# Patient Record
Sex: FEMALE | Race: WHITE | Employment: FULL TIME | URBAN - METROPOLITAN AREA
[De-identification: names, ages, dates, MRNs, and addresses within clinical notes are randomized per-mention and may not be internally consistent; named-entity substitution may affect disease eponyms.]

---

## 2017-02-02 PROBLEM — F10.10 ALCOHOL ABUSE: Status: ACTIVE | Noted: 2017-02-02

## 2017-02-02 PROBLEM — R45.851 SUICIDAL IDEATIONS: Status: ACTIVE | Noted: 2017-02-02

## 2018-08-03 ENCOUNTER — HOSPITAL ENCOUNTER (EMERGENCY)
Age: 43
Discharge: HOME | End: 2018-08-03
Payer: COMMERCIAL

## 2018-08-03 VITALS
SYSTOLIC BLOOD PRESSURE: 128 MMHG | HEART RATE: 103 BPM | BODY MASS INDEX: 36.95 KG/M2 | RESPIRATION RATE: 16 BRPM | WEIGHT: 243 LBS | DIASTOLIC BLOOD PRESSURE: 87 MMHG | OXYGEN SATURATION: 96 % | TEMPERATURE: 97.9 F

## 2018-08-03 DIAGNOSIS — J01.90 ACUTE NON-RECURRENT SINUSITIS, UNSPECIFIED LOCATION: Primary | ICD-10-CM

## 2018-08-03 PROCEDURE — 99212 OFFICE O/P EST SF 10 MIN: CPT

## 2018-08-03 RX ORDER — BROMPHENIRAMINE MALEATE, PSEUDOEPHEDRINE HYDROCHLORIDE, AND DEXTROMETHORPHAN HYDROBROMIDE 2; 30; 10 MG/5ML; MG/5ML; MG/5ML
10 SYRUP ORAL 4 TIMES DAILY PRN
Qty: 140 ML | Refills: 0 | Status: SHIPPED | OUTPATIENT
Start: 2018-08-03 | End: 2018-08-10

## 2018-08-03 RX ORDER — AMOXICILLIN AND CLAVULANATE POTASSIUM 875; 125 MG/1; MG/1
1 TABLET, FILM COATED ORAL 2 TIMES DAILY
Qty: 20 TABLET | Refills: 0 | Status: SHIPPED | OUTPATIENT
Start: 2018-08-03 | End: 2018-08-13

## 2018-08-03 NOTE — ED PROVIDER NOTES
the eyes. The sclera are normal.  ENT: The oropharynx demonstrates a small amount of erythema bilaterally. There is no tonsillar enlargement nor is there any exudate present. No uvular deviation or edema. No tonsillary asymmetry.  Floor of the mouth soft, no trismus, handling secretions. TMs bilaterally demonstrate no evidence of infection. Tender over the maxillary sinuses  Neck: Normal range of motion is achieved in the neck. There is no JVD present. No meningeal signs are present   Anterior cervical adenopathy is negative. Respiratory/chest: The chest is nontender. Breath sounds are normal. There is no respiratory distress.   Cardiovascular: Heart shows a regular rate and rhythm without murmurs clicks or gallops. Abdominal exam: The abdomen is non tender without evidence of peritoneal signs. Specific attention to the left upper quadrant with palpation of the spleen demonstrates no organomegaly or tenderness  Skin: warm and dry, without rash  Neurologic: GCS 15   Psych: Normal Affect  -------------------------------------------------- RESULTS -------------------------------------------------    LABS:  No results found for this visit on 08/03/18. RADIOLOGY:  Interpreted by Radiologist.  No orders to display         ------------------------------ ED COURSE/MEDICAL DECISION MAKING----------------------  Medications - No data to display    ED COURSE:         Medical Decision Making:     Patient is tender over the sinuses has ear pressure and sore throat. I will treat for sinusitis. I did put her on Augmentin and Bromfed cough syrup and advised her to follow-up with her doctor she does not improve        Counseling: The emergency provider has spoken with the patient and discussed todays results, in addition to providing specific details for the plan of care and counseling regarding the diagnosis and prognosis. Questions are answered at this time and they are agreeable with the plan. --------------------------------- IMPRESSION AND DISPOSITION ---------------------------------    IMPRESSION  1.  Acute non-recurrent sinusitis, unspecified location        DISPOSITION  Disposition: Discharge to home  Patient condition is good           Kary Rose, SRIDHAR - GIO  08/03/18 [id number]

## 2019-07-09 ENCOUNTER — APPOINTMENT (OUTPATIENT)
Dept: ULTRASOUND IMAGING | Age: 44
DRG: 683 | End: 2019-07-09
Payer: COMMERCIAL

## 2019-07-09 ENCOUNTER — HOSPITAL ENCOUNTER (INPATIENT)
Age: 44
LOS: 3 days | Discharge: HOME | DRG: 683 | End: 2019-07-12
Attending: EMERGENCY MEDICINE | Admitting: INTERNAL MEDICINE
Payer: COMMERCIAL

## 2019-07-09 ENCOUNTER — HOSPITAL ENCOUNTER (EMERGENCY)
Age: 44
Discharge: ANOTHER HOSPITAL | DRG: 683 | End: 2019-07-09
Payer: COMMERCIAL

## 2019-07-09 VITALS
SYSTOLIC BLOOD PRESSURE: 123 MMHG | RESPIRATION RATE: 18 BRPM | HEART RATE: 85 BPM | TEMPERATURE: 98.5 F | WEIGHT: 214 LBS | DIASTOLIC BLOOD PRESSURE: 69 MMHG | HEIGHT: 68 IN | OXYGEN SATURATION: 100 % | BODY MASS INDEX: 32.43 KG/M2

## 2019-07-09 DIAGNOSIS — D69.6 THROMBOCYTOPENIA (HCC): ICD-10-CM

## 2019-07-09 DIAGNOSIS — E87.6 HYPOKALEMIA: ICD-10-CM

## 2019-07-09 DIAGNOSIS — D64.9 ANEMIA, UNSPECIFIED TYPE: ICD-10-CM

## 2019-07-09 DIAGNOSIS — R23.3 PETECHIAL RASH: Primary | ICD-10-CM

## 2019-07-09 DIAGNOSIS — N17.9 AKI (ACUTE KIDNEY INJURY) (HCC): ICD-10-CM

## 2019-07-09 DIAGNOSIS — R21 RASH: Primary | ICD-10-CM

## 2019-07-09 DIAGNOSIS — E83.42 HYPOMAGNESEMIA: ICD-10-CM

## 2019-07-09 LAB
ALBUMIN SERPL-MCNC: 3.8 G/DL (ref 3.5–5.2)
ALP BLD-CCNC: 160 U/L (ref 35–104)
ALT SERPL-CCNC: 21 U/L (ref 0–32)
ANION GAP SERPL CALCULATED.3IONS-SCNC: 17 MMOL/L (ref 7–16)
AST SERPL-CCNC: 57 U/L (ref 0–31)
BASOPHILS ABSOLUTE: 0 E9/L (ref 0–0.2)
BASOPHILS RELATIVE PERCENT: 0.6 % (ref 0–2)
BILIRUB SERPL-MCNC: 1.8 MG/DL (ref 0–1.2)
BILIRUBIN DIRECT: 0.8 MG/DL (ref 0–0.3)
BILIRUBIN URINE: NEGATIVE
BILIRUBIN, INDIRECT: 1 MG/DL (ref 0–1)
BLOOD, URINE: ABNORMAL
BUN BLDV-MCNC: 23 MG/DL (ref 6–20)
CALCIUM SERPL-MCNC: 8.9 MG/DL (ref 8.6–10.2)
CHLORIDE BLD-SCNC: 94 MMOL/L (ref 98–107)
CLARITY: CLEAR
CO2: 24 MMOL/L (ref 22–29)
COLOR: YELLOW
CREAT SERPL-MCNC: 1.7 MG/DL (ref 0.5–1)
EOSINOPHILS ABSOLUTE: 0.09 E9/L (ref 0.05–0.5)
EOSINOPHILS RELATIVE PERCENT: 1.7 % (ref 0–6)
GFR AFRICAN AMERICAN: 39
GFR NON-AFRICAN AMERICAN: 32 ML/MIN/1.73
GLUCOSE BLD-MCNC: 105 MG/DL (ref 74–99)
GLUCOSE URINE: NEGATIVE MG/DL
HCG, URINE, POC: NEGATIVE
HCT VFR BLD CALC: 24.9 % (ref 34–48)
HEMOGLOBIN: 8.2 G/DL (ref 11.5–15.5)
HYPOCHROMIA: ABNORMAL
KETONES, URINE: NEGATIVE MG/DL
LEUKOCYTE ESTERASE, URINE: NEGATIVE
LYMPHOCYTES ABSOLUTE: 1.2 E9/L (ref 1.5–4)
LYMPHOCYTES RELATIVE PERCENT: 24.3 % (ref 20–42)
Lab: NORMAL
MAGNESIUM: 1.2 MG/DL (ref 1.6–2.6)
MCH RBC QN AUTO: 30.5 PG (ref 26–35)
MCHC RBC AUTO-ENTMCNC: 32.9 % (ref 32–34.5)
MCV RBC AUTO: 92.6 FL (ref 80–99.9)
MONOCYTES ABSOLUTE: 0.15 E9/L (ref 0.1–0.95)
MONOCYTES RELATIVE PERCENT: 2.6 % (ref 2–12)
NEGATIVE QC PASS/FAIL: NORMAL
NEUTROPHILS ABSOLUTE: 3.55 E9/L (ref 1.8–7.3)
NEUTROPHILS RELATIVE PERCENT: 71.3 % (ref 43–80)
NITRITE, URINE: NEGATIVE
PDW BLD-RTO: 12.9 FL (ref 11.5–15)
PH UA: 6 (ref 5–9)
PLATELET # BLD: 68 E9/L (ref 130–450)
PLATELET CONFIRMATION: NORMAL
PMV BLD AUTO: 11.9 FL (ref 7–12)
POIKILOCYTES: ABNORMAL
POLYCHROMASIA: ABNORMAL
POSITIVE QC PASS/FAIL: NORMAL
POTASSIUM REFLEX MAGNESIUM: 3.2 MMOL/L (ref 3.5–5)
PROTEIN UA: NEGATIVE MG/DL
RBC # BLD: 2.69 E12/L (ref 3.5–5.5)
SEDIMENTATION RATE, ERYTHROCYTE: 95 MM/HR (ref 0–20)
SODIUM BLD-SCNC: 135 MMOL/L (ref 132–146)
SPECIFIC GRAVITY UA: <=1.005 (ref 1–1.03)
TEAR DROP CELLS: ABNORMAL
TOTAL PROTEIN: 8.4 G/DL (ref 6.4–8.3)
UROBILINOGEN, URINE: 0.2 E.U./DL
WBC # BLD: 5 E9/L (ref 4.5–11.5)

## 2019-07-09 PROCEDURE — 76770 US EXAM ABDO BACK WALL COMP: CPT

## 2019-07-09 PROCEDURE — 36415 COLL VENOUS BLD VENIPUNCTURE: CPT

## 2019-07-09 PROCEDURE — 80048 BASIC METABOLIC PNL TOTAL CA: CPT

## 2019-07-09 PROCEDURE — 83735 ASSAY OF MAGNESIUM: CPT

## 2019-07-09 PROCEDURE — 85651 RBC SED RATE NONAUTOMATED: CPT

## 2019-07-09 PROCEDURE — 1200000000 HC SEMI PRIVATE

## 2019-07-09 PROCEDURE — 80307 DRUG TEST PRSMV CHEM ANLYZR: CPT

## 2019-07-09 PROCEDURE — 84133 ASSAY OF URINE POTASSIUM: CPT

## 2019-07-09 PROCEDURE — 82570 ASSAY OF URINE CREATININE: CPT

## 2019-07-09 PROCEDURE — 85025 COMPLETE CBC W/AUTO DIFF WBC: CPT

## 2019-07-09 PROCEDURE — 2580000003 HC RX 258: Performed by: INTERNAL MEDICINE

## 2019-07-09 PROCEDURE — 6370000000 HC RX 637 (ALT 250 FOR IP): Performed by: EMERGENCY MEDICINE

## 2019-07-09 PROCEDURE — 99284 EMERGENCY DEPT VISIT MOD MDM: CPT

## 2019-07-09 PROCEDURE — 6370000000 HC RX 637 (ALT 250 FOR IP): Performed by: INTERNAL MEDICINE

## 2019-07-09 PROCEDURE — 96365 THER/PROPH/DIAG IV INF INIT: CPT

## 2019-07-09 PROCEDURE — 81001 URINALYSIS AUTO W/SCOPE: CPT

## 2019-07-09 PROCEDURE — 6360000002 HC RX W HCPCS: Performed by: INTERNAL MEDICINE

## 2019-07-09 PROCEDURE — 6360000002 HC RX W HCPCS: Performed by: EMERGENCY MEDICINE

## 2019-07-09 PROCEDURE — 99212 OFFICE O/P EST SF 10 MIN: CPT

## 2019-07-09 PROCEDURE — 87205 SMEAR GRAM STAIN: CPT

## 2019-07-09 PROCEDURE — 84300 ASSAY OF URINE SODIUM: CPT

## 2019-07-09 PROCEDURE — 80076 HEPATIC FUNCTION PANEL: CPT

## 2019-07-09 PROCEDURE — 82436 ASSAY OF URINE CHLORIDE: CPT

## 2019-07-09 RX ORDER — LORAZEPAM 2 MG/ML
1 INJECTION INTRAMUSCULAR ONCE
Status: COMPLETED | OUTPATIENT
Start: 2019-07-09 | End: 2019-07-09

## 2019-07-09 RX ORDER — SODIUM CHLORIDE, SODIUM LACTATE, POTASSIUM CHLORIDE, CALCIUM CHLORIDE 600; 310; 30; 20 MG/100ML; MG/100ML; MG/100ML; MG/100ML
INJECTION, SOLUTION INTRAVENOUS CONTINUOUS
Status: DISCONTINUED | OUTPATIENT
Start: 2019-07-09 | End: 2019-07-12 | Stop reason: HOSPADM

## 2019-07-09 RX ORDER — ONDANSETRON 2 MG/ML
4 INJECTION INTRAMUSCULAR; INTRAVENOUS EVERY 6 HOURS PRN
Status: DISCONTINUED | OUTPATIENT
Start: 2019-07-09 | End: 2019-07-12 | Stop reason: HOSPADM

## 2019-07-09 RX ORDER — ACETAMINOPHEN 325 MG/1
650 TABLET ORAL EVERY 4 HOURS PRN
Status: DISCONTINUED | OUTPATIENT
Start: 2019-07-09 | End: 2019-07-12 | Stop reason: HOSPADM

## 2019-07-09 RX ORDER — POTASSIUM CHLORIDE 20 MEQ/1
40 TABLET, EXTENDED RELEASE ORAL ONCE
Status: COMPLETED | OUTPATIENT
Start: 2019-07-09 | End: 2019-07-09

## 2019-07-09 RX ORDER — HYDROCODONE BITARTRATE AND ACETAMINOPHEN 5; 325 MG/1; MG/1
1 TABLET ORAL EVERY 6 HOURS PRN
Status: DISCONTINUED | OUTPATIENT
Start: 2019-07-09 | End: 2019-07-12 | Stop reason: HOSPADM

## 2019-07-09 RX ORDER — POTASSIUM CHLORIDE AND SODIUM CHLORIDE 900; 300 MG/100ML; MG/100ML
INJECTION, SOLUTION INTRAVENOUS CONTINUOUS
Status: DISCONTINUED | OUTPATIENT
Start: 2019-07-09 | End: 2019-07-09

## 2019-07-09 RX ORDER — HYDROCODONE BITARTRATE AND ACETAMINOPHEN 5; 325 MG/1; MG/1
1 TABLET ORAL ONCE
Status: COMPLETED | OUTPATIENT
Start: 2019-07-09 | End: 2019-07-09

## 2019-07-09 RX ORDER — SODIUM CHLORIDE 0.9 % (FLUSH) 0.9 %
10 SYRINGE (ML) INJECTION PRN
Status: DISCONTINUED | OUTPATIENT
Start: 2019-07-09 | End: 2019-07-12 | Stop reason: HOSPADM

## 2019-07-09 RX ORDER — DIPHENHYDRAMINE HCL 25 MG
25 TABLET ORAL EVERY 6 HOURS PRN
Status: DISCONTINUED | OUTPATIENT
Start: 2019-07-09 | End: 2019-07-12 | Stop reason: HOSPADM

## 2019-07-09 RX ORDER — SODIUM CHLORIDE 0.9 % (FLUSH) 0.9 %
10 SYRINGE (ML) INJECTION EVERY 12 HOURS SCHEDULED
Status: DISCONTINUED | OUTPATIENT
Start: 2019-07-09 | End: 2019-07-12 | Stop reason: HOSPADM

## 2019-07-09 RX ORDER — MAGNESIUM SULFATE 1 G/100ML
1 INJECTION INTRAVENOUS ONCE
Status: COMPLETED | OUTPATIENT
Start: 2019-07-09 | End: 2019-07-09

## 2019-07-09 RX ADMIN — MAGNESIUM SULFATE HEPTAHYDRATE 1 G: 1 INJECTION, SOLUTION INTRAVENOUS at 18:52

## 2019-07-09 RX ADMIN — HYDROCODONE BITARTRATE AND ACETAMINOPHEN 1 TABLET: 5; 325 TABLET ORAL at 23:53

## 2019-07-09 RX ADMIN — LORAZEPAM 1 MG: 2 INJECTION INTRAMUSCULAR; INTRAVENOUS at 23:53

## 2019-07-09 RX ADMIN — HYDROCODONE BITARTRATE AND ACETAMINOPHEN 1 TABLET: 5; 325 TABLET ORAL at 16:26

## 2019-07-09 RX ADMIN — SODIUM CHLORIDE, POTASSIUM CHLORIDE, SODIUM LACTATE AND CALCIUM CHLORIDE: 600; 310; 30; 20 INJECTION, SOLUTION INTRAVENOUS at 22:45

## 2019-07-09 RX ADMIN — POTASSIUM CHLORIDE 40 MEQ: 20 TABLET, EXTENDED RELEASE ORAL at 18:52

## 2019-07-09 RX ADMIN — DIPHENHYDRAMINE HCL 25 MG: 25 TABLET ORAL at 22:45

## 2019-07-09 RX ADMIN — ACETAMINOPHEN 650 MG: 325 TABLET, FILM COATED ORAL at 22:44

## 2019-07-09 ASSESSMENT — PAIN DESCRIPTION - PROGRESSION: CLINICAL_PROGRESSION: GRADUALLY WORSENING

## 2019-07-09 ASSESSMENT — PAIN DESCRIPTION - PAIN TYPE: TYPE: ACUTE PAIN

## 2019-07-09 ASSESSMENT — PAIN DESCRIPTION - ONSET: ONSET: GRADUAL

## 2019-07-09 ASSESSMENT — PAIN SCALES - GENERAL
PAINLEVEL_OUTOF10: 8
PAINLEVEL_OUTOF10: 7
PAINLEVEL_OUTOF10: 6
PAINLEVEL_OUTOF10: 5
PAINLEVEL_OUTOF10: 7
PAINLEVEL_OUTOF10: 7
PAINLEVEL_OUTOF10: 6

## 2019-07-09 ASSESSMENT — PAIN DESCRIPTION - ORIENTATION: ORIENTATION: RIGHT;LEFT

## 2019-07-09 ASSESSMENT — PAIN DESCRIPTION - DESCRIPTORS: DESCRIPTORS: ACHING;ITCHING

## 2019-07-09 ASSESSMENT — PAIN DESCRIPTION - LOCATION: LOCATION: LEG

## 2019-07-09 ASSESSMENT — PAIN DESCRIPTION - FREQUENCY: FREQUENCY: CONTINUOUS

## 2019-07-09 NOTE — ED PROVIDER NOTES
been reviewed. Allergies: Patient has no known allergies.     -------------------------------------------------- RESULTS -------------------------------------------------  All laboratory and radiology results have been personally reviewed by myself   LABS:  Results for orders placed or performed during the hospital encounter of 07/09/19   CBC Auto Differential   Result Value Ref Range    WBC 5.0 4.5 - 11.5 E9/L    RBC 2.69 (L) 3.50 - 5.50 E12/L    Hemoglobin 8.2 (L) 11.5 - 15.5 g/dL    Hematocrit 24.9 (L) 34.0 - 48.0 %    MCV 92.6 80.0 - 99.9 fL    MCH 30.5 26.0 - 35.0 pg    MCHC 32.9 32.0 - 34.5 %    RDW 12.9 11.5 - 15.0 fL    Platelets 68 (L) 663 - 450 E9/L    MPV 11.9 7.0 - 12.0 fL    Neutrophils % 71.3 43.0 - 80.0 %    Lymphocytes % 24.3 20.0 - 42.0 %    Monocytes % 2.6 2.0 - 12.0 %    Eosinophils % 1.7 0.0 - 6.0 %    Basophils % 0.6 0.0 - 2.0 %    Neutrophils # 3.55 1.80 - 7.30 E9/L    Lymphocytes # 1.20 (L) 1.50 - 4.00 E9/L    Monocytes # 0.15 0.10 - 0.95 E9/L    Eosinophils # 0.09 0.05 - 0.50 E9/L    Basophils # 0.00 0.00 - 0.20 E9/L    Polychromasia 1+     Hypochromia 1+     Poikilocytes 1+     Tear Drop Cells 1+    Basic Metabolic Panel w/ Reflex to MG   Result Value Ref Range    Sodium 135 132 - 146 mmol/L    Potassium reflex Magnesium 3.2 (L) 3.5 - 5.0 mmol/L    Chloride 94 (L) 98 - 107 mmol/L    CO2 24 22 - 29 mmol/L    Anion Gap 17 (H) 7 - 16 mmol/L    Glucose 105 (H) 74 - 99 mg/dL    BUN 23 (H) 6 - 20 mg/dL    CREATININE 1.7 (H) 0.5 - 1.0 mg/dL    GFR Non-African American 32 >=60 mL/min/1.73    GFR African American 39     Calcium 8.9 8.6 - 10.2 mg/dL   Sedimentation Rate   Result Value Ref Range    Sed Rate 95 (H) 0 - 20 mm/Hr   Platelet Confirmation   Result Value Ref Range    Platelet Confirmation CONFIRMED    Magnesium   Result Value Ref Range    Magnesium 1.2 (L) 1.6 - 2.6 mg/dL   Hepatic Function Panel   Result Value Ref Range    Total Protein 8.4 (H) 6.4 - 8.3 g/dL    Alb 3.8 3.5 - 5.2 g/dL

## 2019-07-10 ENCOUNTER — APPOINTMENT (OUTPATIENT)
Dept: CT IMAGING | Age: 44
DRG: 683 | End: 2019-07-10
Payer: COMMERCIAL

## 2019-07-10 LAB
ALBUMIN SERPL-MCNC: 3.5 G/DL (ref 3.5–5.2)
ALP BLD-CCNC: 139 U/L (ref 35–104)
ALT SERPL-CCNC: 18 U/L (ref 0–32)
AMPHETAMINE SCREEN, URINE: NOT DETECTED
ANION GAP SERPL CALCULATED.3IONS-SCNC: 12 MMOL/L (ref 7–16)
ANTISTREPTOLYSIN-O: 175 IU/ML (ref 0–200)
AST SERPL-CCNC: 50 U/L (ref 0–31)
BACTERIA: NORMAL /HPF
BARBITURATE SCREEN URINE: NOT DETECTED
BASOPHILIC STIPPLING: ABNORMAL
BASOPHILS ABSOLUTE: 0.03 E9/L (ref 0–0.2)
BASOPHILS RELATIVE PERCENT: 1 % (ref 0–2)
BENZODIAZEPINE SCREEN, URINE: NOT DETECTED
BILIRUB SERPL-MCNC: 1.5 MG/DL (ref 0–1.2)
BUN BLDV-MCNC: 16 MG/DL (ref 6–20)
CALCIUM SERPL-MCNC: 8.6 MG/DL (ref 8.6–10.2)
CANNABINOID SCREEN URINE: NOT DETECTED
CHLORIDE BLD-SCNC: 94 MMOL/L (ref 98–107)
CHLORIDE URINE RANDOM: <20 MMOL/L
CHOLESTEROL, TOTAL: 149 MG/DL (ref 0–199)
CO2: 24 MMOL/L (ref 22–29)
COCAINE METABOLITE SCREEN URINE: NOT DETECTED
CREAT SERPL-MCNC: 1.4 MG/DL (ref 0.5–1)
CREATININE URINE: 68 MG/DL (ref 29–226)
EOSINOPHIL, URINE: 0 % (ref 0–1)
EOSINOPHILS ABSOLUTE: 0.03 E9/L (ref 0.05–0.5)
EOSINOPHILS RELATIVE PERCENT: 1 % (ref 0–6)
EPITHELIAL CELLS, UA: NORMAL /HPF
GFR AFRICAN AMERICAN: 49
GFR NON-AFRICAN AMERICAN: 41 ML/MIN/1.73
GLUCOSE BLD-MCNC: 101 MG/DL (ref 74–99)
HCT VFR BLD CALC: 21.1 % (ref 34–48)
HDLC SERPL-MCNC: 46 MG/DL
HEMOGLOBIN: 7.3 G/DL (ref 11.5–15.5)
HYPOCHROMIA: ABNORMAL
LACTATE DEHYDROGENASE: 162 U/L (ref 135–214)
LDL CHOLESTEROL CALCULATED: 89 MG/DL (ref 0–99)
LYMPHOCYTES ABSOLUTE: 0.78 E9/L (ref 1.5–4)
LYMPHOCYTES RELATIVE PERCENT: 27 % (ref 20–42)
MAGNESIUM: 1.6 MG/DL (ref 1.6–2.6)
MCH RBC QN AUTO: 32 PG (ref 26–35)
MCHC RBC AUTO-ENTMCNC: 34.6 % (ref 32–34.5)
MCV RBC AUTO: 92.5 FL (ref 80–99.9)
METHADONE SCREEN, URINE: NOT DETECTED
MONOCYTES ABSOLUTE: 0.09 E9/L (ref 0.1–0.95)
MONOCYTES RELATIVE PERCENT: 3 % (ref 2–12)
NEUTROPHILS ABSOLUTE: 1.97 E9/L (ref 1.8–7.3)
NEUTROPHILS RELATIVE PERCENT: 68 % (ref 43–80)
OPIATE SCREEN URINE: NOT DETECTED
PATHOLOGIST REVIEW: NORMAL
PDW BLD-RTO: 12.9 FL (ref 11.5–15)
PHENCYCLIDINE SCREEN URINE: NOT DETECTED
PHOSPHORUS: 3.3 MG/DL (ref 2.5–4.5)
PLATELET # BLD: 60 E9/L (ref 130–450)
PLATELET CONFIRMATION: NORMAL
PMV BLD AUTO: 11.3 FL (ref 7–12)
POLYCHROMASIA: ABNORMAL
POTASSIUM SERPL-SCNC: 3.4 MMOL/L (ref 3.5–5)
POTASSIUM, UR: 14.5 MMOL/L
PROPOXYPHENE SCREEN: NOT DETECTED
RBC # BLD: 2.28 E12/L (ref 3.5–5.5)
RBC UA: NORMAL /HPF (ref 0–2)
SODIUM BLD-SCNC: 130 MMOL/L (ref 132–146)
SODIUM URINE: <20 MMOL/L
T4 FREE: 1.11 NG/DL (ref 0.93–1.7)
TOTAL PROTEIN: 7.4 G/DL (ref 6.4–8.3)
TRIGL SERPL-MCNC: 71 MG/DL (ref 0–149)
TSH SERPL DL<=0.05 MIU/L-ACNC: 20.28 UIU/ML (ref 0.27–4.2)
VLDLC SERPL CALC-MCNC: 14 MG/DL
WBC # BLD: 2.9 E9/L (ref 4.5–11.5)
WBC UA: NORMAL /HPF (ref 0–5)

## 2019-07-10 PROCEDURE — 36415 COLL VENOUS BLD VENIPUNCTURE: CPT

## 2019-07-10 PROCEDURE — 84443 ASSAY THYROID STIM HORMONE: CPT

## 2019-07-10 PROCEDURE — 80074 ACUTE HEPATITIS PANEL: CPT

## 2019-07-10 PROCEDURE — 84439 ASSAY OF FREE THYROXINE: CPT

## 2019-07-10 PROCEDURE — 85025 COMPLETE CBC W/AUTO DIFF WBC: CPT

## 2019-07-10 PROCEDURE — 83615 LACTATE (LD) (LDH) ENZYME: CPT

## 2019-07-10 PROCEDURE — 86703 HIV-1/HIV-2 1 RESULT ANTBDY: CPT

## 2019-07-10 PROCEDURE — 87040 BLOOD CULTURE FOR BACTERIA: CPT

## 2019-07-10 PROCEDURE — 74176 CT ABD & PELVIS W/O CONTRAST: CPT

## 2019-07-10 PROCEDURE — 82595 ASSAY OF CRYOGLOBULIN: CPT

## 2019-07-10 PROCEDURE — 86147 CARDIOLIPIN ANTIBODY EA IG: CPT

## 2019-07-10 PROCEDURE — 51798 US URINE CAPACITY MEASURE: CPT

## 2019-07-10 PROCEDURE — 6360000004 HC RX CONTRAST MEDICATION: Performed by: RADIOLOGY

## 2019-07-10 PROCEDURE — 83735 ASSAY OF MAGNESIUM: CPT

## 2019-07-10 PROCEDURE — 84100 ASSAY OF PHOSPHORUS: CPT

## 2019-07-10 PROCEDURE — 6370000000 HC RX 637 (ALT 250 FOR IP): Performed by: INTERNAL MEDICINE

## 2019-07-10 PROCEDURE — 80053 COMPREHEN METABOLIC PANEL: CPT

## 2019-07-10 PROCEDURE — 6360000002 HC RX W HCPCS: Performed by: INTERNAL MEDICINE

## 2019-07-10 PROCEDURE — 86060 ANTISTREPTOLYSIN O TITER: CPT

## 2019-07-10 PROCEDURE — 86162 COMPLEMENT TOTAL (CH50): CPT

## 2019-07-10 PROCEDURE — 80061 LIPID PANEL: CPT

## 2019-07-10 PROCEDURE — 86255 FLUORESCENT ANTIBODY SCREEN: CPT

## 2019-07-10 PROCEDURE — 2580000003 HC RX 258: Performed by: INTERNAL MEDICINE

## 2019-07-10 PROCEDURE — 1200000000 HC SEMI PRIVATE

## 2019-07-10 RX ORDER — POTASSIUM CHLORIDE 20 MEQ/1
40 TABLET, EXTENDED RELEASE ORAL ONCE
Status: COMPLETED | OUTPATIENT
Start: 2019-07-10 | End: 2019-07-10

## 2019-07-10 RX ORDER — LORAZEPAM 2 MG/ML
1 INJECTION INTRAMUSCULAR EVERY 6 HOURS PRN
Status: DISCONTINUED | OUTPATIENT
Start: 2019-07-10 | End: 2019-07-12 | Stop reason: HOSPADM

## 2019-07-10 RX ORDER — LEVOTHYROXINE SODIUM 0.05 MG/1
50 TABLET ORAL DAILY
Status: DISCONTINUED | OUTPATIENT
Start: 2019-07-10 | End: 2019-07-12 | Stop reason: HOSPADM

## 2019-07-10 RX ADMIN — LORAZEPAM 1 MG: 2 INJECTION INTRAMUSCULAR; INTRAVENOUS at 22:20

## 2019-07-10 RX ADMIN — LORAZEPAM 1 MG: 2 INJECTION INTRAMUSCULAR; INTRAVENOUS at 10:00

## 2019-07-10 RX ADMIN — SODIUM CHLORIDE, POTASSIUM CHLORIDE, SODIUM LACTATE AND CALCIUM CHLORIDE: 600; 310; 30; 20 INJECTION, SOLUTION INTRAVENOUS at 20:20

## 2019-07-10 RX ADMIN — IOHEXOL 50 ML: 240 INJECTION, SOLUTION INTRATHECAL; INTRAVASCULAR; INTRAVENOUS; ORAL at 12:45

## 2019-07-10 RX ADMIN — HYDROCODONE BITARTRATE AND ACETAMINOPHEN 1 TABLET: 5; 325 TABLET ORAL at 06:52

## 2019-07-10 RX ADMIN — POTASSIUM CHLORIDE 40 MEQ: 20 TABLET, EXTENDED RELEASE ORAL at 10:01

## 2019-07-10 RX ADMIN — HYDROCODONE BITARTRATE AND ACETAMINOPHEN 1 TABLET: 5; 325 TABLET ORAL at 17:24

## 2019-07-10 ASSESSMENT — PAIN DESCRIPTION - ORIENTATION
ORIENTATION: RIGHT;LEFT
ORIENTATION: RIGHT;LEFT

## 2019-07-10 ASSESSMENT — PAIN SCALES - GENERAL
PAINLEVEL_OUTOF10: 5
PAINLEVEL_OUTOF10: 6
PAINLEVEL_OUTOF10: 5
PAINLEVEL_OUTOF10: 2
PAINLEVEL_OUTOF10: 5
PAINLEVEL_OUTOF10: 6

## 2019-07-10 ASSESSMENT — PAIN DESCRIPTION - ONSET: ONSET: GRADUAL

## 2019-07-10 ASSESSMENT — PAIN DESCRIPTION - PROGRESSION: CLINICAL_PROGRESSION: NOT CHANGED

## 2019-07-10 ASSESSMENT — PAIN - FUNCTIONAL ASSESSMENT
PAIN_FUNCTIONAL_ASSESSMENT: ACTIVITIES ARE NOT PREVENTED
PAIN_FUNCTIONAL_ASSESSMENT: ACTIVITIES ARE NOT PREVENTED

## 2019-07-10 ASSESSMENT — PAIN DESCRIPTION - LOCATION
LOCATION: LEG
LOCATION: LEG

## 2019-07-10 ASSESSMENT — PAIN DESCRIPTION - FREQUENCY: FREQUENCY: CONTINUOUS

## 2019-07-10 ASSESSMENT — PAIN DESCRIPTION - DESCRIPTORS
DESCRIPTORS: ACHING;DISCOMFORT;DULL
DESCRIPTORS: ACHING

## 2019-07-10 ASSESSMENT — PAIN DESCRIPTION - PAIN TYPE
TYPE: ACUTE PAIN
TYPE: ACUTE PAIN

## 2019-07-10 NOTE — CONSULTS
[address] [place] [address] [facility]  Consult Note    [address] [address]  [place], [address]  Phone [phone]   [phone]      Admit Date: 2019  2:17 PM  Pt Name: Jordon Laureano  MRN: [id number]  : 1975  Reason for Consult:    Chief Complaint   Patient presents with    Rash     sent over from urgent care for further eval of purpuric rash to lower ext     Requesting Physician:  Tera Pugh DO  PCP: Mary Jean MD  History Obtained From:  patient  ID consulted for rash on hospital day  59       Chief Complaint   Patient presents with    Rash     sent over from urgent care for further eval of purpuric rash to lower ext     HISTORYOF PRESENT ILLNESS      Jordon Laureano is a 38 y.o. female who presents with significant past medical history of  has a past medical history of Acute renal failure (ARF) ([facility] [address] Anxiety, Crohn's colitis ([facility] [address] Depression, History of blood transfusion, Pyoderma gangrenosum, and Thyroid disease. who presents with   Chief Complaint   Patient presents with    Rash     sent over from urgent care for further eval of purpuric rash to lower ext     ED TRIAGEVITALS  BP: (!) 116/55, Temp: 97.9 °F (36.6 °C), Pulse: 85, Resp: 20, SpO2: 98 %  HPI   This is a 69-year-old female that presents to urgent care with a rash. The patient is a . She has been to various locations over the past several weeks such as [facility] and [facility]. 2 days ago in [place] she developed a rash to her lower legs. She then went to [address] [place] [address] Before that she was home. She did not go out anywhere. She states the rash is uncomfortable and slightly itchy. She does state a history of an autoimmune disease she states she had Pyoderma Gargrenosum with h/o crohn  She states as a result of this problem in the past she has had to have surgery to the right forearm area. She denies any fever or chills. No other body aches.   No sore throat or sores in the mouth.  No malaise. She does state when she was a child having the normal immunizations. She has a son. She has a cat. SHe walks in her neighborhood. No going through wooded areas. 1 month ago had abscess removed on her buttock. She has an apt with Dr Sanjuana Waters    (2-9 systems for level 4, 10 or more for level 5)     REVIEW OFSYSTEMS:    CONSTITUTIONAL:   No fever, chills, weight loss  ALLERGIES:    No urticaria, hay fever,    EYES:     No blurry vision, loss of vision,eye pain  ENT:      No hearing loss, sore throat  CARDIOVASCULAR:  No chest pain or palpitations  RESPIRATORY:   No cough, sob  ENDOCRINE:    No increase thirst, urination   HEME-LYMPH:   No easy bruising or bleeding  GI:     No nausea, vomiting or diarrhea  :     No urinary complaints  NEURO:    No seizures, stroke, HA  MUSCULOSKELETAL:  No muscle aches or pain, no jointpain  SKIN:      rash no itch  PSYCH:    No depression or anxiety    CURRENT MEDICATIONS     Current Facility-Administered Medications:     sodium chloride flush 0.9 % injection 10 mL, 10 mL, Intravenous, 2 times per day, Joshua Fuel, DO    sodium chloride flush 0.9 % injection 10 mL, 10 mL, Intravenous, PRN, Joshua Fuel, DO    acetaminophen (TYLENOL) tablet 650 mg, 650 mg, Oral, Q4H PRN, Joshua Fuel, DO, 650 mg at 07/09/19 2244    ondansetron (ZOFRAN) injection 4 mg, 4 mg, Intravenous, Q6H PRN, Joshua Fuel, DO    diphenhydrAMINE (BENADRYL) tablet 25 mg, 25 mg, Oral, Q6H PRN, Joshua Fuel, DO, 25 mg at 07/09/19 2245    lactated ringers infusion, , Intravenous, Continuous, Sheila Frank MD, Last Rate: 100 mL/hr at 07/09/19 2245    HYDROcodone-acetaminophen (NORCO) 5-325 MG per tablet 1 tablet, 1 tablet, Oral, Q6H PRN, Joshua Fuel, DO, 1 tablet at 07/10/19 7749  ALLERGIES     Patient has no known allergies. There is no immunization history on file for this patient.   PAST MEDICAL HISTORY     Past Medical History:   Diagnosis Date    Acute renal FEW /HPF    Bacteria, UA NONE /HPF   Platelet Confirmation   Result Value Ref Range    Platelet Confirmation CONFIRMED    POC Pregnancy Urine   Result Value Ref Range    HCG, Urine, POC Negative Negative    Lot Number [id number]     Positive QC Pass/Fail Pass     Negative QC Pass/Fail Pass      MICROBIOLOGY:    Urine Culture, Routine   Date Value Ref Range Status   10/03/2016 <10,000 CFU/mL  Mixed gram positive organisms    Final     No results found for: [address]     No results for input(s): LP1UAG in the last 72 hours. No results for input(s): STREPNEUMAGU in the last 72 hours. Patient is a 38 y.o. female who presented with   Chief Complaint   Patient presents with    Rash     sent over from urgent care for further eval of purpuric rash to lower ext        FINAL IMPRESSION    Leukopenia/thrombocytopenia  vasculitis  1. Petechial rash    2. Anemia, unspecified type    3. Thrombocytopenia ([address]    4. SEVEN (acute kidney injury) ([address]    5. Hypokalemia    6. Hypomagnesemia        H/o Crohn/pyoderma gangrenosum  H/o abscesses    -check labs  hiv hep panel  parvo  rpr  mycoplasma  t cell  ebv  aso  wnl   [id number]    See orders  Dr Bere Cooper to see  May need heme to see    Imaging and labs were reviewed per medical records and any ID pertinent labs were addressed with the patient. The patient/FAMILY  was educated about the diagnosis, prognosis, indications, risks and benefits of treatment. The patient/FAMILY is in agreement with the proposed medical treatment plan. An opportunity to ask questions was given to the patient/FAMILY and questions were answered. Thank you for the consult. We will follow with you.        Electronically signed by Bonnie Griffith MD on 7/10/2019 at 7:15 AM

## 2019-07-10 NOTE — H&P
Department of Internal Medicine  History and Physical    PCP: Dr. Rach Mejia Physician: Dr. Adamaris Espino  Consultants: Nephrology, ID, Dermatology      CHIEF COMPLAINT:  Petechial rash    HISTORY OF PRESENT ILLNESS:    Sissy Felton is a very polite and pleasant 41-year-old female who presented to [address] [place] [address] emergency department for the evaluation of worsening petechial rash involving bilateral lower extremities and to a lesser extent her upper extremities. She states this occurred on Sunday and has actually improved to some extent. Her past medical history includes untreated Crohn's disease, anxiety, and a history of pyoderma gangrenosum. She denies any inciting factor to the development of this rash including GI illness. Upon presentation to the emergency department, multiple lab abnormalities were identified including thrombocytopenia, anemia, and acute renal failure. The patient is asymptomatic aside from the rash. The patient does have a significant exposure to the public as she works as a . She has been out of the country recently with a trip to [facility]. Otherwise, she denies any known sick contacts. She has never had a reaction like this before. PAST MEDICAL Hx:  Past Medical History:   Diagnosis Date    Acute renal failure (ARF) ([facility] [address] 7/9/2019    Anxiety     Crohn's colitis ([facility] [address]     Depression     History of blood transfusion     Pyoderma gangrenosum     Thyroid disease        PAST SURGICAL Hx:   Past Surgical History:   Procedure Laterality Date    ABDOMEN SURGERY      pt has a J-pouch     ARM DEBRIDEMENT      R arm-2010-2011??    COLONOSCOPY      DILATATION, ESOPHAGUS      ENDOSCOPY, COLON, DIAGNOSTIC         FAMILY Hx:  Family History   Problem Relation Age of Onset    Cancer Mother     Heart Disease Father     High Blood Pressure Sister        HOME MEDICATIONS:  Prior to Admission medications    Medication Sig Start Date End Date Taking?  Authorizing Provider

## 2019-07-11 ENCOUNTER — APPOINTMENT (OUTPATIENT)
Dept: ULTRASOUND IMAGING | Age: 44
DRG: 683 | End: 2019-07-11
Payer: COMMERCIAL

## 2019-07-11 LAB
ANION GAP SERPL CALCULATED.3IONS-SCNC: 12 MMOL/L (ref 7–16)
BASOPHILS ABSOLUTE: 0 E9/L (ref 0–0.2)
BASOPHILS RELATIVE PERCENT: 0.7 % (ref 0–2)
BUN BLDV-MCNC: 8 MG/DL (ref 6–20)
C3 COMPLEMENT: 117 MG/DL (ref 90–180)
C4 COMPLEMENT: 16 MG/DL (ref 10–40)
CALCIUM SERPL-MCNC: 9 MG/DL (ref 8.6–10.2)
CHLORIDE BLD-SCNC: 97 MMOL/L (ref 98–107)
CO2: 25 MMOL/L (ref 22–29)
CREAT SERPL-MCNC: 1.1 MG/DL (ref 0.5–1)
EOSINOPHILS ABSOLUTE: 0 E9/L (ref 0.05–0.5)
EOSINOPHILS RELATIVE PERCENT: 1.9 % (ref 0–6)
GFR AFRICAN AMERICAN: >60
GFR NON-AFRICAN AMERICAN: 54 ML/MIN/1.73
GLUCOSE BLD-MCNC: 97 MG/DL (ref 74–99)
HAPTOGLOBIN: 104 MG/DL (ref 30–200)
HAV IGM SER IA-ACNC: NORMAL
HCT VFR BLD CALC: 22 % (ref 34–48)
HEMOGLOBIN: 7.1 G/DL (ref 11.5–15.5)
HEPATITIS B CORE IGM ANTIBODY: NORMAL
HEPATITIS B SURFACE ANTIGEN INTERPRETATION: NORMAL
HEPATITIS C ANTIBODY INTERPRETATION: NORMAL
HIV-1 AND HIV-2 ANTIBODIES: NORMAL
LYMPHOCYTES ABSOLUTE: 1.08 E9/L (ref 1.5–4)
LYMPHOCYTES RELATIVE PERCENT: 40 % (ref 20–42)
MCH RBC QN AUTO: 30.3 PG (ref 26–35)
MCHC RBC AUTO-ENTMCNC: 32.3 % (ref 32–34.5)
MCV RBC AUTO: 94 FL (ref 80–99.9)
MONOCYTES ABSOLUTE: 0.03 E9/L (ref 0.1–0.95)
MONOCYTES RELATIVE PERCENT: 0.9 % (ref 2–12)
MYCOPLASMA PNEUMONIAE IGM: NORMAL
NEUTROPHILS ABSOLUTE: 1.59 E9/L (ref 1.8–7.3)
NEUTROPHILS RELATIVE PERCENT: 59.1 % (ref 43–80)
PDW BLD-RTO: 13.2 FL (ref 11.5–15)
PLATELET # BLD: 62 E9/L (ref 130–450)
PLATELET CONFIRMATION: NORMAL
PMV BLD AUTO: 11 FL (ref 7–12)
POIKILOCYTES: ABNORMAL
POLYCHROMASIA: ABNORMAL
POTASSIUM SERPL-SCNC: 4 MMOL/L (ref 3.5–5)
RBC # BLD: 2.34 E12/L (ref 3.5–5.5)
RHEUMATOID FACTOR: <10 IU/ML (ref 0–13)
SODIUM BLD-SCNC: 134 MMOL/L (ref 132–146)
TEAR DROP CELLS: ABNORMAL
WBC # BLD: 2.7 E9/L (ref 4.5–11.5)

## 2019-07-11 PROCEDURE — 86696 HERPES SIMPLEX TYPE 2 TEST: CPT

## 2019-07-11 PROCEDURE — 83010 ASSAY OF HAPTOGLOBIN QUANT: CPT

## 2019-07-11 PROCEDURE — 86747 PARVOVIRUS ANTIBODY: CPT

## 2019-07-11 PROCEDURE — 86694 HERPES SIMPLEX NES ANTBDY: CPT

## 2019-07-11 PROCEDURE — 86160 COMPLEMENT ANTIGEN: CPT

## 2019-07-11 PROCEDURE — 82105 ALPHA-FETOPROTEIN SERUM: CPT

## 2019-07-11 PROCEDURE — 86431 RHEUMATOID FACTOR QUANT: CPT

## 2019-07-11 PROCEDURE — 86592 SYPHILIS TEST NON-TREP QUAL: CPT

## 2019-07-11 PROCEDURE — 85613 RUSSELL VIPER VENOM DILUTED: CPT

## 2019-07-11 PROCEDURE — 80048 BASIC METABOLIC PNL TOTAL CA: CPT

## 2019-07-11 PROCEDURE — 86611 BARTONELLA ANTIBODY: CPT

## 2019-07-11 PROCEDURE — 86663 EPSTEIN-BARR ANTIBODY: CPT

## 2019-07-11 PROCEDURE — 1200000000 HC SEMI PRIVATE

## 2019-07-11 PROCEDURE — 86038 ANTINUCLEAR ANTIBODIES: CPT

## 2019-07-11 PROCEDURE — 36415 COLL VENOUS BLD VENIPUNCTURE: CPT

## 2019-07-11 PROCEDURE — 85025 COMPLETE CBC W/AUTO DIFF WBC: CPT

## 2019-07-11 PROCEDURE — 86162 COMPLEMENT TOTAL (CH50): CPT

## 2019-07-11 PROCEDURE — 86664 EPSTEIN-BARR NUCLEAR ANTIGEN: CPT

## 2019-07-11 PROCEDURE — 86665 EPSTEIN-BARR CAPSID VCA: CPT

## 2019-07-11 PROCEDURE — 86255 FLUORESCENT ANTIBODY SCREEN: CPT

## 2019-07-11 PROCEDURE — 86738 MYCOPLASMA ANTIBODY: CPT

## 2019-07-11 PROCEDURE — 6370000000 HC RX 637 (ALT 250 FOR IP): Performed by: INTERNAL MEDICINE

## 2019-07-11 PROCEDURE — 2580000003 HC RX 258: Performed by: INTERNAL MEDICINE

## 2019-07-11 PROCEDURE — 76705 ECHO EXAM OF ABDOMEN: CPT

## 2019-07-11 PROCEDURE — 86695 HERPES SIMPLEX TYPE 1 TEST: CPT

## 2019-07-11 PROCEDURE — 6360000002 HC RX W HCPCS: Performed by: INTERNAL MEDICINE

## 2019-07-11 PROCEDURE — 84165 PROTEIN E-PHORESIS SERUM: CPT

## 2019-07-11 RX ADMIN — HYDROCODONE BITARTRATE AND ACETAMINOPHEN 1 TABLET: 5; 325 TABLET ORAL at 00:59

## 2019-07-11 RX ADMIN — HYDROCODONE BITARTRATE AND ACETAMINOPHEN 1 TABLET: 5; 325 TABLET ORAL at 22:45

## 2019-07-11 RX ADMIN — LORAZEPAM 1 MG: 2 INJECTION INTRAMUSCULAR; INTRAVENOUS at 05:00

## 2019-07-11 RX ADMIN — LEVOTHYROXINE SODIUM 50 MCG: 50 TABLET ORAL at 06:27

## 2019-07-11 RX ADMIN — LORAZEPAM 1 MG: 2 INJECTION INTRAMUSCULAR; INTRAVENOUS at 22:45

## 2019-07-11 RX ADMIN — HYDROCODONE BITARTRATE AND ACETAMINOPHEN 1 TABLET: 5; 325 TABLET ORAL at 08:23

## 2019-07-11 RX ADMIN — SODIUM CHLORIDE, POTASSIUM CHLORIDE, SODIUM LACTATE AND CALCIUM CHLORIDE: 600; 310; 30; 20 INJECTION, SOLUTION INTRAVENOUS at 05:04

## 2019-07-11 RX ADMIN — LORAZEPAM 1 MG: 2 INJECTION INTRAMUSCULAR; INTRAVENOUS at 13:45

## 2019-07-11 ASSESSMENT — PAIN SCALES - GENERAL
PAINLEVEL_OUTOF10: 6
PAINLEVEL_OUTOF10: 6
PAINLEVEL_OUTOF10: 0
PAINLEVEL_OUTOF10: 3
PAINLEVEL_OUTOF10: 5
PAINLEVEL_OUTOF10: 0
PAINLEVEL_OUTOF10: 7

## 2019-07-11 ASSESSMENT — PAIN - FUNCTIONAL ASSESSMENT: PAIN_FUNCTIONAL_ASSESSMENT: ACTIVITIES ARE NOT PREVENTED

## 2019-07-11 ASSESSMENT — PAIN DESCRIPTION - DESCRIPTORS: DESCRIPTORS: ACHING;DULL

## 2019-07-11 ASSESSMENT — PAIN DESCRIPTION - LOCATION: LOCATION: LEG

## 2019-07-11 ASSESSMENT — PAIN DESCRIPTION - FREQUENCY: FREQUENCY: CONTINUOUS

## 2019-07-11 ASSESSMENT — PAIN DESCRIPTION - ORIENTATION: ORIENTATION: RIGHT;LEFT

## 2019-07-11 ASSESSMENT — PAIN DESCRIPTION - PAIN TYPE: TYPE: ACUTE PAIN

## 2019-07-11 ASSESSMENT — PAIN DESCRIPTION - ONSET: ONSET: GRADUAL

## 2019-07-11 ASSESSMENT — PAIN DESCRIPTION - PROGRESSION: CLINICAL_PROGRESSION: GRADUALLY IMPROVING

## 2019-07-11 NOTE — PROGRESS NOTES
[facility]  PROGRESS NOTE  Admit Date:  2019   NAME:  Adrien Kanner  MRN:  [id number]  :  1975  Age:   38 y.o. PCP:   Wilber Khan DO, Shannon Walker MD  Hospital Day: Hospital Day: 3    Subjective:     Reason for Admission: Petechial rash [R23.3]  Acute renal failure (ARF) ([facility] [address] [N17.9]  Reason for consultatiion: rash   Pt was seen and examined in a face to face encounter for rash with CC of none  Chief Complaint   Patient presents with    Rash     sent over from urgent care for further eval of purpuric rash to lower ext       HPI: no issues overnight  No itch    ROS:  CONSTITUTIONAL:   No fever, chills, weight loss, loss of appetite  ALLERGIES:    No urticaria, hay fever,    EYES:     No blurry vision, loss of vision,eye pain  ENT:      No hearing loss, sore throat  CARDIOVASCULAR:  No chest pain, palpitations  RESPIRATORY:    No cough, sob  HEME-LYMPH:   No easy bruising, bleeding  GI:     No nausea, vomiting or diarrhea  :     No urinary complaints  NEURO:    No  lightheadedness, dizziness, confusion,   MUSCULOSKELETAL:  No muscle aches, pain   SKIN:       rash    PSYCH:    No depression or anxiety      Scheduled Meds:   levothyroxine  50 mcg Oral Daily    sodium chloride flush  10 mL Intravenous 2 times per day     Continuous Infusions:   lactated ringers 100 mL/hr at 19 0504     PRN Meds:LORazepam, sodium chloride flush, acetaminophen, ondansetron, diphenhydrAMINE, HYDROcodone 5 mg - acetaminophen    ALLERGIES: Patient has no known allergies. Objective:   Vitals reviewed. Vitals:    07/10/19 0900 07/10/19 1915 19 0006 19 0600   BP: 110/60 (!) 118/58     Pulse: 87 93     Resp: 18 18     Temp: 99 °F (37.2 °C) 98.3 °F (36.8 °C)     TempSrc: Oral Oral     SpO2: 95% 96%     Weight:   229 lb (103.9 kg) 229 lb 11.2 oz (104.2 kg)   Height:         Physical Exam  Constitutional:  The patient is awake, alert, and oriented.    Skin:    Warm

## 2019-07-11 NOTE — CONSULTS
Dermatology Consult Note    Reason for Consult:  Petechial Rash    CHIEF COMPLAINT: Lower extremity rash    History Obtained From: Patient    HISTORY OF PRESENT ILLNESS:      The patient is a 38 y.o. female  with a past medical history most notable for Crohn's disease and anxiety who presents with a four day history of a rash on the bilateral lower extremities. She reports that 7/7/2019 she drove to [place], took a nap in a crew room and awoke with a dramatic rash on her legs. She was otherwise feeling well with no unusual fatigue or other symptoms of illness. She reports no new medications. Past Medical History:        Diagnosis Date    Acute renal failure (ARF) ([facility] [address] 7/9/2019    Anxiety     Crohn's colitis ([facility] [address]     Depression     History of blood transfusion     Pyoderma gangrenosum     Thyroid disease      Past Surgical History:        Procedure Laterality Date    ABDOMEN SURGERY      pt has a J-pouch     ARM DEBRIDEMENT      R arm-2010-2011??    COLONOSCOPY      DILATATION, ESOPHAGUS      ENDOSCOPY, COLON, DIAGNOSTIC       Current Medications:    Current Facility-Administered Medications: levothyroxine (SYNTHROID) tablet 50 mcg, 50 mcg, Oral, Daily  LORazepam (ATIVAN) injection 1 mg, 1 mg, Intravenous, Q6H PRN  sodium chloride flush 0.9 % injection 10 mL, 10 mL, Intravenous, 2 times per day  sodium chloride flush 0.9 % injection 10 mL, 10 mL, Intravenous, PRN  acetaminophen (TYLENOL) tablet 650 mg, 650 mg, Oral, Q4H PRN  ondansetron (ZOFRAN) injection 4 mg, 4 mg, Intravenous, Q6H PRN  diphenhydrAMINE (BENADRYL) tablet 25 mg, 25 mg, Oral, Q6H PRN  lactated ringers infusion, , Intravenous, Continuous  HYDROcodone-acetaminophen (NORCO) 5-325 MG per tablet 1 tablet, 1 tablet, Oral, Q6H PRN    Allergies:  Patient has no known allergies.     Social History:   Social History     Socioeconomic History    Marital status: Single     Spouse name: Not on file    Number of children: Not almost entirely limited to the bilateral lower extremities, with a notable dependent distribution. The lesions were non-blanching, non-tender, and all had a non-inflamed dusky appearance. DATA:  CBC:   Lab Results   Component Value Date    WBC 2.7 07/11/2019    RBC 2.34 07/11/2019    HGB 7.1 07/11/2019    HCT 22.0 07/11/2019    MCV 94.0 07/11/2019    MCH 30.3 07/11/2019    MCHC 32.3 07/11/2019    RDW 13.2 07/11/2019    PLT 62 07/11/2019    MPV 11.0 07/11/2019     CMP:    Lab Results   Component Value Date     07/10/2019    K 3.4 07/10/2019    K 3.2 07/09/2019    CL 94 07/10/2019    CO2 24 07/10/2019    BUN 16 07/10/2019    CREATININE 1.4 07/10/2019    GFRAA 49 07/10/2019    LABGLOM 41 07/10/2019    GLUCOSE 101 07/10/2019    GLUCOSE 125 08/11/2011    PROT 7.4 07/10/2019    LABALBU 3.5 07/10/2019    LABALBU 3.6 08/11/2011    CALCIUM 8.6 07/10/2019    BILITOT 1.5 07/10/2019    ALKPHOS 139 07/10/2019    AST 50 07/10/2019    ALT 18 07/10/2019     Hepatic Function Panel:    Lab Results   Component Value Date    ALKPHOS 139 07/10/2019    ALT 18 07/10/2019    AST 50 07/10/2019    PROT 7.4 07/10/2019    BILITOT 1.5 07/10/2019    BILIDIR 0.8 07/09/2019    IBILI 1.0 07/09/2019    LABALBU 3.5 07/10/2019    LABALBU 3.6 08/11/2011       IMPRESSION/RECOMMENDATIONS:    1. 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 These type of vasculitides typically resolve spontaneously within a month and require no treatment. --The usual antecedents to CSVV are infections and medications, but no such clear etiologic candidate is evident here. The patient does have a known history of Crohn's disease with associated pyoderma gangrenosum (PG). Of note while CSVV is not as common as PG in Crohn's patients it also has a well established relationship.  -- The only potential systemic issue I see now is a resolving acute renal insufficiency, which I suspect is unrelated. Nonetheless the creatinine bump does warrant some additional lab work to help rule out a complicated cutaneous small vessel vasculitis. -- Given resolving nature of the purpura and that she has not had new lesions in 4 days I do not think a biopsy is warranted. Thank you for the opportunity to help in the care of your patient. Please do not hesitate to call with any additional question or concerns.            Electronically signed by Geovanny Childers MD PhD on 7/11/2019 at 6:09 AM

## 2019-07-11 NOTE — PLAN OF CARE
Problem: Pain:  Goal: Pain level will decrease  Description  Pain level will decrease  7/11/2019 1910 by Kina Mcwilliams RN  Outcome: Met This Shift     Problem: Pain:  Goal: Control of acute pain  Description  Control of acute pain  7/11/2019 1910 by Kina Mcwilliams RN  Outcome: Met This Shift     Problem: Anxiety:  Goal: Level of anxiety will decrease  Description  Level of anxiety will decrease  7/11/2019 1910 by Kina Mcwilliams RN  Outcome: Met This Shift     Problem: Falls - Risk of:  Goal: Will remain free from falls  Description  Will remain free from falls  7/11/2019 1910 by Kina Mcwilliams RN  Outcome: Met This Shift     Problem: Falls - Risk of:  Goal: Absence of physical injury  Description  Absence of physical injury  7/11/2019 1910 by Kina Mcwilliams RN  Outcome: Met This Shift

## 2019-07-11 NOTE — PLAN OF CARE
Problem: Pain:  Goal: Pain level will decrease  Description  Pain level will decrease  7/11/2019 0245 by Kvng Rivera RN  Outcome: Met This Shift     Problem: Pain:  Goal: Control of acute pain  Description  Control of acute pain  7/11/2019 0245 by Kvng Rivera RN  Outcome: Met This Shift     Problem: Pain:  Goal: Control of chronic pain  Description  Control of chronic pain  7/11/2019 0245 by Kvng Rivera RN  Outcome: Met This Shift     Problem: Anxiety:  Goal: Level of anxiety will decrease  Description  Level of anxiety will decrease  7/11/2019 0245 by Kvng Rivera RN  Outcome: Met This Shift     Problem: Falls - Risk of:  Goal: Will remain free from falls  Description  Will remain free from falls  7/11/2019 0245 by Kvng Rivera RN  Outcome: Met This Shift     Problem: Falls - Risk of:  Goal: Absence of physical injury  Description  Absence of physical injury  7/11/2019 0245 by Kvng Rivera RN  Outcome: Met This Shift

## 2019-07-12 VITALS
HEIGHT: 67 IN | HEART RATE: 78 BPM | WEIGHT: 229.7 LBS | DIASTOLIC BLOOD PRESSURE: 56 MMHG | OXYGEN SATURATION: 92 % | RESPIRATION RATE: 16 BRPM | TEMPERATURE: 98.3 F | BODY MASS INDEX: 36.05 KG/M2 | SYSTOLIC BLOOD PRESSURE: 118 MMHG

## 2019-07-12 LAB
ABO/RH: NORMAL
AFP-TUMOR MARKER: 10 NG/ML (ref 0–9)
ALBUMIN SERPL-MCNC: 3.1 G/DL (ref 3.5–4.7)
ALPHA-1-GLOBULIN: 0.3 G/DL (ref 0.2–0.4)
ALPHA-2-GLOBULIN: 0.7 G/FL (ref 0.5–1)
ANCA IFA: NORMAL
ANION GAP SERPL CALCULATED.3IONS-SCNC: 12 MMOL/L (ref 7–16)
ANTI-NUCLEAR ANTIBODY (ANA): NEGATIVE
ANTIBODY SCREEN: NORMAL
ANTICARDIOLIPIN IGA ANTIBODY: >150 APL (ref 0–11)
ANTICARDIOLIPIN IGG ANTIBODY: 11 GPL (ref 0–14)
BASOPHILS ABSOLUTE: 0 E9/L (ref 0–0.2)
BASOPHILS RELATIVE PERCENT: 0.9 % (ref 0–2)
BETA GLOBULIN: 1.5 G/DL (ref 0.8–1.3)
BLOOD BANK DISPENSE STATUS: NORMAL
BLOOD BANK PRODUCT CODE: NORMAL
BPU ID: NORMAL
BUN BLDV-MCNC: 5 MG/DL (ref 6–20)
CALCIUM SERPL-MCNC: 8.8 MG/DL (ref 8.6–10.2)
CARDIOLIPIN AB IGM: 1 MPL (ref 0–12)
CHLORIDE BLD-SCNC: 104 MMOL/L (ref 98–107)
CO2: 23 MMOL/L (ref 22–29)
COMPLEMENT TOTAL (CH50): 73 CAE UNITS (ref 60–144)
CREAT SERPL-MCNC: 1 MG/DL (ref 0.5–1)
DESCRIPTION BLOOD BANK: NORMAL
ELECTROPHORESIS: ABNORMAL
EOSINOPHILS ABSOLUTE: 0.12 E9/L (ref 0.05–0.5)
EOSINOPHILS RELATIVE PERCENT: 5.2 % (ref 0–6)
EPSTEIN BARR VIRUS NUCLEAR AB IGG: 256 U/ML (ref 0–21.9)
EPSTEIN-BARR EARLY ANTIGEN ANTIBODY: <5 U/ML (ref 0–10.9)
EPSTEIN-BARR VCA IGG: >750 U/ML (ref 0–21.9)
EPSTEIN-BARR VCA IGM: <10 U/ML (ref 0–43.9)
GAMMA GLOBULIN: 1.7 G/DL (ref 0.7–1.6)
GFR AFRICAN AMERICAN: >60
GFR NON-AFRICAN AMERICAN: 60 ML/MIN/1.73
GLUCOSE BLD-MCNC: 99 MG/DL (ref 74–99)
HCT VFR BLD CALC: 22.5 % (ref 34–48)
HEMOGLOBIN: 7 G/DL (ref 11.5–15.5)
HYPOCHROMIA: ABNORMAL
LUPUS ANTICOAG DVVT: NORMAL
LYMPHOCYTES ABSOLUTE: 0.9 E9/L (ref 1.5–4)
LYMPHOCYTES RELATIVE PERCENT: 39.1 % (ref 20–42)
MCH RBC QN AUTO: 30.4 PG (ref 26–35)
MCHC RBC AUTO-ENTMCNC: 31.1 % (ref 32–34.5)
MCV RBC AUTO: 97.8 FL (ref 80–99.9)
MONOCYTES ABSOLUTE: 0.16 E9/L (ref 0.1–0.95)
MONOCYTES RELATIVE PERCENT: 7 % (ref 2–12)
NEUTROPHILS ABSOLUTE: 1.13 E9/L (ref 1.8–7.3)
NEUTROPHILS RELATIVE PERCENT: 48.7 % (ref 43–80)
OVALOCYTES: ABNORMAL
PDW BLD-RTO: 13.4 FL (ref 11.5–15)
PLATELET # BLD: 71 E9/L (ref 130–450)
PLATELET CONFIRMATION: NORMAL
PMV BLD AUTO: 11.1 FL (ref 7–12)
POIKILOCYTES: ABNORMAL
POLYCHROMASIA: ABNORMAL
POTASSIUM SERPL-SCNC: 3.9 MMOL/L (ref 3.5–5)
RBC # BLD: 2.3 E12/L (ref 3.5–5.5)
RPR: NORMAL
SODIUM BLD-SCNC: 139 MMOL/L (ref 132–146)
TARGET CELLS: ABNORMAL
TOTAL PROTEIN: 7.3 G/DL (ref 6.4–8.3)
WBC # BLD: 2.3 E9/L (ref 4.5–11.5)

## 2019-07-12 PROCEDURE — 80048 BASIC METABOLIC PNL TOTAL CA: CPT

## 2019-07-12 PROCEDURE — 85025 COMPLETE CBC W/AUTO DIFF WBC: CPT

## 2019-07-12 PROCEDURE — 36415 COLL VENOUS BLD VENIPUNCTURE: CPT

## 2019-07-12 PROCEDURE — 86900 BLOOD TYPING SEROLOGIC ABO: CPT

## 2019-07-12 PROCEDURE — 86850 RBC ANTIBODY SCREEN: CPT

## 2019-07-12 PROCEDURE — 86923 COMPATIBILITY TEST ELECTRIC: CPT

## 2019-07-12 PROCEDURE — P9016 RBC LEUKOCYTES REDUCED: HCPCS

## 2019-07-12 PROCEDURE — 2580000003 HC RX 258: Performed by: INTERNAL MEDICINE

## 2019-07-12 PROCEDURE — 6370000000 HC RX 637 (ALT 250 FOR IP): Performed by: INTERNAL MEDICINE

## 2019-07-12 PROCEDURE — 6360000002 HC RX W HCPCS: Performed by: INTERNAL MEDICINE

## 2019-07-12 PROCEDURE — 86901 BLOOD TYPING SEROLOGIC RH(D): CPT

## 2019-07-12 PROCEDURE — 36430 TRANSFUSION BLD/BLD COMPNT: CPT

## 2019-07-12 RX ORDER — LEVOTHYROXINE SODIUM 0.05 MG/1
50 TABLET ORAL DAILY
Qty: 30 TABLET | Refills: 3 | Status: SHIPPED | OUTPATIENT
Start: 2019-07-13 | End: 2019-10-28 | Stop reason: ALTCHOICE

## 2019-07-12 RX ORDER — 0.9 % SODIUM CHLORIDE 0.9 %
250 INTRAVENOUS SOLUTION INTRAVENOUS ONCE
Status: DISCONTINUED | OUTPATIENT
Start: 2019-07-12 | End: 2019-07-12 | Stop reason: HOSPADM

## 2019-07-12 RX ADMIN — LEVOTHYROXINE SODIUM 50 MCG: 50 TABLET ORAL at 06:30

## 2019-07-12 RX ADMIN — SODIUM CHLORIDE 250 ML: 0.9 INJECTION, SOLUTION INTRAVENOUS at 11:00

## 2019-07-12 RX ADMIN — LORAZEPAM 1 MG: 2 INJECTION INTRAMUSCULAR; INTRAVENOUS at 06:35

## 2019-07-12 RX ADMIN — Medication 10 ML: at 11:00

## 2019-07-12 NOTE — PROGRESS NOTES
HGB 7.1 07/11/2019    HCT 22.0 07/11/2019    PLT 62 07/11/2019    MCV 94.0 07/11/2019    MCH 30.3 07/11/2019    MCHC 32.3 07/11/2019    RDW 13.2 07/11/2019    SEGSPCT 59 08/11/2011    METASPCT 3 07/05/2011    LYMPHOPCT 40.0 07/11/2019    MONOPCT 0.9 07/11/2019    MYELOPCT 2 02/22/2011    BASOPCT 0.7 07/11/2019    MONOSABS 0.03 07/11/2019    LYMPHSABS 1.08 07/11/2019    EOSABS 0.00 07/11/2019    BASOSABS 0.00 07/11/2019     BMP:    Lab Results   Component Value Date     07/11/2019    K 4.0 07/11/2019    K 3.2 07/09/2019    CL 97 07/11/2019    CO2 25 07/11/2019    BUN 8 07/11/2019    LABALBU 3.5 07/10/2019    LABALBU 3.6 08/11/2011    CREATININE 1.1 07/11/2019    CALCIUM 9.0 07/11/2019    GFRAA >60 07/11/2019    LABGLOM 54 07/11/2019    GLUCOSE 97 07/11/2019    GLUCOSE 125 08/11/2011     Magnesium:    Lab Results   Component Value Date    MG 1.6 07/10/2019     Phosphorus:    Lab Results   Component Value Date    PHOS 3.3 07/10/2019          levothyroxine  50 mcg Oral Daily    sodium chloride flush  10 mL Intravenous 2 times per day      lactated ringers 50 mL/hr at 07/11/19 1054     LORazepam, sodium chloride flush, acetaminophen, ondansetron, diphenhydrAMINE, HYDROcodone 5 mg - acetaminophen    IMPRESSION/RECOMMENDATIONS:      SEVEN secondary to decreased effective renal perfusion as indicated Blaze+<20 and a FeNa <1%-cr trending down-Hep AB&C non reactive, RF <10, C3 and C4 not depressed, HIV nonreactive, Haptoglobin not depressed and LDH not elevated  to suggest hemolysis await ANCA and Anti-GBM but doubt an active renal vasculitic process at this point    Hypothyroidism restarted on meds-TSH 20.280    Crohn's disease-defer to Dr. Alvarez Hernandez    Hx of ETOH Abuse with Thrombocytopenia related  to her splenomegaly in the setting of presumed chronic liver disease-PLT minimal change    Pancytopenia with Anemia as well as the Leukopenia and Thrombocytopenia-WBC and HgB trending down-consider Heme/Onc to see      Opal Escalona Parker Myers MD  7/11/2019 8:17 PM

## 2019-07-12 NOTE — PROGRESS NOTES
[facility]  PROGRESS NOTE  Admit Date:  2019   NAME:  Alice Sneed  MRN:  [id number]  :  1975  Age:   38 y.o. PCP:   Fernanda Russell DO, Bakari Menjivar MD  Hospital Day: Hospital Day: 4    Subjective:     Reason for Admission: Petechial rash [R23.3]  Acute renal failure (ARF) ([facility] [address] [N17.9]  Reason for consultatiion: rash   Pt was seen and examined in a face to face encounter for rash with CC of none  Chief Complaint   Patient presents with    Rash     sent over from urgent care for further eval of purpuric rash to lower ext       HPI: no issues overnight  No itch    ROS:  CONSTITUTIONAL:   No fever, chills, weight loss, loss of appetite  ALLERGIES:    No urticaria, hay fever,    EYES:     No blurry vision, loss of vision,eye pain  ENT:      No hearing loss, sore throat  CARDIOVASCULAR:  No chest pain, palpitations  RESPIRATORY:    No cough, sob  HEME-LYMPH:   No easy bruising, bleeding  GI:     No nausea, vomiting or diarrhea  :     No urinary complaints  NEURO:    No  lightheadedness, dizziness, confusion,   MUSCULOSKELETAL:  No muscle aches, pain   SKIN:       rash    PSYCH:    No depression or anxiety      Scheduled Meds:   sodium chloride  250 mL Intravenous Once    levothyroxine  50 mcg Oral Daily    sodium chloride flush  10 mL Intravenous 2 times per day     Continuous Infusions:   lactated ringers 50 mL/hr at 19 1054     PRN Meds:LORazepam, sodium chloride flush, acetaminophen, ondansetron, diphenhydrAMINE, HYDROcodone 5 mg - acetaminophen    ALLERGIES: Patient has no known allergies. Objective:   Vitals reviewed.   Vitals:    19 0823 19 0830 19 1900 19 0815   BP:  112/66 115/65 122/67   Pulse:  86 83 80   Resp:  18 16 16   Temp:  98.6 °F (37 °C) 98.4 °F (36.9 °C) 98.9 °F (37.2 °C)   TempSrc:  Oral Oral Oral   SpO2: 95%  92%    Weight:       Height:         Physical Exam  Constitutional:  The patient is awake, alert,

## 2019-07-13 LAB
COMPLEMENT TOTAL (CH50): 124 CAE UNITS (ref 60–144)
PARVOVIRUS B19 IGG ANTIBODY: 3.85 IV
PARVOVIRUS B19 IGM ANTIBODY: 0.07 IV

## 2019-07-14 LAB
BARTONELLA HENSELAE AB, IGG: NORMAL
BARTONELLA HENSELAE AB, IGM: NORMAL
HERPES TYPE 1/2 IGM COMBINED: 0.65 IV
HERPES TYPE I/II IGG COMBINED: 16.7 IV
HSV 1 GLYCOPROTEIN G AB IGG: 7.48 IV
HSV 2 GLYCOPROTEIN G AB IGG: 0.19 IV

## 2019-07-15 LAB
BLOOD CULTURE, ROUTINE: NORMAL
CRYOGLOBULIN: NEGATIVE
CULTURE, BLOOD 2: NORMAL

## 2019-07-16 LAB
ANCA IFA: NORMAL
Lab: NORMAL
REPORT: NORMAL
THIS TEST SENT TO: NORMAL

## 2019-09-21 ENCOUNTER — APPOINTMENT (EMERGENCY)
Dept: RADIOLOGY | Facility: HOSPITAL | Age: 44
DRG: 089 | End: 2019-09-21
Attending: EMERGENCY MEDICINE

## 2019-09-21 ENCOUNTER — HOSPITAL ENCOUNTER (INPATIENT)
Facility: HOSPITAL | Age: 44
LOS: 3 days | Discharge: OTHER INSTITUTION | DRG: 089 | End: 2019-09-24
Attending: EMERGENCY MEDICINE | Admitting: FAMILY MEDICINE

## 2019-09-21 DIAGNOSIS — R29.6 FALLING: ICD-10-CM

## 2019-09-21 DIAGNOSIS — E87.1 HYPONATREMIA: ICD-10-CM

## 2019-09-21 DIAGNOSIS — E87.6 HYPOKALEMIA: ICD-10-CM

## 2019-09-21 DIAGNOSIS — R42 DIZZINESS: Primary | ICD-10-CM

## 2019-09-21 PROBLEM — S00.83XA FACIAL BRUISING: Status: ACTIVE | Noted: 2019-09-21

## 2019-09-21 PROBLEM — F41.9 ANXIETY: Status: ACTIVE | Noted: 2019-09-21

## 2019-09-21 PROBLEM — D64.9 NORMOCYTIC ANEMIA: Status: ACTIVE | Noted: 2019-09-21

## 2019-09-21 PROBLEM — S00.03XA SCALP HEMATOMA: Status: ACTIVE | Noted: 2019-09-21

## 2019-09-21 PROBLEM — F10.20 ALCOHOL DEPENDENCE (CMS/HCC): Status: ACTIVE | Noted: 2019-09-21

## 2019-09-21 PROBLEM — R51.9 HEADACHE: Status: ACTIVE | Noted: 2019-09-21

## 2019-09-21 PROBLEM — R31.9 HEMATURIA: Status: ACTIVE | Noted: 2019-09-21

## 2019-09-21 PROBLEM — L88 PYODERMA GANGRENOSA: Status: ACTIVE | Noted: 2019-09-21

## 2019-09-21 PROBLEM — K51.90 ULCERATIVE COLITIS (CMS/HCC): Status: ACTIVE | Noted: 2019-09-21

## 2019-09-21 LAB
ALBUMIN SERPL-MCNC: 3.4 G/DL (ref 3.4–5)
ALP SERPL-CCNC: 114 IU/L (ref 35–126)
ALT SERPL-CCNC: 21 IU/L (ref 11–54)
ANION GAP SERPL CALC-SCNC: 11 MEQ/L (ref 3–15)
APAP SERPL-MCNC: <10 UG/ML (ref 10–30)
AST SERPL-CCNC: 46 IU/L (ref 15–41)
BACTERIA URNS QL MICRO: ABNORMAL /HPF
BASOPHILS # BLD: 0.04 K/UL (ref 0.01–0.1)
BASOPHILS NFR BLD: 0.5 %
BILIRUB SERPL-MCNC: 1.7 MG/DL (ref 0.3–1.2)
BILIRUB UR QL STRIP.AUTO: NEGATIVE MG/DL
BUN SERPL-MCNC: 18 MG/DL (ref 8–20)
CALCIUM SERPL-MCNC: 9.4 MG/DL (ref 8.9–10.3)
CHLORIDE SERPL-SCNC: 85 MEQ/L (ref 98–109)
CLARITY UR REFRACT.AUTO: CLEAR
CO2 SERPL-SCNC: 32 MEQ/L (ref 22–32)
COLOR UR AUTO: YELLOW
CREAT SERPL-MCNC: 1.9 MG/DL
DIFFERENTIAL METHOD BLD: NORMAL
EOSINOPHIL # BLD: 0.13 K/UL (ref 0.04–0.36)
EOSINOPHIL NFR BLD: 1.6 %
ERYTHROCYTE [DISTWIDTH] IN BLOOD BY AUTOMATED COUNT: 16.6 % (ref 11.7–14.4)
ETHANOL SERPL-MCNC: <5 MG/DL
GFR SERPL CREATININE-BSD FRML MDRD: 28.6 ML/MIN/1.73M*2
GLUCOSE SERPL-MCNC: 140 MG/DL (ref 70–99)
GLUCOSE UR STRIP.AUTO-MCNC: NEGATIVE MG/DL
HCG UR QL: NEGATIVE
HCT VFR BLDCO AUTO: 29.1 %
HGB BLD-MCNC: 9.4 G/DL
HGB UR QL STRIP.AUTO: ABNORMAL
HYALINE CASTS #/AREA URNS LPF: ABNORMAL /LPF
IMM GRANULOCYTES # BLD AUTO: 0.04 K/UL (ref 0–0.08)
IMM GRANULOCYTES NFR BLD AUTO: 0.5 %
KETONES UR STRIP.AUTO-MCNC: NEGATIVE MG/DL
LEUKOCYTE ESTERASE UR QL STRIP.AUTO: NEGATIVE
LYMPHOCYTES # BLD: 1.35 K/UL (ref 1.2–3.5)
LYMPHOCYTES NFR BLD: 16.6 %
MCH RBC QN AUTO: 29.6 PG (ref 28–33.2)
MCHC RBC AUTO-ENTMCNC: 32.3 G/DL (ref 32.2–35.5)
MCV RBC AUTO: 91.5 FL (ref 83–98)
MONOCYTES # BLD: 0.51 K/UL (ref 0.28–0.8)
MONOCYTES NFR BLD: 6.3 %
NEUTROPHILS # BLD: 6.04 K/UL (ref 1.7–7)
NEUTS SEG NFR BLD: 74.5 %
NITRITE UR QL STRIP.AUTO: NEGATIVE
NRBC BLD-RTO: 0 %
OVALOCYTES BLD QL SMEAR: NORMAL
PDW BLD AUTO: 11.4 FL (ref 9.4–12.3)
PH UR STRIP.AUTO: 6 [PH]
PLAT MORPH BLD: NORMAL
PLATELET # BLD AUTO: 96 K/UL
PLATELET # BLD EST: NORMAL 10*3/UL
POTASSIUM SERPL-SCNC: 2.7 MEQ/L (ref 3.6–5.1)
POTASSIUM SERPL-SCNC: 2.9 MEQ/L (ref 3.6–5.1)
PROT SERPL-MCNC: 8 G/DL (ref 6–8.2)
PROT UR QL STRIP.AUTO: NEGATIVE
RBC # BLD AUTO: 3.18 M/UL (ref 3.93–5.22)
RBC #/AREA URNS HPF: ABNORMAL /HPF
SODIUM SERPL-SCNC: 128 MEQ/L (ref 136–144)
SP GR UR REFRACT.AUTO: 1.01
SQUAMOUS URNS QL MICRO: 1 /HPF
TROPONIN I SERPL-MCNC: <0.02 NG/ML
UROBILINOGEN UR STRIP-ACNC: 0.2 EU/DL
WBC # BLD AUTO: 8.11 K/UL
WBC #/AREA URNS HPF: ABNORMAL /HPF

## 2019-09-21 PROCEDURE — 63600000 HC DRUGS/DETAIL CODE: Performed by: NURSE PRACTITIONER

## 2019-09-21 PROCEDURE — G0480 DRUG TEST DEF 1-7 CLASSES: HCPCS | Performed by: FAMILY MEDICINE

## 2019-09-21 PROCEDURE — 84132 ASSAY OF SERUM POTASSIUM: CPT | Performed by: FAMILY MEDICINE

## 2019-09-21 PROCEDURE — 99222 1ST HOSP IP/OBS MODERATE 55: CPT | Performed by: FAMILY MEDICINE

## 2019-09-21 PROCEDURE — 63700000 HC SELF-ADMINISTRABLE DRUG: Performed by: FAMILY MEDICINE

## 2019-09-21 PROCEDURE — 36415 COLL VENOUS BLD VENIPUNCTURE: CPT | Performed by: NURSE PRACTITIONER

## 2019-09-21 PROCEDURE — 72125 CT NECK SPINE W/O DYE: CPT

## 2019-09-21 PROCEDURE — 85025 COMPLETE CBC W/AUTO DIFF WBC: CPT | Performed by: NURSE PRACTITIONER

## 2019-09-21 PROCEDURE — 70486 CT MAXILLOFACIAL W/O DYE: CPT

## 2019-09-21 PROCEDURE — 99285 EMERGENCY DEPT VISIT HI MDM: CPT | Mod: 25

## 2019-09-21 PROCEDURE — 81001 URINALYSIS AUTO W/SCOPE: CPT | Performed by: NURSE PRACTITIONER

## 2019-09-21 PROCEDURE — 73030 X-RAY EXAM OF SHOULDER: CPT | Mod: RT

## 2019-09-21 PROCEDURE — 25800000 HC PHARMACY IV SOLUTIONS: Performed by: NURSE PRACTITIONER

## 2019-09-21 PROCEDURE — 80053 COMPREHEN METABOLIC PANEL: CPT | Performed by: NURSE PRACTITIONER

## 2019-09-21 PROCEDURE — 63700000 HC SELF-ADMINISTRABLE DRUG: Performed by: NURSE PRACTITIONER

## 2019-09-21 PROCEDURE — 63700000 HC SELF-ADMINISTRABLE DRUG

## 2019-09-21 PROCEDURE — 96374 THER/PROPH/DIAG INJ IV PUSH: CPT

## 2019-09-21 PROCEDURE — 70450 CT HEAD/BRAIN W/O DYE: CPT

## 2019-09-21 PROCEDURE — 84703 CHORIONIC GONADOTROPIN ASSAY: CPT | Performed by: NURSE PRACTITIONER

## 2019-09-21 PROCEDURE — 84484 ASSAY OF TROPONIN QUANT: CPT | Performed by: NURSE PRACTITIONER

## 2019-09-21 PROCEDURE — 20600000 HC ROOM AND CARE INTERMEDIATE/TELEMETRY

## 2019-09-21 PROCEDURE — 93005 ELECTROCARDIOGRAM TRACING: CPT | Performed by: NURSE PRACTITIONER

## 2019-09-21 PROCEDURE — 71046 X-RAY EXAM CHEST 2 VIEWS: CPT

## 2019-09-21 PROCEDURE — 63600000 HC DRUGS/DETAIL CODE: Performed by: FAMILY MEDICINE

## 2019-09-21 RX ORDER — FLUOXETINE HYDROCHLORIDE 20 MG/1
20 CAPSULE ORAL DAILY
Status: DISCONTINUED | OUTPATIENT
Start: 2019-09-21 | End: 2019-09-24 | Stop reason: HOSPADM

## 2019-09-21 RX ORDER — LORAZEPAM 2 MG/ML
1 INJECTION INTRAMUSCULAR
Status: DISCONTINUED | OUTPATIENT
Start: 2019-09-21 | End: 2019-09-24 | Stop reason: HOSPADM

## 2019-09-21 RX ORDER — IBUPROFEN 600 MG/1
TABLET ORAL
Status: COMPLETED
Start: 2019-09-21 | End: 2019-09-21

## 2019-09-21 RX ORDER — IBUPROFEN 600 MG/1
600 TABLET ORAL ONCE
Status: COMPLETED | OUTPATIENT
Start: 2019-09-21 | End: 2019-09-21

## 2019-09-21 RX ORDER — FLUOXETINE HYDROCHLORIDE 20 MG/1
20 CAPSULE ORAL DAILY
COMMUNITY

## 2019-09-21 RX ORDER — FOLIC ACID 1 MG/1
1 TABLET ORAL DAILY
Status: DISCONTINUED | OUTPATIENT
Start: 2019-09-21 | End: 2019-09-24 | Stop reason: HOSPADM

## 2019-09-21 RX ORDER — POTASSIUM CHLORIDE 1.5 G/1.58G
40 POWDER, FOR SOLUTION ORAL ONCE
Status: DISCONTINUED | OUTPATIENT
Start: 2019-09-21 | End: 2019-09-21

## 2019-09-21 RX ORDER — POTASSIUM CHLORIDE 750 MG/1
40 TABLET, FILM COATED, EXTENDED RELEASE ORAL ONCE
Status: COMPLETED | OUTPATIENT
Start: 2019-09-21 | End: 2019-09-21

## 2019-09-21 RX ORDER — THIAMINE HCL 100 MG
100 TABLET ORAL DAILY
Status: DISCONTINUED | OUTPATIENT
Start: 2019-09-21 | End: 2019-09-23

## 2019-09-21 RX ORDER — DIPHENHYDRAMINE HCL 50 MG/ML
50 VIAL (ML) INJECTION ONCE
Status: COMPLETED | OUTPATIENT
Start: 2019-09-21 | End: 2019-09-21

## 2019-09-21 RX ADMIN — IBUPROFEN 600 MG: 600 TABLET ORAL at 16:53

## 2019-09-21 RX ADMIN — POTASSIUM CHLORIDE 40 MEQ: 10 TABLET, EXTENDED RELEASE ORAL at 14:42

## 2019-09-21 RX ADMIN — FLUOXETINE 20 MG: 20 CAPSULE ORAL at 21:06

## 2019-09-21 RX ADMIN — LORAZEPAM 1 MG: 2 INJECTION INTRAMUSCULAR; INTRAVENOUS at 21:07

## 2019-09-21 RX ADMIN — LORAZEPAM 1 MG: 2 INJECTION INTRAMUSCULAR; INTRAVENOUS at 18:22

## 2019-09-21 RX ADMIN — POTASSIUM CHLORIDE 40 MEQ: 10 TABLET, EXTENDED RELEASE ORAL at 21:07

## 2019-09-21 RX ADMIN — IBUPROFEN 600 MG: 600 TABLET, FILM COATED ORAL at 16:53

## 2019-09-21 RX ADMIN — THIAMINE HCL TAB 100 MG 100 MG: 100 TAB at 18:25

## 2019-09-21 RX ADMIN — DIPHENHYDRAMINE HYDROCHLORIDE 50 MG: 50 INJECTION INTRAMUSCULAR; INTRAVENOUS at 15:48

## 2019-09-21 RX ADMIN — FOLIC ACID 1 MG: 1 TABLET ORAL at 18:25

## 2019-09-21 RX ADMIN — SODIUM CHLORIDE 1000 ML: 9 INJECTION, SOLUTION INTRAVENOUS at 13:56

## 2019-09-21 RX ADMIN — MULTIPLE VITAMINS W/ MINERALS TAB 1 TABLET: TAB at 18:25

## 2019-09-21 ASSESSMENT — COGNITIVE AND FUNCTIONAL STATUS - GENERAL
HELP NEEDED FOR BATHING: 4 - NONE
MOVING TO AND FROM BED TO CHAIR: 4 - NONE
EATING MEALS: 4 - NONE
DRESSING REGULAR UPPER BODY CLOTHING: 4 - NONE
CLIMB 3 TO 5 STEPS WITH RAILING: 4 - NONE
STANDING UP FROM CHAIR USING ARMS: 4 - NONE
TOILETING: 4 - NONE
WALKING IN HOSPITAL ROOM: 4 - NONE
HELP NEEDED FOR PERSONAL GROOMING: 4 - NONE
DRESSING REGULAR LOWER BODY CLOTHING: 4 - NONE

## 2019-09-21 ASSESSMENT — ENCOUNTER SYMPTOMS
ABDOMINAL PAIN: 0
CONFUSION: 0
DIFFICULTY URINATING: 0
PHOTOPHOBIA: 0
LIGHT-HEADEDNESS: 0
FEVER: 0
SHORTNESS OF BREATH: 0
NAUSEA: 1
HEADACHES: 1
BACK PAIN: 1
MYALGIAS: 0
DIZZINESS: 0
VOMITING: 1

## 2019-09-21 NOTE — ASSESSMENT & PLAN NOTE
- Multifactorial. Possible post concussive syndrome after fall over the weekend. Hyponatremic with a sodium of  128.   - Hx of heavy alcohol abuse for 20+ years  - Possible Wernike's encephalopathy? Patient is ataxic reporting multiple falls when she is drunk and when she is sober. She appears confused at times. Nystagmus not clearly present.   - Neurology consulted, recommendations appreciated.     Plan;  Cont w/management of alcohol abuse as stated below  Cont w/management of hyponatremia as stated below   Per neurology recommendations will initiate IV thiamine for treatment of potential wernickes although suspicion is low - will initiate iv thiamine 500mg TID x 2 days followed by transition to oral thiamine  Neurology signed off  Pending clinical stability and completion of IV thiamine x 2 days then t/c d/c back to [facility] tomorrow

## 2019-09-21 NOTE — ED PROVIDER NOTES
HPI     Chief Complaint   Patient presents with   • Head Injury   • Dizziness     Pt is a 44 year old female with hx of alcoholism who presents to the ED from [facility] for evaluation of a head injury after multiple falls. Pt states she fell 4 times while away on vacation in [place]. Pt admits to hitting her head with associated LOC. She was seen at a hospital in [place] following the incident, however did not have imaging done at that time. Pt reports nausea for the past several days. States she had one episode of vomiting today. Pt states she checked herself into [facility] 4 days ago when she returned home. Notes she has been drinking for the past 25 years and has been to rehab 8 times in the past. Pt's last drink was 6 days ago. Admits to drinking approximately 2 bottles of wine and 10 miniature vodka bottles a day. Pt denies any other drug use. Denies CP or SOB.       History provided by:  Patient   used: No         Patient History     Past Medical History:   Diagnosis Date   • Alcoholism (CMS/HCC) (HCC)    • Crohn disease (CMS/HCC) (HCC)        Past Surgical History:   Procedure Laterality Date   • SMALL INTESTINE SURGERY         History reviewed. No pertinent family history.    Social History   Substance Use Topics   • Smoking status: Former Smoker   • Smokeless tobacco: Never Used   • Alcohol use Yes      Comment: at [facility], last drink 7 days ago       Systems Reviewed from Nursing Triage:          Review of Systems     Review of Systems   Constitutional: Negative for fever.   Eyes: Negative for photophobia.   Respiratory: Negative for shortness of breath.    Cardiovascular: Negative for chest pain.   Gastrointestinal: Positive for nausea and vomiting. Negative for abdominal pain.   Genitourinary: Negative for difficulty urinating.   Musculoskeletal: Positive for back pain. Negative for myalgias.   Skin: Negative for rash.   Neurological: Positive for headaches. Negative for dizziness and  "light-headedness.   Psychiatric/Behavioral: Negative for confusion.        Physical Exam     ED Triage Vitals [09/21/19 1319]   Temp Heart Rate Resp BP SpO2   36.4 °C (97.6 °F) 86 16 (!) 122/56 98 %      Temp Source Heart Rate Source Patient Position BP Location FiO2 (%) (Set)   Oral -- Sitting Right upper arm --                     Patient Vitals for the past 24 hrs:   BP Temp Temp src Pulse Resp SpO2 Height Weight   09/21/19 1319 (!) 122/56 36.4 °C (97.6 °F) Oral 86 16 98 % 1.702 m (5' 7\") 95.3 kg (210 lb)           Physical Exam   Constitutional: She is oriented to person, place, and time. She appears well-developed and well-nourished. No distress.   HENT:   Head: Normocephalic.   Eyes: Conjunctivae are normal.   Neck: Normal range of motion. Neck supple.   Cardiovascular: Normal rate and regular rhythm.    Pulmonary/Chest: Effort normal and breath sounds normal.   Abdominal: Soft. There is no tenderness.   Musculoskeletal: She exhibits no edema or tenderness.   Neurological: She is alert and oriented to person, place, and time.   Skin: Skin is warm and dry. Bruising noted. No rash noted.   Bruising to posterior right shoulder and left buttock area  Bruising to right side of face   Psychiatric: She has a normal mood and affect.   Nursing note and vitals reviewed.           Procedures    ED Course & MDM     Labs Reviewed - No data to display    No orders to display               MDM         ED Course as of Sep 21 1842   Sat Sep 21, 2019   1351 Impression: Multiple falls with head injury   Currenlty in rehab [facility]  Plan: Check labs, CT head, facial bones and C-spine, CXR, and right shoulder xray  [LB]   1431 Potassium 2.7 with replace  [LB]   1509 Due to patient's hyponatremia hypokalemia complaint of dizziness lightheadedness falling.  Anemia/no insufficiency                      [LB]   1529 [facility] IS ACCEPTING TO TELE  [LB]      ED Course User Index  [LB] Sylvia Novoa CRNP         Clinical Impressions as of Sep 21 " 1842   Dizziness   Falling   Hyponatremia   Hypokalemia      By signing my name below, I, Salina Worthy, attest that this documentation has been prepared under the direction and in the presence of GIFTY Novoa NP  9/21/2019 1:37 PM       Salina Worthy  09/21/19 1352       Sylvia Novoa, MINISTERIO  09/21/19 1415       Sylvia Novoa, MINISTERIO  09/21/19 1845

## 2019-09-21 NOTE — ASSESSMENT & PLAN NOTE
- Hypokalemia with a potassium of 2.8 on admission. Replaced.     Plan;  Cont to monitor w/routine bmps and replete as required for K>4

## 2019-09-21 NOTE — ASSESSMENT & PLAN NOTE
- Tension headache? Possible post concussive.   - CT head (9/21/19): No acute intracranial hemorrhage.  No acute calvarial or maxillofacial bone fracture. No acute fracture or malalignment of the cervical spine. Small right frontal scalp hematoma.    Plan;  Denies any further sxs  Cont to to monitor

## 2019-09-21 NOTE — H&P
Hospital Medicine Service -  History & Physical        CHIEF COMPLAINT   Lightheadedness      HISTORY OF PRESENT ILLNESS      Rosaline Fatima is a 44 year old woman with a PMHx significant for alcohol abuse, Crohns disease, hx of pyoderma gangrenosum, anxiety, and depression who presents to the [place] ED on 9/21/19 from [facility] for dizziness. She works as a  and was in [place] when she became intoxicated and fell 4x hitting her head. She went to a local hospital but was told she could not receive any imaging for 2 days. She decided to fly back to [place] ([place] for [employer]) and went to [facility] on Tuesday (9/17/19).  Symptoms include lightheadedness, sometimes vertigo, hx of multiple falls intoxicated and sober, and headache. She has been a heavy drinker (2 bottles + of wine or liquor a day for 20+ years). She has been to rehab 8 times. The last time she was there was in July. Denies hx of hospitalization for alcohol withdrawal, seizures, hx of intubation.     In the ED Ms. Fatima was found to be hypokalemic to 2.8 and hyponatremic with a sodium of 128. CT of the head was unremarkable. She was admitted for further management.     PAST MEDICAL AND SURGICAL HISTORY      Past Medical History:   Diagnosis Date   • Alcoholism (CMS/HCC) ([facility])    • Crohn disease (CMS/HCC) ([facility])        Past Surgical History:   Procedure Laterality Date   • SMALL INTESTINE SURGERY         PCP: Pt States, No Pcp    MEDICATIONS      Prior to Admission medications    Medication Sig Start Date End Date Taking? Authorizing Provider   FLUoxetine (PROzac) 20 mg capsule Take 20 mg by mouth daily.   Yes ProviderLeyla MD   OXAZEPAM ORAL Take by mouth.    Provider, MD Leyla       ALLERGIES      Patient has no known allergies.    FAMILY HISTORY      History reviewed. No pertinent family history.    SOCIAL HISTORY      Social History     Social History   • Marital status:      Spouse name: N/A   • Number of children:  N/A   • Years of education: N/A     Social History Main Topics   • Smoking status: Former Smoker   • Smokeless tobacco: Never Used   • Alcohol use Yes      Comment: at [facility], last drink 7 days ago   • Drug use: No   • Sexual activity: Not Asked     Other Topics Concern   • None     Social History Narrative   • None       REVIEW OF SYSTEMS      All other systems reviewed and negative except as noted in HPI    PHYSICAL EXAMINATION      Temp:  [36.4 °C (97.6 °F)-36.9 °C (98.4 °F)] 36.9 °C (98.4 °F)  Heart Rate:  [69-86] 72  Resp:  [16-18] 18  BP: (110-122)/(54-56) 111/54  Body mass index is 32.89 kg/m².    Physical Exam   Constitutional: She is oriented to person, place, and time. She appears well-developed and well-nourished. No distress.   HENT:   Head: Normocephalic. Head is with contusion.       Eyes: Pupils are equal, round, and reactive to light. Conjunctivae and EOM are normal.   Neck: Normal range of motion. Neck supple.   Cardiovascular: Normal rate, regular rhythm and intact distal pulses.  Exam reveals gallop and friction rub.    Murmur heard.  Pulmonary/Chest: Effort normal and breath sounds normal. No respiratory distress. She has no wheezes. She has no rales. She exhibits no tenderness.   Abdominal: Soft. Bowel sounds are normal. She exhibits no distension and no mass. There is no tenderness. There is no rebound and no guarding. No hernia.   Musculoskeletal: Normal range of motion. She exhibits no edema, tenderness or deformity.   Neurological: She is alert and oriented to person, place, and time. She displays normal reflexes. No cranial nerve deficit. Coordination abnormal.   Skin: She is not diaphoretic.   Psychiatric: She has a normal mood and affect.       LABS / IMAGING / EKG        Labs  CBC Results       09/21/19                          1355           WBC 8.11           RBC 3.18 (L)           HGB 9.4 (L)           HCT 29.1 (L)           MCV 91.5           MCH 29.6           MCHC 32.3           PLT  96 (L)           Comment for PLT at 1355 on 09/21/19:  ALL RESULTS HAVE BEEN RECHECKED. SPECIMEN QUALITY CHECKED. RESULTS OBTAINED AFTER VORTEXING TO ELIMINATE PLT CLUMPS        CMP Results       09/21/19                          1355            (L)           K 2.7 (LL)           Cl 85 (L)           CO2 32           Glucose 140 (H)           BUN 18           Creatinine 1.9 (H)           Calcium 9.4           Anion Gap 11           AST 46 (H)           ALT 21           Albumin 3.4           EGFR 28.6 (L)           Comment for K at 1355 on 09/21/19:    Results obtained on plasma. Plasma Potassium values may be up to 0.4 mEQ/L less than serum values. The differences may be greater for patients with high platelet or white cell counts.            Imaging    X-RAY SHOULDER RIGHT 2+ VIEWS [[id number]] Collected: 09/21/19 1459   Order Status: Completed Updated: 09/21/19 1502   Narrative:     CLINICAL HISTORY: Fall; right shoulder pain   Impression:     IMPRESSION: No fracture seen.    COMMENT: Three views of the right shoulder were performed.    We have no prior studies for comparison.    No fracture, dislocation, or significant degenerative changes are seen.    No abnormal calcifications are seen about the shoulder.    The visualized right hemithorax appears clear.    If symptoms persist, MRI should be considered.   X-RAY CHEST 2 VIEWS [[id number]] Collected: 09/21/19 1452   Order Status: Completed Updated: 09/21/19 1454   Narrative:     CLINICAL HISTORY: Status post fall..    COMMENT: Two views of the chest are submitted for review without comparison.    The heart is normal in size. The mediastinal silhouette is unremarkable. The  lung fields are clear bilaterally without evidence for infiltrates, effusion, or  pneumothorax. The osseous structures are within normal limits.   Impression:     IMPRESSION:  No active disease is seen in the chest.   CT HEAD WITHOUT IV CONTRAST [[id number]] Collected: 09/21/19 1421   Order  Status: Completed Updated: 09/21/19 1439   Narrative:     CLINICAL HISTORY: trauma.    TECHNIQUE: Routine unenhanced CT of the head. Routine unenhanced maxillofacial  bone CT. Routine unenhanced CT of the cervical spine.    CT DOSE:  One or more dose reduction techniques (e.g. automated exposure  control, adjustment of the mA and/or kV according to patient size, use of  iterative reconstruction technique) utilized for this examination.    COMPARISON: None available.    COMMENT:    No acute intracranial hemorrhage, extracerebral fluid collection, midline shift  or mass effect.  Right frontal scalp hematoma.  No fracture of the calvarium or  skull base.    Flow Normal appearance of the brain parenchyma. Ventricles are normal. Cerebral  volume is age appropriate.  Visualized orbits are unremarkable.    No acute fracture or osseous defect of the maxillofacial bones.   Paranasal  sinuses are essentially clear.    No acute fracture of the cervical spine. No traumatic malalignment of the  cervical spine, noting mild straightening of the normal lordosis which is  favored to be positional. This examination does not assess for ligamentous  injury or stability. No evidence of significant intraspinal abnormality.  Incomplete fusion of the posterior arch of C1, normal variant.  Vertebral body  and intervertebral disc heights are normal.  Visualized lung apices are  unremarkable.  Common origin of the brachycephalic and left common carotid  arteries, normal variant.   Impression:     IMPRESSION:    1. No acute intracranial hemorrhage.  2. No acute calvarial or maxillofacial bone fracture.  3. No acute fracture or malalignment of the cervical spine.  4.  Small right frontal scalp hematoma.   CT FACIAL BONES WITHOUT IV CONTRAST [[id number]] Collected: 09/21/19 1421   Order Status: Completed Updated: 09/21/19 1439   Narrative:     CLINICAL HISTORY: trauma.    TECHNIQUE: Routine unenhanced CT of the head. Routine unenhanced  maxillofacial  bone CT. Routine unenhanced CT of the cervical spine.    CT DOSE:  One or more dose reduction techniques (e.g. automated exposure  control, adjustment of the mA and/or kV according to patient size, use of  iterative reconstruction technique) utilized for this examination.    COMPARISON: None available.    COMMENT:    No acute intracranial hemorrhage, extracerebral fluid collection, midline shift  or mass effect.  Right frontal scalp hematoma.  No fracture of the calvarium or  skull base.    Flow Normal appearance of the brain parenchyma. Ventricles are normal. Cerebral  volume is age appropriate.  Visualized orbits are unremarkable.    No acute fracture or osseous defect of the maxillofacial bones.   Paranasal  sinuses are essentially clear.    No acute fracture of the cervical spine. No traumatic malalignment of the  cervical spine, noting mild straightening of the normal lordosis which is  favored to be positional. This examination does not assess for ligamentous  injury or stability. No evidence of significant intraspinal abnormality.  Incomplete fusion of the posterior arch of C1, normal variant.  Vertebral body  and intervertebral disc heights are normal.  Visualized lung apices are  unremarkable.  Common origin of the brachycephalic and left common carotid  arteries, normal variant.   Impression:     IMPRESSION:    1. No acute intracranial hemorrhage.  2. No acute calvarial or maxillofacial bone fracture.  3. No acute fracture or malalignment of the cervical spine.  4.  Small right frontal scalp hematoma.   CT CERVICAL SPINE WITHOUT IV CONTRAST [[id number]] Collected: 09/21/19 1421   Order Status: Completed Updated: 09/21/19 1439   Narrative:     CLINICAL HISTORY: trauma.    TECHNIQUE: Routine unenhanced CT of the head. Routine unenhanced maxillofacial  bone CT. Routine unenhanced CT of the cervical spine.    CT DOSE:  One or more dose reduction techniques (e.g. automated exposure  control,  adjustment of the mA and/or kV according to patient size, use of  iterative reconstruction technique) utilized for this examination.    COMPARISON: None available.    COMMENT:    No acute intracranial hemorrhage, extracerebral fluid collection, midline shift  or mass effect.  Right frontal scalp hematoma.  No fracture of the calvarium or  skull base.    Flow Normal appearance of the brain parenchyma. Ventricles are normal. Cerebral  volume is age appropriate.  Visualized orbits are unremarkable.    No acute fracture or osseous defect of the maxillofacial bones.   Paranasal  sinuses are essentially clear.    No acute fracture of the cervical spine. No traumatic malalignment of the  cervical spine, noting mild straightening of the normal lordosis which is  favored to be positional. This examination does not assess for ligamentous  injury or stability. No evidence of significant intraspinal abnormality.  Incomplete fusion of the posterior arch of C1, normal variant.  Vertebral body  and intervertebral disc heights are normal.  Visualized lung apices are  unremarkable.  Common origin of the brachycephalic and left common carotid  arteries, normal variant.   Impression:     IMPRESSION:    1. No acute intracranial hemorrhage.  2. No acute calvarial or maxillofacial bone fracture.  3. No acute fracture or malalignment of the cervical spine.  4.  Small right frontal scalp hematoma.           ASSESSMENT AND PLAN           Alcohol dependence (CMS/HCC) ([facility])   Assessment & Plan    - Last drink was Monday 9/16/19  - At [facility] since Tuesday 9/17/19   - Presented to the ED from [facility] because of lightheadedness.   - On home medication acamprosate 333 mg qd. Will hold for now.   - Consult social work   - Alcohol education   - [facility]     Hyponatremia   Assessment & Plan    - Sodium of 128 on admission  - Secondary to beer potomania vs. SIADH (fall vs. SSRI use) vs. Dehydration  - Will check urine electrolytes  - Patient is eating and  drinking. Hold off on IVF     Dizziness   Assessment & Plan    - Multifactorial. Possible post concussive syndrome after fall over the weekend. Hyponatremic with a sodium of  128.   - Hx of heavy alcohol abuse for 20+ years  - Possible Wernike's encephalopathy? Patient is ataxic reporting multiple falls when she is drunk and when she is sober. She appears confused at times. Nystagmus not clearly present.   - Neurology consulted, recommendations appreciated.      * Hypokalemia   Assessment & Plan    - Hypokalemia with a potassium of 2.8  - KCl 40 mEq given in ED  - Repeat K   - Give another 40 mEq KCl        Headache   Assessment & Plan    - Tension headache? Possible post concussive   - CT head (9/21/19): No acute intracranial hemorrhage.  No acute calvarial or maxillofacial bone fracture. No acute fracture or malalignment of the cervical spine. Small right frontal scalp hematoma.     Anxiety   Assessment & Plan    - On fluoxetine 40 mg qd.  - Continue for now but can cause SIADH  - Based on urine electrolytes reassess fluoxetine.      Hematuria   Assessment & Plan    - Noted on UA  - Will continue to monitor     Facial bruising   Assessment & Plan    - Present on admission     Ulcerative colitis (CMS/HCC) ([facility])   Assessment & Plan    - Present on admission     Normocytic anemia   Assessment & Plan    - Hb of 9.4   - No signs of bleeding  - Will continue to monitor           VTE Prophylaxis Plan: SCD  Code Status: Full Code  Estimated Discharge Date: 9/23/2019       Katty Aguilar MD  9/21/2019

## 2019-09-21 NOTE — NURSING NOTE
Patient arrived to unit from ED. Patient placed on telemetry. Vital signs obtained and documented.

## 2019-09-21 NOTE — ASSESSMENT & PLAN NOTE
- Sodium of 128 on admission  - Secondary to beer potomania vs. SIADH (fall vs. SSRI use) vs. Dehydration  - Sodium of 136 today over the course of 48 hours    Plan:   will hold on additional IVFs  Cont to monitor w/routine bmps

## 2019-09-21 NOTE — ED ATTESTATION NOTE
I have personally seen and examined the patient.  I reviewed and agree with physician assistant / nurse practitioner’s assessment and plan of care, with the following exceptions: None  My examination, assessment, and plan of care of Rosaline Fatima is as follows:    The patient is a 44-year-old female with past medical history of alcohol abuse, Crohn's disease, who presents to the emergency department for evaluation of headache.  Patient reports she has fallen multiple times while she was visiting [place] 5 days ago.  The patient reports she has been drinking heavily over the past several days.  The patient went to get treatment in an alcohol treatment facility and was sent to the emergency department for evaluation.  The patient has had intermittent episodes of nausea, vomiting, headache, and lightheadedness.  Patient denies focal neurologic weakness, numbness, vision change, speech change, chest pain, dyspnea, abdominal pain, lower extremity pain, edema, or rash.  The patient has chronic diarrhea due to Crohn's disease.    Physical exam: Vital signs reviewed.  General: Patient appears comfortable sitting up in bed.  HEENT: NCAT, EOMI, MMM.  Neck: Supple, nontender, no meningismus.  Chest: Lungs clear to auscultation bilaterally, no rales.  Heart: Regular rate and rhythm no murmur.  Abdomen: Soft, nontender nondistended, no guarding, no rebound, obese, normal active bowel sounds.  Extremities: No cyanosis, clubbing, or edema.  No calf tenderness.  Skin: Warm and dry, no rash.  Neuro: GCS 15, 5/5 strength bilateral upper and lower extremities, sensation normal.  Cranial nerves II to XII intact.  Psychiatric: Patient appears skeptical and guarded.    Plan: Check labs.  CT scan brain.  '    Labs Reviewed   COMPREHENSIVE METABOLIC PANEL - Abnormal        Result Value    Sodium 128 (*)     Potassium 2.7 (*)     Chloride 85 (*)     CO2 32      BUN 18      Creatinine 1.9 (*)     Glucose 140 (*)     Calcium 9.4      AST (SGOT)  46 (*)     ALT (SGPT) 21      Alkaline Phosphatase 114      Total Protein 8.0      Albumin 3.4      Bilirubin, Total 1.7 (*)     eGFR 28.6 (*)     Anion Gap 11     CBC - Abnormal     WBC 8.11      RBC 3.18 (*)     Hemoglobin 9.4 (*)     Hematocrit 29.1 (*)     MCV 91.5      MCH 29.6      MCHC 32.3      RDW 16.6 (*)     Platelets 96 (*)     MPV 11.4     UA REFLEX CULTURE (MACROSCOPIC) - Abnormal     Color, Urine Yellow      Clarity, Urine Clear      Specific Gravity, Urine 1.012      pH, Urine 6.0      Leukocyte Esterase Negative      Nitrite, Urine Negative      Protein, Urine Negative      Glucose, Urine Negative      Ketones, Urine Negative      Urobilinogen, Urine 0.2      Bilirubin, Urine Negative      Blood, Urine Trace (*)    UA MICROSCOPIC - Abnormal     RBC, Urine 5 TO 9 (*)     WBC, Urine 0 TO 3      Squamous Epithelial +1 (*)     Hyaline Cast 0 TO 2 (*)     Bacteria, Urine None Seen     ACETAMINOPHEN LEVEL - Abnormal     Acetaminophen <10.0 (*)    POTASSIUM - Abnormal     Potassium 2.9 (*)    BHCG, SERUM, QUAL - Normal    Preg Test, Serum Negative     TROPONIN I - Normal    Troponin I <0.02     ETHANOL - Normal    Ethanol <5     CBC AND DIFFERENTIAL    Narrative:     The following orders were created for panel order CBC and differential.  Procedure                               Abnormality         Status                     ---------                               -----------         ------                     CBC[[id number]]                           Abnormal            Final result               Diff Count[[id number]]                                        Final result                 Please view results for these tests on the individual orders.   URINALYSIS REFLEX CULTURE (ED AND OUTPATIENT ONLY)    Narrative:     The following orders were created for panel order Urinalysis w/ reflex culture.  Procedure                               Abnormality         Status                     ---------                                -----------         ------                     UA Reflex to Culture (Mac...[[id number]]  Abnormal            Final result               UA Microscopic[[id number]]                Abnormal            Final result                 Please view results for these tests on the individual orders.   DIFF COUNT    Differential Type Auto      nRBC 0.0      Immature Granulocytes 0.5      Neutrophils 74.5      Lymphocytes 16.6      Monocytes 6.3      Eosinophils 1.6      Basophils 0.5      Immature Granulocytes, Absolute 0.04      Neutrophils, Absolute 6.04      Lymphocytes, Absolute 1.35      Monocytes, Absolute 0.51      Eosinophils, Absolute 0.13      Basophils, Absolute 0.04      Ovalocytes Occasional      Platelet Estimate Decreased (51,000-149,000)      PLT Morphology Normal     RAINBOW DRAW PANEL    Narrative:     The following orders were created for panel order [place] Draw Panel.  Procedure                               Abnormality         Status                     ---------                               -----------         ------                     RAINBOW RED[[id number]]                                                                  RAINBOW LT BLUE[[id number]]                                   Final result               RAINBOW LT GREEN[[id number]]                                  Final result                 Please view results for these tests on the individual orders.   RAINBOW LT BLUE   RAINBOW LT GREEN   RAINBOW RED         The patient's labs and CT scan results are reviewed.  The patient has an elevated creatinine.  Patient has no old records to determine her baseline creatinine.  The patient became tearful and agitated after being informed we recommend hospitalization at this time.  Patient reluctantly agrees to hospitalization.    Impression: Acute headache.  Acute hyponatremia.  acute hypokalemia. History of alcohol abuse.          I was physically present for the key/critical portions of the following  procedures: None       Andrey Lieberman MD  09/21/19 0917

## 2019-09-21 NOTE — ASSESSMENT & PLAN NOTE
- Last drink was Monday 9/16/19  - At [facility] since Tuesday 9/17/19   - Presented to the ED from [facility] because of lightheadedness.   - patient w/mild tremulousness but otherwise denies any other si/sxs of withdrawal    Plan:  - Held home medication acamprosate 333 mg qd while hospitalized. -   - Discussed alcohol cessation   - cont to monitor w/CIWA protocol  -Cont w/ativan IV PRN  -Cont w/MV, folic acid and IV thiamine as stated above

## 2019-09-22 PROBLEM — N17.9 AKI (ACUTE KIDNEY INJURY) (CMS/HCC): Status: ACTIVE | Noted: 2019-09-22

## 2019-09-22 PROBLEM — R26.81 UNSTEADY GAIT: Status: ACTIVE | Noted: 2019-09-22

## 2019-09-22 PROBLEM — S06.9XAA MILD TRAUMATIC BRAIN INJURY (CMS/HCC): Status: ACTIVE | Noted: 2019-09-22

## 2019-09-22 LAB
ALBUMIN SERPL-MCNC: 3.2 G/DL (ref 3.4–5)
ALP SERPL-CCNC: 93 IU/L (ref 35–126)
ALT SERPL-CCNC: 18 IU/L (ref 11–54)
ANION GAP SERPL CALC-SCNC: 10 MEQ/L (ref 3–15)
AST SERPL-CCNC: 35 IU/L (ref 15–41)
ATRIAL RATE: 80
BILIRUB SERPL-MCNC: 1.4 MG/DL (ref 0.3–1.2)
BUN SERPL-MCNC: 18 MG/DL (ref 8–20)
CALCIUM SERPL-MCNC: 9.1 MG/DL (ref 8.9–10.3)
CHLORIDE SERPL-SCNC: 94 MEQ/L (ref 98–109)
CO2 SERPL-SCNC: 29 MEQ/L (ref 22–32)
CREAT SERPL-MCNC: 1.6 MG/DL
ERYTHROCYTE [DISTWIDTH] IN BLOOD BY AUTOMATED COUNT: 17.3 % (ref 11.7–14.4)
GFR SERPL CREATININE-BSD FRML MDRD: 34.9 ML/MIN/1.73M*2
GLUCOSE SERPL-MCNC: 115 MG/DL (ref 70–99)
HCT VFR BLDCO AUTO: 27.1 %
HGB BLD-MCNC: 9 G/DL
MAGNESIUM SERPL-MCNC: 1.7 MG/DL (ref 1.8–2.5)
MCH RBC QN AUTO: 29.5 PG (ref 28–33.2)
MCHC RBC AUTO-ENTMCNC: 33.2 G/DL (ref 32.2–35.5)
MCV RBC AUTO: 88.9 FL (ref 83–98)
P AXIS: 49
PDW BLD AUTO: 11.4 FL (ref 9.4–12.3)
PLATELET # BLD AUTO: 89 K/UL
POTASSIUM SERPL-SCNC: 3.2 MEQ/L (ref 3.6–5.1)
PR INTERVAL: 126
PROT SERPL-MCNC: 6.9 G/DL (ref 6–8.2)
QRS DURATION: 94
QT INTERVAL: 404
QTC CALCULATION(BAZETT): 465
R AXIS: 11
RBC # BLD AUTO: 3.05 M/UL (ref 3.93–5.22)
SODIUM SERPL-SCNC: 133 MEQ/L (ref 136–144)
T WAVE AXIS: -22
VENTRICULAR RATE: 80
WBC # BLD AUTO: 6.52 K/UL

## 2019-09-22 PROCEDURE — 20600000 HC ROOM AND CARE INTERMEDIATE/TELEMETRY

## 2019-09-22 PROCEDURE — 63600000 HC DRUGS/DETAIL CODE: Performed by: FAMILY MEDICINE

## 2019-09-22 PROCEDURE — 99232 SBSQ HOSP IP/OBS MODERATE 35: CPT | Performed by: FAMILY MEDICINE

## 2019-09-22 PROCEDURE — 85027 COMPLETE CBC AUTOMATED: CPT | Performed by: FAMILY MEDICINE

## 2019-09-22 PROCEDURE — 80053 COMPREHEN METABOLIC PANEL: CPT | Performed by: FAMILY MEDICINE

## 2019-09-22 PROCEDURE — 36415 COLL VENOUS BLD VENIPUNCTURE: CPT | Performed by: FAMILY MEDICINE

## 2019-09-22 PROCEDURE — 63700000 HC SELF-ADMINISTRABLE DRUG: Performed by: FAMILY MEDICINE

## 2019-09-22 PROCEDURE — 25800000 HC PHARMACY IV SOLUTIONS: Performed by: FAMILY MEDICINE

## 2019-09-22 PROCEDURE — 83735 ASSAY OF MAGNESIUM: CPT | Performed by: FAMILY MEDICINE

## 2019-09-22 RX ORDER — SODIUM CHLORIDE 9 MG/ML
INJECTION, SOLUTION INTRAVENOUS CONTINUOUS
Status: ACTIVE | OUTPATIENT
Start: 2019-09-22 | End: 2019-09-23

## 2019-09-22 RX ORDER — POTASSIUM CHLORIDE 750 MG/1
40 TABLET, FILM COATED, EXTENDED RELEASE ORAL ONCE
Status: COMPLETED | OUTPATIENT
Start: 2019-09-22 | End: 2019-09-22

## 2019-09-22 RX ADMIN — SODIUM CHLORIDE: 9 INJECTION, SOLUTION INTRAVENOUS at 15:50

## 2019-09-22 RX ADMIN — LORAZEPAM 1 MG: 2 INJECTION INTRAMUSCULAR; INTRAVENOUS at 10:31

## 2019-09-22 RX ADMIN — MULTIPLE VITAMINS W/ MINERALS TAB 1 TABLET: TAB at 08:17

## 2019-09-22 RX ADMIN — THIAMINE HCL TAB 100 MG 100 MG: 100 TAB at 08:17

## 2019-09-22 RX ADMIN — LORAZEPAM 1 MG: 2 INJECTION INTRAMUSCULAR; INTRAVENOUS at 06:01

## 2019-09-22 RX ADMIN — SODIUM CHLORIDE: 9 INJECTION, SOLUTION INTRAVENOUS at 08:17

## 2019-09-22 RX ADMIN — LORAZEPAM 1 MG: 2 INJECTION INTRAMUSCULAR; INTRAVENOUS at 00:48

## 2019-09-22 RX ADMIN — LORAZEPAM 1 MG: 2 INJECTION INTRAMUSCULAR; INTRAVENOUS at 20:23

## 2019-09-22 RX ADMIN — SODIUM CHLORIDE: 9 INJECTION, SOLUTION INTRAVENOUS at 23:56

## 2019-09-22 RX ADMIN — POTASSIUM CHLORIDE 40 MEQ: 10 TABLET, EXTENDED RELEASE ORAL at 08:17

## 2019-09-22 RX ADMIN — FLUOXETINE 20 MG: 20 CAPSULE ORAL at 08:17

## 2019-09-22 RX ADMIN — LORAZEPAM 1 MG: 2 INJECTION INTRAMUSCULAR; INTRAVENOUS at 15:50

## 2019-09-22 RX ADMIN — FOLIC ACID 1 MG: 1 TABLET ORAL at 08:17

## 2019-09-22 NOTE — PLAN OF CARE
Problem: Patient Care Overview  Goal: Plan of Care Review  Outcome: Ongoing (interventions implemented as appropriate)   09/22/19 1118   Coping/Psychosocial   Plan Of Care Reviewed With patient   Plan of Care Review   Progress progress toward functional goals as expected   Outcome Summary Will continue to replace electrolytes and correct creatinine with hydration; ativan given as ordered per CIWA, patient calm and cooperative today

## 2019-09-22 NOTE — PROGRESS NOTES
Hospital Medicine Service -  Daily Progress Note       SUBJECTIVE   Interval History:     No acute overnight events. Ms. Fatima was seen and examined at bedside. She feels fine this morning and is anxious to go home. Denies fevers, chills, dyspnea, chest pain, palpitations, abdominal pain, nausea, vomiting, diarrhea.      OBJECTIVE      Vital signs in last 24 hours:  Temp:  [36.4 °C (97.6 °F)-36.9 °C (98.4 °F)] 36.6 °C (97.8 °F)  Heart Rate:  [65-86] 66  Resp:  [16-18] 18  BP: (110-122)/(54-60) 121/60    Intake/Output Summary (Last 24 hours) at 09/22/19 0808  Last data filed at 09/21/19 2350   Gross per 24 hour   Intake              240 ml   Output                0 ml   Net              240 ml       PHYSICAL EXAMINATION      Physical Exam     Constitutional: She is oriented to person, place, and time. She appears well-developed and well-nourished. No distress.   HENT:   Head: Normocephalic. Head is with contusion.   Eyes: Pupils are equal, round, and reactive to light. Conjunctivae and EOM are normal.   Neck: Normal range of motion. Neck supple.   Cardiovascular: Normal rate, regular rhythm and intact distal pulses.  Exam reveals gallop and friction rub.    Murmur heard.  Pulmonary/Chest: Effort normal and breath sounds normal. No respiratory distress. She has no wheezes. She has no rales. She exhibits no tenderness.   Abdominal: Soft. Bowel sounds are normal. She exhibits no distension and no mass. There is no tenderness. There is no rebound and no guarding. No hernia.   Musculoskeletal: Normal range of motion. She exhibits no edema, tenderness or deformity.   Neurological: She is alert and oriented to person, place, and time. She displays normal reflexes. No cranial nerve deficit. Coordination abnormal.   Skin: She is not diaphoretic.   Psychiatric: She has a normal mood and affect.       LINES, CATHETERS, DRAINS, AIRWAYS, AND WOUNDS   Lines, Drains, Airways, Wounds:  Peripheral IV 09/21/19 Left Forearm (Active)    Number of days: 1        LABS / IMAGING / TELE      Labs  CBC Results       09/22/19 09/21/19                       0520 1355          WBC 6.52 8.11          RBC 3.05 (L) 3.18 (L)          HGB 9.0 (L) 9.4 (L)          HCT 27.1 (L) 29.1 (L)          MCV 88.9 91.5          MCH 29.5 29.6          MCHC 33.2 32.3          PLT 89 (L) 96 (L)          Comment for PLT at 0520 on 09/22/19:  ALL RESULTS HAVE BEEN RECHECKED. CONSISTENT WITH PREVIOUS RESULTS. SPECIMEN QUALITY CHECKED    Comment for PLT at 1355 on 09/21/19:  ALL RESULTS HAVE BEEN RECHECKED. SPECIMEN QUALITY CHECKED. RESULTS OBTAINED AFTER VORTEXING TO ELIMINATE PLT CLUMPS        CMP Results       09/22/19 09/21/19 09/21/19                    0520 2014 1355          (L) - 128 (L)         K 3.2 (L) 2.9 (L) 2.7 (LL)         Cl 94 (L) - 85 (L)         CO2 29 - 32         Glucose 115 (H) - 140 (H)         BUN 18 - 18         Creatinine 1.6 (H) - 1.9 (H)         Calcium 9.1 - 9.4         Anion Gap 10 - 11         AST 35 - 46 (H)         ALT 18 - 21         Albumin 3.2 (L) - 3.4         EGFR 34.9 (L) - 28.6 (L)         Comment for K at 1355 on 09/21/19:    Results obtained on plasma. Plasma Potassium values may be up to 0.4 mEQ/L less than serum values. The differences may be greater for patients with high platelet or white cell counts.          ASSESSMENT AND PLAN      Alcohol dependence (CMS/HCC) (HCC)   Assessment & Plan    - Last drink was Monday 9/16/19  - At [facility] since Tuesday 9/17/19   - Presented to the ED from [facility] because of lightheadedness.   - Held home medication acamprosate 333 mg qd while hospitalized. Resumed on discharge  - Discussed alcohol cessation      Hyponatremia   Assessment & Plan    - Sodium of 128 on admission  - Secondary to beer potomania vs. SIADH (fall vs. SSRI use) vs. Dehydration  - Sodium of 133 today     Dizziness   Assessment & Plan    - Multifactorial. Possible post concussive syndrome after fall over the weekend. Hyponatremic  with a sodium of  128.   - Hx of heavy alcohol abuse for 20+ years  - Possible Wernike's encephalopathy? Patient is ataxic reporting multiple falls when she is drunk and when she is sober. She appears confused at times. Nystagmus not clearly present.   - Neurology consulted, recommendations appreciated.      * Hypokalemia   Assessment & Plan    - Hypokalemia with a potassium of 2.8 on admission. Replaced. Discharged with normal potassium.        Headache   Assessment & Plan    - Tension headache? Possible post concussive. - CT head (9/21/19): No acute intracranial hemorrhage.  No acute calvarial or maxillofacial bone fracture. No acute fracture or malalignment of the cervical spine. Small right frontal scalp hematoma.     Anxiety   Assessment & Plan    - Continued home medication fluoxetine 40 mg qd.       Hematuria   Assessment & Plan    - Noted on UA  - Pt to follow up with urology as an outpatient.      Facial bruising   Assessment & Plan    - Present on admission     Scalp hematoma   Assessment & Plan    - Present on admission     Normocytic anemia   Assessment & Plan    - Hb stable in the 9s.   - No evidence of bleeding.   - Outpatient follow up of anemia with PCP     CARLOTTA (acute kidney injury) (CMS/HCC) (HCC)   Assessment & Plan    - Cr of 1.9 on admission.   - With IV fluids decreased to 1.3   - Resumed fluids  - Unclear baseline          VTE Prophylaxis Plan: SCDs  Code Status: Full Code  Estimated Discharge Date: 9/23/2019  Disposition Planning: [facility] after resolution of CARLOTTA and evaluation by neurology      Katty Aguilar MD  9/22/2019

## 2019-09-22 NOTE — CONSULTS
NEUROLOGICAL CONSULTATION      PATIENT NAME:  Rosaline Fatima      YOB: 1975   AGE:  44 y.o.      GENDER: female  MRN:  [id number]          PATIENT #: [id number]    Reason for Consultation: Headache and slowed cognition in the setting of head injury and alcohol use    Requesting service: Hospitalist    HISTORY   Deni is a 44-year-old woman with a history of alcoholism and Crohn's disease who was admitted yesterday.  She had been in [place].  She was drinking heavily.  She fell several times.  She went to medical facility.  No head injury and head imaging was conducted.  When she [place] here [place] the [place], she checked herself into rehab.  She was experiencing a persistent headache and came to [facility].  She had several metabolic abnormalities upon presentation.  CAT scan was unremarkable.    PAST MEDICAL HISTORY     Past Medical History:   Diagnosis Date   • Alcoholism (CMS/HCC) (HCC)    • Crohn disease (CMS/HCC) (HCC)        PAST SURGICAL HISTORY     Past Surgical History:   Procedure Laterality Date   • SMALL INTESTINE SURGERY          FAMILY HISTORY   History reviewed. No pertinent family history.    SOCIAL HISTORY     Social History   Substance Use Topics   • Smoking status: Former Smoker   • Smokeless tobacco: Never Used   • Alcohol use Yes      Comment: at [facility], last drink 7 days ago       ALLERGIES   No Known Allergies    MEDICATIONS   Home Medication:  •  FLUoxetine, Take 20 mg by mouth daily.  •  OXAZEPAM ORAL, Take by mouth.    MEDICATIONS:  Infusions:      sodium chloride 0.9 %  Last Rate: 125 mL/hr at 19 0817          Scheduled:     FLUoxetine 20 mg oral Daily   folic acid 1 mg oral Daily   multivitamin 1 tablet oral Daily   thiamine 100 mg oral Daily       REVIEW OF SYSTEMS   13 point review of systems completed. Negative except for above mentioned history.    PHYSICAL EXAMINATION   Temperature: Temp (24hrs), Av.7 °C (98 °F), Min:36.4 °C (97.6 °F), Max:36.9 °C (98.4  °F)     BP Max:  Systolic (24hrs), Av , Min:110 , Max:122      BP Simpson:  Diastolic (24hrs), Av, Min:54, Max:60    Recent:      Patient Vitals for the past 4 hrs:   BP Temp Temp src Pulse Resp SpO2   19 0800 (!) 111/59 36.5 °C (97.7 °F) Oral 74 18 98 %   19 0658 - - - 66 - -         Neurologic Exam:   Mental status: Oriented to year and month.  Has trouble pronouncing the name of the hospital where she is.  Able to calculate but does so slowly.  Language is intact to naming, comprehension, repetition.  Cranial nerves: Pupils are 3 mm bilaterally and equally reactive to light.  Extraocular movements are intact without nystagmus.  Visual fields by confrontation are full.  Facial strength is symmetric.  Facial sensation is intact to light touch. Hearing is intact. Tongue is midline on protrusion with symmetric palate elevation.  Strength: 5/5 throughout.  No pronator drift.  Tone is normal.  Sensation: Intact to light touch.  Reflexes: 2+ throughout with downgoing toes  Coordination: Finger to nose and heel to shin are normal without ataxia  Gait: Hesitant and slightly wide-based    Diagnostic Data:     Labs reviewed-  Lab Results   Component Value Date    WBC 6.52 2019    HGB 9.0 (L) 2019    HCT 27.1 (L) 2019    PLT 89 (L) 2019    ALT 18 2019    AST 35 2019     (L) 2019    K 3.2 (L) 2019    CL 94 (L) 2019    CREATININE 1.6 (H) 2019    BUN 18 2019    CO2 29 2019     Imaging reviewed  X-ray Chest 2 Views    Result Date: 2019  IMPRESSION: No active disease is seen in the chest.    X-ray Shoulder Right 2+ Views    Result Date: 2019  IMPRESSION: No fracture seen. COMMENT: Three views of the right shoulder were performed. We have no prior studies for comparison. No fracture, dislocation, or significant degenerative changes are seen. No abnormal calcifications are seen about the shoulder. The visualized right  hemithorax appears clear. If symptoms persist, MRI should be considered.    Ct Head Without Iv Contrast    Result Date: 9/21/2019  IMPRESSION: 1. No acute intracranial hemorrhage. 2. No acute calvarial or maxillofacial bone fracture. 3. No acute fracture or malalignment of the cervical spine. 4.  Small right frontal scalp hematoma.     Ct Facial Bones Without Iv Contrast    Result Date: 9/21/2019  IMPRESSION: 1. No acute intracranial hemorrhage. 2. No acute calvarial or maxillofacial bone fracture. 3. No acute fracture or malalignment of the cervical spine. 4.  Small right frontal scalp hematoma.     Ct Cervical Spine Without Iv Contrast    Result Date: 9/21/2019  IMPRESSION: 1. No acute intracranial hemorrhage. 2. No acute calvarial or maxillofacial bone fracture. 3. No acute fracture or malalignment of the cervical spine. 4.  Small right frontal scalp hematoma.         IMPRESSION/PLAN     Mild traumatic brain injury (CMS/HCC) (HCC)   Assessment & Plan    Some unsteadiness, cognitive slowness, and headache likely secondary to concussion.  At this point, we will sign off.  Please let us know if we can be of additional service.     Unsteady gait   Assessment & Plan    Likely a combination of chronic alcoholism, concussion, and metabolic abnormality.  I think it is unlikely that this patient has Warnicke's encephalopathy, but I think benefits still outweigh risks of providing IV thiamine 300 mg.             AUTHOR:  Hay Padilla MD    PRIMARY CARE PHYSICIAN   Pt States, No Pcp  [phone]

## 2019-09-22 NOTE — ASSESSMENT & PLAN NOTE
Likely a combination of chronic alcoholism, concussion, and metabolic abnormality.  I think it is unlikely that this patient has Warnicke's encephalopathy, but I think benefits still outweigh risks of providing IV thiamine 300 mg.

## 2019-09-23 LAB
ANION GAP SERPL CALC-SCNC: 7 MEQ/L (ref 3–15)
BUN SERPL-MCNC: 12 MG/DL (ref 8–20)
CALCIUM SERPL-MCNC: 8.8 MG/DL (ref 8.9–10.3)
CHLORIDE SERPL-SCNC: 102 MEQ/L (ref 98–109)
CO2 SERPL-SCNC: 27 MEQ/L (ref 22–32)
CREAT SERPL-MCNC: 1.1 MG/DL
ERYTHROCYTE [DISTWIDTH] IN BLOOD BY AUTOMATED COUNT: 17.8 % (ref 11.7–14.4)
GFR SERPL CREATININE-BSD FRML MDRD: 53.7 ML/MIN/1.73M*2
GLUCOSE SERPL-MCNC: 106 MG/DL (ref 70–99)
HCT VFR BLDCO AUTO: 29.7 %
HGB BLD-MCNC: 9.5 G/DL
MAGNESIUM SERPL-MCNC: 1.6 MG/DL (ref 1.8–2.5)
MCH RBC QN AUTO: 29.8 PG (ref 28–33.2)
MCHC RBC AUTO-ENTMCNC: 32 G/DL (ref 32.2–35.5)
MCV RBC AUTO: 93.1 FL (ref 83–98)
PDW BLD AUTO: 10.9 FL (ref 9.4–12.3)
PLATELET # BLD AUTO: 95 K/UL
POTASSIUM SERPL-SCNC: 3.4 MEQ/L (ref 3.6–5.1)
RBC # BLD AUTO: 3.19 M/UL (ref 3.93–5.22)
SODIUM SERPL-SCNC: 136 MEQ/L (ref 136–144)
WBC # BLD AUTO: 6.55 K/UL

## 2019-09-23 PROCEDURE — 83735 ASSAY OF MAGNESIUM: CPT | Performed by: INTERNAL MEDICINE

## 2019-09-23 PROCEDURE — 63700000 HC SELF-ADMINISTRABLE DRUG: Performed by: FAMILY MEDICINE

## 2019-09-23 PROCEDURE — 63700000 HC SELF-ADMINISTRABLE DRUG: Performed by: INTERNAL MEDICINE

## 2019-09-23 PROCEDURE — 3E02340 INTRODUCTION OF INFLUENZA VACCINE INTO MUSCLE, PERCUTANEOUS APPROACH: ICD-10-PCS | Performed by: INTERNAL MEDICINE

## 2019-09-23 PROCEDURE — 63600000 HC DRUGS/DETAIL CODE: Performed by: FAMILY MEDICINE

## 2019-09-23 PROCEDURE — 90686 IIV4 VACC NO PRSV 0.5 ML IM: CPT | Performed by: INTERNAL MEDICINE

## 2019-09-23 PROCEDURE — 80048 BASIC METABOLIC PNL TOTAL CA: CPT | Performed by: FAMILY MEDICINE

## 2019-09-23 PROCEDURE — 63600000 HC DRUGS/DETAIL CODE: Performed by: INTERNAL MEDICINE

## 2019-09-23 PROCEDURE — 25800000 HC PHARMACY IV SOLUTIONS: Performed by: FAMILY MEDICINE

## 2019-09-23 PROCEDURE — 20600000 HC ROOM AND CARE INTERMEDIATE/TELEMETRY

## 2019-09-23 PROCEDURE — 85027 COMPLETE CBC AUTOMATED: CPT | Performed by: FAMILY MEDICINE

## 2019-09-23 PROCEDURE — 99232 SBSQ HOSP IP/OBS MODERATE 35: CPT | Mod: 25 | Performed by: INTERNAL MEDICINE

## 2019-09-23 PROCEDURE — 25800000 HC PHARMACY IV SOLUTIONS: Performed by: INTERNAL MEDICINE

## 2019-09-23 PROCEDURE — G0008 ADMIN INFLUENZA VIRUS VAC: HCPCS | Performed by: INTERNAL MEDICINE

## 2019-09-23 PROCEDURE — 36415 COLL VENOUS BLD VENIPUNCTURE: CPT | Performed by: FAMILY MEDICINE

## 2019-09-23 RX ORDER — THIAMINE HYDROCHLORIDE 100 MG/ML
500 INJECTION, SOLUTION INTRAMUSCULAR; INTRAVENOUS 3 TIMES DAILY
Status: DISCONTINUED | OUTPATIENT
Start: 2019-09-23 | End: 2019-09-23

## 2019-09-23 RX ORDER — POTASSIUM CHLORIDE 750 MG/1
40 TABLET, FILM COATED, EXTENDED RELEASE ORAL AS NEEDED
Status: DISCONTINUED | OUTPATIENT
Start: 2019-09-23 | End: 2019-09-24 | Stop reason: HOSPADM

## 2019-09-23 RX ORDER — POTASSIUM CHLORIDE 14.9 MG/ML
20 INJECTION INTRAVENOUS AS NEEDED
Status: DISCONTINUED | OUTPATIENT
Start: 2019-09-23 | End: 2019-09-24 | Stop reason: HOSPADM

## 2019-09-23 RX ORDER — POTASSIUM CHLORIDE 750 MG/1
20 TABLET, FILM COATED, EXTENDED RELEASE ORAL AS NEEDED
Status: DISCONTINUED | OUTPATIENT
Start: 2019-09-23 | End: 2019-09-24 | Stop reason: HOSPADM

## 2019-09-23 RX ORDER — POTASSIUM CHLORIDE 1.5 G/1.58G
20 POWDER, FOR SOLUTION ORAL AS NEEDED
Status: DISCONTINUED | OUTPATIENT
Start: 2019-09-23 | End: 2019-09-24 | Stop reason: HOSPADM

## 2019-09-23 RX ORDER — POTASSIUM CHLORIDE 1.5 G/1.58G
40 POWDER, FOR SOLUTION ORAL AS NEEDED
Status: DISCONTINUED | OUTPATIENT
Start: 2019-09-23 | End: 2019-09-24 | Stop reason: HOSPADM

## 2019-09-23 RX ADMIN — LORAZEPAM 1 MG: 2 INJECTION INTRAMUSCULAR; INTRAVENOUS at 22:06

## 2019-09-23 RX ADMIN — MAGNESIUM SULFATE 2 G: 2 INJECTION INTRAVENOUS at 23:31

## 2019-09-23 RX ADMIN — THIAMINE HYDROCHLORIDE 500 MG: 100 INJECTION, SOLUTION INTRAMUSCULAR; INTRAVENOUS at 22:06

## 2019-09-23 RX ADMIN — LORAZEPAM 1 MG: 2 INJECTION INTRAMUSCULAR; INTRAVENOUS at 00:29

## 2019-09-23 RX ADMIN — LORAZEPAM 1 MG: 2 INJECTION INTRAMUSCULAR; INTRAVENOUS at 09:02

## 2019-09-23 RX ADMIN — POTASSIUM CHLORIDE 40 MEQ: 10 TABLET, EXTENDED RELEASE ORAL at 23:28

## 2019-09-23 RX ADMIN — MULTIPLE VITAMINS W/ MINERALS TAB 1 TABLET: TAB at 07:52

## 2019-09-23 RX ADMIN — THIAMINE HCL TAB 100 MG 100 MG: 100 TAB at 07:52

## 2019-09-23 RX ADMIN — SODIUM CHLORIDE: 9 INJECTION, SOLUTION INTRAVENOUS at 07:54

## 2019-09-23 RX ADMIN — LORAZEPAM 1 MG: 2 INJECTION INTRAMUSCULAR; INTRAVENOUS at 17:05

## 2019-09-23 RX ADMIN — FLUOXETINE 20 MG: 20 CAPSULE ORAL at 07:52

## 2019-09-23 RX ADMIN — INFLUENZA VIRUS VACCINE 60 MCG: 15; 15; 15; 15 SUSPENSION INTRAMUSCULAR at 18:57

## 2019-09-23 RX ADMIN — THIAMINE HYDROCHLORIDE 500 MG: 100 INJECTION, SOLUTION INTRAMUSCULAR; INTRAVENOUS at 17:01

## 2019-09-23 RX ADMIN — LORAZEPAM 1 MG: 2 INJECTION INTRAMUSCULAR; INTRAVENOUS at 05:06

## 2019-09-23 RX ADMIN — FOLIC ACID 1 MG: 1 TABLET ORAL at 07:52

## 2019-09-23 RX ADMIN — THIAMINE HYDROCHLORIDE 500 MG: 100 INJECTION, SOLUTION INTRAMUSCULAR; INTRAVENOUS at 11:18

## 2019-09-23 NOTE — PLAN OF CARE
Problem: Patient Care Overview  Goal: Discharge Needs Assessment  Met with pt in room.  Explained role of care coordination.  Pt admitted to [facility] from [facility].  [place] [facility] [place] [facility]  Pt states she is independent and works as a .  Pt states her pharmacy is [facility].  Pt states she has good social support and her father lives close to her in [place].  Pt denies barriers to care in the community and states her PCP is Dr Ty Justin in [facility].  Pt anticipates [facility] when medically stable[facility]   09/21/19 2100 09/23/19 0804 09/23/19 1517   [place] Needs Assessment   Concerns To Be Addressed --  --  care coordination/care conferences;coping/stress concerns;substance/tobacco abuse/use concerns   Readmission Within The Last 30 Days --  no previous admission in last 30 days --    Provider Choice List(s) Given --  --  no   Anticipated Discharge Disposition --  substance abuse treatment facility --    Community Agency Name(s) --  --  [facility]   Equipment Needed After Discharge --  --  none   Current Discharge Risk --  --  substance use/abuse   Activity/Self Care ROS   Equipment Currently Used at Home --  --  none   Current Health   Anticipated Changes Related to Illness inability to work --  --

## 2019-09-23 NOTE — PLAN OF CARE
Problem: Patient Care Overview  Goal: Plan of Care Review  Outcome: Ongoing (interventions implemented as appropriate)   09/23/19 0635   Coping/Psychosocial   Plan Of Care Reviewed With patient   Plan of Care Review   Progress improving   Outcome Summary Replacing electrolytes. CIWA 9, IV ativan given. VSS.        Problem: Fall Risk (Adult)  Goal: Identify Related Risk Factors and Signs and Symptoms   09/23/19 0635   Fall Risk   Signs and Symptoms (Fall Risk) presence of risk factors     Goal: Absence of Falls   09/23/19 0635   Fall Risk (Adult)   Absence of Falls making progress toward outcome

## 2019-09-23 NOTE — PATIENT CARE CONFERENCE
Care Progression Rounds Note  Date: 9/23/2019  Time: 10:27 AM     Patient Name: Rosaline Fatima     Medical Record Number: [id number]   YOB: 1975  Sex: Female      Room/Bed: 3019    Admitting Diagnosis: Hypokalemia [E87.6]  Dizziness [R42]  Hyponatremia [E87.1]  Falling [R29.6]   Admit Date/Time: 9/21/2019  1:25 PM    Primary Diagnosis: Hypokalemia  Principal Problem: Hypokalemia    GMLOS: pending  Anticipated Discharge Date: 9/23/2019    AM-PAC  Mobility Score: 24    Discharge Planning:  Concerns To Be Addressed: no discharge needs identified, denies needs/concerns at this time  Anticipated Discharge Disposition: substance abuse treatment facility    Barriers to Discharge:  Barriers to Discharge: Medical issues not resolved    Participants:  , nursing, occupational therapy, social work/services

## 2019-09-23 NOTE — PLAN OF CARE
Problem: Patient Care Overview  Goal: Plan of Care Review  Outcome: Ongoing (interventions implemented as appropriate)   09/23/19 0804   Coping/Psychosocial   Plan Of Care Reviewed With patient   Plan of Care Review   Progress improving     Goal: Individualization & Mutuality  Outcome: Ongoing (interventions implemented as appropriate)   09/23/19 0804   Individualization   Patient Specific Preferences LIKES TO BE CALLED [facility]    Patient Specific Goals no falls   Patient Specific Interventions motnior VSS and heart montior     Goal: Discharge Needs Assessment  Outcome: Ongoing (interventions implemented as appropriate)   09/23/19 0804   DC Needs Assessment   Concerns To Be Addressed no discharge needs identified;denies needs/concerns at this time   Readmission Within The Last 30 Days no previous admission in last 30 days   Provider Choice List(s) Given no   Anticipated Discharge Disposition substance abuse treatment facility     Goal: Interprofessional Rounds/Family Conf  Outcome: Ongoing (interventions implemented as appropriate)   09/23/19 0804   Interdisciplinary Rounds/Family Conf   Participants nursing;patient;physician       Problem: Fall Risk (Adult)  Goal: Identify Related Risk Factors and Signs and Symptoms  Outcome: Ongoing (interventions implemented as appropriate)   09/23/19 0804   Fall Risk   Related Risk Factors (Fall Risk) history of falls;impaired vision;sensory deficits;environment unfamiliar;depression/anxiety   Signs and Symptoms (Fall Risk) presence of risk factors     Goal: Absence of Falls  Outcome: Ongoing (interventions implemented as appropriate)   09/23/19 0804   Fall Risk (Adult)   Absence of Falls making progress toward outcome

## 2019-09-24 VITALS
BODY MASS INDEX: 32.94 KG/M2 | RESPIRATION RATE: 20 BRPM | HEART RATE: 78 BPM | WEIGHT: 209.88 LBS | TEMPERATURE: 98.3 F | DIASTOLIC BLOOD PRESSURE: 59 MMHG | HEIGHT: 67 IN | SYSTOLIC BLOOD PRESSURE: 115 MMHG | OXYGEN SATURATION: 98 %

## 2019-09-24 LAB
ALBUMIN SERPL-MCNC: 2.7 G/DL (ref 3.4–5)
ALP SERPL-CCNC: 77 IU/L (ref 35–126)
ALT SERPL-CCNC: 17 IU/L (ref 11–54)
ANION GAP SERPL CALC-SCNC: 6 MEQ/L (ref 3–15)
AST SERPL-CCNC: 30 IU/L (ref 15–41)
BILIRUB SERPL-MCNC: 1.1 MG/DL (ref 0.3–1.2)
BUN SERPL-MCNC: 9 MG/DL (ref 8–20)
CALCIUM SERPL-MCNC: 8.6 MG/DL (ref 8.9–10.3)
CHLORIDE SERPL-SCNC: 106 MEQ/L (ref 98–109)
CO2 SERPL-SCNC: 24 MEQ/L (ref 22–32)
CREAT SERPL-MCNC: 0.9 MG/DL
ERYTHROCYTE [DISTWIDTH] IN BLOOD BY AUTOMATED COUNT: 17.9 % (ref 11.7–14.4)
GFR SERPL CREATININE-BSD FRML MDRD: >60 ML/MIN/1.73M*2
GLUCOSE SERPL-MCNC: 116 MG/DL (ref 70–99)
HCT VFR BLDCO AUTO: 26.7 %
HGB BLD-MCNC: 8.3 G/DL
MAGNESIUM SERPL-MCNC: 1.7 MG/DL (ref 1.8–2.5)
MCH RBC QN AUTO: 30.2 PG (ref 28–33.2)
MCHC RBC AUTO-ENTMCNC: 31.1 G/DL (ref 32.2–35.5)
MCV RBC AUTO: 97.1 FL (ref 83–98)
PDW BLD AUTO: 10.9 FL (ref 9.4–12.3)
PLATELET # BLD AUTO: 94 K/UL
POTASSIUM SERPL-SCNC: 3.9 MEQ/L (ref 3.6–5.1)
PROT SERPL-MCNC: 6.2 G/DL (ref 6–8.2)
RBC # BLD AUTO: 2.75 M/UL (ref 3.93–5.22)
SODIUM SERPL-SCNC: 136 MEQ/L (ref 136–144)
WBC # BLD AUTO: 4.39 K/UL

## 2019-09-24 PROCEDURE — 85027 COMPLETE CBC AUTOMATED: CPT | Performed by: INTERNAL MEDICINE

## 2019-09-24 PROCEDURE — 63600000 HC DRUGS/DETAIL CODE: Performed by: FAMILY MEDICINE

## 2019-09-24 PROCEDURE — 63600000 HC DRUGS/DETAIL CODE: Performed by: INTERNAL MEDICINE

## 2019-09-24 PROCEDURE — 36415 COLL VENOUS BLD VENIPUNCTURE: CPT | Performed by: INTERNAL MEDICINE

## 2019-09-24 PROCEDURE — 63700000 HC SELF-ADMINISTRABLE DRUG: Performed by: FAMILY MEDICINE

## 2019-09-24 PROCEDURE — 63700000 HC SELF-ADMINISTRABLE DRUG: Performed by: INTERNAL MEDICINE

## 2019-09-24 PROCEDURE — 99239 HOSP IP/OBS DSCHRG MGMT >30: CPT | Performed by: INTERNAL MEDICINE

## 2019-09-24 PROCEDURE — 25800000 HC PHARMACY IV SOLUTIONS: Performed by: INTERNAL MEDICINE

## 2019-09-24 PROCEDURE — 80053 COMPREHEN METABOLIC PANEL: CPT | Performed by: INTERNAL MEDICINE

## 2019-09-24 PROCEDURE — 83735 ASSAY OF MAGNESIUM: CPT | Performed by: INTERNAL MEDICINE

## 2019-09-24 RX ORDER — FOLIC ACID 1 MG/1
1 TABLET ORAL DAILY
Qty: 30 TABLET | Refills: 0 | Status: SHIPPED | OUTPATIENT
Start: 2019-09-25 | End: 2019-10-25

## 2019-09-24 RX ORDER — MULTIVITAMIN
1 TABLET ORAL DAILY
Qty: 30 TABLET | Refills: 0 | Status: SHIPPED | OUTPATIENT
Start: 2019-09-24 | End: 2019-10-24

## 2019-09-24 RX ORDER — LANOLIN ALCOHOL/MO/W.PET/CERES
CREAM (GRAM) TOPICAL
Qty: 60 TABLET | Refills: 0 | Status: SHIPPED | OUTPATIENT
Start: 2019-09-24

## 2019-09-24 RX ADMIN — LORAZEPAM 1 MG: 2 INJECTION INTRAMUSCULAR; INTRAVENOUS at 04:25

## 2019-09-24 RX ADMIN — THIAMINE HYDROCHLORIDE 500 MG: 100 INJECTION, SOLUTION INTRAMUSCULAR; INTRAVENOUS at 18:01

## 2019-09-24 RX ADMIN — LORAZEPAM 1 MG: 2 INJECTION INTRAMUSCULAR; INTRAVENOUS at 14:34

## 2019-09-24 RX ADMIN — THIAMINE HYDROCHLORIDE 500 MG: 100 INJECTION, SOLUTION INTRAMUSCULAR; INTRAVENOUS at 09:29

## 2019-09-24 RX ADMIN — THIAMINE HYDROCHLORIDE 500 MG: 100 INJECTION, SOLUTION INTRAMUSCULAR; INTRAVENOUS at 13:18

## 2019-09-24 RX ADMIN — POTASSIUM CHLORIDE 20 MEQ: 10 TABLET, EXTENDED RELEASE ORAL at 09:28

## 2019-09-24 RX ADMIN — MULTIPLE VITAMINS W/ MINERALS TAB 1 TABLET: TAB at 09:29

## 2019-09-24 RX ADMIN — LORAZEPAM 1 MG: 2 INJECTION INTRAMUSCULAR; INTRAVENOUS at 09:29

## 2019-09-24 RX ADMIN — FLUOXETINE 20 MG: 20 CAPSULE ORAL at 09:29

## 2019-09-24 RX ADMIN — FOLIC ACID 1 MG: 1 TABLET ORAL at 09:29

## 2019-09-24 NOTE — DISCHARGE SUMMARY
Hospital Medicine Service -  Inpatient Discharge Summary        BRIEF OVERVIEW   Admission Date: 9/21/2019  Discharge Date: 9/24/2019    Admitting Provider: Katty Aguilar MD  Attending Provider: Delmi Pickard DO Attending phys phone: [phone]    PCP: Henri Kamara, No Pcp [phone]    Consults During Admission:  IP CONSULT TO SOCIAL WORK  IP CONSULT TO NEUROLOGY     DISCHARGE DIAGNOSES      Primary Discharge Diagnosis  Dizziness    Secondary Discharge Diagnoses  Active Hospital Problems    Diagnosis Date Noted   • Dizziness 09/21/2019     Priority: High   • CARLOTTA (acute kidney injury) (CMS/[facility]) ([facility]) 09/22/2019   • Unsteady gait 09/22/2019   • Mild traumatic brain injury (CMS/HCC) ([facility]) 09/22/2019   • Hyponatremia 09/21/2019   • Hypokalemia 09/21/2019   • Alcohol dependence (CMS/HCC) ([facility]) 09/21/2019   • Normocytic anemia 09/21/2019   • Hematuria 09/21/2019   • Anxiety 09/21/2019   • Ulcerative colitis (CMS/HCC) ([facility]) 09/21/2019   • Pyoderma gangrenosa 09/21/2019   • Headache 09/21/2019   • Scalp hematoma 09/21/2019   • Facial bruising 09/21/2019      Resolved Hospital Problems    Diagnosis Date Noted Date Resolved   No resolved problems to display.       Problem List on Day of Discharge  * Dizziness   Assessment & Plan    - Multifactorial. Possible post concussive syndrome after fall over the weekend. Hyponatremic with a sodium of  128.   - Hx of heavy alcohol abuse for 20+ years  - Possible Wernike's encephalopathy? Patient is ataxic reporting multiple falls when she is drunk and when she is sober. She appears confused at times. Nystagmus not clearly present.   - Neurology consulted, recommendations appreciated.     Plan;  Cont w/management of alcohol abuse as stated below  Cont w/management of hyponatremia as stated below   Per neurology recommendations will initiate IV thiamine for treatment of potential wernickes although suspicion is low - will initiate iv thiamine 500mg TID x 2 days followed by  transition to oral thiamine  Neurology signed off  Pending clinical stability and completion of IV thiamine x 2 days then t/c d/c back to [facility] tomorrow       CARLOTTA (acute kidney injury) (CMS/[facility]) ([facility])   Assessment & Plan    - Cr of 1.9 on admission.   -resolved with IVFs    Plan;  Cont to monitor w/routine bmps     Facial bruising   Assessment & Plan    - Present on admission     Scalp hematoma   Assessment & Plan    - Present on admission     Headache   Assessment & Plan    - Tension headache? Possible post concussive.   - CT head (9/21/19): No acute intracranial hemorrhage.  No acute calvarial or maxillofacial bone fracture. No acute fracture or malalignment of the cervical spine. Small right frontal scalp hematoma.    Plan;  Denies any further sxs  Cont to to monitor     Anxiety   Assessment & Plan    - Continued home medication fluoxetine 40 mg qd.       Hematuria   Assessment & Plan    - Noted on UA  - Pt to follow up with urology as an outpatient.      Normocytic anemia   Assessment & Plan    - Hb stable in the 9s.   - No evidence of bleeding.   - Outpatient follow up of anemia with PCP     Alcohol dependence (CMS/[facility]) ([facility])   Assessment & Plan    - Last drink was Monday 9/16/19  - At [facility] since Tuesday 9/17/19   - Presented to the ED from [facility] because of lightheadedness.   - patient w/mild tremulousness but otherwise denies any other si/sxs of withdrawal    Plan:  - Held home medication acamprosate 333 mg qd while hospitalized. -   - Discussed alcohol cessation   - cont to monitor w/CIWA protocol  -Cont w/ativan IV PRN  -Cont w/MV, folic acid and IV thiamine as stated above     Hypokalemia   Assessment & Plan    - Hypokalemia with a potassium of 2.8 on admission. Replaced.     Plan;  Cont to monitor w/routine bmps and replete as required for K>4     Hyponatremia   Assessment & Plan    - Sodium of 128 on admission  - Secondary to beer potomania vs. SIADH (fall vs. SSRI use) vs. Dehydration  - Sodium of 136 today  "over the course of 48 hours    Plan:   will hold on additional IVFs  Cont to monitor w/routine bmps       SUMMARY OF HOSPITALIZATION      Presenting Problem/History of Present Illness  44 y.o. year-old female with past medical history of Crohn's Disease c/b pyoderma gangrenosum, alcohol abuse, depression, and anxiety who presented to [facility] ER from [facility] on 9/21/2019 with \"dizziness.\" According to patient she works as a  and while in [place] she became intoxicated and fell several times hitting her head. She went to a hospital in [place] for further treatment but was told she would have to wait two days for imaging. As a result, she flew back to [place] where she checked into [facility] for further management of alcohol abuse. While at [facility] patient continued to have persistent lightheadedness and occasional vertigo. As a result given persistent symptoms patient decided to come to the ER for further evaluation and management. Upon arrival to the ER, exam was notable for bruising encompassing right orbit. Labs were notable for Hgb 9.6, platelets of 96. Na of 128, potassium of 2.8. Cr 1.9. AST 46, T Bilirubin 1.7. Troponin was within rhonda limits. Tylenol level was undetectable. Ethanol level was undetectable. Urinalysis was microscopic hematuria but no adrianna infection. Head, facial and c-spine CT was notable for small right frontal scalp hematoma. No acute intracranial hemorrhage, calvarial or maxillofacial bone fracture, or  fracture or malalignment of the cervical spine was seen.    Hospital Course  Patient was ultimately admitted to the telemetry unit to the medicine service for further evaluation and management. Upon admission, patient was placed on CIWA protocol and initiated ativan PRN for management of alcohol abuse/withdrawal. Patient was also initiated on IVFs/banana bag for management of hyponatremia, hypokalemia and acute " "renal failure. Labs, to include BMP, Mg were performed on a frequent basis for close monitoring of electrolytes and kidney function. Following initiation of treatment patient's sodium gradually uptrended and electrolyte abnormalities, acute renal injury resolved. Despite resolution of metabolic abnormalities patient's \"dizziness\" persisted. Neurology was consulted and per evaluation symptoms were likely multifactorial secondary concussion, chronic alcoholism and and metabolic abnormalities. Continued supportive care and electrolyte repletion was recommended. While suspicion for Wernicke's encephalopathy was low, initiation of IV dose thiamine was also recommended given benefits outweighed the risk of medication administration. As a result, patient was also initiated on two days of IV thiamine 500mg TID followed by transition to oral thiamin 100mg TID. Following adjustments in therapy patient's symptoms improved although she still complained of lightheadedness intermittently. Despite ongoing symptoms however, she was able to ambulate and perform all ADLs and IADLs without difficulty.     The remainder of patient's hospitalization was uneventful and no further intervention was required.     Patient was deemed stable for discharged back to [facility] on 9/24/19 . Patient is to be continued on multivitamin, folic acid and thiamine 100mg TID x 2 weeks followed by 100mg daily for continued management of alcohol abuse. Patient is to call and schedule an appointment with primary care physician within 7-10 days of discharge from rehab for hospital follow-up. Repeat labs (CBC, CMP, Mg, and Urinalysis) are recommended to be performed on Monday, 9/30/19 for continued monitoring of blood counts, electrolytes, kidney, liver function and for continued monitoring of microscopic hematuria. If hematuria persists than to consider referral to Urology for further evaluation.     Exam on Day of Discharge  Physical " Exam  GEN: well-developed and well-nourished; not in acute distress  HEENT: normocephalic; (+) ecchymosis noted over left orbit. Eyes: PERRLA, EOMI  NECK: no JVD; no bruits  CARDIO: regular rate and rhythm; no murmurs, rubs or gallops  RESP: clear to auscultation bilaterally; no rales, rhonchi, or wheezes  ABD: soft, non-distended, non-tender, normal bowel sounds  EXT: no cyanosis, clubbing, or edema  SKIN: clean, dry, warm, and intact  MUSCULOSKELETAL: no injury or deformity  NEURO: alert and oriented x 3; tremulousness improved compared to yesterday   BEHAVIOR/EMOTIONAL: appropriate; cooperative  DISCHARGE MEDICATIONS      Medication List      START taking these medications    folic acid 1 mg tablet  Commonly known as:  FOLVITE  Take 1 tablet (1 mg total) by mouth daily.  Dose:  1 mg     multivitamin tablet  Commonly known as:  THERAGRAN  Take 1 tablet by mouth daily.  Dose:  1 tablet     thiamine 100 mg tablet  Take 1 tab by mouth three times a day for two weeks followed by 1 tab by mouth daily        CONTINUE taking these medications    FLUoxetine 20 mg capsule  Commonly known as:  PROzac  Take 20 mg by mouth daily.  Dose:  20 mg     OXAZEPAM ORAL  Take by mouth.           Rosaline Fatima   Home Medication Instructions ESTELLE:[id number]    Printed on:09/24/19 9745   Medication Information                      FLUoxetine (PROzac) 20 mg capsule  Take 20 mg by mouth daily.             OXAZEPAM ORAL  Take by mouth.                    PROCEDURES / LABS / IMAGING      Operative Procedures  None.     Other Procedures  None.     Pertinent Labs  As above.    Pertinent Imaging  X-ray Chest 2 Views    Result Date: 9/21/2019  IMPRESSION: No active disease is seen in the chest.    X-ray Shoulder Right 2+ Views    Result Date: 9/21/2019  IMPRESSION: No fracture seen. COMMENT: Three views of the right shoulder were performed. We have no prior studies for comparison. No fracture, dislocation, or significant degenerative changes are  seen. No abnormal calcifications are seen about the shoulder. The visualized right hemithorax appears clear. If symptoms persist, MRI should be considered.    Ct Head Without Iv Contrast    Result Date: 9/21/2019  IMPRESSION: 1. No acute intracranial hemorrhage. 2. No acute calvarial or maxillofacial bone fracture. 3. No acute fracture or malalignment of the cervical spine. 4.  Small right frontal scalp hematoma.     Ct Facial Bones Without Iv Contrast    Result Date: 9/21/2019  IMPRESSION: 1. No acute intracranial hemorrhage. 2. No acute calvarial or maxillofacial bone fracture. 3. No acute fracture or malalignment of the cervical spine. 4.  Small right frontal scalp hematoma.     Ct Cervical Spine Without Iv Contrast    Result Date: 9/21/2019  IMPRESSION: 1. No acute intracranial hemorrhage. 2. No acute calvarial or maxillofacial bone fracture. 3. No acute fracture or malalignment of the cervical spine. 4.  Small right frontal scalp hematoma.       OUTPATIENT  FOLLOW-UP / REFERRALS / PENDING TESTS      Outpatient Follow-Up Appointments  Please call and schedule appointment with primary care physician within 7-10 days for hospital follow-up and continued evaluation of monitoring of hematuria. If you need assistance finding a primary care physician in the area please call call [phone] ([phone])     Test Results Pending at Discharge  Unresulted Labs     Start     Ordered    09/21/19 1356  [place] Draw Panel  STAT     Question Answer Comment   Red Top 1 Label    Light Green Top 1 Label    Gold Top No Labels    Light Blue 1 Label    Lavender Top No Labels    Pink Top No Labels    Yellow - Urine Tall No Labels    Urine Culture Tube No Labels    Blood Culture No Labels        09/21/19 1355          Important Issues to Address in Follow-Up  Repeat labs (CBC, CMP, Mg) are recommended to be performed on Monday, 9/30/19 for continued monitoring of blood counts, electrolytes, kidney and liver function.     Repeat  urinalysis is recommended to be performed on Monday 9/30/19 for continued monitoring of microscopic hematuria. If hematuria persists than to consider referral to Urology for further evaluation.       DISCHARGE DISPOSITION      Disposition: Transfer to another type of institution      Code Status At Discharge: Full Code    Physician Order for Life-Sustaining Treatment Document Status      No documents found

## 2019-09-24 NOTE — PLAN OF CARE
Problem: Patient Care Overview  Goal: Plan of Care Review  Outcome: Ongoing (interventions implemented as appropriate)   09/24/19 0433   Coping/Psychosocial   Plan Of Care Reviewed With patient   Plan of Care Review   Progress improving   Outcome Summary vss, k-3.4 and mag 1.6, electrolytes replaced per prn protocol ciwa 10 prn ativan given     Goal: Individualization & Mutuality  Outcome: Ongoing (interventions implemented as appropriate)    Goal: Discharge Needs Assessment  Outcome: Ongoing (interventions implemented as appropriate)      Problem: Fall Risk (Adult)  Goal: Absence of Falls  Outcome: Ongoing (interventions implemented as appropriate)

## 2019-09-24 NOTE — PROGRESS NOTES
Hospital Medicine Service -  Daily Progress Note       SUBJECTIVE   Interval History: No acute events overnight. Patient seen and examined. Pt cont to c/o mild lightheadedness, tremulouness. Denies any avh. Denies any headaches, cp, sob, abd pain, n/v/d.     OBJECTIVE      Vital signs in last 24 hours:  Temp:  [36.5 °C (97.7 °F)-37.2 °C (98.9 °F)] 37.2 °C (98.9 °F)  Heart Rate:  [71-84] 80  Resp:  [18] 18  BP: (107-136)/(53-59) 116/59    Intake/Output Summary (Last 24 hours) at 09/23/19 2305  Last data filed at 09/23/19 2206   Gross per 24 hour   Intake              700 ml   Output                0 ml   Net              700 ml       PHYSICAL EXAMINATION      GEN: well-developed and well-nourished; not in acute distress  HEENT: normocephalic; atraumatic  NECK: no JVD; no bruits  CARDIO: regular rate and rhythm; no murmurs, rubs or gallops  RESP: clear to auscultation bilaterally; no rales, rhonchi, or wheezes  ABD: soft, non-distended, non-tender, normal bowel sounds  EXT: no cyanosis, clubbing, or edema  SKIN: clean, dry, warm, and intact  MUSCULOSKELETAL: no injury or deformity  NEURO: alert and oriented x 3; mild diffuse tremulousness noted   BEHAVIOR/EMOTIONAL: appropriate; cooperative     LABS / IMAGING / TELE      Labs  Lab Results   Component Value Date    GLUCOSE 106 (H) 09/23/2019    CALCIUM 8.8 (L) 09/23/2019     09/23/2019    K 3.4 (L) 09/23/2019    CO2 27 09/23/2019     09/23/2019    BUN 12 09/23/2019    CREATININE 1.1 09/23/2019     Lab Results   Component Value Date    WBC 6.55 09/23/2019    HGB 9.5 (L) 09/23/2019    HCT 29.7 (L) 09/23/2019    MCV 93.1 09/23/2019    PLT 95 (L) 09/23/2019     Lab Results   Component Value Date    ALBUMIN 3.2 (L) 09/22/2019    BILITOT 1.4 (H) 09/22/2019    ALKPHOS 93 09/22/2019    AST 35 09/22/2019    ALT 18 09/22/2019    PROTEIN 6.9 09/22/2019       Imaging  X-ray Chest 2 Views    Result Date: 9/21/2019  IMPRESSION: No active disease is seen in the  chest.    X-ray Shoulder Right 2+ Views    Result Date: 9/21/2019  IMPRESSION: No fracture seen. COMMENT: Three views of the right shoulder were performed. We have no prior studies for comparison. No fracture, dislocation, or significant degenerative changes are seen. No abnormal calcifications are seen about the shoulder. The visualized right hemithorax appears clear. If symptoms persist, MRI should be considered.    Ct Head Without Iv Contrast    Result Date: 9/21/2019  IMPRESSION: 1. No acute intracranial hemorrhage. 2. No acute calvarial or maxillofacial bone fracture. 3. No acute fracture or malalignment of the cervical spine. 4.  Small right frontal scalp hematoma.     Ct Facial Bones Without Iv Contrast    Result Date: 9/21/2019  IMPRESSION: 1. No acute intracranial hemorrhage. 2. No acute calvarial or maxillofacial bone fracture. 3. No acute fracture or malalignment of the cervical spine. 4.  Small right frontal scalp hematoma.     Ct Cervical Spine Without Iv Contrast    Result Date: 9/21/2019  IMPRESSION: 1. No acute intracranial hemorrhage. 2. No acute calvarial or maxillofacial bone fracture. 3. No acute fracture or malalignment of the cervical spine. 4.  Small right frontal scalp hematoma.         ASSESSMENT AND PLAN      * Dizziness   Assessment & Plan    - Multifactorial. Possible post concussive syndrome after fall over the weekend. Hyponatremic with a sodium of  128.   - Hx of heavy alcohol abuse for 20+ years  - Possible Wernike's encephalopathy? Patient is ataxic reporting multiple falls when she is drunk and when she is sober. She appears confused at times. Nystagmus not clearly present.   - Neurology consulted, recommendations appreciated.     Plan;  Cont w/management of alcohol abuse as stated below  Cont w/management of hyponatremia as stated below   Per neurology recommendations will initiate IV thiamine for treatment of potential wernickes although suspicion is low - will initiate iv  thiamine 500mg TID x 2 days followed by transition to oral thiamine  Neurology signed off  Pending clinical stability and completion of IV thiamine x 2 days then t/c d/c back to [facility] tomorrow       CARLOTTA (acute kidney injury) (CMS/[facility]) ([facility])   Assessment & Plan    - Cr of 1.9 on admission.   -resolved with IVFs    Plan;  Cont to monitor w/routine bmps     Facial bruising   Assessment & Plan    - Present on admission     Scalp hematoma   Assessment & Plan    - Present on admission     Headache   Assessment & Plan    - Tension headache? Possible post concussive.   - CT head (9/21/19): No acute intracranial hemorrhage.  No acute calvarial or maxillofacial bone fracture. No acute fracture or malalignment of the cervical spine. Small right frontal scalp hematoma.    Plan;  Denies any further sxs  Cont to to monitor     Anxiety   Assessment & Plan    - Continued home medication fluoxetine 40 mg qd.       Hematuria   Assessment & Plan    - Noted on UA  - Pt to follow up with urology as an outpatient.      Normocytic anemia   Assessment & Plan    - Hb stable in the 9s.   - No evidence of bleeding.   - Outpatient follow up of anemia with PCP     Alcohol dependence (CMS/[facility]) ([facility])   Assessment & Plan    - Last drink was Monday 9/16/19  - At [facility] since Tuesday 9/17/19   - Presented to the ED from [facility] because of lightheadedness.   - patient w/mild tremulousness but otherwise denies any other si/sxs of withdrawal    Plan:  - Held home medication acamprosate 333 mg qd while hospitalized. -   - Discussed alcohol cessation   - cont to monitor w/CIWA protocol  -Cont w/ativan IV PRN  -Cont w/MV, folic acid and IV thiamine as stated above     Hypokalemia   Assessment & Plan    - Hypokalemia with a potassium of 2.8 on admission. Replaced.     Plan;  Cont to monitor w/routine bmps and replete as required for K>4     Hyponatremia   Assessment & Plan    - Sodium of 128 on admission  - Secondary to beer potomania vs. SIADH (fall vs. SSRI use)  vs. Dehydration  - Sodium of 136 today over the course of 48 hours    Plan:   will hold on additional IVFs  Cont to monitor w/routine bmps            VTE Assessment: Padua      Estimated discharge date: 9/23/2019  Code Status: Full Code     Delmi Pickard, DO  9/23/2019

## 2019-09-24 NOTE — PLAN OF CARE
Problem: Patient Care Overview  Goal: Plan of Care Review  Outcome: Ongoing (interventions implemented as appropriate)   09/24/19 7975   Coping/Psychosocial   Plan Of Care Reviewed With patient   Plan of Care Review   Progress improving   Outcome Summary Magnesium replaced; VSS; Ativan IV administered per CIWA protocol; no falls; denies dizziness;       Problem: Fall Risk (Adult)  Goal: Identify Related Risk Factors and Signs and Symptoms  Outcome: Outcome(s) Achieved Date Met: 09/24/19    Goal: Absence of Falls  Outcome: Ongoing (interventions implemented as appropriate)      Problem: Alcohol Withdrawal Acute, Risk/Actual (Adult)  Goal: Signs and Symptoms of Listed Potential Problems Will be Absent, Minimized or Managed (Alcohol Withdrawal Acute, Risk/Actual)  Outcome: Outcome(s) Achieved Date Met: 09/24/19

## 2019-09-24 NOTE — DISCHARGE INSTRUCTIONS
ADDITIONAL INSTRUCTIONS:  Repeat labs (CBC, CMP, Mg) are recommended to be performed on Monday, 9/30/19 for continued monitoring of blood counts, electrolytes, kidney and liver function.     Repeat urinalysis is recommended to be performed on Monday 9/30/19 for continued monitoring of microscopic hematuria. If hematuria persists than to consider referral to Urology for further evaluation.    ACTIVITY:  Activity as tolerated.    MEDICATIONS:  Take all medications as prescribed.    Call your doctor or return to the Emergency Room of you develop any of the following:  Return of your original symptoms  Persistent nausea and vomiting   Chest Pain  Shortness of Breath  Fever above 100.4 degrees or chills  Difficulty or inability to urinate  Change in mental status from baseline

## 2019-09-24 NOTE — PATIENT CARE CONFERENCE
Care Progression Rounds Note  Date: 9/24/2019  Time: 10:21 AM     Patient Name: Rosaline Fatima     Medical Record Number: [id number]   YOB: 1975  Sex: Female      Room/Bed: 3019    Admitting Diagnosis: Hypokalemia [E87.6]  Dizziness [R42]  Hyponatremia [E87.1]  Falling [R29.6]   Admit Date/Time: 9/21/2019  1:25 PM    Primary Diagnosis: Dizziness  Principal Problem: Dizziness    GMLOS: 2.3  Anticipated Discharge Date: 9/23/2019    AM-PAC  Mobility Score: 24    Discharge Planning:  Concerns To Be Addressed: care coordination/care conferences, substance/tobacco abuse/use concerns  Anticipated Discharge Disposition: substance abuse treatment facility    Barriers to Discharge:  Barriers to Discharge: Other (see comments)  Comment: replete godfrey, possible d/c after Iv thiamine    Participants:  nursing, pharmacy, physical therapy, , social work/services

## 2019-09-24 NOTE — PROGRESS NOTES
Patient discussed with MD this afternoon who anticipates discharging patient today to [facility].  Call placed to Mallory Henderson  notifying her of plan for d/c today around 7pm (patient needing last dose of IV Thiamine). Clinical faxed to central intake per Mallory's request.  Patient agreeable to transfer back to [facility] this evening and is aware [facility] will provide transport.  [facility] contacted Mallory to confirm plan, she stated pt's clinical has been escalated to the medical director for review. She was unable to inform [facility] what [facility]'s concern is regarding pt's medical status.  She noted she will contact the appropriate parties at [facility] and will update ER [facility] due to the timing.  [facility] updated ER [facility] that [facility] may contact the ER with an update and plan is for pt to return to [facility] today if accepted.  [facility] updated pt's RN and provided RN with ER [facility]'s contact information.

## 2019-09-24 NOTE — PROGRESS NOTES
9/24/2019 3:05 PM SW attempted to meet with pt for alcohol counseling. Pt is snoring and unable to be awoken after multiple attempts. SW left alcohol counseling information on table at pt's bedside. Pt for return to [facility] at discharge. LUPE Seay

## 2019-09-25 NOTE — NURSING NOTE
Patient in stable condition. Discharged by [facility] staff in wheelchair to transport to [facility]. All questions answered and all belongings with patient on discharge.

## 2019-10-07 ENCOUNTER — APPOINTMENT (EMERGENCY)
Dept: RADIOLOGY | Facility: HOSPITAL | Age: 44
End: 2019-10-07

## 2019-10-07 ENCOUNTER — HOSPITAL ENCOUNTER (EMERGENCY)
Facility: HOSPITAL | Age: 44
Discharge: HOME | End: 2019-10-08
Attending: EMERGENCY MEDICINE

## 2019-10-07 DIAGNOSIS — R10.12 LUQ ABDOMINAL PAIN: Primary | ICD-10-CM

## 2019-10-07 LAB
ALBUMIN SERPL-MCNC: 3.7 G/DL (ref 3.4–5)
ALP SERPL-CCNC: 105 IU/L (ref 35–126)
ALT SERPL-CCNC: 21 IU/L (ref 11–54)
ANION GAP SERPL CALC-SCNC: 8 MEQ/L (ref 3–15)
AST SERPL-CCNC: 36 IU/L (ref 15–41)
BACTERIA URNS QL MICRO: ABNORMAL /HPF
BASOPHILS # BLD: 0.06 K/UL (ref 0.01–0.1)
BASOPHILS NFR BLD: 1 %
BILIRUB SERPL-MCNC: 1.1 MG/DL (ref 0.3–1.2)
BILIRUB UR QL STRIP.AUTO: NEGATIVE MG/DL
BUN SERPL-MCNC: 9 MG/DL (ref 8–20)
CALCIUM SERPL-MCNC: 9.1 MG/DL (ref 8.9–10.3)
CHLORIDE SERPL-SCNC: 102 MEQ/L (ref 98–109)
CLARITY UR REFRACT.AUTO: ABNORMAL
CO2 SERPL-SCNC: 25 MEQ/L (ref 22–32)
COLOR UR AUTO: YELLOW
CREAT SERPL-MCNC: 1 MG/DL
DIFFERENTIAL METHOD BLD: NORMAL
EOSINOPHIL # BLD: 0.14 K/UL (ref 0.04–0.36)
EOSINOPHIL NFR BLD: 2.3 %
ERYTHROCYTE [DISTWIDTH] IN BLOOD BY AUTOMATED COUNT: 16.8 % (ref 11.7–14.4)
GFR SERPL CREATININE-BSD FRML MDRD: 60 ML/MIN/1.73M*2
GLUCOSE SERPL-MCNC: 86 MG/DL (ref 70–99)
GLUCOSE UR STRIP.AUTO-MCNC: NEGATIVE MG/DL
HCG UR QL: NEGATIVE
HCT VFR BLDCO AUTO: 30.9 %
HGB BLD-MCNC: 9.8 G/DL
HGB UR QL STRIP.AUTO: ABNORMAL
HYALINE CASTS #/AREA URNS LPF: ABNORMAL /LPF
IMM GRANULOCYTES # BLD AUTO: 0.03 K/UL (ref 0–0.08)
IMM GRANULOCYTES NFR BLD AUTO: 0.5 %
KETONES UR STRIP.AUTO-MCNC: NEGATIVE MG/DL
LEUKOCYTE ESTERASE UR QL STRIP.AUTO: NEGATIVE
LIPASE SERPL-CCNC: 62 U/L (ref 20–51)
LYMPHOCYTES # BLD: 1.37 K/UL (ref 1.2–3.5)
LYMPHOCYTES NFR BLD: 22.9 %
MCH RBC QN AUTO: 29.6 PG (ref 28–33.2)
MCHC RBC AUTO-ENTMCNC: 31.7 G/DL (ref 32.2–35.5)
MCV RBC AUTO: 93.4 FL (ref 83–98)
MONOCYTES # BLD: 0.45 K/UL (ref 0.28–0.8)
MONOCYTES NFR BLD: 7.5 %
NEUTROPHILS # BLD: 3.93 K/UL (ref 1.7–7)
NEUTS SEG NFR BLD: 65.8 %
NITRITE UR QL STRIP.AUTO: NEGATIVE
NRBC BLD-RTO: 0 %
PDW BLD AUTO: 10.8 FL (ref 9.4–12.3)
PH UR STRIP.AUTO: 6 [PH]
PLATELET # BLD AUTO: 179 K/UL
POTASSIUM SERPL-SCNC: 3.4 MEQ/L (ref 3.6–5.1)
PROT SERPL-MCNC: 8.6 G/DL (ref 6–8.2)
PROT UR QL STRIP.AUTO: NEGATIVE
RBC # BLD AUTO: 3.31 M/UL (ref 3.93–5.22)
RBC #/AREA URNS HPF: ABNORMAL /HPF
SODIUM SERPL-SCNC: 135 MEQ/L (ref 136–144)
SP GR UR REFRACT.AUTO: 1.02
SQUAMOUS URNS QL MICRO: 2 /HPF
TROPONIN I SERPL-MCNC: <0.02 NG/ML
UROBILINOGEN UR STRIP-ACNC: 0.2 EU/DL
WBC # BLD AUTO: 5.98 K/UL
WBC #/AREA URNS HPF: ABNORMAL /HPF

## 2019-10-07 PROCEDURE — 86665 EPSTEIN-BARR CAPSID VCA: CPT | Performed by: EMERGENCY MEDICINE

## 2019-10-07 PROCEDURE — 25800000 HC PHARMACY IV SOLUTIONS: Performed by: EMERGENCY MEDICINE

## 2019-10-07 PROCEDURE — 74177 CT ABD & PELVIS W/CONTRAST: CPT

## 2019-10-07 PROCEDURE — 99284 EMERGENCY DEPT VISIT MOD MDM: CPT | Mod: 25

## 2019-10-07 PROCEDURE — 36415 COLL VENOUS BLD VENIPUNCTURE: CPT | Performed by: EMERGENCY MEDICINE

## 2019-10-07 PROCEDURE — 80053 COMPREHEN METABOLIC PANEL: CPT | Performed by: EMERGENCY MEDICINE

## 2019-10-07 PROCEDURE — 63700000 HC SELF-ADMINISTRABLE DRUG: Performed by: EMERGENCY MEDICINE

## 2019-10-07 PROCEDURE — 96374 THER/PROPH/DIAG INJ IV PUSH: CPT | Mod: 59

## 2019-10-07 PROCEDURE — 84484 ASSAY OF TROPONIN QUANT: CPT | Performed by: EMERGENCY MEDICINE

## 2019-10-07 PROCEDURE — 93005 ELECTROCARDIOGRAM TRACING: CPT | Performed by: EMERGENCY MEDICINE

## 2019-10-07 PROCEDURE — 63600105 HC IODINE BASED CONTRAST: Performed by: EMERGENCY MEDICINE

## 2019-10-07 PROCEDURE — 86308 HETEROPHILE ANTIBODY SCREEN: CPT | Performed by: EMERGENCY MEDICINE

## 2019-10-07 PROCEDURE — 83690 ASSAY OF LIPASE: CPT | Performed by: EMERGENCY MEDICINE

## 2019-10-07 PROCEDURE — 81001 URINALYSIS AUTO W/SCOPE: CPT | Performed by: EMERGENCY MEDICINE

## 2019-10-07 PROCEDURE — 71045 X-RAY EXAM CHEST 1 VIEW: CPT

## 2019-10-07 PROCEDURE — 84703 CHORIONIC GONADOTROPIN ASSAY: CPT

## 2019-10-07 PROCEDURE — 63600000 HC DRUGS/DETAIL CODE: Performed by: EMERGENCY MEDICINE

## 2019-10-07 PROCEDURE — 85025 COMPLETE CBC W/AUTO DIFF WBC: CPT | Performed by: EMERGENCY MEDICINE

## 2019-10-07 RX ORDER — FERROUS SULFATE 325(65) MG
TABLET ORAL
COMMUNITY
Start: 2019-10-03

## 2019-10-07 RX ORDER — FLUOXETINE HYDROCHLORIDE 40 MG/1
CAPSULE ORAL
COMMUNITY
Start: 2019-09-17

## 2019-10-07 RX ORDER — TRAZODONE HYDROCHLORIDE 100 MG/1
TABLET ORAL
COMMUNITY
Start: 2019-08-14

## 2019-10-07 RX ORDER — MULTIVIT-MIN/IRON/FOLIC ACID/K 18-400-25
1 TABLET ORAL
Refills: 0 | COMMUNITY
Start: 2019-09-24

## 2019-10-07 RX ORDER — AMOXICILLIN AND CLAVULANATE POTASSIUM 875; 125 MG/1; MG/1
TABLET, FILM COATED ORAL
COMMUNITY
Start: 2019-10-03

## 2019-10-07 RX ORDER — POTASSIUM CHLORIDE 750 MG/1
40 TABLET, FILM COATED, EXTENDED RELEASE ORAL 2 TIMES DAILY
Status: DISCONTINUED | OUTPATIENT
Start: 2019-10-07 | End: 2019-10-08 | Stop reason: HOSPADM

## 2019-10-07 RX ORDER — KETOROLAC TROMETHAMINE 15 MG/ML
15 INJECTION, SOLUTION INTRAMUSCULAR; INTRAVENOUS ONCE
Status: COMPLETED | OUTPATIENT
Start: 2019-10-07 | End: 2019-10-07

## 2019-10-07 RX ADMIN — SODIUM CHLORIDE 1000 ML: 9 INJECTION, SOLUTION INTRAVENOUS at 20:00

## 2019-10-07 RX ADMIN — KETOROLAC TROMETHAMINE 15 MG: 15 INJECTION, SOLUTION INTRAMUSCULAR; INTRAVENOUS at 22:43

## 2019-10-07 RX ADMIN — POTASSIUM CHLORIDE 40 MEQ: 10 TABLET, EXTENDED RELEASE ORAL at 21:26

## 2019-10-07 RX ADMIN — IOHEXOL 115 ML: 300 INJECTION, SOLUTION INTRAVENOUS at 21:59

## 2019-10-07 ASSESSMENT — ENCOUNTER SYMPTOMS
ARTHRALGIAS: 0
DYSURIA: 0
CHILLS: 0
SORE THROAT: 0
SEIZURES: 0
BACK PAIN: 0
HEMATURIA: 0
COUGH: 0
FEVER: 0
NAUSEA: 0
ABDOMINAL PAIN: 1
PALPITATIONS: 0
COLOR CHANGE: 0
SHORTNESS OF BREATH: 0
DIARRHEA: 0
VOMITING: 0
EYE PAIN: 0

## 2019-10-07 NOTE — ED PROVIDER NOTES
HPI     Chief Complaint   Patient presents with   • Abdominal Pain       44-year-old female with significant PMH of Crohn's disease (with prior surgical intervention) and alcoholism who currently resides at [facility] presents to the ED today complaining of left upper abdominal pain.  Patient notes that her pain started mild 4 days ago, next continue to worsen.  Patient reports her pain as sharp, constant 8/10 is worse with movement and breathing.  Patient reports her last BM was a few hours ago mildly runny per her baseline, not black or bloody.  Patient admits to 99.5 days ago, she denies fever/chills since.  Patient reports taking Motrin in her facility today at 1700 without relief of her pain.  Patient denies chest pain, shortness of breath, nausea/vomiting, dysuria, urinary symptoms.  Patient denies personal history of diverticulitis or kidney stones.  Patient was recently admitted to this hospital (9/21/19-9/24/19) for hyponatremia and alcohol withdrawal prior to going to [facility].             Patient History     Past Medical History:   Diagnosis Date   • Alcoholism (CMS/[facility])    • Crohn disease (CMS/[facility])        Past Surgical History:   Procedure Laterality Date   • SMALL INTESTINE SURGERY         History reviewed. No pertinent family history.    Social History   Substance Use Topics   • Smoking status: Former Smoker   • Smokeless tobacco: Never Used   • Alcohol use Yes      Comment: at [facility], last drink 7 days ago       Systems Reviewed from Nursing Triage:          Review of Systems     Review of Systems   Constitutional: Negative for chills and fever.   HENT: Negative for ear pain and sore throat.    Eyes: Negative for pain and visual disturbance.   Respiratory: Negative for cough and shortness of breath.    Cardiovascular: Negative for chest pain and palpitations.   Gastrointestinal: Positive for abdominal pain (LUQ/LLQ). Negative for diarrhea, nausea and vomiting.   Genitourinary: Negative for dysuria and hematuria.  "  Musculoskeletal: Negative for arthralgias and back pain.   Skin: Negative for color change and rash.   Neurological: Negative for seizures and syncope.   All other systems reviewed and are negative.       Physical Exam     ED Triage Vitals   Temp Heart Rate Resp BP SpO2   10/07/19 1840 10/07/19 1840 10/07/19 1840 10/07/19 1840 10/07/19 1840   36.7 °C (98 °F) 69 18 139/69 100 %      Temp Source Heart Rate Source Patient Position BP Location FiO2 (%) (Set)   10/07/19 1840 10/07/19 2017 10/07/19 1840 10/07/19 1840 --   Temporal Monitor Sitting Left upper arm        Pulse Ox %: 97 % (10/07/19 2137)  Pulse Ox Interpretation: Normal (10/07/19 2137)  Heart Rate: 61 (10/07/19 2137)  Rhythm Strip Interpretation: Normal Sinus Rhythm (10/07/19 2137)    Patient Vitals for the past 24 hrs:   BP Temp Temp src Pulse Resp SpO2 Height Weight   10/07/19 2243 133/69 - - 64 12 94 % - -   10/07/19 2128 119/67 - - 68 14 97 % - -   10/07/19 2017 (!) 121/57 - - 62 16 98 % - -   10/07/19 1840 139/69 36.7 °C (98 °F) Temporal 69 18 100 % 1.702 m (5' 7\") 98 kg (216 lb)           Physical Exam   Constitutional: She appears well-developed and well-nourished. No distress.   Patient well-appearing, in NAD   HENT:   Head: Normocephalic and atraumatic.   Eyes: Conjunctivae are normal.   Neck: Neck supple.   Cardiovascular: Normal rate and regular rhythm.    No murmur heard.  Pulmonary/Chest: Effort normal and breath sounds normal. No respiratory distress.   CTA bilaterally  No wheezes, rales, rhonchi   Abdominal: Soft. There is tenderness. There is rebound and guarding.   Soft, nondistended  Left upper quadrant left lower quadrant TTP.  Guarding present. Rebound present.   Musculoskeletal: She exhibits no edema.   Neurological: She is alert.   Skin: Skin is warm and dry.   Psychiatric: She has a normal mood and affect.   Nursing note and vitals reviewed.           Procedures    ED Course & MDM     Labs Reviewed   COMPREHENSIVE METABOLIC PANEL - " Abnormal        Result Value    Sodium 135 (*)     Potassium 3.4 (*)     Chloride 102      CO2 25      BUN 9      Creatinine 1.0      Glucose 86      Calcium 9.1      AST (SGOT) 36      ALT (SGPT) 21      Alkaline Phosphatase 105      Total Protein 8.6 (*)     Albumin 3.7      Bilirubin, Total 1.1      eGFR 60.0      Anion Gap 8     LIPASE - Abnormal     Lipase 62 (*)    CBC - Abnormal     WBC 5.98      RBC 3.31 (*)     Hemoglobin 9.8 (*)     Hematocrit 30.9 (*)     MCV 93.4      MCH 29.6      MCHC 31.7 (*)     RDW 16.8 (*)     Platelets 179      MPV 10.8     UA REFLEX CULTURE (MACROSCOPIC) - Abnormal     Color, Urine Yellow      Clarity, Urine Cloudy (*)     Specific Gravity, Urine 1.021      pH, Urine 6.0      Leukocyte Esterase Negative      Nitrite, Urine Negative      Protein, Urine Negative      Glucose, Urine Negative      Ketones, Urine Negative      Urobilinogen, Urine 0.2      Bilirubin, Urine Negative      Blood, Urine Trace (*)    UA MICROSCOPIC - Abnormal     RBC, Urine 10 TO 19 (*)     WBC, Urine 0 TO 3      Squamous Epithelial +2 (*)     Hyaline Cast 3 TO 9 (*)     Bacteria, Urine None Seen     TROPONIN I - Normal    Troponin I <0.02     BHCG, SERUM, QUAL - Normal    Preg Test, Serum Negative     CBC AND DIFFERENTIAL    Narrative:     The following orders were created for panel order CBC and differential.  Procedure                               Abnormality         Status                     ---------                               -----------         ------                     CBC[[id number]]                          Abnormal            Final result               Diff Count[[id number]]                                       Final result                 Please view results for these tests on the individual orders.   DIFF COUNT    Differential Type Auto      nRBC 0.0      Immature Granulocytes 0.5      Neutrophils 65.8      Lymphocytes 22.9      Monocytes 7.5      Eosinophils 2.3      Basophils 1.0       Immature Granulocytes, Absolute 0.03      Neutrophils, Absolute 3.93      Lymphocytes, Absolute 1.37      Monocytes, Absolute 0.45      Eosinophils, Absolute 0.14      Basophils, Absolute 0.06     URINALYSIS REFLEX CULTURE (ED AND OUTPATIENT ONLY)    Narrative:     The following orders were created for panel order Urinalysis w/ reflex culture.  Procedure                               Abnormality         Status                     ---------                               -----------         ------                     UA Reflex to Culture (Ma...[[id number]]  Abnormal            Final result               UA Microscopic[[id number]]               Abnormal            Final result                 Please view results for these tests on the individual orders.   RAINBOW DRAW PANEL    Narrative:     The following orders were created for panel order [facility] Draw Panel.  Procedure                               Abnormality         Status                     ---------                               -----------         ------                     RAINBOW RED[[id number]]                                      In process                 RAINBOW LT BLUE[[id number]]                                  In process                 RAINBOW GOLD[[id number]]                                     In process                   Please view results for these tests on the individual orders.   HETEROPHILE AB REFLEX EBV EVALUATION   RAINBOW RED   RAINBOW LT BLUE   RAINBOW GOLD       CT ABDOMEN PELVIS WITH IV CONTRAST   ED Interpretation   IMPRESSION:     1. Splenomegaly.       2. Hepatomegaly with trace perihepatic ascites and a small amount of fluid in the gallbladder fossa. Correlation with evidence of hepatic disease advised.       3. Evidence of prior bowel surgery. There is a small ventral midline umbilical hernia containing fat and bowel but no evidence of bowel obstruction.       4. Moderate cardiomegaly.       5. Tortuous fluid filled tubular structure in  the right adnexa, suspicious for incidental hydrosalpinx. Differential includes a normal loop of small bowel.      Interpreted by: New Nails MD, Oct 07, 2019 10:27 PM      ECG 12 lead   ED Interpretation   Rhythm: Normal Sinus   Rate: 61   P waves: normal interval   QRS: normal QRS   Axis: normal   ST Segments: no obvious ST elevation or ischemia      Rhythm Strip: Normal Sinus Rhythm   Normal EKG   When compared to eKG from 21-Sep-2019 1503;   ST and T wave abnormalities noted in inferior and anterolateral leads no longer noted      O2 sat: normal; 97% on RA            X-RAY CHEST 1 VIEW    (Results Pending)               [facility]         ED Course as of Oct 08 0008   Mon Oct 07, 2019   1825 Impression: Left sided abd pain, LUQ most tender  Plan: IV, labs, CT abd, CXR; observe  Discussed pt and plan of care with attending Dr Fuentes  [BB]   1900 PERC 0  [BB]   2010 Improved from 8.3 thirteen days ago Hemoglobin: (!) 9.8 [BB]   2027 Lipase: (!) 62 [BB]   2040 Trop: negative  [BB]   2040 Will order oral Potassium: (!) 3.4 [BB]   2155 Pt in CT scan  [BB]   2234 CT Remote Radiology   IMPRESSION:    1. Splenomegaly.     2. Hepatomegaly with trace perihepatic ascites and a small amount of fluid in the gallbladder fossa. Correlation with evidence of hepatic disease advised.     3. Evidence of prior bowel surgery. There is a small ventral midline umbilical hernia containing fat and bowel but no evidence of bowel obstruction.     4. Moderate cardiomegaly.     5. Tortuous fluid filled tubular structure in the right adnexa, suspicious for incidental hydrosalpinx. Differential includes a normal loop of small bowel.  [BB]   2241 Added mono to patients labs 2/2 splenomegaly on CT scan.  Pt requesting pain medications, will order 15mg IV toradol  [BB]   2255 Discussed CT results in detail with patient, including all incidental findings.We will wait to see if patient gets relief from the Toradol medication.Patient understands that  the mono test will come back today, she is to follow-up with GI and her OB for further evaluation and care of her incidental findings.  Patient resting comfortably in the bed watching TV.  [BB]   2349 Patient reports that her abdominal pain is decreased from the Toradol to 5/10.Offered to have the patient stay in the hospital, she declined at this time states she would like to go back to [facility].  Patient understands her follow-up care.Discussed red flags for return to the ED.Discussed no contact sports.Patient resting calmly, she is hemodynamically stable, will discharge.  [BB]      ED Course User Index  [BB] Megan Chavez PA C         Clinical Impressions as of Oct 08 0008   LUQ abdominal pain        Megan Chavez PA C  10/08/19 0005       Megan Chavez PA C  10/08/19 0008

## 2019-10-08 VITALS
OXYGEN SATURATION: 97 % | WEIGHT: 216 LBS | HEIGHT: 67 IN | BODY MASS INDEX: 33.9 KG/M2 | DIASTOLIC BLOOD PRESSURE: 69 MMHG | TEMPERATURE: 98 F | HEART RATE: 64 BPM | SYSTOLIC BLOOD PRESSURE: 128 MMHG | RESPIRATION RATE: 16 BRPM

## 2019-10-08 LAB
ATRIAL RATE: 61
EBV NA 1 IGG SER-ACNC: 3.89
EBV VCA IGG SER IA-ACNC: 5.95
EBV VCA IGM SER IA-ACNC: 0.33
HETEROPH AB SER QL LA: NEGATIVE
INTERPRETATION: ABNORMAL
P AXIS: 14
PR INTERVAL: 140
QRS DURATION: 82
QT INTERVAL: 448
QTC CALCULATION(BAZETT): 450
R AXIS: 15
T WAVE AXIS: 12
VENTRICULAR RATE: 61

## 2019-10-08 PROCEDURE — 93010 ELECTROCARDIOGRAM REPORT: CPT | Performed by: INTERNAL MEDICINE

## 2019-10-08 NOTE — DISCHARGE INSTRUCTIONS
You were treated in the ER today for left upper quadrant abdominal pain.    You had lab work and a CT scan that were negative for any obvious acute or emergent conditions.    As discuss, several incidental findings were seen on your CT scan.  Follow up with the GI on these forms for further evaluation and care of your spleen and liver.  [place], you may want to see your primary care doctor at home and get a request for follow-up to a GI doctor and an OB/GYN.    These specialists are provided for you on these forms (in this area of [place]) as needed.    See the provider at [facility] tomorrow for checkup for your abdominal pain.    Take over the counter motrin (as directed on the label) as needed for pain.    As discussed, your mono testing will take 2-3 days for completion. You will get a call for a positive result. [facility] facility may allow you to call the ED in 4 days if you do not hear anything for results.    Return to the ER for worsening of any symptoms.

## 2019-10-08 NOTE — ED ATTESTATION NOTE
I have personally seen and examined the patient.  I reviewed and agree with physician assistant / nurse practitioner’s assessment and plan of care, with the following exceptions: None  My examination, assessment, and plan of care of Rosaline Fatima is as follows:    44-year-old female with history of Crohn's disease/ulcerative colitis presents with crampy pain in her left flank, left upper quadrant.  The pain is worsened with deep breath but she is not short of breath.  She has had previous surgery for ulcerative colitis.  She denies fevers or chills, nausea or vomiting, diarrhea or constipation.  The pain is not significantly worsened with movement but is worsened with deep breath.  The patient is passing gas normally.  Examination reveals tenderness in the left flank and left upper quadrant without voluntary guarding or rebound tenderness.  The abdomen is obese with some increased tympany to percussion.  The patient is being evaluated with CT scan of the abdomen and pelvis.  Lab work is essentially unremarkable at this time with the exception of hemoglobin of 9+ which is better than usual, and 10-19 red blood cells per high-power field.    9:22 PM  Care endorsed to Dr Gatica at this time. Pending CT scan.    I was physically present for the key/critical portions of the following procedures: None         Eduardo Fuentes, DO  10/07/19 2029       Eduardo Fuentes, DO  10/07/19 2030       Eduardo Fuentes, DO  10/07/19 2122

## 2019-10-28 ENCOUNTER — HOSPITAL ENCOUNTER (EMERGENCY)
Age: 44
Discharge: HOME | End: 2019-10-28

## 2019-10-28 VITALS
TEMPERATURE: 97.7 F | RESPIRATION RATE: 20 BRPM | DIASTOLIC BLOOD PRESSURE: 91 MMHG | WEIGHT: 210 LBS | HEIGHT: 68 IN | SYSTOLIC BLOOD PRESSURE: 146 MMHG | OXYGEN SATURATION: 98 % | BODY MASS INDEX: 31.83 KG/M2 | HEART RATE: 96 BPM

## 2019-10-28 DIAGNOSIS — J04.0 LARYNGITIS: ICD-10-CM

## 2019-10-28 DIAGNOSIS — J02.9 ACUTE PHARYNGITIS, UNSPECIFIED ETIOLOGY: Primary | ICD-10-CM

## 2019-10-28 PROCEDURE — 99212 OFFICE O/P EST SF 10 MIN: CPT

## 2019-10-28 RX ORDER — AMOXICILLIN 500 MG/1
500 CAPSULE ORAL 3 TIMES DAILY
Qty: 30 CAPSULE | Refills: 0 | Status: SHIPPED | OUTPATIENT
Start: 2019-10-28 | End: 2019-11-07

## 2019-10-28 ASSESSMENT — PAIN DESCRIPTION - ORIENTATION: ORIENTATION: RIGHT;LEFT

## 2019-10-28 ASSESSMENT — PAIN DESCRIPTION - LOCATION: LOCATION: EAR;THROAT

## 2019-10-28 ASSESSMENT — PAIN DESCRIPTION - PROGRESSION: CLINICAL_PROGRESSION: GRADUALLY WORSENING

## 2019-10-28 ASSESSMENT — PAIN DESCRIPTION - DESCRIPTORS: DESCRIPTORS: SORE;ACHING;PRESSURE

## 2019-10-28 ASSESSMENT — PAIN DESCRIPTION - PAIN TYPE: TYPE: ACUTE PAIN

## 2019-10-28 ASSESSMENT — PAIN DESCRIPTION - FREQUENCY: FREQUENCY: CONTINUOUS

## 2019-10-28 ASSESSMENT — PAIN DESCRIPTION - ONSET: ONSET: GRADUAL

## 2019-10-28 ASSESSMENT — PAIN SCALES - GENERAL: PAINLEVEL_OUTOF10: 7

## 2020-08-14 ENCOUNTER — HOSPITAL ENCOUNTER (EMERGENCY)
Age: 45
Discharge: HOME | End: 2020-08-14
Attending: EMERGENCY MEDICINE
Payer: COMMERCIAL

## 2020-08-14 ENCOUNTER — APPOINTMENT (OUTPATIENT)
Dept: GENERAL RADIOLOGY | Age: 45
End: 2020-08-14
Payer: COMMERCIAL

## 2020-08-14 VITALS
OXYGEN SATURATION: 95 % | RESPIRATION RATE: 18 BRPM | HEIGHT: 68 IN | SYSTOLIC BLOOD PRESSURE: 187 MMHG | HEART RATE: 97 BPM | TEMPERATURE: 98 F | BODY MASS INDEX: 33.34 KG/M2 | WEIGHT: 220 LBS | DIASTOLIC BLOOD PRESSURE: 80 MMHG

## 2020-08-14 LAB
ANION GAP SERPL CALCULATED.3IONS-SCNC: 17 MMOL/L (ref 7–16)
BUN BLDV-MCNC: <2 MG/DL (ref 6–20)
CALCIUM SERPL-MCNC: 8.2 MG/DL (ref 8.6–10.2)
CHLORIDE BLD-SCNC: 77 MMOL/L (ref 98–107)
CO2: 29 MMOL/L (ref 22–29)
CREAT SERPL-MCNC: 0.6 MG/DL (ref 0.5–1)
D DIMER: 370 NG/ML DDU
EKG ATRIAL RATE: 82 BPM
EKG P AXIS: 57 DEGREES
EKG P-R INTERVAL: 142 MS
EKG Q-T INTERVAL: 414 MS
EKG QRS DURATION: 96 MS
EKG QTC CALCULATION (BAZETT): 483 MS
EKG R AXIS: 19 DEGREES
EKG T AXIS: 20 DEGREES
EKG VENTRICULAR RATE: 82 BPM
GFR AFRICAN AMERICAN: >60
GFR NON-AFRICAN AMERICAN: >60 ML/MIN/1.73
GLUCOSE BLD-MCNC: 115 MG/DL (ref 74–99)
HCT VFR BLD CALC: 30.8 % (ref 34–48)
HEMOGLOBIN: 10.4 G/DL (ref 11.5–15.5)
MCH RBC QN AUTO: 27.4 PG (ref 26–35)
MCHC RBC AUTO-ENTMCNC: 33.8 % (ref 32–34.5)
MCV RBC AUTO: 81.3 FL (ref 80–99.9)
PDW BLD-RTO: 15.4 FL (ref 11.5–15)
PLATELET # BLD: 37 E9/L (ref 130–450)
PLATELET CONFIRMATION: NORMAL
PMV BLD AUTO: 11.1 FL (ref 7–12)
POTASSIUM SERPL-SCNC: 2.8 MMOL/L (ref 3.5–5)
RBC # BLD: 3.79 E12/L (ref 3.5–5.5)
SODIUM BLD-SCNC: 123 MMOL/L (ref 132–146)
TROPONIN: <0.01 NG/ML (ref 0–0.03)
WBC # BLD: 4.6 E9/L (ref 4.5–11.5)

## 2020-08-14 PROCEDURE — 84484 ASSAY OF TROPONIN QUANT: CPT

## 2020-08-14 PROCEDURE — 80048 BASIC METABOLIC PNL TOTAL CA: CPT

## 2020-08-14 PROCEDURE — 99284 EMERGENCY DEPT VISIT MOD MDM: CPT

## 2020-08-14 PROCEDURE — 93005 ELECTROCARDIOGRAM TRACING: CPT | Performed by: EMERGENCY MEDICINE

## 2020-08-14 PROCEDURE — 85027 COMPLETE CBC AUTOMATED: CPT

## 2020-08-14 PROCEDURE — 85378 FIBRIN DEGRADE SEMIQUANT: CPT

## 2020-08-14 PROCEDURE — 71045 X-RAY EXAM CHEST 1 VIEW: CPT

## 2020-08-14 ASSESSMENT — ENCOUNTER SYMPTOMS
COUGH: 1
EYE REDNESS: 0
BACK PAIN: 0
ABDOMINAL DISTENTION: 0
VOMITING: 0
NAUSEA: 0
SHORTNESS OF BREATH: 1
SINUS PRESSURE: 0
DIARRHEA: 0
WHEEZING: 0
EYE DISCHARGE: 0
SORE THROAT: 0
EYE PAIN: 0

## 2020-08-14 NOTE — ED PROVIDER NOTES
Tenderness: There is no abdominal tenderness. There is no guarding. Musculoskeletal: Normal range of motion. General: No swelling. Right lower leg: No edema. Left lower leg: No edema. Skin:     General: Skin is warm and dry. Findings: No rash. Neurological:      Mental Status: She is alert and oriented to person, place, and time. Procedures     MDM           EKG:  Normal sinus rhythm with ventricular rate of 82 and frequent PACs. NV interval, QRS duration and QT interval within normal range. Normal axis. No ST segment abnormalities to suggest acute ischemia.         --------------------------------------------- PAST HISTORY ---------------------------------------------  Past Medical History:  has a past medical history of Acute renal failure (ARF) ([facility] [address] Anxiety[facility] Depression, History of blood transfusion, Pyoderma gangrenosum, and Thyroid disease. Past Surgical History:  has a past surgical history that includes Abdomen surgery; Arm Debridement; Colonoscopy; Endoscopy, colon, diagnostic; and Dilatation, esophagus. Social History:  reports that she has never smoked. She has never used smokeless tobacco. She reports current alcohol use of about 2.0 standard drinks of alcohol per week. She reports previous drug use. Family History: family history includes Cancer in her mother; Heart Disease in her father; High Blood Pressure in her sister. The patients home medications have been reviewed. Allergies: Patient has no known allergies.     -------------------------------------------------- RESULTS -------------------------------------------------  Labs:  Results for orders placed or performed during the hospital encounter of 08/14/20   CBC   Result Value Ref Range    WBC 4.6 4.5 - 11.5 E9/L    RBC 3.79 3.50 - 5.50 E12/L    Hemoglobin 10.4 (L) 11.5 - 15.5 g/dL    Hematocrit 30.8 (L) 34.0 - 48.0 %    MCV 81.3 80.0 - 99.9 fL    MCH 27.4 26.0 - 35.0 pg MCHC 33.8 32.0 - 34.5 %    RDW 15.4 (H) 11.5 - 15.0 fL    Platelets 37 (L) 937 - 450 E9/L    MPV 11.1 7.0 - 12.0 fL   EKG 12 Lead   Result Value Ref Range    Ventricular Rate 82 BPM    Atrial Rate 82 BPM    P-R Interval 142 ms    QRS Duration 96 ms    Q-T Interval 414 ms    QTc Calculation (Bazett) 483 ms    P Axis 57 degrees    R Axis 19 degrees    T Axis 20 degrees       Radiology:  XR CHEST PORTABLE    (Results Pending)       ------------------------- NURSING NOTES AND VITALS REVIEWED ---------------------------  Date / Time Roomed:  8/14/2020  2:00 AM  ED Bed Assignment:  04/04    The nursing notes within the ED encounter and vital signs as below have been reviewed. BP (!) 187/80   Pulse 97   Temp 98 °F (36.7 °C) (Oral)   Resp 18   Ht 5' 8\" (1.727 m)   Wt 220 lb (99.8 kg)   LMP 07/02/2020   SpO2 95%   BMI 33.45 kg/m²   Oxygen Saturation Interpretation: Normal      This patient has chosen to leave against medical advice. I have personally explained to them that choosing to do so may result in permanent bodily harm or death. I discussed at length that without further evaluation and monitoring there may be unforeseen circumstances and deterioration resulting in permanent bodily harm or death as a result of their choice. They are alert, oriented, and competent at this time. They state that they are aware of the serious risks as explained, but they continue to wish to leave against medical advice. In light of their decision to leave against medical advice, follow-up has been arranged and they are aware of the importance of following up as instructed. They have been advised that they should return to the ED immediately if they change their mind at any time, or if their condition begins to change or worsen.             [address] [facility],   08/14/20 1588

## 2020-08-14 NOTE — ED TRIAGE NOTES
Pt arrived via EMS from home after being woken up by shortness of breath tonight. Pt reports she is a  and last worked thirteen days ago. Pt also reports experiencing some anxiety before bed and \"drank a couple white claws\" to calm her nerves. Pt denies other symptoms.

## 2020-08-14 NOTE — ED NOTES
Pt reports feeling more anxious and would like to sign out AMA. Provider notified.      Francois Forrester, RUDDY  08/14/20 7300

## 2021-01-01 ENCOUNTER — ANESTHESIA EVENT (OUTPATIENT)
Dept: ENDOSCOPY | Age: 46
DRG: 433 | End: 2021-01-01
Payer: COMMERCIAL

## 2021-01-01 ENCOUNTER — HOSPITAL ENCOUNTER (INPATIENT)
Age: 46
LOS: 15 days | Discharge: HOME | DRG: 441 | End: 2021-12-10
Attending: EMERGENCY MEDICINE | Admitting: FAMILY MEDICINE
Payer: COMMERCIAL

## 2021-01-01 ENCOUNTER — ANESTHESIA (OUTPATIENT)
Dept: ENDOSCOPY | Age: 46
DRG: 433 | End: 2021-01-01
Payer: COMMERCIAL

## 2021-01-01 ENCOUNTER — HOSPITAL ENCOUNTER (OUTPATIENT)
Dept: INFUSION THERAPY | Age: 46
Setting detail: INFUSION SERIES
Discharge: HOME | End: 2021-12-13

## 2021-01-01 ENCOUNTER — ANESTHESIA (OUTPATIENT)
Dept: ENDOSCOPY | Age: 46
DRG: 441 | End: 2021-01-01
Payer: COMMERCIAL

## 2021-01-01 ENCOUNTER — APPOINTMENT (OUTPATIENT)
Dept: ULTRASOUND IMAGING | Age: 46
DRG: 433 | End: 2021-01-01
Payer: COMMERCIAL

## 2021-01-01 ENCOUNTER — HOSPITAL ENCOUNTER (INPATIENT)
Age: 46
LOS: 3 days | Discharge: HOME | DRG: 433 | End: 2021-10-28
Attending: EMERGENCY MEDICINE | Admitting: INTERNAL MEDICINE
Payer: COMMERCIAL

## 2021-01-01 ENCOUNTER — APPOINTMENT (OUTPATIENT)
Dept: CT IMAGING | Age: 46
DRG: 433 | End: 2021-01-01
Payer: COMMERCIAL

## 2021-01-01 ENCOUNTER — HOSPITAL ENCOUNTER (INPATIENT)
Age: 46
LOS: 4 days | Discharge: HOME | DRG: 433 | End: 2021-09-06
Attending: EMERGENCY MEDICINE | Admitting: INTERNAL MEDICINE
Payer: COMMERCIAL

## 2021-01-01 ENCOUNTER — APPOINTMENT (OUTPATIENT)
Dept: CT IMAGING | Age: 46
DRG: 441 | End: 2021-01-01
Payer: COMMERCIAL

## 2021-01-01 ENCOUNTER — APPOINTMENT (OUTPATIENT)
Dept: GENERAL RADIOLOGY | Age: 46
DRG: 433 | End: 2021-01-01
Payer: COMMERCIAL

## 2021-01-01 ENCOUNTER — TELEPHONE (OUTPATIENT)
Dept: CARDIOLOGY CLINIC | Age: 46
End: 2021-01-01

## 2021-01-01 ENCOUNTER — HOSPITAL ENCOUNTER (EMERGENCY)
Age: 46
Discharge: HOME | DRG: 433 | End: 2021-10-22
Attending: EMERGENCY MEDICINE
Payer: COMMERCIAL

## 2021-01-01 ENCOUNTER — HOSPITAL ENCOUNTER (OUTPATIENT)
Dept: INFUSION THERAPY | Age: 46
Setting detail: INFUSION SERIES
Discharge: HOME | End: 2021-12-12

## 2021-01-01 ENCOUNTER — APPOINTMENT (OUTPATIENT)
Dept: MRI IMAGING | Age: 46
DRG: 433 | End: 2021-01-01
Payer: COMMERCIAL

## 2021-01-01 ENCOUNTER — ANESTHESIA EVENT (OUTPATIENT)
Dept: ENDOSCOPY | Age: 46
DRG: 441 | End: 2021-01-01
Payer: COMMERCIAL

## 2021-01-01 ENCOUNTER — APPOINTMENT (OUTPATIENT)
Dept: INTERVENTIONAL RADIOLOGY/VASCULAR | Age: 46
DRG: 433 | End: 2021-01-01
Payer: COMMERCIAL

## 2021-01-01 ENCOUNTER — APPOINTMENT (OUTPATIENT)
Dept: GENERAL RADIOLOGY | Age: 46
DRG: 441 | End: 2021-01-01
Payer: COMMERCIAL

## 2021-01-01 ENCOUNTER — APPOINTMENT (OUTPATIENT)
Dept: INTERVENTIONAL RADIOLOGY/VASCULAR | Age: 46
DRG: 441 | End: 2021-01-01
Payer: COMMERCIAL

## 2021-01-01 VITALS
BODY MASS INDEX: 37.89 KG/M2 | OXYGEN SATURATION: 98 % | DIASTOLIC BLOOD PRESSURE: 78 MMHG | SYSTOLIC BLOOD PRESSURE: 123 MMHG | RESPIRATION RATE: 20 BRPM | HEART RATE: 90 BPM | HEIGHT: 68 IN | TEMPERATURE: 98.6 F | WEIGHT: 250 LBS

## 2021-01-01 VITALS
TEMPERATURE: 98.8 F | SYSTOLIC BLOOD PRESSURE: 135 MMHG | DIASTOLIC BLOOD PRESSURE: 69 MMHG | OXYGEN SATURATION: 99 % | HEART RATE: 99 BPM | RESPIRATION RATE: 16 BRPM | BODY MASS INDEX: 38.62 KG/M2 | WEIGHT: 254 LBS

## 2021-01-01 VITALS
SYSTOLIC BLOOD PRESSURE: 112 MMHG | DIASTOLIC BLOOD PRESSURE: 60 MMHG | TEMPERATURE: 97.2 F | WEIGHT: 254 LBS | RESPIRATION RATE: 12 BRPM | BODY MASS INDEX: 38.49 KG/M2 | HEIGHT: 68 IN | HEART RATE: 64 BPM | OXYGEN SATURATION: 94 %

## 2021-01-01 VITALS
SYSTOLIC BLOOD PRESSURE: 108 MMHG | OXYGEN SATURATION: 99 % | HEIGHT: 68 IN | TEMPERATURE: 99 F | WEIGHT: 210 LBS | HEART RATE: 72 BPM | RESPIRATION RATE: 16 BRPM | BODY MASS INDEX: 31.83 KG/M2 | DIASTOLIC BLOOD PRESSURE: 51 MMHG

## 2021-01-01 VITALS
SYSTOLIC BLOOD PRESSURE: 81 MMHG | OXYGEN SATURATION: 99 % | RESPIRATION RATE: 4 BRPM | DIASTOLIC BLOOD PRESSURE: 51 MMHG

## 2021-01-01 VITALS — SYSTOLIC BLOOD PRESSURE: 85 MMHG | OXYGEN SATURATION: 100 % | DIASTOLIC BLOOD PRESSURE: 48 MMHG

## 2021-01-01 VITALS
SYSTOLIC BLOOD PRESSURE: 137 MMHG | TEMPERATURE: 98 F | RESPIRATION RATE: 24 BRPM | WEIGHT: 254 LBS | BODY MASS INDEX: 38.62 KG/M2 | DIASTOLIC BLOOD PRESSURE: 70 MMHG | OXYGEN SATURATION: 100 % | HEART RATE: 100 BPM

## 2021-01-01 VITALS — RESPIRATION RATE: 20 BRPM | TEMPERATURE: 97.8 F | OXYGEN SATURATION: 100 % | HEART RATE: 88 BPM

## 2021-01-01 VITALS
SYSTOLIC BLOOD PRESSURE: 115 MMHG | DIASTOLIC BLOOD PRESSURE: 60 MMHG | OXYGEN SATURATION: 100 % | RESPIRATION RATE: 15 BRPM

## 2021-01-01 DIAGNOSIS — K70.31 ALCOHOLIC CIRRHOSIS OF LIVER WITH ASCITES (HCC): ICD-10-CM

## 2021-01-01 DIAGNOSIS — E86.0 DEHYDRATION: ICD-10-CM

## 2021-01-01 DIAGNOSIS — E87.6 HYPOKALEMIA: ICD-10-CM

## 2021-01-01 DIAGNOSIS — F10.929 ACUTE ALCOHOLIC INTOXICATION WITH COMPLICATION (HCC): ICD-10-CM

## 2021-01-01 DIAGNOSIS — K70.31 ASCITES DUE TO ALCOHOLIC CIRRHOSIS (HCC): ICD-10-CM

## 2021-01-01 DIAGNOSIS — K72.00 ACUTE LIVER FAILURE WITHOUT HEPATIC COMA: Primary | ICD-10-CM

## 2021-01-01 DIAGNOSIS — R60.1 ANASARCA: ICD-10-CM

## 2021-01-01 DIAGNOSIS — R17 JAUNDICE, NON-NEONATAL: ICD-10-CM

## 2021-01-01 DIAGNOSIS — B95.2 BACTEREMIA DUE TO VANCOMYCIN RESISTANT ENTEROCOCCUS: Primary | ICD-10-CM

## 2021-01-01 DIAGNOSIS — B95.2 BACTEREMIA DUE TO VANCOMYCIN RESISTANT ENTEROCOCCUS: ICD-10-CM

## 2021-01-01 DIAGNOSIS — N30.00 ACUTE CYSTITIS WITHOUT HEMATURIA: ICD-10-CM

## 2021-01-01 DIAGNOSIS — Z16.21 BACTEREMIA DUE TO VANCOMYCIN RESISTANT ENTEROCOCCUS: Primary | ICD-10-CM

## 2021-01-01 DIAGNOSIS — Z16.21 BACTEREMIA DUE TO VANCOMYCIN RESISTANT ENTEROCOCCUS: ICD-10-CM

## 2021-01-01 DIAGNOSIS — R10.84 GENERALIZED ABDOMINAL PAIN: ICD-10-CM

## 2021-01-01 DIAGNOSIS — R78.81 BACTEREMIA DUE TO VANCOMYCIN RESISTANT ENTEROCOCCUS: ICD-10-CM

## 2021-01-01 DIAGNOSIS — K76.82 HEPATIC ENCEPHALOPATHY: ICD-10-CM

## 2021-01-01 DIAGNOSIS — R78.81 BACTEREMIA DUE TO VANCOMYCIN RESISTANT ENTEROCOCCUS: Primary | ICD-10-CM

## 2021-01-01 DIAGNOSIS — R10.84 GENERALIZED ABDOMINAL PAIN: Primary | ICD-10-CM

## 2021-01-01 DIAGNOSIS — E80.6 HYPERBILIRUBINEMIA: ICD-10-CM

## 2021-01-01 DIAGNOSIS — R11.2 NAUSEA AND VOMITING, INTRACTABILITY OF VOMITING NOT SPECIFIED, UNSPECIFIED VOMITING TYPE: Primary | ICD-10-CM

## 2021-01-01 DIAGNOSIS — R41.82 ALTERED MENTAL STATUS, UNSPECIFIED ALTERED MENTAL STATUS TYPE: Primary | ICD-10-CM

## 2021-01-01 LAB
ABO/RH: NORMAL
ACANTHOCYTES: ABNORMAL
ACETAMINOPHEN LEVEL: <5 MCG/ML (ref 10–30)
ACETAMINOPHEN LEVEL: <5 MCG/ML (ref 10–30)
ALBUMIN FLUID: 0.8 G/DL
ALBUMIN SERPL-MCNC: 2.2 G/DL (ref 3.5–5.2)
ALBUMIN SERPL-MCNC: 2.3 G/DL (ref 3.5–5.2)
ALBUMIN SERPL-MCNC: 2.4 G/DL (ref 3.5–5.2)
ALBUMIN SERPL-MCNC: 2.5 G/DL (ref 3.5–5.2)
ALBUMIN SERPL-MCNC: 2.6 G/DL (ref 3.5–5.2)
ALBUMIN SERPL-MCNC: 2.7 G/DL (ref 3.5–5.2)
ALBUMIN SERPL-MCNC: 2.9 G/DL (ref 3.5–5.2)
ALBUMIN SERPL-MCNC: 2.9 G/DL (ref 3.5–5.2)
ALBUMIN SERPL-MCNC: 3 G/DL (ref 3.5–5.2)
ALBUMIN SERPL-MCNC: 3.1 G/DL (ref 3.5–5.2)
ALBUMIN SERPL-MCNC: 3.6 G/DL (ref 3.5–5.2)
ALP BLD-CCNC: 115 U/L (ref 35–104)
ALP BLD-CCNC: 130 U/L (ref 35–104)
ALP BLD-CCNC: 151 U/L (ref 35–104)
ALP BLD-CCNC: 151 U/L (ref 35–104)
ALP BLD-CCNC: 155 U/L (ref 35–104)
ALP BLD-CCNC: 171 U/L (ref 35–104)
ALP BLD-CCNC: 172 U/L (ref 35–104)
ALP BLD-CCNC: 173 U/L (ref 35–104)
ALP BLD-CCNC: 181 U/L (ref 35–104)
ALP BLD-CCNC: 187 U/L (ref 35–104)
ALP BLD-CCNC: 196 U/L (ref 35–104)
ALP BLD-CCNC: 199 U/L (ref 35–104)
ALP BLD-CCNC: 218 U/L (ref 35–104)
ALP BLD-CCNC: 221 U/L (ref 35–104)
ALP BLD-CCNC: 245 U/L (ref 35–104)
ALP BLD-CCNC: 277 U/L (ref 35–104)
ALP BLD-CCNC: 82 U/L (ref 35–104)
ALP BLD-CCNC: 85 U/L (ref 35–104)
ALP BLD-CCNC: 87 U/L (ref 35–104)
ALP BLD-CCNC: 88 U/L (ref 35–104)
ALP BLD-CCNC: 93 U/L (ref 35–104)
ALP BLD-CCNC: 94 U/L (ref 35–104)
ALP BLD-CCNC: 98 U/L (ref 35–104)
ALT SERPL-CCNC: 105 U/L (ref 0–32)
ALT SERPL-CCNC: 22 U/L (ref 0–32)
ALT SERPL-CCNC: 23 U/L (ref 0–32)
ALT SERPL-CCNC: 24 U/L (ref 0–32)
ALT SERPL-CCNC: 26 U/L (ref 0–32)
ALT SERPL-CCNC: 30 U/L (ref 0–32)
ALT SERPL-CCNC: 30 U/L (ref 0–32)
ALT SERPL-CCNC: 32 U/L (ref 0–32)
ALT SERPL-CCNC: 35 U/L (ref 0–32)
ALT SERPL-CCNC: 40 U/L (ref 0–32)
ALT SERPL-CCNC: 43 U/L (ref 0–32)
ALT SERPL-CCNC: 47 U/L (ref 0–32)
ALT SERPL-CCNC: 50 U/L (ref 0–32)
ALT SERPL-CCNC: 54 U/L (ref 0–32)
ALT SERPL-CCNC: 55 U/L (ref 0–32)
ALT SERPL-CCNC: 58 U/L (ref 0–32)
ALT SERPL-CCNC: 59 U/L (ref 0–32)
ALT SERPL-CCNC: 62 U/L (ref 0–32)
ALT SERPL-CCNC: 65 U/L (ref 0–32)
ALT SERPL-CCNC: 69 U/L (ref 0–32)
ALT SERPL-CCNC: 81 U/L (ref 0–32)
AMMONIA: 119 UMOL/L (ref 11–51)
AMMONIA: 126 UMOL/L (ref 11–51)
AMMONIA: 128 UMOL/L (ref 11–51)
AMMONIA: 129 UMOL/L (ref 11–51)
AMMONIA: 136 UMOL/L (ref 11–51)
AMMONIA: 142 UMOL/L (ref 11–51)
AMMONIA: 147 UMOL/L (ref 11–51)
AMMONIA: 164 UMOL/L (ref 11–51)
AMMONIA: 65 UMOL/L (ref 11–51)
AMMONIA: 83 UMOL/L (ref 11–51)
AMMONIA: 87 UMOL/L (ref 11–51)
AMMONIA: 96 UMOL/L (ref 11–51)
AMMONIA: 99 UMOL/L (ref 11–51)
AMPHETAMINE SCREEN, URINE: NOT DETECTED
AMPHETAMINE SCREEN, URINE: NOT DETECTED
AMYLASE FLUID: 33 U/L
AMYLASE: 123 U/L (ref 20–100)
ANION GAP SERPL CALCULATED.3IONS-SCNC: 10 MMOL/L (ref 7–16)
ANION GAP SERPL CALCULATED.3IONS-SCNC: 11 MMOL/L (ref 7–16)
ANION GAP SERPL CALCULATED.3IONS-SCNC: 12 MMOL/L (ref 7–16)
ANION GAP SERPL CALCULATED.3IONS-SCNC: 12 MMOL/L (ref 7–16)
ANION GAP SERPL CALCULATED.3IONS-SCNC: 13 MMOL/L (ref 7–16)
ANION GAP SERPL CALCULATED.3IONS-SCNC: 14 MMOL/L (ref 7–16)
ANION GAP SERPL CALCULATED.3IONS-SCNC: 15 MMOL/L (ref 7–16)
ANION GAP SERPL CALCULATED.3IONS-SCNC: 16 MMOL/L (ref 7–16)
ANION GAP SERPL CALCULATED.3IONS-SCNC: 6 MMOL/L (ref 7–16)
ANION GAP SERPL CALCULATED.3IONS-SCNC: 7 MMOL/L (ref 7–16)
ANION GAP SERPL CALCULATED.3IONS-SCNC: 8 MMOL/L (ref 7–16)
ANION GAP SERPL CALCULATED.3IONS-SCNC: 9 MMOL/L (ref 7–16)
ANISOCYTOSIS: ABNORMAL
ANTIBODY SCREEN: NORMAL
APPEARANCE FLUID: CLEAR
APTT: 24.8 SEC (ref 24.5–35.1)
APTT: 32.3 SEC (ref 24.5–35.1)
APTT: 35.8 SEC (ref 24.5–35.1)
AST SERPL-CCNC: 108 U/L (ref 0–31)
AST SERPL-CCNC: 139 U/L (ref 0–31)
AST SERPL-CCNC: 141 U/L (ref 0–31)
AST SERPL-CCNC: 144 U/L (ref 0–31)
AST SERPL-CCNC: 144 U/L (ref 0–31)
AST SERPL-CCNC: 161 U/L (ref 0–31)
AST SERPL-CCNC: 168 U/L (ref 0–31)
AST SERPL-CCNC: 168 U/L (ref 0–31)
AST SERPL-CCNC: 211 U/L (ref 0–31)
AST SERPL-CCNC: 32 U/L (ref 0–31)
AST SERPL-CCNC: 33 U/L (ref 0–31)
AST SERPL-CCNC: 37 U/L (ref 0–31)
AST SERPL-CCNC: 38 U/L (ref 0–31)
AST SERPL-CCNC: 38 U/L (ref 0–31)
AST SERPL-CCNC: 40 U/L (ref 0–31)
AST SERPL-CCNC: 45 U/L (ref 0–31)
AST SERPL-CCNC: 47 U/L (ref 0–31)
AST SERPL-CCNC: 47 U/L (ref 0–31)
AST SERPL-CCNC: 66 U/L (ref 0–31)
AST SERPL-CCNC: 71 U/L (ref 0–31)
AST SERPL-CCNC: 72 U/L (ref 0–31)
AST SERPL-CCNC: 83 U/L (ref 0–31)
AST SERPL-CCNC: 87 U/L (ref 0–31)
B.E.: -5.5 MMOL/L (ref -3–3)
BACTERIA: ABNORMAL /HPF
BARBITURATE SCREEN URINE: NOT DETECTED
BARBITURATE SCREEN URINE: NOT DETECTED
BASOPHILS ABSOLUTE: 0 E9/L (ref 0–0.2)
BASOPHILS ABSOLUTE: 0.03 E9/L (ref 0–0.2)
BASOPHILS ABSOLUTE: 0.03 E9/L (ref 0–0.2)
BASOPHILS ABSOLUTE: 0.05 E9/L (ref 0–0.2)
BASOPHILS ABSOLUTE: 0.06 E9/L (ref 0–0.2)
BASOPHILS ABSOLUTE: 0.09 E9/L (ref 0–0.2)
BASOPHILS ABSOLUTE: 0.1 E9/L (ref 0–0.2)
BASOPHILS ABSOLUTE: 0.12 E9/L (ref 0–0.2)
BASOPHILS RELATIVE PERCENT: 0.4 % (ref 0–2)
BASOPHILS RELATIVE PERCENT: 0.5 % (ref 0–2)
BASOPHILS RELATIVE PERCENT: 0.7 % (ref 0–2)
BASOPHILS RELATIVE PERCENT: 0.8 % (ref 0–2)
BASOPHILS RELATIVE PERCENT: 0.8 % (ref 0–2)
BASOPHILS RELATIVE PERCENT: 0.9 % (ref 0–2)
BASOPHILS RELATIVE PERCENT: 1.8 % (ref 0–2)
BENZODIAZEPINE SCREEN, URINE: NOT DETECTED
BENZODIAZEPINE SCREEN, URINE: NOT DETECTED
BILIRUB SERPL-MCNC: 1.2 MG/DL (ref 0–1.2)
BILIRUB SERPL-MCNC: 1.4 MG/DL (ref 0–1.2)
BILIRUB SERPL-MCNC: 1.4 MG/DL (ref 0–1.2)
BILIRUB SERPL-MCNC: 1.5 MG/DL (ref 0–1.2)
BILIRUB SERPL-MCNC: 1.5 MG/DL (ref 0–1.2)
BILIRUB SERPL-MCNC: 1.6 MG/DL (ref 0–1.2)
BILIRUB SERPL-MCNC: 1.7 MG/DL (ref 0–1.2)
BILIRUB SERPL-MCNC: 1.8 MG/DL (ref 0–1.2)
BILIRUB SERPL-MCNC: 1.8 MG/DL (ref 0–1.2)
BILIRUB SERPL-MCNC: 1.9 MG/DL (ref 0–1.2)
BILIRUB SERPL-MCNC: 11.1 MG/DL (ref 0–1.2)
BILIRUB SERPL-MCNC: 11.3 MG/DL (ref 0–1.2)
BILIRUB SERPL-MCNC: 12.4 MG/DL (ref 0–1.2)
BILIRUB SERPL-MCNC: 14.6 MG/DL (ref 0–1.2)
BILIRUB SERPL-MCNC: 15.5 MG/DL (ref 0–1.2)
BILIRUB SERPL-MCNC: 15.7 MG/DL (ref 0–1.2)
BILIRUB SERPL-MCNC: 2.6 MG/DL (ref 0–1.2)
BILIRUB SERPL-MCNC: 2.9 MG/DL (ref 0–1.2)
BILIRUB SERPL-MCNC: 20.3 MG/DL (ref 0–1.2)
BILIRUB SERPL-MCNC: 3.5 MG/DL (ref 0–1.2)
BILIRUB SERPL-MCNC: 4.3 MG/DL (ref 0–1.2)
BILIRUB SERPL-MCNC: 4.5 MG/DL (ref 0–1.2)
BILIRUB SERPL-MCNC: 4.8 MG/DL (ref 0–1.2)
BILIRUBIN DIRECT: 1.8 MG/DL (ref 0–0.3)
BILIRUBIN DIRECT: 11.4 MG/DL (ref 0–0.3)
BILIRUBIN DIRECT: 16.3 MG/DL (ref 0–0.3)
BILIRUBIN DIRECT: 2 MG/DL (ref 0–0.3)
BILIRUBIN DIRECT: 7.3 MG/DL (ref 0–0.3)
BILIRUBIN DIRECT: 7.6 MG/DL (ref 0–0.3)
BILIRUBIN DIRECT: 8.3 MG/DL (ref 0–0.3)
BILIRUBIN DIRECT: 9.6 MG/DL (ref 0–0.3)
BILIRUBIN DIRECT: 9.8 MG/DL (ref 0–0.3)
BILIRUBIN URINE: ABNORMAL
BILIRUBIN URINE: ABNORMAL
BILIRUBIN URINE: NEGATIVE
BILIRUBIN, INDIRECT: 1.5 MG/DL (ref 0–1)
BILIRUBIN, INDIRECT: 2.5 MG/DL (ref 0–1)
BILIRUBIN, INDIRECT: 3.7 MG/DL (ref 0–1)
BILIRUBIN, INDIRECT: 3.8 MG/DL (ref 0–1)
BILIRUBIN, INDIRECT: 4 MG/DL (ref 0–1)
BILIRUBIN, INDIRECT: 4.1 MG/DL (ref 0–1)
BILIRUBIN, INDIRECT: 4.1 MG/DL (ref 0–1)
BILIRUBIN, INDIRECT: 5 MG/DL (ref 0–1)
BILIRUBIN, INDIRECT: 5.9 MG/DL (ref 0–1)
BLOOD BANK DISPENSE STATUS: NORMAL
BLOOD BANK DISPENSE STATUS: NORMAL
BLOOD BANK PRODUCT CODE: NORMAL
BLOOD BANK PRODUCT CODE: NORMAL
BLOOD CULTURE, ROUTINE: ABNORMAL
BLOOD CULTURE, ROUTINE: NORMAL
BLOOD CULTURE, ROUTINE: NORMAL
BLOOD, URINE: ABNORMAL
BLOOD, URINE: NEGATIVE
BODY FLUID CULTURE, STERILE: NORMAL
BOTTLE TYPE: ABNORMAL
BOTTLE TYPE: ABNORMAL
BPU ID: NORMAL
BPU ID: NORMAL
BUN BLDV-MCNC: 10 MG/DL (ref 6–20)
BUN BLDV-MCNC: 3 MG/DL (ref 6–20)
BUN BLDV-MCNC: 4 MG/DL (ref 6–20)
BUN BLDV-MCNC: 4 MG/DL (ref 6–20)
BUN BLDV-MCNC: 5 MG/DL (ref 6–20)
BUN BLDV-MCNC: 6 MG/DL (ref 6–20)
BUN BLDV-MCNC: 7 MG/DL (ref 6–20)
BUN BLDV-MCNC: 8 MG/DL (ref 6–20)
BUN BLDV-MCNC: 9 MG/DL (ref 6–20)
BURR CELLS: ABNORMAL
C-REACTIVE PROTEIN: 0.4 MG/DL (ref 0–0.4)
C-REACTIVE PROTEIN: 0.5 MG/DL (ref 0–0.4)
CALCIUM SERPL-MCNC: 7.8 MG/DL (ref 8.6–10.2)
CALCIUM SERPL-MCNC: 7.9 MG/DL (ref 8.6–10.2)
CALCIUM SERPL-MCNC: 8 MG/DL (ref 8.6–10.2)
CALCIUM SERPL-MCNC: 8.1 MG/DL (ref 8.6–10.2)
CALCIUM SERPL-MCNC: 8.2 MG/DL (ref 8.6–10.2)
CALCIUM SERPL-MCNC: 8.2 MG/DL (ref 8.6–10.2)
CALCIUM SERPL-MCNC: 8.3 MG/DL (ref 8.6–10.2)
CALCIUM SERPL-MCNC: 8.4 MG/DL (ref 8.6–10.2)
CALCIUM SERPL-MCNC: 8.5 MG/DL (ref 8.6–10.2)
CALCIUM SERPL-MCNC: 8.5 MG/DL (ref 8.6–10.2)
CALCIUM SERPL-MCNC: 8.6 MG/DL (ref 8.6–10.2)
CALCIUM SERPL-MCNC: 9.4 MG/DL (ref 8.6–10.2)
CANDIDA ALBICANS BY PCR: NOT DETECTED
CANDIDA ALBICANS BY PCR: NOT DETECTED
CANDIDA GLABRATA BY PCR: NOT DETECTED
CANDIDA GLABRATA BY PCR: NOT DETECTED
CANDIDA KRUSEI BY PCR: NOT DETECTED
CANDIDA KRUSEI BY PCR: NOT DETECTED
CANDIDA PARAPSILOSIS BY PCR: NOT DETECTED
CANDIDA PARAPSILOSIS BY PCR: NOT DETECTED
CANDIDA TROPICALIS BY PCR: NOT DETECTED
CANDIDA TROPICALIS BY PCR: NOT DETECTED
CANNABINOID SCREEN URINE: NOT DETECTED
CANNABINOID SCREEN URINE: NOT DETECTED
CELL COUNT FLUID TYPE: NORMAL
CHLORIDE BLD-SCNC: 101 MMOL/L (ref 98–107)
CHLORIDE BLD-SCNC: 102 MMOL/L (ref 98–107)
CHLORIDE BLD-SCNC: 105 MMOL/L (ref 98–107)
CHLORIDE BLD-SCNC: 105 MMOL/L (ref 98–107)
CHLORIDE BLD-SCNC: 106 MMOL/L (ref 98–107)
CHLORIDE BLD-SCNC: 106 MMOL/L (ref 98–107)
CHLORIDE BLD-SCNC: 107 MMOL/L (ref 98–107)
CHLORIDE BLD-SCNC: 107 MMOL/L (ref 98–107)
CHLORIDE BLD-SCNC: 108 MMOL/L (ref 98–107)
CHLORIDE BLD-SCNC: 108 MMOL/L (ref 98–107)
CHLORIDE BLD-SCNC: 110 MMOL/L (ref 98–107)
CHLORIDE BLD-SCNC: 110 MMOL/L (ref 98–107)
CHLORIDE BLD-SCNC: 111 MMOL/L (ref 98–107)
CHLORIDE BLD-SCNC: 112 MMOL/L (ref 98–107)
CHLORIDE BLD-SCNC: 113 MMOL/L (ref 98–107)
CHLORIDE BLD-SCNC: 113 MMOL/L (ref 98–107)
CHLORIDE BLD-SCNC: 114 MMOL/L (ref 98–107)
CHLORIDE BLD-SCNC: 114 MMOL/L (ref 98–107)
CHLORIDE BLD-SCNC: 115 MMOL/L (ref 98–107)
CHLORIDE BLD-SCNC: 116 MMOL/L (ref 98–107)
CHLORIDE BLD-SCNC: 117 MMOL/L (ref 98–107)
CHLORIDE BLD-SCNC: 120 MMOL/L (ref 98–107)
CHOLESTEROL, TOTAL: 100 MG/DL (ref 0–199)
CHP ED QC CHECK: 118
CLARITY: ABNORMAL
CLARITY: ABNORMAL
CLARITY: CLEAR
CO2: 10 MMOL/L (ref 22–29)
CO2: 12 MMOL/L (ref 22–29)
CO2: 12 MMOL/L (ref 22–29)
CO2: 13 MMOL/L (ref 22–29)
CO2: 14 MMOL/L (ref 22–29)
CO2: 15 MMOL/L (ref 22–29)
CO2: 15 MMOL/L (ref 22–29)
CO2: 16 MMOL/L (ref 22–29)
CO2: 17 MMOL/L (ref 22–29)
CO2: 18 MMOL/L (ref 22–29)
CO2: 19 MMOL/L (ref 22–29)
CO2: 20 MMOL/L (ref 22–29)
CO2: 22 MMOL/L (ref 22–29)
COCAINE METABOLITE SCREEN URINE: NOT DETECTED
COCAINE METABOLITE SCREEN URINE: NOT DETECTED
COHB: 0.2 % (ref 0–1.5)
COLOR FLUID: YELLOW
COLOR: ABNORMAL
COLOR: YELLOW
CREAT SERPL-MCNC: 0.8 MG/DL (ref 0.5–1)
CREAT SERPL-MCNC: 0.9 MG/DL (ref 0.5–1)
CREAT SERPL-MCNC: 1 MG/DL (ref 0.5–1)
CREAT SERPL-MCNC: 1.1 MG/DL (ref 0.5–1)
CREAT SERPL-MCNC: 1.2 MG/DL (ref 0.5–1)
CREATININE URINE: 136 MG/DL (ref 29–226)
CRITICAL: ABNORMAL
CULTURE, BLOOD 2: ABNORMAL
CULTURE, BLOOD 2: NORMAL
DATE ANALYZED: ABNORMAL
DATE OF COLLECTION: ABNORMAL
DESCRIPTION BLOOD BANK: NORMAL
DESCRIPTION BLOOD BANK: NORMAL
EKG ATRIAL RATE: 124 BPM
EKG ATRIAL RATE: 89 BPM
EKG ATRIAL RATE: 89 BPM
EKG P AXIS: 42 DEGREES
EKG P AXIS: 54 DEGREES
EKG P AXIS: 55 DEGREES
EKG P-R INTERVAL: 118 MS
EKG P-R INTERVAL: 124 MS
EKG P-R INTERVAL: 124 MS
EKG Q-T INTERVAL: 354 MS
EKG Q-T INTERVAL: 370 MS
EKG Q-T INTERVAL: 426 MS
EKG QRS DURATION: 80 MS
EKG QRS DURATION: 82 MS
EKG QRS DURATION: 84 MS
EKG QTC CALCULATION (BAZETT): 430 MS
EKG QTC CALCULATION (BAZETT): 450 MS
EKG QTC CALCULATION (BAZETT): 612 MS
EKG R AXIS: 11 DEGREES
EKG R AXIS: 15 DEGREES
EKG R AXIS: 4 DEGREES
EKG T AXIS: 17 DEGREES
EKG T AXIS: 18 DEGREES
EKG T AXIS: 30 DEGREES
EKG VENTRICULAR RATE: 124 BPM
EKG VENTRICULAR RATE: 89 BPM
EKG VENTRICULAR RATE: 89 BPM
ENTEROBACTER CLOACAE COMPLEX BY PCR: NOT DETECTED
ENTEROBACTER CLOACAE COMPLEX BY PCR: NOT DETECTED
ENTEROBACTERALES BY PCR: NOT DETECTED
ENTEROBACTERALES BY PCR: NOT DETECTED
ENTEROCOCCUS BY PCR: DETECTED
EOSINOPHILS ABSOLUTE: 0.08 E9/L (ref 0.05–0.5)
EOSINOPHILS ABSOLUTE: 0.08 E9/L (ref 0.05–0.5)
EOSINOPHILS ABSOLUTE: 0.09 E9/L (ref 0.05–0.5)
EOSINOPHILS ABSOLUTE: 0.1 E9/L (ref 0.05–0.5)
EOSINOPHILS ABSOLUTE: 0.11 E9/L (ref 0.05–0.5)
EOSINOPHILS ABSOLUTE: 0.13 E9/L (ref 0.05–0.5)
EOSINOPHILS ABSOLUTE: 0.14 E9/L (ref 0.05–0.5)
EOSINOPHILS ABSOLUTE: 0.14 E9/L (ref 0.05–0.5)
EOSINOPHILS ABSOLUTE: 0.15 E9/L (ref 0.05–0.5)
EOSINOPHILS ABSOLUTE: 0.16 E9/L (ref 0.05–0.5)
EOSINOPHILS ABSOLUTE: 0.2 E9/L (ref 0.05–0.5)
EOSINOPHILS ABSOLUTE: 0.2 E9/L (ref 0.05–0.5)
EOSINOPHILS ABSOLUTE: 0.27 E9/L (ref 0.05–0.5)
EOSINOPHILS RELATIVE PERCENT: 0.9 % (ref 0–6)
EOSINOPHILS RELATIVE PERCENT: 0.9 % (ref 0–6)
EOSINOPHILS RELATIVE PERCENT: 1.1 % (ref 0–6)
EOSINOPHILS RELATIVE PERCENT: 1.5 % (ref 0–6)
EOSINOPHILS RELATIVE PERCENT: 1.7 % (ref 0–6)
EOSINOPHILS RELATIVE PERCENT: 1.7 % (ref 0–6)
EOSINOPHILS RELATIVE PERCENT: 1.8 % (ref 0–6)
EOSINOPHILS RELATIVE PERCENT: 2.6 % (ref 0–6)
EOSINOPHILS RELATIVE PERCENT: 2.9 % (ref 0–6)
EOSINOPHILS RELATIVE PERCENT: 3.5 % (ref 0–6)
EOSINOPHILS RELATIVE PERCENT: 5.3 % (ref 0–6)
EPITHELIAL CELLS, UA: ABNORMAL /HPF
ESCHERICHIA COLI BY PCR: NOT DETECTED
ESCHERICHIA COLI BY PCR: NOT DETECTED
ETHANOL: 148 MG/DL (ref 0–0.08)
ETHANOL: <10 MG/DL (ref 0–0.08)
FENTANYL SCREEN, URINE: NOT DETECTED
FENTANYL SCREEN, URINE: NOT DETECTED
FERRITIN: 222 NG/ML
FLUID TYPE: NORMAL
FOLATE: 6.4 NG/ML (ref 4.8–24.2)
GFR AFRICAN AMERICAN: 58
GFR AFRICAN AMERICAN: >60
GFR NON-AFRICAN AMERICAN: 48 ML/MIN/1.73
GFR NON-AFRICAN AMERICAN: 53 ML/MIN/1.73
GFR NON-AFRICAN AMERICAN: 59 ML/MIN/1.73
GFR NON-AFRICAN AMERICAN: >60 ML/MIN/1.73
GLUCOSE BLD-MCNC: 100 MG/DL (ref 74–99)
GLUCOSE BLD-MCNC: 101 MG/DL (ref 74–99)
GLUCOSE BLD-MCNC: 101 MG/DL (ref 74–99)
GLUCOSE BLD-MCNC: 104 MG/DL (ref 74–99)
GLUCOSE BLD-MCNC: 105 MG/DL (ref 74–99)
GLUCOSE BLD-MCNC: 106 MG/DL (ref 74–99)
GLUCOSE BLD-MCNC: 107 MG/DL (ref 74–99)
GLUCOSE BLD-MCNC: 107 MG/DL (ref 74–99)
GLUCOSE BLD-MCNC: 110 MG/DL (ref 74–99)
GLUCOSE BLD-MCNC: 114 MG/DL (ref 74–99)
GLUCOSE BLD-MCNC: 115 MG/DL (ref 74–99)
GLUCOSE BLD-MCNC: 116 MG/DL (ref 74–99)
GLUCOSE BLD-MCNC: 121 MG/DL (ref 74–99)
GLUCOSE BLD-MCNC: 122 MG/DL (ref 74–99)
GLUCOSE BLD-MCNC: 123 MG/DL (ref 74–99)
GLUCOSE BLD-MCNC: 127 MG/DL (ref 74–99)
GLUCOSE BLD-MCNC: 135 MG/DL (ref 74–99)
GLUCOSE BLD-MCNC: 139 MG/DL (ref 74–99)
GLUCOSE BLD-MCNC: 154 MG/DL (ref 74–99)
GLUCOSE BLD-MCNC: 159 MG/DL (ref 74–99)
GLUCOSE BLD-MCNC: 90 MG/DL (ref 74–99)
GLUCOSE BLD-MCNC: 91 MG/DL (ref 74–99)
GLUCOSE BLD-MCNC: 93 MG/DL (ref 74–99)
GLUCOSE BLD-MCNC: 97 MG/DL (ref 74–99)
GLUCOSE BLD-MCNC: 97 MG/DL (ref 74–99)
GLUCOSE BLD-MCNC: 98 MG/DL (ref 74–99)
GLUCOSE URINE: 100 MG/DL
GLUCOSE URINE: NEGATIVE MG/DL
GRAM STAIN RESULT: NORMAL
HAEMOPHILUS INFLUENZAE BY PCR: NOT DETECTED
HAEMOPHILUS INFLUENZAE BY PCR: NOT DETECTED
HBA1C MFR BLD: 4.1 % (ref 4–5.6)
HCG, URINE, POC: NEGATIVE
HCO3: 18 MMOL/L (ref 22–26)
HCT VFR BLD CALC: 21.4 % (ref 34–48)
HCT VFR BLD CALC: 22.3 % (ref 34–48)
HCT VFR BLD CALC: 22.4 % (ref 34–48)
HCT VFR BLD CALC: 22.8 % (ref 34–48)
HCT VFR BLD CALC: 22.9 % (ref 34–48)
HCT VFR BLD CALC: 23.2 % (ref 34–48)
HCT VFR BLD CALC: 23.3 % (ref 34–48)
HCT VFR BLD CALC: 23.3 % (ref 34–48)
HCT VFR BLD CALC: 23.5 % (ref 34–48)
HCT VFR BLD CALC: 23.6 % (ref 34–48)
HCT VFR BLD CALC: 23.7 % (ref 34–48)
HCT VFR BLD CALC: 23.7 % (ref 34–48)
HCT VFR BLD CALC: 24 % (ref 34–48)
HCT VFR BLD CALC: 24 % (ref 34–48)
HCT VFR BLD CALC: 24.1 % (ref 34–48)
HCT VFR BLD CALC: 24.1 % (ref 34–48)
HCT VFR BLD CALC: 24.2 % (ref 34–48)
HCT VFR BLD CALC: 24.3 % (ref 34–48)
HCT VFR BLD CALC: 24.4 % (ref 34–48)
HCT VFR BLD CALC: 24.5 % (ref 34–48)
HCT VFR BLD CALC: 24.7 % (ref 34–48)
HCT VFR BLD CALC: 24.9 % (ref 34–48)
HCT VFR BLD CALC: 25 % (ref 34–48)
HCT VFR BLD CALC: 27 % (ref 34–48)
HCT VFR BLD CALC: 27.1 % (ref 34–48)
HCT VFR BLD CALC: 27.4 % (ref 34–48)
HCT VFR BLD CALC: 27.7 % (ref 34–48)
HCT VFR BLD CALC: 28.1 % (ref 34–48)
HCT VFR BLD CALC: 31.3 % (ref 34–48)
HCT VFR BLD CALC: 32.5 % (ref 34–48)
HCT VFR BLD CALC: 33.4 % (ref 34–48)
HDLC SERPL-MCNC: 12 MG/DL
HEMOGLOBIN: 10.4 G/DL (ref 11.5–15.5)
HEMOGLOBIN: 10.6 G/DL (ref 11.5–15.5)
HEMOGLOBIN: 6.7 G/DL (ref 11.5–15.5)
HEMOGLOBIN: 7.1 G/DL (ref 11.5–15.5)
HEMOGLOBIN: 7.3 G/DL (ref 11.5–15.5)
HEMOGLOBIN: 7.4 G/DL (ref 11.5–15.5)
HEMOGLOBIN: 7.4 G/DL (ref 11.5–15.5)
HEMOGLOBIN: 7.5 G/DL (ref 11.5–15.5)
HEMOGLOBIN: 7.6 G/DL (ref 11.5–15.5)
HEMOGLOBIN: 7.7 G/DL (ref 11.5–15.5)
HEMOGLOBIN: 7.8 G/DL (ref 11.5–15.5)
HEMOGLOBIN: 7.9 G/DL (ref 11.5–15.5)
HEMOGLOBIN: 8 G/DL (ref 11.5–15.5)
HEMOGLOBIN: 8 G/DL (ref 11.5–15.5)
HEMOGLOBIN: 8.1 G/DL (ref 11.5–15.5)
HEMOGLOBIN: 8.1 G/DL (ref 11.5–15.5)
HEMOGLOBIN: 8.4 G/DL (ref 11.5–15.5)
HEMOGLOBIN: 8.5 G/DL (ref 11.5–15.5)
HEMOGLOBIN: 8.7 G/DL (ref 11.5–15.5)
HEMOGLOBIN: 8.8 G/DL (ref 11.5–15.5)
HEMOGLOBIN: 8.9 G/DL (ref 11.5–15.5)
HEMOGLOBIN: 9.1 G/DL (ref 11.5–15.5)
HEMOGLOBIN: 9.2 G/DL (ref 11.5–15.5)
HHB: 0.3 % (ref 0–5)
HYALINE CASTS: ABNORMAL /LPF (ref 0–2)
HYPOCHROMIA: ABNORMAL
IMMATURE GRANULOCYTES #: 0.03 E9/L
IMMATURE GRANULOCYTES #: 0.04 E9/L
IMMATURE GRANULOCYTES #: 0.07 E9/L
IMMATURE GRANULOCYTES %: 0.4 % (ref 0–5)
IMMATURE GRANULOCYTES %: 0.5 % (ref 0–5)
IMMATURE GRANULOCYTES %: 0.7 % (ref 0–5)
IMMATURE RETIC FRACT: 12.6 % (ref 3–15.9)
INR BLD: 1.7
INR BLD: 1.7
INR BLD: 2
INR BLD: 2.1
INR BLD: 2.2
INR BLD: 2.3
INR BLD: 2.5
IRON SATURATION: 75 % (ref 15–50)
IRON: 117 MCG/DL (ref 37–145)
KETONES, URINE: ABNORMAL MG/DL
KETONES, URINE: NEGATIVE MG/DL
KLEBSIELLA OXYTOCA BY PCR: NOT DETECTED
KLEBSIELLA OXYTOCA BY PCR: NOT DETECTED
KLEBSIELLA PNEUMONIAE GROUP BY PCR: NOT DETECTED
KLEBSIELLA PNEUMONIAE GROUP BY PCR: NOT DETECTED
LAB: ABNORMAL
LACTIC ACID, SEPSIS: 3.5 MMOL/L (ref 0.5–1.9)
LACTIC ACID, SEPSIS: 5.2 MMOL/L (ref 0.5–1.9)
LACTIC ACID: 1.5 MMOL/L (ref 0.5–2.2)
LACTIC ACID: 1.6 MMOL/L (ref 0.5–2.2)
LACTIC ACID: 1.6 MMOL/L (ref 0.5–2.2)
LACTIC ACID: 1.8 MMOL/L (ref 0.5–2.2)
LACTIC ACID: 2.5 MMOL/L (ref 0.5–2.2)
LDL CHOLESTEROL CALCULATED: 55 MG/DL (ref 0–99)
LEUKOCYTE ESTERASE, URINE: ABNORMAL
LEUKOCYTE ESTERASE, URINE: NEGATIVE
LEUKOCYTE ESTERASE, URINE: NEGATIVE
LIPASE: 100 U/L (ref 13–60)
LIPASE: 30 U/L (ref 13–60)
LIPASE: 63 U/L (ref 13–60)
LIPASE: 67 U/L (ref 13–60)
LIPASE: 69 U/L (ref 13–60)
LISTERIA MONOCYTOGENES BY PCR: NOT DETECTED
LISTERIA MONOCYTOGENES BY PCR: NOT DETECTED
LV EF: 58 %
LV EF: 60 %
LVEF MODALITY: NORMAL
LVEF MODALITY: NORMAL
LYMPHOCYTES ABSOLUTE: 0.59 E9/L (ref 1.5–4)
LYMPHOCYTES ABSOLUTE: 0.68 E9/L (ref 1.5–4)
LYMPHOCYTES ABSOLUTE: 0.85 E9/L (ref 1.5–4)
LYMPHOCYTES ABSOLUTE: 0.88 E9/L (ref 1.5–4)
LYMPHOCYTES ABSOLUTE: 0.95 E9/L (ref 1.5–4)
LYMPHOCYTES ABSOLUTE: 1.01 E9/L (ref 1.5–4)
LYMPHOCYTES ABSOLUTE: 1.03 E9/L (ref 1.5–4)
LYMPHOCYTES ABSOLUTE: 1.04 E9/L (ref 1.5–4)
LYMPHOCYTES ABSOLUTE: 1.05 E9/L (ref 1.5–4)
LYMPHOCYTES ABSOLUTE: 1.28 E9/L (ref 1.5–4)
LYMPHOCYTES ABSOLUTE: 1.73 E9/L (ref 1.5–4)
LYMPHOCYTES ABSOLUTE: 2.33 E9/L (ref 1.5–4)
LYMPHOCYTES ABSOLUTE: 2.4 E9/L (ref 1.5–4)
LYMPHOCYTES RELATIVE PERCENT: 10.5 % (ref 20–42)
LYMPHOCYTES RELATIVE PERCENT: 12.2 % (ref 20–42)
LYMPHOCYTES RELATIVE PERCENT: 13.2 % (ref 20–42)
LYMPHOCYTES RELATIVE PERCENT: 13.9 % (ref 20–42)
LYMPHOCYTES RELATIVE PERCENT: 15.5 % (ref 20–42)
LYMPHOCYTES RELATIVE PERCENT: 15.7 % (ref 20–42)
LYMPHOCYTES RELATIVE PERCENT: 16.4 % (ref 20–42)
LYMPHOCYTES RELATIVE PERCENT: 16.5 % (ref 20–42)
LYMPHOCYTES RELATIVE PERCENT: 16.7 % (ref 20–42)
LYMPHOCYTES RELATIVE PERCENT: 18.3 % (ref 20–42)
LYMPHOCYTES RELATIVE PERCENT: 18.3 % (ref 20–42)
LYMPHOCYTES RELATIVE PERCENT: 24.8 % (ref 20–42)
LYMPHOCYTES RELATIVE PERCENT: 32.1 % (ref 20–42)
Lab: ABNORMAL
Lab: NORMAL
MAGNESIUM: 1.5 MG/DL (ref 1.6–2.6)
MAGNESIUM: 1.6 MG/DL (ref 1.6–2.6)
MAGNESIUM: 1.7 MG/DL (ref 1.6–2.6)
MAGNESIUM: 1.8 MG/DL (ref 1.6–2.6)
MAGNESIUM: 1.9 MG/DL (ref 1.6–2.6)
MCH RBC QN AUTO: 31.9 PG (ref 26–35)
MCH RBC QN AUTO: 31.9 PG (ref 26–35)
MCH RBC QN AUTO: 32.1 PG (ref 26–35)
MCH RBC QN AUTO: 32.1 PG (ref 26–35)
MCH RBC QN AUTO: 32.2 PG (ref 26–35)
MCH RBC QN AUTO: 32.2 PG (ref 26–35)
MCH RBC QN AUTO: 32.3 PG (ref 26–35)
MCH RBC QN AUTO: 32.3 PG (ref 26–35)
MCH RBC QN AUTO: 32.5 PG (ref 26–35)
MCH RBC QN AUTO: 32.5 PG (ref 26–35)
MCH RBC QN AUTO: 32.6 PG (ref 26–35)
MCH RBC QN AUTO: 32.6 PG (ref 26–35)
MCH RBC QN AUTO: 32.8 PG (ref 26–35)
MCH RBC QN AUTO: 32.9 PG (ref 26–35)
MCH RBC QN AUTO: 33.2 PG (ref 26–35)
MCH RBC QN AUTO: 33.5 PG (ref 26–35)
MCH RBC QN AUTO: 33.5 PG (ref 26–35)
MCH RBC QN AUTO: 33.6 PG (ref 26–35)
MCH RBC QN AUTO: 33.8 PG (ref 26–35)
MCH RBC QN AUTO: 33.9 PG (ref 26–35)
MCH RBC QN AUTO: 33.9 PG (ref 26–35)
MCH RBC QN AUTO: 34.1 PG (ref 26–35)
MCHC RBC AUTO-ENTMCNC: 29.4 % (ref 32–34.5)
MCHC RBC AUTO-ENTMCNC: 31 % (ref 32–34.5)
MCHC RBC AUTO-ENTMCNC: 31.1 % (ref 32–34.5)
MCHC RBC AUTO-ENTMCNC: 31.3 % (ref 32–34.5)
MCHC RBC AUTO-ENTMCNC: 31.5 % (ref 32–34.5)
MCHC RBC AUTO-ENTMCNC: 31.7 % (ref 32–34.5)
MCHC RBC AUTO-ENTMCNC: 31.7 % (ref 32–34.5)
MCHC RBC AUTO-ENTMCNC: 31.9 % (ref 32–34.5)
MCHC RBC AUTO-ENTMCNC: 31.9 % (ref 32–34.5)
MCHC RBC AUTO-ENTMCNC: 32 % (ref 32–34.5)
MCHC RBC AUTO-ENTMCNC: 32 % (ref 32–34.5)
MCHC RBC AUTO-ENTMCNC: 32.1 % (ref 32–34.5)
MCHC RBC AUTO-ENTMCNC: 32.3 % (ref 32–34.5)
MCHC RBC AUTO-ENTMCNC: 32.4 % (ref 32–34.5)
MCHC RBC AUTO-ENTMCNC: 32.6 % (ref 32–34.5)
MCHC RBC AUTO-ENTMCNC: 32.8 % (ref 32–34.5)
MCHC RBC AUTO-ENTMCNC: 32.8 % (ref 32–34.5)
MCHC RBC AUTO-ENTMCNC: 32.9 % (ref 32–34.5)
MCHC RBC AUTO-ENTMCNC: 33.5 % (ref 32–34.5)
MCHC RBC AUTO-ENTMCNC: 34.3 % (ref 32–34.5)
MCV RBC AUTO: 100 FL (ref 80–99.9)
MCV RBC AUTO: 100.4 FL (ref 80–99.9)
MCV RBC AUTO: 100.6 FL (ref 80–99.9)
MCV RBC AUTO: 100.9 FL (ref 80–99.9)
MCV RBC AUTO: 101.3 FL (ref 80–99.9)
MCV RBC AUTO: 101.8 FL (ref 80–99.9)
MCV RBC AUTO: 101.8 FL (ref 80–99.9)
MCV RBC AUTO: 101.9 FL (ref 80–99.9)
MCV RBC AUTO: 102.2 FL (ref 80–99.9)
MCV RBC AUTO: 102.3 FL (ref 80–99.9)
MCV RBC AUTO: 102.6 FL (ref 80–99.9)
MCV RBC AUTO: 102.7 FL (ref 80–99.9)
MCV RBC AUTO: 102.9 FL (ref 80–99.9)
MCV RBC AUTO: 103.3 FL (ref 80–99.9)
MCV RBC AUTO: 103.4 FL (ref 80–99.9)
MCV RBC AUTO: 103.8 FL (ref 80–99.9)
MCV RBC AUTO: 104.1 FL (ref 80–99.9)
MCV RBC AUTO: 104.4 FL (ref 80–99.9)
MCV RBC AUTO: 104.6 FL (ref 80–99.9)
MCV RBC AUTO: 105 FL (ref 80–99.9)
MCV RBC AUTO: 105.1 FL (ref 80–99.9)
MCV RBC AUTO: 105.2 FL (ref 80–99.9)
MCV RBC AUTO: 106.7 FL (ref 80–99.9)
MCV RBC AUTO: 109.1 FL (ref 80–99.9)
MCV RBC AUTO: 98 FL (ref 80–99.9)
METER GLUCOSE: 161 MG/DL (ref 74–99)
METHADONE SCREEN, URINE: NOT DETECTED
METHADONE SCREEN, URINE: NOT DETECTED
METHB: 0.5 % (ref 0–1.5)
METHICILLIN RESISTANCE MECA/C  BY PCR: DETECTED
MODE: ABNORMAL
MONOCYTE, FLUID: 88 %
MONOCYTES ABSOLUTE: 0.06 E9/L (ref 0.1–0.95)
MONOCYTES ABSOLUTE: 0.12 E9/L (ref 0.1–0.95)
MONOCYTES ABSOLUTE: 0.15 E9/L (ref 0.1–0.95)
MONOCYTES ABSOLUTE: 0.16 E9/L (ref 0.1–0.95)
MONOCYTES ABSOLUTE: 0.2 E9/L (ref 0.1–0.95)
MONOCYTES ABSOLUTE: 0.2 E9/L (ref 0.1–0.95)
MONOCYTES ABSOLUTE: 0.25 E9/L (ref 0.1–0.95)
MONOCYTES ABSOLUTE: 0.25 E9/L (ref 0.1–0.95)
MONOCYTES ABSOLUTE: 0.26 E9/L (ref 0.1–0.95)
MONOCYTES ABSOLUTE: 0.32 E9/L (ref 0.1–0.95)
MONOCYTES ABSOLUTE: 0.46 E9/L (ref 0.1–0.95)
MONOCYTES ABSOLUTE: 0.5 E9/L (ref 0.1–0.95)
MONOCYTES ABSOLUTE: 0.96 E9/L (ref 0.1–0.95)
MONOCYTES RELATIVE PERCENT: 0.9 % (ref 2–12)
MONOCYTES RELATIVE PERCENT: 1.7 % (ref 2–12)
MONOCYTES RELATIVE PERCENT: 1.8 % (ref 2–12)
MONOCYTES RELATIVE PERCENT: 2.6 % (ref 2–12)
MONOCYTES RELATIVE PERCENT: 3.5 % (ref 2–12)
MONOCYTES RELATIVE PERCENT: 3.5 % (ref 2–12)
MONOCYTES RELATIVE PERCENT: 4.3 % (ref 2–12)
MONOCYTES RELATIVE PERCENT: 5.2 % (ref 2–12)
MONOCYTES RELATIVE PERCENT: 6.8 % (ref 2–12)
MONOCYTES RELATIVE PERCENT: 6.8 % (ref 2–12)
MONOCYTES RELATIVE PERCENT: 8.3 % (ref 2–12)
MYELOCYTE PERCENT: 0.9 % (ref 0–0)
NEGATIVE QC PASS/FAIL: NORMAL
NEISSERIA MENINGITIDIS BY PCR: NOT DETECTED
NEISSERIA MENINGITIDIS BY PCR: NOT DETECTED
NEUTROPHIL, FLUID: 12 %
NEUTROPHILS ABSOLUTE: 10.55 E9/L (ref 1.8–7.3)
NEUTROPHILS ABSOLUTE: 3.32 E9/L (ref 1.8–7.3)
NEUTROPHILS ABSOLUTE: 3.75 E9/L (ref 1.8–7.3)
NEUTROPHILS ABSOLUTE: 4.07 E9/L (ref 1.8–7.3)
NEUTROPHILS ABSOLUTE: 4.26 E9/L (ref 1.8–7.3)
NEUTROPHILS ABSOLUTE: 4.36 E9/L (ref 1.8–7.3)
NEUTROPHILS ABSOLUTE: 4.45 E9/L (ref 1.8–7.3)
NEUTROPHILS ABSOLUTE: 4.47 E9/L (ref 1.8–7.3)
NEUTROPHILS ABSOLUTE: 4.8 E9/L (ref 1.8–7.3)
NEUTROPHILS ABSOLUTE: 4.98 E9/L (ref 1.8–7.3)
NEUTROPHILS ABSOLUTE: 5.02 E9/L (ref 1.8–7.3)
NEUTROPHILS ABSOLUTE: 7.4 E9/L (ref 1.8–7.3)
NEUTROPHILS ABSOLUTE: 8.64 E9/L (ref 1.8–7.3)
NEUTROPHILS RELATIVE PERCENT: 64.3 % (ref 43–80)
NEUTROPHILS RELATIVE PERCENT: 64.6 % (ref 43–80)
NEUTROPHILS RELATIVE PERCENT: 72.3 % (ref 43–80)
NEUTROPHILS RELATIVE PERCENT: 74 % (ref 43–80)
NEUTROPHILS RELATIVE PERCENT: 74.4 % (ref 43–80)
NEUTROPHILS RELATIVE PERCENT: 74.8 % (ref 43–80)
NEUTROPHILS RELATIVE PERCENT: 75.7 % (ref 43–80)
NEUTROPHILS RELATIVE PERCENT: 78.3 % (ref 43–80)
NEUTROPHILS RELATIVE PERCENT: 79.1 % (ref 43–80)
NEUTROPHILS RELATIVE PERCENT: 80 % (ref 43–80)
NEUTROPHILS RELATIVE PERCENT: 81.6 % (ref 43–80)
NEUTROPHILS RELATIVE PERCENT: 82.6 % (ref 43–80)
NEUTROPHILS RELATIVE PERCENT: 86 % (ref 43–80)
NITRITE, URINE: NEGATIVE
NITRITE, URINE: POSITIVE
NITRITE, URINE: POSITIVE
NUCLEATED CELLS FLUID: 221 /UL
O2 CONTENT: 16.6 ML/DL
O2 SATURATION: 99.7 % (ref 92–98.5)
O2HB: 99 % (ref 94–97)
OPERATOR ID: ABNORMAL
OPIATE SCREEN URINE: NOT DETECTED
OPIATE SCREEN URINE: NOT DETECTED
ORDER NUMBER: ABNORMAL
ORDER NUMBER: ABNORMAL
ORGANISM: ABNORMAL
OVALOCYTES: ABNORMAL
OXYCODONE URINE: NOT DETECTED
OXYCODONE URINE: NOT DETECTED
PATIENT TEMP: 37 C
PCO2: 29.1 MMHG (ref 35–45)
PDW BLD-RTO: 15.7 FL (ref 11.5–15)
PDW BLD-RTO: 15.8 FL (ref 11.5–15)
PDW BLD-RTO: 15.9 FL (ref 11.5–15)
PDW BLD-RTO: 15.9 FL (ref 11.5–15)
PDW BLD-RTO: 16 FL (ref 11.5–15)
PDW BLD-RTO: 16 FL (ref 11.5–15)
PDW BLD-RTO: 16.1 FL (ref 11.5–15)
PDW BLD-RTO: 16.1 FL (ref 11.5–15)
PDW BLD-RTO: 16.2 FL (ref 11.5–15)
PDW BLD-RTO: 16.3 FL (ref 11.5–15)
PDW BLD-RTO: 16.3 FL (ref 11.5–15)
PDW BLD-RTO: 16.5 FL (ref 11.5–15)
PDW BLD-RTO: 16.5 FL (ref 11.5–15)
PDW BLD-RTO: 16.6 FL (ref 11.5–15)
PDW BLD-RTO: 16.9 FL (ref 11.5–15)
PDW BLD-RTO: 17.7 FL (ref 11.5–15)
PDW BLD-RTO: 18.1 FL (ref 11.5–15)
PDW BLD-RTO: 18.2 FL (ref 11.5–15)
PDW BLD-RTO: 18.4 FL (ref 11.5–15)
PDW BLD-RTO: 18.5 FL (ref 11.5–15)
PDW BLD-RTO: 18.6 FL (ref 11.5–15)
PDW BLD-RTO: 18.6 FL (ref 11.5–15)
PDW BLD-RTO: 18.9 FL (ref 11.5–15)
PDW BLD-RTO: 19 FL (ref 11.5–15)
PDW BLD-RTO: 19.2 FL (ref 11.5–15)
PDW BLD-RTO: 19.2 FL (ref 11.5–15)
PH BLOOD GAS: 7.41 (ref 7.35–7.45)
PH UA: 6.5 (ref 5–9)
PH UA: 7 (ref 5–9)
PH UA: 7 (ref 5–9)
PH UA: 7.5 (ref 5–9)
PH UA: 8 (ref 5–9)
PHENCYCLIDINE SCREEN URINE: NOT DETECTED
PHENCYCLIDINE SCREEN URINE: NOT DETECTED
PHOSPHORUS: 2.4 MG/DL (ref 2.5–4.5)
PHOSPHORUS: 2.9 MG/DL (ref 2.5–4.5)
PHOSPHORUS: 3.2 MG/DL (ref 2.5–4.5)
PHOSPHORUS: 3.3 MG/DL (ref 2.5–4.5)
PHOSPHORUS: 3.3 MG/DL (ref 2.5–4.5)
PLATELET # BLD: 146 E9/L (ref 130–450)
PLATELET # BLD: 35 E9/L (ref 130–450)
PLATELET # BLD: 35 E9/L (ref 130–450)
PLATELET # BLD: 37 E9/L (ref 130–450)
PLATELET # BLD: 39 E9/L (ref 130–450)
PLATELET # BLD: 40 E9/L (ref 130–450)
PLATELET # BLD: 40 E9/L (ref 130–450)
PLATELET # BLD: 43 E9/L (ref 130–450)
PLATELET # BLD: 43 E9/L (ref 130–450)
PLATELET # BLD: 45 E9/L (ref 130–450)
PLATELET # BLD: 45 E9/L (ref 130–450)
PLATELET # BLD: 46 E9/L (ref 130–450)
PLATELET # BLD: 46 E9/L (ref 130–450)
PLATELET # BLD: 53 E9/L (ref 130–450)
PLATELET # BLD: 58 E9/L (ref 130–450)
PLATELET # BLD: 61 E9/L (ref 130–450)
PLATELET # BLD: 62 E9/L (ref 130–450)
PLATELET # BLD: 63 E9/L (ref 130–450)
PLATELET # BLD: 63 E9/L (ref 130–450)
PLATELET # BLD: 70 E9/L (ref 130–450)
PLATELET # BLD: 71 E9/L (ref 130–450)
PLATELET # BLD: 73 E9/L (ref 130–450)
PLATELET # BLD: 77 E9/L (ref 130–450)
PLATELET # BLD: 79 E9/L (ref 130–450)
PLATELET # BLD: 80 E9/L (ref 130–450)
PLATELET # BLD: 86 E9/L (ref 130–450)
PLATELET CONFIRMATION: NORMAL
PMV BLD AUTO: 11.3 FL (ref 7–12)
PMV BLD AUTO: 11.7 FL (ref 7–12)
PMV BLD AUTO: 12.1 FL (ref 7–12)
PMV BLD AUTO: 12.1 FL (ref 7–12)
PMV BLD AUTO: 12.3 FL (ref 7–12)
PMV BLD AUTO: 12.4 FL (ref 7–12)
PMV BLD AUTO: 12.4 FL (ref 7–12)
PMV BLD AUTO: 12.5 FL (ref 7–12)
PMV BLD AUTO: 12.6 FL (ref 7–12)
PMV BLD AUTO: 12.7 FL (ref 7–12)
PMV BLD AUTO: 12.8 FL (ref 7–12)
PMV BLD AUTO: 12.8 FL (ref 7–12)
PMV BLD AUTO: 12.9 FL (ref 7–12)
PMV BLD AUTO: 12.9 FL (ref 7–12)
PMV BLD AUTO: 13 FL (ref 7–12)
PMV BLD AUTO: 13.1 FL (ref 7–12)
PMV BLD AUTO: 13.2 FL (ref 7–12)
PMV BLD AUTO: 13.5 FL (ref 7–12)
PMV BLD AUTO: 14 FL (ref 7–12)
PO2: 288.6 MMHG (ref 75–100)
POIKILOCYTES: ABNORMAL
POLYCHROMASIA: ABNORMAL
POSITIVE QC PASS/FAIL: NORMAL
POTASSIUM REFLEX MAGNESIUM: 2.4 MMOL/L (ref 3.5–5)
POTASSIUM REFLEX MAGNESIUM: 2.6 MMOL/L (ref 3.5–5)
POTASSIUM SERPL-SCNC: 2.4 MMOL/L (ref 3.5–5)
POTASSIUM SERPL-SCNC: 2.4 MMOL/L (ref 3.5–5)
POTASSIUM SERPL-SCNC: 2.8 MMOL/L (ref 3.5–5)
POTASSIUM SERPL-SCNC: 3 MMOL/L (ref 3.5–5)
POTASSIUM SERPL-SCNC: 3 MMOL/L (ref 3.5–5)
POTASSIUM SERPL-SCNC: 3.1 MMOL/L (ref 3.5–5)
POTASSIUM SERPL-SCNC: 3.1 MMOL/L (ref 3.5–5)
POTASSIUM SERPL-SCNC: 3.2 MMOL/L (ref 3.5–5)
POTASSIUM SERPL-SCNC: 3.3 MMOL/L (ref 3.5–5)
POTASSIUM SERPL-SCNC: 3.4 MMOL/L (ref 3.5–5)
POTASSIUM SERPL-SCNC: 3.4 MMOL/L (ref 3.5–5)
POTASSIUM SERPL-SCNC: 3.5 MMOL/L (ref 3.5–5)
POTASSIUM SERPL-SCNC: 3.6 MMOL/L (ref 3.5–5)
POTASSIUM SERPL-SCNC: 3.7 MMOL/L (ref 3.5–5)
POTASSIUM SERPL-SCNC: 3.8 MMOL/L (ref 3.5–5)
POTASSIUM SERPL-SCNC: 4.2 MMOL/L (ref 3.5–5)
POTASSIUM SERPL-SCNC: 4.3 MMOL/L (ref 3.5–5)
PRO-BNP: 112 PG/ML (ref 0–125)
PRO-BNP: 27 PG/ML (ref 0–125)
PROCALCITONIN: 0.16 NG/ML (ref 0–0.08)
PROTEIN UA: ABNORMAL MG/DL
PROTEIN UA: ABNORMAL MG/DL
PROTEIN UA: NEGATIVE MG/DL
PROTEUS SPECIES BY PCR: NOT DETECTED
PROTEUS SPECIES BY PCR: NOT DETECTED
PROTHROMBIN TIME: 20.1 SEC (ref 9.3–12.4)
PROTHROMBIN TIME: 20.4 SEC (ref 9.3–12.4)
PROTHROMBIN TIME: 22.8 SEC (ref 9.3–12.4)
PROTHROMBIN TIME: 23 SEC (ref 9.3–12.4)
PROTHROMBIN TIME: 23.6 SEC (ref 9.3–12.4)
PROTHROMBIN TIME: 23.7 SEC (ref 9.3–12.4)
PROTHROMBIN TIME: 23.7 SEC (ref 9.3–12.4)
PROTHROMBIN TIME: 24 SEC (ref 9.3–12.4)
PROTHROMBIN TIME: 24.2 SEC (ref 9.3–12.4)
PROTHROMBIN TIME: 24.5 SEC (ref 9.3–12.4)
PROTHROMBIN TIME: 24.6 SEC (ref 9.3–12.4)
PROTHROMBIN TIME: 26.2 SEC (ref 9.3–12.4)
PROTHROMBIN TIME: 26.3 SEC (ref 9.3–12.4)
PROTHROMBIN TIME: 27.3 SEC (ref 9.3–12.4)
PROTHROMBIN TIME: 28.7 SEC (ref 9.3–12.4)
PROTHROMBIN TIME: 29.1 SEC (ref 9.3–12.4)
PROTHROMBIN TIME: 29.2 SEC (ref 9.3–12.4)
PSEUDOMONAS AERUGINOSA BY PCR: NOT DETECTED
PSEUDOMONAS AERUGINOSA BY PCR: NOT DETECTED
RBC # BLD: 2.1 E12/L (ref 3.5–5.5)
RBC # BLD: 2.1 E12/L (ref 3.5–5.5)
RBC # BLD: 2.2 E12/L (ref 3.5–5.5)
RBC # BLD: 2.22 E12/L (ref 3.5–5.5)
RBC # BLD: 2.24 E12/L (ref 3.5–5.5)
RBC # BLD: 2.26 E12/L (ref 3.5–5.5)
RBC # BLD: 2.27 E12/L (ref 3.5–5.5)
RBC # BLD: 2.31 E12/L (ref 3.5–5.5)
RBC # BLD: 2.32 E12/L (ref 3.5–5.5)
RBC # BLD: 2.32 E12/L (ref 3.5–5.5)
RBC # BLD: 2.33 E12/L (ref 3.5–5.5)
RBC # BLD: 2.33 E12/L (ref 3.5–5.5)
RBC # BLD: 2.35 E12/L (ref 3.5–5.5)
RBC # BLD: 2.37 E12/L (ref 3.5–5.5)
RBC # BLD: 2.37 E12/L (ref 3.5–5.5)
RBC # BLD: 2.39 E12/L (ref 3.5–5.5)
RBC # BLD: 2.49 E12/L (ref 3.5–5.5)
RBC # BLD: 2.5 E12/L (ref 3.5–5.5)
RBC # BLD: 2.59 E12/L (ref 3.5–5.5)
RBC # BLD: 2.64 E12/L (ref 3.5–5.5)
RBC # BLD: 2.72 E12/L (ref 3.5–5.5)
RBC # BLD: 2.73 E12/L (ref 3.5–5.5)
RBC # BLD: 2.87 E12/L (ref 3.5–5.5)
RBC # BLD: 3.2 E12/L (ref 3.5–5.5)
RBC # BLD: 3.23 E12/L (ref 3.5–5.5)
RBC FLUID: <2000 /UL
RBC UA: ABNORMAL /HPF (ref 0–2)
REASON FOR REJECTION: NORMAL
REJECTED TEST: NORMAL
RENAL EPITHELIAL, UA: ABNORMAL /HPF
RETIC HGB EQUIVALENT: 35.1 PG (ref 28.2–36.6)
RETICULOCYTE ABSOLUTE COUNT: 0.08 E12/L
RETICULOCYTE COUNT PCT: 3.4 % (ref 0.4–1.9)
SALICYLATE, SERUM: <0.3 MG/DL (ref 0–30)
SALICYLATE, SERUM: <0.3 MG/DL (ref 0–30)
SARS-COV-2, NAAT: NOT DETECTED
SCHISTOCYTES: ABNORMAL
SEDIMENTATION RATE, ERYTHROCYTE: 25 MM/HR (ref 0–20)
SEDIMENTATION RATE, ERYTHROCYTE: 38 MM/HR (ref 0–20)
SEDIMENTATION RATE, ERYTHROCYTE: 97 MM/HR (ref 0–20)
SERRATIA MARCESCENS BY PCR: NOT DETECTED
SERRATIA MARCESCENS BY PCR: NOT DETECTED
SODIUM BLD-SCNC: 129 MMOL/L (ref 132–146)
SODIUM BLD-SCNC: 133 MMOL/L (ref 132–146)
SODIUM BLD-SCNC: 134 MMOL/L (ref 132–146)
SODIUM BLD-SCNC: 134 MMOL/L (ref 132–146)
SODIUM BLD-SCNC: 135 MMOL/L (ref 132–146)
SODIUM BLD-SCNC: 136 MMOL/L (ref 132–146)
SODIUM BLD-SCNC: 137 MMOL/L (ref 132–146)
SODIUM BLD-SCNC: 137 MMOL/L (ref 132–146)
SODIUM BLD-SCNC: 138 MMOL/L (ref 132–146)
SODIUM BLD-SCNC: 139 MMOL/L (ref 132–146)
SODIUM BLD-SCNC: 140 MMOL/L (ref 132–146)
SODIUM BLD-SCNC: 141 MMOL/L (ref 132–146)
SODIUM BLD-SCNC: 141 MMOL/L (ref 132–146)
SODIUM BLD-SCNC: 142 MMOL/L (ref 132–146)
SODIUM URINE: <20 MMOL/L
SOURCE OF BLOOD CULTURE: ABNORMAL
SOURCE OF BLOOD CULTURE: ABNORMAL
SOURCE, BLOOD GAS: ABNORMAL
SPECIFIC GRAVITY UA: 1.01 (ref 1–1.03)
SPECIFIC GRAVITY UA: <=1.005 (ref 1–1.03)
SPECIFIC GRAVITY UA: <=1.005 (ref 1–1.03)
STAPHYLOCOCCUS AUREUS BY PCR: NOT DETECTED
STAPHYLOCOCCUS AUREUS BY PCR: NOT DETECTED
STAPHYLOCOCCUS SPECIES BY PCR: DETECTED
STAPHYLOCOCCUS SPECIES BY PCR: NOT DETECTED
STREPTOCOCCUS AGALACTIAE BY PCR: NOT DETECTED
STREPTOCOCCUS AGALACTIAE BY PCR: NOT DETECTED
STREPTOCOCCUS PNEUMONIAE BY PCR: NOT DETECTED
STREPTOCOCCUS PNEUMONIAE BY PCR: NOT DETECTED
STREPTOCOCCUS PYOGENES  BY PCR: NOT DETECTED
STREPTOCOCCUS PYOGENES  BY PCR: NOT DETECTED
STREPTOCOCCUS SPECIES BY PCR: NOT DETECTED
STREPTOCOCCUS SPECIES BY PCR: NOT DETECTED
T4 FREE: 0.95 NG/DL (ref 0.93–1.7)
TARGET CELLS: ABNORMAL
TEAR DROP CELLS: ABNORMAL
THB: 11.4 G/DL (ref 11.5–16.5)
TIME ANALYZED: 2003
TOTAL CK: 335 U/L (ref 20–180)
TOTAL CK: 78 U/L (ref 20–180)
TOTAL IRON BINDING CAPACITY: 155 MCG/DL (ref 250–450)
TOTAL PROTEIN: 5.7 G/DL (ref 6.4–8.3)
TOTAL PROTEIN: 5.8 G/DL (ref 6.4–8.3)
TOTAL PROTEIN: 5.8 G/DL (ref 6.4–8.3)
TOTAL PROTEIN: 5.9 G/DL (ref 6.4–8.3)
TOTAL PROTEIN: 6 G/DL (ref 6.4–8.3)
TOTAL PROTEIN: 6 G/DL (ref 6.4–8.3)
TOTAL PROTEIN: 6.1 G/DL (ref 6.4–8.3)
TOTAL PROTEIN: 6.1 G/DL (ref 6.4–8.3)
TOTAL PROTEIN: 6.3 G/DL (ref 6.4–8.3)
TOTAL PROTEIN: 6.3 G/DL (ref 6.4–8.3)
TOTAL PROTEIN: 6.4 G/DL (ref 6.4–8.3)
TOTAL PROTEIN: 6.5 G/DL (ref 6.4–8.3)
TOTAL PROTEIN: 6.5 G/DL (ref 6.4–8.3)
TOTAL PROTEIN: 6.6 G/DL (ref 6.4–8.3)
TOTAL PROTEIN: 6.7 G/DL (ref 6.4–8.3)
TOTAL PROTEIN: 6.7 G/DL (ref 6.4–8.3)
TOTAL PROTEIN: 7 G/DL (ref 6.4–8.3)
TOTAL PROTEIN: 7.1 G/DL (ref 6.4–8.3)
TOTAL PROTEIN: 7.1 G/DL (ref 6.4–8.3)
TOTAL PROTEIN: 7.3 G/DL (ref 6.4–8.3)
TOTAL PROTEIN: 8.8 G/DL (ref 6.4–8.3)
TRICYCLIC ANTIDEPRESSANTS SCREEN SERUM: NEGATIVE NG/ML
TRICYCLIC ANTIDEPRESSANTS SCREEN SERUM: NEGATIVE NG/ML
TRIGL SERPL-MCNC: 164 MG/DL (ref 0–149)
TROPONIN, HIGH SENSITIVITY: 15 NG/L (ref 0–9)
TROPONIN, HIGH SENSITIVITY: 17 NG/L (ref 0–9)
TROPONIN, HIGH SENSITIVITY: 17 NG/L (ref 0–9)
TROPONIN, HIGH SENSITIVITY: 18 NG/L (ref 0–9)
TROPONIN, HIGH SENSITIVITY: 19 NG/L (ref 0–9)
TROPONIN, HIGH SENSITIVITY: 19 NG/L (ref 0–9)
TROPONIN, HIGH SENSITIVITY: 20 NG/L (ref 0–9)
TROPONIN, HIGH SENSITIVITY: 20 NG/L (ref 0–9)
TROPONIN, HIGH SENSITIVITY: 21 NG/L (ref 0–9)
TROPONIN, HIGH SENSITIVITY: 22 NG/L (ref 0–9)
TROPONIN, HIGH SENSITIVITY: 23 NG/L (ref 0–9)
TROPONIN, HIGH SENSITIVITY: 24 NG/L (ref 0–9)
TROPONIN, HIGH SENSITIVITY: 24 NG/L (ref 0–9)
TROPONIN, HIGH SENSITIVITY: 25 NG/L (ref 0–9)
TROPONIN, HIGH SENSITIVITY: 25 NG/L (ref 0–9)
TROPONIN, HIGH SENSITIVITY: 28 NG/L (ref 0–9)
TROPONIN, HIGH SENSITIVITY: 29 NG/L (ref 0–9)
TROPONIN, HIGH SENSITIVITY: 30 NG/L (ref 0–9)
TSH SERPL DL<=0.05 MIU/L-ACNC: 34.68 UIU/ML (ref 0.27–4.2)
TSH SERPL DL<=0.05 MIU/L-ACNC: 6.72 UIU/ML (ref 0.27–4.2)
UREA NITROGEN, UR: 389 MG/DL (ref 800–1666)
URINE CULTURE, ROUTINE: ABNORMAL
URINE CULTURE, ROUTINE: NORMAL
UROBILINOGEN, URINE: 0.2 E.U./DL
UROBILINOGEN, URINE: 2 E.U./DL
VANCOMYCIN RESISTANT BY PCR: NOT DETECTED
VANCOMYCIN TROUGH: 16.4 MCG/ML (ref 5–16)
VITAMIN B-12: >2000 PG/ML (ref 211–946)
VLDLC SERPL CALC-MCNC: 33 MG/DL
VRE CULTURE: ABNORMAL
WBC # BLD: 10.8 E9/L (ref 4.5–11.5)
WBC # BLD: 14.2 E9/L (ref 4.5–11.5)
WBC # BLD: 2.7 E9/L (ref 4.5–11.5)
WBC # BLD: 3.1 E9/L (ref 4.5–11.5)
WBC # BLD: 3.3 E9/L (ref 4.5–11.5)
WBC # BLD: 3.4 E9/L (ref 4.5–11.5)
WBC # BLD: 3.5 E9/L (ref 4.5–11.5)
WBC # BLD: 3.6 E9/L (ref 4.5–11.5)
WBC # BLD: 3.7 E9/L (ref 4.5–11.5)
WBC # BLD: 3.7 E9/L (ref 4.5–11.5)
WBC # BLD: 4 E9/L (ref 4.5–11.5)
WBC # BLD: 4.5 E9/L (ref 4.5–11.5)
WBC # BLD: 4.6 E9/L (ref 4.5–11.5)
WBC # BLD: 4.8 E9/L (ref 4.5–11.5)
WBC # BLD: 4.8 E9/L (ref 4.5–11.5)
WBC # BLD: 4.9 E9/L (ref 4.5–11.5)
WBC # BLD: 4.9 E9/L (ref 4.5–11.5)
WBC # BLD: 5 E9/L (ref 4.5–11.5)
WBC # BLD: 5.1 E9/L (ref 4.5–11.5)
WBC # BLD: 5.1 E9/L (ref 4.5–11.5)
WBC # BLD: 5.2 E9/L (ref 4.5–11.5)
WBC # BLD: 5.7 E9/L (ref 4.5–11.5)
WBC # BLD: 5.8 E9/L (ref 4.5–11.5)
WBC # BLD: 6 E9/L (ref 4.5–11.5)
WBC # BLD: 6.3 E9/L (ref 4.5–11.5)
WBC # BLD: 6.8 E9/L (ref 4.5–11.5)
WBC # BLD: 7.5 E9/L (ref 4.5–11.5)
WBC # BLD: 8.6 E9/L (ref 4.5–11.5)
WBC UA: ABNORMAL /HPF (ref 0–5)

## 2021-01-01 PROCEDURE — 84300 ASSAY OF URINE SODIUM: CPT

## 2021-01-01 PROCEDURE — 83735 ASSAY OF MAGNESIUM: CPT

## 2021-01-01 PROCEDURE — 6370000000 HC RX 637 (ALT 250 FOR IP): Performed by: NURSE PRACTITIONER

## 2021-01-01 PROCEDURE — 86850 RBC ANTIBODY SCREEN: CPT

## 2021-01-01 PROCEDURE — 82248 BILIRUBIN DIRECT: CPT

## 2021-01-01 PROCEDURE — 85025 COMPLETE CBC W/AUTO DIFF WBC: CPT

## 2021-01-01 PROCEDURE — 84439 ASSAY OF FREE THYROXINE: CPT

## 2021-01-01 PROCEDURE — 83690 ASSAY OF LIPASE: CPT

## 2021-01-01 PROCEDURE — 85027 COMPLETE CBC AUTOMATED: CPT

## 2021-01-01 PROCEDURE — 6370000000 HC RX 637 (ALT 250 FOR IP): Performed by: FAMILY MEDICINE

## 2021-01-01 PROCEDURE — 82607 VITAMIN B-12: CPT

## 2021-01-01 PROCEDURE — 36415 COLL VENOUS BLD VENIPUNCTURE: CPT

## 2021-01-01 PROCEDURE — 2060000000 HC ICU INTERMEDIATE R&B

## 2021-01-01 PROCEDURE — 2580000003 HC RX 258: Performed by: STUDENT IN AN ORGANIZED HEALTH CARE EDUCATION/TRAINING PROGRAM

## 2021-01-01 PROCEDURE — 87088 URINE BACTERIA CULTURE: CPT

## 2021-01-01 PROCEDURE — 83605 ASSAY OF LACTIC ACID: CPT

## 2021-01-01 PROCEDURE — 2580000003 HC RX 258: Performed by: INTERNAL MEDICINE

## 2021-01-01 PROCEDURE — 2580000003 HC RX 258: Performed by: NURSE PRACTITIONER

## 2021-01-01 PROCEDURE — G0008 ADMIN INFLUENZA VIRUS VAC: HCPCS | Performed by: INTERNAL MEDICINE

## 2021-01-01 PROCEDURE — 6360000002 HC RX W HCPCS: Performed by: INTERNAL MEDICINE

## 2021-01-01 PROCEDURE — 6370000000 HC RX 637 (ALT 250 FOR IP): Performed by: INTERNAL MEDICINE

## 2021-01-01 PROCEDURE — 85014 HEMATOCRIT: CPT

## 2021-01-01 PROCEDURE — 80048 BASIC METABOLIC PNL TOTAL CA: CPT

## 2021-01-01 PROCEDURE — 3700000001 HC ADD 15 MINUTES (ANESTHESIA): Performed by: INTERNAL MEDICINE

## 2021-01-01 PROCEDURE — 80053 COMPREHEN METABOLIC PANEL: CPT

## 2021-01-01 PROCEDURE — 97110 THERAPEUTIC EXERCISES: CPT

## 2021-01-01 PROCEDURE — C9113 INJ PANTOPRAZOLE SODIUM, VIA: HCPCS | Performed by: STUDENT IN AN ORGANIZED HEALTH CARE EDUCATION/TRAINING PROGRAM

## 2021-01-01 PROCEDURE — 36430 TRANSFUSION BLD/BLD COMPNT: CPT

## 2021-01-01 PROCEDURE — 6360000002 HC RX W HCPCS: Performed by: FAMILY MEDICINE

## 2021-01-01 PROCEDURE — 87077 CULTURE AEROBIC IDENTIFY: CPT

## 2021-01-01 PROCEDURE — 2580000003 HC RX 258: Performed by: FAMILY MEDICINE

## 2021-01-01 PROCEDURE — 1200000000 HC SEMI PRIVATE

## 2021-01-01 PROCEDURE — 82140 ASSAY OF AMMONIA: CPT

## 2021-01-01 PROCEDURE — 6360000002 HC RX W HCPCS: Performed by: STUDENT IN AN ORGANIZED HEALTH CARE EDUCATION/TRAINING PROGRAM

## 2021-01-01 PROCEDURE — 6370000000 HC RX 637 (ALT 250 FOR IP): Performed by: STUDENT IN AN ORGANIZED HEALTH CARE EDUCATION/TRAINING PROGRAM

## 2021-01-01 PROCEDURE — C9113 INJ PANTOPRAZOLE SODIUM, VIA: HCPCS | Performed by: INTERNAL MEDICINE

## 2021-01-01 PROCEDURE — 6360000002 HC RX W HCPCS: Performed by: SPECIALIST

## 2021-01-01 PROCEDURE — 86923 COMPATIBILITY TEST ELECTRIC: CPT

## 2021-01-01 PROCEDURE — 82150 ASSAY OF AMYLASE: CPT

## 2021-01-01 PROCEDURE — 88305 TISSUE EXAM BY PATHOLOGIST: CPT

## 2021-01-01 PROCEDURE — 97161 PT EVAL LOW COMPLEX 20 MIN: CPT | Performed by: PHYSICAL THERAPIST

## 2021-01-01 PROCEDURE — 97530 THERAPEUTIC ACTIVITIES: CPT

## 2021-01-01 PROCEDURE — 97165 OT EVAL LOW COMPLEX 30 MIN: CPT

## 2021-01-01 PROCEDURE — A9577 INJ MULTIHANCE: HCPCS | Performed by: RADIOLOGY

## 2021-01-01 PROCEDURE — 74183 MRI ABD W/O CNTR FLWD CNTR: CPT

## 2021-01-01 PROCEDURE — 84100 ASSAY OF PHOSPHORUS: CPT

## 2021-01-01 PROCEDURE — 84132 ASSAY OF SERUM POTASSIUM: CPT

## 2021-01-01 PROCEDURE — 6360000002 HC RX W HCPCS: Performed by: NURSE PRACTITIONER

## 2021-01-01 PROCEDURE — P9016 RBC LEUKOCYTES REDUCED: HCPCS

## 2021-01-01 PROCEDURE — 76705 ECHO EXAM OF ABDOMEN: CPT

## 2021-01-01 PROCEDURE — 6360000002 HC RX W HCPCS: Performed by: NURSE ANESTHETIST, CERTIFIED REGISTERED

## 2021-01-01 PROCEDURE — 84484 ASSAY OF TROPONIN QUANT: CPT

## 2021-01-01 PROCEDURE — G0378 HOSPITAL OBSERVATION PER HR: HCPCS

## 2021-01-01 PROCEDURE — 82077 ASSAY SPEC XCP UR&BREATH IA: CPT

## 2021-01-01 PROCEDURE — 99232 SBSQ HOSP IP/OBS MODERATE 35: CPT | Performed by: STUDENT IN AN ORGANIZED HEALTH CARE EDUCATION/TRAINING PROGRAM

## 2021-01-01 PROCEDURE — 7100000001 HC PACU RECOVERY - ADDTL 15 MIN: Performed by: STUDENT IN AN ORGANIZED HEALTH CARE EDUCATION/TRAINING PROGRAM

## 2021-01-01 PROCEDURE — 36592 COLLECT BLOOD FROM PICC: CPT

## 2021-01-01 PROCEDURE — 83540 ASSAY OF IRON: CPT

## 2021-01-01 PROCEDURE — 6360000004 HC RX CONTRAST MEDICATION: Performed by: RADIOLOGY

## 2021-01-01 PROCEDURE — 85610 PROTHROMBIN TIME: CPT

## 2021-01-01 PROCEDURE — 99222 1ST HOSP IP/OBS MODERATE 55: CPT | Performed by: STUDENT IN AN ORGANIZED HEALTH CARE EDUCATION/TRAINING PROGRAM

## 2021-01-01 PROCEDURE — 87205 SMEAR GRAM STAIN: CPT

## 2021-01-01 PROCEDURE — P9047 ALBUMIN (HUMAN), 25%, 50ML: HCPCS | Performed by: INTERNAL MEDICINE

## 2021-01-01 PROCEDURE — 76937 US GUIDE VASCULAR ACCESS: CPT

## 2021-01-01 PROCEDURE — 97535 SELF CARE MNGMENT TRAINING: CPT

## 2021-01-01 PROCEDURE — 96374 THER/PROPH/DIAG INJ IV PUSH: CPT

## 2021-01-01 PROCEDURE — 82746 ASSAY OF FOLIC ACID SERUM: CPT

## 2021-01-01 PROCEDURE — 96376 TX/PRO/DX INJ SAME DRUG ADON: CPT

## 2021-01-01 PROCEDURE — 81001 URINALYSIS AUTO W/SCOPE: CPT

## 2021-01-01 PROCEDURE — 93312 ECHO TRANSESOPHAGEAL: CPT | Performed by: INTERNAL MEDICINE

## 2021-01-01 PROCEDURE — 87040 BLOOD CULTURE FOR BACTERIA: CPT

## 2021-01-01 PROCEDURE — 7100000010 HC PHASE II RECOVERY - FIRST 15 MIN: Performed by: INTERNAL MEDICINE

## 2021-01-01 PROCEDURE — 96365 THER/PROPH/DIAG IV INF INIT: CPT

## 2021-01-01 PROCEDURE — 80179 DRUG ASSAY SALICYLATE: CPT

## 2021-01-01 PROCEDURE — 3700000000 HC ANESTHESIA ATTENDED CARE: Performed by: INTERNAL MEDICINE

## 2021-01-01 PROCEDURE — 82962 GLUCOSE BLOOD TEST: CPT

## 2021-01-01 PROCEDURE — 80076 HEPATIC FUNCTION PANEL: CPT

## 2021-01-01 PROCEDURE — 87635 SARS-COV-2 COVID-19 AMP PRB: CPT

## 2021-01-01 PROCEDURE — 74177 CT ABD & PELVIS W/CONTRAST: CPT

## 2021-01-01 PROCEDURE — 84540 ASSAY OF URINE/UREA-N: CPT

## 2021-01-01 PROCEDURE — 87081 CULTURE SCREEN ONLY: CPT

## 2021-01-01 PROCEDURE — 74022 RADEX COMPL AQT ABD SERIES: CPT

## 2021-01-01 PROCEDURE — 80307 DRUG TEST PRSMV CHEM ANLYZR: CPT

## 2021-01-01 PROCEDURE — 85018 HEMOGLOBIN: CPT

## 2021-01-01 PROCEDURE — 2580000003 HC RX 258: Performed by: NURSE ANESTHETIST, CERTIFIED REGISTERED

## 2021-01-01 PROCEDURE — 80143 DRUG ASSAY ACETAMINOPHEN: CPT

## 2021-01-01 PROCEDURE — 3609008300 HC SIGMOIDOSCOPY W/BIOPSY SINGLE/MULTIPLE: Performed by: STUDENT IN AN ORGANIZED HEALTH CARE EDUCATION/TRAINING PROGRAM

## 2021-01-01 PROCEDURE — 86140 C-REACTIVE PROTEIN: CPT

## 2021-01-01 PROCEDURE — 51702 INSERT TEMP BLADDER CATH: CPT

## 2021-01-01 PROCEDURE — 86900 BLOOD TYPING SEROLOGIC ABO: CPT

## 2021-01-01 PROCEDURE — 96372 THER/PROPH/DIAG INJ SC/IM: CPT

## 2021-01-01 PROCEDURE — 87186 SC STD MICRODIL/AGAR DIL: CPT

## 2021-01-01 PROCEDURE — 93306 TTE W/DOPPLER COMPLETE: CPT

## 2021-01-01 PROCEDURE — 2580000003 HC RX 258: Performed by: SPECIALIST

## 2021-01-01 PROCEDURE — 96375 TX/PRO/DX INJ NEW DRUG ADDON: CPT

## 2021-01-01 PROCEDURE — 7100000011 HC PHASE II RECOVERY - ADDTL 15 MIN: Performed by: INTERNAL MEDICINE

## 2021-01-01 PROCEDURE — 2709999900 HC NON-CHARGEABLE SUPPLY: Performed by: STUDENT IN AN ORGANIZED HEALTH CARE EDUCATION/TRAINING PROGRAM

## 2021-01-01 PROCEDURE — 85651 RBC SED RATE NONAUTOMATED: CPT

## 2021-01-01 PROCEDURE — 0DB88ZX EXCISION OF SMALL INTESTINE, VIA NATURAL OR ARTIFICIAL OPENING ENDOSCOPIC, DIAGNOSTIC: ICD-10-PCS | Performed by: STUDENT IN AN ORGANIZED HEALTH CARE EDUCATION/TRAINING PROGRAM

## 2021-01-01 PROCEDURE — 96361 HYDRATE IV INFUSION ADD-ON: CPT

## 2021-01-01 PROCEDURE — 3609012400 HC EGD TRANSORAL BIOPSY SINGLE/MULTIPLE: Performed by: STUDENT IN AN ORGANIZED HEALTH CARE EDUCATION/TRAINING PROGRAM

## 2021-01-01 PROCEDURE — 96366 THER/PROPH/DIAG IV INF ADDON: CPT

## 2021-01-01 PROCEDURE — C1729 CATH, DRAINAGE: HCPCS

## 2021-01-01 PROCEDURE — 83880 ASSAY OF NATRIURETIC PEPTIDE: CPT

## 2021-01-01 PROCEDURE — 99285 EMERGENCY DEPT VISIT HI MDM: CPT

## 2021-01-01 PROCEDURE — 99231 SBSQ HOSP IP/OBS SF/LOW 25: CPT | Performed by: INTERNAL MEDICINE

## 2021-01-01 PROCEDURE — 93312 ECHO TRANSESOPHAGEAL: CPT

## 2021-01-01 PROCEDURE — 83550 IRON BINDING TEST: CPT

## 2021-01-01 PROCEDURE — 82728 ASSAY OF FERRITIN: CPT

## 2021-01-01 PROCEDURE — 83036 HEMOGLOBIN GLYCOSYLATED A1C: CPT

## 2021-01-01 PROCEDURE — 3700000000 HC ANESTHESIA ATTENDED CARE: Performed by: STUDENT IN AN ORGANIZED HEALTH CARE EDUCATION/TRAINING PROGRAM

## 2021-01-01 PROCEDURE — 76705 ECHO EXAM OF ABDOMEN: CPT | Performed by: RADIOLOGY

## 2021-01-01 PROCEDURE — 02HV33Z INSERTION OF INFUSION DEVICE INTO SUPERIOR VENA CAVA, PERCUTANEOUS APPROACH: ICD-10-PCS | Performed by: INTERNAL MEDICINE

## 2021-01-01 PROCEDURE — 70450 CT HEAD/BRAIN W/O DYE: CPT

## 2021-01-01 PROCEDURE — 3609013000 HC EGD TRANSORAL CONTROL BLEEDING ANY METHOD: Performed by: STUDENT IN AN ORGANIZED HEALTH CARE EDUCATION/TRAINING PROGRAM

## 2021-01-01 PROCEDURE — 87150 DNA/RNA AMPLIFIED PROBE: CPT

## 2021-01-01 PROCEDURE — 87070 CULTURE OTHR SPECIMN AEROBIC: CPT

## 2021-01-01 PROCEDURE — 90686 IIV4 VACC NO PRSV 0.5 ML IM: CPT | Performed by: INTERNAL MEDICINE

## 2021-01-01 PROCEDURE — C1751 CATH, INF, PER/CENT/MIDLINE: HCPCS

## 2021-01-01 PROCEDURE — 7100000000 HC PACU RECOVERY - FIRST 15 MIN: Performed by: STUDENT IN AN ORGANIZED HEALTH CARE EDUCATION/TRAINING PROGRAM

## 2021-01-01 PROCEDURE — 36569 INSJ PICC 5 YR+ W/O IMAGING: CPT

## 2021-01-01 PROCEDURE — 0W9G3ZZ DRAINAGE OF PERITONEAL CAVITY, PERCUTANEOUS APPROACH: ICD-10-PCS | Performed by: RADIOLOGY

## 2021-01-01 PROCEDURE — 49083 ABD PARACENTESIS W/IMAGING: CPT

## 2021-01-01 PROCEDURE — 85730 THROMBOPLASTIN TIME PARTIAL: CPT

## 2021-01-01 PROCEDURE — 2709999900 HC NON-CHARGEABLE SUPPLY: Performed by: INTERNAL MEDICINE

## 2021-01-01 PROCEDURE — 6360000002 HC RX W HCPCS: Performed by: EMERGENCY MEDICINE

## 2021-01-01 PROCEDURE — 93005 ELECTROCARDIOGRAM TRACING: CPT | Performed by: EMERGENCY MEDICINE

## 2021-01-01 PROCEDURE — 74178 CT ABD&PLV WO CNTR FLWD CNTR: CPT

## 2021-01-01 PROCEDURE — 2720000010 HC SURG SUPPLY STERILE: Performed by: STUDENT IN AN ORGANIZED HEALTH CARE EDUCATION/TRAINING PROGRAM

## 2021-01-01 PROCEDURE — 80202 ASSAY OF VANCOMYCIN: CPT

## 2021-01-01 PROCEDURE — 93325 DOPPLER ECHO COLOR FLOW MAPG: CPT

## 2021-01-01 PROCEDURE — 71045 X-RAY EXAM CHEST 1 VIEW: CPT

## 2021-01-01 PROCEDURE — 99283 EMERGENCY DEPT VISIT LOW MDM: CPT

## 2021-01-01 PROCEDURE — 84145 PROCALCITONIN (PCT): CPT

## 2021-01-01 PROCEDURE — 2500000003 HC RX 250 WO HCPCS: Performed by: NURSE ANESTHETIST, CERTIFIED REGISTERED

## 2021-01-01 PROCEDURE — 97161 PT EVAL LOW COMPLEX 20 MIN: CPT

## 2021-01-01 PROCEDURE — 93005 ELECTROCARDIOGRAM TRACING: CPT | Performed by: STUDENT IN AN ORGANIZED HEALTH CARE EDUCATION/TRAINING PROGRAM

## 2021-01-01 PROCEDURE — 82805 BLOOD GASES W/O2 SATURATION: CPT

## 2021-01-01 PROCEDURE — 82550 ASSAY OF CK (CPK): CPT

## 2021-01-01 PROCEDURE — 81003 URINALYSIS AUTO W/O SCOPE: CPT

## 2021-01-01 PROCEDURE — 82570 ASSAY OF URINE CREATININE: CPT

## 2021-01-01 PROCEDURE — 84443 ASSAY THYROID STIM HORMONE: CPT

## 2021-01-01 PROCEDURE — 93005 ELECTROCARDIOGRAM TRACING: CPT | Performed by: PHYSICIAN ASSISTANT

## 2021-01-01 PROCEDURE — 99284 EMERGENCY DEPT VISIT MOD MDM: CPT

## 2021-01-01 PROCEDURE — APPSS45 APP SPLIT SHARED TIME 31-45 MINUTES: Performed by: NURSE PRACTITIONER

## 2021-01-01 PROCEDURE — 2580000003 HC RX 258: Performed by: RADIOLOGY

## 2021-01-01 PROCEDURE — 0DJ08ZZ INSPECTION OF UPPER INTESTINAL TRACT, VIA NATURAL OR ARTIFICIAL OPENING ENDOSCOPIC: ICD-10-PCS | Performed by: INTERNAL MEDICINE

## 2021-01-01 PROCEDURE — 3609017100 HC EGD: Performed by: INTERNAL MEDICINE

## 2021-01-01 PROCEDURE — 0DB98ZX EXCISION OF DUODENUM, VIA NATURAL OR ARTIFICIAL OPENING ENDOSCOPIC, DIAGNOSTIC: ICD-10-PCS | Performed by: STUDENT IN AN ORGANIZED HEALTH CARE EDUCATION/TRAINING PROGRAM

## 2021-01-01 PROCEDURE — 80061 LIPID PANEL: CPT

## 2021-01-01 PROCEDURE — 71275 CT ANGIOGRAPHY CHEST: CPT

## 2021-01-01 PROCEDURE — 93010 ELECTROCARDIOGRAM REPORT: CPT | Performed by: INTERNAL MEDICINE

## 2021-01-01 PROCEDURE — 96367 TX/PROPH/DG ADDL SEQ IV INF: CPT

## 2021-01-01 PROCEDURE — 99231 SBSQ HOSP IP/OBS SF/LOW 25: CPT | Performed by: STUDENT IN AN ORGANIZED HEALTH CARE EDUCATION/TRAINING PROGRAM

## 2021-01-01 PROCEDURE — 99222 1ST HOSP IP/OBS MODERATE 55: CPT | Performed by: INTERNAL MEDICINE

## 2021-01-01 PROCEDURE — 86901 BLOOD TYPING SEROLOGIC RH(D): CPT

## 2021-01-01 PROCEDURE — 3700000001 HC ADD 15 MINUTES (ANESTHESIA): Performed by: STUDENT IN AN ORGANIZED HEALTH CARE EDUCATION/TRAINING PROGRAM

## 2021-01-01 PROCEDURE — 97116 GAIT TRAINING THERAPY: CPT

## 2021-01-01 PROCEDURE — 89051 BODY FLUID CELL COUNT: CPT

## 2021-01-01 PROCEDURE — 2580000003 HC RX 258: Performed by: EMERGENCY MEDICINE

## 2021-01-01 PROCEDURE — 82042 OTHER SOURCE ALBUMIN QUAN EA: CPT

## 2021-01-01 PROCEDURE — 0DBP8ZX EXCISION OF RECTUM, VIA NATURAL OR ARTIFICIAL OPENING ENDOSCOPIC, DIAGNOSTIC: ICD-10-PCS | Performed by: STUDENT IN AN ORGANIZED HEALTH CARE EDUCATION/TRAINING PROGRAM

## 2021-01-01 PROCEDURE — 85045 AUTOMATED RETICULOCYTE COUNT: CPT

## 2021-01-01 RX ORDER — SODIUM CHLORIDE 9 MG/ML
25 INJECTION, SOLUTION INTRAVENOUS PRN
Status: DISCONTINUED | OUTPATIENT
Start: 2021-01-01 | End: 2021-01-01 | Stop reason: HOSPADM

## 2021-01-01 RX ORDER — SODIUM CHLORIDE 0.9 % (FLUSH) 0.9 %
5-40 SYRINGE (ML) INJECTION PRN
Status: CANCELLED | OUTPATIENT
Start: 2021-01-01

## 2021-01-01 RX ORDER — LORAZEPAM 1 MG/1
3 TABLET ORAL
Status: DISCONTINUED | OUTPATIENT
Start: 2021-01-01 | End: 2021-01-01

## 2021-01-01 RX ORDER — ERGOCALCIFEROL 1.25 MG/1
50000 CAPSULE ORAL WEEKLY
Qty: 5 CAPSULE | Refills: 0 | Status: SHIPPED | OUTPATIENT
Start: 2021-01-01

## 2021-01-01 RX ORDER — POTASSIUM CHLORIDE 20 MEQ/1
40 TABLET, EXTENDED RELEASE ORAL PRN
Status: DISCONTINUED | OUTPATIENT
Start: 2021-01-01 | End: 2021-01-01

## 2021-01-01 RX ORDER — PROPOFOL 10 MG/ML
INJECTION, EMULSION INTRAVENOUS CONTINUOUS PRN
Status: DISCONTINUED | OUTPATIENT
Start: 2021-01-01 | End: 2021-01-01 | Stop reason: SDUPTHER

## 2021-01-01 RX ORDER — LACTULOSE 10 G/15ML
20 SOLUTION ORAL ONCE
Status: COMPLETED | OUTPATIENT
Start: 2021-01-01 | End: 2021-01-01

## 2021-01-01 RX ORDER — LANOLIN ALCOHOL/MO/W.PET/CERES
2000 CREAM (GRAM) TOPICAL DAILY
Status: DISCONTINUED | OUTPATIENT
Start: 2021-01-01 | End: 2021-01-01 | Stop reason: HOSPADM

## 2021-01-01 RX ORDER — LORAZEPAM 0.5 MG/1
0.5 TABLET ORAL EVERY 4 HOURS PRN
Status: DISCONTINUED | OUTPATIENT
Start: 2021-01-01 | End: 2021-01-01 | Stop reason: HOSPADM

## 2021-01-01 RX ORDER — MAGNESIUM SULFATE IN WATER 40 MG/ML
2000 INJECTION, SOLUTION INTRAVENOUS PRN
Status: DISCONTINUED | OUTPATIENT
Start: 2021-01-01 | End: 2021-01-01 | Stop reason: HOSPADM

## 2021-01-01 RX ORDER — LACTULOSE 10 G/15ML
20 SOLUTION ORAL EVERY 12 HOURS
COMMUNITY

## 2021-01-01 RX ORDER — POTASSIUM CHLORIDE 7.45 MG/ML
10 INJECTION INTRAVENOUS ONCE
Status: COMPLETED | OUTPATIENT
Start: 2021-01-01 | End: 2021-01-01

## 2021-01-01 RX ORDER — HEPARIN SODIUM (PORCINE) LOCK FLUSH IV SOLN 100 UNIT/ML 100 UNIT/ML
500 SOLUTION INTRAVENOUS PRN
Status: CANCELLED | OUTPATIENT
Start: 2021-01-01

## 2021-01-01 RX ORDER — SODIUM CHLORIDE 9 MG/ML
25 INJECTION, SOLUTION INTRAVENOUS PRN
Status: DISCONTINUED | OUTPATIENT
Start: 2021-01-01 | End: 2021-01-01 | Stop reason: SDUPTHER

## 2021-01-01 RX ORDER — SODIUM CHLORIDE 0.9 % (FLUSH) 0.9 %
10 SYRINGE (ML) INJECTION PRN
Status: DISCONTINUED | OUTPATIENT
Start: 2021-01-01 | End: 2021-01-01 | Stop reason: SDUPTHER

## 2021-01-01 RX ORDER — MORPHINE SULFATE 5 MG/ML
5 INJECTION, SOLUTION INTRAMUSCULAR; INTRAVENOUS ONCE
Status: COMPLETED | OUTPATIENT
Start: 2021-01-01 | End: 2021-01-01

## 2021-01-01 RX ORDER — SODIUM CHLORIDE 9 MG/ML
INJECTION, SOLUTION INTRAVENOUS CONTINUOUS PRN
Status: DISCONTINUED | OUTPATIENT
Start: 2021-01-01 | End: 2021-01-01 | Stop reason: SDUPTHER

## 2021-01-01 RX ORDER — ONDANSETRON 4 MG/1
4 TABLET, ORALLY DISINTEGRATING ORAL EVERY 8 HOURS PRN
Status: DISCONTINUED | OUTPATIENT
Start: 2021-01-01 | End: 2021-01-01 | Stop reason: HOSPADM

## 2021-01-01 RX ORDER — LORAZEPAM 2 MG/ML
4 INJECTION INTRAMUSCULAR
Status: DISCONTINUED | OUTPATIENT
Start: 2021-01-01 | End: 2021-01-01 | Stop reason: HOSPADM

## 2021-01-01 RX ORDER — LANOLIN ALCOHOL/MO/W.PET/CERES
25 CREAM (GRAM) TOPICAL DAILY
Status: DISCONTINUED | OUTPATIENT
Start: 2021-01-01 | End: 2021-01-01 | Stop reason: HOSPADM

## 2021-01-01 RX ORDER — LORAZEPAM 2 MG/ML
1 INJECTION INTRAMUSCULAR
Status: DISCONTINUED | OUTPATIENT
Start: 2021-01-01 | End: 2021-01-01 | Stop reason: HOSPADM

## 2021-01-01 RX ORDER — ACETAMINOPHEN 650 MG/1
650 SUPPOSITORY RECTAL EVERY 6 HOURS PRN
Status: DISCONTINUED | OUTPATIENT
Start: 2021-01-01 | End: 2021-01-01 | Stop reason: HOSPADM

## 2021-01-01 RX ORDER — POTASSIUM CHLORIDE 20 MEQ/1
40 TABLET, EXTENDED RELEASE ORAL ONCE
Status: COMPLETED | OUTPATIENT
Start: 2021-01-01 | End: 2021-01-01

## 2021-01-01 RX ORDER — M-VIT,TX,IRON,MINS/CALC/FOLIC 27MG-0.4MG
1 TABLET ORAL DAILY
Status: DISCONTINUED | OUTPATIENT
Start: 2021-01-01 | End: 2021-01-01 | Stop reason: HOSPADM

## 2021-01-01 RX ORDER — SODIUM CHLORIDE 9 MG/ML
25 INJECTION, SOLUTION INTRAVENOUS PRN
Status: CANCELLED | OUTPATIENT
Start: 2021-01-01

## 2021-01-01 RX ORDER — SODIUM CHLORIDE 9 MG/ML
10 INJECTION INTRAVENOUS 2 TIMES DAILY
Status: DISCONTINUED | OUTPATIENT
Start: 2021-01-01 | End: 2021-01-01 | Stop reason: HOSPADM

## 2021-01-01 RX ORDER — SODIUM CHLORIDE 0.9 % (FLUSH) 0.9 %
5-40 SYRINGE (ML) INJECTION EVERY 12 HOURS SCHEDULED
Status: DISCONTINUED | OUTPATIENT
Start: 2021-01-01 | End: 2021-01-01 | Stop reason: SDUPTHER

## 2021-01-01 RX ORDER — MAGNESIUM SULFATE IN WATER 40 MG/ML
2000 INJECTION, SOLUTION INTRAVENOUS ONCE
Status: COMPLETED | OUTPATIENT
Start: 2021-01-01 | End: 2021-01-01

## 2021-01-01 RX ORDER — SODIUM CHLORIDE 9 MG/ML
25 INJECTION, SOLUTION INTRAVENOUS PRN
Status: DISCONTINUED | OUTPATIENT
Start: 2021-01-01 | End: 2021-01-01

## 2021-01-01 RX ORDER — CEFDINIR 300 MG/1
300 CAPSULE ORAL 2 TIMES DAILY
Qty: 14 CAPSULE | Refills: 0 | Status: ON HOLD | OUTPATIENT
Start: 2021-01-01 | End: 2021-01-01 | Stop reason: SDUPTHER

## 2021-01-01 RX ORDER — GABAPENTIN 300 MG/1
300 CAPSULE ORAL 3 TIMES DAILY
Status: DISCONTINUED | OUTPATIENT
Start: 2021-01-01 | End: 2021-01-01

## 2021-01-01 RX ORDER — SODIUM CHLORIDE 9 MG/ML
INJECTION, SOLUTION INTRAVENOUS CONTINUOUS
Status: DISCONTINUED | OUTPATIENT
Start: 2021-01-01 | End: 2021-01-01

## 2021-01-01 RX ORDER — LIDOCAINE HYDROCHLORIDE 10 MG/ML
5 INJECTION, SOLUTION EPIDURAL; INFILTRATION; INTRACAUDAL; PERINEURAL ONCE
Status: DISCONTINUED | OUTPATIENT
Start: 2021-01-01 | End: 2021-01-01 | Stop reason: HOSPADM

## 2021-01-01 RX ORDER — POTASSIUM CHLORIDE 7.45 MG/ML
10 INJECTION INTRAVENOUS
Status: COMPLETED | OUTPATIENT
Start: 2021-01-01 | End: 2021-01-01

## 2021-01-01 RX ORDER — POTASSIUM CHLORIDE 20 MEQ/1
40 TABLET, EXTENDED RELEASE ORAL PRN
Status: DISCONTINUED | OUTPATIENT
Start: 2021-01-01 | End: 2021-01-01 | Stop reason: HOSPADM

## 2021-01-01 RX ORDER — LACTULOSE 10 G/15ML
20 SOLUTION ORAL EVERY 12 HOURS
Status: DISCONTINUED | OUTPATIENT
Start: 2021-01-01 | End: 2021-01-01

## 2021-01-01 RX ORDER — SODIUM CHLORIDE 9 MG/ML
INJECTION, SOLUTION INTRAVENOUS PRN
Status: DISCONTINUED | OUTPATIENT
Start: 2021-01-01 | End: 2021-01-01 | Stop reason: HOSPADM

## 2021-01-01 RX ORDER — OXYCODONE HYDROCHLORIDE AND ACETAMINOPHEN 5; 325 MG/1; MG/1
1 TABLET ORAL ONCE
Status: COMPLETED | OUTPATIENT
Start: 2021-01-01 | End: 2021-01-01

## 2021-01-01 RX ORDER — ACETAMINOPHEN 325 MG/1
650 TABLET ORAL EVERY 6 HOURS PRN
Status: DISCONTINUED | OUTPATIENT
Start: 2021-01-01 | End: 2021-01-01

## 2021-01-01 RX ORDER — GAUZE BANDAGE 2" X 2"
100 BANDAGE TOPICAL DAILY
Status: DISCONTINUED | OUTPATIENT
Start: 2021-01-01 | End: 2021-01-01 | Stop reason: HOSPADM

## 2021-01-01 RX ORDER — OXYCODONE HYDROCHLORIDE 5 MG/1
5 TABLET ORAL ONCE
Status: COMPLETED | OUTPATIENT
Start: 2021-01-01 | End: 2021-01-01

## 2021-01-01 RX ORDER — LEVOTHYROXINE SODIUM 0.1 MG/1
100 TABLET ORAL DAILY
Qty: 30 TABLET | Refills: 3 | Status: SHIPPED | OUTPATIENT
Start: 2021-01-01 | End: 2021-01-01

## 2021-01-01 RX ORDER — POTASSIUM CHLORIDE 7.45 MG/ML
10 INJECTION INTRAVENOUS ONCE
Status: DISCONTINUED | OUTPATIENT
Start: 2021-01-01 | End: 2021-01-01 | Stop reason: HOSPADM

## 2021-01-01 RX ORDER — PANTOPRAZOLE SODIUM 40 MG/10ML
40 INJECTION, POWDER, LYOPHILIZED, FOR SOLUTION INTRAVENOUS 2 TIMES DAILY
Status: DISCONTINUED | OUTPATIENT
Start: 2021-01-01 | End: 2021-01-01

## 2021-01-01 RX ORDER — SODIUM CHLORIDE 0.9 % (FLUSH) 0.9 %
5-40 SYRINGE (ML) INJECTION EVERY 12 HOURS SCHEDULED
Status: DISCONTINUED | OUTPATIENT
Start: 2021-01-01 | End: 2021-01-01 | Stop reason: HOSPADM

## 2021-01-01 RX ORDER — GAUZE BANDAGE 2" X 2"
100 BANDAGE TOPICAL DAILY
Status: DISCONTINUED | OUTPATIENT
Start: 2021-01-01 | End: 2021-01-01 | Stop reason: SDUPTHER

## 2021-01-01 RX ORDER — MAGNESIUM OXIDE 400 MG/1
400 TABLET ORAL DAILY
Status: DISCONTINUED | OUTPATIENT
Start: 2021-01-01 | End: 2021-01-01 | Stop reason: HOSPADM

## 2021-01-01 RX ORDER — LORAZEPAM 1 MG/1
2 TABLET ORAL
Status: DISCONTINUED | OUTPATIENT
Start: 2021-01-01 | End: 2021-01-01

## 2021-01-01 RX ORDER — POTASSIUM CHLORIDE 750 MG/1
20 TABLET, EXTENDED RELEASE ORAL DAILY
COMMUNITY

## 2021-01-01 RX ORDER — LORAZEPAM 2 MG/ML
4 INJECTION INTRAMUSCULAR
Status: DISCONTINUED | OUTPATIENT
Start: 2021-01-01 | End: 2021-01-01

## 2021-01-01 RX ORDER — MIRTAZAPINE 15 MG/1
15 TABLET, FILM COATED ORAL NIGHTLY
COMMUNITY
End: 2021-01-01 | Stop reason: ALTCHOICE

## 2021-01-01 RX ORDER — LACTULOSE 10 G/15ML
20 SOLUTION ORAL
Status: DISCONTINUED | OUTPATIENT
Start: 2021-01-01 | End: 2021-01-01 | Stop reason: HOSPADM

## 2021-01-01 RX ORDER — POTASSIUM CHLORIDE 20 MEQ/1
40 TABLET, EXTENDED RELEASE ORAL ONCE
Status: DISCONTINUED | OUTPATIENT
Start: 2021-01-01 | End: 2021-01-01 | Stop reason: HOSPADM

## 2021-01-01 RX ORDER — 0.9 % SODIUM CHLORIDE 0.9 %
1000 INTRAVENOUS SOLUTION INTRAVENOUS ONCE
Status: COMPLETED | OUTPATIENT
Start: 2021-01-01 | End: 2021-01-01

## 2021-01-01 RX ORDER — MAGNESIUM OXIDE 400 MG/1
400 TABLET ORAL DAILY
COMMUNITY

## 2021-01-01 RX ORDER — ERGOCALCIFEROL 1.25 MG/1
50000 CAPSULE ORAL WEEKLY
Status: DISCONTINUED | OUTPATIENT
Start: 2021-01-01 | End: 2021-01-01 | Stop reason: HOSPADM

## 2021-01-01 RX ORDER — POTASSIUM CHLORIDE 7.45 MG/ML
10 INJECTION INTRAVENOUS ONCE
Status: DISCONTINUED | OUTPATIENT
Start: 2021-01-01 | End: 2021-01-01 | Stop reason: SDUPTHER

## 2021-01-01 RX ORDER — PROPRANOLOL HYDROCHLORIDE 20 MG/1
20 TABLET ORAL EVERY 12 HOURS
Status: DISCONTINUED | OUTPATIENT
Start: 2021-01-01 | End: 2021-01-01 | Stop reason: HOSPADM

## 2021-01-01 RX ORDER — LACTULOSE 10 G/15ML
10 SOLUTION ORAL EVERY 12 HOURS
Status: DISCONTINUED | OUTPATIENT
Start: 2021-01-01 | End: 2021-01-01 | Stop reason: HOSPADM

## 2021-01-01 RX ORDER — FOLIC ACID 1 MG/1
1 TABLET ORAL DAILY
Status: DISCONTINUED | OUTPATIENT
Start: 2021-01-01 | End: 2021-01-01 | Stop reason: HOSPADM

## 2021-01-01 RX ORDER — PREDNISONE 20 MG/1
40 TABLET ORAL DAILY
Status: ON HOLD | COMMUNITY
End: 2021-01-01 | Stop reason: HOSPADM

## 2021-01-01 RX ORDER — SODIUM CHLORIDE 0.9 % (FLUSH) 0.9 %
10 SYRINGE (ML) INJECTION PRN
Status: COMPLETED | OUTPATIENT
Start: 2021-01-01 | End: 2021-01-01

## 2021-01-01 RX ORDER — PANTOPRAZOLE SODIUM 40 MG/1
40 TABLET, DELAYED RELEASE ORAL
Status: DISCONTINUED | OUTPATIENT
Start: 2021-01-01 | End: 2021-01-01 | Stop reason: HOSPADM

## 2021-01-01 RX ORDER — LORAZEPAM 2 MG/ML
2 INJECTION INTRAMUSCULAR
Status: DISCONTINUED | OUTPATIENT
Start: 2021-01-01 | End: 2021-01-01

## 2021-01-01 RX ORDER — CLONIDINE HYDROCHLORIDE 0.1 MG/1
0.1 TABLET ORAL DAILY
Status: DISCONTINUED | OUTPATIENT
Start: 2021-01-01 | End: 2021-01-01 | Stop reason: HOSPADM

## 2021-01-01 RX ORDER — DOXYCYCLINE HYCLATE 100 MG/1
100 CAPSULE ORAL EVERY 12 HOURS SCHEDULED
Qty: 20 CAPSULE | Refills: 0 | Status: SHIPPED | OUTPATIENT
Start: 2021-01-01 | End: 2021-01-01

## 2021-01-01 RX ORDER — SODIUM CHLORIDE 9 MG/ML
INJECTION, SOLUTION INTRAVENOUS PRN
Status: COMPLETED | OUTPATIENT
Start: 2021-01-01 | End: 2021-01-01

## 2021-01-01 RX ORDER — ONDANSETRON 2 MG/ML
4 INJECTION INTRAMUSCULAR; INTRAVENOUS ONCE
Status: COMPLETED | OUTPATIENT
Start: 2021-01-01 | End: 2021-01-01

## 2021-01-01 RX ORDER — LEVOTHYROXINE SODIUM 0.1 MG/1
100 TABLET ORAL DAILY
Status: DISCONTINUED | OUTPATIENT
Start: 2021-01-01 | End: 2021-01-01 | Stop reason: HOSPADM

## 2021-01-01 RX ORDER — ONDANSETRON 2 MG/ML
4 INJECTION INTRAMUSCULAR; INTRAVENOUS EVERY 6 HOURS PRN
Status: DISCONTINUED | OUTPATIENT
Start: 2021-01-01 | End: 2021-01-01

## 2021-01-01 RX ORDER — PROPRANOLOL HYDROCHLORIDE 10 MG/1
10 TABLET ORAL EVERY 12 HOURS
Status: DISCONTINUED | OUTPATIENT
Start: 2021-01-01 | End: 2021-01-01 | Stop reason: HOSPADM

## 2021-01-01 RX ORDER — FENTANYL CITRATE 50 UG/ML
50 INJECTION, SOLUTION INTRAMUSCULAR; INTRAVENOUS ONCE
Status: COMPLETED | OUTPATIENT
Start: 2021-01-01 | End: 2021-01-01

## 2021-01-01 RX ORDER — SPIRONOLACTONE 25 MG/1
25 TABLET ORAL DAILY
Status: DISCONTINUED | OUTPATIENT
Start: 2021-01-01 | End: 2021-01-01 | Stop reason: HOSPADM

## 2021-01-01 RX ORDER — MAGNESIUM SULFATE 1 G/100ML
1000 INJECTION INTRAVENOUS PRN
Status: DISCONTINUED | OUTPATIENT
Start: 2021-01-01 | End: 2021-01-01

## 2021-01-01 RX ORDER — SPIRONOLACTONE 25 MG/1
25 TABLET ORAL 2 TIMES DAILY
Status: DISCONTINUED | OUTPATIENT
Start: 2021-01-01 | End: 2021-01-01 | Stop reason: HOSPADM

## 2021-01-01 RX ORDER — LORAZEPAM 1 MG/1
1 TABLET ORAL
Status: DISCONTINUED | OUTPATIENT
Start: 2021-01-01 | End: 2021-01-01

## 2021-01-01 RX ORDER — SODIUM CHLORIDE, SODIUM LACTATE, POTASSIUM CHLORIDE, AND CALCIUM CHLORIDE .6; .31; .03; .02 G/100ML; G/100ML; G/100ML; G/100ML
1000 INJECTION, SOLUTION INTRAVENOUS ONCE
Status: COMPLETED | OUTPATIENT
Start: 2021-01-01 | End: 2021-01-01

## 2021-01-01 RX ORDER — SODIUM CHLORIDE 0.9 % (FLUSH) 0.9 %
5-40 SYRINGE (ML) INJECTION PRN
Status: DISCONTINUED | OUTPATIENT
Start: 2021-01-01 | End: 2021-01-01 | Stop reason: SDUPTHER

## 2021-01-01 RX ORDER — HEPARIN SODIUM (PORCINE) LOCK FLUSH IV SOLN 100 UNIT/ML 100 UNIT/ML
3 SOLUTION INTRAVENOUS PRN
Status: DISCONTINUED | OUTPATIENT
Start: 2021-01-01 | End: 2021-01-01

## 2021-01-01 RX ORDER — HYDROCODONE BITARTRATE AND ACETAMINOPHEN 5; 325 MG/1; MG/1
1 TABLET ORAL EVERY 6 HOURS PRN
Status: DISCONTINUED | OUTPATIENT
Start: 2021-01-01 | End: 2021-01-01 | Stop reason: HOSPADM

## 2021-01-01 RX ORDER — LORAZEPAM 2 MG/ML
2 INJECTION INTRAMUSCULAR
Status: DISCONTINUED | OUTPATIENT
Start: 2021-01-01 | End: 2021-01-01 | Stop reason: HOSPADM

## 2021-01-01 RX ORDER — LACTULOSE 10 G/15ML
20 SOLUTION ORAL 3 TIMES DAILY
Status: DISCONTINUED | OUTPATIENT
Start: 2021-01-01 | End: 2021-01-01

## 2021-01-01 RX ORDER — THIAMINE MONONITRATE (VIT B1) 100 MG
100 TABLET ORAL DAILY
COMMUNITY

## 2021-01-01 RX ORDER — DOXYCYCLINE HYCLATE 100 MG/1
100 CAPSULE ORAL EVERY 12 HOURS SCHEDULED
Status: DISCONTINUED | OUTPATIENT
Start: 2021-01-01 | End: 2021-01-01 | Stop reason: HOSPADM

## 2021-01-01 RX ORDER — PROPRANOLOL HYDROCHLORIDE 10 MG/1
20 TABLET ORAL EVERY 12 HOURS
COMMUNITY

## 2021-01-01 RX ORDER — SODIUM CHLORIDE 0.9 % (FLUSH) 0.9 %
5-40 SYRINGE (ML) INJECTION EVERY 12 HOURS SCHEDULED
Status: CANCELLED | OUTPATIENT
Start: 2021-01-01

## 2021-01-01 RX ORDER — SODIUM CHLORIDE 0.9 % (FLUSH) 0.9 %
5-40 SYRINGE (ML) INJECTION PRN
Status: DISCONTINUED | OUTPATIENT
Start: 2021-01-01 | End: 2021-01-01 | Stop reason: HOSPADM

## 2021-01-01 RX ORDER — SODIUM CHLORIDE 0.9 % (FLUSH) 0.9 %
10 SYRINGE (ML) INJECTION EVERY 12 HOURS SCHEDULED
Status: DISCONTINUED | OUTPATIENT
Start: 2021-01-01 | End: 2021-01-01 | Stop reason: HOSPADM

## 2021-01-01 RX ORDER — ONDANSETRON 4 MG/1
4 TABLET, ORALLY DISINTEGRATING ORAL EVERY 8 HOURS PRN
Qty: 10 TABLET | Refills: 0 | Status: SHIPPED | OUTPATIENT
Start: 2021-01-01 | End: 2021-01-01

## 2021-01-01 RX ORDER — LORAZEPAM 1 MG/1
2 TABLET ORAL
Status: DISCONTINUED | OUTPATIENT
Start: 2021-01-01 | End: 2021-01-01 | Stop reason: HOSPADM

## 2021-01-01 RX ORDER — LORAZEPAM 1 MG/1
4 TABLET ORAL
Status: DISCONTINUED | OUTPATIENT
Start: 2021-01-01 | End: 2021-01-01 | Stop reason: HOSPADM

## 2021-01-01 RX ORDER — HEPARIN SODIUM (PORCINE) LOCK FLUSH IV SOLN 100 UNIT/ML 100 UNIT/ML
500 SOLUTION INTRAVENOUS PRN
Status: DISCONTINUED | OUTPATIENT
Start: 2021-01-01 | End: 2021-01-01 | Stop reason: HOSPADM

## 2021-01-01 RX ORDER — POTASSIUM CHLORIDE 7.45 MG/ML
10 INJECTION INTRAVENOUS PRN
Status: DISCONTINUED | OUTPATIENT
Start: 2021-01-01 | End: 2021-01-01 | Stop reason: HOSPADM

## 2021-01-01 RX ORDER — MORPHINE SULFATE 2 MG/ML
2 INJECTION, SOLUTION INTRAMUSCULAR; INTRAVENOUS EVERY 4 HOURS PRN
Status: DISCONTINUED | OUTPATIENT
Start: 2021-01-01 | End: 2021-01-01 | Stop reason: HOSPADM

## 2021-01-01 RX ORDER — PANTOPRAZOLE SODIUM 40 MG/10ML
40 INJECTION, POWDER, LYOPHILIZED, FOR SOLUTION INTRAVENOUS 2 TIMES DAILY
Status: DISCONTINUED | OUTPATIENT
Start: 2021-01-01 | End: 2021-01-01 | Stop reason: HOSPADM

## 2021-01-01 RX ORDER — LIDOCAINE HYDROCHLORIDE 20 MG/ML
INJECTION, SOLUTION INFILTRATION; PERINEURAL PRN
Status: DISCONTINUED | OUTPATIENT
Start: 2021-01-01 | End: 2021-01-01 | Stop reason: SDUPTHER

## 2021-01-01 RX ORDER — CEFDINIR 300 MG/1
300 CAPSULE ORAL 2 TIMES DAILY
Qty: 14 CAPSULE | Refills: 0 | Status: SHIPPED | OUTPATIENT
Start: 2021-01-01 | End: 2021-01-01

## 2021-01-01 RX ORDER — LACTULOSE 10 G/15ML
30 SOLUTION ORAL 3 TIMES DAILY
Status: DISCONTINUED | OUTPATIENT
Start: 2021-01-01 | End: 2021-01-01 | Stop reason: HOSPADM

## 2021-01-01 RX ORDER — PANTOPRAZOLE SODIUM 40 MG/1
40 TABLET, DELAYED RELEASE ORAL 2 TIMES DAILY
Status: DISCONTINUED | OUTPATIENT
Start: 2021-01-01 | End: 2021-01-01

## 2021-01-01 RX ORDER — KETOROLAC TROMETHAMINE 15 MG/ML
15 INJECTION, SOLUTION INTRAMUSCULAR; INTRAVENOUS ONCE
Status: COMPLETED | OUTPATIENT
Start: 2021-01-01 | End: 2021-01-01

## 2021-01-01 RX ORDER — PYRIDOXINE HCL (VITAMIN B6) 25 MG
25 TABLET ORAL DAILY
Qty: 30 TABLET | Refills: 3 | Status: SHIPPED | OUTPATIENT
Start: 2021-01-01

## 2021-01-01 RX ORDER — SODIUM CHLORIDE, SODIUM LACTATE, POTASSIUM CHLORIDE, CALCIUM CHLORIDE 600; 310; 30; 20 MG/100ML; MG/100ML; MG/100ML; MG/100ML
INJECTION, SOLUTION INTRAVENOUS CONTINUOUS PRN
Status: DISCONTINUED | OUTPATIENT
Start: 2021-01-01 | End: 2021-01-01 | Stop reason: SDUPTHER

## 2021-01-01 RX ORDER — LORAZEPAM 1 MG/1
4 TABLET ORAL
Status: DISCONTINUED | OUTPATIENT
Start: 2021-01-01 | End: 2021-01-01

## 2021-01-01 RX ORDER — PANTOPRAZOLE SODIUM 40 MG/1
40 TABLET, DELAYED RELEASE ORAL DAILY
Status: DISCONTINUED | OUTPATIENT
Start: 2021-01-01 | End: 2021-01-01

## 2021-01-01 RX ORDER — 0.9 % SODIUM CHLORIDE 0.9 %
500 INTRAVENOUS SOLUTION INTRAVENOUS ONCE
Status: COMPLETED | OUTPATIENT
Start: 2021-01-01 | End: 2021-01-01

## 2021-01-01 RX ORDER — LORAZEPAM 2 MG/ML
3 INJECTION INTRAMUSCULAR
Status: DISCONTINUED | OUTPATIENT
Start: 2021-01-01 | End: 2021-01-01 | Stop reason: HOSPADM

## 2021-01-01 RX ORDER — CLONIDINE HYDROCHLORIDE 0.1 MG/1
0.1 TABLET ORAL DAILY
COMMUNITY

## 2021-01-01 RX ORDER — LORAZEPAM 2 MG/ML
1 INJECTION INTRAMUSCULAR EVERY 6 HOURS PRN
Status: DISCONTINUED | OUTPATIENT
Start: 2021-01-01 | End: 2021-01-01 | Stop reason: HOSPADM

## 2021-01-01 RX ORDER — HYDROCODONE BITARTRATE AND ACETAMINOPHEN 5; 325 MG/1; MG/1
1 TABLET ORAL EVERY 6 HOURS PRN
Qty: 8 TABLET | Refills: 0 | Status: ON HOLD | OUTPATIENT
Start: 2021-01-01 | End: 2021-01-01 | Stop reason: HOSPADM

## 2021-01-01 RX ORDER — HYDROXYZINE HYDROCHLORIDE 25 MG/1
25 TABLET, FILM COATED ORAL ONCE
Status: COMPLETED | OUTPATIENT
Start: 2021-01-01 | End: 2021-01-01

## 2021-01-01 RX ORDER — POLYETHYLENE GLYCOL 3350 17 G/17G
17 POWDER, FOR SOLUTION ORAL DAILY PRN
Status: DISCONTINUED | OUTPATIENT
Start: 2021-01-01 | End: 2021-01-01 | Stop reason: HOSPADM

## 2021-01-01 RX ORDER — LACTULOSE 10 G/15ML
30 SOLUTION ORAL 3 TIMES DAILY
Qty: 4050 ML | Refills: 0 | Status: SHIPPED | OUTPATIENT
Start: 2021-01-01 | End: 2021-01-01

## 2021-01-01 RX ORDER — LORAZEPAM 1 MG/1
3 TABLET ORAL
Status: DISCONTINUED | OUTPATIENT
Start: 2021-01-01 | End: 2021-01-01 | Stop reason: HOSPADM

## 2021-01-01 RX ORDER — PROPOFOL 10 MG/ML
INJECTION, EMULSION INTRAVENOUS PRN
Status: DISCONTINUED | OUTPATIENT
Start: 2021-01-01 | End: 2021-01-01 | Stop reason: SDUPTHER

## 2021-01-01 RX ORDER — ONDANSETRON 2 MG/ML
4 INJECTION INTRAMUSCULAR; INTRAVENOUS EVERY 6 HOURS PRN
Status: DISCONTINUED | OUTPATIENT
Start: 2021-01-01 | End: 2021-01-01 | Stop reason: HOSPADM

## 2021-01-01 RX ORDER — ALBUMIN (HUMAN) 12.5 G/50ML
50 SOLUTION INTRAVENOUS ONCE
Status: COMPLETED | OUTPATIENT
Start: 2021-01-01 | End: 2021-01-01

## 2021-01-01 RX ORDER — MAGNESIUM SULFATE 1 G/100ML
1000 INJECTION INTRAVENOUS PRN
Status: DISCONTINUED | OUTPATIENT
Start: 2021-01-01 | End: 2021-01-01 | Stop reason: HOSPADM

## 2021-01-01 RX ORDER — POTASSIUM CHLORIDE 20 MEQ/1
20 TABLET, EXTENDED RELEASE ORAL 2 TIMES DAILY
Status: DISCONTINUED | OUTPATIENT
Start: 2021-01-01 | End: 2021-01-01 | Stop reason: HOSPADM

## 2021-01-01 RX ORDER — SULFAMETHOXAZOLE AND TRIMETHOPRIM 800; 160 MG/1; MG/1
1 TABLET ORAL
Status: ON HOLD | COMMUNITY
End: 2021-01-01 | Stop reason: HOSPADM

## 2021-01-01 RX ORDER — PROMETHAZINE HYDROCHLORIDE 25 MG/1
12.5 TABLET ORAL EVERY 6 HOURS PRN
Status: DISCONTINUED | OUTPATIENT
Start: 2021-01-01 | End: 2021-01-01 | Stop reason: HOSPADM

## 2021-01-01 RX ORDER — ACETAMINOPHEN 325 MG/1
650 TABLET ORAL EVERY 6 HOURS PRN
Status: DISCONTINUED | OUTPATIENT
Start: 2021-01-01 | End: 2021-01-01 | Stop reason: HOSPADM

## 2021-01-01 RX ORDER — SODIUM CHLORIDE 9 MG/ML
10 INJECTION INTRAVENOUS 2 TIMES DAILY
Status: DISCONTINUED | OUTPATIENT
Start: 2021-01-01 | End: 2021-01-01

## 2021-01-01 RX ORDER — LORAZEPAM 2 MG/ML
1 INJECTION INTRAMUSCULAR
Status: DISCONTINUED | OUTPATIENT
Start: 2021-01-01 | End: 2021-01-01

## 2021-01-01 RX ORDER — SPIRONOLACTONE 25 MG/1
25 TABLET ORAL DAILY
Qty: 30 TABLET | Refills: 3 | Status: SHIPPED | OUTPATIENT
Start: 2021-01-01

## 2021-01-01 RX ORDER — HEPARIN SODIUM (PORCINE) LOCK FLUSH IV SOLN 100 UNIT/ML 100 UNIT/ML
3 SOLUTION INTRAVENOUS EVERY 12 HOURS SCHEDULED
Status: DISCONTINUED | OUTPATIENT
Start: 2021-01-01 | End: 2021-01-01

## 2021-01-01 RX ORDER — POTASSIUM CHLORIDE 7.45 MG/ML
10 INJECTION INTRAVENOUS PRN
Status: DISCONTINUED | OUTPATIENT
Start: 2021-01-01 | End: 2021-01-01

## 2021-01-01 RX ORDER — MIRTAZAPINE 15 MG/1
15 TABLET, FILM COATED ORAL NIGHTLY
Status: DISCONTINUED | OUTPATIENT
Start: 2021-01-01 | End: 2021-01-01 | Stop reason: HOSPADM

## 2021-01-01 RX ORDER — LACTULOSE 10 G/15ML
20 SOLUTION ORAL ONCE
Status: DISCONTINUED | OUTPATIENT
Start: 2021-01-01 | End: 2021-01-01

## 2021-01-01 RX ORDER — HEPARIN SODIUM 5000 [USP'U]/ML
5000 INJECTION, SOLUTION INTRAVENOUS; SUBCUTANEOUS EVERY 8 HOURS SCHEDULED
Status: DISCONTINUED | OUTPATIENT
Start: 2021-01-01 | End: 2021-01-01 | Stop reason: ALTCHOICE

## 2021-01-01 RX ORDER — ACETAMINOPHEN 650 MG/1
650 SUPPOSITORY RECTAL EVERY 6 HOURS PRN
Status: DISCONTINUED | OUTPATIENT
Start: 2021-01-01 | End: 2021-01-01

## 2021-01-01 RX ORDER — LORAZEPAM 1 MG/1
1 TABLET ORAL
Status: DISCONTINUED | OUTPATIENT
Start: 2021-01-01 | End: 2021-01-01 | Stop reason: HOSPADM

## 2021-01-01 RX ORDER — LORAZEPAM 2 MG/ML
3 INJECTION INTRAMUSCULAR
Status: DISCONTINUED | OUTPATIENT
Start: 2021-01-01 | End: 2021-01-01

## 2021-01-01 RX ORDER — POLYETHYLENE GLYCOL 3350 17 G/17G
17 POWDER, FOR SOLUTION ORAL DAILY PRN
Status: DISCONTINUED | OUTPATIENT
Start: 2021-01-01 | End: 2021-01-01

## 2021-01-01 RX ADMIN — SPIRONOLACTONE 25 MG: 25 TABLET ORAL at 09:24

## 2021-01-01 RX ADMIN — VANCOMYCIN HYDROCHLORIDE 1000 MG: 1 INJECTION, POWDER, LYOPHILIZED, FOR SOLUTION INTRAVENOUS at 14:00

## 2021-01-01 RX ADMIN — RIFAXIMIN 550 MG: 550 TABLET ORAL at 20:04

## 2021-01-01 RX ADMIN — Medication 25 MG: at 08:47

## 2021-01-01 RX ADMIN — Medication 10 ML: at 09:56

## 2021-01-01 RX ADMIN — MAGNESIUM OXIDE 400 MG: 400 TABLET ORAL at 08:34

## 2021-01-01 RX ADMIN — RIFAXIMIN 550 MG: 550 TABLET ORAL at 08:45

## 2021-01-01 RX ADMIN — LACTULOSE 20 G: 20 SOLUTION ORAL at 20:46

## 2021-01-01 RX ADMIN — RIFAXIMIN 550 MG: 550 TABLET ORAL at 20:46

## 2021-01-01 RX ADMIN — Medication 10 ML: at 21:38

## 2021-01-01 RX ADMIN — SPIRONOLACTONE 25 MG: 25 TABLET ORAL at 08:46

## 2021-01-01 RX ADMIN — SODIUM CHLORIDE, PRESERVATIVE FREE 10 ML: 5 INJECTION INTRAVENOUS at 09:46

## 2021-01-01 RX ADMIN — LACTULOSE 20 G: 20 SOLUTION ORAL at 18:14

## 2021-01-01 RX ADMIN — LACTULOSE 20 G: 20 SOLUTION ORAL at 18:31

## 2021-01-01 RX ADMIN — FOLIC ACID 1 MG: 1 TABLET ORAL at 08:34

## 2021-01-01 RX ADMIN — PETROLATUM: 420 OINTMENT TOPICAL at 10:32

## 2021-01-01 RX ADMIN — MAGNESIUM OXIDE 400 MG: 400 TABLET ORAL at 21:03

## 2021-01-01 RX ADMIN — CLONIDINE HYDROCHLORIDE 0.1 MG: 0.1 TABLET ORAL at 11:30

## 2021-01-01 RX ADMIN — PETROLATUM: 420 OINTMENT TOPICAL at 20:37

## 2021-01-01 RX ADMIN — CLONIDINE HYDROCHLORIDE 0.1 MG: 0.1 TABLET ORAL at 09:22

## 2021-01-01 RX ADMIN — LACTULOSE 20 G: 20 SOLUTION ORAL at 18:37

## 2021-01-01 RX ADMIN — LORAZEPAM 3 MG: 2 INJECTION INTRAMUSCULAR; INTRAVENOUS at 14:30

## 2021-01-01 RX ADMIN — RIFAXIMIN 550 MG: 550 TABLET ORAL at 20:51

## 2021-01-01 RX ADMIN — LEVOTHYROXINE SODIUM 100 MCG: 0.1 TABLET ORAL at 05:58

## 2021-01-01 RX ADMIN — MORPHINE SULFATE 2 MG: 2 INJECTION, SOLUTION INTRAMUSCULAR; INTRAVENOUS at 07:21

## 2021-01-01 RX ADMIN — WATER 1000 MG: 1 INJECTION INTRAMUSCULAR; INTRAVENOUS; SUBCUTANEOUS at 09:34

## 2021-01-01 RX ADMIN — FOLIC ACID 1 MG: 1 TABLET ORAL at 08:14

## 2021-01-01 RX ADMIN — RIFAXIMIN 550 MG: 550 TABLET ORAL at 10:31

## 2021-01-01 RX ADMIN — PANTOPRAZOLE SODIUM 40 MG: 40 INJECTION, POWDER, LYOPHILIZED, FOR SOLUTION INTRAVENOUS at 20:04

## 2021-01-01 RX ADMIN — FOLIC ACID 1 MG: 1 TABLET ORAL at 21:04

## 2021-01-01 RX ADMIN — LACTULOSE 30 G: 20 SOLUTION ORAL at 20:18

## 2021-01-01 RX ADMIN — SODIUM CHLORIDE, POTASSIUM CHLORIDE, SODIUM LACTATE AND CALCIUM CHLORIDE 1000 ML: 600; 310; 30; 20 INJECTION, SOLUTION INTRAVENOUS at 21:00

## 2021-01-01 RX ADMIN — PANTOPRAZOLE SODIUM 40 MG: 40 INJECTION, POWDER, FOR SOLUTION INTRAVENOUS at 08:40

## 2021-01-01 RX ADMIN — LACTULOSE 20 G: 20 SOLUTION ORAL at 10:25

## 2021-01-01 RX ADMIN — LACTULOSE 20 G: 20 SOLUTION ORAL at 06:19

## 2021-01-01 RX ADMIN — MIRTAZAPINE 15 MG: 15 TABLET, FILM COATED ORAL at 21:12

## 2021-01-01 RX ADMIN — PETROLATUM: 420 OINTMENT TOPICAL at 20:21

## 2021-01-01 RX ADMIN — SODIUM CHLORIDE 3000 MG: 900 INJECTION INTRAVENOUS at 00:21

## 2021-01-01 RX ADMIN — PANTOPRAZOLE SODIUM 40 MG: 40 INJECTION, POWDER, LYOPHILIZED, FOR SOLUTION INTRAVENOUS at 09:23

## 2021-01-01 RX ADMIN — FOLIC ACID 1 MG: 1 TABLET ORAL at 09:52

## 2021-01-01 RX ADMIN — POTASSIUM CHLORIDE 40 MEQ: 20 TABLET, EXTENDED RELEASE ORAL at 10:59

## 2021-01-01 RX ADMIN — LACTULOSE 20 G: 20 SOLUTION ORAL at 14:31

## 2021-01-01 RX ADMIN — Medication 25 MG: at 09:08

## 2021-01-01 RX ADMIN — FOLIC ACID 1 MG: 1 TABLET ORAL at 09:13

## 2021-01-01 RX ADMIN — LACTULOSE 20 G: 20 SOLUTION ORAL at 01:37

## 2021-01-01 RX ADMIN — SODIUM CHLORIDE, PRESERVATIVE FREE 10 ML: 5 INJECTION INTRAVENOUS at 09:42

## 2021-01-01 RX ADMIN — VANCOMYCIN HYDROCHLORIDE 1000 MG: 1 INJECTION, POWDER, LYOPHILIZED, FOR SOLUTION INTRAVENOUS at 14:11

## 2021-01-01 RX ADMIN — PYRIDOXINE HCL TAB 50 MG 25 MG: 50 TAB at 18:28

## 2021-01-01 RX ADMIN — LORAZEPAM 1 MG: 1 TABLET ORAL at 15:32

## 2021-01-01 RX ADMIN — POTASSIUM CHLORIDE 40 MEQ: 1500 TABLET, EXTENDED RELEASE ORAL at 09:59

## 2021-01-01 RX ADMIN — THIAMINE HCL TAB 100 MG 100 MG: 100 TAB at 09:08

## 2021-01-01 RX ADMIN — SPIRONOLACTONE 25 MG: 25 TABLET ORAL at 08:47

## 2021-01-01 RX ADMIN — PANTOPRAZOLE SODIUM 40 MG: 40 INJECTION, POWDER, LYOPHILIZED, FOR SOLUTION INTRAVENOUS at 09:14

## 2021-01-01 RX ADMIN — SODIUM CHLORIDE, PRESERVATIVE FREE 10 ML: 5 INJECTION INTRAVENOUS at 09:56

## 2021-01-01 RX ADMIN — HYDROCODONE BITARTRATE AND ACETAMINOPHEN 1 TABLET: 5; 325 TABLET ORAL at 09:48

## 2021-01-01 RX ADMIN — LACTULOSE 30 G: 20 SOLUTION ORAL at 21:35

## 2021-01-01 RX ADMIN — SODIUM CHLORIDE 3000 MG: 900 INJECTION INTRAVENOUS at 20:56

## 2021-01-01 RX ADMIN — RIFAXIMIN 550 MG: 550 TABLET ORAL at 21:34

## 2021-01-01 RX ADMIN — Medication 25 MG: at 09:43

## 2021-01-01 RX ADMIN — PETROLATUM: 420 OINTMENT TOPICAL at 09:56

## 2021-01-01 RX ADMIN — ENOXAPARIN SODIUM 40 MG: 100 INJECTION SUBCUTANEOUS at 09:21

## 2021-01-01 RX ADMIN — PETROLATUM: 420 OINTMENT TOPICAL at 21:27

## 2021-01-01 RX ADMIN — Medication 25 MG: at 09:58

## 2021-01-01 RX ADMIN — LACTULOSE 20 G: 20 SOLUTION ORAL at 06:12

## 2021-01-01 RX ADMIN — LACTULOSE 30 G: 20 SOLUTION ORAL at 08:13

## 2021-01-01 RX ADMIN — LORAZEPAM 1 MG: 1 TABLET ORAL at 18:42

## 2021-01-01 RX ADMIN — Medication 10 ML: at 08:19

## 2021-01-01 RX ADMIN — Medication 10 ML: at 20:59

## 2021-01-01 RX ADMIN — LORAZEPAM 2 MG: 2 INJECTION INTRAMUSCULAR; INTRAVENOUS at 08:51

## 2021-01-01 RX ADMIN — POTASSIUM CHLORIDE 40 MEQ: 20 TABLET, EXTENDED RELEASE ORAL at 09:24

## 2021-01-01 RX ADMIN — ENOXAPARIN SODIUM 40 MG: 100 INJECTION SUBCUTANEOUS at 10:31

## 2021-01-01 RX ADMIN — LACTULOSE 20 G: 20 SOLUTION ORAL at 08:35

## 2021-01-01 RX ADMIN — LACTULOSE 30 G: 20 SOLUTION ORAL at 08:17

## 2021-01-01 RX ADMIN — MIRTAZAPINE 15 MG: 15 TABLET, FILM COATED ORAL at 20:55

## 2021-01-01 RX ADMIN — Medication 10 ML: at 09:53

## 2021-01-01 RX ADMIN — RIFAXIMIN 550 MG: 550 TABLET ORAL at 09:08

## 2021-01-01 RX ADMIN — SODIUM CHLORIDE 3000 MG: 900 INJECTION INTRAVENOUS at 18:38

## 2021-01-01 RX ADMIN — MIRTAZAPINE 15 MG: 15 TABLET, FILM COATED ORAL at 20:05

## 2021-01-01 RX ADMIN — SPIRONOLACTONE 25 MG: 25 TABLET ORAL at 09:08

## 2021-01-01 RX ADMIN — PANTOPRAZOLE SODIUM 40 MG: 40 TABLET, DELAYED RELEASE ORAL at 09:47

## 2021-01-01 RX ADMIN — Medication 10 ML: at 21:58

## 2021-01-01 RX ADMIN — ERGOCALCIFEROL 50000 UNITS: 1.25 CAPSULE ORAL at 09:54

## 2021-01-01 RX ADMIN — FOLIC ACID 1 MG: 1 TABLET ORAL at 10:23

## 2021-01-01 RX ADMIN — RIFAXIMIN 550 MG: 550 TABLET ORAL at 08:34

## 2021-01-01 RX ADMIN — Medication 300 UNITS: at 10:32

## 2021-01-01 RX ADMIN — SODIUM CHLORIDE 3000 MG: 900 INJECTION INTRAVENOUS at 18:42

## 2021-01-01 RX ADMIN — POTASSIUM CHLORIDE 10 MEQ: 10 INJECTION, SOLUTION INTRAVENOUS at 14:31

## 2021-01-01 RX ADMIN — OCTREOTIDE ACETATE 25 MCG/HR: 500 INJECTION, SOLUTION INTRAVENOUS; SUBCUTANEOUS at 04:26

## 2021-01-01 RX ADMIN — MAGNESIUM SULFATE HEPTAHYDRATE 1000 MG: 1 INJECTION, SOLUTION INTRAVENOUS at 14:34

## 2021-01-01 RX ADMIN — POTASSIUM CHLORIDE 40 MEQ: 20 TABLET, EXTENDED RELEASE ORAL at 21:59

## 2021-01-01 RX ADMIN — PANTOPRAZOLE SODIUM 40 MG: 40 INJECTION, POWDER, FOR SOLUTION INTRAVENOUS at 20:17

## 2021-01-01 RX ADMIN — Medication 300 UNITS: at 20:21

## 2021-01-01 RX ADMIN — VANCOMYCIN HYDROCHLORIDE 1000 MG: 1 INJECTION, POWDER, LYOPHILIZED, FOR SOLUTION INTRAVENOUS at 02:42

## 2021-01-01 RX ADMIN — SPIRONOLACTONE 25 MG: 25 TABLET ORAL at 08:19

## 2021-01-01 RX ADMIN — MORPHINE SULFATE 2 MG: 2 INJECTION, SOLUTION INTRAMUSCULAR; INTRAVENOUS at 12:39

## 2021-01-01 RX ADMIN — Medication 10 ML: at 09:47

## 2021-01-01 RX ADMIN — FOLIC ACID 1 MG: 1 TABLET ORAL at 18:27

## 2021-01-01 RX ADMIN — RIFAXIMIN 550 MG: 550 TABLET ORAL at 10:21

## 2021-01-01 RX ADMIN — RIFAXIMIN 550 MG: 550 TABLET ORAL at 08:19

## 2021-01-01 RX ADMIN — RIFAXIMIN 550 MG: 550 TABLET ORAL at 20:17

## 2021-01-01 RX ADMIN — Medication 10 ML: at 20:47

## 2021-01-01 RX ADMIN — LACTULOSE 20 G: 20 SOLUTION ORAL at 18:40

## 2021-01-01 RX ADMIN — THIAMINE HCL TAB 100 MG 100 MG: 100 TAB at 10:31

## 2021-01-01 RX ADMIN — RIFAXIMIN 550 MG: 550 TABLET ORAL at 00:19

## 2021-01-01 RX ADMIN — PETROLATUM: 420 OINTMENT TOPICAL at 09:47

## 2021-01-01 RX ADMIN — PANTOPRAZOLE SODIUM 40 MG: 40 INJECTION, POWDER, LYOPHILIZED, FOR SOLUTION INTRAVENOUS at 09:54

## 2021-01-01 RX ADMIN — RIFAXIMIN 550 MG: 550 TABLET ORAL at 09:13

## 2021-01-01 RX ADMIN — RIFAXIMIN 550 MG: 550 TABLET ORAL at 21:19

## 2021-01-01 RX ADMIN — THIAMINE HCL TAB 100 MG 100 MG: 100 TAB at 09:51

## 2021-01-01 RX ADMIN — MIRTAZAPINE 15 MG: 15 TABLET, FILM COATED ORAL at 21:04

## 2021-01-01 RX ADMIN — CEFTRIAXONE SODIUM 2000 MG: 2 INJECTION, POWDER, FOR SOLUTION INTRAMUSCULAR; INTRAVENOUS at 20:56

## 2021-01-01 RX ADMIN — PANTOPRAZOLE SODIUM 40 MG: 40 INJECTION, POWDER, LYOPHILIZED, FOR SOLUTION INTRAVENOUS at 08:47

## 2021-01-01 RX ADMIN — SODIUM CHLORIDE: 9 INJECTION, SOLUTION INTRAVENOUS at 17:40

## 2021-01-01 RX ADMIN — PHYTONADIONE 10 MG: 10 INJECTION, EMULSION INTRAMUSCULAR; INTRAVENOUS; SUBCUTANEOUS at 09:55

## 2021-01-01 RX ADMIN — Medication 25 MG: at 10:16

## 2021-01-01 RX ADMIN — POTASSIUM CHLORIDE 10 MEQ: 7.46 INJECTION, SOLUTION INTRAVENOUS at 01:36

## 2021-01-01 RX ADMIN — SODIUM CHLORIDE, PRESERVATIVE FREE 10 ML: 5 INJECTION INTRAVENOUS at 20:09

## 2021-01-01 RX ADMIN — PANTOPRAZOLE SODIUM 40 MG: 40 TABLET, DELAYED RELEASE ORAL at 14:32

## 2021-01-01 RX ADMIN — IOPAMIDOL 75 ML: 755 INJECTION, SOLUTION INTRAVENOUS at 00:20

## 2021-01-01 RX ADMIN — SODIUM CHLORIDE 3000 MG: 900 INJECTION INTRAVENOUS at 20:44

## 2021-01-01 RX ADMIN — OCTREOTIDE ACETATE 25 MCG/HR: 500 INJECTION, SOLUTION INTRAVENOUS; SUBCUTANEOUS at 10:24

## 2021-01-01 RX ADMIN — HEPARIN SODIUM (PORCINE) LOCK FLUSH IV SOLN 100 UNIT/ML 500 UNITS: 100 SOLUTION at 09:56

## 2021-01-01 RX ADMIN — SODIUM CHLORIDE: 9 INJECTION, SOLUTION INTRAVENOUS at 02:16

## 2021-01-01 RX ADMIN — FOLIC ACID 1 MG: 1 TABLET ORAL at 08:41

## 2021-01-01 RX ADMIN — LACTULOSE 20 G: 20 SOLUTION ORAL at 21:11

## 2021-01-01 RX ADMIN — PANTOPRAZOLE SODIUM 40 MG: 40 TABLET, DELAYED RELEASE ORAL at 08:50

## 2021-01-01 RX ADMIN — RIFAXIMIN 550 MG: 550 TABLET ORAL at 20:55

## 2021-01-01 RX ADMIN — PANTOPRAZOLE SODIUM 40 MG: 40 TABLET, DELAYED RELEASE ORAL at 05:16

## 2021-01-01 RX ADMIN — CLONIDINE HYDROCHLORIDE 0.1 MG: 0.1 TABLET ORAL at 09:50

## 2021-01-01 RX ADMIN — LACTULOSE 20 G: 20 SOLUTION ORAL at 21:28

## 2021-01-01 RX ADMIN — HYDROCODONE BITARTRATE AND ACETAMINOPHEN 1 TABLET: 5; 325 TABLET ORAL at 08:34

## 2021-01-01 RX ADMIN — LACTULOSE 30 G: 20 SOLUTION ORAL at 14:11

## 2021-01-01 RX ADMIN — HEPARIN SODIUM (PORCINE) LOCK FLUSH IV SOLN 100 UNIT/ML 500 UNITS: 100 SOLUTION at 11:42

## 2021-01-01 RX ADMIN — SODIUM CHLORIDE, PRESERVATIVE FREE 10 ML: 5 INJECTION INTRAVENOUS at 10:24

## 2021-01-01 RX ADMIN — FOLIC ACID 1 MG: 1 TABLET ORAL at 09:08

## 2021-01-01 RX ADMIN — MORPHINE SULFATE 5 MG: 5 INJECTION, SOLUTION INTRAMUSCULAR; INTRAVENOUS at 07:57

## 2021-01-01 RX ADMIN — PANTOPRAZOLE SODIUM 40 MG: 40 INJECTION, POWDER, LYOPHILIZED, FOR SOLUTION INTRAVENOUS at 21:28

## 2021-01-01 RX ADMIN — RIFAXIMIN 550 MG: 550 TABLET ORAL at 08:52

## 2021-01-01 RX ADMIN — SODIUM CHLORIDE, PRESERVATIVE FREE 10 ML: 5 INJECTION INTRAVENOUS at 09:08

## 2021-01-01 RX ADMIN — DAPTOMYCIN 850 MG: 500 INJECTION, POWDER, LYOPHILIZED, FOR SOLUTION INTRAVENOUS at 17:01

## 2021-01-01 RX ADMIN — FOLIC ACID 1 MG: 1 TABLET ORAL at 09:54

## 2021-01-01 RX ADMIN — PETROLATUM: 420 OINTMENT TOPICAL at 09:48

## 2021-01-01 RX ADMIN — Medication 10 ML: at 14:03

## 2021-01-01 RX ADMIN — DAPTOMYCIN 850 MG: 500 INJECTION, POWDER, LYOPHILIZED, FOR SOLUTION INTRAVENOUS at 16:45

## 2021-01-01 RX ADMIN — PYRIDOXINE HCL TAB 50 MG 25 MG: 50 TAB at 14:32

## 2021-01-01 RX ADMIN — LORAZEPAM 1 MG: 2 INJECTION INTRAMUSCULAR; INTRAVENOUS at 08:44

## 2021-01-01 RX ADMIN — THIAMINE HCL TAB 100 MG 100 MG: 100 TAB at 08:46

## 2021-01-01 RX ADMIN — PANTOPRAZOLE SODIUM 40 MG: 40 TABLET, DELAYED RELEASE ORAL at 05:18

## 2021-01-01 RX ADMIN — LORAZEPAM 0.5 MG: 0.5 TABLET ORAL at 21:00

## 2021-01-01 RX ADMIN — PROPOFOL 150 MCG/KG/MIN: 10 INJECTION, EMULSION INTRAVENOUS at 14:07

## 2021-01-01 RX ADMIN — WATER 1000 MG: 1 INJECTION INTRAMUSCULAR; INTRAVENOUS; SUBCUTANEOUS at 21:06

## 2021-01-01 RX ADMIN — Medication 10 ML: at 20:37

## 2021-01-01 RX ADMIN — SODIUM CHLORIDE 3000 MG: 900 INJECTION INTRAVENOUS at 09:54

## 2021-01-01 RX ADMIN — LACTULOSE 30 G: 20 SOLUTION ORAL at 09:51

## 2021-01-01 RX ADMIN — LACTULOSE 20 G: 20 SOLUTION ORAL at 12:24

## 2021-01-01 RX ADMIN — HYDROXYZINE HYDROCHLORIDE 25 MG: 25 TABLET, FILM COATED ORAL at 23:29

## 2021-01-01 RX ADMIN — POTASSIUM CHLORIDE 10 MEQ: 10 INJECTION, SOLUTION INTRAVENOUS at 23:10

## 2021-01-01 RX ADMIN — PROPRANOLOL HYDROCHLORIDE 20 MG: 20 TABLET ORAL at 06:19

## 2021-01-01 RX ADMIN — LACTULOSE 20 G: 20 SOLUTION ORAL at 09:13

## 2021-01-01 RX ADMIN — SPIRONOLACTONE 25 MG: 25 TABLET ORAL at 08:41

## 2021-01-01 RX ADMIN — PETROLATUM: 420 OINTMENT TOPICAL at 10:05

## 2021-01-01 RX ADMIN — FOLIC ACID 1 MG: 1 TABLET ORAL at 08:47

## 2021-01-01 RX ADMIN — WATER 2000 MG: 1 INJECTION INTRAMUSCULAR; INTRAVENOUS; SUBCUTANEOUS at 06:26

## 2021-01-01 RX ADMIN — FOLIC ACID 1 MG: 1 TABLET ORAL at 10:32

## 2021-01-01 RX ADMIN — CEFTRIAXONE SODIUM 2000 MG: 2 INJECTION, POWDER, FOR SOLUTION INTRAMUSCULAR; INTRAVENOUS at 21:22

## 2021-01-01 RX ADMIN — PYRIDOXINE HCL TAB 50 MG 25 MG: 50 TAB at 08:41

## 2021-01-01 RX ADMIN — CLONIDINE HYDROCHLORIDE 0.1 MG: 0.1 TABLET ORAL at 08:41

## 2021-01-01 RX ADMIN — VANCOMYCIN HYDROCHLORIDE 1250 MG: 10 INJECTION, POWDER, LYOPHILIZED, FOR SOLUTION INTRAVENOUS at 03:17

## 2021-01-01 RX ADMIN — LEVOTHYROXINE SODIUM 100 MCG: 0.1 TABLET ORAL at 14:32

## 2021-01-01 RX ADMIN — RIFAXIMIN 550 MG: 550 TABLET ORAL at 09:24

## 2021-01-01 RX ADMIN — Medication 25 MG: at 09:22

## 2021-01-01 RX ADMIN — PANTOPRAZOLE SODIUM 40 MG: 40 INJECTION, POWDER, LYOPHILIZED, FOR SOLUTION INTRAVENOUS at 09:45

## 2021-01-01 RX ADMIN — Medication 10 ML: at 09:27

## 2021-01-01 RX ADMIN — LORAZEPAM 1 MG: 2 INJECTION INTRAMUSCULAR; INTRAVENOUS at 12:38

## 2021-01-01 RX ADMIN — PANTOPRAZOLE SODIUM 40 MG: 40 INJECTION, POWDER, FOR SOLUTION INTRAVENOUS at 08:44

## 2021-01-01 RX ADMIN — LORAZEPAM 1 MG: 1 TABLET ORAL at 10:24

## 2021-01-01 RX ADMIN — CLONIDINE HYDROCHLORIDE 0.1 MG: 0.1 TABLET ORAL at 08:33

## 2021-01-01 RX ADMIN — RIFAXIMIN 550 MG: 550 TABLET ORAL at 22:48

## 2021-01-01 RX ADMIN — POTASSIUM CHLORIDE 10 MEQ: 7.46 INJECTION, SOLUTION INTRAVENOUS at 13:49

## 2021-01-01 RX ADMIN — PANTOPRAZOLE SODIUM 40 MG: 40 TABLET, DELAYED RELEASE ORAL at 08:33

## 2021-01-01 RX ADMIN — PROPRANOLOL HYDROCHLORIDE 10 MG: 10 TABLET ORAL at 21:06

## 2021-01-01 RX ADMIN — PANTOPRAZOLE SODIUM 40 MG: 40 INJECTION, POWDER, LYOPHILIZED, FOR SOLUTION INTRAVENOUS at 22:20

## 2021-01-01 RX ADMIN — CLONIDINE HYDROCHLORIDE 0.1 MG: 0.1 TABLET ORAL at 21:27

## 2021-01-01 RX ADMIN — LACTULOSE 30 G: 20 SOLUTION ORAL at 08:45

## 2021-01-01 RX ADMIN — SODIUM CHLORIDE, PRESERVATIVE FREE 10 ML: 5 INJECTION INTRAVENOUS at 09:50

## 2021-01-01 RX ADMIN — SODIUM CHLORIDE 3000 MG: 900 INJECTION INTRAVENOUS at 12:29

## 2021-01-01 RX ADMIN — LORAZEPAM 1 MG: 1 TABLET ORAL at 09:54

## 2021-01-01 RX ADMIN — Medication 10 ML: at 09:12

## 2021-01-01 RX ADMIN — CYANOCOBALAMIN TAB 1000 MCG 2000 MCG: 1000 TAB at 08:34

## 2021-01-01 RX ADMIN — DAPTOMYCIN 850 MG: 500 INJECTION, POWDER, LYOPHILIZED, FOR SOLUTION INTRAVENOUS at 12:03

## 2021-01-01 RX ADMIN — RIFAXIMIN 550 MG: 550 TABLET ORAL at 09:54

## 2021-01-01 RX ADMIN — DAPTOMYCIN 850 MG: 500 INJECTION, POWDER, LYOPHILIZED, FOR SOLUTION INTRAVENOUS at 13:57

## 2021-01-01 RX ADMIN — GADOBENATE DIMEGLUMINE 20 ML: 529 INJECTION, SOLUTION INTRAVENOUS at 08:45

## 2021-01-01 RX ADMIN — CYANOCOBALAMIN TAB 1000 MCG 2000 MCG: 1000 TAB at 08:40

## 2021-01-01 RX ADMIN — SPIRONOLACTONE 25 MG: 25 TABLET ORAL at 18:22

## 2021-01-01 RX ADMIN — ENOXAPARIN SODIUM 40 MG: 40 INJECTION SUBCUTANEOUS at 09:34

## 2021-01-01 RX ADMIN — DAPTOMYCIN 850 MG: 500 INJECTION, POWDER, LYOPHILIZED, FOR SOLUTION INTRAVENOUS at 11:38

## 2021-01-01 RX ADMIN — CEFTRIAXONE SODIUM 2000 MG: 2 INJECTION, POWDER, FOR SOLUTION INTRAMUSCULAR; INTRAVENOUS at 20:21

## 2021-01-01 RX ADMIN — SPIRONOLACTONE 25 MG: 25 TABLET ORAL at 10:22

## 2021-01-01 RX ADMIN — LACTULOSE 10 G: 20 SOLUTION ORAL at 08:40

## 2021-01-01 RX ADMIN — VANCOMYCIN HYDROCHLORIDE 1000 MG: 1 INJECTION, POWDER, LYOPHILIZED, FOR SOLUTION INTRAVENOUS at 15:46

## 2021-01-01 RX ADMIN — ERGOCALCIFEROL 50000 UNITS: 1.25 CAPSULE ORAL at 10:30

## 2021-01-01 RX ADMIN — Medication 10 ML: at 20:04

## 2021-01-01 RX ADMIN — DAPTOMYCIN 850 MG: 500 INJECTION, POWDER, LYOPHILIZED, FOR SOLUTION INTRAVENOUS at 11:25

## 2021-01-01 RX ADMIN — Medication 25 MG: at 09:46

## 2021-01-01 RX ADMIN — FOLIC ACID 1 MG: 1 TABLET ORAL at 09:31

## 2021-01-01 RX ADMIN — THIAMINE HCL TAB 100 MG 100 MG: 100 TAB at 09:14

## 2021-01-01 RX ADMIN — RIFAXIMIN 550 MG: 550 TABLET ORAL at 09:46

## 2021-01-01 RX ADMIN — PETROLATUM: 420 OINTMENT TOPICAL at 22:51

## 2021-01-01 RX ADMIN — POTASSIUM CHLORIDE 40 MEQ: 20 TABLET, EXTENDED RELEASE ORAL at 10:23

## 2021-01-01 RX ADMIN — VANCOMYCIN HYDROCHLORIDE 1250 MG: 10 INJECTION, POWDER, LYOPHILIZED, FOR SOLUTION INTRAVENOUS at 02:55

## 2021-01-01 RX ADMIN — Medication 10 ML: at 21:13

## 2021-01-01 RX ADMIN — MAGNESIUM SULFATE HEPTAHYDRATE 2000 MG: 2 INJECTION, SOLUTION INTRAVENOUS at 10:02

## 2021-01-01 RX ADMIN — Medication 10 ML: at 22:50

## 2021-01-01 RX ADMIN — PYRIDOXINE HCL TAB 50 MG 25 MG: 50 TAB at 08:17

## 2021-01-01 RX ADMIN — PANTOPRAZOLE SODIUM 40 MG: 40 INJECTION, POWDER, FOR SOLUTION INTRAVENOUS at 08:38

## 2021-01-01 RX ADMIN — DAPTOMYCIN 850 MG: 500 INJECTION, POWDER, LYOPHILIZED, FOR SOLUTION INTRAVENOUS at 11:48

## 2021-01-01 RX ADMIN — THIAMINE HCL TAB 100 MG 100 MG: 100 TAB at 10:38

## 2021-01-01 RX ADMIN — PYRIDOXINE HCL TAB 50 MG 25 MG: 50 TAB at 09:51

## 2021-01-01 RX ADMIN — PYRIDOXINE HCL TAB 50 MG 25 MG: 50 TAB at 08:46

## 2021-01-01 RX ADMIN — POTASSIUM CHLORIDE 10 MEQ: 10 INJECTION, SOLUTION INTRAVENOUS at 22:00

## 2021-01-01 RX ADMIN — RIFAXIMIN 550 MG: 550 TABLET ORAL at 21:28

## 2021-01-01 RX ADMIN — MAGNESIUM SULFATE HEPTAHYDRATE 1000 MG: 1 INJECTION, SOLUTION INTRAVENOUS at 11:46

## 2021-01-01 RX ADMIN — THIAMINE HCL TAB 100 MG 100 MG: 100 TAB at 08:47

## 2021-01-01 RX ADMIN — Medication 10 ML: at 20:10

## 2021-01-01 RX ADMIN — Medication 10 ML: at 21:52

## 2021-01-01 RX ADMIN — SODIUM CHLORIDE 3000 MG: 900 INJECTION INTRAVENOUS at 13:11

## 2021-01-01 RX ADMIN — THIAMINE HCL TAB 100 MG 100 MG: 100 TAB at 09:13

## 2021-01-01 RX ADMIN — LACTULOSE 30 G: 20 SOLUTION ORAL at 20:05

## 2021-01-01 RX ADMIN — Medication 25 MG: at 10:31

## 2021-01-01 RX ADMIN — CYANOCOBALAMIN TAB 1000 MCG 2000 MCG: 1000 TAB at 18:27

## 2021-01-01 RX ADMIN — PANTOPRAZOLE SODIUM 40 MG: 40 TABLET, DELAYED RELEASE ORAL at 05:01

## 2021-01-01 RX ADMIN — THIAMINE HCL TAB 100 MG 100 MG: 100 TAB at 09:54

## 2021-01-01 RX ADMIN — THIAMINE HCL TAB 100 MG 100 MG: 100 TAB at 10:17

## 2021-01-01 RX ADMIN — DAPTOMYCIN 840 MG: 500 INJECTION, POWDER, LYOPHILIZED, FOR SOLUTION INTRAVENOUS at 11:42

## 2021-01-01 RX ADMIN — PETROLATUM: 420 OINTMENT TOPICAL at 09:15

## 2021-01-01 RX ADMIN — MORPHINE SULFATE 2 MG: 2 INJECTION, SOLUTION INTRAMUSCULAR; INTRAVENOUS at 04:16

## 2021-01-01 RX ADMIN — LORAZEPAM 2 MG: 1 TABLET ORAL at 06:57

## 2021-01-01 RX ADMIN — LEVOTHYROXINE SODIUM 100 MCG: 0.1 TABLET ORAL at 06:26

## 2021-01-01 RX ADMIN — RIFAXIMIN 550 MG: 550 TABLET ORAL at 14:33

## 2021-01-01 RX ADMIN — LORAZEPAM 0.5 MG: 0.5 TABLET ORAL at 06:08

## 2021-01-01 RX ADMIN — POTASSIUM CHLORIDE 10 MEQ: 10 INJECTION, SOLUTION INTRAVENOUS at 10:32

## 2021-01-01 RX ADMIN — LORAZEPAM 1 MG: 1 TABLET ORAL at 05:47

## 2021-01-01 RX ADMIN — LACTULOSE 20 G: 20 SOLUTION ORAL at 09:07

## 2021-01-01 RX ADMIN — POTASSIUM CHLORIDE 40 MEQ: 20 TABLET, EXTENDED RELEASE ORAL at 09:42

## 2021-01-01 RX ADMIN — RIFAXIMIN 550 MG: 550 TABLET ORAL at 21:56

## 2021-01-01 RX ADMIN — Medication 10 ML: at 21:00

## 2021-01-01 RX ADMIN — ALBUMIN (HUMAN) 25 G: 0.25 INJECTION, SOLUTION INTRAVENOUS at 21:21

## 2021-01-01 RX ADMIN — THIAMINE HCL TAB 100 MG 100 MG: 100 TAB at 09:22

## 2021-01-01 RX ADMIN — RIFAXIMIN 550 MG: 550 TABLET ORAL at 08:47

## 2021-01-01 RX ADMIN — RIFAXIMIN 550 MG: 550 TABLET ORAL at 21:58

## 2021-01-01 RX ADMIN — RIFAXIMIN 550 MG: 550 TABLET ORAL at 08:33

## 2021-01-01 RX ADMIN — Medication 10 ML: at 09:26

## 2021-01-01 RX ADMIN — MAGNESIUM SULFATE HEPTAHYDRATE 2000 MG: 2 INJECTION, SOLUTION INTRAVENOUS at 22:00

## 2021-01-01 RX ADMIN — Medication 10 ML: at 09:54

## 2021-01-01 RX ADMIN — THIAMINE HCL TAB 100 MG 100 MG: 100 TAB at 08:41

## 2021-01-01 RX ADMIN — MULTIPLE VITAMINS W/ MINERALS TAB 1 TABLET: TAB at 18:28

## 2021-01-01 RX ADMIN — LORAZEPAM 2 MG: 1 TABLET ORAL at 01:44

## 2021-01-01 RX ADMIN — CYANOCOBALAMIN TAB 1000 MCG 2000 MCG: 1000 TAB at 08:46

## 2021-01-01 RX ADMIN — PANTOPRAZOLE SODIUM 40 MG: 40 TABLET, DELAYED RELEASE ORAL at 21:12

## 2021-01-01 RX ADMIN — LEVOTHYROXINE SODIUM 100 MCG: 0.1 TABLET ORAL at 06:11

## 2021-01-01 RX ADMIN — SPIRONOLACTONE 25 MG: 25 TABLET ORAL at 09:22

## 2021-01-01 RX ADMIN — PANTOPRAZOLE SODIUM 40 MG: 40 INJECTION, POWDER, FOR SOLUTION INTRAVENOUS at 21:05

## 2021-01-01 RX ADMIN — WATER 1000 MG: 1 INJECTION INTRAMUSCULAR; INTRAVENOUS; SUBCUTANEOUS at 06:10

## 2021-01-01 RX ADMIN — MORPHINE SULFATE 2 MG: 2 INJECTION, SOLUTION INTRAMUSCULAR; INTRAVENOUS at 02:48

## 2021-01-01 RX ADMIN — SODIUM CHLORIDE 3000 MG: 900 INJECTION INTRAVENOUS at 06:46

## 2021-01-01 RX ADMIN — PETROLATUM: 420 OINTMENT TOPICAL at 08:34

## 2021-01-01 RX ADMIN — PYRIDOXINE HCL TAB 50 MG 25 MG: 50 TAB at 21:26

## 2021-01-01 RX ADMIN — SODIUM CHLORIDE: 9 INJECTION, SOLUTION INTRAVENOUS at 16:43

## 2021-01-01 RX ADMIN — MULTIPLE VITAMINS W/ MINERALS TAB 1 TABLET: TAB at 09:53

## 2021-01-01 RX ADMIN — THIAMINE HCL TAB 100 MG 100 MG: 100 TAB at 09:42

## 2021-01-01 RX ADMIN — PANTOPRAZOLE SODIUM 40 MG: 40 INJECTION, POWDER, LYOPHILIZED, FOR SOLUTION INTRAVENOUS at 09:08

## 2021-01-01 RX ADMIN — PANTOPRAZOLE SODIUM 40 MG: 40 TABLET, DELAYED RELEASE ORAL at 08:13

## 2021-01-01 RX ADMIN — POTASSIUM CHLORIDE 10 MEQ: 7.46 INJECTION, SOLUTION INTRAVENOUS at 11:02

## 2021-01-01 RX ADMIN — MORPHINE SULFATE 2 MG: 2 INJECTION, SOLUTION INTRAMUSCULAR; INTRAVENOUS at 21:05

## 2021-01-01 RX ADMIN — MORPHINE SULFATE 5 MG: 5 INJECTION, SOLUTION INTRAMUSCULAR; INTRAVENOUS at 20:03

## 2021-01-01 RX ADMIN — SPIRONOLACTONE 25 MG: 25 TABLET ORAL at 09:46

## 2021-01-01 RX ADMIN — VANCOMYCIN HYDROCHLORIDE 1000 MG: 1 INJECTION, POWDER, LYOPHILIZED, FOR SOLUTION INTRAVENOUS at 03:07

## 2021-01-01 RX ADMIN — LACTULOSE 20 G: 20 SOLUTION ORAL at 09:42

## 2021-01-01 RX ADMIN — POTASSIUM CHLORIDE 20 MEQ: 1500 TABLET, EXTENDED RELEASE ORAL at 21:03

## 2021-01-01 RX ADMIN — PROPRANOLOL HYDROCHLORIDE 20 MG: 20 TABLET ORAL at 18:14

## 2021-01-01 RX ADMIN — Medication 10 ML: at 20:46

## 2021-01-01 RX ADMIN — ENOXAPARIN SODIUM 40 MG: 40 INJECTION SUBCUTANEOUS at 08:14

## 2021-01-01 RX ADMIN — Medication 10 ML: at 10:33

## 2021-01-01 RX ADMIN — WATER 1000 MG: 1 INJECTION INTRAMUSCULAR; INTRAVENOUS; SUBCUTANEOUS at 21:57

## 2021-01-01 RX ADMIN — PETROLATUM: 420 OINTMENT TOPICAL at 21:58

## 2021-01-01 RX ADMIN — Medication 10 ML: at 10:26

## 2021-01-01 RX ADMIN — LACTULOSE 20 G: 20 SOLUTION ORAL at 06:57

## 2021-01-01 RX ADMIN — RIFAXIMIN 550 MG: 550 TABLET ORAL at 21:27

## 2021-01-01 RX ADMIN — Medication 10 ML: at 09:29

## 2021-01-01 RX ADMIN — PETROLATUM: 420 OINTMENT TOPICAL at 20:04

## 2021-01-01 RX ADMIN — LORAZEPAM 1 MG: 1 TABLET ORAL at 22:20

## 2021-01-01 RX ADMIN — SODIUM CHLORIDE 1000 ML: 9 INJECTION, SOLUTION INTRAVENOUS at 06:26

## 2021-01-01 RX ADMIN — Medication 25 MG: at 08:51

## 2021-01-01 RX ADMIN — Medication 25 MG: at 09:13

## 2021-01-01 RX ADMIN — LACTULOSE 20 G: 20 SOLUTION ORAL at 21:52

## 2021-01-01 RX ADMIN — PROPRANOLOL HYDROCHLORIDE 20 MG: 20 TABLET ORAL at 16:55

## 2021-01-01 RX ADMIN — PETROLATUM: 420 OINTMENT TOPICAL at 20:10

## 2021-01-01 RX ADMIN — LORAZEPAM 2 MG: 2 INJECTION INTRAMUSCULAR; INTRAVENOUS at 16:43

## 2021-01-01 RX ADMIN — LACTULOSE 30 G: 20 SOLUTION ORAL at 13:59

## 2021-01-01 RX ADMIN — LACTULOSE 20 G: 20 SOLUTION ORAL at 13:55

## 2021-01-01 RX ADMIN — LACTULOSE 20 G: 20 SOLUTION ORAL at 20:09

## 2021-01-01 RX ADMIN — PETROLATUM: 420 OINTMENT TOPICAL at 09:20

## 2021-01-01 RX ADMIN — SODIUM CHLORIDE, PRESERVATIVE FREE 10 ML: 5 INJECTION INTRAVENOUS at 09:15

## 2021-01-01 RX ADMIN — RIFAXIMIN 550 MG: 550 TABLET ORAL at 08:40

## 2021-01-01 RX ADMIN — PETROLATUM: 420 OINTMENT TOPICAL at 21:51

## 2021-01-01 RX ADMIN — LEVOTHYROXINE SODIUM 100 MCG: 0.1 TABLET ORAL at 05:03

## 2021-01-01 RX ADMIN — Medication 10 ML: at 21:04

## 2021-01-01 RX ADMIN — POTASSIUM CHLORIDE 40 MEQ: 1500 TABLET, EXTENDED RELEASE ORAL at 09:03

## 2021-01-01 RX ADMIN — LORAZEPAM 1 MG: 1 TABLET ORAL at 08:17

## 2021-01-01 RX ADMIN — LACTULOSE 20 G: 20 SOLUTION ORAL at 16:55

## 2021-01-01 RX ADMIN — Medication 25 MG: at 09:24

## 2021-01-01 RX ADMIN — LORAZEPAM 3 MG: 1 TABLET ORAL at 21:19

## 2021-01-01 RX ADMIN — PETROLATUM: 420 OINTMENT TOPICAL at 08:51

## 2021-01-01 RX ADMIN — PROPRANOLOL HYDROCHLORIDE 20 MG: 20 TABLET ORAL at 18:40

## 2021-01-01 RX ADMIN — OXYCODONE 5 MG: 5 TABLET ORAL at 21:35

## 2021-01-01 RX ADMIN — SODIUM CHLORIDE, PRESERVATIVE FREE 10 ML: 5 INJECTION INTRAVENOUS at 21:52

## 2021-01-01 RX ADMIN — IOPAMIDOL 90 ML: 755 INJECTION, SOLUTION INTRAVENOUS at 13:10

## 2021-01-01 RX ADMIN — SODIUM CHLORIDE: 9 INJECTION, SOLUTION INTRAVENOUS at 08:15

## 2021-01-01 RX ADMIN — Medication 25 MG: at 09:45

## 2021-01-01 RX ADMIN — THIAMINE HCL TAB 100 MG 100 MG: 100 TAB at 08:13

## 2021-01-01 RX ADMIN — SODIUM CHLORIDE 3000 MG: 900 INJECTION INTRAVENOUS at 00:41

## 2021-01-01 RX ADMIN — LACTULOSE 20 G: 20 SOLUTION ORAL at 09:53

## 2021-01-01 RX ADMIN — PANTOPRAZOLE SODIUM 40 MG: 40 INJECTION, POWDER, LYOPHILIZED, FOR SOLUTION INTRAVENOUS at 21:58

## 2021-01-01 RX ADMIN — PROPRANOLOL HYDROCHLORIDE 20 MG: 20 TABLET ORAL at 06:58

## 2021-01-01 RX ADMIN — PHYTONADIONE 10 MG: 10 INJECTION, EMULSION INTRAMUSCULAR; INTRAVENOUS; SUBCUTANEOUS at 13:49

## 2021-01-01 RX ADMIN — Medication 10 ML: at 20:19

## 2021-01-01 RX ADMIN — POTASSIUM CHLORIDE 20 MEQ: 1500 TABLET, EXTENDED RELEASE ORAL at 09:00

## 2021-01-01 RX ADMIN — PROPRANOLOL HYDROCHLORIDE 20 MG: 20 TABLET ORAL at 22:49

## 2021-01-01 RX ADMIN — POTASSIUM CHLORIDE 20 MEQ: 1500 TABLET, EXTENDED RELEASE ORAL at 21:06

## 2021-01-01 RX ADMIN — LACTULOSE 20 G: 20 SOLUTION ORAL at 18:42

## 2021-01-01 RX ADMIN — ENOXAPARIN SODIUM 40 MG: 100 INJECTION SUBCUTANEOUS at 10:00

## 2021-01-01 RX ADMIN — OXYCODONE AND ACETAMINOPHEN 1 TABLET: 5; 325 TABLET ORAL at 03:31

## 2021-01-01 RX ADMIN — SPIRONOLACTONE 25 MG: 25 TABLET ORAL at 09:42

## 2021-01-01 RX ADMIN — FOLIC ACID 1 MG: 1 TABLET ORAL at 09:42

## 2021-01-01 RX ADMIN — FOLIC ACID 1 MG: 1 TABLET ORAL at 08:45

## 2021-01-01 RX ADMIN — PROPOFOL 100 MG: 10 INJECTION, EMULSION INTRAVENOUS at 13:08

## 2021-01-01 RX ADMIN — ACETAMINOPHEN 650 MG: 325 TABLET ORAL at 18:36

## 2021-01-01 RX ADMIN — PANTOPRAZOLE SODIUM 40 MG: 40 INJECTION, POWDER, FOR SOLUTION INTRAVENOUS at 21:57

## 2021-01-01 RX ADMIN — LACTULOSE 20 G: 20 SOLUTION ORAL at 05:31

## 2021-01-01 RX ADMIN — PROPRANOLOL HYDROCHLORIDE 10 MG: 10 TABLET ORAL at 08:41

## 2021-01-01 RX ADMIN — RIFAXIMIN 550 MG: 550 TABLET ORAL at 21:52

## 2021-01-01 RX ADMIN — SODIUM CHLORIDE: 9 INJECTION, SOLUTION INTRAVENOUS at 06:47

## 2021-01-01 RX ADMIN — MULTIPLE VITAMINS W/ MINERALS TAB 1 TABLET: TAB at 08:19

## 2021-01-01 RX ADMIN — LORAZEPAM 1 MG: 2 INJECTION INTRAMUSCULAR; INTRAVENOUS at 23:46

## 2021-01-01 RX ADMIN — CYANOCOBALAMIN TAB 1000 MCG 2000 MCG: 1000 TAB at 08:41

## 2021-01-01 RX ADMIN — PANTOPRAZOLE SODIUM 40 MG: 40 INJECTION, POWDER, FOR SOLUTION INTRAVENOUS at 08:35

## 2021-01-01 RX ADMIN — RIFAXIMIN 550 MG: 550 TABLET ORAL at 09:22

## 2021-01-01 RX ADMIN — LORAZEPAM 3 MG: 1 TABLET ORAL at 00:21

## 2021-01-01 RX ADMIN — Medication 300 UNITS: at 10:02

## 2021-01-01 RX ADMIN — IOPAMIDOL 80 ML: 755 INJECTION, SOLUTION INTRAVENOUS at 06:21

## 2021-01-01 RX ADMIN — OCTREOTIDE ACETATE 25 MCG/HR: 500 INJECTION, SOLUTION INTRAVENOUS; SUBCUTANEOUS at 18:47

## 2021-01-01 RX ADMIN — DAPTOMYCIN 850 MG: 500 INJECTION, POWDER, LYOPHILIZED, FOR SOLUTION INTRAVENOUS at 11:49

## 2021-01-01 RX ADMIN — HEPARIN SODIUM (PORCINE) LOCK FLUSH IV SOLN 100 UNIT/ML 500 UNITS: 100 SOLUTION at 12:00

## 2021-01-01 RX ADMIN — PANTOPRAZOLE SODIUM 40 MG: 40 TABLET, DELAYED RELEASE ORAL at 05:58

## 2021-01-01 RX ADMIN — FOLIC ACID 1 MG: 1 TABLET ORAL at 09:22

## 2021-01-01 RX ADMIN — ENOXAPARIN SODIUM 40 MG: 100 INJECTION SUBCUTANEOUS at 09:47

## 2021-01-01 RX ADMIN — Medication 10 ML: at 09:45

## 2021-01-01 RX ADMIN — RIFAXIMIN 550 MG: 550 TABLET ORAL at 08:41

## 2021-01-01 RX ADMIN — THIAMINE HCL TAB 100 MG 100 MG: 100 TAB at 09:35

## 2021-01-01 RX ADMIN — VANCOMYCIN HYDROCHLORIDE 1250 MG: 10 INJECTION, POWDER, LYOPHILIZED, FOR SOLUTION INTRAVENOUS at 15:09

## 2021-01-01 RX ADMIN — LORAZEPAM 1 MG: 1 TABLET ORAL at 18:19

## 2021-01-01 RX ADMIN — PETROLATUM: 420 OINTMENT TOPICAL at 20:51

## 2021-01-01 RX ADMIN — DAPTOMYCIN 850 MG: 500 INJECTION, POWDER, LYOPHILIZED, FOR SOLUTION INTRAVENOUS at 12:18

## 2021-01-01 RX ADMIN — Medication 10 ML: at 21:36

## 2021-01-01 RX ADMIN — PROPRANOLOL HYDROCHLORIDE 20 MG: 20 TABLET ORAL at 06:17

## 2021-01-01 RX ADMIN — PANTOPRAZOLE SODIUM 40 MG: 40 INJECTION, POWDER, LYOPHILIZED, FOR SOLUTION INTRAVENOUS at 20:59

## 2021-01-01 RX ADMIN — PANTOPRAZOLE SODIUM 40 MG: 40 INJECTION, POWDER, LYOPHILIZED, FOR SOLUTION INTRAVENOUS at 20:46

## 2021-01-01 RX ADMIN — Medication 10 ML: at 20:23

## 2021-01-01 RX ADMIN — SODIUM CHLORIDE, PRESERVATIVE FREE 10 ML: 5 INJECTION INTRAVENOUS at 08:47

## 2021-01-01 RX ADMIN — SODIUM CHLORIDE, PRESERVATIVE FREE 10 ML: 5 INJECTION INTRAVENOUS at 20:22

## 2021-01-01 RX ADMIN — CLONIDINE HYDROCHLORIDE 0.1 MG: 0.1 TABLET ORAL at 10:30

## 2021-01-01 RX ADMIN — SODIUM CHLORIDE, PRESERVATIVE FREE 10 ML: 5 INJECTION INTRAVENOUS at 21:28

## 2021-01-01 RX ADMIN — SPIRONOLACTONE 25 MG: 25 TABLET ORAL at 09:54

## 2021-01-01 RX ADMIN — LORAZEPAM 2 MG: 2 INJECTION INTRAMUSCULAR; INTRAVENOUS at 13:48

## 2021-01-01 RX ADMIN — VANCOMYCIN HYDROCHLORIDE 1000 MG: 1 INJECTION, POWDER, LYOPHILIZED, FOR SOLUTION INTRAVENOUS at 02:44

## 2021-01-01 RX ADMIN — SPIRONOLACTONE 25 MG: 25 TABLET ORAL at 09:14

## 2021-01-01 RX ADMIN — SODIUM CHLORIDE, PRESERVATIVE FREE 10 ML: 5 INJECTION INTRAVENOUS at 09:23

## 2021-01-01 RX ADMIN — Medication 10 ML: at 20:06

## 2021-01-01 RX ADMIN — ONDANSETRON 4 MG: 2 INJECTION INTRAMUSCULAR; INTRAVENOUS at 05:40

## 2021-01-01 RX ADMIN — FENTANYL CITRATE 50 MCG: 50 INJECTION INTRAMUSCULAR; INTRAVENOUS at 05:39

## 2021-01-01 RX ADMIN — SODIUM CHLORIDE: 9 INJECTION, SOLUTION INTRAVENOUS at 02:32

## 2021-01-01 RX ADMIN — PYRIDOXINE HCL TAB 50 MG 25 MG: 50 TAB at 08:34

## 2021-01-01 RX ADMIN — Medication 25 MG: at 10:22

## 2021-01-01 RX ADMIN — LACTULOSE 20 G: 20 SOLUTION ORAL at 11:05

## 2021-01-01 RX ADMIN — Medication 10 ML: at 08:48

## 2021-01-01 RX ADMIN — RIFAXIMIN 550 MG: 550 TABLET ORAL at 20:09

## 2021-01-01 RX ADMIN — SODIUM CHLORIDE, PRESERVATIVE FREE 10 ML: 5 INJECTION INTRAVENOUS at 11:42

## 2021-01-01 RX ADMIN — SODIUM CHLORIDE 3000 MG: 900 INJECTION INTRAVENOUS at 15:49

## 2021-01-01 RX ADMIN — ERGOCALCIFEROL 50000 UNITS: 1.25 CAPSULE ORAL at 17:13

## 2021-01-01 RX ADMIN — THIAMINE HCL TAB 100 MG 100 MG: 100 TAB at 08:51

## 2021-01-01 RX ADMIN — POTASSIUM CHLORIDE 10 MEQ: 10 INJECTION, SOLUTION INTRAVENOUS at 11:15

## 2021-01-01 RX ADMIN — FOLIC ACID 1 MG: 1 TABLET ORAL at 09:46

## 2021-01-01 RX ADMIN — SODIUM CHLORIDE, PRESERVATIVE FREE 10 ML: 5 INJECTION INTRAVENOUS at 09:19

## 2021-01-01 RX ADMIN — LORAZEPAM 1 MG: 1 TABLET ORAL at 14:32

## 2021-01-01 RX ADMIN — LACTULOSE 20 G: 20 SOLUTION ORAL at 13:56

## 2021-01-01 RX ADMIN — SODIUM CHLORIDE, POTASSIUM CHLORIDE, SODIUM LACTATE AND CALCIUM CHLORIDE: 600; 310; 30; 20 INJECTION, SOLUTION INTRAVENOUS at 13:51

## 2021-01-01 RX ADMIN — CLONIDINE HYDROCHLORIDE 0.1 MG: 0.1 TABLET ORAL at 09:42

## 2021-01-01 RX ADMIN — MIRTAZAPINE 15 MG: 15 TABLET, FILM COATED ORAL at 21:05

## 2021-01-01 RX ADMIN — HYDROCODONE BITARTRATE AND ACETAMINOPHEN 1 TABLET: 5; 325 TABLET ORAL at 10:59

## 2021-01-01 RX ADMIN — SODIUM CHLORIDE: 9 INJECTION, SOLUTION INTRAVENOUS at 12:39

## 2021-01-01 RX ADMIN — SODIUM CHLORIDE 3000 MG: 900 INJECTION INTRAVENOUS at 07:01

## 2021-01-01 RX ADMIN — HEPARIN SODIUM (PORCINE) LOCK FLUSH IV SOLN 100 UNIT/ML 500 UNITS: 100 SOLUTION at 09:29

## 2021-01-01 RX ADMIN — RIFAXIMIN 550 MG: 550 TABLET ORAL at 21:05

## 2021-01-01 RX ADMIN — INFLUENZA A VIRUS A/VICTORIA/2570/2019 IVR-215 (H1N1) ANTIGEN (PROPIOLACTONE INACTIVATED), INFLUENZA A VIRUS A/CAMBODIA/E0826360/2020 IVR-224 (H3N2) ANTIGEN (PROPIOLACTONE INACTIVATED), INFLUENZA B VIRUS B/VICTORIA/705/2018 BVR-11 ANTIGEN (PROPIOLACTONE INACTIVATED), INFLUENZA B VIRUS B/PHUKET/3073/2013 BVR-1B ANTIGEN (PROPIOLACTONE INACTIVATED) 0.5 ML: 15; 15; 15; 15 INJECTION, SUSPENSION INTRAMUSCULAR at 10:23

## 2021-01-01 RX ADMIN — PETROLATUM: 420 OINTMENT TOPICAL at 09:09

## 2021-01-01 RX ADMIN — THIAMINE HCL TAB 100 MG 100 MG: 100 TAB at 23:26

## 2021-01-01 RX ADMIN — POTASSIUM CHLORIDE 20 MEQ: 1500 TABLET, EXTENDED RELEASE ORAL at 21:57

## 2021-01-01 RX ADMIN — MULTIPLE VITAMINS W/ MINERALS TAB 1 TABLET: TAB at 08:46

## 2021-01-01 RX ADMIN — SPIRONOLACTONE 25 MG: 25 TABLET ORAL at 08:52

## 2021-01-01 RX ADMIN — DAPTOMYCIN 850 MG: 500 INJECTION, POWDER, LYOPHILIZED, FOR SOLUTION INTRAVENOUS at 12:57

## 2021-01-01 RX ADMIN — SODIUM CHLORIDE, PRESERVATIVE FREE 10 ML: 5 INJECTION INTRAVENOUS at 20:46

## 2021-01-01 RX ADMIN — SPIRONOLACTONE 25 MG: 25 TABLET ORAL at 21:06

## 2021-01-01 RX ADMIN — PANTOPRAZOLE SODIUM 40 MG: 40 INJECTION, POWDER, LYOPHILIZED, FOR SOLUTION INTRAVENOUS at 20:03

## 2021-01-01 RX ADMIN — Medication 10 ML: at 08:34

## 2021-01-01 RX ADMIN — SPIRONOLACTONE 25 MG: 25 TABLET ORAL at 09:58

## 2021-01-01 RX ADMIN — HYDROCODONE BITARTRATE AND ACETAMINOPHEN 1 TABLET: 5; 325 TABLET ORAL at 05:04

## 2021-01-01 RX ADMIN — POTASSIUM CHLORIDE 40 MEQ: 20 TABLET, EXTENDED RELEASE ORAL at 10:31

## 2021-01-01 RX ADMIN — OCTREOTIDE ACETATE 25 MCG/HR: 500 INJECTION, SOLUTION INTRAVENOUS; SUBCUTANEOUS at 15:09

## 2021-01-01 RX ADMIN — Medication 300 UNITS: at 22:48

## 2021-01-01 RX ADMIN — CYANOCOBALAMIN TAB 1000 MCG 2000 MCG: 1000 TAB at 08:18

## 2021-01-01 RX ADMIN — MAGNESIUM SULFATE HEPTAHYDRATE 2000 MG: 40 INJECTION, SOLUTION INTRAVENOUS at 01:36

## 2021-01-01 RX ADMIN — MIRTAZAPINE 15 MG: 15 TABLET, FILM COATED ORAL at 20:18

## 2021-01-01 RX ADMIN — POTASSIUM CHLORIDE 10 MEQ: 7.46 INJECTION, SOLUTION INTRAVENOUS at 10:02

## 2021-01-01 RX ADMIN — MAGNESIUM OXIDE 400 MG: 400 TABLET ORAL at 08:41

## 2021-01-01 RX ADMIN — THIAMINE HCL TAB 100 MG 100 MG: 100 TAB at 09:23

## 2021-01-01 RX ADMIN — POTASSIUM CHLORIDE 40 MEQ: 20 TABLET, EXTENDED RELEASE ORAL at 08:47

## 2021-01-01 RX ADMIN — LACTULOSE 20 G: 20 SOLUTION ORAL at 09:34

## 2021-01-01 RX ADMIN — MORPHINE SULFATE 2 MG: 2 INJECTION, SOLUTION INTRAMUSCULAR; INTRAVENOUS at 16:53

## 2021-01-01 RX ADMIN — MULTIPLE VITAMINS W/ MINERALS TAB 1 TABLET: TAB at 14:33

## 2021-01-01 RX ADMIN — CLONIDINE HYDROCHLORIDE 0.1 MG: 0.1 TABLET ORAL at 08:40

## 2021-01-01 RX ADMIN — THIAMINE HCL TAB 100 MG 100 MG: 100 TAB at 08:34

## 2021-01-01 RX ADMIN — LACTULOSE 30 G: 20 SOLUTION ORAL at 15:41

## 2021-01-01 RX ADMIN — LEVOTHYROXINE SODIUM 100 MCG: 0.1 TABLET ORAL at 05:16

## 2021-01-01 RX ADMIN — DAPTOMYCIN 850 MG: 500 INJECTION, POWDER, LYOPHILIZED, FOR SOLUTION INTRAVENOUS at 12:30

## 2021-01-01 RX ADMIN — PROPRANOLOL HYDROCHLORIDE 10 MG: 10 TABLET ORAL at 08:42

## 2021-01-01 RX ADMIN — FOLIC ACID 1 MG: 1 TABLET ORAL at 08:19

## 2021-01-01 RX ADMIN — DAPTOMYCIN 850 MG: 500 INJECTION, POWDER, LYOPHILIZED, FOR SOLUTION INTRAVENOUS at 12:24

## 2021-01-01 RX ADMIN — SODIUM CHLORIDE, PRESERVATIVE FREE 10 ML: 5 INJECTION INTRAVENOUS at 21:58

## 2021-01-01 RX ADMIN — POTASSIUM CHLORIDE 10 MEQ: 7.46 INJECTION, SOLUTION INTRAVENOUS at 04:30

## 2021-01-01 RX ADMIN — FOLIC ACID 1 MG: 1 TABLET ORAL at 08:44

## 2021-01-01 RX ADMIN — CEFTRIAXONE SODIUM 2000 MG: 2 INJECTION, POWDER, FOR SOLUTION INTRAMUSCULAR; INTRAVENOUS at 20:43

## 2021-01-01 RX ADMIN — RIFAXIMIN 550 MG: 550 TABLET ORAL at 09:51

## 2021-01-01 RX ADMIN — SPIRONOLACTONE 25 MG: 25 TABLET ORAL at 08:33

## 2021-01-01 RX ADMIN — POTASSIUM CHLORIDE 10 MEQ: 10 INJECTION, SOLUTION INTRAVENOUS at 08:51

## 2021-01-01 RX ADMIN — RIFAXIMIN 550 MG: 550 TABLET ORAL at 20:21

## 2021-01-01 RX ADMIN — SODIUM CHLORIDE, PRESERVATIVE FREE 10 ML: 5 INJECTION INTRAVENOUS at 20:20

## 2021-01-01 RX ADMIN — PANTOPRAZOLE SODIUM 40 MG: 40 TABLET, DELAYED RELEASE ORAL at 10:30

## 2021-01-01 RX ADMIN — Medication 10 ML: at 20:56

## 2021-01-01 RX ADMIN — OCTREOTIDE ACETATE 25 MCG/HR: 500 INJECTION, SOLUTION INTRAVENOUS; SUBCUTANEOUS at 11:56

## 2021-01-01 RX ADMIN — MORPHINE SULFATE 2 MG: 2 INJECTION, SOLUTION INTRAMUSCULAR; INTRAVENOUS at 12:19

## 2021-01-01 RX ADMIN — SPIRONOLACTONE 25 MG: 25 TABLET ORAL at 09:13

## 2021-01-01 RX ADMIN — RIFAXIMIN 550 MG: 550 TABLET ORAL at 21:00

## 2021-01-01 RX ADMIN — LACTULOSE 20 G: 20 SOLUTION ORAL at 17:01

## 2021-01-01 RX ADMIN — Medication 10 ML: at 08:14

## 2021-01-01 RX ADMIN — CLONIDINE HYDROCHLORIDE 0.1 MG: 0.1 TABLET ORAL at 08:34

## 2021-01-01 RX ADMIN — SODIUM CHLORIDE, PRESERVATIVE FREE 10 ML: 5 INJECTION INTRAVENOUS at 20:17

## 2021-01-01 RX ADMIN — RIFAXIMIN 550 MG: 550 TABLET ORAL at 09:57

## 2021-01-01 RX ADMIN — PANTOPRAZOLE SODIUM 40 MG: 40 INJECTION, POWDER, LYOPHILIZED, FOR SOLUTION INTRAVENOUS at 10:24

## 2021-01-01 RX ADMIN — RIFAXIMIN 550 MG: 550 TABLET ORAL at 20:37

## 2021-01-01 RX ADMIN — PROPRANOLOL HYDROCHLORIDE 20 MG: 20 TABLET ORAL at 08:51

## 2021-01-01 RX ADMIN — LORAZEPAM 1 MG: 2 INJECTION INTRAMUSCULAR; INTRAVENOUS at 00:47

## 2021-01-01 RX ADMIN — LACTULOSE 20 G: 20 SOLUTION ORAL at 06:47

## 2021-01-01 RX ADMIN — LACTULOSE 30 G: 20 SOLUTION ORAL at 15:09

## 2021-01-01 RX ADMIN — SODIUM CHLORIDE, PRESERVATIVE FREE 10 ML: 5 INJECTION INTRAVENOUS at 21:07

## 2021-01-01 RX ADMIN — SPIRONOLACTONE 25 MG: 25 TABLET ORAL at 10:31

## 2021-01-01 RX ADMIN — LORAZEPAM 1 MG: 2 INJECTION INTRAMUSCULAR; INTRAVENOUS at 19:44

## 2021-01-01 RX ADMIN — ENOXAPARIN SODIUM 40 MG: 100 INJECTION SUBCUTANEOUS at 08:50

## 2021-01-01 RX ADMIN — FOLIC ACID 1 MG: 1 TABLET ORAL at 09:35

## 2021-01-01 RX ADMIN — MIRTAZAPINE 15 MG: 15 TABLET, FILM COATED ORAL at 21:34

## 2021-01-01 RX ADMIN — Medication 5 ML: at 19:47

## 2021-01-01 RX ADMIN — PANTOPRAZOLE SODIUM 40 MG: 40 INJECTION, POWDER, LYOPHILIZED, FOR SOLUTION INTRAVENOUS at 09:12

## 2021-01-01 RX ADMIN — Medication 10 ML: at 00:20

## 2021-01-01 RX ADMIN — PANTOPRAZOLE SODIUM 40 MG: 40 INJECTION, POWDER, LYOPHILIZED, FOR SOLUTION INTRAVENOUS at 09:41

## 2021-01-01 RX ADMIN — Medication 10 ML: at 21:08

## 2021-01-01 RX ADMIN — THIAMINE HCL TAB 100 MG 100 MG: 100 TAB at 09:57

## 2021-01-01 RX ADMIN — LEVOTHYROXINE SODIUM 100 MCG: 0.1 TABLET ORAL at 05:18

## 2021-01-01 RX ADMIN — PANTOPRAZOLE SODIUM 40 MG: 40 TABLET, DELAYED RELEASE ORAL at 10:00

## 2021-01-01 RX ADMIN — CYANOCOBALAMIN TAB 1000 MCG 2000 MCG: 1000 TAB at 08:17

## 2021-01-01 RX ADMIN — POTASSIUM CHLORIDE 40 MEQ: 1500 TABLET, EXTENDED RELEASE ORAL at 01:37

## 2021-01-01 RX ADMIN — RIFAXIMIN 550 MG: 550 TABLET ORAL at 08:38

## 2021-01-01 RX ADMIN — THIAMINE HCL TAB 100 MG 100 MG: 100 TAB at 08:18

## 2021-01-01 RX ADMIN — RIFAXIMIN 550 MG: 550 TABLET ORAL at 09:42

## 2021-01-01 RX ADMIN — DAPTOMYCIN 840 MG: 500 INJECTION, POWDER, LYOPHILIZED, FOR SOLUTION INTRAVENOUS at 09:26

## 2021-01-01 RX ADMIN — SODIUM CHLORIDE 3000 MG: 900 INJECTION INTRAVENOUS at 02:12

## 2021-01-01 RX ADMIN — MAGNESIUM OXIDE 400 MG: 400 TABLET ORAL at 08:43

## 2021-01-01 RX ADMIN — FOLIC ACID 1 MG: 1 TABLET ORAL at 10:00

## 2021-01-01 RX ADMIN — LORAZEPAM 1 MG: 2 INJECTION INTRAMUSCULAR; INTRAVENOUS at 21:06

## 2021-01-01 RX ADMIN — CLONIDINE HYDROCHLORIDE 0.1 MG: 0.1 TABLET ORAL at 09:08

## 2021-01-01 RX ADMIN — POTASSIUM CHLORIDE 20 MEQ: 1500 TABLET, EXTENDED RELEASE ORAL at 08:35

## 2021-01-01 RX ADMIN — FOLIC ACID 1 MG: 1 TABLET ORAL at 10:17

## 2021-01-01 RX ADMIN — PANTOPRAZOLE SODIUM 40 MG: 40 INJECTION, POWDER, LYOPHILIZED, FOR SOLUTION INTRAVENOUS at 20:09

## 2021-01-01 RX ADMIN — RIFAXIMIN 550 MG: 550 TABLET ORAL at 00:23

## 2021-01-01 RX ADMIN — SPIRONOLACTONE 25 MG: 25 TABLET ORAL at 09:45

## 2021-01-01 RX ADMIN — LACTULOSE 20 G: 20 SOLUTION ORAL at 19:11

## 2021-01-01 RX ADMIN — LACTULOSE 20 G: 20 SOLUTION ORAL at 06:33

## 2021-01-01 RX ADMIN — SODIUM CHLORIDE 500 ML: 9 INJECTION, SOLUTION INTRAVENOUS at 20:18

## 2021-01-01 RX ADMIN — PROPOFOL 125 MCG/KG/MIN: 10 INJECTION, EMULSION INTRAVENOUS at 13:11

## 2021-01-01 RX ADMIN — POTASSIUM CHLORIDE 40 MEQ: 20 TABLET, EXTENDED RELEASE ORAL at 09:54

## 2021-01-01 RX ADMIN — LORAZEPAM 2 MG: 1 TABLET ORAL at 08:37

## 2021-01-01 RX ADMIN — MORPHINE SULFATE 2 MG: 2 INJECTION, SOLUTION INTRAMUSCULAR; INTRAVENOUS at 21:56

## 2021-01-01 RX ADMIN — LACTULOSE 20 G: 20 SOLUTION ORAL at 06:16

## 2021-01-01 RX ADMIN — PYRIDOXINE HCL TAB 50 MG 25 MG: 50 TAB at 08:19

## 2021-01-01 RX ADMIN — FOLIC ACID 1 MG: 1 TABLET ORAL at 08:50

## 2021-01-01 RX ADMIN — LACTULOSE 30 G: 20 SOLUTION ORAL at 20:55

## 2021-01-01 RX ADMIN — RIFAXIMIN 550 MG: 550 TABLET ORAL at 08:14

## 2021-01-01 RX ADMIN — PYRIDOXINE HCL TAB 50 MG 25 MG: 50 TAB at 08:43

## 2021-01-01 RX ADMIN — MULTIPLE VITAMINS W/ MINERALS TAB 1 TABLET: TAB at 08:14

## 2021-01-01 RX ADMIN — PETROLATUM: 420 OINTMENT TOPICAL at 12:42

## 2021-01-01 RX ADMIN — HYDROCODONE BITARTRATE AND ACETAMINOPHEN 1 TABLET: 5; 325 TABLET ORAL at 23:28

## 2021-01-01 RX ADMIN — LORAZEPAM 2 MG: 2 INJECTION INTRAMUSCULAR; INTRAVENOUS at 02:12

## 2021-01-01 RX ADMIN — LACTULOSE 20 G: 20 SOLUTION ORAL at 18:23

## 2021-01-01 RX ADMIN — LACTULOSE 30 G: 20 SOLUTION ORAL at 20:37

## 2021-01-01 RX ADMIN — CLONIDINE HYDROCHLORIDE 0.1 MG: 0.1 TABLET ORAL at 09:54

## 2021-01-01 RX ADMIN — PANTOPRAZOLE SODIUM 40 MG: 40 TABLET, DELAYED RELEASE ORAL at 09:22

## 2021-01-01 RX ADMIN — VANCOMYCIN HYDROCHLORIDE 1250 MG: 10 INJECTION, POWDER, LYOPHILIZED, FOR SOLUTION INTRAVENOUS at 15:00

## 2021-01-01 RX ADMIN — LORAZEPAM 1 MG: 2 INJECTION INTRAMUSCULAR; INTRAVENOUS at 21:03

## 2021-01-01 RX ADMIN — LORAZEPAM 1 MG: 2 INJECTION INTRAMUSCULAR; INTRAVENOUS at 03:49

## 2021-01-01 RX ADMIN — THIAMINE HCL TAB 100 MG 100 MG: 100 TAB at 18:28

## 2021-01-01 RX ADMIN — LACTULOSE 20 G: 20 SOLUTION ORAL at 21:56

## 2021-01-01 RX ADMIN — THIAMINE HCL TAB 100 MG 100 MG: 100 TAB at 10:23

## 2021-01-01 RX ADMIN — PETROLATUM: 420 OINTMENT TOPICAL at 09:28

## 2021-01-01 RX ADMIN — KETOROLAC TROMETHAMINE 15 MG: 15 INJECTION, SOLUTION INTRAMUSCULAR; INTRAVENOUS at 07:57

## 2021-01-01 RX ADMIN — CLONIDINE HYDROCHLORIDE 0.1 MG: 0.1 TABLET ORAL at 10:00

## 2021-01-01 RX ADMIN — PROPRANOLOL HYDROCHLORIDE 10 MG: 10 TABLET ORAL at 21:26

## 2021-01-01 RX ADMIN — CYANOCOBALAMIN TAB 1000 MCG 2000 MCG: 1000 TAB at 21:05

## 2021-01-01 RX ADMIN — SODIUM CHLORIDE, PRESERVATIVE FREE 10 ML: 5 INJECTION INTRAVENOUS at 12:00

## 2021-01-01 RX ADMIN — LACTULOSE 10 G: 20 SOLUTION ORAL at 21:05

## 2021-01-01 RX ADMIN — OCTREOTIDE ACETATE 25 MCG/HR: 500 INJECTION, SOLUTION INTRAVENOUS; SUBCUTANEOUS at 10:49

## 2021-01-01 RX ADMIN — PANTOPRAZOLE SODIUM 40 MG: 40 INJECTION, POWDER, LYOPHILIZED, FOR SOLUTION INTRAVENOUS at 21:52

## 2021-01-01 RX ADMIN — LIDOCAINE HYDROCHLORIDE 60 MG: 20 INJECTION, SOLUTION INFILTRATION; PERINEURAL at 13:08

## 2021-01-01 RX ADMIN — Medication 10 ML: at 22:59

## 2021-01-01 RX ADMIN — MIRTAZAPINE 15 MG: 15 TABLET, FILM COATED ORAL at 20:37

## 2021-01-01 RX ADMIN — POTASSIUM CHLORIDE 10 MEQ: 7.46 INJECTION, SOLUTION INTRAVENOUS at 03:31

## 2021-01-01 RX ADMIN — Medication 10 ML: at 21:39

## 2021-01-01 RX ADMIN — LORAZEPAM 3 MG: 1 TABLET ORAL at 02:31

## 2021-01-01 RX ADMIN — OCTREOTIDE ACETATE 25 MCG/HR: 500 INJECTION, SOLUTION INTRAVENOUS; SUBCUTANEOUS at 12:18

## 2021-01-01 RX ADMIN — THIAMINE HCL TAB 100 MG 100 MG: 100 TAB at 09:46

## 2021-01-01 RX ADMIN — Medication 10 ML: at 10:01

## 2021-01-01 RX ADMIN — WATER 1000 MG: 1 INJECTION INTRAMUSCULAR; INTRAVENOUS; SUBCUTANEOUS at 06:27

## 2021-01-01 RX ADMIN — MIRTAZAPINE 15 MG: 15 TABLET, FILM COATED ORAL at 21:56

## 2021-01-01 RX ADMIN — Medication 10 ML: at 20:51

## 2021-01-01 RX ADMIN — LORAZEPAM 2 MG: 1 TABLET ORAL at 11:20

## 2021-01-01 RX ADMIN — CYANOCOBALAMIN TAB 1000 MCG 2000 MCG: 1000 TAB at 14:32

## 2021-01-01 RX ADMIN — IOPAMIDOL 80 ML: 755 INJECTION, SOLUTION INTRAVENOUS at 00:34

## 2021-01-01 RX ADMIN — PHYTONADIONE 10 MG: 10 INJECTION, EMULSION INTRAMUSCULAR; INTRAVENOUS; SUBCUTANEOUS at 10:19

## 2021-01-01 RX ADMIN — Medication 10 ML: at 09:49

## 2021-01-01 RX ADMIN — Medication 300 UNITS: at 21:39

## 2021-01-01 RX ADMIN — FOLIC ACID 1 MG: 1 TABLET ORAL at 09:47

## 2021-01-01 RX ADMIN — Medication 25 MG: at 09:54

## 2021-01-01 RX ADMIN — RIFAXIMIN 550 MG: 550 TABLET ORAL at 21:12

## 2021-01-01 RX ADMIN — LACTULOSE 20 G: 20 SOLUTION ORAL at 21:26

## 2021-01-01 RX ADMIN — ONDANSETRON 4 MG: 2 INJECTION INTRAMUSCULAR; INTRAVENOUS at 20:03

## 2021-01-01 RX ADMIN — PETROLATUM: 420 OINTMENT TOPICAL at 22:23

## 2021-01-01 RX ADMIN — PETROLATUM: 420 OINTMENT TOPICAL at 21:00

## 2021-01-01 RX ADMIN — Medication 10 ML: at 09:09

## 2021-01-01 RX ADMIN — CYANOCOBALAMIN TAB 1000 MCG 2000 MCG: 1000 TAB at 09:51

## 2021-01-01 RX ADMIN — LORAZEPAM 0.5 MG: 0.5 TABLET ORAL at 08:47

## 2021-01-01 RX ADMIN — PROPRANOLOL HYDROCHLORIDE 20 MG: 20 TABLET ORAL at 18:48

## 2021-01-01 RX ADMIN — PROPRANOLOL HYDROCHLORIDE 10 MG: 10 TABLET ORAL at 08:33

## 2021-01-01 RX ADMIN — CLONIDINE HYDROCHLORIDE 0.1 MG: 0.1 TABLET ORAL at 08:47

## 2021-01-01 RX ADMIN — SODIUM CHLORIDE, PRESERVATIVE FREE 10 ML: 5 INJECTION INTRAVENOUS at 21:00

## 2021-01-01 RX ADMIN — Medication 5 ML: at 11:50

## 2021-01-01 RX ADMIN — LORAZEPAM 2 MG: 1 TABLET ORAL at 09:22

## 2021-01-01 RX ADMIN — SODIUM CHLORIDE: 9 INJECTION, SOLUTION INTRAVENOUS at 19:31

## 2021-01-01 SDOH — ECONOMIC STABILITY: INCOME INSECURITY: IN THE LAST 12 MONTHS, WAS THERE A TIME WHEN YOU WERE NOT ABLE TO PAY THE MORTGAGE OR RENT ON TIME?: NO

## 2021-01-01 SDOH — HEALTH STABILITY: MENTAL HEALTH
STRESS IS WHEN SOMEONE FEELS TENSE, NERVOUS, ANXIOUS, OR CAN'T SLEEP AT NIGHT BECAUSE THEIR MIND IS TROUBLED. HOW STRESSED ARE YOU?: ONLY A LITTLE

## 2021-01-01 SDOH — SOCIAL STABILITY: SOCIAL INSECURITY
WITHIN THE LAST YEAR, HAVE YOU BEEN KICKED, HIT, SLAPPED, OR OTHERWISE PHYSICALLY HURT BY YOUR PARTNER OR EX-PARTNER?: NO

## 2021-01-01 SDOH — SOCIAL STABILITY: SOCIAL INSECURITY
WITHIN THE LAST YEAR, HAVE TO BEEN RAPED OR FORCED TO HAVE ANY KIND OF SEXUAL ACTIVITY BY YOUR PARTNER OR EX-PARTNER?: NO

## 2021-01-01 SDOH — ECONOMIC STABILITY: HOUSING INSECURITY: IN THE LAST 12 MONTHS, HOW MANY PLACES HAVE YOU LIVED?: 6

## 2021-01-01 SDOH — ECONOMIC STABILITY: HOUSING INSECURITY
IN THE LAST 12 MONTHS, WAS THERE A TIME WHEN YOU DID NOT HAVE A STEADY PLACE TO SLEEP OR SLEPT IN A SHELTER (INCLUDING NOW)?: NO

## 2021-01-01 SDOH — HEALTH STABILITY: MENTAL HEALTH: HOW OFTEN DO YOU HAVE A DRINK CONTAINING ALCOHOL?: NEVER

## 2021-01-01 SDOH — ECONOMIC STABILITY: TRANSPORTATION INSECURITY
IN THE PAST 12 MONTHS, HAS THE LACK OF TRANSPORTATION KEPT YOU FROM MEDICAL APPOINTMENTS OR FROM GETTING MEDICATIONS?: NO

## 2021-01-01 SDOH — SOCIAL STABILITY: SOCIAL INSECURITY: WITHIN THE LAST YEAR, HAVE YOU BEEN HUMILIATED OR EMOTIONALLY ABUSED IN OTHER WAYS BY YOUR PARTNER OR EX-PARTNER?: NO

## 2021-01-01 SDOH — SOCIAL STABILITY: SOCIAL NETWORK
DO YOU BELONG TO ANY CLUBS OR ORGANIZATIONS SUCH AS CHURCH GROUPS UNIONS, FRATERNAL OR ATHLETIC GROUPS, OR SCHOOL GROUPS?: YES

## 2021-01-01 SDOH — ECONOMIC STABILITY: INCOME INSECURITY: HOW HARD IS IT FOR YOU TO PAY FOR THE VERY BASICS LIKE FOOD, HOUSING, MEDICAL CARE, AND HEATING?: NOT HARD AT ALL

## 2021-01-01 SDOH — ECONOMIC STABILITY: FOOD INSECURITY: WITHIN THE PAST 12 MONTHS, THE FOOD YOU BOUGHT JUST DIDN'T LAST AND YOU DIDN'T HAVE MONEY TO GET MORE.: NEVER TRUE

## 2021-01-01 SDOH — SOCIAL STABILITY: SOCIAL INSECURITY: WITHIN THE LAST YEAR, HAVE YOU BEEN AFRAID OF YOUR PARTNER OR EX-PARTNER?: NO

## 2021-01-01 SDOH — ECONOMIC STABILITY: FOOD INSECURITY: WITHIN THE PAST 12 MONTHS, YOU WORRIED THAT YOUR FOOD WOULD RUN OUT BEFORE YOU GOT MONEY TO BUY MORE.: NEVER TRUE

## 2021-01-01 SDOH — SOCIAL STABILITY: SOCIAL NETWORK: HOW OFTEN DO YOU ATTEND CHURCH OR RELIGIOUS SERVICES?: MORE THAN 4 TIMES PER YEAR

## 2021-01-01 SDOH — ECONOMIC STABILITY: TRANSPORTATION INSECURITY
IN THE PAST 12 MONTHS, HAS LACK OF TRANSPORTATION KEPT YOU FROM MEETINGS, WORK, OR FROM GETTING THINGS NEEDED FOR DAILY LIVING?: NO

## 2021-01-01 SDOH — HEALTH STABILITY: PHYSICAL HEALTH: ON AVERAGE, HOW MANY DAYS PER WEEK DO YOU ENGAGE IN MODERATE TO STRENUOUS EXERCISE (LIKE A BRISK WALK)?: 0 DAYS

## 2021-01-01 SDOH — SOCIAL STABILITY: SOCIAL NETWORK
IN A TYPICAL WEEK, HOW MANY TIMES DO YOU TALK ON THE PHONE WITH FAMILY, FRIENDS, OR NEIGHBORS?: MORE THAN THREE TIMES A WEEK

## 2021-01-01 SDOH — SOCIAL STABILITY: SOCIAL NETWORK: HOW OFTEN DO YOU GET TOGETHER WITH FRIENDS OR RELATIVES?: THREE TIMES A WEEK

## 2021-01-01 SDOH — SOCIAL STABILITY: SOCIAL NETWORK: HOW OFTEN DO YOU ATTENT MEETINGS OF THE CLUB OR ORGANIZATION YOU BELONG TO?: MORE THAN 4 TIMES PER YEAR

## 2021-01-01 SDOH — SOCIAL STABILITY: SOCIAL NETWORK: ARE YOU MARRIED, WIDOWED, DIVORCED, SEPARATED, NEVER MARRIED, OR LIVING WITH A PARTNER?: DIVORCED

## 2021-01-01 ASSESSMENT — PAIN DESCRIPTION - PROGRESSION
CLINICAL_PROGRESSION: GRADUALLY WORSENING
CLINICAL_PROGRESSION: NOT CHANGED
CLINICAL_PROGRESSION: GRADUALLY WORSENING
CLINICAL_PROGRESSION: OTHER (COMMENT)
CLINICAL_PROGRESSION: NOT CHANGED
CLINICAL_PROGRESSION: GRADUALLY WORSENING
CLINICAL_PROGRESSION: OTHER (COMMENT)
CLINICAL_PROGRESSION: GRADUALLY WORSENING

## 2021-01-01 ASSESSMENT — PAIN SCALES - GENERAL
PAINLEVEL_OUTOF10: 0
PAINLEVEL_OUTOF10: 9
PAINLEVEL_OUTOF10: 2
PAINLEVEL_OUTOF10: 8
PAINLEVEL_OUTOF10: 0
PAINLEVEL_OUTOF10: 8
PAINLEVEL_OUTOF10: 0
PAINLEVEL_OUTOF10: 8
PAINLEVEL_OUTOF10: 0
PAINLEVEL_OUTOF10: 8
PAINLEVEL_OUTOF10: 0
PAINLEVEL_OUTOF10: 9
PAINLEVEL_OUTOF10: 0
PAINLEVEL_OUTOF10: 0
PAINLEVEL_OUTOF10: 7
PAINLEVEL_OUTOF10: 8
PAINLEVEL_OUTOF10: 0
PAINLEVEL_OUTOF10: 9
PAINLEVEL_OUTOF10: 0
PAINLEVEL_OUTOF10: 8
PAINLEVEL_OUTOF10: 0
PAINLEVEL_OUTOF10: 7
PAINLEVEL_OUTOF10: 4
PAINLEVEL_OUTOF10: 0
PAINLEVEL_OUTOF10: 8
PAINLEVEL_OUTOF10: 0
PAINLEVEL_OUTOF10: 0
PAINLEVEL_OUTOF10: 10
PAINLEVEL_OUTOF10: 8
PAINLEVEL_OUTOF10: 0
PAINLEVEL_OUTOF10: 3
PAINLEVEL_OUTOF10: 8
PAINLEVEL_OUTOF10: 9
PAINLEVEL_OUTOF10: 0
PAINLEVEL_OUTOF10: 1
PAINLEVEL_OUTOF10: 7
PAINLEVEL_OUTOF10: 0
PAINLEVEL_OUTOF10: 3
PAINLEVEL_OUTOF10: 7
PAINLEVEL_OUTOF10: 0
PAINLEVEL_OUTOF10: 2
PAINLEVEL_OUTOF10: 0
PAINLEVEL_OUTOF10: 4
PAINLEVEL_OUTOF10: 0
PAINLEVEL_OUTOF10: 8
PAINLEVEL_OUTOF10: 0
PAINLEVEL_OUTOF10: 7
PAINLEVEL_OUTOF10: 0
PAINLEVEL_OUTOF10: 8
PAINLEVEL_OUTOF10: 0
PAINLEVEL_OUTOF10: 8
PAINLEVEL_OUTOF10: 0
PAINLEVEL_OUTOF10: 7
PAINLEVEL_OUTOF10: 8
PAINLEVEL_OUTOF10: 2
PAINLEVEL_OUTOF10: 2
PAINLEVEL_OUTOF10: 0
PAINLEVEL_OUTOF10: 0
PAINLEVEL_OUTOF10: 7
PAINLEVEL_OUTOF10: 0
PAINLEVEL_OUTOF10: 2
PAINLEVEL_OUTOF10: 8
PAINLEVEL_OUTOF10: 0
PAINLEVEL_OUTOF10: 4
PAINLEVEL_OUTOF10: 0

## 2021-01-01 ASSESSMENT — PAIN DESCRIPTION - LOCATION
LOCATION: ABDOMEN

## 2021-01-01 ASSESSMENT — ENCOUNTER SYMPTOMS
TROUBLE SWALLOWING: 0
EYE DISCHARGE: 0
COUGH: 0
VOMITING: 0
EYE PAIN: 0
SHORTNESS OF BREATH: 0
ABDOMINAL DISTENTION: 1
DIARRHEA: 0
SHORTNESS OF BREATH: 0
COUGH: 0
COUGH: 0
BACK PAIN: 0
NAUSEA: 0
WHEEZING: 0
SORE THROAT: 0
SINUS PRESSURE: 0
VOMITING: 0
EYE PAIN: 0
APNEA: 0
SORE THROAT: 0
WHEEZING: 0
CONSTIPATION: 0
CHEST TIGHTNESS: 0
VOMITING: 1
VOMITING: 1
NAUSEA: 1
EYE PAIN: 0
ABDOMINAL DISTENTION: 0
SINUS PRESSURE: 0
BACK PAIN: 0
ABDOMINAL DISTENTION: 0
VOMITING: 0
ABDOMINAL PAIN: 1
SHORTNESS OF BREATH: 0
BACK PAIN: 0
DIARRHEA: 1
RHINORRHEA: 0
CHEST TIGHTNESS: 0
NAUSEA: 0
WHEEZING: 0
COUGH: 0
ABDOMINAL PAIN: 1
EYE REDNESS: 0
DIARRHEA: 1
DIARRHEA: 0
ABDOMINAL DISTENTION: 1
NAUSEA: 1
COLOR CHANGE: 1
COLOR CHANGE: 1
NAUSEA: 1
CHEST TIGHTNESS: 0
DIARRHEA: 0
COLOR CHANGE: 1
SORE THROAT: 0
ABDOMINAL PAIN: 1
BACK PAIN: 0
COUGH: 0
WHEEZING: 0
BLOOD IN STOOL: 0
EYE DISCHARGE: 0
SORE THROAT: 0
SHORTNESS OF BREATH: 0
SHORTNESS OF BREATH: 0
PHOTOPHOBIA: 0

## 2021-01-01 ASSESSMENT — PAIN DESCRIPTION - FREQUENCY
FREQUENCY: CONTINUOUS

## 2021-01-01 ASSESSMENT — PAIN DESCRIPTION - PAIN TYPE
TYPE: ACUTE PAIN

## 2021-01-01 ASSESSMENT — PAIN DESCRIPTION - ONSET
ONSET: GRADUAL
ONSET: ON-GOING

## 2021-01-01 ASSESSMENT — PAIN - FUNCTIONAL ASSESSMENT
PAIN_FUNCTIONAL_ASSESSMENT: ACTIVITIES ARE NOT PREVENTED
PAIN_FUNCTIONAL_ASSESSMENT: PREVENTS OR INTERFERES SOME ACTIVE ACTIVITIES AND ADLS
PAIN_FUNCTIONAL_ASSESSMENT: ACTIVITIES ARE NOT PREVENTED
PAIN_FUNCTIONAL_ASSESSMENT: PREVENTS OR INTERFERES WITH ALL ACTIVE AND SOME PASSIVE ACTIVITIES

## 2021-01-01 ASSESSMENT — PAIN DESCRIPTION - DESCRIPTORS
DESCRIPTORS: JABBING
DESCRIPTORS: DISCOMFORT
DESCRIPTORS: SHARP
DESCRIPTORS: SHARP;SHOOTING;STABBING
DESCRIPTORS: ACHING;DISCOMFORT;SORE;TENDER
DESCRIPTORS: SHARP;SHOOTING;STABBING
DESCRIPTORS: ACHING;SHARP
DESCRIPTORS: ACHING
DESCRIPTORS: SHARP

## 2021-01-01 ASSESSMENT — PAIN DESCRIPTION - ORIENTATION
ORIENTATION: RIGHT
ORIENTATION: LEFT
ORIENTATION: RIGHT;LEFT
ORIENTATION: RIGHT;LEFT
ORIENTATION: RIGHT;UPPER
ORIENTATION: RIGHT

## 2021-01-01 ASSESSMENT — PAIN SCALES - WONG BAKER: WONGBAKER_NUMERICALRESPONSE: 0

## 2021-01-20 ENCOUNTER — APPOINTMENT (OUTPATIENT)
Dept: GENERAL RADIOLOGY | Age: 46
End: 2021-01-20
Payer: COMMERCIAL

## 2021-01-20 ENCOUNTER — APPOINTMENT (OUTPATIENT)
Dept: CT IMAGING | Age: 46
End: 2021-01-20
Payer: COMMERCIAL

## 2021-01-20 ENCOUNTER — HOSPITAL ENCOUNTER (EMERGENCY)
Age: 46
Discharge: HOME | End: 2021-01-21
Attending: EMERGENCY MEDICINE
Payer: COMMERCIAL

## 2021-01-20 DIAGNOSIS — F10.10 ALCOHOL ABUSE: Primary | ICD-10-CM

## 2021-01-20 DIAGNOSIS — K61.2 ABSCESS OF ANAL OR RECTAL REGION: ICD-10-CM

## 2021-01-20 DIAGNOSIS — K70.30 ALCOHOLIC CIRRHOSIS OF LIVER WITHOUT ASCITES (HCC): ICD-10-CM

## 2021-01-20 DIAGNOSIS — F10.921 ACUTE ALCOHOLIC INTOXICATION WITH DELIRIUM (HCC): ICD-10-CM

## 2021-01-20 LAB
ACETAMINOPHEN LEVEL: <5 MCG/ML (ref 10–30)
ALBUMIN SERPL-MCNC: 3.8 G/DL (ref 3.5–5.2)
ALP BLD-CCNC: 173 U/L (ref 35–104)
ALT SERPL-CCNC: 46 U/L (ref 0–32)
AMMONIA: 57 UMOL/L (ref 11–51)
AMYLASE: 163 U/L (ref 20–100)
ANION GAP SERPL CALCULATED.3IONS-SCNC: 17 MMOL/L (ref 7–16)
APTT: 41.4 SEC (ref 24.5–35.1)
AST SERPL-CCNC: 111 U/L (ref 0–31)
B.E.: -0.7 MMOL/L (ref -3–3)
BASOPHILS ABSOLUTE: 0.1 E9/L (ref 0–0.2)
BASOPHILS RELATIVE PERCENT: 1.1 % (ref 0–2)
BILIRUB SERPL-MCNC: 4.9 MG/DL (ref 0–1.2)
BUN BLDV-MCNC: 3 MG/DL (ref 6–20)
CALCIUM SERPL-MCNC: 9.3 MG/DL (ref 8.6–10.2)
CHLORIDE BLD-SCNC: 102 MMOL/L (ref 98–107)
CO2: 22 MMOL/L (ref 22–29)
COHB: 0.2 % (ref 0–1.5)
CREAT SERPL-MCNC: 0.9 MG/DL (ref 0.5–1)
CRITICAL: ABNORMAL
DATE ANALYZED: ABNORMAL
DATE OF COLLECTION: ABNORMAL
EOSINOPHILS ABSOLUTE: 0.18 E9/L (ref 0.05–0.5)
EOSINOPHILS RELATIVE PERCENT: 2 % (ref 0–6)
ETHANOL: 267 MG/DL (ref 0–0.08)
GFR AFRICAN AMERICAN: >60
GFR NON-AFRICAN AMERICAN: >60 ML/MIN/1.73
GLUCOSE BLD-MCNC: 114 MG/DL (ref 74–99)
HCO3: 21.4 MMOL/L (ref 22–26)
HCT VFR BLD CALC: 36.6 % (ref 34–48)
HEMOGLOBIN: 12.2 G/DL (ref 11.5–15.5)
HHB: 2.3 % (ref 0–5)
IMMATURE GRANULOCYTES #: 0.03 E9/L
IMMATURE GRANULOCYTES %: 0.3 % (ref 0–5)
INR BLD: 1.7
LAB: ABNORMAL
LACTIC ACID, SEPSIS: 3.9 MMOL/L (ref 0.5–1.9)
LIPASE: 38 U/L (ref 13–60)
LYMPHOCYTES ABSOLUTE: 2.67 E9/L (ref 1.5–4)
LYMPHOCYTES RELATIVE PERCENT: 29.3 % (ref 20–42)
Lab: ABNORMAL
MAGNESIUM: 1.3 MG/DL (ref 1.6–2.6)
MCH RBC QN AUTO: 31 PG (ref 26–35)
MCHC RBC AUTO-ENTMCNC: 33.3 % (ref 32–34.5)
MCV RBC AUTO: 92.9 FL (ref 80–99.9)
METHB: 0.3 % (ref 0–1.5)
MODE: ABNORMAL
MONOCYTES ABSOLUTE: 0.48 E9/L (ref 0.1–0.95)
MONOCYTES RELATIVE PERCENT: 5.3 % (ref 2–12)
NEUTROPHILS ABSOLUTE: 5.64 E9/L (ref 1.8–7.3)
NEUTROPHILS RELATIVE PERCENT: 62 % (ref 43–80)
O2 CONTENT: 19.6 ML/DL
O2 SATURATION: 97.7 % (ref 92–98.5)
O2HB: 97.2 % (ref 94–97)
OPERATOR ID: ABNORMAL
PATIENT TEMP: 37 C
PCO2: 28.7 MMHG (ref 35–45)
PDW BLD-RTO: 12.9 FL (ref 11.5–15)
PH BLOOD GAS: 7.49 (ref 7.35–7.45)
PLATELET # BLD: 183 E9/L (ref 130–450)
PMV BLD AUTO: 11.4 FL (ref 7–12)
PO2: 104.9 MMHG (ref 75–100)
POTASSIUM SERPL-SCNC: 3.2 MMOL/L (ref 3.5–5)
PROTHROMBIN TIME: 19.6 SEC (ref 9.3–12.4)
RBC # BLD: 3.94 E12/L (ref 3.5–5.5)
SALICYLATE, SERUM: <0.3 MG/DL (ref 0–30)
SODIUM BLD-SCNC: 141 MMOL/L (ref 132–146)
SOURCE, BLOOD GAS: ABNORMAL
THB: 14.3 G/DL (ref 11.5–16.5)
TIME ANALYZED: 2159
TOTAL PROTEIN: 8.6 G/DL (ref 6.4–8.3)
TROPONIN: 0.03 NG/ML (ref 0–0.03)
WBC # BLD: 9.1 E9/L (ref 4.5–11.5)

## 2021-01-20 PROCEDURE — 85610 PROTHROMBIN TIME: CPT

## 2021-01-20 PROCEDURE — 6360000002 HC RX W HCPCS: Performed by: EMERGENCY MEDICINE

## 2021-01-20 PROCEDURE — 99285 EMERGENCY DEPT VISIT HI MDM: CPT | Performed by: EMERGENCY MEDICINE

## 2021-01-20 PROCEDURE — 83735 ASSAY OF MAGNESIUM: CPT

## 2021-01-20 PROCEDURE — 83690 ASSAY OF LIPASE: CPT

## 2021-01-20 PROCEDURE — 6370000000 HC RX 637 (ALT 250 FOR IP): Performed by: EMERGENCY MEDICINE

## 2021-01-20 PROCEDURE — 80053 COMPREHEN METABOLIC PANEL: CPT

## 2021-01-20 PROCEDURE — 96366 THER/PROPH/DIAG IV INF ADDON: CPT | Performed by: EMERGENCY MEDICINE

## 2021-01-20 PROCEDURE — 82150 ASSAY OF AMYLASE: CPT

## 2021-01-20 PROCEDURE — 82805 BLOOD GASES W/O2 SATURATION: CPT

## 2021-01-20 PROCEDURE — 96374 THER/PROPH/DIAG INJ IV PUSH: CPT | Performed by: EMERGENCY MEDICINE

## 2021-01-20 PROCEDURE — 93005 ELECTROCARDIOGRAM TRACING: CPT | Performed by: EMERGENCY MEDICINE

## 2021-01-20 PROCEDURE — 84484 ASSAY OF TROPONIN QUANT: CPT

## 2021-01-20 PROCEDURE — 2500000003 HC RX 250 WO HCPCS: Performed by: EMERGENCY MEDICINE

## 2021-01-20 PROCEDURE — 87040 BLOOD CULTURE FOR BACTERIA: CPT

## 2021-01-20 PROCEDURE — 74177 CT ABD & PELVIS W/CONTRAST: CPT

## 2021-01-20 PROCEDURE — 83605 ASSAY OF LACTIC ACID: CPT

## 2021-01-20 PROCEDURE — 96365 THER/PROPH/DIAG IV INF INIT: CPT | Performed by: EMERGENCY MEDICINE

## 2021-01-20 PROCEDURE — 82140 ASSAY OF AMMONIA: CPT

## 2021-01-20 PROCEDURE — 87150 DNA/RNA AMPLIFIED PROBE: CPT

## 2021-01-20 PROCEDURE — 2580000003 HC RX 258: Performed by: EMERGENCY MEDICINE

## 2021-01-20 PROCEDURE — G0480 DRUG TEST DEF 1-7 CLASSES: HCPCS

## 2021-01-20 PROCEDURE — 87077 CULTURE AEROBIC IDENTIFY: CPT

## 2021-01-20 PROCEDURE — 70450 CT HEAD/BRAIN W/O DYE: CPT

## 2021-01-20 PROCEDURE — 87186 SC STD MICRODIL/AGAR DIL: CPT

## 2021-01-20 PROCEDURE — 80307 DRUG TEST PRSMV CHEM ANLYZR: CPT

## 2021-01-20 PROCEDURE — 85025 COMPLETE CBC W/AUTO DIFF WBC: CPT

## 2021-01-20 PROCEDURE — 85730 THROMBOPLASTIN TIME PARTIAL: CPT

## 2021-01-20 PROCEDURE — 71045 X-RAY EXAM CHEST 1 VIEW: CPT

## 2021-01-20 PROCEDURE — 6360000004 HC RX CONTRAST MEDICATION: Performed by: RADIOLOGY

## 2021-01-20 RX ORDER — POTASSIUM BICARBONATE 25 MEQ/1
50 TABLET, EFFERVESCENT ORAL ONCE
Status: DISCONTINUED | OUTPATIENT
Start: 2021-01-20 | End: 2021-01-20 | Stop reason: CLARIF

## 2021-01-20 RX ORDER — LORAZEPAM 2 MG/ML
1 INJECTION INTRAMUSCULAR ONCE
Status: COMPLETED | OUTPATIENT
Start: 2021-01-20 | End: 2021-01-20

## 2021-01-20 RX ORDER — 0.9 % SODIUM CHLORIDE 0.9 %
1000 INTRAVENOUS SOLUTION INTRAVENOUS ONCE
Status: COMPLETED | OUTPATIENT
Start: 2021-01-20 | End: 2021-01-21

## 2021-01-20 RX ADMIN — FOLIC ACID: 5 INJECTION, SOLUTION INTRAMUSCULAR; INTRAVENOUS; SUBCUTANEOUS at 22:32

## 2021-01-20 RX ADMIN — LORAZEPAM 1 MG: 2 INJECTION INTRAMUSCULAR; INTRAVENOUS at 21:44

## 2021-01-20 RX ADMIN — IOPAMIDOL 75 ML: 755 INJECTION, SOLUTION INTRAVENOUS at 23:08

## 2021-01-20 RX ADMIN — POTASSIUM BICARBONATE 40 MEQ: 782 TABLET, EFFERVESCENT ORAL at 22:40

## 2021-01-20 RX ADMIN — SODIUM CHLORIDE 1000 ML: 9 INJECTION, SOLUTION INTRAVENOUS at 21:44

## 2021-01-20 ASSESSMENT — ENCOUNTER SYMPTOMS
ABDOMINAL PAIN: 0
SHORTNESS OF BREATH: 0
COUGH: 0
SINUS PRESSURE: 0
WHEEZING: 0
VOMITING: 0
CHEST TIGHTNESS: 0
BACK PAIN: 0
NAUSEA: 0
SORE THROAT: 0
DIARRHEA: 0

## 2021-01-21 VITALS
OXYGEN SATURATION: 99 % | DIASTOLIC BLOOD PRESSURE: 89 MMHG | SYSTOLIC BLOOD PRESSURE: 139 MMHG | RESPIRATION RATE: 16 BRPM | HEART RATE: 102 BPM | BODY MASS INDEX: 33.45 KG/M2 | WEIGHT: 220 LBS | TEMPERATURE: 97.7 F

## 2021-01-21 LAB
EKG ATRIAL RATE: 117 BPM
EKG P AXIS: 38 DEGREES
EKG P-R INTERVAL: 126 MS
EKG Q-T INTERVAL: 336 MS
EKG QRS DURATION: 78 MS
EKG QTC CALCULATION (BAZETT): 468 MS
EKG R AXIS: 8 DEGREES
EKG T AXIS: 27 DEGREES
EKG VENTRICULAR RATE: 117 BPM

## 2021-01-21 PROCEDURE — 2580000003 HC RX 258: Performed by: EMERGENCY MEDICINE

## 2021-01-21 PROCEDURE — 6370000000 HC RX 637 (ALT 250 FOR IP): Performed by: EMERGENCY MEDICINE

## 2021-01-21 PROCEDURE — 87150 DNA/RNA AMPLIFIED PROBE: CPT

## 2021-01-21 PROCEDURE — 93010 ELECTROCARDIOGRAM REPORT: CPT | Performed by: INTERNAL MEDICINE

## 2021-01-21 RX ORDER — CEFDINIR 300 MG/1
300 CAPSULE ORAL ONCE
Status: COMPLETED | OUTPATIENT
Start: 2021-01-21 | End: 2021-01-21

## 2021-01-21 RX ORDER — 0.9 % SODIUM CHLORIDE 0.9 %
1000 INTRAVENOUS SOLUTION INTRAVENOUS ONCE
Status: COMPLETED | OUTPATIENT
Start: 2021-01-21 | End: 2021-01-21

## 2021-01-21 RX ORDER — CEFDINIR 300 MG/1
300 CAPSULE ORAL 2 TIMES DAILY
Qty: 20 CAPSULE | Refills: 0 | Status: ON HOLD | OUTPATIENT
Start: 2021-01-21 | End: 2021-02-01 | Stop reason: HOSPADM

## 2021-01-21 RX ADMIN — CEFDINIR 300 MG: 300 CAPSULE ORAL at 00:08

## 2021-01-21 RX ADMIN — SODIUM CHLORIDE 1000 ML: 9 INJECTION, SOLUTION INTRAVENOUS at 00:08

## 2021-01-21 NOTE — ED NOTES
Assisted Dr. Riya Meneses with rectal exam. CT showed possible abscess. Patient denies pain or complication. Upon exam, patient is noted to having small abscess to right buttocks near rectum. Abscess open and draining serosanguinous fluid.      Jolly Shea RN  01/21/21 0678

## 2021-01-21 NOTE — ED PROVIDER NOTES
Chief complaint:   alcohol abuse      HPI History provided by the patient     The patient comes in for alcohol abuse problems. Was drinking earlier today. States she is really not sure why she is here but that her mother made her come in. She denies any chest pain, palpitations or shortness of breath. Denies abdominal pain. No headache. No extremity numbness, tingling, paresthesias or weakness. Denies suicidal ideation, plan or thought. Admits to alcohol consumption and abuse. States she is really not sure otherwise why she is here. No treatment prior to arrival.  No current symptoms. Review of Systems   Constitutional: Negative for chills, diaphoresis, fatigue and fever. HENT: Negative for congestion, sinus pressure and sore throat. Respiratory: Negative for cough, chest tightness, shortness of breath and wheezing. Cardiovascular: Negative for chest pain and palpitations. Gastrointestinal: Negative for abdominal pain, diarrhea, nausea and vomiting. Genitourinary: Negative for dysuria, flank pain and frequency. Musculoskeletal: Negative for arthralgias, back pain, gait problem, joint swelling, myalgias, neck pain and neck stiffness. Skin: Negative for rash and wound. Neurological: Negative for dizziness, seizures, syncope, weakness and headaches. Hematological: Negative for adenopathy. Psychiatric/Behavioral: Negative for self-injury and suicidal ideas. All other systems reviewed and are negative. Physical Exam  Vitals signs and nursing note reviewed. Constitutional:       General: She is not in acute distress. Appearance: She is well-developed. She is not ill-appearing, toxic-appearing or diaphoretic. HENT:      Head: Normocephalic and atraumatic. Mouth/Throat:      Mouth: Mucous membranes are dry. Eyes:      General: Scleral icterus present. Extraocular Movements: Extraocular movements intact. Right eye: No nystagmus. Left eye: No nystagmus. Conjunctiva/sclera: Conjunctivae normal.      Pupils: Pupils are equal, round, and reactive to light. Neck:      Musculoskeletal: Normal range of motion and neck supple. Normal range of motion. No neck rigidity, injury, pain with movement, spinous process tenderness or muscular tenderness. Trachea: Trachea and phonation normal. No tracheal tenderness or tracheal deviation. Cardiovascular:      Rate and Rhythm: Regular rhythm. Tachycardia present. Heart sounds: Normal heart sounds. No murmur. Pulmonary:      Effort: Pulmonary effort is normal. No respiratory distress. Breath sounds: Normal breath sounds. No stridor, decreased air movement or transmitted upper airway sounds. No decreased breath sounds, wheezing, rhonchi or rales. Chest:      Chest wall: No tenderness. Abdominal:      General: Bowel sounds are normal. There is no distension. Palpations: Abdomen is soft. Tenderness: There is no abdominal tenderness. There is no right CVA tenderness, left CVA tenderness, guarding or rebound. Comments: Abdomen soft and nontender. Musculoskeletal:         General: No swelling, tenderness, deformity or signs of injury. Right lower leg: No edema. Left lower leg: No edema. Comments: Arms and legs are all neurovascular intact with no sign of acute bone or joint injury no pretibial edema or calf pain. Lymphadenopathy:      Cervical: No cervical adenopathy. Skin:     General: Skin is warm and dry. Coloration: Skin is jaundiced. Skin is not cyanotic, mottled or pale. Findings: No erythema or rash. Neurological:      General: No focal deficit present. Mental Status: She is alert. She is disoriented. GCS: GCS eye subscore is 4. GCS verbal subscore is 5. GCS motor subscore is 6. Cranial Nerves: Cranial nerves are intact. No cranial nerve deficit. Sensory: Sensation is intact. Motor: Motor function is intact. Coordination: Coordination is intact. Coordination normal.      Gait: Gait is intact. Comments: Patient oriented to person, not oriented to time or place. No focal neurologic deficit          Procedures     [facility]     ED Course as of Jan 21 0006 Wed Jan 20, 2021   2317 Patient laying in the bed in no distress. Updated on work-up results at this point. Has no complaints at this time. Her physical exam is generally unchanged, she is awake and alert. [NC]   5722 Rectal exam with nurse Kip Reeves present shows a right perirectal, perianal area fullness with pinhole mild drainage and very minimal tenderness with mild induration in that area. Consistent with her CT findings. No gross or extensive cellulitis around it. Patient has been up and ambulating twice now to the restroom on her own well with no difficulty per nursing staff. She is currently awake and alert and now oriented x3 stating she knows she has a history of alcohol abuse as well as cirrhosis and she needs to stop drinking. She states her sister will come and get her, she tells me her address and assures me she is comfortable going home at this time and is awake and alert and oriented now. [NC]      ED Course User Index  [NC] Kiah Pacheco, DO          EKG Interpretation    Interpreted by emergency department physician    Rhythm: sinus tachycardia  Rate: 117  Axis: normal  Ectopy: none  Conduction: normal  ST Segments: no acute change  T Waves: no acute change  Q Waves: none    Clinical Impression: sinus tachycardia    [facility]       ED Course as of Jan 21 0006 Wed Jan 20, 2021   2317 Patient laying in the bed in no distress. Updated on work-up results at this point. Has no complaints at this time. Her physical exam is generally unchanged, she is awake and alert.     [NC] 0801 Rectal exam with nurse Marcial Ray present shows a right perirectal, perianal area fullness with pinhole mild drainage and very minimal tenderness with mild induration in that area. Consistent with her CT findings. No gross or extensive cellulitis around it. Patient has been up and ambulating twice now to the restroom on her own well with no difficulty per nursing staff. She is currently awake and alert and now oriented x3 stating she knows she has a history of alcohol abuse as well as cirrhosis and she needs to stop drinking. She states her sister will come and get her, she tells me her address and assures me she is comfortable going home at this time and is awake and alert and oriented now. [NC]      ED Course User Index  [NC] Daniel Mast, DO       --------------------------------------------- PAST HISTORY ---------------------------------------------  Past Medical History:  has a past medical history of Acute renal failure (ARF) ([facility] [address] Anxiety, Crohn's colitis ([facility] [address] Depression, History of blood transfusion, Pyoderma gangrenosum, and Thyroid disease. Past Surgical History:  has a past surgical history that includes Abdomen surgery; Arm Debridement; Colonoscopy; Endoscopy, colon, diagnostic; and Dilatation, esophagus. Social History:  reports that she has never smoked. She has never used smokeless tobacco. She reports current alcohol use of about 2.0 standard drinks of alcohol per week. She reports previous drug use. Family History: family history includes Cancer in her mother; Heart Disease in her father; High Blood Pressure in her sister. The patients home medications have been reviewed. Allergies: Patient has no known allergies.     -------------------------------------------------- RESULTS -------------------------------------------------  Labs:  Results for orders placed or performed during the hospital encounter of 01/20/21   Lactate, Sepsis   Result Value Ref Range Lactic Acid, Sepsis 3.9 (H) 0.5 - 1.9 mmol/L   CBC Auto Differential   Result Value Ref Range    WBC 9.1 4.5 - 11.5 E9/L    RBC 3.94 3.50 - 5.50 E12/L    Hemoglobin 12.2 11.5 - 15.5 g/dL    Hematocrit 36.6 34.0 - 48.0 %    MCV 92.9 80.0 - 99.9 fL    MCH 31.0 26.0 - 35.0 pg    MCHC 33.3 32.0 - 34.5 %    RDW 12.9 11.5 - 15.0 fL    Platelets 240 589 - 504 E9/L    MPV 11.4 7.0 - 12.0 fL    Neutrophils % 62.0 43.0 - 80.0 %    Immature Granulocytes % 0.3 0.0 - 5.0 %    Lymphocytes % 29.3 20.0 - 42.0 %    Monocytes % 5.3 2.0 - 12.0 %    Eosinophils % 2.0 0.0 - 6.0 %    Basophils % 1.1 0.0 - 2.0 %    Neutrophils Absolute 5.64 1.80 - 7.30 E9/L    Immature Granulocytes # 0.03 E9/L    Lymphocytes Absolute 2.67 1.50 - 4.00 E9/L    Monocytes Absolute 0.48 0.10 - 0.95 E9/L    Eosinophils Absolute 0.18 0.05 - 0.50 E9/L    Basophils Absolute 0.10 0.00 - 0.20 E9/L   Comprehensive Metabolic Panel   Result Value Ref Range    Sodium 141 132 - 146 mmol/L    Potassium 3.2 (L) 3.5 - 5.0 mmol/L    Chloride 102 98 - 107 mmol/L    CO2 22 22 - 29 mmol/L    Anion Gap 17 (H) 7 - 16 mmol/L    Glucose 114 (H) 74 - 99 mg/dL    BUN 3 (L) 6 - 20 mg/dL    CREATININE 0.9 0.5 - 1.0 mg/dL    GFR Non-African American >60 >=60 mL/min/1.73    GFR African American >60     Calcium 9.3 8.6 - 10.2 mg/dL    Total Protein 8.6 (H) 6.4 - 8.3 g/dL    Alb 3.8 3.5 - 5.2 g/dL    Total Bilirubin 4.9 (H) 0.0 - 1.2 mg/dL    Alkaline Phosphatase 173 (H) 35 - 104 U/L    ALT 46 (H) 0 - 32 U/L     (H) 0 - 31 U/L   Protime-INR   Result Value Ref Range    Protime 19.6 (H) 9.3 - 12.4 sec    INR 1.7    APTT   Result Value Ref Range    aPTT 41.4 (H) 24.5 - 35.1 sec   Magnesium   Result Value Ref Range    Magnesium 1.3 (L) 1.6 - 2.6 mg/dL   Lipase   Result Value Ref Range    Lipase 38 13 - 60 U/L   Amylase   Result Value Ref Range    Amylase 163 (H) 20 - 100 U/L   Ammonia   Result Value Ref Range    Ammonia 57.0 (H) 11.0 - 51.0 umol/L   Troponin   Result Value Ref Range Troponin 0.03 0.00 - 0.03 ng/mL   Acetaminophen Level   Result Value Ref Range    Acetaminophen Level <5.0 (L) 10.0 - 30.0 mcg/mL   Ethanol   Result Value Ref Range    Ethanol Lvl 866 mg/dL   Salicylate   Result Value Ref Range    Salicylate, Serum <2.8 0.0 - 30.0 mg/dL   Blood Gas, Arterial   Result Value Ref Range    Date Analyzed 20210120     Time Analyzed 2159     Source: Blood Arterial     pH, Blood Gas 7.490 (H) 7.350 - 7.450    PCO2 28.7 (L) 35.0 - 45.0 mmHg    PO2 104.9 (H) 75.0 - 100.0 mmHg    HCO3 21.4 (L) 22.0 - 26.0 mmol/L    B.E. -0.7 -3.0 - 3.0 mmol/L    O2 Sat 97.7 92.0 - 98.5 %    O2Hb 97.2 (H) 94.0 - 97.0 %    COHb 0.2 0.0 - 1.5 %    MetHb 0.3 0.0 - 1.5 %    O2 Content 19.6 mL/dL    HHb 2.3 0.0 - 5.0 %    tHb (est) 14.3 11.5 - 16.5 g/dL    Mode RA     Date Of Collection      Time Collected      Pt Temp 37.0 C     ID [id number]     Lab [id number]     Critical(s) Notified . No Critical Values    EKG 12 Lead   Result Value Ref Range    Ventricular Rate 117 BPM    Atrial Rate 117 BPM    P-R Interval 126 ms    QRS Duration 78 ms    Q-T Interval 336 ms    QTc Calculation (Bazett) 468 ms    P Axis 38 degrees    R Axis 8 degrees    T Axis 27 degrees       Radiology:  CT ABDOMEN PELVIS W IV CONTRAST Additional Contrast? None   Final Result   Hepatomegaly with hypertrophy of the left lobe of the liver which may be seen   with cirrhosis. Mild splenomegaly. Paraesophageal varices and enlarged portosystemic   collaterals, consistent with portal hypertension. Distended gallbladder with no evidence of gallstones. Status post partial colectomy with anastomotic surgical changes in the   sigmoid colon   Partially visualized thickening of soft tissues in the right perianal region   and extending to the subcutaneous space of the right gluteal fold with   subcutaneous air, which may represent perianal abscess, the complete extent   of which is not included on the images obtained.   Clinical correlation recommended. Stable periumbilical ventral hernia with herniation of segments of small   bowel and no evidence of complications. Stable cystic structure in the right adnexa measuring approximately 3.7 cm in   diameter, also present on the prior examination. CT HEAD WO CONTRAST   Final Result   No acute intracranial abnormality. MRI would be useful if symptoms persist.      XR CHEST 1 VIEW   Final Result   No acute process. ------------------------- NURSING NOTES AND VITALS REVIEWED ---------------------------  Date / Time Roomed:  1/20/2021  8:33 PM  ED Bed Assignment:  07/07    The nursing notes within the ED encounter and vital signs as below have been reviewed. BP (!) 157/95   Pulse 117   Temp 97.7 °F (36.5 °C) (Oral)   Resp 21   Wt 220 lb (99.8 kg)   SpO2 96%   BMI 33.45 kg/m²   Oxygen Saturation Interpretation: Normal      ------------------------------------------ PROGRESS NOTES ------------------------------------------  I have spoken with the patient and discussed todays results, in addition to providing specific details for the plan of care and counseling regarding the diagnosis and prognosis. Their questions are answered at this time and they are agreeable with the plan. I discussed at length with them reasons for immediate return here for re evaluation. They will followup with primary care by calling their office tomorrow. --------------------------------- ADDITIONAL PROVIDER NOTES ---------------------------------  At this time the patient is without objective evidence of an acute process requiring hospitalization or inpatient management. They have remained hemodynamically stable throughout their entire ED visit and are stable for discharge with outpatient follow-up. The plan has been discussed in detail and they are aware of the specific conditions for emergent return, as well as the importance of follow-up.       New Prescriptions CEFDINIR (OMNICEF) 300 MG CAPSULE    Take 1 capsule by mouth 2 times daily for 10 days       Diagnosis:  1. Alcohol abuse    2. Acute alcoholic intoxication with delirium ([facility] [address]    3. Alcoholic cirrhosis of liver without ascites ([facility] [address]    4. Abscess of anal or rectal region        Disposition:  Patient's disposition: Discharge to home  Patient's condition is stable.          Lelo White DO  01/21/21 0006

## 2021-01-21 NOTE — ED NOTES
No one answering at number provided. Will continue to try.      Patricia Christianson RN  01/21/21 3913

## 2021-01-22 LAB
ACINETOBACTER BAUMANNII BY PCR: NOT DETECTED
ACINETOBACTER BAUMANNII BY PCR: NOT DETECTED
BOTTLE TYPE: ABNORMAL
BOTTLE TYPE: NORMAL
CANDIDA ALBICANS BY PCR: NOT DETECTED
CANDIDA ALBICANS BY PCR: NOT DETECTED
CANDIDA GLABRATA BY PCR: NOT DETECTED
CANDIDA GLABRATA BY PCR: NOT DETECTED
CANDIDA KRUSEI BY PCR: NOT DETECTED
CANDIDA KRUSEI BY PCR: NOT DETECTED
CANDIDA PARAPSILOSIS BY PCR: NOT DETECTED
CANDIDA PARAPSILOSIS BY PCR: NOT DETECTED
CANDIDA TROPICALIS BY PCR: NOT DETECTED
CANDIDA TROPICALIS BY PCR: NOT DETECTED
ENTEROBACTER CLOACAE COMPLEX BY PCR: NOT DETECTED
ENTEROBACTER CLOACAE COMPLEX BY PCR: NOT DETECTED
ENTEROBACTERALES BY PCR: NOT DETECTED
ENTEROBACTERALES BY PCR: NOT DETECTED
ENTEROCOCCUS BY PCR: DETECTED
ENTEROCOCCUS BY PCR: NOT DETECTED
ESCHERICHIA COLI BY PCR: NOT DETECTED
ESCHERICHIA COLI BY PCR: NOT DETECTED
HAEMOPHILUS INFLUENZAE BY PCR: NOT DETECTED
HAEMOPHILUS INFLUENZAE BY PCR: NOT DETECTED
KLEBSIELLA OXYTOCA BY PCR: NOT DETECTED
KLEBSIELLA OXYTOCA BY PCR: NOT DETECTED
KLEBSIELLA PNEUMONIAE GROUP BY PCR: NOT DETECTED
KLEBSIELLA PNEUMONIAE GROUP BY PCR: NOT DETECTED
LISTERIA MONOCYTOGENES BY PCR: NOT DETECTED
LISTERIA MONOCYTOGENES BY PCR: NOT DETECTED
NEISSERIA MENINGITIDIS BY PCR: NOT DETECTED
NEISSERIA MENINGITIDIS BY PCR: NOT DETECTED
ORDER NUMBER: ABNORMAL
ORDER NUMBER: NORMAL
PROTEUS SPECIES BY PCR: NOT DETECTED
PROTEUS SPECIES BY PCR: NOT DETECTED
PSEUDOMONAS AERUGINOSA BY PCR: NOT DETECTED
PSEUDOMONAS AERUGINOSA BY PCR: NOT DETECTED
SERRATIA MARCESCENS BY PCR: NOT DETECTED
SERRATIA MARCESCENS BY PCR: NOT DETECTED
SOURCE OF BLOOD CULTURE: ABNORMAL
SOURCE OF BLOOD CULTURE: NORMAL
STAPHYLOCOCCUS AUREUS BY PCR: NOT DETECTED
STAPHYLOCOCCUS AUREUS BY PCR: NOT DETECTED
STAPHYLOCOCCUS SPECIES BY PCR: NOT DETECTED
STAPHYLOCOCCUS SPECIES BY PCR: NOT DETECTED
STREPTOCOCCUS AGALACTIAE BY PCR: NOT DETECTED
STREPTOCOCCUS AGALACTIAE BY PCR: NOT DETECTED
STREPTOCOCCUS PNEUMONIAE BY PCR: NOT DETECTED
STREPTOCOCCUS PNEUMONIAE BY PCR: NOT DETECTED
STREPTOCOCCUS PYOGENES  BY PCR: NOT DETECTED
STREPTOCOCCUS PYOGENES  BY PCR: NOT DETECTED
STREPTOCOCCUS SPECIES BY PCR: NOT DETECTED
STREPTOCOCCUS SPECIES BY PCR: NOT DETECTED
VANCOMYCIN RESISTANT BY PCR: NOT DETECTED

## 2021-01-22 NOTE — ED NOTES
Contacted pt at home- she stated she would come back in today for reevaluation per Dr Rock Parikh, RN  01/22/21 4528

## 2021-01-26 LAB
ORGANISM: ABNORMAL
ORGANISM: ABNORMAL

## 2021-01-27 ENCOUNTER — APPOINTMENT (OUTPATIENT)
Dept: GENERAL RADIOLOGY | Age: 46
DRG: 433 | End: 2021-01-27
Payer: COMMERCIAL

## 2021-01-27 ENCOUNTER — HOSPITAL ENCOUNTER (INPATIENT)
Age: 46
LOS: 5 days | Discharge: NURSING, REHAB OR LONG-TERM CARE | DRG: 433 | End: 2021-02-01
Attending: EMERGENCY MEDICINE | Admitting: INTERNAL MEDICINE
Payer: COMMERCIAL

## 2021-01-27 ENCOUNTER — APPOINTMENT (OUTPATIENT)
Dept: CT IMAGING | Age: 46
DRG: 433 | End: 2021-01-27
Payer: COMMERCIAL

## 2021-01-27 DIAGNOSIS — K70.30 ALCOHOLIC CIRRHOSIS OF LIVER WITHOUT ASCITES (HCC): ICD-10-CM

## 2021-01-27 DIAGNOSIS — E87.6 HYPOKALEMIA: ICD-10-CM

## 2021-01-27 DIAGNOSIS — R11.2 NON-INTRACTABLE VOMITING WITH NAUSEA, UNSPECIFIED VOMITING TYPE: ICD-10-CM

## 2021-01-27 DIAGNOSIS — E83.42 HYPOMAGNESEMIA: ICD-10-CM

## 2021-01-27 DIAGNOSIS — N39.0 URINARY TRACT INFECTION WITHOUT HEMATURIA, SITE UNSPECIFIED: ICD-10-CM

## 2021-01-27 DIAGNOSIS — F10.930 ALCOHOL WITHDRAWAL SYNDROME WITHOUT COMPLICATION (HCC): Primary | ICD-10-CM

## 2021-01-27 PROBLEM — F10.139 ALCOHOL ABUSE WITH WITHDRAWAL (HCC): Status: ACTIVE | Noted: 2021-01-27

## 2021-01-27 LAB
ALBUMIN SERPL-MCNC: 3.5 G/DL (ref 3.5–5.2)
ALP BLD-CCNC: 270 U/L (ref 35–104)
ALT SERPL-CCNC: 59 U/L (ref 0–32)
AMMONIA: 55 UMOL/L (ref 11–51)
ANION GAP SERPL CALCULATED.3IONS-SCNC: 19 MMOL/L (ref 7–16)
APTT: 37.1 SEC (ref 24.5–35.1)
AST SERPL-CCNC: 148 U/L (ref 0–31)
BACTERIA: ABNORMAL /HPF
BASOPHILS ABSOLUTE: 0 E9/L (ref 0–0.2)
BASOPHILS RELATIVE PERCENT: 0.5 % (ref 0–2)
BILIRUB SERPL-MCNC: 6.1 MG/DL (ref 0–1.2)
BILIRUBIN DIRECT: 2.9 MG/DL (ref 0–0.3)
BILIRUBIN URINE: ABNORMAL
BILIRUBIN, INDIRECT: 3.2 MG/DL (ref 0–1)
BLOOD, URINE: NEGATIVE
BUN BLDV-MCNC: 3 MG/DL (ref 6–20)
CALCIUM SERPL-MCNC: 8.8 MG/DL (ref 8.6–10.2)
CHLORIDE BLD-SCNC: 97 MMOL/L (ref 98–107)
CLARITY: ABNORMAL
CO2: 23 MMOL/L (ref 22–29)
COLOR: ABNORMAL
CREAT SERPL-MCNC: 0.8 MG/DL (ref 0.5–1)
EOSINOPHILS ABSOLUTE: 0 E9/L (ref 0.05–0.5)
EOSINOPHILS RELATIVE PERCENT: 0.1 % (ref 0–6)
EPITHELIAL CELLS, UA: ABNORMAL /HPF
GFR AFRICAN AMERICAN: >60
GFR NON-AFRICAN AMERICAN: >60 ML/MIN/1.73
GLUCOSE BLD-MCNC: 121 MG/DL (ref 74–99)
GLUCOSE URINE: NEGATIVE MG/DL
HCG, URINE, POC: NEGATIVE
HCT VFR BLD CALC: 31.8 % (ref 34–48)
HEMOGLOBIN: 10.8 G/DL (ref 11.5–15.5)
INR BLD: 2.2
KETONES, URINE: NEGATIVE MG/DL
LACTIC ACID: 5.9 MMOL/L (ref 0.5–2.2)
LEUKOCYTE ESTERASE, URINE: ABNORMAL
LIPASE: 35 U/L (ref 13–60)
LYMPHOCYTES ABSOLUTE: 0.53 E9/L (ref 1.5–4)
LYMPHOCYTES RELATIVE PERCENT: 7 % (ref 20–42)
Lab: NORMAL
MAGNESIUM: 1 MG/DL (ref 1.6–2.6)
MCH RBC QN AUTO: 30.9 PG (ref 26–35)
MCHC RBC AUTO-ENTMCNC: 34 % (ref 32–34.5)
MCV RBC AUTO: 91.1 FL (ref 80–99.9)
MONOCYTES ABSOLUTE: 0.08 E9/L (ref 0.1–0.95)
MONOCYTES RELATIVE PERCENT: 0.9 % (ref 2–12)
NEGATIVE QC PASS/FAIL: NORMAL
NEUTROPHILS ABSOLUTE: 6.99 E9/L (ref 1.8–7.3)
NEUTROPHILS RELATIVE PERCENT: 92.2 % (ref 43–80)
NITRITE, URINE: NEGATIVE
OVALOCYTES: ABNORMAL
PDW BLD-RTO: 12.7 FL (ref 11.5–15)
PH UA: 6.5 (ref 5–9)
PLATELET # BLD: 62 E9/L (ref 130–450)
PLATELET CONFIRMATION: NORMAL
PMV BLD AUTO: 11.8 FL (ref 7–12)
POIKILOCYTES: ABNORMAL
POSITIVE QC PASS/FAIL: NORMAL
POTASSIUM REFLEX MAGNESIUM: 2.9 MMOL/L (ref 3.5–5)
PRO-BNP: 86 PG/ML (ref 0–125)
PROTEIN UA: NEGATIVE MG/DL
PROTHROMBIN TIME: 25.2 SEC (ref 9.3–12.4)
RBC # BLD: 3.49 E12/L (ref 3.5–5.5)
RBC UA: ABNORMAL /HPF (ref 0–2)
SODIUM BLD-SCNC: 139 MMOL/L (ref 132–146)
SPECIFIC GRAVITY UA: 1.01 (ref 1–1.03)
TEAR DROP CELLS: ABNORMAL
TOTAL PROTEIN: 8 G/DL (ref 6.4–8.3)
TROPONIN: <0.01 NG/ML (ref 0–0.03)
UROBILINOGEN, URINE: 2 E.U./DL
WBC # BLD: 7.6 E9/L (ref 4.5–11.5)
WBC UA: ABNORMAL /HPF (ref 0–5)

## 2021-01-27 PROCEDURE — 83605 ASSAY OF LACTIC ACID: CPT

## 2021-01-27 PROCEDURE — 82140 ASSAY OF AMMONIA: CPT

## 2021-01-27 PROCEDURE — 83690 ASSAY OF LIPASE: CPT

## 2021-01-27 PROCEDURE — 6360000002 HC RX W HCPCS: Performed by: INTERNAL MEDICINE

## 2021-01-27 PROCEDURE — 96366 THER/PROPH/DIAG IV INF ADDON: CPT

## 2021-01-27 PROCEDURE — 99285 EMERGENCY DEPT VISIT HI MDM: CPT

## 2021-01-27 PROCEDURE — 6360000004 HC RX CONTRAST MEDICATION: Performed by: RADIOLOGY

## 2021-01-27 PROCEDURE — 80048 BASIC METABOLIC PNL TOTAL CA: CPT

## 2021-01-27 PROCEDURE — 93005 ELECTROCARDIOGRAM TRACING: CPT | Performed by: EMERGENCY MEDICINE

## 2021-01-27 PROCEDURE — 83735 ASSAY OF MAGNESIUM: CPT

## 2021-01-27 PROCEDURE — 84484 ASSAY OF TROPONIN QUANT: CPT

## 2021-01-27 PROCEDURE — 74177 CT ABD & PELVIS W/CONTRAST: CPT

## 2021-01-27 PROCEDURE — 85025 COMPLETE CBC W/AUTO DIFF WBC: CPT

## 2021-01-27 PROCEDURE — 96368 THER/DIAG CONCURRENT INF: CPT

## 2021-01-27 PROCEDURE — 2580000003 HC RX 258: Performed by: INTERNAL MEDICINE

## 2021-01-27 PROCEDURE — 71046 X-RAY EXAM CHEST 2 VIEWS: CPT

## 2021-01-27 PROCEDURE — 96361 HYDRATE IV INFUSION ADD-ON: CPT

## 2021-01-27 PROCEDURE — 6370000000 HC RX 637 (ALT 250 FOR IP): Performed by: EMERGENCY MEDICINE

## 2021-01-27 PROCEDURE — 1200000000 HC SEMI PRIVATE

## 2021-01-27 PROCEDURE — 85730 THROMBOPLASTIN TIME PARTIAL: CPT

## 2021-01-27 PROCEDURE — 2580000003 HC RX 258: Performed by: EMERGENCY MEDICINE

## 2021-01-27 PROCEDURE — 87088 URINE BACTERIA CULTURE: CPT

## 2021-01-27 PROCEDURE — 6360000002 HC RX W HCPCS: Performed by: EMERGENCY MEDICINE

## 2021-01-27 PROCEDURE — 96365 THER/PROPH/DIAG IV INF INIT: CPT

## 2021-01-27 PROCEDURE — 81001 URINALYSIS AUTO W/SCOPE: CPT

## 2021-01-27 PROCEDURE — 96375 TX/PRO/DX INJ NEW DRUG ADDON: CPT

## 2021-01-27 PROCEDURE — 51701 INSERT BLADDER CATHETER: CPT

## 2021-01-27 PROCEDURE — 85610 PROTHROMBIN TIME: CPT

## 2021-01-27 PROCEDURE — 80076 HEPATIC FUNCTION PANEL: CPT

## 2021-01-27 PROCEDURE — 96376 TX/PRO/DX INJ SAME DRUG ADON: CPT

## 2021-01-27 PROCEDURE — 83880 ASSAY OF NATRIURETIC PEPTIDE: CPT

## 2021-01-27 PROCEDURE — 6370000000 HC RX 637 (ALT 250 FOR IP): Performed by: INTERNAL MEDICINE

## 2021-01-27 RX ORDER — LORAZEPAM 1 MG/1
1 TABLET ORAL
Status: DISCONTINUED | OUTPATIENT
Start: 2021-01-27 | End: 2021-01-28

## 2021-01-27 RX ORDER — SODIUM CHLORIDE 0.9 % (FLUSH) 0.9 %
10 SYRINGE (ML) INJECTION EVERY 12 HOURS SCHEDULED
Status: DISCONTINUED | OUTPATIENT
Start: 2021-01-27 | End: 2021-01-27 | Stop reason: SDUPTHER

## 2021-01-27 RX ORDER — CHLORDIAZEPOXIDE HYDROCHLORIDE 25 MG/1
50 CAPSULE, GELATIN COATED ORAL ONCE
Status: COMPLETED | OUTPATIENT
Start: 2021-01-27 | End: 2021-01-27

## 2021-01-27 RX ORDER — LORAZEPAM 2 MG/ML
3 INJECTION INTRAMUSCULAR
Status: DISCONTINUED | OUTPATIENT
Start: 2021-01-27 | End: 2021-01-28

## 2021-01-27 RX ORDER — ACETAMINOPHEN 325 MG/1
650 TABLET ORAL EVERY 6 HOURS PRN
Status: DISCONTINUED | OUTPATIENT
Start: 2021-01-27 | End: 2021-02-01

## 2021-01-27 RX ORDER — POTASSIUM CHLORIDE 20 MEQ/1
40 TABLET, EXTENDED RELEASE ORAL ONCE
Status: COMPLETED | OUTPATIENT
Start: 2021-01-27 | End: 2021-01-27

## 2021-01-27 RX ORDER — 0.9 % SODIUM CHLORIDE 0.9 %
1000 INTRAVENOUS SOLUTION INTRAVENOUS ONCE
Status: COMPLETED | OUTPATIENT
Start: 2021-01-27 | End: 2021-01-27

## 2021-01-27 RX ORDER — DEXTROSE MONOHYDRATE 25 G/50ML
12.5 INJECTION, SOLUTION INTRAVENOUS PRN
Status: DISCONTINUED | OUTPATIENT
Start: 2021-01-27 | End: 2021-02-01 | Stop reason: HOSPADM

## 2021-01-27 RX ORDER — SODIUM CHLORIDE 0.9 % (FLUSH) 0.9 %
SYRINGE (ML) INJECTION
Status: COMPLETED
Start: 2021-01-27 | End: 2021-01-27

## 2021-01-27 RX ORDER — LORAZEPAM 1 MG/1
3 TABLET ORAL
Status: DISCONTINUED | OUTPATIENT
Start: 2021-01-27 | End: 2021-01-28

## 2021-01-27 RX ORDER — SODIUM CHLORIDE 0.9 % (FLUSH) 0.9 %
10 SYRINGE (ML) INJECTION EVERY 12 HOURS SCHEDULED
Status: DISCONTINUED | OUTPATIENT
Start: 2021-01-27 | End: 2021-02-01 | Stop reason: HOSPADM

## 2021-01-27 RX ORDER — SODIUM CHLORIDE 0.9 % (FLUSH) 0.9 %
10 SYRINGE (ML) INJECTION PRN
Status: DISCONTINUED | OUTPATIENT
Start: 2021-01-27 | End: 2021-02-01 | Stop reason: HOSPADM

## 2021-01-27 RX ORDER — MAGNESIUM SULFATE IN WATER 40 MG/ML
2000 INJECTION, SOLUTION INTRAVENOUS ONCE
Status: COMPLETED | OUTPATIENT
Start: 2021-01-27 | End: 2021-01-27

## 2021-01-27 RX ORDER — POLYETHYLENE GLYCOL 3350 17 G/17G
17 POWDER, FOR SOLUTION ORAL DAILY PRN
Status: DISCONTINUED | OUTPATIENT
Start: 2021-01-27 | End: 2021-01-28

## 2021-01-27 RX ORDER — POTASSIUM CHLORIDE 7.45 MG/ML
10 INJECTION INTRAVENOUS ONCE
Status: COMPLETED | OUTPATIENT
Start: 2021-01-27 | End: 2021-01-27

## 2021-01-27 RX ORDER — LORAZEPAM 1 MG/1
4 TABLET ORAL
Status: DISCONTINUED | OUTPATIENT
Start: 2021-01-27 | End: 2021-01-28

## 2021-01-27 RX ORDER — ACETAMINOPHEN 650 MG/1
650 SUPPOSITORY RECTAL EVERY 6 HOURS PRN
Status: DISCONTINUED | OUTPATIENT
Start: 2021-01-27 | End: 2021-02-01 | Stop reason: HOSPADM

## 2021-01-27 RX ORDER — LORAZEPAM 2 MG/ML
4 INJECTION INTRAMUSCULAR
Status: DISCONTINUED | OUTPATIENT
Start: 2021-01-27 | End: 2021-01-28

## 2021-01-27 RX ORDER — NICOTINE POLACRILEX 4 MG
15 LOZENGE BUCCAL PRN
Status: DISCONTINUED | OUTPATIENT
Start: 2021-01-27 | End: 2021-02-01 | Stop reason: HOSPADM

## 2021-01-27 RX ORDER — SODIUM CHLORIDE 0.9 % (FLUSH) 0.9 %
10 SYRINGE (ML) INJECTION PRN
Status: DISCONTINUED | OUTPATIENT
Start: 2021-01-27 | End: 2021-01-27 | Stop reason: SDUPTHER

## 2021-01-27 RX ORDER — LORAZEPAM 2 MG/ML
1 INJECTION INTRAMUSCULAR
Status: DISCONTINUED | OUTPATIENT
Start: 2021-01-27 | End: 2021-01-28

## 2021-01-27 RX ORDER — DEXTROSE MONOHYDRATE 50 MG/ML
100 INJECTION, SOLUTION INTRAVENOUS PRN
Status: DISCONTINUED | OUTPATIENT
Start: 2021-01-27 | End: 2021-02-01 | Stop reason: HOSPADM

## 2021-01-27 RX ORDER — LORAZEPAM 2 MG/ML
2 INJECTION INTRAMUSCULAR
Status: DISCONTINUED | OUTPATIENT
Start: 2021-01-27 | End: 2021-01-28

## 2021-01-27 RX ORDER — MAGNESIUM SULFATE IN WATER 40 MG/ML
2000 INJECTION, SOLUTION INTRAVENOUS ONCE
Status: COMPLETED | OUTPATIENT
Start: 2021-01-27 | End: 2021-01-28

## 2021-01-27 RX ORDER — ONDANSETRON 2 MG/ML
4 INJECTION INTRAMUSCULAR; INTRAVENOUS ONCE
Status: COMPLETED | OUTPATIENT
Start: 2021-01-27 | End: 2021-01-27

## 2021-01-27 RX ORDER — LORAZEPAM 1 MG/1
2 TABLET ORAL
Status: DISCONTINUED | OUTPATIENT
Start: 2021-01-27 | End: 2021-01-28

## 2021-01-27 RX ORDER — LORAZEPAM 2 MG/ML
2 INJECTION INTRAMUSCULAR ONCE
Status: COMPLETED | OUTPATIENT
Start: 2021-01-27 | End: 2021-01-28

## 2021-01-27 RX ORDER — THIAMINE HYDROCHLORIDE 100 MG/ML
100 INJECTION, SOLUTION INTRAMUSCULAR; INTRAVENOUS DAILY
Status: DISCONTINUED | OUTPATIENT
Start: 2021-01-27 | End: 2021-01-28 | Stop reason: SDUPTHER

## 2021-01-27 RX ADMIN — MAGNESIUM SULFATE HEPTAHYDRATE 2000 MG: 40 INJECTION, SOLUTION INTRAVENOUS at 22:46

## 2021-01-27 RX ADMIN — LORAZEPAM 2 MG: 2 INJECTION INTRAMUSCULAR; INTRAVENOUS at 20:52

## 2021-01-27 RX ADMIN — Medication 10 ML: at 22:19

## 2021-01-27 RX ADMIN — LORAZEPAM 2 MG: 2 INJECTION INTRAMUSCULAR; INTRAVENOUS at 16:58

## 2021-01-27 RX ADMIN — CHLORDIAZEPOXIDE HYDROCHLORIDE 50 MG: 25 CAPSULE ORAL at 15:13

## 2021-01-27 RX ADMIN — TRIMETHOBENZAMIDE HYDROCHLORIDE 200 MG: 100 INJECTION INTRAMUSCULAR at 20:52

## 2021-01-27 RX ADMIN — ONDANSETRON 4 MG: 2 INJECTION INTRAMUSCULAR; INTRAVENOUS at 15:05

## 2021-01-27 RX ADMIN — IOPAMIDOL 75 ML: 755 INJECTION, SOLUTION INTRAVENOUS at 16:18

## 2021-01-27 RX ADMIN — Medication 10 ML: at 22:37

## 2021-01-27 RX ADMIN — POTASSIUM CHLORIDE 10 MEQ: 7.46 INJECTION, SOLUTION INTRAVENOUS at 16:56

## 2021-01-27 RX ADMIN — POTASSIUM CHLORIDE 40 MEQ: 1500 TABLET, EXTENDED RELEASE ORAL at 22:45

## 2021-01-27 RX ADMIN — SODIUM CHLORIDE 1000 ML: 9 INJECTION, SOLUTION INTRAVENOUS at 15:04

## 2021-01-27 RX ADMIN — CEFTRIAXONE SODIUM 1000 MG: 1 INJECTION, POWDER, FOR SOLUTION INTRAMUSCULAR; INTRAVENOUS at 17:44

## 2021-01-27 RX ADMIN — SODIUM CHLORIDE 1000 ML: 9 INJECTION, SOLUTION INTRAVENOUS at 16:56

## 2021-01-27 RX ADMIN — LORAZEPAM 2 MG: 2 INJECTION INTRAMUSCULAR at 15:13

## 2021-01-27 RX ADMIN — MAGNESIUM SULFATE HEPTAHYDRATE 2000 MG: 40 INJECTION, SOLUTION INTRAVENOUS at 16:56

## 2021-01-27 RX ADMIN — LORAZEPAM 2 MG: 2 INJECTION INTRAMUSCULAR; INTRAVENOUS at 22:23

## 2021-01-27 RX ADMIN — THIAMINE HYDROCHLORIDE 100 MG: 100 INJECTION, SOLUTION INTRAMUSCULAR; INTRAVENOUS at 15:54

## 2021-01-27 ASSESSMENT — PAIN DESCRIPTION - DESCRIPTORS: DESCRIPTORS: ACHING

## 2021-01-27 ASSESSMENT — PAIN DESCRIPTION - LOCATION: LOCATION: ABDOMEN

## 2021-01-27 NOTE — CARE COORDINATION
Emergency Department Social Work Assessment:    Pt presents to the ED, due to alcohol withdrawal. Per ED physician, pt is interested in discharging to a detox facility. SW met with pt in room to complete psychosocial assessment and discuss discharge plan. Pt was alert/oriented, stated that she was sober from alcohol for two months, relapsed and \"went on a castro\" about a week ago. She reported that she was drinking 6-7 \"tall Truly's\" a day, last drink was yesterday afternoon. Pt reported a hx of anxiety, reported that she takes prozac as prescribed. She denies SI or HI, or hx of suicide attempts. She was future oriented, stated that she wants to be sober and be there for her 15 yearold son. Pt reported that she goes to counseling at [facility] in [place], has a hx of inpatient substance abuse treatment at [facility] in [facility]. Pt reported that she wants to discharge to a detox facility and is agreeable to speaking with Kristina Naidu from [facility]. Kristina Naidu from [facility] aware and will meet with patient. SW following.     King Reynaldo, MSW, [address] Emergency Department

## 2021-01-27 NOTE — CARE COORDINATION
Pt has Zumper, URSULA called Dominic Zarate at [facility], spoke with [facility] who reported that they will not have beds until February 5th. Yuly Puckett from [facility] met with patient, provided emotional support and discussed discharge options. Pt is agreeable to detox at any inpatient substance abuse facility. Yuly Puckett contacted the [facility] Board, received approval to send patient to [facility] or [facility] under the [facility] funding. The admissions department at both facilities are currently closed. Yuly Puckett will follow up with patient tomorrow (at the hospital or at home), call the facilities and make referrals. ED physician aware.      Ирина Jarrell, THEODORE, [address] Emergency Department

## 2021-01-27 NOTE — CARE COORDINATION
This note will not be viewable in [employer] for the following reason(s). Suspected substance abuse disorder. [facility] Support Note    Name: Luis Alfredo Batres  Date: 1/27/2021    Chief Complaint   Patient presents with    Alcohol Problem     PT states she is in liver failure and needs admitted to stop drinking. Denies SI, HI. Pt states she drinks 2 big trulys a day. PT last drink last night       [facility] met with patient.   [x] Support and education provided  [x] Resources provided   [] Treatment referral:   [] Other:   [] Patient declined peer recovery services     Referred By: URSULA Mendez        Signed: Andrew Aguilera, 1/27/2021

## 2021-01-27 NOTE — ED NOTES
PT having trouble urniating, states her bladder is full but she can not urinate, Cannon inserted and 300 ML dark brown urine emptied       Ketan Nurse, RN  01/27/21 1809

## 2021-01-27 NOTE — ED NOTES
Drug and alcohol counselor at bedside     [address] [facility], [address] [place] [address]  01/27/21 4522

## 2021-01-27 NOTE — ED NOTES
Bed: 07  Expected date:   Expected time:   Means of arrival:   Comments:  Via Debora Chau RN  01/27/21 9982

## 2021-01-28 ENCOUNTER — APPOINTMENT (OUTPATIENT)
Dept: ULTRASOUND IMAGING | Age: 46
DRG: 433 | End: 2021-01-28
Payer: COMMERCIAL

## 2021-01-28 LAB
ALBUMIN SERPL-MCNC: 3.1 G/DL (ref 3.5–5.2)
ALP BLD-CCNC: 231 U/L (ref 35–104)
ALT SERPL-CCNC: 45 U/L (ref 0–32)
AMMONIA: 60 UMOL/L (ref 11–51)
ANION GAP SERPL CALCULATED.3IONS-SCNC: 11 MMOL/L (ref 7–16)
AST SERPL-CCNC: 112 U/L (ref 0–31)
BASOPHILS ABSOLUTE: 0.02 E9/L (ref 0–0.2)
BASOPHILS RELATIVE PERCENT: 0.5 % (ref 0–2)
BILIRUB SERPL-MCNC: 5.1 MG/DL (ref 0–1.2)
BUN BLDV-MCNC: 3 MG/DL (ref 6–20)
CALCIUM SERPL-MCNC: 8.1 MG/DL (ref 8.6–10.2)
CHLORIDE BLD-SCNC: 104 MMOL/L (ref 98–107)
CO2: 27 MMOL/L (ref 22–29)
CREAT SERPL-MCNC: 0.8 MG/DL (ref 0.5–1)
EOSINOPHILS ABSOLUTE: 0.08 E9/L (ref 0.05–0.5)
EOSINOPHILS RELATIVE PERCENT: 1.8 % (ref 0–6)
FERRITIN: 201 NG/ML
FOLATE: 5.6 NG/ML (ref 4.8–24.2)
GFR AFRICAN AMERICAN: >60
GFR NON-AFRICAN AMERICAN: >60 ML/MIN/1.73
GLUCOSE BLD-MCNC: 85 MG/DL (ref 74–99)
HAPTOGLOBIN: 12 MG/DL (ref 30–200)
HCT VFR BLD CALC: 28.1 % (ref 34–48)
HEMOGLOBIN: 9.1 G/DL (ref 11.5–15.5)
HOMOCYSTEINE: 21.9 UMOL/L (ref 0–15)
IMMATURE GRANULOCYTES #: 0.01 E9/L
IMMATURE GRANULOCYTES %: 0.2 % (ref 0–5)
IRON SATURATION: 58 % (ref 15–50)
IRON: 132 MCG/DL (ref 37–145)
LACTATE DEHYDROGENASE: 206 U/L (ref 135–214)
LACTIC ACID: 0.9 MMOL/L (ref 0.5–2.2)
LACTIC ACID: 1 MMOL/L (ref 0.5–2.2)
LACTIC ACID: 1.9 MMOL/L (ref 0.5–2.2)
LYMPHOCYTES ABSOLUTE: 1.07 E9/L (ref 1.5–4)
LYMPHOCYTES RELATIVE PERCENT: 24.4 % (ref 20–42)
MAGNESIUM: 2 MG/DL (ref 1.6–2.6)
MCH RBC QN AUTO: 30.3 PG (ref 26–35)
MCHC RBC AUTO-ENTMCNC: 32.4 % (ref 32–34.5)
MCV RBC AUTO: 93.7 FL (ref 80–99.9)
MONOCYTES ABSOLUTE: 0.24 E9/L (ref 0.1–0.95)
MONOCYTES RELATIVE PERCENT: 5.5 % (ref 2–12)
NEUTROPHILS ABSOLUTE: 2.97 E9/L (ref 1.8–7.3)
NEUTROPHILS RELATIVE PERCENT: 67.6 % (ref 43–80)
PDW BLD-RTO: 12.6 FL (ref 11.5–15)
PHOSPHORUS: 2.7 MG/DL (ref 2.5–4.5)
PLATELET # BLD: 38 E9/L (ref 130–450)
PLATELET CONFIRMATION: NORMAL
PMV BLD AUTO: 12.6 FL (ref 7–12)
POTASSIUM SERPL-SCNC: 2.9 MMOL/L (ref 3.5–5)
RBC # BLD: 3 E12/L (ref 3.5–5.5)
SARS-COV-2, NAAT: NOT DETECTED
SODIUM BLD-SCNC: 142 MMOL/L (ref 132–146)
TOTAL IRON BINDING CAPACITY: 227 MCG/DL (ref 250–450)
TOTAL PROTEIN: 6.8 G/DL (ref 6.4–8.3)
TSH SERPL DL<=0.05 MIU/L-ACNC: 15.47 UIU/ML (ref 0.27–4.2)
VITAMIN B-12: 998 PG/ML (ref 211–946)
WBC # BLD: 4.4 E9/L (ref 4.5–11.5)

## 2021-01-28 PROCEDURE — 83605 ASSAY OF LACTIC ACID: CPT

## 2021-01-28 PROCEDURE — 83090 ASSAY OF HOMOCYSTEINE: CPT

## 2021-01-28 PROCEDURE — 1200000000 HC SEMI PRIVATE

## 2021-01-28 PROCEDURE — 83540 ASSAY OF IRON: CPT

## 2021-01-28 PROCEDURE — 2580000003 HC RX 258: Performed by: EMERGENCY MEDICINE

## 2021-01-28 PROCEDURE — 6360000002 HC RX W HCPCS: Performed by: INTERNAL MEDICINE

## 2021-01-28 PROCEDURE — 82140 ASSAY OF AMMONIA: CPT

## 2021-01-28 PROCEDURE — 2580000003 HC RX 258: Performed by: INTERNAL MEDICINE

## 2021-01-28 PROCEDURE — 6370000000 HC RX 637 (ALT 250 FOR IP): Performed by: HOSPITALIST

## 2021-01-28 PROCEDURE — 82607 VITAMIN B-12: CPT

## 2021-01-28 PROCEDURE — 6360000002 HC RX W HCPCS: Performed by: EMERGENCY MEDICINE

## 2021-01-28 PROCEDURE — 6370000000 HC RX 637 (ALT 250 FOR IP): Performed by: INTERNAL MEDICINE

## 2021-01-28 PROCEDURE — 83921 ORGANIC ACID SINGLE QUANT: CPT

## 2021-01-28 PROCEDURE — 80053 COMPREHEN METABOLIC PANEL: CPT

## 2021-01-28 PROCEDURE — 83550 IRON BINDING TEST: CPT

## 2021-01-28 PROCEDURE — 84443 ASSAY THYROID STIM HORMONE: CPT

## 2021-01-28 PROCEDURE — U0002 COVID-19 LAB TEST NON-CDC: HCPCS

## 2021-01-28 PROCEDURE — 83615 LACTATE (LD) (LDH) ENZYME: CPT

## 2021-01-28 PROCEDURE — 76705 ECHO EXAM OF ABDOMEN: CPT

## 2021-01-28 PROCEDURE — 83735 ASSAY OF MAGNESIUM: CPT

## 2021-01-28 PROCEDURE — 83010 ASSAY OF HAPTOGLOBIN QUANT: CPT

## 2021-01-28 PROCEDURE — 82728 ASSAY OF FERRITIN: CPT

## 2021-01-28 PROCEDURE — 76856 US EXAM PELVIC COMPLETE: CPT

## 2021-01-28 PROCEDURE — 85025 COMPLETE CBC W/AUTO DIFF WBC: CPT

## 2021-01-28 PROCEDURE — 82746 ASSAY OF FOLIC ACID SERUM: CPT

## 2021-01-28 PROCEDURE — 2500000003 HC RX 250 WO HCPCS: Performed by: INTERNAL MEDICINE

## 2021-01-28 PROCEDURE — 76830 TRANSVAGINAL US NON-OB: CPT

## 2021-01-28 PROCEDURE — 84100 ASSAY OF PHOSPHORUS: CPT

## 2021-01-28 RX ORDER — POTASSIUM CHLORIDE 1.5 G/1.77G
20 POWDER, FOR SOLUTION ORAL 2 TIMES DAILY
COMMUNITY
End: 2021-01-28 | Stop reason: ALTCHOICE

## 2021-01-28 RX ORDER — FLUOXETINE HYDROCHLORIDE 20 MG/1
40 CAPSULE ORAL DAILY
COMMUNITY
End: 2021-01-28 | Stop reason: DRUGHIGH

## 2021-01-28 RX ORDER — LEVOTHYROXINE SODIUM 0.07 MG/1
75 TABLET ORAL DAILY
Status: DISCONTINUED | OUTPATIENT
Start: 2021-01-28 | End: 2021-02-01 | Stop reason: HOSPADM

## 2021-01-28 RX ORDER — POTASSIUM CHLORIDE 20 MEQ/1
20 TABLET, EXTENDED RELEASE ORAL DAILY
COMMUNITY
End: 2021-01-01 | Stop reason: DRUGHIGH

## 2021-01-28 RX ORDER — GABAPENTIN 100 MG/1
100 CAPSULE ORAL 2 TIMES DAILY
Status: DISCONTINUED | OUTPATIENT
Start: 2021-01-28 | End: 2021-01-29

## 2021-01-28 RX ORDER — ERGOCALCIFEROL 1.25 MG/1
50000 CAPSULE ORAL WEEKLY
COMMUNITY
End: 2021-01-01 | Stop reason: ALTCHOICE

## 2021-01-28 RX ORDER — UBIDECARENONE 75 MG
50 CAPSULE ORAL DAILY
Status: DISCONTINUED | OUTPATIENT
Start: 2021-01-28 | End: 2021-01-28 | Stop reason: ALTCHOICE

## 2021-01-28 RX ORDER — LORAZEPAM 2 MG/ML
0.5 INJECTION INTRAMUSCULAR EVERY 6 HOURS PRN
Status: DISCONTINUED | OUTPATIENT
Start: 2021-01-28 | End: 2021-01-28

## 2021-01-28 RX ORDER — FLUOXETINE HYDROCHLORIDE 20 MG/1
40 CAPSULE ORAL DAILY
Status: DISCONTINUED | OUTPATIENT
Start: 2021-01-28 | End: 2021-02-01 | Stop reason: HOSPADM

## 2021-01-28 RX ORDER — FOLIC ACID 1 MG/1
2.5 TABLET ORAL DAILY
Status: DISCONTINUED | OUTPATIENT
Start: 2021-01-28 | End: 2021-02-01 | Stop reason: HOSPADM

## 2021-01-28 RX ORDER — LANOLIN ALCOHOL/MO/W.PET/CERES
25 CREAM (GRAM) TOPICAL DAILY
Status: DISCONTINUED | OUTPATIENT
Start: 2021-01-28 | End: 2021-02-01 | Stop reason: HOSPADM

## 2021-01-28 RX ORDER — THIAMINE HYDROCHLORIDE 100 MG/ML
100 INJECTION, SOLUTION INTRAMUSCULAR; INTRAVENOUS DAILY
Status: DISCONTINUED | OUTPATIENT
Start: 2021-01-28 | End: 2021-01-28 | Stop reason: SDUPTHER

## 2021-01-28 RX ORDER — UBIDECARENONE 75 MG
100 CAPSULE ORAL DAILY
Status: ON HOLD | COMMUNITY
End: 2021-02-01 | Stop reason: HOSPADM

## 2021-01-28 RX ORDER — FLUOXETINE HYDROCHLORIDE 40 MG/1
40 CAPSULE ORAL DAILY
Status: ON HOLD | COMMUNITY
End: 2021-01-01

## 2021-01-28 RX ORDER — LANOLIN ALCOHOL/MO/W.PET/CERES
100 CREAM (GRAM) TOPICAL 3 TIMES DAILY
Status: ON HOLD | COMMUNITY
End: 2021-02-01 | Stop reason: HOSPADM

## 2021-01-28 RX ORDER — LANOLIN ALCOHOL/MO/W.PET/CERES
2000 CREAM (GRAM) TOPICAL DAILY
Status: DISCONTINUED | OUTPATIENT
Start: 2021-01-28 | End: 2021-02-01 | Stop reason: HOSPADM

## 2021-01-28 RX ORDER — TRAMADOL HYDROCHLORIDE 50 MG/1
50 TABLET ORAL EVERY 8 HOURS PRN
Status: DISCONTINUED | OUTPATIENT
Start: 2021-01-28 | End: 2021-02-01 | Stop reason: HOSPADM

## 2021-01-28 RX ORDER — PANTOPRAZOLE SODIUM 40 MG/1
40 TABLET, DELAYED RELEASE ORAL DAILY
COMMUNITY

## 2021-01-28 RX ORDER — CHOLESTYRAMINE LIGHT 4 G/5.7G
4 POWDER, FOR SUSPENSION ORAL 2 TIMES DAILY PRN
Status: ON HOLD | COMMUNITY
End: 2021-02-01 | Stop reason: HOSPADM

## 2021-01-28 RX ORDER — LORAZEPAM 2 MG/ML
1 INJECTION INTRAMUSCULAR EVERY 4 HOURS PRN
Status: DISCONTINUED | OUTPATIENT
Start: 2021-01-28 | End: 2021-01-29

## 2021-01-28 RX ORDER — PANTOPRAZOLE SODIUM 40 MG/1
40 GRANULE, DELAYED RELEASE ORAL
COMMUNITY
End: 2021-01-28 | Stop reason: ALTCHOICE

## 2021-01-28 RX ADMIN — LORAZEPAM 1 MG: 2 INJECTION INTRAMUSCULAR; INTRAVENOUS at 12:29

## 2021-01-28 RX ADMIN — POTASSIUM BICARBONATE 20 MEQ: 782 TABLET, EFFERVESCENT ORAL at 11:35

## 2021-01-28 RX ADMIN — FOLIC ACID 2.5 MG: 1 TABLET ORAL at 18:25

## 2021-01-28 RX ADMIN — LORAZEPAM 2 MG: 2 INJECTION INTRAMUSCULAR; INTRAVENOUS at 02:11

## 2021-01-28 RX ADMIN — PYRIDOXINE HCL TAB 50 MG 25 MG: 50 TAB at 18:26

## 2021-01-28 RX ADMIN — VITAM B12 50 MCG: 100 TAB at 10:04

## 2021-01-28 RX ADMIN — FLUOXETINE 40 MG: 20 CAPSULE ORAL at 10:04

## 2021-01-28 RX ADMIN — RIFAXIMIN 550 MG: 550 TABLET ORAL at 21:09

## 2021-01-28 RX ADMIN — LORAZEPAM 1 MG: 2 INJECTION INTRAMUSCULAR; INTRAVENOUS at 19:46

## 2021-01-28 RX ADMIN — Medication 10 ML: at 10:05

## 2021-01-28 RX ADMIN — LEVOTHYROXINE SODIUM 75 MCG: 75 TABLET ORAL at 18:23

## 2021-01-28 RX ADMIN — Medication 10 ML: at 19:46

## 2021-01-28 RX ADMIN — CEFTRIAXONE SODIUM 1000 MG: 1 INJECTION, POWDER, FOR SOLUTION INTRAMUSCULAR; INTRAVENOUS at 17:00

## 2021-01-28 RX ADMIN — FOLIC ACID 1 MG: 5 INJECTION, SOLUTION INTRAMUSCULAR; INTRAVENOUS; SUBCUTANEOUS at 01:32

## 2021-01-28 RX ADMIN — THIAMINE HYDROCHLORIDE 100 MG: 100 INJECTION, SOLUTION INTRAMUSCULAR; INTRAVENOUS at 12:29

## 2021-01-28 RX ADMIN — POTASSIUM BICARBONATE 20 MEQ: 782 TABLET, EFFERVESCENT ORAL at 19:55

## 2021-01-28 RX ADMIN — LORAZEPAM 1 MG: 2 INJECTION INTRAMUSCULAR at 10:00

## 2021-01-28 RX ADMIN — LORAZEPAM 1 MG: 2 INJECTION INTRAMUSCULAR; INTRAVENOUS at 14:44

## 2021-01-28 RX ADMIN — CYANOCOBALAMIN TAB 1000 MCG 2000 MCG: 1000 TAB at 18:26

## 2021-01-28 RX ADMIN — TRAMADOL HYDROCHLORIDE 50 MG: 50 TABLET ORAL at 16:59

## 2021-01-28 RX ADMIN — GABAPENTIN 100 MG: 100 CAPSULE ORAL at 21:09

## 2021-01-28 ASSESSMENT — ENCOUNTER SYMPTOMS
BLOOD IN STOOL: 0
SHORTNESS OF BREATH: 0
VOMITING: 1
RHINORRHEA: 0
SORE THROAT: 0
WHEEZING: 0
COLOR CHANGE: 1
EYE PAIN: 0
CONSTIPATION: 0
ABDOMINAL PAIN: 1
NAUSEA: 1
COUGH: 0
EYE REDNESS: 0
ABDOMINAL DISTENTION: 1
DIARRHEA: 1
BACK PAIN: 0

## 2021-01-28 ASSESSMENT — PAIN DESCRIPTION - PROGRESSION: CLINICAL_PROGRESSION: NOT CHANGED

## 2021-01-28 ASSESSMENT — PAIN DESCRIPTION - LOCATION
LOCATION: ABDOMEN
LOCATION: ABDOMEN;CHEST

## 2021-01-28 ASSESSMENT — PAIN DESCRIPTION - PAIN TYPE: TYPE: ACUTE PAIN

## 2021-01-28 ASSESSMENT — PAIN DESCRIPTION - DESCRIPTORS
DESCRIPTORS: CRAMPING;DISCOMFORT;TENDER
DESCRIPTORS: DISCOMFORT;SORE;CRAMPING

## 2021-01-28 ASSESSMENT — PAIN - FUNCTIONAL ASSESSMENT
PAIN_FUNCTIONAL_ASSESSMENT: PREVENTS OR INTERFERES SOME ACTIVE ACTIVITIES AND ADLS
PAIN_FUNCTIONAL_ASSESSMENT: ACTIVITIES ARE NOT PREVENTED
PAIN_FUNCTIONAL_ASSESSMENT: PREVENTS OR INTERFERES SOME ACTIVE ACTIVITIES AND ADLS

## 2021-01-28 ASSESSMENT — PAIN DESCRIPTION - FREQUENCY: FREQUENCY: CONTINUOUS

## 2021-01-28 NOTE — PROGRESS NOTES
Internal Medicine Progress Note    [facility]    Suyapahenry Luiskeshiamanoj. Mae Gavin., F.A.C.OReinaldoI. Elvis Story D.O., SAVAGEOHERIBERTO Hannah D.O. Vik Long, MSN, APRN, NP-C  Pippa Dunn. Karson Gonzales, MSN, APRN-CNP     Primary Care Physician: No primary care provider on file. Admitting Physician:  Sylwia Sherman DO  Admission date and time: 1/27/2021  2:25 PM    Room:  [facility]1917-09  Admitting diagnosis: Alcohol abuse with withdrawal [F10.139]    Patient Name: Sherley Degroot  MRN: [id number]    Date of Service: 1/28/2021     Subjective:  Phi Milton is a 54 y.o. female who was seen and examined today,1/28/2021, at the bedside. Resting in bed. Tearful. States that she had done very well abstaining from ETOH use for 2.5 months but her father just passed and she relapsed. Has complaint of right sided abdominal pain. States that her jaundice had started to resolve but that she is now very yellow again. No family present during my examination. Review of System:   Constitutional:   Denies fever or chills, weight loss or gain, = for fatigue/malaise. HEENT:   Denies ear pain, sore throat, sinus or eye problems. Cardiovascular:   Denies any chest pain, irregular heartbeats, or palpitations. Respiratory:   Denies shortness of breath, coughing, sputum production, hemoptysis, or wheezing. Gastrointestinal:   Denies nausea, vomiting, diarrhea, or constipation. Positive for right sided abdominal pain. Genitourinary:    Denies any urgency, frequency, hematuria. Voiding  without difficulty. Extremities:   Denies lower extremity swelling, edema or cyanosis. Neurology:    Denies any headache or focal neurological deficits, Denies generalized weakness or memory difficulty. Psch:   Denies being anxious or depressed. Musculoskeletal:    Denies  myalgias, joint complaints or back pain. Integumentary:   Denies any rashes, ulcers, or excoriations. Denies bruising. Jaundice.  influenza virus vaccine  0.5 mL Intramuscular Prior to discharge    gabapentin  100 mg Oral BID    rifaximin  550 mg Oral BID    folic acid-pyridoxine-cyancobalamin  1 tablet Oral Daily    levothyroxine  75 mcg Oral Daily    sodium chloride flush  10 mL Intravenous 2 times per day     Continuous Infusions:   dextrose         Objective Data:  CBC with Differential:    Lab Results   Component Value Date    WBC 4.4 01/28/2021    RBC 3.00 01/28/2021    HGB 9.1 01/28/2021    HCT 28.1 01/28/2021    PLT 38 01/28/2021    MCV 93.7 01/28/2021    MCH 30.3 01/28/2021    MCHC 32.4 01/28/2021    RDW 12.6 01/28/2021    SEGSPCT 59 08/11/2011    METASPCT 3 07/05/2011    LYMPHOPCT 24.4 01/28/2021    MONOPCT 5.5 01/28/2021    MYELOPCT 2 02/22/2011    BASOPCT 0.5 01/28/2021    MONOSABS 0.24 01/28/2021    LYMPHSABS 1.07 01/28/2021    EOSABS 0.08 01/28/2021    BASOSABS 0.02 01/28/2021     CMP:    Lab Results   Component Value Date     01/28/2021    K 2.9 01/28/2021    K 2.9 01/27/2021     01/28/2021    CO2 27 01/28/2021    BUN 3 01/28/2021    CREATININE 0.8 01/28/2021    GFRAA >60 01/28/2021    LABGLOM >60 01/28/2021    GLUCOSE 85 01/28/2021    GLUCOSE 125 08/11/2011    PROT 6.8 01/28/2021    LABALBU 3.1 01/28/2021    LABALBU 3.6 08/11/2011    CALCIUM 8.1 01/28/2021    BILITOT 5.1 01/28/2021    ALKPHOS 231 01/28/2021     01/28/2021    ALT 45 01/28/2021     PT/INR:    Lab Results   Component Value Date    PROTIME 25.2 01/27/2021    PROTIME 14.7 08/11/2011    INR 2.2 01/27/2021       Wound Documentation:   None    Assessment:  · Decompensated alcoholic cirrhosis complicated by acute alcoholic hepatitis  · Normocytic anemia  · Ulcerative colitis status post IPAA  · Alcohol withdrawal  · UTI  · Multiple electrolyte derangements  · Cystic lesion at the level of the right adnexa  · Lactic acidosis  · Prolonged QTC  · Hypothyroidism  · Anxiety and depression      Plan: Ativan as needed for agitation/ alcohol withdrawal.  Also add Neurontin. We will add Foltx to current medication regimen. Also add Xifaxan. Had discussion with the patient and she is aware that she does need to have approximately 3-5 bowel movements on a daily basis. Potassium supplementation to replete potassium stores. Patient did admit that she has been evaluated for liver transplant. Needs to abstain from alcohol use. Sided recent relapse secondary to the death of her father. Emotional support given. Currently declined psychiatric evaluation. Continue current therapy. See orders for further plan of care. More than 50% of my  time was spent at the bedside counseling/coordinating care with the patient and/or family with face to face contact. This time was spent reviewing notes and laboratory data as well as instructing and counseling the patient. Time I spent with the family or surrogate(s) is included only if the patient was incapable of providing the necessary information or participating in medical decisions. I also discussed the differential diagnosis and all of the proposed management plans with the patient and individuals accompanying the patient. Inell Kev requires this high level of physician care and nursing on the IMC/Telemetry unit due the complexity of decision management and chance of rapid decline or death. Continued cardiac monitoring and higher level of nursing are required. I am readily available for any further decision-making and intervention. The patient was seen, examined and then discussed with Dr. Nathan Ramirez. SRIDHAR Rivas - CNP  1/28/2021  5:36 PM       I evaluated the patient. I agree with the findings and the plan of care as documented in Michael Samayoa NP-C's  note.     Juan Pablo Oliva D.O., Sandra Nino  5:48 PM  1/28/2021

## 2021-01-28 NOTE — CARE COORDINATION
Social Work/Discharge Planning     SW consult noted for discharge to substance abuse rehab. Refer to ED SW note on 1/27 for full assessment. Pt is agreeable to inpatient treatment, John Mcknight from [facility] following and will make arrangements. SW/CM following.     Anna Funes, MSW, [address] Emergency Department

## 2021-01-28 NOTE — ED PROVIDER NOTES
MARIANNA Craft is a 54 y.o. female with a PMHx significant for alcoholic liver cirrhosis (follows with hepatology in [facility] [employer]), hypothyroidism, Crohn's disease, anxiety and polysubstance abuse who presents withdrawing from alcohol. The patient states that her last drinks were yesterday. She states that she is attempting to detox and would like to be evaluated for possible transfer to an alcohol rehab facility. She states she has attempted this many times, but has continued to relapse. She states that she is aware that her health is very poor and that she is bordering on liver failure. The patient states that she has been in alcohol withdrawal numerous times in the past.  She does deny ever having any associated seizures. On arrival she endorses abdominal pain, nausea, vomiting, diarrhea and tremors. She states that the episodes of emesis have been NBNB. She states that her diarrhea is a chronic issue secondary to her Crohn's. She denies a history of esophageal varices. She also denies any blood in the stool. She describes the abdominal pain as a generalized cramping with occasional sharp pains localizing to the right upper quadrant. Has not noted anything to make her symptoms better or worse. Symptoms are moderate in severity. Symptoms have been gradually getting worse. She denies recent trauma, fever, chills, fatigue, HA, dizziness, vision changes, congestion, rhinorrhea, neck pain, chest pain, palpitations, hx of MI, hx of blood clots, LE edema, SOB, cough, wheezing, constipation, hematochezia, melena, dysuria, hematuria, generalized weakness and paresthesias. The patient is currently taking no blood thinners. Tobacco Hx:   reports that she has never smoked. She has never used smokeless tobacco.    Alcohol Hx:   reports current alcohol use of about 2.0 standard drinks of alcohol per week. Illicit Drug Hx:  Reports no current drug use. The history is provided by the patient. Last Tetanus (if applicable): N/A    Review of Systems   Constitutional: Negative for chills, diaphoresis, fatigue and fever. HENT: Negative for congestion, rhinorrhea and sore throat. Eyes: Negative for pain, redness and visual disturbance. Respiratory: Negative for cough, shortness of breath and wheezing. Cardiovascular: Negative for chest pain, palpitations and leg swelling. Gastrointestinal: Positive for abdominal distention, abdominal pain, diarrhea, nausea and vomiting. Negative for blood in stool and constipation. Genitourinary: Negative for difficulty urinating, dysuria, flank pain, frequency and hematuria. Musculoskeletal: Negative for arthralgias, back pain, myalgias and neck pain. Skin: Positive for color change (Jaundice). Negative for rash and wound. Neurological: Positive for tremors. Negative for dizziness, syncope, speech difficulty, weakness, light-headedness, numbness and headaches. All other systems reviewed and are negative. Physical Exam  Vitals signs and nursing note reviewed. Constitutional:       General: She is awake. She is not in acute distress. Appearance: She is ill-appearing. She is not diaphoretic. HENT:      Head: Normocephalic and atraumatic. Right Ear: External ear normal.      Left Ear: External ear normal.      Nose: Nose normal.      Mouth/Throat:      Mouth: Mucous membranes are dry. Pharynx: Oropharynx is clear. Eyes:      General: Scleral icterus present. Right eye: No discharge. Left eye: No discharge. Extraocular Movements: Extraocular movements intact. Conjunctiva/sclera: Conjunctivae normal.      Comments: Right pupil is 3 mm and reactive. Left pupil is larger at 5 mm, but is also reactive. No gross vision changes noted. Bilateral scleral icterus appreciated. Neck:      Musculoskeletal: Normal range of motion and neck supple. No neck rigidity or muscular tenderness.    Cardiovascular: Rate and Rhythm: Regular rhythm. Tachycardia present. Heart sounds: Murmur (2/6 MAYRA heard best at the RUSB) present. No friction rub. No gallop. Comments: Upper extremity and lower extremity distal pulses intact bilaterally +2/4  Pulmonary:      Effort: Pulmonary effort is normal. No respiratory distress. Breath sounds: Normal breath sounds. No wheezing, rhonchi or rales. Chest:      Chest wall: No tenderness. Abdominal:      General: Bowel sounds are normal. There is distension (Mild). Palpations: Abdomen is soft. Tenderness: There is abdominal tenderness (To mild palpation throughout, with focal tenderness noted in the right upper quadrant). There is no guarding or rebound. Comments: Mother is enlarged and palpable below the right inferior ribs by approximately 5 cm   Musculoskeletal: Normal range of motion. General: No tenderness or deformity. Right lower leg: No edema. Left lower leg: No edema. Lymphadenopathy:      Cervical: No cervical adenopathy. Skin:     General: Skin is warm and dry. Capillary Refill: Capillary refill takes less than 2 seconds. Coloration: Skin is jaundiced. Findings: No erythema or rash. Neurological:      General: No focal deficit present. Mental Status: She is alert. Cranial Nerves: No cranial nerve deficit. Sensory: No sensory deficit. Motor: No weakness. Coordination: Coordination normal.      Comments: Patient oscillated between intermittent confusion and being alert and oriented x3 throughout her time in the department.          --------------------------------------------- PAST HISTORY ---------------------------------------------  Past Medical History:  has a past medical history of Acute renal failure (ARF) ([facility] [address] Alcoholic cirrhosis ([facility] Anxiety, Crohn's colitis ([facility] Depression, History of blood transfusion, Pyoderma gangrenosum, and Thyroid disease. Past Surgical History:  has a past surgical history that includes Abdomen surgery; Arm Debridement; Colonoscopy; Endoscopy, colon, diagnostic; and Dilatation, esophagus. Social History:  reports that she has never smoked. She has never used smokeless tobacco. She reports current alcohol use of about 2.0 standard drinks of alcohol per week. She reports previous drug use. Family History: family history includes Cancer in her mother; Heart Disease in her father; High Blood Pressure in her sister. Home Meds: Not in a hospital admission. The patients home medications have been reviewed. Allergies: Patient has no known allergies. ------------------------- NURSING NOTES AND VITALS REVIEWED ---------------------------  Date / Time Roomed:  1/27/2021  2:25 PM  ED Bed Assignment:  07/07    The nursing notes within the ED encounter and vital signs as below have been reviewed. BP (!) 161/88   Pulse 94   Temp 98.2 °F (36.8 °C) (Oral)   Resp 24   SpO2 97%   -------------------------------------------------- RESULTS / INTERVENTIONS -------------------------------------------------  All laboratory and radiology tests have been reviewed by this physician.     LABS:  Results for orders placed or performed during the hospital encounter of 01/27/21   Ammonia   Result Value Ref Range    Ammonia 55.0 (H) 11.0 - 51.0 umol/L   CBC Auto Differential   Result Value Ref Range    WBC 7.6 4.5 - 11.5 E9/L    RBC 3.49 (L) 3.50 - 5.50 E12/L    Hemoglobin 10.8 (L) 11.5 - 15.5 g/dL    Hematocrit 31.8 (L) 34.0 - 48.0 %    MCV 91.1 80.0 - 99.9 fL    MCH 30.9 26.0 - 35.0 pg    MCHC 34.0 32.0 - 34.5 %    RDW 12.7 11.5 - 15.0 fL    Platelets 62 (L) 759 - 450 E9/L    MPV 11.8 7.0 - 12.0 fL    Neutrophils % 92.2 (H) 43.0 - 80.0 %    Lymphocytes % 7.0 (L) 20.0 - 42.0 %    Monocytes % 0.9 (L) 2.0 - 12.0 %    Eosinophils % 0.1 0.0 - 6.0 %    Basophils % 0.5 0.0 - 2.0 %    Neutrophils Absolute 6.99 1.80 - 7.30 E9/L Lymphocytes Absolute 0.53 (L) 1.50 - 4.00 E9/L    Monocytes Absolute 0.08 (L) 0.10 - 0.95 E9/L    Eosinophils Absolute 0.00 (L) 0.05 - 0.50 E9/L    Basophils Absolute 0.00 0.00 - 0.20 E9/L    Poikilocytes 1+     Ovalocytes 1+     Tear Drop Cells 1+    Basic Metabolic Panel w/ Reflex to MG   Result Value Ref Range    Sodium 139 132 - 146 mmol/L    Potassium reflex Magnesium 2.9 (L) 3.5 - 5.0 mmol/L    Chloride 97 (L) 98 - 107 mmol/L    CO2 23 22 - 29 mmol/L    Anion Gap 19 (H) 7 - 16 mmol/L    Glucose 121 (H) 74 - 99 mg/dL    BUN 3 (L) 6 - 20 mg/dL    CREATININE 0.8 0.5 - 1.0 mg/dL    GFR Non-African American >60 >=60 mL/min/1.73    GFR African American >60     Calcium 8.8 8.6 - 10.2 mg/dL   Hepatic Function Panel   Result Value Ref Range    Total Protein 8.0 6.4 - 8.3 g/dL    Albumin 3.5 3.5 - 5.2 g/dL    Alkaline Phosphatase 270 (H) 35 - 104 U/L    ALT 59 (H) 0 - 32 U/L     (H) 0 - 31 U/L    Total Bilirubin 6.1 (H) 0.0 - 1.2 mg/dL    Bilirubin, Direct 2.9 (H) 0.0 - 0.3 mg/dL    Bilirubin, Indirect 3.2 (H) 0.0 - 1.0 mg/dL   Lipase   Result Value Ref Range    Lipase 35 13 - 60 U/L   Lactic Acid, Plasma   Result Value Ref Range    Lactic Acid 5.9 (HH) 0.5 - 2.2 mmol/L   Protime-INR   Result Value Ref Range    Protime 25.2 (H) 9.3 - 12.4 sec    INR 2.2    APTT   Result Value Ref Range    aPTT 37.1 (H) 24.5 - 35.1 sec   Troponin   Result Value Ref Range    Troponin <0.01 0.00 - 0.03 ng/mL   Brain Natriuretic Peptide   Result Value Ref Range    Pro-BNP 86 0 - 125 pg/mL   Platelet Confirmation   Result Value Ref Range    Platelet Confirmation CONFIRMED    Urinalysis   Result Value Ref Range    Color, UA TOMMY (A) Straw/Yellow    Clarity, UA CLOUDY (A) Clear    Glucose, Ur Negative Negative mg/dL    Bilirubin Urine SMALL (A) Negative    Ketones, Urine Negative Negative mg/dL    Specific Gravity, UA 1.010 1.005 - 1.030    Blood, Urine Negative Negative    pH, UA 6.5 5.0 - 9.0    Protein, UA Negative Negative mg/dL Urobilinogen, Urine 2.0 (A) <2.0 E.U./dL    Nitrite, Urine Negative Negative    Leukocyte Esterase, Urine MODERATE (A) Negative   Magnesium   Result Value Ref Range    Magnesium 1.0 (LL) 1.6 - 2.6 mg/dL   Microscopic Urinalysis   Result Value Ref Range    WBC, UA 10-20 (A) 0 - 5 /HPF    RBC, UA NONE 0 - 2 /HPF    Epithelial Cells, UA RARE /HPF    Bacteria, UA MANY (A) None Seen /HPF   POC Pregnancy Urine   Result Value Ref Range    HCG, Urine, POC Negative Negative    Lot Number [id number]     Positive QC Pass/Fail Pass     Negative QC Pass/Fail Pass    EKG 12 Lead   Result Value Ref Range    Ventricular Rate 95 BPM    Atrial Rate 95 BPM    P-R Interval 126 ms    QRS Duration 78 ms    Q-T Interval 398 ms    QTc Calculation (Bazett) 500 ms    P Axis 55 degrees    R Axis 9 degrees    T Axis 13 degrees       RADIOLOGY: Interpreted by Radiologist unless otherwise noted. XR CHEST (2 VW)   Final Result   Suboptimal inspiration with no clearly pathologic findings. CT ABDOMEN PELVIS W IV CONTRAST Additional Contrast? None   Final Result   1. Previously seen loculated gas collection in right gluteal fold is not   included on today's examination. CT pelvis to level of mid thighs may be   warranted for further evaluation. 2.  Stable cystic lesion at level of right adnexa may represent adnexal cyst   versus hydrosalpinx. Ultrasound may be helpful for further evaluation. 3.  Redemonstration of stable ventral abdominal wall hernias. 4.  Stable hepatosplenomegaly. 5.  Redemonstration of esophageal varices. 6.  Generalized decreased attenuation throughout the liver suggestive of   hepatocellular disease. 7.  Stable distended gallbladder. US GALLBLADDER RUQ    (Results Pending)       EKG: As interpreted by this ER physician.   Rate: 95 bpm  Rhythm: Sinus  Axis: normal  ST Segments: depression in  v4 and v5  T-Waves: no acute change  Interpretation: NSR with prolonged QTC; abnormal EKG Comparison: changes compared to previous EKG 1/20/2021, QTC is not prolonged    Oxygen Saturation Interpretation: Normal    Meds Given:  Medications   LORazepam (ATIVAN) tablet 1 mg ( Oral See Alternative 1/27/21 2223)     Or   LORazepam (ATIVAN) injection 1 mg ( Intravenous See Alternative 1/27/21 2223)     Or   LORazepam (ATIVAN) tablet 2 mg ( Oral See Alternative 1/27/21 2223)     Or   LORazepam (ATIVAN) injection 2 mg (2 mg Intravenous Given 1/27/21 2223)   thiamine (B-1) injection 100 mg (100 mg Intravenous Given 1/27/21 1554)   glucose (GLUTOSE) 40 % oral gel 15 g (has no administration in time range)   dextrose 50 % IV solution (has no administration in time range)   glucagon (rDNA) injection 1 mg (has no administration in time range)   dextrose 5 % solution (has no administration in time range)   sodium chloride flush 0.9 % injection 10 mL (10 mLs Intravenous Given 1/27/21 2237)   sodium chloride flush 0.9 % injection 10 mL (has no administration in time range)   polyethylene glycol (GLYCOLAX) packet 17 g (has no administration in time range)   acetaminophen (TYLENOL) tablet 650 mg (has no administration in time range)     Or   acetaminophen (TYLENOL) suppository 650 mg (has no administration in time range)   cefTRIAXone (ROCEPHIN) 1,000 mg in sterile water 10 mL IV syringe (has no administration in time range)   folic acid 1 mg in dextrose 5 % 50 mL IVPB (has no administration in time range)   ondansetron (ZOFRAN) injection 4 mg (4 mg Intravenous Given 1/27/21 1505)   0.9 % sodium chloride bolus (0 mLs Intravenous Stopped 1/27/21 1607)   LORazepam (ATIVAN) injection 2 mg (2 mg Intravenous Given 1/27/21 1513)   chlordiazePOXIDE (LIBRIUM) capsule 50 mg (50 mg Oral Given 1/27/21 1513)   iopamidol (ISOVUE-370) 76 % injection 75 mL (75 mLs Intravenous Given 1/27/21 1618)   magnesium sulfate 2000 mg in 50 mL IVPB premix (0 mg Intravenous Stopped 1/27/21 1907) 0.9 % sodium chloride bolus (0 mLs Intravenous Stopped 1/27/21 1745)   potassium chloride 10 mEq/100 mL IVPB (Peripheral Line) (0 mEq Intravenous Stopped 1/27/21 1830)   cefTRIAXone (ROCEPHIN) 1,000 mg in sterile water 10 mL IV syringe (0 mg Intravenous Stopped 1/27/21 1745)   trimethobenzamide (TIGAN) injection 200 mg (200 mg Intramuscular Given 1/27/21 2052)   magnesium sulfate 2000 mg in 50 mL IVPB premix (0 mg Intravenous Stopped 1/28/21 0052)   potassium chloride (KLOR-CON M) extended release tablet 40 mEq (40 mEq Oral Given 1/27/21 2245)       Procedures:  No procedures performed. --------------------------------- PROGRESS NOTES / ADDITIONAL PROVIDER NOTES ---------------------------------  Consultations:  As outlined below. ED Course:    ED Course as of Jan 28 0109 Wed Jan 27, 2021   1752 On reevaluation, the patient is resting comfortably in bed. I updated her on the status of her work-up and our plan to admit her to the hospital for further evaluation and management. The patient verbalized her understanding and agreement with that plan. The patient was confused while discussing her current PCP as there is none listed in the chart. It appears that she has had several, but she states that her current PCP is Dr. Imelda Gunderson from [facility]. We were unable to locate who that physician is and what affiliations they have. [ML]   1805 Case was discussed with Dr. Ariana Guerrero of . He agrees to admit the patient. [ML]   2015 I had several other discussions with the hospitalist team from [address] [facility]. They state that the patient should technically go to the [facility]. Call placed to that group to discuss. [ML]   2021 Discussed the case with Dr. Savannah Chamberlain of internal medicine. He agrees to admit the patient for further evaluation and management.     [ML]      ED Course User Index  [ML] Lonie Hodgkin,  2024: All results were discussed with the patient and I have provided specific details regarding the plan to admit the patient. The patient was stable at the time of admission and was without objective evidence of hemodynamic instability. She was seen in the emergency department by myself and the assigned attending physician, Dr. Claudia Latham, who agreed with the assessment, plan and decision to admit as laid out herein. The patient verbalized an understanding and agreement with the plan for admission. All questions were answered and the patient was deemed to be in stable condition at the time of transport.     MDM: Patient's disposition: Admit to telemetry  Patient's condition is serious. This patient was seen, examined and treated with Dr. Jazmin Clayton. All pertinent aspects of the patient's care were discussed with the attending physician. ATTENDING PROVIDER ATTESTATION:     Luis Alfredo Batres presented to the emergency department for evaluation of Alcohol Problem (PT states she is in liver failure and needs admitted to stop drinking. Denies SI, HI. Pt states she drinks 2 big trulys a day. PT last drink last night)    I have reviewed and discussed the case, including pertinent history (medical, surgical, family and social) and exam findings with the Resident and the Nurse assigned to Luis Alfredo Batres. I have personally performed and/or participated in the history, exam, medical decision making, and procedures and agree with all pertinent clinical information. Patient appears uncomfortable but is in no distress. Abdomen is mildly distended with diffuse tenderness with the tenderness being greatest in the right upper quadrant. She does have jaundice as well as scleral icterus of on exam.  She is also mildly tachycardic. No evidence of respiratory distress. No conversational dyspnea or accessory muscle use. I have reviewed my findings and recommendations with Luis Alfredo Batres and members of family present at the time of disposition. MDM: Supportive care, will obtain appropriate labs and imaging to assess patient's Alcohol Problem (PT states she is in liver failure and needs admitted to stop drinking. Denies SI, HI. Pt states she drinks 2 big trulys a day.  PT last drink last night) My findings/plan: The primary encounter diagnosis was Alcohol withdrawal syndrome without complication ([facility] [address] Diagnoses of Alcoholic cirrhosis of liver without ascites ([facility] [address] Non-intractable vomiting with nausea, unspecified vomiting type, Urinary tract infection without hematuria, site unspecified, Hypokalemia, and Hypomagnesemia were also pertinent to this visit.   Current Discharge Medication List        DO Brian Delacruz Mt, DO  Resident  01/28/21 0109       Mac Carnes DO  01/28/21 7212

## 2021-01-28 NOTE — ED NOTES
Patient advised that a full bladder is needed for US.  Patient provided with water pitchjaylan and ginger ale     Heavenly Sober, RN  01/28/21 7032

## 2021-01-28 NOTE — CONSULTS
GI CONSULT NOTE    [facility] Dr. Dalbert Riedel, M.D., Dr. Tanya Ya M.D., Dr. Suzy Pappas D.O., Dr. Gene Bal M.D., Dr. Keny Evans D.O., GI fellow      Date:11:33 AM 2021    Leighton Kilgore  54 y.o.  female    HPI:    76-year-old female with PMHx of contact alcohol abuse, biopsy-proven alcoholic cirrhosis, multiple episodes of acute alcoholic hepatitis unresponsive to steroids, ulcerative colitis s/p IPAA, and chronic anemia who presented to the ED due to worsening nausea, emesis,  and alcohol withdrawal.  Patient states she has been drinking for the past 2 weeks after her father passed away. Of note patient was recently hospitalized from 2022 on 2020 at [facility] for severe acute alcoholic hepatitis without improvement on steroids and was being evaluated for liver transplant. After evaluation patient was deemed not to be a suitable candidate secondary to [facility] psychosocial barriers to transplant were identified, deferred for 6 weeks. \"  During the hospitalization patient underwent EGD and colonoscopy, and EGD on 12/3/2020 was unremarkable for any varices but did reveal PHG. Patient had refused inpatient rehab and elected to be discharged home and seek rehab as outpatient. Patient states she was doing well until her father passed away and that she relapsed. Endorses mild abdominal discomfort after ultrasound but denies any recent nausea, melena, hematochezia, or hematemesis. Long discussion with patient about importance of absolute alcohol cessation and continued abstinence.             PAST MEDICAL Hx:  Past Medical History:   Diagnosis Date    [facility] [address]     Alcoholic cirrhosis (HCC)     Anxiety     Crohn's colitis ([facility] [address]     Depression     History of blood transfusion     Pyoderma gangrenosum     Thyroid disease        PAST SURGICAL Hx:   Past Surgical History:   Procedure Laterality Date  Smokeless tobacco: Never Used   Substance and Sexual Activity    Alcohol use: Yes     Alcohol/week: 2.0 standard drinks     Types: 2 Cans of beer per week     Comment: daily    Drug use: Not Currently    Sexual activity: Not on file   Lifestyle    Physical activity     Days per week: Not on file     Minutes per session: Not on file    Stress: Not on file   Relationships    Social connections     Talks on phone: Not on file     Gets together: Not on file     Attends Christian service: Not on file     Active member of club or organization: Not on file     Attends meetings of clubs or organizations: Not on file     Relationship status: Not on file    Intimate partner violence     Fear of current or ex partner: Not on file     Emotionally abused: Not on file     Physically abused: Not on file     Forced sexual activity: Not on file   Other Topics Concern    Not on file   Social History Narrative    Not on file       PE:  BP (!) 155/89   Pulse 110   Temp 98.2 °F (36.8 °C) (Oral)   Resp 22   SpO2 100%     General: A&Ox 3, friendly, NAD  HEENT: Atraumatic, symmetric, no anterior/posterior lymphadenopathy, moist mucous membranes, PERRL, EOM intact, fair dentition. Pulm: CTAB, Neg w/r/r, normal chest expansion, no crackles noted  Cardio: RRR, neg m/r/g, nl S1 and S2, no extra heart sounds  Abd.: soft, NT, ND, BS+, no G/R, no HSM  Ext: +2/4 pulse Dp and radial b/l, LE and UE ROM intact,   Skin: No lesions, excoriations, petechiae, or ecchymoses noted    Neuro: normal sensation throughout, DTRs patellar, tricept, and bicept 2/4 b/l and equal, normal muscle strength throughout.       DATA:     Lab Results   Component Value Date    WBC 4.4 01/28/2021    RBC 3.00 01/28/2021    HGB 9.1 01/28/2021    HCT 28.1 01/28/2021    MCV 93.7 01/28/2021    MCH 30.3 01/28/2021    MCHC 32.4 01/28/2021    RDW 12.6 01/28/2021    PLT 38 01/28/2021    MPV 12.6 01/28/2021     Lab Results   Component Value Date     01/28/2021 K 2.9 01/28/2021    K 2.9 01/27/2021     01/28/2021    CO2 27 01/28/2021    BUN 3 01/28/2021    CREATININE 0.8 01/28/2021    CALCIUM 8.1 01/28/2021    PROT 6.8 01/28/2021    LABALBU 3.1 01/28/2021    LABALBU 3.6 08/11/2011    BILITOT 5.1 01/28/2021    ALKPHOS 231 01/28/2021     01/28/2021    ALT 45 01/28/2021     Lab Results   Component Value Date    LIPASE 35 01/27/2021     Lab Results   Component Value Date    AMYLASE 163 01/20/2021         ASSESSMENT/PLAN:  1. Decompensated alcoholic cirrhosis complicated by acute alcoholic hepatitis   MDF 71, also recently 71 on most recent admission from 12/1/2022 12/9/2020 at [facility]  MELD-NA: 21, previously 24 and most recent admission  Recently unresponsive to steroids, Marii score: 0.89  Right upper quad ultrasound and Doppler  Most recent hepatic panel reviewed and unremarkable  Recently evaluated for liver transplant and was deemed not a candidate secondary to\"absolute psychosocial barriers to transplant were identified, deferred for 6 weeks. \"  Hepatic encephalopathy, type C, overt, grade 1, recurrent  Icteric sclera, no ascites, no asterixis,  Continue supportive care  Daily CMP and INR  Long discussion about the importance of absolute alcohol cessation and continued abstinence    2. Normocytic anemia, unspecified  Likely secondary hypoproliferative or myelosuppression secondary to acute on chronic alcohol abuse  Recent EGD and colonoscopy at [facility] in December 2020 unremarkable for any etiology  Anemia panel      3. Ulcerative colitis s/p IPAA  Suspect diarrhea secondary to #1  Cholestyramine      Will discuss with attending    Urvashi Curry DO  1/28/2021  11:33 AM    Pt seen and independently examined. Pertinent notes and lab work reviewed. D/w Dr. Vamsi Perez with physical exam and A&P. Discussed with patient/ - all questions answered - agreeable with the plan as delineated. Thank you for the opportunity to see this patient in consultation.     Eamon Strong MD  1/28/2021  2:48 PM

## 2021-01-28 NOTE — ED NOTES
Floor called and report given to RN.  Patient to be transported to room 431-2 via stretcher     David Chambers RN  01/28/21 2398

## 2021-01-28 NOTE — H&P
Department of Internal Medicine  History and Physical    PCP:   Admitting Physician: Dr. Dusty Henao  Consultants: Dr. Reyna Hashimoto: Abdominal pain/nausea/emesis/alcohol withdrawal    HISTORY OF PRESENT ILLNESS:    Patient is 62-year-old female presented to the ED due to nausea and emesis secondary to alcohol withdrawal.  Patient states that her last drink was last night. She developed nausea and emesis earlier today along with epigastric abdominal pain. She denies fever or chills. She did deal with diarrhea on 8 chronic basis secondary to Crohn's disease. PAST MEDICAL Hx:  Past Medical History:   Diagnosis Date    Acute renal failure (ARF) ([facility] [address] 59/07/7917    Alcoholic cirrhosis (HCC)     Anxiety     Crohn's colitis ([facility] [address]     Depression     History of blood transfusion     Pyoderma gangrenosum     Thyroid disease        PAST SURGICAL Hx:   Past Surgical History:   Procedure Laterality Date    ABDOMEN SURGERY      pt has a J-pouch     ARM DEBRIDEMENT      R arm-2010-2011??    COLONOSCOPY      DILATATION, ESOPHAGUS      ENDOSCOPY, COLON, DIAGNOSTIC         FAMILY Hx:  Family History   Problem Relation Age of Onset    Cancer Mother         breast     Heart Disease Father     High Blood Pressure Sister        HOME MEDICATIONS:  Prior to Admission medications    Medication Sig Start Date End Date Taking?  Authorizing Provider   vitamin B-12 (CYANOCOBALAMIN) 100 MCG tablet Take 50 mcg by mouth daily   Yes Historical Provider, MD   potassium chloride (KLOR-CON) 20 MEQ packet Take 20 mEq by mouth 2 times daily   Yes Historical Provider, MD   pantoprazole sodium (PROTONIX) 40 MG PACK packet Take 40 mg by mouth every morning (before breakfast)   Yes Historical Provider, MD   FLUoxetine (PROZAC) 20 MG capsule Take 40 mg by mouth daily   Yes Historical Provider, MD   vitamin D (CHOLECALCIFEROL) 125 MCG (5000 UT) CAPS capsule Take 5,000 Units by mouth daily   Yes Historical Provider, MD cefdinir (OMNICEF) 300 MG capsule Take 1 capsule by mouth 2 times daily for 10 days 1/21/21 1/31/21  Demian Mast DO       ALLERGIES:  Patient has no known allergies. SOCIAL Hx:  Social History     Socioeconomic History    Marital status:      Spouse name: Not on file    Number of children: Not on file    Years of education: Not on file    Highest education level: Not on file   Occupational History    Not on file   Social Needs    Financial resource strain: Not on file    Food insecurity     Worry: Not on file     Inability: Not on file    Transportation needs     Medical: Not on file     Non-medical: Not on file   Tobacco Use    Smoking status: Never Smoker    Smokeless tobacco: Never Used   Substance and Sexual Activity    Alcohol use:  Yes     Alcohol/week: 2.0 standard drinks     Types: 2 Cans of beer per week     Comment: daily    Drug use: Not Currently    Sexual activity: Not on file   Lifestyle    Physical activity     Days per week: Not on file     Minutes per session: Not on file    Stress: Not on file   Relationships    Social connections     Talks on phone: Not on file     Gets together: Not on file     Attends Jainism service: Not on file     Active member of club or organization: Not on file     Attends meetings of clubs or organizations: Not on file     Relationship status: Not on file    Intimate partner violence     Fear of current or ex partner: Not on file     Emotionally abused: Not on file     Physically abused: Not on file     Forced sexual activity: Not on file   Other Topics Concern    Not on file   Social History Narrative    Not on file       ROS: Positive in bold  General:   Denies chills, fatigue, fever, malaise, night sweats or weight loss    Psychological:   Denies anxiety, disorientation or hallucinations    ENT:    Denies epistaxis, headaches, vertigo or visual changes    Cardiovascular: Denies any chest pain, irregular heartbeats, or palpitations. No paroxysmal nocturnal dyspnea. Respiratory:   Denies shortness of breath, coughing, sputum production, hemoptysis, or wheezing. No orthopnea. Gastrointestinal:   Denies nausea, vomiting, diarrhea, or constipation. Denies any abdominal pain. Denies change in bowel habits or stools. Genito-Urinary:    Denies any urgency, frequency, hematuria. Voiding without difficulty. Musculoskeletal:   Denies joint pain, joint stiffness, joint swelling or muscle pain    Neurology:    Denies any headache or focal neurological deficits. No weakness or paresthesia. Derm:    Denies any rashes, ulcers, or excoriations. Denies bruising. Extremities:   Denies any lower extremity swelling or edema. PHYSICAL EXAM:  VITALS:  Vitals:    01/28/21 0211   BP: (!) 157/83   Pulse: 112   Resp:    Temp:    SpO2:          CONSTITUTIONAL:    Awake, alert, cooperative, no apparent distress, and appears stated age, patient is lethargic    EYES:    PERRL, EOMI, sclera clear, conjunctiva normal    ENT:    Normocephalic, atraumatic, sinuses nontender on palpation. External ears without lesions. Oral pharynx with moist mucus membranes. Tonsils without erythema or exudates. NECK:    Supple, symmetrical, trachea midline, no adenopathy, thyroid symmetric, not enlarged and no tenderness, skin normal, no bruits, no JVD    HEMATOLOGIC/LYMPHATICS:    No cervical lymphadenopathy and no supraclavicular lymphadenopathy    LUNGS:    Symmetric.  No increased work of breathing, good air exchange, clear to auscultation bilaterally, no wheezes, rhonchi, or rales,     CARDIOVASCULAR:    Normal apical impulse, regular rate and rhythm, normal S1 and S2, no S3 or S4, and no murmur noted    ABDOMEN:    No scars, normal bowel sounds, soft, non-distended, non-tender, no masses palpated, no hepatosplenomegaly, no rebound or guarding elicited on palpation Patient does have some upper quadrant tenderness to palpation    MUSCULOSKELETAL:    There is no redness, warmth, or swelling of the joints. Full range of motion noted. Motor strength is 5 out of 5 all extremities bilaterally. Tone is normal.    NEUROLOGIC:    Awake, alert, oriented to name, place and time. Cranial nerves II-XII are grossly intact. Motor is 5 out of 5 bilaterally. SKIN:    No bruising or bleeding. No redness, warmth, or swelling    EXTREMITIES:    Peripheral pulses present. No edema, cyanosis, or swelling. OSTEOPATHIC:    Examined in seated and supine positions. Normal thoracic kyphosis and lumbar lordosis. No acute somatic dysfunction. LINES/CATHETERS   Cannon catheter in place    LABORATORY DATA:  CBC with Differential:    Lab Results   Component Value Date    WBC 7.6 01/27/2021    RBC 3.49 01/27/2021    HGB 10.8 01/27/2021    HCT 31.8 01/27/2021    PLT 62 01/27/2021    MCV 91.1 01/27/2021    MCH 30.9 01/27/2021    MCHC 34.0 01/27/2021    RDW 12.7 01/27/2021    SEGSPCT 59 08/11/2011    METASPCT 3 07/05/2011    LYMPHOPCT 7.0 01/27/2021    MONOPCT 0.9 01/27/2021    MYELOPCT 2 02/22/2011    BASOPCT 0.5 01/27/2021    MONOSABS 0.08 01/27/2021    LYMPHSABS 0.53 01/27/2021    EOSABS 0.00 01/27/2021    BASOSABS 0.00 01/27/2021     CMP:    Lab Results   Component Value Date     01/27/2021    K 2.9 01/27/2021    CL 97 01/27/2021    CO2 23 01/27/2021    BUN 3 01/27/2021    CREATININE 0.8 01/27/2021    GFRAA >60 01/27/2021    LABGLOM >60 01/27/2021    GLUCOSE 121 01/27/2021    GLUCOSE 125 08/11/2011    PROT 8.0 01/27/2021    LABALBU 3.5 01/27/2021    LABALBU 3.6 08/11/2011    CALCIUM 8.8 01/27/2021    BILITOT 6.1 01/27/2021    ALKPHOS 270 01/27/2021     01/27/2021    ALT 59 01/27/2021       ASSESSMENT/PLAN:  1. Alcohol withdrawal   2. Decompensated alcoholic cirrhosis  3. UTI  4. Multiple electrolyte derangements  5. Cystic lesion at level of right adnexa  6.  Lactic acidosis 7. Prolonged QTC  8. Chronic normocytic anemia  9. Ulcerative colitis  10. Hypothyroidism  11. Anxiety and depression    Patient presented due to intractable nausea and emesis along with abdominal pain likely secondary to alcohol withdrawal.  She has had a drink yesterday evening. Patient follows with [facility]ology [facility] for evaluation of liver transplantation. Plan is to manage alcohol withdrawal and treat UTI. Social work will be consulted for rehab placement. GI has been consulted for decompensated alcoholic cirrhosis.     Dorian Valle D.O.  4:06 AM  1/28/2021    Electronically signed by Dorian Valle DO on 1/27/21 at 8:19 PM EST

## 2021-01-29 PROBLEM — K74.60 DECOMPENSATED HEPATIC CIRRHOSIS (HCC): Status: ACTIVE | Noted: 2021-01-29

## 2021-01-29 PROBLEM — K72.90 DECOMPENSATED HEPATIC CIRRHOSIS (HCC): Status: ACTIVE | Noted: 2021-01-29

## 2021-01-29 LAB
ALBUMIN SERPL-MCNC: 2.9 G/DL (ref 3.5–5.2)
ALP BLD-CCNC: 207 U/L (ref 35–104)
ALT SERPL-CCNC: 38 U/L (ref 0–32)
AMMONIA: 66 UMOL/L (ref 11–51)
ANION GAP SERPL CALCULATED.3IONS-SCNC: 8 MMOL/L (ref 7–16)
ANISOCYTOSIS: ABNORMAL
AST SERPL-CCNC: 89 U/L (ref 0–31)
BASOPHILS ABSOLUTE: 0.03 E9/L (ref 0–0.2)
BASOPHILS RELATIVE PERCENT: 0.9 % (ref 0–2)
BILIRUB SERPL-MCNC: 3.4 MG/DL (ref 0–1.2)
BUN BLDV-MCNC: 5 MG/DL (ref 6–20)
CALCIUM SERPL-MCNC: 8.2 MG/DL (ref 8.6–10.2)
CHLORIDE BLD-SCNC: 100 MMOL/L (ref 98–107)
CO2: 25 MMOL/L (ref 22–29)
CREAT SERPL-MCNC: 0.8 MG/DL (ref 0.5–1)
EKG ATRIAL RATE: 95 BPM
EKG P AXIS: 55 DEGREES
EKG P-R INTERVAL: 126 MS
EKG Q-T INTERVAL: 398 MS
EKG QRS DURATION: 78 MS
EKG QTC CALCULATION (BAZETT): 500 MS
EKG R AXIS: 9 DEGREES
EKG T AXIS: 13 DEGREES
EKG VENTRICULAR RATE: 95 BPM
EOSINOPHILS ABSOLUTE: 0.06 E9/L (ref 0.05–0.5)
EOSINOPHILS RELATIVE PERCENT: 1.8 % (ref 0–6)
GFR AFRICAN AMERICAN: >60
GFR NON-AFRICAN AMERICAN: >60 ML/MIN/1.73
GLUCOSE BLD-MCNC: 85 MG/DL (ref 74–99)
HCT VFR BLD CALC: 25.3 % (ref 34–48)
HEMOGLOBIN: 8.3 G/DL (ref 11.5–15.5)
INR BLD: 2.4
LYMPHOCYTES ABSOLUTE: 0.84 E9/L (ref 1.5–4)
LYMPHOCYTES RELATIVE PERCENT: 27.2 % (ref 20–42)
MCH RBC QN AUTO: 30.7 PG (ref 26–35)
MCHC RBC AUTO-ENTMCNC: 32.8 % (ref 32–34.5)
MCV RBC AUTO: 93.7 FL (ref 80–99.9)
MONOCYTES ABSOLUTE: 0.12 E9/L (ref 0.1–0.95)
MONOCYTES RELATIVE PERCENT: 3.5 % (ref 2–12)
NEUTROPHILS ABSOLUTE: 2.08 E9/L (ref 1.8–7.3)
NEUTROPHILS RELATIVE PERCENT: 66.7 % (ref 43–80)
OVALOCYTES: ABNORMAL
PDW BLD-RTO: 12.6 FL (ref 11.5–15)
PLATELET # BLD: 28 E9/L (ref 130–450)
PLATELET CONFIRMATION: NORMAL
PMV BLD AUTO: 13.5 FL (ref 7–12)
POIKILOCYTES: ABNORMAL
POTASSIUM SERPL-SCNC: 2.9 MMOL/L (ref 3.5–5)
PROTHROMBIN TIME: 27.7 SEC (ref 9.3–12.4)
RBC # BLD: 2.7 E12/L (ref 3.5–5.5)
SODIUM BLD-SCNC: 133 MMOL/L (ref 132–146)
TOTAL PROTEIN: 6.4 G/DL (ref 6.4–8.3)
URINE CULTURE, ROUTINE: NORMAL
WBC # BLD: 3.1 E9/L (ref 4.5–11.5)

## 2021-01-29 PROCEDURE — 6360000002 HC RX W HCPCS: Performed by: INTERNAL MEDICINE

## 2021-01-29 PROCEDURE — 80053 COMPREHEN METABOLIC PANEL: CPT

## 2021-01-29 PROCEDURE — 6370000000 HC RX 637 (ALT 250 FOR IP): Performed by: INTERNAL MEDICINE

## 2021-01-29 PROCEDURE — 85610 PROTHROMBIN TIME: CPT

## 2021-01-29 PROCEDURE — 2580000003 HC RX 258: Performed by: EMERGENCY MEDICINE

## 2021-01-29 PROCEDURE — 1200000000 HC SEMI PRIVATE

## 2021-01-29 PROCEDURE — 36415 COLL VENOUS BLD VENIPUNCTURE: CPT

## 2021-01-29 PROCEDURE — 2580000003 HC RX 258: Performed by: INTERNAL MEDICINE

## 2021-01-29 PROCEDURE — 6360000002 HC RX W HCPCS: Performed by: EMERGENCY MEDICINE

## 2021-01-29 PROCEDURE — 85025 COMPLETE CBC W/AUTO DIFF WBC: CPT

## 2021-01-29 PROCEDURE — 6370000000 HC RX 637 (ALT 250 FOR IP): Performed by: HOSPITALIST

## 2021-01-29 PROCEDURE — 82140 ASSAY OF AMMONIA: CPT

## 2021-01-29 RX ORDER — POTASSIUM BICARBONATE 25 MEQ/1
50 TABLET, EFFERVESCENT ORAL 2 TIMES DAILY
Status: DISCONTINUED | OUTPATIENT
Start: 2021-01-29 | End: 2021-01-29 | Stop reason: CLARIF

## 2021-01-29 RX ORDER — PHYTONADIONE 5 MG/1
5 TABLET ORAL DAILY
Status: COMPLETED | OUTPATIENT
Start: 2021-01-29 | End: 2021-01-30

## 2021-01-29 RX ORDER — SPIRONOLACTONE 25 MG/1
25 TABLET ORAL DAILY
Status: DISCONTINUED | OUTPATIENT
Start: 2021-01-29 | End: 2021-02-01 | Stop reason: HOSPADM

## 2021-01-29 RX ORDER — LORAZEPAM 2 MG/ML
0.5 INJECTION INTRAMUSCULAR EVERY 4 HOURS PRN
Status: DISCONTINUED | OUTPATIENT
Start: 2021-01-29 | End: 2021-02-01

## 2021-01-29 RX ORDER — GABAPENTIN 100 MG/1
100 CAPSULE ORAL 3 TIMES DAILY
Status: DISCONTINUED | OUTPATIENT
Start: 2021-01-29 | End: 2021-01-30

## 2021-01-29 RX ADMIN — LORAZEPAM 0.5 MG: 2 INJECTION INTRAMUSCULAR; INTRAVENOUS at 22:31

## 2021-01-29 RX ADMIN — LORAZEPAM 1 MG: 2 INJECTION INTRAMUSCULAR; INTRAVENOUS at 08:19

## 2021-01-29 RX ADMIN — TRAMADOL HYDROCHLORIDE 50 MG: 50 TABLET ORAL at 08:19

## 2021-01-29 RX ADMIN — LORAZEPAM 0.5 MG: 2 INJECTION INTRAMUSCULAR; INTRAVENOUS at 13:08

## 2021-01-29 RX ADMIN — POTASSIUM BICARBONATE 40 MEQ: 782 TABLET, EFFERVESCENT ORAL at 10:27

## 2021-01-29 RX ADMIN — LEVOTHYROXINE SODIUM 75 MCG: 75 TABLET ORAL at 06:09

## 2021-01-29 RX ADMIN — ACETAMINOPHEN 650 MG: 325 TABLET, FILM COATED ORAL at 01:38

## 2021-01-29 RX ADMIN — FOLIC ACID 2.5 MG: 1 TABLET ORAL at 08:20

## 2021-01-29 RX ADMIN — CYANOCOBALAMIN TAB 1000 MCG 2000 MCG: 1000 TAB at 08:21

## 2021-01-29 RX ADMIN — RIFAXIMIN 550 MG: 550 TABLET ORAL at 08:21

## 2021-01-29 RX ADMIN — SPIRONOLACTONE 25 MG: 25 TABLET ORAL at 10:28

## 2021-01-29 RX ADMIN — GABAPENTIN 100 MG: 100 CAPSULE ORAL at 14:11

## 2021-01-29 RX ADMIN — LORAZEPAM 0.5 MG: 2 INJECTION INTRAMUSCULAR; INTRAVENOUS at 17:18

## 2021-01-29 RX ADMIN — SODIUM CHLORIDE, PRESERVATIVE FREE 10 ML: 5 INJECTION INTRAVENOUS at 18:41

## 2021-01-29 RX ADMIN — GABAPENTIN 100 MG: 100 CAPSULE ORAL at 22:11

## 2021-01-29 RX ADMIN — LORAZEPAM 1 MG: 2 INJECTION INTRAMUSCULAR; INTRAVENOUS at 01:38

## 2021-01-29 RX ADMIN — FLUOXETINE 40 MG: 20 CAPSULE ORAL at 08:20

## 2021-01-29 RX ADMIN — PHYTONADIONE 5 MG: 5 TABLET ORAL at 10:27

## 2021-01-29 RX ADMIN — CEFTRIAXONE SODIUM 1000 MG: 1 INJECTION, POWDER, FOR SOLUTION INTRAMUSCULAR; INTRAVENOUS at 18:30

## 2021-01-29 RX ADMIN — PYRIDOXINE HCL TAB 50 MG 25 MG: 50 TAB at 08:20

## 2021-01-29 RX ADMIN — GABAPENTIN 100 MG: 100 CAPSULE ORAL at 08:21

## 2021-01-29 RX ADMIN — Medication 10 ML: at 22:12

## 2021-01-29 RX ADMIN — POTASSIUM BICARBONATE 40 MEQ: 782 TABLET, EFFERVESCENT ORAL at 22:11

## 2021-01-29 RX ADMIN — RIFAXIMIN 550 MG: 550 TABLET ORAL at 22:11

## 2021-01-29 RX ADMIN — THIAMINE HYDROCHLORIDE 100 MG: 100 INJECTION, SOLUTION INTRAMUSCULAR; INTRAVENOUS at 12:12

## 2021-01-29 RX ADMIN — POTASSIUM BICARBONATE 20 MEQ: 782 TABLET, EFFERVESCENT ORAL at 08:20

## 2021-01-29 RX ADMIN — Medication 10 ML: at 08:27

## 2021-01-29 ASSESSMENT — PAIN DESCRIPTION - DESCRIPTORS
DESCRIPTORS: ACHING;CONSTANT;HEADACHE
DESCRIPTORS: ACHING;CONSTANT;DISCOMFORT

## 2021-01-29 ASSESSMENT — PAIN DESCRIPTION - PAIN TYPE: TYPE: ACUTE PAIN

## 2021-01-29 ASSESSMENT — PAIN DESCRIPTION - LOCATION: LOCATION: ABDOMEN

## 2021-01-29 ASSESSMENT — PAIN SCALES - GENERAL: PAINLEVEL_OUTOF10: 8

## 2021-01-29 ASSESSMENT — PAIN - FUNCTIONAL ASSESSMENT: PAIN_FUNCTIONAL_ASSESSMENT: PREVENTS OR INTERFERES SOME ACTIVE ACTIVITIES AND ADLS

## 2021-01-29 ASSESSMENT — PAIN DESCRIPTION - ONSET: ONSET: SUDDEN

## 2021-01-29 ASSESSMENT — PAIN DESCRIPTION - FREQUENCY: FREQUENCY: INTERMITTENT

## 2021-01-29 NOTE — CARE COORDINATION
Ss note:1/29/2021.10:22 AM Negative Covid on 1-. Pt was seen by domingo in ED, referral made to [facility]. Simi Waller requested this  make referral to [facility] at end of day yesterday. SW made referral to [facility], faxed info and received return call from [facility] whom has denied the pt due to many chronic medical issues. Follow up with Simi Waller who relays to make referral to [facility]. Referral made and information and negative covid test result faxed for review. Sw will await return call from [facility] to determine if this facility can accept this pt for inpt alcohol rehab. Pt has a hx of [facility] in [facility] however per previous sw notes no bed available until feb 5th at this facility. At this time awaiting return call from [facility] in [place] for [facility].  DAMIAN Beaver

## 2021-01-29 NOTE — CARE COORDINATION
This note will not be viewable in [facility] for the following reason(s). Suspected substance abuse disorder. Peer Recovery Support Note    Name: Mila Galeana  Date: 1/29/2021    Chief Complaint   Patient presents with    Alcohol Problem     PT states she is in liver failure and needs admitted to stop drinking. Denies SI, HI. Pt states she drinks 2 big trulys a day. PT last drink last night       Peer Support met with patient.   [x] Support and education provided  [x] Resources provided   [] Treatment referral:   [] Other:   [] Patient declined peer recovery services     Referred By: URSULA Mendez        Signed: Sebastián Gaines, 1/29/2021

## 2021-01-29 NOTE — CARE COORDINATION
Ss note:1/29/2021 11:11 AM Negative Covid test on 1-. Rosita Valero with [facility] has met with pt again today, she is much more alert. Pt does NOT want to go to any inpt facility but [facility], therefore sw cancelled referral to [facility], pt has a hx of [employer]. Per ED sw notes no beds available until February 5th. This  left a [employer] message for [employer] at [phone] ext [phone] requesting return call on bed availability and if pt needs to do a phone assessment during her current hospital admission. NEMESIO Valero and URSULA Timmons will be in house at [facility]. [employer] and can follow up on referral to [facility] if this  does not get a return call today.  DAMIAN Ortiz

## 2021-01-29 NOTE — PROGRESS NOTES
Disaster mode charting initiated at 19:25 on 1/28/2021.     Electronically signed by Lamonte Saunders RN on 1/29/2021 at 5:00 AM

## 2021-01-29 NOTE — PROGRESS NOTES
Denies any rashes, ulcers, or excoriations. Denies bruising. Jaundice. Hematologic/Lymphatic:  Denies bruising or bleeding. Physical Exam:  I/O this shift: In: 480 [P.O.:480]  Out: 850 [Urine:850]    Intake/Output Summary (Last 24 hours) at 1/29/2021 1432  Last data filed at 1/29/2021 1415  Gross per 24 hour   Intake 960 ml   Output 1650 ml   Net -690 ml   I/O last 3 completed shifts: In: 480 [P.O.:480]  Out: 1700 [Urine:1700]  No data found. Vital Signs:   Blood pressure (!) 149/77, pulse 89, temperature 98 °F (36.7 °C), temperature source Oral, resp. rate 18, SpO2 98 %. General appearance:  Alert, responsive, oriented to person, place, and time. Well preserved, alert, no distress. Head:  Normocephalic. No masses, lesions or tenderness. Eyes:  PERRLA. EOMI. Scleral icterus--appears less. Buccal mucosa moist.  ENT:  Ears normal. Mucosa normal.  Neck:    Supple. Trachea midline. No thyromegaly. No JVD. No bruits. Heart:    Rhythm regular. Rate controlled. No murmurs. Lungs:    Symmetrical. Clear to auscultation bilaterally. No wheezes. No rhonchi. No rales. Abdomen:   Distended. Tender to palpation. Bowel sounds positive. No organomegaly or masses. No pain on palpation. Extremities:    Peripheral pulses present. No peripheral edema. No ulcers. No cyanosis. No clubbing. Neurologic:    Alert x 3. No focal deficit. Cranial nerves grossly intact. No focal weakness. Psych:   Behavior is normal. Mood appears normal. Speech is not rapid and/or pressured. Tearful when she discussed death of father. Musculoskeletal:   Spine ROM normal. Muscular strength intact. Gait not assessed. Integumentary:  No rashes  Skin normal color and texture. Patient is jaundiced.   Genitalia/Breast:  Deferred    Medication:  Scheduled Meds:   gabapentin  100 mg Oral TID    phytonadione  5 mg Oral Daily    spironolactone  25 mg Oral Daily    potassium bicarb-citric acid  40 mEq Oral BID  cefTRIAXone (ROCEPHIN) IV  1,000 mg Intravenous Q24H    FLUoxetine  40 mg Oral Daily    thiamine (VITAMIN B1) IVPB  100 mg Intravenous Q24H    influenza virus vaccine  0.5 mL Intramuscular Prior to discharge    rifaximin  550 mg Oral BID    levothyroxine  75 mcg Oral Daily    folic acid  2.5 mg Oral Daily    And    vitamin B-6  25 mg Oral Daily    And    vitamin B-12  2,000 mcg Oral Daily    sodium chloride flush  10 mL Intravenous 2 times per day     Continuous Infusions:   dextrose         Objective Data:  CBC with Differential:    Lab Results   Component Value Date    WBC 3.1 01/29/2021    RBC 2.70 01/29/2021    HGB 8.3 01/29/2021    HCT 25.3 01/29/2021    PLT 28 01/29/2021    MCV 93.7 01/29/2021    MCH 30.7 01/29/2021    MCHC 32.8 01/29/2021    RDW 12.6 01/29/2021    SEGSPCT 59 08/11/2011    METASPCT 3 07/05/2011    LYMPHOPCT 27.2 01/29/2021    MONOPCT 3.5 01/29/2021    MYELOPCT 2 02/22/2011    BASOPCT 0.9 01/29/2021    MONOSABS 0.12 01/29/2021    LYMPHSABS 0.84 01/29/2021    EOSABS 0.06 01/29/2021    BASOSABS 0.03 01/29/2021     CMP:    Lab Results   Component Value Date     01/29/2021    K 2.9 01/29/2021    K 2.9 01/27/2021     01/29/2021    CO2 25 01/29/2021    BUN 5 01/29/2021    CREATININE 0.8 01/29/2021    GFRAA >60 01/29/2021    LABGLOM >60 01/29/2021    GLUCOSE 85 01/29/2021    GLUCOSE 125 08/11/2011    PROT 6.4 01/29/2021    LABALBU 2.9 01/29/2021    LABALBU 3.6 08/11/2011    CALCIUM 8.2 01/29/2021    BILITOT 3.4 01/29/2021    ALKPHOS 207 01/29/2021    AST 89 01/29/2021    ALT 38 01/29/2021     PT/INR:    Lab Results   Component Value Date    PROTIME 27.7 01/29/2021    PROTIME 14.7 08/11/2011    INR 2.4 01/29/2021       Wound Documentation:   None    Assessment:    · Decompensated alcoholic cirrhosis complicated by acute alcoholic hepatitis  · Normocytic anemia  · Ulcerative colitis status post IPAA  · Alcohol withdrawal  · UTI  · Multiple electrolyte derangements · Cystic lesion at the level of the right adnexa  · Lactic acidosis  · Prolonged QTC  · Hypothyroidism  · Anxiety and depression      Plan:     · Patient does seem to be doing well with Neurontin. This will be titrated upward and Ativan will be deescalated  · Bilirubin is improving along with a liver enzymes, unfortunately INR is becoming elevated. We will add oral vitamin K  · Continue work with physical therapy  · Discussed discharge planning. · Continue Xifaxan treatment for the elevated ammonia  · Correct hypokalemia    More than 50% of my  time was spent at the bedside counseling/coordinating care with the patient and/or family with face to face contact. This time was spent reviewing notes and laboratory data as well as instructing and counseling the patient. Time I spent with the family or surrogate(s) is included only if the patient was incapable of providing the necessary information or participating in medical decisions. I also discussed the differential diagnosis and all of the proposed management plans with the patient and individuals accompanying the patient. Cleveland Boxer requires this high level of physician care and nursing on the IMC/Telemetry unit due the complexity of decision management and chance of rapid decline or death. Continued cardiac monitoring and higher level of nursing are required. I am readily available for any further decision-making and intervention.          Navneet Peterson D.O., [facility]  2:32 PM  1/29/2021

## 2021-01-29 NOTE — PROGRESS NOTES
PROGRESS NOTE    By Doris Cid D.O., GI Fellow    [facility] Dr. Jaynee Rubinstein, M.D., Dr. Rhona Holland M.D., Dr. Abdiaziz Vega., Dr. Fawn Gardner M.D., Dr. Jarod Tanner  54 y.o.  female    SUBJECTIVE:    No acute events overnight. Patient tolerating diet endorses improvement of anxiety with anxiolytics. Resting comfortably in bed    OBJECTIVE:  BP (!) 149/77   Pulse 89   Temp 98 °F (36.7 °C) (Oral)   Resp 18   SpO2 98%   GEN: A&Ox3, friendly, NAD  HEENT:PEERL, no icterus  Heart: RRR  Lungs: CTAB  Abd.: soft, NT, ND, BS +, no G/R, no HSM  Extr.: no C/C/E, no brusing         Lab Results   Component Value Date    WBC 3.1 01/29/2021    WBC 4.4 01/28/2021    WBC 7.6 01/27/2021    HGB 8.3 01/29/2021    HGB 9.1 01/28/2021    HGB 10.8 01/27/2021    HCT 25.3 01/29/2021    MCV 93.7 01/29/2021    RDW 12.6 01/29/2021    PLT 28 01/29/2021    PLT 38 01/28/2021    PLT 62 01/27/2021     Lab Results   Component Value Date     01/29/2021    K 2.9 01/29/2021    K 2.9 01/27/2021     01/29/2021    CO2 25 01/29/2021    BUN 5 01/29/2021    CREATININE 0.8 01/29/2021    CALCIUM 8.2 01/29/2021    PROT 6.4 01/29/2021    LABALBU 2.9 01/29/2021    LABALBU 3.6 08/11/2011    BILITOT 3.4 01/29/2021    BILITOT 5.1 01/28/2021    BILITOT 6.1 01/27/2021    ALKPHOS 207 01/29/2021    ALKPHOS 231 01/28/2021    ALKPHOS 270 01/27/2021    AST 89 01/29/2021     01/28/2021     01/27/2021    ALT 38 01/29/2021    ALT 45 01/28/2021    ALT 59 01/27/2021     Lab Results   Component Value Date    LIPASE 35 01/27/2021    LIPASE 38 01/20/2021    LIPASE 48 10/03/2016     Lab Results   Component Value Date    AMYLASE 163 01/20/2021         ASSESSMENT/PLAN:  1.   Decompensated alcoholic cirrhosis complicated by acute alcoholic hepatitis   MDF 72, also recently 71 on most recent admission from 12/1/2022 12/9/2020 at [facility]  MELD-NA: 21, previously 25 and most recent admission Recently unresponsive to steroids, Marii score: 0.89  Right upper quadrant ultrasound and Doppler unremarkable for any obstructive process or thrombus  Most recent hepatic panel reviewed and unremarkable  Recently evaluated for liver transplant and was deemed not a candidate secondary to\"absolute psychosocial barriers to transplant were identified, deferred for 6 weeks. \"  Hepatic encephalopathy, type C, overt, grade 1, recurrent  Icteric sclera, no ascites, no asterixis  Daily CMP and INR  Long discussion about the importance of absolute alcohol cessation and continued abstinence  Rifaximin  Spironolactone  Continue supportive care     2. Normocytic anemia, unspecified  Likely secondary hypoproliferative or myelosuppression secondary to acute on chronic alcohol abuse  Recent EGD and colonoscopy at [facility] in December 2020 unremarkable for any etiology  Anemia panel        3. Ulcerative colitis s/p IPAA  Suspect diarrhea secondary to #1  Cholestyramine        Will discuss with attending    Robert Higuera DO  1/29/2021  10:44 AM    Pt seen and independently examined. Pertinent notes and lab work reviewed. Monitor reviewed showing sinus rhythm. D/w Dr. Blanche Vizcarra with physical exam and A&P. Discussed with patient - all questions answered - agreeable with the plan as delineated.     Paige Banegas MD  1/29/2021  2:45 PM

## 2021-01-30 LAB
ALBUMIN SERPL-MCNC: 2.8 G/DL (ref 3.5–5.2)
ALP BLD-CCNC: 202 U/L (ref 35–104)
ALT SERPL-CCNC: 36 U/L (ref 0–32)
AMMONIA: 96 UMOL/L (ref 11–51)
ANION GAP SERPL CALCULATED.3IONS-SCNC: 8 MMOL/L (ref 7–16)
AST SERPL-CCNC: 75 U/L (ref 0–31)
BASOPHILS ABSOLUTE: 0.01 E9/L (ref 0–0.2)
BASOPHILS RELATIVE PERCENT: 0.3 % (ref 0–2)
BILIRUB SERPL-MCNC: 2.8 MG/DL (ref 0–1.2)
BUN BLDV-MCNC: 8 MG/DL (ref 6–20)
CALCIUM SERPL-MCNC: 8.4 MG/DL (ref 8.6–10.2)
CHLORIDE BLD-SCNC: 102 MMOL/L (ref 98–107)
CO2: 26 MMOL/L (ref 22–29)
CREAT SERPL-MCNC: 0.8 MG/DL (ref 0.5–1)
EOSINOPHILS ABSOLUTE: 0.15 E9/L (ref 0.05–0.5)
EOSINOPHILS RELATIVE PERCENT: 4.3 % (ref 0–6)
GFR AFRICAN AMERICAN: >60
GFR NON-AFRICAN AMERICAN: >60 ML/MIN/1.73
GLUCOSE BLD-MCNC: 91 MG/DL (ref 74–99)
HCT VFR BLD CALC: 25.2 % (ref 34–48)
HEMOGLOBIN: 8.3 G/DL (ref 11.5–15.5)
IMMATURE GRANULOCYTES #: 0.01 E9/L
IMMATURE GRANULOCYTES %: 0.3 % (ref 0–5)
INR BLD: 2.1
LACTIC ACID: 0.8 MMOL/L (ref 0.5–2.2)
LYMPHOCYTES ABSOLUTE: 1.08 E9/L (ref 1.5–4)
LYMPHOCYTES RELATIVE PERCENT: 31.2 % (ref 20–42)
MAGNESIUM: 1.4 MG/DL (ref 1.6–2.6)
MCH RBC QN AUTO: 31.3 PG (ref 26–35)
MCHC RBC AUTO-ENTMCNC: 32.9 % (ref 32–34.5)
MCV RBC AUTO: 95.1 FL (ref 80–99.9)
MONOCYTES ABSOLUTE: 0.26 E9/L (ref 0.1–0.95)
MONOCYTES RELATIVE PERCENT: 7.5 % (ref 2–12)
NEUTROPHILS ABSOLUTE: 1.95 E9/L (ref 1.8–7.3)
NEUTROPHILS RELATIVE PERCENT: 56.4 % (ref 43–80)
PDW BLD-RTO: 12.6 FL (ref 11.5–15)
PLATELET # BLD: 35 E9/L (ref 130–450)
PLATELET CONFIRMATION: NORMAL
PMV BLD AUTO: 12.1 FL (ref 7–12)
POTASSIUM SERPL-SCNC: 3.8 MMOL/L (ref 3.5–5)
PROTHROMBIN TIME: 23.7 SEC (ref 9.3–12.4)
RBC # BLD: 2.65 E12/L (ref 3.5–5.5)
SODIUM BLD-SCNC: 136 MMOL/L (ref 132–146)
TOTAL PROTEIN: 6.6 G/DL (ref 6.4–8.3)
WBC # BLD: 3.5 E9/L (ref 4.5–11.5)

## 2021-01-30 PROCEDURE — 2580000003 HC RX 258: Performed by: INTERNAL MEDICINE

## 2021-01-30 PROCEDURE — 83735 ASSAY OF MAGNESIUM: CPT

## 2021-01-30 PROCEDURE — 6370000000 HC RX 637 (ALT 250 FOR IP): Performed by: HOSPITALIST

## 2021-01-30 PROCEDURE — 6370000000 HC RX 637 (ALT 250 FOR IP): Performed by: INTERNAL MEDICINE

## 2021-01-30 PROCEDURE — 36415 COLL VENOUS BLD VENIPUNCTURE: CPT

## 2021-01-30 PROCEDURE — 85610 PROTHROMBIN TIME: CPT

## 2021-01-30 PROCEDURE — 1200000000 HC SEMI PRIVATE

## 2021-01-30 PROCEDURE — 6360000002 HC RX W HCPCS: Performed by: INTERNAL MEDICINE

## 2021-01-30 PROCEDURE — 80053 COMPREHEN METABOLIC PANEL: CPT

## 2021-01-30 PROCEDURE — 82140 ASSAY OF AMMONIA: CPT

## 2021-01-30 PROCEDURE — 83605 ASSAY OF LACTIC ACID: CPT

## 2021-01-30 PROCEDURE — 6360000002 HC RX W HCPCS

## 2021-01-30 PROCEDURE — 85025 COMPLETE CBC W/AUTO DIFF WBC: CPT

## 2021-01-30 RX ORDER — ONDANSETRON 2 MG/ML
INJECTION INTRAMUSCULAR; INTRAVENOUS
Status: COMPLETED
Start: 2021-01-30 | End: 2021-01-30

## 2021-01-30 RX ORDER — ONDANSETRON 2 MG/ML
4 INJECTION INTRAMUSCULAR; INTRAVENOUS EVERY 4 HOURS PRN
Status: DISCONTINUED | OUTPATIENT
Start: 2021-01-30 | End: 2021-02-01 | Stop reason: HOSPADM

## 2021-01-30 RX ORDER — LACTULOSE 10 G/15ML
20 SOLUTION ORAL 3 TIMES DAILY
Status: DISCONTINUED | OUTPATIENT
Start: 2021-01-30 | End: 2021-01-31

## 2021-01-30 RX ORDER — LANOLIN ALCOHOL/MO/W.PET/CERES
100 CREAM (GRAM) TOPICAL DAILY
Status: DISCONTINUED | OUTPATIENT
Start: 2021-01-30 | End: 2021-01-30 | Stop reason: SDUPTHER

## 2021-01-30 RX ORDER — MAGNESIUM SULFATE 1 G/100ML
1000 INJECTION INTRAVENOUS PRN
Status: DISCONTINUED | OUTPATIENT
Start: 2021-01-30 | End: 2021-02-01

## 2021-01-30 RX ORDER — POTASSIUM CHLORIDE 7.45 MG/ML
10 INJECTION INTRAVENOUS PRN
Status: DISCONTINUED | OUTPATIENT
Start: 2021-01-30 | End: 2021-02-01

## 2021-01-30 RX ORDER — GAUZE BANDAGE 2" X 2"
100 BANDAGE TOPICAL DAILY
Status: DISCONTINUED | OUTPATIENT
Start: 2021-01-30 | End: 2021-02-01 | Stop reason: HOSPADM

## 2021-01-30 RX ORDER — POTASSIUM CHLORIDE 20 MEQ/1
40 TABLET, EXTENDED RELEASE ORAL PRN
Status: DISCONTINUED | OUTPATIENT
Start: 2021-01-30 | End: 2021-02-01

## 2021-01-30 RX ORDER — LACTULOSE 10 G/15ML
20 SOLUTION ORAL DAILY
Status: DISCONTINUED | OUTPATIENT
Start: 2021-01-30 | End: 2021-01-30

## 2021-01-30 RX ORDER — GABAPENTIN 100 MG/1
200 CAPSULE ORAL 3 TIMES DAILY
Status: DISCONTINUED | OUTPATIENT
Start: 2021-01-30 | End: 2021-02-01

## 2021-01-30 RX ADMIN — ONDANSETRON 4 MG: 2 INJECTION INTRAMUSCULAR; INTRAVENOUS at 02:30

## 2021-01-30 RX ADMIN — Medication 10 ML: at 09:17

## 2021-01-30 RX ADMIN — POTASSIUM BICARBONATE 40 MEQ: 782 TABLET, EFFERVESCENT ORAL at 21:25

## 2021-01-30 RX ADMIN — CEFTRIAXONE SODIUM 1000 MG: 1 INJECTION, POWDER, FOR SOLUTION INTRAMUSCULAR; INTRAVENOUS at 17:23

## 2021-01-30 RX ADMIN — LORAZEPAM 0.5 MG: 2 INJECTION INTRAMUSCULAR; INTRAVENOUS at 02:40

## 2021-01-30 RX ADMIN — TRAMADOL HYDROCHLORIDE 50 MG: 50 TABLET ORAL at 10:09

## 2021-01-30 RX ADMIN — SPIRONOLACTONE 25 MG: 25 TABLET ORAL at 09:17

## 2021-01-30 RX ADMIN — LORAZEPAM 0.5 MG: 2 INJECTION INTRAMUSCULAR; INTRAVENOUS at 21:26

## 2021-01-30 RX ADMIN — LEVOTHYROXINE SODIUM 75 MCG: 75 TABLET ORAL at 06:24

## 2021-01-30 RX ADMIN — PYRIDOXINE HCL TAB 50 MG 25 MG: 50 TAB at 09:17

## 2021-01-30 RX ADMIN — GABAPENTIN 200 MG: 100 CAPSULE ORAL at 14:09

## 2021-01-30 RX ADMIN — LACTULOSE 20 G: 20 SOLUTION ORAL at 21:25

## 2021-01-30 RX ADMIN — RIFAXIMIN 550 MG: 550 TABLET ORAL at 09:17

## 2021-01-30 RX ADMIN — POTASSIUM BICARBONATE 40 MEQ: 782 TABLET, EFFERVESCENT ORAL at 09:17

## 2021-01-30 RX ADMIN — GABAPENTIN 200 MG: 100 CAPSULE ORAL at 21:25

## 2021-01-30 RX ADMIN — LORAZEPAM 0.5 MG: 2 INJECTION INTRAMUSCULAR; INTRAVENOUS at 17:17

## 2021-01-30 RX ADMIN — CYANOCOBALAMIN TAB 1000 MCG 2000 MCG: 1000 TAB at 09:17

## 2021-01-30 RX ADMIN — Medication 10 ML: at 21:26

## 2021-01-30 RX ADMIN — RIFAXIMIN 550 MG: 550 TABLET ORAL at 21:25

## 2021-01-30 RX ADMIN — FOLIC ACID 2.5 MG: 1 TABLET ORAL at 09:17

## 2021-01-30 RX ADMIN — FLUOXETINE 40 MG: 20 CAPSULE ORAL at 09:17

## 2021-01-30 RX ADMIN — TRAMADOL HYDROCHLORIDE 50 MG: 50 TABLET ORAL at 21:26

## 2021-01-30 RX ADMIN — GABAPENTIN 100 MG: 100 CAPSULE ORAL at 09:17

## 2021-01-30 RX ADMIN — THIAMINE HCL TAB 100 MG 100 MG: 100 TAB at 10:09

## 2021-01-30 RX ADMIN — LACTULOSE 20 G: 20 SOLUTION ORAL at 10:09

## 2021-01-30 RX ADMIN — PHYTONADIONE 5 MG: 5 TABLET ORAL at 09:17

## 2021-01-30 RX ADMIN — LORAZEPAM 0.5 MG: 2 INJECTION INTRAMUSCULAR; INTRAVENOUS at 09:18

## 2021-01-30 RX ADMIN — LACTULOSE 20 G: 20 SOLUTION ORAL at 14:09

## 2021-01-30 ASSESSMENT — PAIN DESCRIPTION - DESCRIPTORS: DESCRIPTORS: ACHING;CONSTANT;DISCOMFORT

## 2021-01-30 ASSESSMENT — PAIN DESCRIPTION - LOCATION
LOCATION: ABDOMEN
LOCATION: ABDOMEN

## 2021-01-30 ASSESSMENT — PAIN DESCRIPTION - PAIN TYPE: TYPE: ACUTE PAIN

## 2021-01-30 ASSESSMENT — PAIN DESCRIPTION - ONSET: ONSET: ON-GOING

## 2021-01-30 NOTE — PROGRESS NOTES
Internal Medicine Progress Note    [facility]    [facility]. Bebo Burris, SAVAGEOHERIBERTO Amado D.O., SAIDA Trevizo D.O. Maceo Cooks, MSN, APRN, NP-C  Greyson Mosquera. Mervat Umana, MSN, APRN-CNP     Primary Care Physician: No primary care provider on file. Admitting Physician:  Bernadette Guzmán DO  Admission date and time: 1/27/2021  2:25 PM    Room:  [facility]  Admitting diagnosis: Alcohol abuse with withdrawal [F10.139]    Patient Name: Joe Rosen  MRN: [id number]    Date of Service: 1/30/2021     Subjective:  Malgorzata Arellano is a 54 y.o. female who was seen and examined today,1/30/2021, at the bedside. Malgorzata Arellano is complaining of right-sided abdominal pain today. She is requesting additional pain medications. She denies any signs of alcohol withdrawal.  She remains agreeable to rehabilitation once this can be arranged. She is an overall poor historian as we attempt to determine actual bowel movements in the setting of upward trending ammonia. Review of System:   Constitutional:   Admits to generalized malaise and fatigue. HEENT:   Denies ear pain, sore throat, sinus or eye problems. Cardiovascular:   Denies any chest pain, irregular heartbeats, or palpitations. Respiratory:   Denies shortness of breath, coughing, sputum production, hemoptysis, or wheezing. Gastrointestinal:   Admits to worsening right-sided abdominal pain. She has a difficult time quantifying her bowel movements. Genitourinary:    Denies any urgency, frequency, hematuria. Voiding  without difficulty. Extremities:   Denies lower extremity swelling, edema or cyanosis. Neurology:    Denies any headache or focal neurological deficits, Denies generalized weakness or memory difficulty. Psch:   Denies being anxious or depressed. Musculoskeletal:    Denies  myalgias, joint complaints or back pain. Integumentary:   Denies any rashes, ulcers, or excoriations. Denies bruising. Jaundice. Hematologic/Lymphatic:  Denies bruising or bleeding. Physical Exam:  No intake/output data recorded. Intake/Output Summary (Last 24 hours) at 1/30/2021 0941  Last data filed at 1/30/2021 0924  Gross per 24 hour   Intake 1220 ml   Output 1900 ml   Net -680 ml   I/O last 3 completed shifts: In: 4968 [P.O.:1460]  Out: 1900 [Urine:1900]  No data found. Vital Signs:   Blood pressure (!) 146/94, pulse 90, temperature 98.1 °F (36.7 °C), temperature source Oral, resp. rate 18, SpO2 98 %. General appearance:  Alert, responsive, oriented to person, place, and time. Well preserved, alert, no distress. Head:  Normocephalic. No masses, lesions or tenderness. Eyes:  PERRLA. EOMI. Scleral icterus--appears less. Buccal mucosa moist.  ENT:  Ears normal. Mucosa normal.  Neck:    Supple. Trachea midline. No thyromegaly. No JVD. No bruits. Heart:    Rhythm regular. Rate controlled. No murmurs. Lungs:    Symmetrical. Clear to auscultation bilaterally. No wheezes. No rhonchi. No rales. Abdomen:   Mildly tender to palpation diffusely with some localization to the right upper quadrant. Voluntary guarding is elicited. Bowel sounds are active. Extremities:    Peripheral pulses present. No peripheral edema. No ulcers. No cyanosis. No clubbing. Neurologic:    Alert x 3. No focal deficit. Cranial nerves grossly intact. No focal weakness. Psych:   Behavior is normal. Mood appears normal. Speech is not rapid and/or pressured. Musculoskeletal:   Spine ROM normal. Muscular strength intact. Gait not assessed. Integumentary:  No rashes  Skin normal color and texture. Patient is jaundiced.   Genitalia/Breast:  Deferred    Medication:  Scheduled Meds:   lactulose  20 g Oral Daily    thiamine mononitrate  100 mg Oral Daily    gabapentin  200 mg Oral TID    spironolactone  25 mg Oral Daily    potassium bicarb-citric acid  40 mEq Oral BID    cefTRIAXone (ROCEPHIN) IV  1,000 mg Intravenous Q24H  FLUoxetine  40 mg Oral Daily    influenza virus vaccine  0.5 mL Intramuscular Prior to discharge    rifaximin  550 mg Oral BID    levothyroxine  75 mcg Oral Daily    folic acid  2.5 mg Oral Daily    And    vitamin B-6  25 mg Oral Daily    And    vitamin B-12  2,000 mcg Oral Daily    sodium chloride flush  10 mL Intravenous 2 times per day     Continuous Infusions:   dextrose         Objective Data:  CBC with Differential:    Lab Results   Component Value Date    WBC 3.5 01/30/2021    RBC 2.65 01/30/2021    HGB 8.3 01/30/2021    HCT 25.2 01/30/2021    PLT 35 01/30/2021    MCV 95.1 01/30/2021    MCH 31.3 01/30/2021    MCHC 32.9 01/30/2021    RDW 12.6 01/30/2021    SEGSPCT 59 08/11/2011    METASPCT 3 07/05/2011    LYMPHOPCT 31.2 01/30/2021    MONOPCT 7.5 01/30/2021    MYELOPCT 2 02/22/2011    BASOPCT 0.3 01/30/2021    MONOSABS 0.26 01/30/2021    LYMPHSABS 1.08 01/30/2021    EOSABS 0.15 01/30/2021    BASOSABS 0.01 01/30/2021     CMP:    Lab Results   Component Value Date     01/30/2021    K 3.8 01/30/2021    K 2.9 01/27/2021     01/30/2021    CO2 26 01/30/2021    BUN 8 01/30/2021    CREATININE 0.8 01/30/2021    GFRAA >60 01/30/2021    LABGLOM >60 01/30/2021    GLUCOSE 91 01/30/2021    GLUCOSE 125 08/11/2011    PROT 6.6 01/30/2021    LABALBU 2.8 01/30/2021    LABALBU 3.6 08/11/2011    CALCIUM 8.4 01/30/2021    BILITOT 2.8 01/30/2021    ALKPHOS 202 01/30/2021    AST 75 01/30/2021    ALT 36 01/30/2021     PT/INR:    Lab Results   Component Value Date    PROTIME 23.7 01/30/2021    PROTIME 14.7 08/11/2011    INR 2.1 01/30/2021         Assessment:  1. Decompensated alcoholic cirrhosis complicated by acute alcoholic hepatitis  2. Right-sided hydrosalpinx  3. Normocytic anemia  4. Ulcerative colitis status post IPAA  5. Hypothyroidism  6.  Generalized anxiety and depression    Plan: The patient's ammonia is trending upward but she is without overt signs of hepatic encephalopathy. She has a difficult time quantifying her bowel movements and we will maximize therapy with the addition of daily lactulose therapy and ask the nursing staff to monitor stool output closely. In the setting of abdominal discomfort particularly to the right, we will asked the OB/GYN team to evaluate the hydrosalpinx. Neurontin will be uptitrated for better pain control. She requires increasing work with the physical therapy and occupational therapy teams. She is agreeable to alcohol rehabilitation and the social work team is following for this purpose. I suspect that she will be acceptable for discharge to an alcohol rehabilitation facility on Monday. More than 50% of my  time was spent at the bedside counseling/coordinating care with the patient and/or family with face to face contact. This time was spent reviewing notes and laboratory data as well as instructing and counseling the patient. Time I spent with the family or surrogate(s) is included only if the patient was incapable of providing the necessary information or participating in medical decisions. I also discussed the differential diagnosis and all of the proposed management plans with the patient and individuals accompanying the patient. Apollo Castañeda requires this high level of physician care and nursing on the IM/Telemetry unit due the complexity of decision management and chance of rapid decline or death. Continued cardiac monitoring and higher level of nursing are required. I am readily available for any further decision-making and intervention.          Brian Flores D.O., Ronald Hancock  9:41 AM  1/30/2021

## 2021-01-30 NOTE — PROGRESS NOTES
PROGRESS NOTE  By Geo Felipe M.D. [facility]  Dr. Jacques Storey M.D.,  Dr. Lanre Riggs M.D.,   Dr. Ketan Hernandez D.O.,  Dr. Lidya Hernandez M.D.,  Dr. Susana Hooper D.O.,  WESLEY HuntleyO. Luis Alfredo Batres  54 y.o.  female    SUBJECTIVE:  Denies abdominal pain. Denies nausea or vomiting. Reports that she is tolerating diet    OBJECTIVE:    BP (!) 146/94   Pulse 90   Temp 98.1 °F (36.7 °C) (Oral)   Resp 18   Wt 200 lb (90.7 kg)   SpO2 98%   BMI 30.41 kg/m²     General: Resting comfortably. Awakes to voice. Not in acute distress. Alert, awake and oriented to self, place and time  HEENT: Mild scleral icterus/moist oral mucosa  Neck: Supple with trachea midline  Chest: CTA B  Cor: Regular  Abd.: Soft/NT. Does not appear distended or tympanic  Extr.:  Decreased muscle tone and bulk throughout  Skin: Warm and dry      DATA:    Monitor data reviewed -sinus rhythm noted.        Lab Results   Component Value Date    WBC 3.5 01/30/2021    RBC 2.65 01/30/2021    HGB 8.3 01/30/2021    HCT 25.2 01/30/2021    MCV 95.1 01/30/2021    MCH 31.3 01/30/2021    MCHC 32.9 01/30/2021    RDW 12.6 01/30/2021    PLT 35 01/30/2021    MPV 12.1 01/30/2021     Lab Results   Component Value Date     01/30/2021    K 3.8 01/30/2021    K 2.9 01/27/2021     01/30/2021    CO2 26 01/30/2021    BUN 8 01/30/2021    CREATININE 0.8 01/30/2021    CALCIUM 8.4 01/30/2021    PROT 6.6 01/30/2021    LABALBU 2.8 01/30/2021    LABALBU 3.6 08/11/2011    BILITOT 2.8 01/30/2021    ALKPHOS 202 01/30/2021    AST 75 01/30/2021    ALT 36 01/30/2021     Lab Results   Component Value Date    LIPASE 35 01/27/2021     Lab Results   Component Value Date    AMYLASE 163 01/20/2021         ASSESSMENT/PLAN:     1.  Decompensated alcoholic cirrhosis complicated by acute alcoholic hepatitis   MDF 71, also recently 71 on most recent admission from 12/1/2022 12/9/2020 at [facility]  MELD-NA: 21 on presentation Recently unresponsive to steroids, Marii score: 0.89  Right upper quadrant ultrasound and\ Doppler unremarkable for obstructive process or thrombus  Most recent hepatic panel reviewed and unremarkable  Recently evaluated for liver transplant and was deemed not a candidate secondary to\"absolute psychosocial barriers to transplant were identified, deferred for 6 weeks. \"  Hepatic encephalopathy, type C, overt, grade 1, recurrent  Icteric sclera, no ascites, no asterixis  Daily CMP and INR  Long discussion about the importance of absolute alcohol cessation and continued abstinence  Rifaximin  Spironolactone  Continue supportive care  -Increased ammonia  -increase lactulose to 3 times a day today and reevaluate tomorrow     2.  Normocytic anemia, unspecified  Likely secondary hypoproliferative or myelosuppression secondary to acute on chronic alcohol abuse  Recent EGD and colonoscopy at [facility] in December 2020 unremarkable for any etiology  -Monitor H&H        3.  Ulcerative colitis s/p IPAA  Suspect diarrhea secondary to #1  Cholestyramine       Rico Martinez MD  1/30/2021  11:23 AM    NOTE:  This report was transcribed using voice recognition software. Every effort was made to ensure accuracy; however, inadvertent computerized transcription errors may be present.

## 2021-01-30 NOTE — PROGRESS NOTES
Pt is too tired to participate today. Requests that it wait until tomorrow. Hilario Garrett, PT Lic.   #[id number]

## 2021-01-30 NOTE — PLAN OF CARE
Problem: Pain:  Goal: Pain level will decrease  Description: Pain level will decrease  Outcome: Met This Shift  Goal: Control of acute pain  Description: Control of acute pain  Outcome: Met This Shift  Goal: Control of chronic pain  Description: Control of chronic pain  Outcome: Met This Shift     Problem: Falls - Risk of:  Goal: Will remain free from falls  Description: Will remain free from falls  1/30/2021 0934 by Wander Valdes RN  Outcome: Met This Shift  1/30/2021 0438 by Hector Hyman RN  Outcome: Met This Shift  Goal: Absence of physical injury  Description: Absence of physical injury  Outcome: Met This Shift

## 2021-01-30 NOTE — CARE COORDINATION
Ss note:1/30/2021; Neg covid on 1-. Contacted Joan Rai admissions at [facility] who relayed that referral is still under physician review and no determination has been made yet regarding rehab acceptance or bed availability, she will have Maricruz notify sw if pt is approved for a weekend admission or have her follow with assigned sw/cm on Monday, nursing informed.  Electronically signed by DAMIAN Moraes on 1/30/2021 at 11:12 AM

## 2021-01-31 LAB
ALBUMIN SERPL-MCNC: 3.1 G/DL (ref 3.5–5.2)
ALP BLD-CCNC: 216 U/L (ref 35–104)
ALT SERPL-CCNC: 35 U/L (ref 0–32)
ANION GAP SERPL CALCULATED.3IONS-SCNC: 7 MMOL/L (ref 7–16)
AST SERPL-CCNC: 74 U/L (ref 0–31)
BASOPHILS ABSOLUTE: 0.01 E9/L (ref 0–0.2)
BASOPHILS RELATIVE PERCENT: 0.3 % (ref 0–2)
BILIRUB SERPL-MCNC: 2.6 MG/DL (ref 0–1.2)
BUN BLDV-MCNC: 8 MG/DL (ref 6–20)
CALCIUM SERPL-MCNC: 8.6 MG/DL (ref 8.6–10.2)
CHLORIDE BLD-SCNC: 102 MMOL/L (ref 98–107)
CO2: 24 MMOL/L (ref 22–29)
CREAT SERPL-MCNC: 0.7 MG/DL (ref 0.5–1)
EOSINOPHILS ABSOLUTE: 0.12 E9/L (ref 0.05–0.5)
EOSINOPHILS RELATIVE PERCENT: 3.7 % (ref 0–6)
GFR AFRICAN AMERICAN: >60
GFR NON-AFRICAN AMERICAN: >60 ML/MIN/1.73
GLUCOSE BLD-MCNC: 101 MG/DL (ref 74–99)
HCT VFR BLD CALC: 26.1 % (ref 34–48)
HCT VFR BLD CALC: 27.4 % (ref 34–48)
HEMOGLOBIN: 8.3 G/DL (ref 11.5–15.5)
HEMOGLOBIN: 8.9 G/DL (ref 11.5–15.5)
IMMATURE GRANULOCYTES #: 0.01 E9/L
IMMATURE GRANULOCYTES %: 0.3 % (ref 0–5)
INR BLD: 1.8
LYMPHOCYTES ABSOLUTE: 1.02 E9/L (ref 1.5–4)
LYMPHOCYTES RELATIVE PERCENT: 31.5 % (ref 20–42)
MAGNESIUM: 1.6 MG/DL (ref 1.6–2.6)
MCH RBC QN AUTO: 30.9 PG (ref 26–35)
MCHC RBC AUTO-ENTMCNC: 31.8 % (ref 32–34.5)
MCV RBC AUTO: 97 FL (ref 80–99.9)
METHYLMALONIC ACID: 0.21 UMOL/L (ref 0–0.4)
MONOCYTES ABSOLUTE: 0.3 E9/L (ref 0.1–0.95)
MONOCYTES RELATIVE PERCENT: 9.3 % (ref 2–12)
NEUTROPHILS ABSOLUTE: 1.78 E9/L (ref 1.8–7.3)
NEUTROPHILS RELATIVE PERCENT: 54.9 % (ref 43–80)
OVALOCYTES: ABNORMAL
PDW BLD-RTO: 12.9 FL (ref 11.5–15)
PHOSPHORUS: 1.7 MG/DL (ref 2.5–4.5)
PLATELET # BLD: 48 E9/L (ref 130–450)
PLATELET CONFIRMATION: NORMAL
PMV BLD AUTO: 11.8 FL (ref 7–12)
POIKILOCYTES: ABNORMAL
POLYCHROMASIA: ABNORMAL
POTASSIUM SERPL-SCNC: 4 MMOL/L (ref 3.5–5)
PROTHROMBIN TIME: 21.1 SEC (ref 9.3–12.4)
RBC # BLD: 2.69 E12/L (ref 3.5–5.5)
SCHISTOCYTES: ABNORMAL
SODIUM BLD-SCNC: 133 MMOL/L (ref 132–146)
TEAR DROP CELLS: ABNORMAL
TOTAL PROTEIN: 6.6 G/DL (ref 6.4–8.3)
WBC # BLD: 3.2 E9/L (ref 4.5–11.5)

## 2021-01-31 PROCEDURE — 85014 HEMATOCRIT: CPT

## 2021-01-31 PROCEDURE — 36415 COLL VENOUS BLD VENIPUNCTURE: CPT

## 2021-01-31 PROCEDURE — 6370000000 HC RX 637 (ALT 250 FOR IP): Performed by: HOSPITALIST

## 2021-01-31 PROCEDURE — 82105 ALPHA-FETOPROTEIN SERUM: CPT

## 2021-01-31 PROCEDURE — 80053 COMPREHEN METABOLIC PANEL: CPT

## 2021-01-31 PROCEDURE — 6370000000 HC RX 637 (ALT 250 FOR IP): Performed by: INTERNAL MEDICINE

## 2021-01-31 PROCEDURE — 83735 ASSAY OF MAGNESIUM: CPT

## 2021-01-31 PROCEDURE — 85025 COMPLETE CBC W/AUTO DIFF WBC: CPT

## 2021-01-31 PROCEDURE — 85610 PROTHROMBIN TIME: CPT

## 2021-01-31 PROCEDURE — 6360000002 HC RX W HCPCS: Performed by: INTERNAL MEDICINE

## 2021-01-31 PROCEDURE — 1200000000 HC SEMI PRIVATE

## 2021-01-31 PROCEDURE — 2580000003 HC RX 258: Performed by: INTERNAL MEDICINE

## 2021-01-31 PROCEDURE — 97161 PT EVAL LOW COMPLEX 20 MIN: CPT | Performed by: PHYSICAL THERAPIST

## 2021-01-31 PROCEDURE — 82378 CARCINOEMBRYONIC ANTIGEN: CPT

## 2021-01-31 PROCEDURE — 85018 HEMOGLOBIN: CPT

## 2021-01-31 PROCEDURE — 84100 ASSAY OF PHOSPHORUS: CPT

## 2021-01-31 PROCEDURE — 86304 IMMUNOASSAY TUMOR CA 125: CPT

## 2021-01-31 RX ORDER — LACTULOSE 10 G/15ML
20 SOLUTION ORAL DAILY
Status: DISCONTINUED | OUTPATIENT
Start: 2021-02-01 | End: 2021-02-01

## 2021-01-31 RX ADMIN — FLUOXETINE 40 MG: 20 CAPSULE ORAL at 08:39

## 2021-01-31 RX ADMIN — RIFAXIMIN 550 MG: 550 TABLET ORAL at 20:11

## 2021-01-31 RX ADMIN — LORAZEPAM 0.5 MG: 2 INJECTION INTRAMUSCULAR; INTRAVENOUS at 13:46

## 2021-01-31 RX ADMIN — LEVOTHYROXINE SODIUM 75 MCG: 75 TABLET ORAL at 05:50

## 2021-01-31 RX ADMIN — TRAMADOL HYDROCHLORIDE 50 MG: 50 TABLET ORAL at 05:50

## 2021-01-31 RX ADMIN — THIAMINE HCL TAB 100 MG 100 MG: 100 TAB at 08:38

## 2021-01-31 RX ADMIN — Medication 10 ML: at 20:12

## 2021-01-31 RX ADMIN — LORAZEPAM 0.5 MG: 2 INJECTION INTRAMUSCULAR; INTRAVENOUS at 02:14

## 2021-01-31 RX ADMIN — PYRIDOXINE HCL TAB 50 MG 25 MG: 50 TAB at 08:38

## 2021-01-31 RX ADMIN — TRAMADOL HYDROCHLORIDE 50 MG: 50 TABLET ORAL at 15:10

## 2021-01-31 RX ADMIN — LORAZEPAM 0.5 MG: 2 INJECTION INTRAMUSCULAR; INTRAVENOUS at 18:18

## 2021-01-31 RX ADMIN — CYANOCOBALAMIN TAB 1000 MCG 2000 MCG: 1000 TAB at 08:37

## 2021-01-31 RX ADMIN — LORAZEPAM 0.5 MG: 2 INJECTION INTRAMUSCULAR; INTRAVENOUS at 08:47

## 2021-01-31 RX ADMIN — FOLIC ACID 2.5 MG: 1 TABLET ORAL at 08:38

## 2021-01-31 RX ADMIN — POTASSIUM BICARBONATE 40 MEQ: 782 TABLET, EFFERVESCENT ORAL at 20:12

## 2021-01-31 RX ADMIN — POTASSIUM BICARBONATE 40 MEQ: 782 TABLET, EFFERVESCENT ORAL at 08:37

## 2021-01-31 RX ADMIN — TRAMADOL HYDROCHLORIDE 50 MG: 50 TABLET ORAL at 23:10

## 2021-01-31 RX ADMIN — RIFAXIMIN 550 MG: 550 TABLET ORAL at 08:39

## 2021-01-31 RX ADMIN — GABAPENTIN 200 MG: 100 CAPSULE ORAL at 13:42

## 2021-01-31 RX ADMIN — GABAPENTIN 200 MG: 100 CAPSULE ORAL at 20:12

## 2021-01-31 RX ADMIN — Medication 10 ML: at 08:39

## 2021-01-31 RX ADMIN — LACTULOSE 20 G: 20 SOLUTION ORAL at 08:37

## 2021-01-31 RX ADMIN — GABAPENTIN 200 MG: 100 CAPSULE ORAL at 08:39

## 2021-01-31 RX ADMIN — SPIRONOLACTONE 25 MG: 25 TABLET ORAL at 08:38

## 2021-01-31 RX ADMIN — CEFTRIAXONE SODIUM 1000 MG: 1 INJECTION, POWDER, FOR SOLUTION INTRAMUSCULAR; INTRAVENOUS at 17:26

## 2021-01-31 ASSESSMENT — PAIN DESCRIPTION - FREQUENCY: FREQUENCY: CONTINUOUS

## 2021-01-31 ASSESSMENT — PAIN DESCRIPTION - PAIN TYPE: TYPE: ACUTE PAIN

## 2021-01-31 ASSESSMENT — PAIN SCALES - GENERAL
PAINLEVEL_OUTOF10: 9
PAINLEVEL_OUTOF10: 9
PAINLEVEL_OUTOF10: 7
PAINLEVEL_OUTOF10: 6

## 2021-01-31 ASSESSMENT — PAIN DESCRIPTION - ORIENTATION: ORIENTATION: RIGHT;LOWER

## 2021-01-31 ASSESSMENT — PAIN DESCRIPTION - PROGRESSION: CLINICAL_PROGRESSION: NOT CHANGED

## 2021-01-31 ASSESSMENT — PAIN DESCRIPTION - LOCATION: LOCATION: ABDOMEN

## 2021-01-31 NOTE — PROGRESS NOTES
Internal Medicine Progress Note    [facility]    Marshall Escoto. Liudmila Jj., F.A.CReinaldoOReinaldoI. Carlos Hernández D.O., SAVAGEOHERIBERTO Oconnor D.O. Travis Haley, MSN, APRN, NP-C  Dennis Walter. Roxanne Ferrer, MSN, APRN-CNP     Primary Care Physician: No primary care provider on file. Admitting Physician:  Wilson Walton DO  Admission date and time: 1/27/2021  2:25 PM    Room:  [facility]-  Admitting diagnosis: Alcohol abuse with withdrawal [F10.139]    Patient Name: Luis Alfredo Batres  MRN: [id number]    Date of Service: 1/31/2021     Subjective:  Carolyn Platt is a 54 y.o. female who was seen and examined today,1/31/2021, at the bedside. Caorlyn Platt is feeling somewhat better today. She continues to have mild right-sided abdominal discomfort. She had multiple bouts of diarrhea yesterday and we discussed de-escalating her lactulose therapy. We also discussed removing the Cannon catheter to allow her to become more ambulatory. Review of System:   Constitutional:   Admits to generalized malaise and fatigue. HEENT:   Denies ear pain, sore throat, sinus or eye problems. Cardiovascular:   Denies any chest pain, irregular heartbeats, or palpitations. Respiratory:   Denies shortness of breath, coughing, sputum production, hemoptysis, or wheezing. Gastrointestinal:   Persistent right-sided abdominal pain. States she had multiple bouts of diarrhea yesterday upwards of 8-9 times. She now admits to bright red rectal bleeding as well. Genitourinary:    Denies any urgency, frequency, hematuria. Voiding  without difficulty. Extremities:   Denies lower extremity swelling, edema or cyanosis. Neurology:    Denies any headache or focal neurological deficits, Denies generalized weakness or memory difficulty. Psch:   Denies being anxious or depressed. Musculoskeletal:    Denies  myalgias, joint complaints or back pain.    Integumentary: Denies any rashes, ulcers, or excoriations. Denies bruising. Jaundice. Hematologic/Lymphatic:  Denies bruising or bleeding. Physical Exam:  I/O this shift:  In: 120 [P.O.:120]  Out: -     Intake/Output Summary (Last 24 hours) at 1/31/2021 0907  Last data filed at 1/31/2021 0821  Gross per 24 hour   Intake 600 ml   Output 3050 ml   Net -2450 ml   I/O last 3 completed shifts: In: 480 [P.O.:480]  Out: 3050 [Urine:3050]  Patient Vitals for the past 96 hrs (Last 3 readings):   Weight   01/30/21 0955 200 lb (90.7 kg)     Vital Signs:   Blood pressure 134/66, pulse 77, temperature 97.6 °F (36.4 °C), temperature source Oral, resp. rate 16, weight 200 lb (90.7 kg), SpO2 99 %. General appearance:  Alert, responsive, oriented to person, place, and time. Well preserved, alert, no distress. Head:  Normocephalic. No masses, lesions or tenderness. Eyes:  PERRLA. EOMI. Scleral icterus--appears less. Buccal mucosa moist.  ENT:  Ears normal. Mucosa normal.  Neck:    Supple. Trachea midline. No thyromegaly. No JVD. No bruits. Heart:    Rhythm regular. Rate controlled. No murmurs. Lungs:    Symmetrical. Clear to auscultation bilaterally. No wheezes. No rhonchi. No rales. Abdomen:   Mildly tender to palpation diffusely with some localization to the right upper quadrant. Voluntary guarding is elicited. Bowel sounds are active. Extremities:    Peripheral pulses present. No peripheral edema. No ulcers. No cyanosis. No clubbing. Neurologic:    Alert x 3. No focal deficit. Cranial nerves grossly intact. No focal weakness. Psych:   Behavior is normal. Mood appears normal. Speech is not rapid and/or pressured. Musculoskeletal:   Spine ROM normal. Muscular strength intact. Gait not assessed. Integumentary:  No rashes  Skin normal color and texture. Patient is jaundiced.   Genitalia/Breast:  Deferred    Medication:  Scheduled Meds:   thiamine mononitrate  100 mg Oral Daily    gabapentin  200 mg Oral TID  lactulose  20 g Oral TID    spironolactone  25 mg Oral Daily    potassium bicarb-citric acid  40 mEq Oral BID    cefTRIAXone (ROCEPHIN) IV  1,000 mg Intravenous Q24H    FLUoxetine  40 mg Oral Daily    influenza virus vaccine  0.5 mL Intramuscular Prior to discharge    rifaximin  550 mg Oral BID    levothyroxine  75 mcg Oral Daily    folic acid  2.5 mg Oral Daily    And    vitamin B-6  25 mg Oral Daily    And    vitamin B-12  2,000 mcg Oral Daily    sodium chloride flush  10 mL Intravenous 2 times per day     Continuous Infusions:   dextrose         Objective Data:  CBC with Differential:    Lab Results   Component Value Date    WBC 3.2 01/31/2021    RBC 2.69 01/31/2021    HGB 8.3 01/31/2021    HCT 26.1 01/31/2021    PLT 48 01/31/2021    MCV 97.0 01/31/2021    MCH 30.9 01/31/2021    MCHC 31.8 01/31/2021    RDW 12.9 01/31/2021    SEGSPCT 59 08/11/2011    METASPCT 3 07/05/2011    LYMPHOPCT 31.5 01/31/2021    MONOPCT 9.3 01/31/2021    MYELOPCT 2 02/22/2011    BASOPCT 0.3 01/31/2021    MONOSABS 0.30 01/31/2021    LYMPHSABS 1.02 01/31/2021    EOSABS 0.12 01/31/2021    BASOSABS 0.01 01/31/2021     CMP:    Lab Results   Component Value Date     01/31/2021    K 4.0 01/31/2021    K 2.9 01/27/2021     01/31/2021    CO2 24 01/31/2021    BUN 8 01/31/2021    CREATININE 0.7 01/31/2021    GFRAA >60 01/31/2021    LABGLOM >60 01/31/2021    GLUCOSE 101 01/31/2021    GLUCOSE 125 08/11/2011    PROT 6.6 01/31/2021    LABALBU 3.1 01/31/2021    LABALBU 3.6 08/11/2011    CALCIUM 8.6 01/31/2021    BILITOT 2.6 01/31/2021    ALKPHOS 216 01/31/2021    AST 74 01/31/2021    ALT 35 01/31/2021     PT/INR:    Lab Results   Component Value Date    PROTIME 21.1 01/31/2021    PROTIME 14.7 08/11/2011    INR 1.8 01/31/2021         Assessment:  1. Decompensated alcoholic cirrhosis complicated by acute alcoholic hepatitis  2. Right-sided hydrosalpinx  3. Normocytic anemia  4. Ulcerative colitis status post IPAA  5.  Hypothyroidism 6. Generalized anxiety and depression    Plan: We will de-escalate lactulose therapy today with goal of 5 bowel movements per day. She has developed bright red rectal bleeding and this is likely related to anal irritation in the setting of significant diarrhea. Ammonia level will be reassessed tomorrow but she is not currently encephalopathic. The GI team continues to follow. We have asked the OB/GYN team to provide consultation in the setting of right-sided hydrosalpinx near where the patient is experiencing significant abdominal pain. We will remove the Cannon catheter today and have encouraged the patient to become more ambulatory. Peer recovery and the social work teams are providing consultation as the patient is agreeable to inpatient drug/alcohol rehabilitation. More than 50% of my  time was spent at the bedside counseling/coordinating care with the patient and/or family with face to face contact. This time was spent reviewing notes and laboratory data as well as instructing and counseling the patient. Time I spent with the family or surrogate(s) is included only if the patient was incapable of providing the necessary information or participating in medical decisions. I also discussed the differential diagnosis and all of the proposed management plans with the patient and individuals accompanying the patient. Estefania Gerber requires this high level of physician care and nursing on the IMC/Telemetry unit due the complexity of decision management and chance of rapid decline or death. Continued cardiac monitoring and higher level of nursing are required. I am readily available for any further decision-making and intervention.          Fran Vo D.O., Billy Forrester  9:07 AM  1/31/2021

## 2021-01-31 NOTE — PROGRESS NOTES
Physical Therapy    Physical Therapy Initial Evaluation    Room #:  1332/9280-59  Patient Name: Kandis Wright  YOB: 1975  MRN: [id number]    Referring Provider:   Olga Ledbetter DO      Date of Service: 1/31/2021    Evaluating Physical Therapist: Nina Bassett, PT  #[id number]       Diagnosis:   Alcohol abuse with withdrawal [F10.139]   Admitted with  nausea, vomiting, diarrhea  And tremors     Patient Active Problem List   Diagnosis    Small bowel obstruction ([address]    Hernia of abdominal wall    Elevated liver enzymes    Suicidal ideations    Alcohol abuse    Anxiety disorder    Petechial rash    Acute renal failure (ARF) ([address]    Alcohol abuse with withdrawal    Decompensated hepatic cirrhosis ([address]        Tentative placement recommendation: Home    Equipment recommendation: None      Prior Level of Function: Patient ambulated independently    Rehab Potential: good    for baseline    Past medical history:   Past Medical History:   Diagnosis Date    Acute renal failure (ARF) ([address] 87/38/7540    Alcoholic cirrhosis ([address]     Anxiety     Crohn's colitis ([address]     Depression     History of blood transfusion     Pyoderma gangrenosum     Thyroid disease      Past Surgical History:   Procedure Laterality Date    ABDOMEN SURGERY      pt has a J-pouch     ARM DEBRIDEMENT      R arm-2010-2011??    COLONOSCOPY      DILATATION, ESOPHAGUS      ENDOSCOPY, COLON, DIAGNOSTIC         Precautions:  Activity as tolerated, falls and alarm ,      SUBJECTIVE:    Social history: Patient lives with son   in a two story home bedroom and bathroom 2nd floor    with 2 steps  to enter with Rail  Tub shower     Equipment owned: None,       [address] [facility] [address]   How much difficulty turning over in bed?: None  How much difficulty sitting down on / standing up from a chair with arms?: None  How much difficulty moving from lying on back to sitting on side of bed?: None How much help from another person moving to and from a bed to a chair?: None  How much help from another person needed to walk in hospital room?: A Little  How much help from another person for climbing 3-5 steps with a railing?: A Little  AM-PAC Inpatient Mobility Raw Score : 22  AM-PAC Inpatient T-Scale Score : 53.28  Mobility Inpatient CMS 0-100% Score: 20.91  Mobility Inpatient CMS G-Code Modifier : [address] cleared patient for PT evaluation. The admitting diagnosis and active problem list as listed above have been reviewed prior to the initiation of this evaluation. OBJECTIVE;   Initial Evaluation  Date: 1/31/2021 Treatment Date:     Short Term/ Long Term   Goals   Was pt agreeable to Eval/treatment? Yes    To be met in 2 days   Pain level   0/10        Bed Mobility    Rolling: Independent    Supine to sit:  Independent    Sit to supine: Independent    Scooting: Independent       Transfers Sit to stand: Supervision     Sit to stand: Independent     Ambulation    2x150 feet using  no device with Supervision        3x150 feet using  no device with Independent    Stair negotiation: ascended and descended   2 steps with rail and min a      10 steps  with rail  independent     Strength BUE:  4/5  RLE:  4/5  LLE:  4/5   Increase strength in affected mm groups by 1/3 grade   Balance Sitting EOB:  good    Dynamic Standing:  fair  ataxic  Sitting EOB:  good    Dynamic Standing: good       Patient is Alert & Oriented x person, place, time and situation and follows directions    Sensation:  Patient  denies numbness and tingling     Edema:  no   Endurance: good          Patient education  Patient educated on role of Physical Therapy, risks of immobility, safety and plan of care and  importance of mobility while in hospital       Patient response to education:   Pt verbalized understanding Pt demonstrated skill Pt requires further education in this area   Yes Partial Yes      Treatment: not today Therapeutic Exercises:  not performed       At end of session, patient in bed with  call light and phone within reach,   all lines and tubes intact, nursing notified. Patient would benefit from continued skilled Physical Therapy to improve functional independence and quality of life. Patient's/ family goals   home        ASSESSMENT: Patient exhibits decreased   balance, coordination impairing functional mobility. And are barriers to d/c and require skilled intervention during hospital stay          Plan of Care:     -Gait: Gait training, Standing activities to improve: base of support, weight shift, weight bearing  and Exercises to improve hip and knee control   -Endurance: Utilize Supervised activities to increase level of endurance to allow for safe functional mobility including transfers and gait  and Use graduated activities to promote good breathing techniques and provide support and education to maximize respiratory function  -Stairs: Stair training with instruction on proper technique and hand placement on rail    Patient and or family understand(s) diagnosis, prognosis, and plan of care. Frequency of treatments: Patient will be seen  2-3 times/week. Time in  1132  Time out  1142    Total Treatment Time  0 minutes    Evaluation time includes thorough review of current medical information, gathering information on past medical history/social history and prior level of function, completion of standardized testing/informal observation of tasks, assessment of data, and development of Plan of care and goals.      CPT codes:  Low Complexity PT evaluation (06140)  No treatment    Rodrigo Norton PT

## 2021-01-31 NOTE — CONSULTS
Patient Name: Tracey Curry  YOB: 1975  MRN:      [id number]      Date: 1/31/2021    Ob-Gyn Consult    CHIEF COMPLAINT: Right-sided abdominal pain    HISTORY OF PRESENT ILLNESS:  Tracey Curry is a 54 y.o.  female who was admitted with alcoholic cirrhosis of liver under  the care of Dr. Adalberto Montelongo MD       for evaluation and management of cirrhosis of liver and anemia. Patient has GYN problem namely right-sided hydrosalpinx diagnosed with the ultrasound and a CAT scan of abdomen and pelvis and now she is referred for consult. Patient had a last menstrual period last March 2020, and patient had an Pap smear which was abnormal and it is expectantly managed by another OB/GYN last year. The report of which is not available. She does not report any pelvic inflammatory disease in the past denies any GYN surgeries in the past     Patient has no known allergies. Prior to Admission medications    Medication Sig Start Date End Date Taking?  Authorizing Provider   vitamin B-12 (CYANOCOBALAMIN) 100 MCG tablet Take 100 mcg by mouth daily    Yes Historical Provider, MD   FLUoxetine (PROZAC) 40 MG capsule Take 40 mg by mouth daily   Yes Historical Provider, MD   pantoprazole (PROTONIX) 40 MG tablet Take 40 mg by mouth 2 times daily   Yes Historical Provider, MD   potassium chloride (KLOR-CON M) 20 MEQ extended release tablet Take 20 mEq by mouth 2 times daily   Yes Historical Provider, MD   vitamin D (ERGOCALCIFEROL) 1.25 MG (68002 UT) CAPS capsule Take 50,000 Units by mouth once a week Monday   Yes Historical Provider, MD   thiamine (QC VITAMIN B1) 100 MG tablet Take 100 mg by mouth 3 times daily   Yes Historical Provider, MD   cholestyramine light (PREVALITE) 4 g packet Take 4 g by mouth 2 times daily as needed (Itching)   Yes Historical Provider, MD   cefdinir (OMNICEF) 300 MG capsule Take 1 capsule by mouth 2 times daily for 10 days 1/21/21 1/31/21  Lobito Mast,        Medicines Since Admission:   [START ON 2/1/2021] lactulose (CHRONULAC) 10 GM/15ML solution 20 g, Daily      ondansetron (ZOFRAN) injection 4 mg, Q4H PRN      thiamine mononitrate tablet 100 mg, Daily      magnesium sulfate 1000 mg in dextrose 5% 100 mL IVPB, PRN      potassium chloride (KLOR-CON M) extended release tablet 40 mEq, PRN    Or      potassium bicarb-citric acid (EFFER-K) effervescent tablet 40 mEq, PRN    Or      potassium chloride 10 mEq/100 mL IVPB (Peripheral Line), PRN      sodium phosphate 14.52 mmol in dextrose 5 % 250 mL IVPB, PRN    Or      sodium phosphate 29.01 mmol in dextrose 5 % 500 mL IVPB, PRN      gabapentin (NEURONTIN) capsule 200 mg, TID      LORazepam (ATIVAN) injection 0.5 mg, Q4H PRN      spironolactone (ALDACTONE) tablet 25 mg, Daily      potassium bicarb-citric acid (EFFER-K) effervescent tablet 40 mEq, BID      cefTRIAXone (ROCEPHIN) 1,000 mg in sterile water 10 mL IV syringe, Q24H      FLUoxetine (PROZAC) capsule 40 mg, Daily      traMADol (ULTRAM) tablet 50 mg, Q8H PRN      influenza quadrivalent split vaccine (FLUZONE;FLUARIX;FLULAVAL;AFLURIA) injection 0.5 mL, Prior to discharge      rifaximin (XIFAXAN) tablet 550 mg, BID      levothyroxine (SYNTHROID) tablet 75 mcg, Daily      folic acid (FOLVITE) tablet 2.5 mg, Daily    And      vitamin B-6 (PYRIDOXINE) tablet 25 mg, Daily    And      vitamin B-12 (CYANOCOBALAMIN) tablet 2,000 mcg, Daily      glucose (GLUTOSE) 40 % oral gel 15 g, PRN      dextrose 50 % IV solution, PRN      glucagon (rDNA) injection 1 mg, PRN      dextrose 5 % solution, PRN      sodium chloride flush 0.9 % injection 10 mL, 2 times per day      sodium chloride flush 0.9 % injection 10 mL, PRN      acetaminophen (TYLENOL) tablet 650 mg, Q6H PRN    Or      acetaminophen (TYLENOL) suppository 650 mg, Q6H PRN        Past Medical History:   Diagnosis Date    Acute renal failure (ARF) (HCC) 68/56/9900    Alcoholic cirrhosis (HCC)     Anxiety  Not on file       REVIEW OF SYSTEMS:  Review of Systems -Review of Systems -  General ROS: negative for - chills, fatigue or malaise  ENT ROS: negative for - hearing change, nasal congestion or nasal discharge  Allergy and Immunology ROS: negative for - hives, itchy/watery eyes or nasal congestion  Hematological and Lymphatic ROS: negative for - blood clots, blood transfusions, bruising or fatigue  Endocrine ROS: negative for - malaise/lethargy, mood swings, palpitations or polydipsia/polyuria  Respiratory ROS: negative for - sputum changes, stridor, tachypnea or wheezing  Cardiovascular ROS: negative for - irregular heartbeat, loss of consciousness, murmur or orthopnea  Gastrointestinal ROS: negative for - constipation, diarrhea, gas/bloating, heartburn or hematemesis  Genito-Urinary ROS: negative for -  genital discharge, genital ulcers or hematuria  Musculoskeletal ROS: negative for - gait disturbance, muscle pain or muscular weakness      Physical exam:   VITALS: /66   Pulse 77   Temp 97.6 °F (36.4 °C) (Oral)   Resp 16   Wt 200 lb (90.7 kg)   SpO2 99%   BMI 30.41 kg/m²     Physical Exam:General appearance:  NAD  Head: NCAT, PERRLA, EOMI, red conjunctiva  Neck: supple, no masses  Lungs: CTAB, equal chest rise bilateral  Heart: Reg rate,rhythm reg ,no murmur noticed  Abdomen: soft, slightly tender in the right middle and lower quadrant there is no rebound tenderness abdomen is generally distended most likely because of ascites, bowel sounds are good,no mass palpable, liver and spleen both are palpable and somewhat tender  Pelvic Exam:External genitalia: Normal vagina appears unremarkable no lesions noticed some discharge present cervix appears unremarkable no lesions noticed bimanual exam uterus is anteverted smooth firm mobile nontender right-sided adnexa tender no fullness noticed no mass noticed and left adnexa nontender no masses noticed. Cul-de-sac is nontender.   Rectal exam not done Skin; no lesions  Gu: no cva tenderness  Extremities: extremities normal, atraumatic, no cyanosis or edema  Psych: No tremor, visual hallucinations      Results for orders placed or performed during the hospital encounter of 01/27/21   Culture, Urine    Specimen: Urine, clean catch   Result Value Ref Range    Urine Culture, Routine       10 to 100,000 CFU/mL  Mixed ayan isolated. Further workup and sensitivity testing  is not routinely indicated and will not be performed.   Mixed ayan isolated includes:  Mixed gram positive organisms  Mixed gram negative rods     Ammonia   Result Value Ref Range    Ammonia 55.0 (H) 11.0 - 51.0 umol/L   CBC Auto Differential   Result Value Ref Range    WBC 7.6 4.5 - 11.5 E9/L    RBC 3.49 (L) 3.50 - 5.50 E12/L    Hemoglobin 10.8 (L) 11.5 - 15.5 g/dL    Hematocrit 31.8 (L) 34.0 - 48.0 %    MCV 91.1 80.0 - 99.9 fL    MCH 30.9 26.0 - 35.0 pg    MCHC 34.0 32.0 - 34.5 %    RDW 12.7 11.5 - 15.0 fL    Platelets 62 (L) 965 - 450 E9/L    MPV 11.8 7.0 - 12.0 fL    Neutrophils % 92.2 (H) 43.0 - 80.0 %    Lymphocytes % 7.0 (L) 20.0 - 42.0 %    Monocytes % 0.9 (L) 2.0 - 12.0 %    Eosinophils % 0.1 0.0 - 6.0 %    Basophils % 0.5 0.0 - 2.0 %    Neutrophils Absolute 6.99 1.80 - 7.30 E9/L    Lymphocytes Absolute 0.53 (L) 1.50 - 4.00 E9/L    Monocytes Absolute 0.08 (L) 0.10 - 0.95 E9/L    Eosinophils Absolute 0.00 (L) 0.05 - 0.50 E9/L    Basophils Absolute 0.00 0.00 - 0.20 E9/L    Poikilocytes 1+     Ovalocytes 1+     Tear Drop Cells 1+    Basic Metabolic Panel w/ Reflex to MG   Result Value Ref Range    Sodium 139 132 - 146 mmol/L    Potassium reflex Magnesium 2.9 (L) 3.5 - 5.0 mmol/L    Chloride 97 (L) 98 - 107 mmol/L    CO2 23 22 - 29 mmol/L    Anion Gap 19 (H) 7 - 16 mmol/L    Glucose 121 (H) 74 - 99 mg/dL    BUN 3 (L) 6 - 20 mg/dL    CREATININE 0.8 0.5 - 1.0 mg/dL    GFR Non-African American >60 >=60 mL/min/1.73    GFR African American >60     Calcium 8.8 8.6 - 10.2 mg/dL Hepatic Function Panel   Result Value Ref Range    Total Protein 8.0 6.4 - 8.3 g/dL    Albumin 3.5 3.5 - 5.2 g/dL    Alkaline Phosphatase 270 (H) 35 - 104 U/L    ALT 59 (H) 0 - 32 U/L     (H) 0 - 31 U/L    Total Bilirubin 6.1 (H) 0.0 - 1.2 mg/dL    Bilirubin, Direct 2.9 (H) 0.0 - 0.3 mg/dL    Bilirubin, Indirect 3.2 (H) 0.0 - 1.0 mg/dL   Lipase   Result Value Ref Range    Lipase 35 13 - 60 U/L   Lactic Acid, Plasma   Result Value Ref Range    Lactic Acid 5.9 (HH) 0.5 - 2.2 mmol/L   Protime-INR   Result Value Ref Range    Protime 25.2 (H) 9.3 - 12.4 sec    INR 2.2    APTT   Result Value Ref Range    aPTT 37.1 (H) 24.5 - 35.1 sec   Troponin   Result Value Ref Range    Troponin <0.01 0.00 - 0.03 ng/mL   Brain Natriuretic Peptide   Result Value Ref Range    Pro-BNP 86 0 - 125 pg/mL   Platelet Confirmation   Result Value Ref Range    Platelet Confirmation CONFIRMED    Urinalysis   Result Value Ref Range    Color, UA TOMMY (A) Straw/Yellow    Clarity, UA CLOUDY (A) Clear    Glucose, Ur Negative Negative mg/dL    Bilirubin Urine SMALL (A) Negative    Ketones, Urine Negative Negative mg/dL    Specific Gravity, UA 1.010 1.005 - 1.030    Blood, Urine Negative Negative    pH, UA 6.5 5.0 - 9.0    Protein, UA Negative Negative mg/dL    Urobilinogen, Urine 2.0 (A) <2.0 E.U./dL    Nitrite, Urine Negative Negative    Leukocyte Esterase, Urine MODERATE (A) Negative   Magnesium   Result Value Ref Range    Magnesium 1.0 (LL) 1.6 - 2.6 mg/dL   Microscopic Urinalysis   Result Value Ref Range    WBC, UA 10-20 (A) 0 - 5 /HPF    RBC, UA NONE 0 - 2 /HPF    Epithelial Cells, UA RARE /HPF    Bacteria, UA MANY (A) None Seen /HPF   Comprehensive Metabolic Panel   Result Value Ref Range    Sodium 142 132 - 146 mmol/L    Potassium 2.9 (L) 3.5 - 5.0 mmol/L    Chloride 104 98 - 107 mmol/L    CO2 27 22 - 29 mmol/L    Anion Gap 11 7 - 16 mmol/L    Glucose 85 74 - 99 mg/dL    BUN 3 (L) 6 - 20 mg/dL    CREATININE 0.8 0.5 - 1.0 mg/dL GFR Non-African American >60 >=60 mL/min/1.73    GFR African American >60     Calcium 8.1 (L) 8.6 - 10.2 mg/dL    Total Protein 6.8 6.4 - 8.3 g/dL    Albumin 3.1 (L) 3.5 - 5.2 g/dL    Total Bilirubin 5.1 (H) 0.0 - 1.2 mg/dL    Alkaline Phosphatase 231 (H) 35 - 104 U/L    ALT 45 (H) 0 - 32 U/L     (H) 0 - 31 U/L   TSH without Reflex   Result Value Ref Range    TSH 15.470 (H) 0.270 - 4.200 uIU/mL   Phosphorus   Result Value Ref Range    Phosphorus 2.7 2.5 - 4.5 mg/dL   Magnesium   Result Value Ref Range    Magnesium 2.0 1.6 - 2.6 mg/dL   LACTIC ACID, PLASMA   Result Value Ref Range    Lactic Acid 1.0 0.5 - 2.2 mmol/L   LACTIC ACID, PLASMA   Result Value Ref Range    Lactic Acid 0.9 0.5 - 2.2 mmol/L   Ammonia   Result Value Ref Range    Ammonia 60.0 (H) 11.0 - 51.0 umol/L   LACTIC ACID, PLASMA   Result Value Ref Range    Lactic Acid 1.9 0.5 - 2.2 mmol/L   CBC Auto Differential   Result Value Ref Range    WBC 4.4 (L) 4.5 - 11.5 E9/L    RBC 3.00 (L) 3.50 - 5.50 E12/L    Hemoglobin 9.1 (L) 11.5 - 15.5 g/dL    Hematocrit 28.1 (L) 34.0 - 48.0 %    MCV 93.7 80.0 - 99.9 fL    MCH 30.3 26.0 - 35.0 pg    MCHC 32.4 32.0 - 34.5 %    RDW 12.6 11.5 - 15.0 fL    Platelets 38 (L) 100 - 450 E9/L    MPV 12.6 (H) 7.0 - 12.0 fL    Neutrophils % 67.6 43.0 - 80.0 %    Immature Granulocytes % 0.2 0.0 - 5.0 %    Lymphocytes % 24.4 20.0 - 42.0 %    Monocytes % 5.5 2.0 - 12.0 %    Eosinophils % 1.8 0.0 - 6.0 %    Basophils % 0.5 0.0 - 2.0 %    Neutrophils Absolute 2.97 1.80 - 7.30 E9/L    Immature Granulocytes # 0.01 E9/L    Lymphocytes Absolute 1.07 (L) 1.50 - 4.00 E9/L    Monocytes Absolute 0.24 0.10 - 0.95 E9/L    Eosinophils Absolute 0.08 0.05 - 0.50 E9/L    Basophils Absolute 0.02 0.00 - 0.20 E9/L   Platelet Confirmation   Result Value Ref Range    Platelet Confirmation CONFIRMED    COVID-19   Result Value Ref Range    SARS-CoV-2, NAAT Not Detected Not Detected   Homocysteine, serum   Result Value Ref Range Homocysteine 21.9 (H) 0.0 - 15.0 umol/L   Methylmalonic acid, serum   Result Value Ref Range    Methylmalonic Acid 0.21 0.00 - 0.40 umol/L   Vitamin B12 & folate   Result Value Ref Range    Vitamin B-12 998 (H) 211 - 946 pg/mL    Folate 5.6 4.8 - 24.2 ng/mL   Ferritin   Result Value Ref Range    Ferritin 201 ng/mL   Iron and TIBC   Result Value Ref Range    Iron 132 37 - 145 mcg/dL    TIBC 227 (L) 250 - 450 mcg/dL    Iron Saturation 58 (H) 15 - 50 %   Haptoglobin   Result Value Ref Range    Haptoglobin 12 (L) 30 - 200 mg/dL   Lactate dehydrogenase   Result Value Ref Range     135 - 214 U/L   Comprehensive Metabolic Panel   Result Value Ref Range    Sodium 133 132 - 146 mmol/L    Potassium 2.9 (L) 3.5 - 5.0 mmol/L    Chloride 100 98 - 107 mmol/L    CO2 25 22 - 29 mmol/L    Anion Gap 8 7 - 16 mmol/L    Glucose 85 74 - 99 mg/dL    BUN 5 (L) 6 - 20 mg/dL    CREATININE 0.8 0.5 - 1.0 mg/dL    GFR Non-African American >60 >=60 mL/min/1.73    GFR African American >60     Calcium 8.2 (L) 8.6 - 10.2 mg/dL    Total Protein 6.4 6.4 - 8.3 g/dL    Albumin 2.9 (L) 3.5 - 5.2 g/dL    Total Bilirubin 3.4 (H) 0.0 - 1.2 mg/dL    Alkaline Phosphatase 207 (H) 35 - 104 U/L    ALT 38 (H) 0 - 32 U/L    AST 89 (H) 0 - 31 U/L   Ammonia   Result Value Ref Range    Ammonia 66.0 (H) 11.0 - 51.0 umol/L   CBC Auto Differential   Result Value Ref Range    WBC 3.1 (L) 4.5 - 11.5 E9/L    RBC 2.70 (L) 3.50 - 5.50 E12/L    Hemoglobin 8.3 (L) 11.5 - 15.5 g/dL    Hematocrit 25.3 (L) 34.0 - 48.0 %    MCV 93.7 80.0 - 99.9 fL    MCH 30.7 26.0 - 35.0 pg    MCHC 32.8 32.0 - 34.5 %    RDW 12.6 11.5 - 15.0 fL    Platelets 28 (L) 652 - 450 E9/L    MPV 13.5 (H) 7.0 - 12.0 fL    Neutrophils % 66.7 43.0 - 80.0 %    Lymphocytes % 27.2 20.0 - 42.0 %    Monocytes % 3.5 2.0 - 12.0 %    Eosinophils % 1.8 0.0 - 6.0 %    Basophils % 0.9 0.0 - 2.0 %    Neutrophils Absolute 2.08 1.80 - 7.30 E9/L    Lymphocytes Absolute 0.84 (L) 1.50 - 4.00 E9/L Monocytes Absolute 0.12 0.10 - 0.95 E9/L    Eosinophils Absolute 0.06 0.05 - 0.50 E9/L    Basophils Absolute 0.03 0.00 - 0.20 E9/L    Anisocytosis 1+     Poikilocytes 1+     Ovalocytes 1+    Protime-INR   Result Value Ref Range    Protime 27.7 (H) 9.3 - 12.4 sec    INR 2.4    Platelet Confirmation   Result Value Ref Range    Platelet Confirmation CONFIRMED    Comprehensive Metabolic Panel   Result Value Ref Range    Sodium 136 132 - 146 mmol/L    Potassium 3.8 3.5 - 5.0 mmol/L    Chloride 102 98 - 107 mmol/L    CO2 26 22 - 29 mmol/L    Anion Gap 8 7 - 16 mmol/L    Glucose 91 74 - 99 mg/dL    BUN 8 6 - 20 mg/dL    CREATININE 0.8 0.5 - 1.0 mg/dL    GFR Non-African American >60 >=60 mL/min/1.73    GFR African American >60     Calcium 8.4 (L) 8.6 - 10.2 mg/dL    Total Protein 6.6 6.4 - 8.3 g/dL    Albumin 2.8 (L) 3.5 - 5.2 g/dL    Total Bilirubin 2.8 (H) 0.0 - 1.2 mg/dL    Alkaline Phosphatase 202 (H) 35 - 104 U/L    ALT 36 (H) 0 - 32 U/L    AST 75 (H) 0 - 31 U/L   Ammonia   Result Value Ref Range    Ammonia 96.0 (H) 11.0 - 51.0 umol/L   CBC Auto Differential   Result Value Ref Range    WBC 3.5 (L) 4.5 - 11.5 E9/L    RBC 2.65 (L) 3.50 - 5.50 E12/L    Hemoglobin 8.3 (L) 11.5 - 15.5 g/dL    Hematocrit 25.2 (L) 34.0 - 48.0 %    MCV 95.1 80.0 - 99.9 fL    MCH 31.3 26.0 - 35.0 pg    MCHC 32.9 32.0 - 34.5 %    RDW 12.6 11.5 - 15.0 fL    Platelets 35 (L) 404 - 450 E9/L    MPV 12.1 (H) 7.0 - 12.0 fL    Neutrophils % 56.4 43.0 - 80.0 %    Immature Granulocytes % 0.3 0.0 - 5.0 %    Lymphocytes % 31.2 20.0 - 42.0 %    Monocytes % 7.5 2.0 - 12.0 %    Eosinophils % 4.3 0.0 - 6.0 %    Basophils % 0.3 0.0 - 2.0 %    Neutrophils Absolute 1.95 1.80 - 7.30 E9/L    Immature Granulocytes # 0.01 E9/L    Lymphocytes Absolute 1.08 (L) 1.50 - 4.00 E9/L    Monocytes Absolute 0.26 0.10 - 0.95 E9/L    Eosinophils Absolute 0.15 0.05 - 0.50 E9/L    Basophils Absolute 0.01 0.00 - 0.20 E9/L   Magnesium   Result Value Ref Range Magnesium 1.4 (L) 1.6 - 2.6 mg/dL   Protime-INR   Result Value Ref Range    Protime 23.7 (H) 9.3 - 12.4 sec    INR 2.1    Platelet Confirmation   Result Value Ref Range    Platelet Confirmation CONFIRMED    Lactic acid, plasma   Result Value Ref Range    Lactic Acid 0.8 0.5 - 2.2 mmol/L   Comprehensive Metabolic Panel   Result Value Ref Range    Sodium 133 132 - 146 mmol/L    Potassium 4.0 3.5 - 5.0 mmol/L    Chloride 102 98 - 107 mmol/L    CO2 24 22 - 29 mmol/L    Anion Gap 7 7 - 16 mmol/L    Glucose 101 (H) 74 - 99 mg/dL    BUN 8 6 - 20 mg/dL    CREATININE 0.7 0.5 - 1.0 mg/dL    GFR Non-African American >60 >=60 mL/min/1.73    GFR African American >60     Calcium 8.6 8.6 - 10.2 mg/dL    Total Protein 6.6 6.4 - 8.3 g/dL    Albumin 3.1 (L) 3.5 - 5.2 g/dL    Total Bilirubin 2.6 (H) 0.0 - 1.2 mg/dL    Alkaline Phosphatase 216 (H) 35 - 104 U/L    ALT 35 (H) 0 - 32 U/L    AST 74 (H) 0 - 31 U/L   CBC Auto Differential   Result Value Ref Range    WBC 3.2 (L) 4.5 - 11.5 E9/L    RBC 2.69 (L) 3.50 - 5.50 E12/L    Hemoglobin 8.3 (L) 11.5 - 15.5 g/dL    Hematocrit 26.1 (L) 34.0 - 48.0 %    MCV 97.0 80.0 - 99.9 fL    MCH 30.9 26.0 - 35.0 pg    MCHC 31.8 (L) 32.0 - 34.5 %    RDW 12.9 11.5 - 15.0 fL    Platelets 48 (L) 634 - 450 E9/L    MPV 11.8 7.0 - 12.0 fL    Neutrophils % 54.9 43.0 - 80.0 %    Immature Granulocytes % 0.3 0.0 - 5.0 %    Lymphocytes % 31.5 20.0 - 42.0 %    Monocytes % 9.3 2.0 - 12.0 %    Eosinophils % 3.7 0.0 - 6.0 %    Basophils % 0.3 0.0 - 2.0 %    Neutrophils Absolute 1.78 (L) 1.80 - 7.30 E9/L    Immature Granulocytes # 0.01 E9/L    Lymphocytes Absolute 1.02 (L) 1.50 - 4.00 E9/L    Monocytes Absolute 0.30 0.10 - 0.95 E9/L    Eosinophils Absolute 0.12 0.05 - 0.50 E9/L    Basophils Absolute 0.01 0.00 - 0.20 E9/L    Polychromasia 1+     Poikilocytes 1+     Schistocytes 1+     Ovalocytes 1+     Tear Drop Cells 1+    Protime-INR   Result Value Ref Range    Protime 21.1 (H) 9.3 - 12.4 sec    INR 1.8 Magnesium   Result Value Ref Range    Magnesium 1.6 1.6 - 2.6 mg/dL   Phosphorus   Result Value Ref Range    Phosphorus 1.7 (L) 2.5 - 4.5 mg/dL   Platelet Confirmation   Result Value Ref Range    Platelet Confirmation CONFIRMED    POC Pregnancy Urine   Result Value Ref Range    HCG, Urine, POC Negative Negative    Lot Number [id number]     Positive QC Pass/Fail Pass     Negative QC Pass/Fail Pass    EKG 12 Lead   Result Value Ref Range    Ventricular Rate 95 BPM    Atrial Rate 95 BPM    P-R Interval 126 ms    QRS Duration 78 ms    Q-T Interval 398 ms    QTc Calculation (Bazett) 500 ms    P Axis 55 degrees    R Axis 9 degrees    T Axis 13 degrees       CBC:   Lab Results   Component Value Date    WBC 3.2 01/31/2021    RBC 2.69 01/31/2021    HGB 8.3 01/31/2021    HCT 26.1 01/31/2021    MCV 97.0 01/31/2021    MCH 30.9 01/31/2021    MCHC 31.8 01/31/2021    RDW 12.9 01/31/2021    PLT 48 01/31/2021    MPV 11.8 01/31/2021     CBC with Differential:    Lab Results   Component Value Date    WBC 3.2 01/31/2021    RBC 2.69 01/31/2021    HGB 8.3 01/31/2021    HCT 26.1 01/31/2021    PLT 48 01/31/2021    MCV 97.0 01/31/2021    MCH 30.9 01/31/2021    MCHC 31.8 01/31/2021    RDW 12.9 01/31/2021    SEGSPCT 59 08/11/2011    METASPCT 3 07/05/2011    LYMPHOPCT 31.5 01/31/2021    MONOPCT 9.3 01/31/2021    MYELOPCT 2 02/22/2011    BASOPCT 0.3 01/31/2021    MONOSABS 0.30 01/31/2021    LYMPHSABS 1.02 01/31/2021    EOSABS 0.12 01/31/2021    BASOSABS 0.01 01/31/2021     CMP:    Lab Results   Component Value Date     01/31/2021    K 4.0 01/31/2021    K 2.9 01/27/2021     01/31/2021    CO2 24 01/31/2021    BUN 8 01/31/2021    CREATININE 0.7 01/31/2021    GFRAA >60 01/31/2021    LABGLOM >60 01/31/2021    GLUCOSE 101 01/31/2021    GLUCOSE 125 08/11/2011    PROT 6.6 01/31/2021    LABALBU 3.1 01/31/2021    LABALBU 3.6 08/11/2011    CALCIUM 8.6 01/31/2021    BILITOT 2.6 01/31/2021    ALKPHOS 216 01/31/2021    AST 74 01/31/2021 ALT 35 01/31/2021     BMP:    Lab Results   Component Value Date     01/31/2021    K 4.0 01/31/2021    K 2.9 01/27/2021     01/31/2021    CO2 24 01/31/2021    BUN 8 01/31/2021    LABALBU 3.1 01/31/2021    LABALBU 3.6 08/11/2011    CREATININE 0.7 01/31/2021    CALCIUM 8.6 01/31/2021    GFRAA >60 01/31/2021    LABGLOM >60 01/31/2021    GLUCOSE 101 01/31/2021    GLUCOSE 125 08/11/2011     HgBA1c:    Lab Results   Component Value Date    LABA1C 5.3 05/14/2011     TSH:    Lab Results   Component Value Date    TSH 15.470 01/28/2021     No results found for:       Radiology Reports:  Abdominal Ultrasound: Reviewed and it has a right sided adnexal swelling cystic approximately 5 cm x 4 cm in diameter suggestive of hydrosalpinx. Abdominal CT Scan: Similar findings about the right adnexa  Patient has a significant complex problem alcoholic cirrhosis with severe anemia hypothyroidism and history of ulcerative colitis in the past with a bowel surgery in the past.  Patient has right lower quadrant at present will defer any surgical intervention as it is a 4.5 to 5 cm cystic mass present and she is a poor candidate as far as a surgical intervention at present time. ASSESSMENT:   1. Right adnexal cystic mass possibly hydrosalpinx  2. Alcoholic hepatic cirrhosis  3. Hypothyroidism  4. Severe anemia  5.  History of ulcerative colitis/surgery    PLAN: Patient has a significant complex problem of alcoholic cirrhosis with severe anemia hypothyroidism and history of ulcerative colitis in the past with a bowel surgery in the past.  This is managed by medical doctors in very effective manner. patient has right lower quadrant pain at present, will defer any surgical intervention as it is a 4.5 to 5 cm cystic mass present and she is a poor candidate as far as a surgical intervention is concerned. However will order Ca1 25, CEA and alpha-fetoprotein as a tumor markers and rule out any suspicious malignancy . Her lab parameters do not suggest any acute infection in the pelvic region. It appears to be benign cystic mass possibly hydrosalpinx however will manage expectantly for this time till her condition improved significantly and then may consider surgical intervention at a later date discussed with patient and elaborate manner  1.      Signed:   Dr Vera Avila M.D.  1/31/2021  3:48 PM

## 2021-01-31 NOTE — PROGRESS NOTES
PROGRESS NOTE  By Panchito Brown M.D. [facility]  Dr. Jacob Richards M.D.,  Dr. Ginger Bland M.D.,   Dr. Beba Bryan D.O.,  Dr. Raquel Segundo M.D.,  Dr. Balbir Ervin D.O.,  Mae Markham D.O. Dany Mitchell  54 y.o.  female    SUBJECTIVE:  Reports 9 bowel movements today    OBJECTIVE:    /66   Pulse 77   Temp 97.6 °F (36.4 °C) (Oral)   Resp 16   Wt 200 lb (90.7 kg)   SpO2 99%   BMI 30.41 kg/m²     General: NAD/ female. AAO x3  HEENT: Mild scleral icterus/moist oral mucosa/no discharge from nose or ears  Neck: Supple with trachea midline  Chest: CTA B  Cor: Regular  Abd.: Soft and obese. Appears nontender  Extr.:  No significant peripheral edema   Skin:warm and dry        DATA:    Monitor data reviewed -sinus rhythm noted.        Lab Results   Component Value Date    WBC 3.2 01/31/2021    RBC 2.69 01/31/2021    HGB 8.3 01/31/2021    HCT 26.1 01/31/2021    MCV 97.0 01/31/2021    MCH 30.9 01/31/2021    MCHC 31.8 01/31/2021    RDW 12.9 01/31/2021    PLT 48 01/31/2021    MPV 11.8 01/31/2021     Lab Results   Component Value Date     01/31/2021    K 4.0 01/31/2021    K 2.9 01/27/2021     01/31/2021    CO2 24 01/31/2021    BUN 8 01/31/2021    CREATININE 0.7 01/31/2021    CALCIUM 8.6 01/31/2021    PROT 6.6 01/31/2021    LABALBU 3.1 01/31/2021    LABALBU 3.6 08/11/2011    BILITOT 2.6 01/31/2021    ALKPHOS 216 01/31/2021    AST 74 01/31/2021    ALT 35 01/31/2021     Lab Results   Component Value Date    LIPASE 35 01/27/2021     Lab Results   Component Value Date    AMYLASE 163 01/20/2021         ASSESSMENT/PLAN:    1.  Decompensated alcoholic cirrhosis complicated by acute alcoholic hepatitis   MDF 72, also recently 72 on most recent admission from 12/1/2022 12/9/2020 at [facility]  MELD-NA: 21 on presentation  Recently unresponsive to steroids, Marii score: 0.89  Right upper quadrant ultrasound and\ Doppler unremarkable for obstructive process or thrombus Most recent hepatic panel reviewed and unremarkable  Recently evaluated for liver transplant and was deemed not a candidate secondary to\"absolute psychosocial barriers to transplant were identified, deferred for 6 weeks. \"  Hepatic encephalopathy, type C, overt, grade 1, recurrent  Icteric sclera, no ascites, no asterixis  Daily CMP and INR  Long discussion about the importance of absolute alcohol cessation and continued abstinence  Continue rifaximin; lactulose adjusted per admitting  Continue spironolactone and supportive care     2.  Normocytic anemia, unspecified  Likely secondary hypoproliferative or myelosuppression secondary to acute on chronic alcohol abuse  Recent EGD and colonoscopy at [facility] in December 2020 unremarkable for any etiology  -Monitor H&H        3.  Ulcerative colitis s/p IPAA  Suspect diarrhea secondary to #1  Cholestyramine prn    Jewell Marroquin MD  1/31/2021  12:40 PM    NOTE:  This report was transcribed using voice recognition software. Every effort was made to ensure accuracy; however, inadvertent computerized transcription errors may be present.

## 2021-02-01 VITALS
HEART RATE: 82 BPM | WEIGHT: 200 LBS | SYSTOLIC BLOOD PRESSURE: 112 MMHG | OXYGEN SATURATION: 100 % | TEMPERATURE: 98.4 F | RESPIRATION RATE: 20 BRPM | BODY MASS INDEX: 30.41 KG/M2 | DIASTOLIC BLOOD PRESSURE: 61 MMHG

## 2021-02-01 LAB
ALBUMIN SERPL-MCNC: 3.1 G/DL (ref 3.5–5.2)
ALP BLD-CCNC: 187 U/L (ref 35–104)
ALT SERPL-CCNC: 34 U/L (ref 0–32)
AMMONIA: 70 UMOL/L (ref 11–51)
ANION GAP SERPL CALCULATED.3IONS-SCNC: 9 MMOL/L (ref 7–16)
AST SERPL-CCNC: 65 U/L (ref 0–31)
BASOPHILS ABSOLUTE: 0.01 E9/L (ref 0–0.2)
BASOPHILS RELATIVE PERCENT: 0.3 % (ref 0–2)
BILIRUB SERPL-MCNC: 2.4 MG/DL (ref 0–1.2)
BUN BLDV-MCNC: 8 MG/DL (ref 6–20)
CA 125: 23.7 U/ML (ref 0–35)
CALCIUM SERPL-MCNC: 8.8 MG/DL (ref 8.6–10.2)
CEA: 1.4 NG/ML (ref 0–5.2)
CHLORIDE BLD-SCNC: 100 MMOL/L (ref 98–107)
CO2: 22 MMOL/L (ref 22–29)
CREAT SERPL-MCNC: 0.7 MG/DL (ref 0.5–1)
EOSINOPHILS ABSOLUTE: 0.1 E9/L (ref 0.05–0.5)
EOSINOPHILS RELATIVE PERCENT: 2.9 % (ref 0–6)
GFR AFRICAN AMERICAN: >60
GFR NON-AFRICAN AMERICAN: >60 ML/MIN/1.73
GLUCOSE BLD-MCNC: 94 MG/DL (ref 74–99)
HCT VFR BLD CALC: 26.1 % (ref 34–48)
HEMOGLOBIN: 8.2 G/DL (ref 11.5–15.5)
IMMATURE GRANULOCYTES #: 0.01 E9/L
IMMATURE GRANULOCYTES %: 0.3 % (ref 0–5)
INR BLD: 1.6
LYMPHOCYTES ABSOLUTE: 1.13 E9/L (ref 1.5–4)
LYMPHOCYTES RELATIVE PERCENT: 32.7 % (ref 20–42)
MCH RBC QN AUTO: 30.6 PG (ref 26–35)
MCHC RBC AUTO-ENTMCNC: 31.4 % (ref 32–34.5)
MCV RBC AUTO: 97.4 FL (ref 80–99.9)
MONOCYTES ABSOLUTE: 0.42 E9/L (ref 0.1–0.95)
MONOCYTES RELATIVE PERCENT: 12.1 % (ref 2–12)
NEUTROPHILS ABSOLUTE: 1.79 E9/L (ref 1.8–7.3)
NEUTROPHILS RELATIVE PERCENT: 51.7 % (ref 43–80)
PDW BLD-RTO: 13.3 FL (ref 11.5–15)
PLATELET # BLD: 49 E9/L (ref 130–450)
PLATELET CONFIRMATION: NORMAL
PMV BLD AUTO: 12.3 FL (ref 7–12)
POTASSIUM SERPL-SCNC: 4.2 MMOL/L (ref 3.5–5)
PROTHROMBIN TIME: 18.2 SEC (ref 9.3–12.4)
RBC # BLD: 2.68 E12/L (ref 3.5–5.5)
SODIUM BLD-SCNC: 131 MMOL/L (ref 132–146)
TOTAL PROTEIN: 6.7 G/DL (ref 6.4–8.3)
WBC # BLD: 3.5 E9/L (ref 4.5–11.5)

## 2021-02-01 PROCEDURE — 82140 ASSAY OF AMMONIA: CPT

## 2021-02-01 PROCEDURE — 6360000002 HC RX W HCPCS: Performed by: INTERNAL MEDICINE

## 2021-02-01 PROCEDURE — 85610 PROTHROMBIN TIME: CPT

## 2021-02-01 PROCEDURE — 6370000000 HC RX 637 (ALT 250 FOR IP): Performed by: INTERNAL MEDICINE

## 2021-02-01 PROCEDURE — 97116 GAIT TRAINING THERAPY: CPT

## 2021-02-01 PROCEDURE — 36415 COLL VENOUS BLD VENIPUNCTURE: CPT

## 2021-02-01 PROCEDURE — 6370000000 HC RX 637 (ALT 250 FOR IP): Performed by: HOSPITALIST

## 2021-02-01 PROCEDURE — 97165 OT EVAL LOW COMPLEX 30 MIN: CPT

## 2021-02-01 PROCEDURE — 85025 COMPLETE CBC W/AUTO DIFF WBC: CPT

## 2021-02-01 PROCEDURE — 97530 THERAPEUTIC ACTIVITIES: CPT

## 2021-02-01 PROCEDURE — 80053 COMPREHEN METABOLIC PANEL: CPT

## 2021-02-01 RX ORDER — LACTULOSE 10 G/15ML
10 SOLUTION ORAL DAILY
Qty: 450 ML | Refills: 0 | Status: SHIPPED | OUTPATIENT
Start: 2021-02-02 | End: 2021-03-04

## 2021-02-01 RX ORDER — GABAPENTIN 300 MG/1
300 CAPSULE ORAL 3 TIMES DAILY
Status: DISCONTINUED | OUTPATIENT
Start: 2021-02-01 | End: 2021-02-01 | Stop reason: HOSPADM

## 2021-02-01 RX ORDER — PYRIDOXINE HCL (VITAMIN B6) 25 MG
25 TABLET ORAL DAILY
Qty: 30 TABLET | Refills: 3 | Status: SHIPPED | OUTPATIENT
Start: 2021-02-02 | End: 2021-01-01 | Stop reason: ALTCHOICE

## 2021-02-01 RX ORDER — FOLIC ACID 1 MG/1
1 TABLET ORAL DAILY
Qty: 30 TABLET | Refills: 3 | Status: SHIPPED | OUTPATIENT
Start: 2021-02-02

## 2021-02-01 RX ORDER — LEVOTHYROXINE SODIUM 0.07 MG/1
75 TABLET ORAL DAILY
Qty: 30 TABLET | Refills: 3 | Status: ON HOLD | OUTPATIENT
Start: 2021-02-02 | End: 2021-01-01 | Stop reason: HOSPADM

## 2021-02-01 RX ORDER — GABAPENTIN 300 MG/1
300 CAPSULE ORAL 3 TIMES DAILY
Qty: 90 CAPSULE | Refills: 0 | Status: SHIPPED | OUTPATIENT
Start: 2021-02-01 | End: 2021-01-01 | Stop reason: ALTCHOICE

## 2021-02-01 RX ORDER — GAUZE BANDAGE 2" X 2"
100 BANDAGE TOPICAL DAILY
Qty: 30 TABLET | Refills: 0 | Status: SHIPPED | OUTPATIENT
Start: 2021-02-02 | End: 2021-03-04

## 2021-02-01 RX ORDER — SPIRONOLACTONE 25 MG/1
25 TABLET ORAL DAILY
Qty: 30 TABLET | Refills: 3 | Status: SHIPPED | OUTPATIENT
Start: 2021-02-02 | End: 2021-01-01 | Stop reason: ALTCHOICE

## 2021-02-01 RX ORDER — LACTULOSE 10 G/15ML
10 SOLUTION ORAL DAILY
Status: DISCONTINUED | OUTPATIENT
Start: 2021-02-02 | End: 2021-02-01 | Stop reason: HOSPADM

## 2021-02-01 RX ORDER — LORAZEPAM 2 MG/ML
0.5 INJECTION INTRAMUSCULAR EVERY 8 HOURS PRN
Status: DISCONTINUED | OUTPATIENT
Start: 2021-02-01 | End: 2021-02-01 | Stop reason: HOSPADM

## 2021-02-01 RX ADMIN — LACTULOSE 20 G: 20 SOLUTION ORAL at 08:07

## 2021-02-01 RX ADMIN — LORAZEPAM 0.5 MG: 2 INJECTION INTRAMUSCULAR; INTRAVENOUS at 13:55

## 2021-02-01 RX ADMIN — THIAMINE HCL TAB 100 MG 100 MG: 100 TAB at 08:06

## 2021-02-01 RX ADMIN — FLUOXETINE 40 MG: 20 CAPSULE ORAL at 08:05

## 2021-02-01 RX ADMIN — SPIRONOLACTONE 25 MG: 25 TABLET ORAL at 08:06

## 2021-02-01 RX ADMIN — TRAMADOL HYDROCHLORIDE 50 MG: 50 TABLET ORAL at 08:04

## 2021-02-01 RX ADMIN — LEVOTHYROXINE SODIUM 75 MCG: 75 TABLET ORAL at 05:35

## 2021-02-01 RX ADMIN — LORAZEPAM 0.5 MG: 2 INJECTION INTRAMUSCULAR; INTRAVENOUS at 05:40

## 2021-02-01 RX ADMIN — GABAPENTIN 200 MG: 100 CAPSULE ORAL at 08:04

## 2021-02-01 RX ADMIN — LORAZEPAM 0.5 MG: 2 INJECTION INTRAMUSCULAR; INTRAVENOUS at 00:36

## 2021-02-01 RX ADMIN — RIFAXIMIN 550 MG: 550 TABLET ORAL at 08:06

## 2021-02-01 RX ADMIN — FOLIC ACID 2.5 MG: 1 TABLET ORAL at 08:03

## 2021-02-01 RX ADMIN — POTASSIUM BICARBONATE 40 MEQ: 782 TABLET, EFFERVESCENT ORAL at 09:41

## 2021-02-01 RX ADMIN — CYANOCOBALAMIN TAB 1000 MCG 2000 MCG: 1000 TAB at 08:06

## 2021-02-01 RX ADMIN — GABAPENTIN 300 MG: 300 CAPSULE ORAL at 13:55

## 2021-02-01 RX ADMIN — PYRIDOXINE HCL TAB 50 MG 25 MG: 50 TAB at 08:05

## 2021-02-01 ASSESSMENT — PAIN DESCRIPTION - PROGRESSION: CLINICAL_PROGRESSION: NOT CHANGED

## 2021-02-01 ASSESSMENT — PAIN DESCRIPTION - LOCATION: LOCATION: ABDOMEN

## 2021-02-01 ASSESSMENT — PAIN DESCRIPTION - ORIENTATION: ORIENTATION: RIGHT;LOWER

## 2021-02-01 ASSESSMENT — PAIN SCALES - GENERAL: PAINLEVEL_OUTOF10: 7

## 2021-02-01 ASSESSMENT — PAIN DESCRIPTION - PAIN TYPE: TYPE: ACUTE PAIN

## 2021-02-01 NOTE — PROGRESS NOTES
Internal Medicine Progress Note    [facility]    Michael Fernandes. Cammy Blake., F.A.C.O.I. Kendrick Barrera D.O., SAIDA Manning D.O. Mae Sanchez, MSN, APRN, NP-C  Pat Le, MSN, APRN-CNP     Primary Care Physician: No primary care provider on file. Admitting Physician:  Pily Camacho DO  Admission date and time: 1/27/2021  2:25 PM    Room:  [address] [place] [address]  Admitting diagnosis: Alcohol abuse with withdrawal [F10.139]    Patient Name: Mila Galeana  MRN: [id number]    Date of Service: 2/1/2021     Subjective:  Oksana Goldberg is a 54 y.o. female who was seen and examined today,2/1/2021, at the bedside. Oksana Goldberg seem to be improving on a daily basis. She is hopeful for discharge to [facility] rehab facility. She still complains of being anxious. We will decrease the benzodiazepine and add Vistaril. We did inform her of improvement noted on liver profile. She had some dysphonia's which have been present for over a month we will obtain ENT evaluation    No family member present      Review of System:   Constitutional:   Admits to generalized malaise and fatigue. HEENT:   Denies ear pain, sore throat, sinus or eye problems. Admits to dysphonia with voice change  Cardiovascular:   Denies any chest pain, irregular heartbeats, or palpitations. Respiratory:   Denies shortness of breath, coughing, sputum production, hemoptysis, or wheezing. Gastrointestinal:   Persistent right-sided abdominal pain. States she had multiple bouts of diarrhea yesterday upwards of 6-7 times. She now admits to bright red rectal bleeding as well. Genitourinary:    Denies any urgency, frequency, hematuria. Voiding  without difficulty. Extremities:   Denies lower extremity swelling, edema or cyanosis. Neurology:    Denies any headache or focal neurological deficits, Denies generalized weakness or memory difficulty. Psch:   Denies being anxious or depressed.   Musculoskeletal: Denies  myalgias, joint complaints or back pain. Integumentary:   Denies any rashes, ulcers, or excoriations. Denies bruising. Jaundice. Hematologic/Lymphatic:  Denies bruising or bleeding. Physical Exam:  No intake/output data recorded. Intake/Output Summary (Last 24 hours) at 2/1/2021 0849  Last data filed at 1/31/2021 1424  Gross per 24 hour   Intake 240 ml   Output 1300 ml   Net -1060 ml   I/O last 3 completed shifts: In: 360 [P.O.:360]  Out: 1300 [Urine:1300]  Patient Vitals for the past 96 hrs (Last 3 readings):   Weight   01/30/21 0955 200 lb (90.7 kg)     Vital Signs:   Blood pressure 112/61, pulse 82, temperature 98.4 °F (36.9 °C), temperature source Oral, resp. rate 20, weight 200 lb (90.7 kg), SpO2 100 %. General appearance:  Alert, responsive, oriented to person, place, and time. Well preserved, alert, no distress. Head:  Normocephalic. No masses, lesions or tenderness. Eyes:  PERRLA. EOMI. Scleral icterus--appears less. Buccal mucosa moist.  ENT:  Ears normal. Mucosa normal.  Change of voice quality  Neck:    Supple. Trachea midline. No thyromegaly. No JVD. No bruits. Heart:    Rhythm regular. Rate controlled. No murmurs. Lungs:    Symmetrical. Clear to auscultation bilaterally. No wheezes. No rhonchi. No rales. Abdomen:   Mildly tender to palpation diffusely with some localization to the right upper quadrant. Voluntary guarding is elicited. Bowel sounds are active. Extremities:    Peripheral pulses present. No peripheral edema. No ulcers. No cyanosis. No clubbing. Neurologic:    Alert x 3. No focal deficit. Cranial nerves grossly intact. No focal weakness. Psych:   Behavior is normal. Mood appears normal. Speech is not rapid and/or pressured. Musculoskeletal:   Spine ROM normal. Muscular strength intact. Gait not assessed. Integumentary:  No rashes  Skin normal color and texture. Patient is jaundiced.   Genitalia/Breast:  Deferred    Medication:  Scheduled Meds:  lactulose  20 g Oral Daily    thiamine mononitrate  100 mg Oral Daily    gabapentin  200 mg Oral TID    spironolactone  25 mg Oral Daily    potassium bicarb-citric acid  40 mEq Oral BID    cefTRIAXone (ROCEPHIN) IV  1,000 mg Intravenous Q24H    FLUoxetine  40 mg Oral Daily    influenza virus vaccine  0.5 mL Intramuscular Prior to discharge    rifaximin  550 mg Oral BID    levothyroxine  75 mcg Oral Daily    folic acid  2.5 mg Oral Daily    And    vitamin B-6  25 mg Oral Daily    And    vitamin B-12  2,000 mcg Oral Daily    sodium chloride flush  10 mL Intravenous 2 times per day     Continuous Infusions:   dextrose         Objective Data:  CBC with Differential:    Lab Results   Component Value Date    WBC 3.5 02/01/2021    RBC 2.68 02/01/2021    HGB 8.2 02/01/2021    HCT 26.1 02/01/2021    PLT 49 02/01/2021    MCV 97.4 02/01/2021    MCH 30.6 02/01/2021    MCHC 31.4 02/01/2021    RDW 13.3 02/01/2021    SEGSPCT 59 08/11/2011    METASPCT 3 07/05/2011    LYMPHOPCT 32.7 02/01/2021    MONOPCT 12.1 02/01/2021    MYELOPCT 2 02/22/2011    BASOPCT 0.3 02/01/2021    MONOSABS 0.42 02/01/2021    LYMPHSABS 1.13 02/01/2021    EOSABS 0.10 02/01/2021    BASOSABS 0.01 02/01/2021     CMP:    Lab Results   Component Value Date     02/01/2021    K 4.2 02/01/2021    K 2.9 01/27/2021     02/01/2021    CO2 22 02/01/2021    BUN 8 02/01/2021    CREATININE 0.7 02/01/2021    GFRAA >60 02/01/2021    LABGLOM >60 02/01/2021    GLUCOSE 94 02/01/2021    GLUCOSE 125 08/11/2011    PROT 6.7 02/01/2021    LABALBU 3.1 02/01/2021    LABALBU 3.6 08/11/2011    CALCIUM 8.8 02/01/2021    BILITOT 2.4 02/01/2021    ALKPHOS 187 02/01/2021    AST 65 02/01/2021    ALT 34 02/01/2021     PT/INR:    Lab Results   Component Value Date    PROTIME 18.2 02/01/2021    PROTIME 14.7 08/11/2011    INR 1.6 02/01/2021         Assessment:    · Decompensated alcoholic cirrhosis complicated by acute alcoholic hepatitis  · Right-sided hydrosalpinx · Normocytic anemia  · Ulcerative colitis status post IPAA  · Hypothyroidism  · Generalized anxiety and depression  · Dysphonia  · Thrombocytopenia secondary liver disease        Plan:     ·  is working with the patient insurance for possible acute rehab at [facility]  · We will obtain ENT consult due to the dysphonia. She denies any GI symptomatology such as reflux. · Wean his benzodiazepine and placed on as needed Vistaril  · INR and bilirubin seem to be improving. Ammonia remains elevated  · Gynecological consult has been obtained and follow-up with tumor markers  · Will increase Neurontin      More than 50% of my  time was spent at the bedside counseling/coordinating care with the patient and/or family with face to face contact. This time was spent reviewing notes and laboratory data as well as instructing and counseling the patient. Time I spent with the family or surrogate(s) is included only if the patient was incapable of providing the necessary information or participating in medical decisions. I also discussed the differential diagnosis and all of the proposed management plans with the patient and individuals accompanying the patient. [facility] requires this high level of physician care and nursing on the IMC/Telemetry unit due the complexity of decision management and chance of rapid decline or death. Continued cardiac monitoring and higher level of nursing are required. I am readily available for any further decision-making and intervention.          Laine Guerrreo D.O., [facility]  8:49 AM  2/1/2021

## 2021-02-01 NOTE — PROGRESS NOTES
PROGRESS NOTE    By Rosy Plascencia D.O GI Fellow    [facility]  Dr. Henri Grimm MD, Dr. Benji Tavares MD, Dr Johnathon Suero, Dr. Juan Pablo Tapia MD, Dr. Jennie Pimentel,       [facility]  54 y.o.  female    SUBJECTIVE:      Patient resting company this AM.  She has no complaints at this time. Patient denies any melena medic easier hematemesis. Patient appears to be at baseline mentation.       OBJECTIVE:    /61   Pulse 82   Temp 98.4 °F (36.9 °C) (Oral)   Resp 20   Wt 200 lb (90.7 kg)   SpO2 100%   BMI 30.41 kg/m²     Gen: NAD, AAO x 3  HEENT:PEERL, no icterus  Heart: RRR, no M/R/G  Lungs: CTAB  Abd.: soft, NT, ND, BS +, no G/R, no HSM  Extr.: no C/C/E, no bruising         Lab Results   Component Value Date    WBC 3.5 02/01/2021    WBC 3.2 01/31/2021    WBC 3.5 01/30/2021    HGB 8.2 02/01/2021    HGB 8.9 01/31/2021    HGB 8.3 01/31/2021    HCT 26.1 02/01/2021    MCV 97.4 02/01/2021    RDW 13.3 02/01/2021    PLT 49 02/01/2021    PLT 48 01/31/2021    PLT 35 01/30/2021     Lab Results   Component Value Date     02/01/2021    K 4.2 02/01/2021    K 2.9 01/27/2021     02/01/2021    CO2 22 02/01/2021    BUN 8 02/01/2021    CREATININE 0.7 02/01/2021    CALCIUM 8.8 02/01/2021    PROT 6.7 02/01/2021    LABALBU 3.1 02/01/2021    LABALBU 3.6 08/11/2011    BILITOT 2.4 02/01/2021    BILITOT 2.6 01/31/2021    BILITOT 2.8 01/30/2021    ALKPHOS 187 02/01/2021    ALKPHOS 216 01/31/2021    ALKPHOS 202 01/30/2021    AST 65 02/01/2021    AST 74 01/31/2021    AST 75 01/30/2021    ALT 34 02/01/2021    ALT 35 01/31/2021    ALT 36 01/30/2021     Lab Results   Component Value Date    LIPASE 35 01/27/2021    LIPASE 38 01/20/2021    LIPASE 48 10/03/2016     Lab Results   Component Value Date    AMYLASE 163 01/20/2021         ASSESSMENT/PLAN:    1.  Decompensated alcoholic cirrhosis complicated by acute alcoholic hepatitis MDF 72, also recently 71 on most recent admission from 12/1/2022 12/9/2020 at [facility]  MELD-NA: 21, previously 24 and most recent admission  Recently unresponsive to steroids, Marii score: 0.89  Right upper quadrant ultrasound and Doppler unremarkable for any obstructive process or thrombus  Most recent hepatic panel reviewed and unremarkable  Recently evaluated for liver transplant and was deemed not a candidate secondary to\"absolute psychosocial barriers to transplant were identified, deferred for 6 weeks. \"  -Continue with daily lactulose and rifaximin for HE  Daily CMP and INR  Long discussion about the importance of absolute alcohol cessation and continued abstinence and risk of death continued use  Continue supportive care     2.  Normocytic anemia, unspecified  Likely secondary hypoproliferative or myelosuppression secondary to acute on chronic alcohol abuse  Recent EGD and colonoscopy at [facility] in December 2020 unremarkable for any etiology  Anemia panel     3.  Ulcerative colitis s/p IPAA  Suspect diarrhea secondary to #1          Pt was discussed with Dr. Beatris Dalton DO  GI Fellow   2/1/2021  10:05 AM    Pt seen and independently examined. Pertinent notes and lab work reviewed. Monitor reviewed showing sinus rhythm  D/w Dr. Hi Amezcua with physical exam and A&P.     Lanny Cook MD  2/1/2021  12:27 PM

## 2021-02-01 NOTE — CARE COORDINATION
Ss note:2/1/2021.10:37 AM negative covid on 1-. Domingo spoke with [employer] at [facility], she relays that pt has been accepted HOWEVER no bed is available at this moment today. Jocy [employer] relays she will check again this afternoon regarding bed availability to see if a bed would possibly open up later today. Furthermore PT MUST ADMIT [address] IN ORDER FOR HER INSURANCE TO [employer], pt cannot admit from home. domingo will await return call from [employer] later this afternoon regarding bed availability for later today vs possible bed opening tomorrow.  DAMIAN Wagner

## 2021-02-01 NOTE — CARE COORDINATION
Ss note:2/1/2021.2:07 PM neg covid. Gudelia Mcgill will transport today and pick pt up in 15 mins. Address is [facility]. Liaison aware, nursing aware, pt aware.  DAMIAN Fu

## 2021-02-01 NOTE — PROGRESS NOTES
Physical Therapy    Physical Therapy Treatment Note    Room #:  1123/2317-07  Patient Name: Luis Alfredo Batres  YOB: 1975  MRN: [id number]    Referring Provider:   Red Rubio DO      Date of Service: 2/1/2021    Evaluating Physical Therapist: Forest Glovre, PT  #[id number]       Diagnosis:   Alcohol abuse with withdrawal [F10.139]   Admitted with  nausea, vomiting, diarrhea  And tremors     Patient Active Problem List   Diagnosis    Small bowel obstruction ([address]    Hernia of abdominal wall    Elevated liver enzymes    Suicidal ideations    Alcohol abuse    Anxiety disorder    Petechial rash    Acute renal failure (ARF) ([address]    Alcohol abuse with withdrawal    Decompensated hepatic cirrhosis ([address]        Tentative placement recommendation: Home    Equipment recommendation: None      Prior Level of Function: Patient ambulated independently    Rehab Potential: good    for baseline    Past medical history:   Past Medical History:   Diagnosis Date    Acute renal failure (ARF) ([address] 35/21/4638    Alcoholic cirrhosis ([address]     Anxiety     Crohn's colitis ([address]     Depression     History of blood transfusion     Pyoderma gangrenosum     Thyroid disease      Past Surgical History:   Procedure Laterality Date    ABDOMEN SURGERY      pt has a J-pouch     ARM DEBRIDEMENT      R arm-2010-2011??    COLONOSCOPY      DILATATION, ESOPHAGUS      ENDOSCOPY, COLON, DIAGNOSTIC         Precautions:  Activity as tolerated, falls and alarm ,      SUBJECTIVE:    Social history: Patient lives with son   in a two story home bedroom and bathroom 2nd floor    with 2 steps  to enter with Rail  Tub shower     Equipment owned: None,       [address]   How much difficulty turning over in bed?: A Little  How much difficulty sitting down on / standing up from a chair with arms?: A Little  How much difficulty moving from lying on back to sitting on side of bed?: A Little How much help from another person moving to and from a bed to a chair?: A Little  How much help from another person needed to walk in hospital room?: A Little  How much help from another person for climbing 3-5 steps with a railing?: A Little  AM-PAC Inpatient Mobility Raw Score : 18  AM-PAC Inpatient T-Scale Score : 43.63  Mobility Inpatient CMS 0-100% Score: 46.58  Mobility Inpatient CMS G-Code Modifier : CK    Nursing cleared patient for PT evaluation. The admitting diagnosis and active problem list as listed above have been reviewed prior to the initiation of this evaluation. OBJECTIVE;   Initial Evaluation  Date: 1/31/2021 Treatment Date:  2/1/2021     Short Term/ Long Term   Goals   Was pt agreeable to Eval/treatment? Yes   yes To be met in 2 days   Pain level   0/10        Bed Mobility    Rolling: Independent    Supine to sit: Independent    Sit to supine: Independent    Scooting: Independent   Rolling: Independent   Supine to sit: Independent   Sit to supine: Independent   Scooting: Independent       Transfers Sit to stand: Supervision     Sit to stand: Independent     Ambulation    2x150 feet using  no device with Supervision      2 x 100 no device, cues to use handrail if needed. Cues for steppage, pt taking short,, shuffling steps.   3x150 feet using  no device with Independent    Stair negotiation: ascended and descended   2 steps with rail and min a      10 steps  with rail  independent     Strength BUE:  4/5  RLE:  4/5  LLE:  4/5   Increase strength in affected mm groups by 1/3 grade   Balance Sitting EOB:  good    Dynamic Standing:  fair  ataxic Sitting EOB: good   Dynamic Standing: fair    Sitting EOB:  good    Dynamic Standing: good       Patient is Alert & Oriented x person, place, time and situation and follows directions    Sensation:  Patient  denies numbness and tingling     Edema:  no   Endurance: good          Patient education Patient educated on role of Physical Therapy, risks of immobility, safety and plan of care and  importance of mobility while in hospital       Patient response to education:   Pt verbalized understanding Pt demonstrated skill Pt requires further education in this area   Yes Partial Yes      Treatment: Pt. Ambulated in borden and returned to bed Independently. Therapeutic Exercises:  not performed       At end of session, patient in bed with  call light and phone within reach,   all lines and tubes intact, nursing notified. Patient would benefit from continued skilled Physical Therapy to improve functional independence and quality of life. Patient's/ family goals   home        ASSESSMENT: Patient exhibits decreased   balance, coordination impairing functional mobility. And are barriers to d/c and require skilled intervention during hospital stay          Plan of Care:     -Gait: Gait training, Standing activities to improve: base of support, weight shift, weight bearing  and Exercises to improve hip and knee control   -Endurance: Utilize Supervised activities to increase level of endurance to allow for safe functional mobility including transfers and gait  and Use graduated activities to promote good breathing techniques and provide support and education to maximize respiratory function  -Stairs: Stair training with instruction on proper technique and hand placement on rail    Patient and or family understand(s) diagnosis, prognosis, and plan of care. Frequency of treatments: Patient will be seen  2-3 times/week.        Time in  1039  Time out  1049    Total Treatment Time  10 minutes    CPT codes:    Gait Training (00316) 10 minutes 1 unit(s)    Annette August PTA  TQK#[id number]

## 2021-02-01 NOTE — PROGRESS NOTES
Received phone call from Dr Mando Sal resident,they are not going to see patient here as this hoarseness is not new(present since November). They will see patient as outpatient in office.  CHAYO Segura notified of this.

## 2021-02-01 NOTE — PROGRESS NOTES
P Quality Flow/Interdisciplinary Rounds Progress Note        Quality Flow Rounds held on February 1, 2021    Disciplines Attending:  Bedside Nurse, ,  and Nursing Unit Leadership    Chana Santa was admitted on 1/27/2021  2:25 PM    Anticipated Discharge Date:  Expected Discharge Date: 02/01/21    Disposition:    Jeremy Score:  Jeremy Scale Score: 20    Readmission Risk              Risk of Unplanned Readmission:        18           Discussed patient goal for the day, patient clinical progression, and barriers to discharge.   The following Goal(s) of the Day/Commitment(s) have been identified:  RA, activity progression,       Elsie Alva  February 1, 2021

## 2021-02-01 NOTE — CARE COORDINATION
Ss note:2/1/2021.1:37 PM Negative covid test 1-. Per Melony Fee at [facility], pt has been accepted and bed is available today, facility wants pt by 4 pm today. Sw met with pt for transportation, she has tried to get someone to take her, she has not been successful. SW has call out to [facility] to see if they will help with transport. sw /cm calling [facility] to see if they will transport. Pt needs clothes, sw will provide clothes and shoes for discharge today. Charge nurse notified Dr. Desai that bed is available today.  DAMIAN Hall

## 2021-02-01 NOTE — PROGRESS NOTES
OCCUPATIONAL THERAPY  Initial Evaluation  Date:2021  Patient Name: Zoe Hoyt  MRN: [id number]  : 1975  ROOM #: [id number]     Referring Provider: DO Be Mac OT: Tavia Jiménez OTR/L [id number]    Placement Recommendation: Home with no skilled occupational therapy needed after discharge from inpatient. Recommended Adaptive Equipment: none     [facility]   AM-PAC Daily Activity Inpatient   How much help for putting on and taking off regular lower body clothing?: A Little  How much help for Bathing?: A Little  How much help for Toileting?: A Little  How much help for putting on and taking off regular upper body clothing?: A Little  How much help for taking care of personal grooming?: None  How much help for eating meals?: None  AM-[facility] Inpatient Daily Activity Raw Score: 20  AM-PAC Inpatient ADL T-Scale Score : 42.03  ADL Inpatient CMS 0-100% Score: 38.32  ADL Inpatient CMS G-Code Modifier : CJ    Based on patient's functional performance as stated below and their level of assistance needed prior to admission, this therapist believes that the patient would benefit from skilled Occupational Therapy following their hospital stay in an effort to increase safety and independence with completion of ADL/IADL tasks for functional independence and quality of life. Diagnosis:   1. Alcohol withdrawal syndrome without complication ([address]    2. Alcoholic cirrhosis of liver without ascites ([address]    3. Non-intractable vomiting with nausea, unspecified vomiting type    4. Urinary tract infection without hematuria, site unspecified    5. Hypokalemia    6.  Hypomagnesemia      Pertinent Medical History:   Past Medical History:   Diagnosis Date    Acute renal failure (ARF) ([address]     Alcoholic cirrhosis (HCC)     Anxiety     Crohn's colitis ([address]     Depression     History of blood transfusion     Pyoderma gangrenosum     Thyroid disease       Precautions:  falls Pain Scale: Numeric Rate: 7/10 lower right quadran pain/abdomin; Nursing notified. Social history: with family: son        Drive: yes      Interest: cat   Home architecture: single family home, 2 story, bedroom and bathroom on 2nd floor, 3 steps to enter with rail, tub shower. PLOF: independent with BADL and independent with IADL, pt ambulated with no device   Equipment owned: none   Cognition: A&O x 4; follows 3 step directions. good  Problem solving skills   good  Memory    good  Sequencing   good  Judgement/safety  Communication: intact   Visual perceptual skills: intact     Glasses: wears contacts   Edema: no     Sensation: intact   Hand Dominance:  Left     X  Right     Left Right Comment   Passive range of motion [facility]     Active range of motion [facility]     Muscle Grade 4/5 4/5    /pinch Strength Intact  Intact       Functional Assessment:   Initial Evaluation Status Date:   2/1/2021 Treatment Status  Date: STGs = LTGs  Time frame: 5 - 14 days   Feeding Independent   Independent    Grooming Independent   Independent    UB Dressing Supervision   Independent    LB Dressing Supervision   Independent    Bathing Supervision  Independent    Toileting Supervision   Independent    Bed Mobility  Facilitated Supine to sit: Independent   Scooting:Independent  Sit to supine: Independent    Rolling: Independent  Supine to sit: Independent   Scooting:Independent  Sit to supine: Independent   Rolling: Independent   Functional Transfers Supervision from EOB and to and from low commode. Transfer training with verbal cues for hand placement throughout session to improve safety.    Independent    Functional Mobility Supervision with no device to and from bathroom, slightly unsteady   Independent    Activity Tolerance Fair   Good      Balance:   Sitting balance at EOB and commode to increase dynamic sitting balance and activities tolerance with Supervision     Standing fair with no device to improve balance Endurance: fair     Comments: Upon arrival to the room the patient was supine. At end of the session, the patient was supine. Call light and phone within reach. Pt required verbal cues and instruction as noted above for safe facilitation and completion of tasks. Therapist provided skilled monitoring of the patient's response during treatment session. Prior to and at the end of session, environmental modifications/line management completed for patients safety and efficiency of treatment session. OT services provided to include instruction/training on safety and adapted techniques for completion of transfers and ADL's. Overall, the patient demonstrates difficulties with completion of BADL's and IADL's. Factors contributing to these difficulties includes decreased endurance and generalized weakness. Pt would benefit from continued skilled OT to increase independence with BADL's and IADL's. Treatment:    Bed mobility: Facilitated bed mobility with cues for proper body mechanics and sequencing to prepare for ADL completion. Functional transfers: Facilitated transfers from various surfaces with cues for body alignment, safety and hand placement. Postural Balance: Sitting and standing balance retraining to improve righting reactions with postural changes during ADLs. Skilled positioning: Proper positioning to improve interaction with environment, overall functioning and decrease/prevent edema and contractures. Evaluation Complexity:   · Low Complexity  · History: Brief history including review of medical records relating to the problem  · Exam: 1-3 performance Deficits  · Assistance/Modification: No assistance or modifications required to perform tasks. No comorbities affecting occupational performance.     Assessment of current deficits:   Functional mobility [x]        ADLs [x]        Strength [x]      Cognition []  Functional transfers  [x]       IADLs [x]        Safety Awareness []      Endurance [x] Fine Motor Coordination []       Balance [x]       Vision/perception []     Sensation []   Gross Motor Coordination []       ROM []         Delirium []                          Motor Control []    Plan of Care: OT 1-3x/week for 5-7 days PRN   Instruction/training on adapted ADL techniques and AE recommendations to increased functional independence with precautions   Functional transfer/mobility training/DME recommendations for increased independence, safety, and fall prevention    Therapeutic activity to facilitate/challenge dynamic balance, standing tolerance, fine motor dexterity/in-hand manipulation for increased independence with ALD's    Functional Mobility Training   Training on energy conservation techniques/strategies to improve independence/tolerance for self care routine   Positioning to Improve Functional [place], Safety, and Skin Integrity   Patient/family education to increase follow through with safety techniques and functional independence   Therapeutic exercise to improve motor endurance, ROM, and functional strength for ALD's and functional transfers   Splinting/positioning for increased function, prevention of contractures, and improved skin integrity          Rehab Potential: Good for established goals developed with patient and family. Patient / Family Goal: return home      Patient and/or family were instructed on functional diagnosis, prognosis/goals and OT plan of care. Demonstrated fair understanding. Evaluation time includes thorough review of current medical information, gathering information on past medical history/social history and prior level of function, completion of standardized testing/informal observation of tasks, assessment of data, and development of POC/Goals.     Time In: 11:15am    Time Out: 12:35pm                  Min Units   OT Eval Low 77886 X     OT Eval Medium 50058     OT Eval High 74675     OT Re-Eval [id number]          ADL/Self Care 73710 Therapeutic Activities [id number] 27    Therapeutic Ex 54649     Orthotic Management 64246     Neuro Re-Ed 06866     Non-Billable Time     TOTAL TIMED TREATMENT [address] [place] OTR/L [id number]

## 2021-02-01 NOTE — DISCHARGE SUMMARY
PROTIME 21.1 (H) 01/31/2021    PROTIME 23.7 (H) 01/30/2021      Lab Results   Component Value Date    TSH 15.470 (H) 01/28/2021     Lab Results   Component Value Date    TRIG 71 07/10/2019    TRIG 197 (H) 10/04/2016     Lab Results   Component Value Date    HDL 46 07/10/2019    HDL 98 10/04/2016     Lab Results   Component Value Date    LDLCALC 89 07/10/2019    LDLCALC 107 (H) 10/04/2016     Lab Results   Component Value Date    LABA1C 5.3 05/14/2011       IMAGING:  Xr Chest (2 Vw)    Result Date: 1/27/2021  EXAMINATION: TWO XRAY VIEWS OF THE CHEST 1/27/2021 3:35 pm COMPARISON: January 20, 2021 HISTORY: ORDERING SYSTEM PROVIDED HISTORY: nausea and vomiting TECHNOLOGIST PROVIDED HISTORY: Reason for exam:->nausea and vomiting FINDINGS: Suboptimal inspiration. No clearly pathologic findings. Suboptimal inspiration with no clearly pathologic findings. Ct Head Wo Contrast    Result Date: 1/20/2021  EXAMINATION: CT OF THE HEAD WITHOUT CONTRAST  1/20/2021 9:57 pm TECHNIQUE: CT of the head was performed without the administration of intravenous contrast. Dose modulation, iterative reconstruction, and/or weight based adjustment of the mA/kV was utilized to reduce the radiation dose to as low as reasonably achievable. COMPARISON: 01/31/2017 HISTORY: ORDERING SYSTEM PROVIDED HISTORY: ams TECHNOLOGIST PROVIDED HISTORY: Has a \"code stroke\" or \"stroke alert\" been called? ->No Reason for exam:->ams FINDINGS: BRAIN/VENTRICLES: There is no acute intracranial hemorrhage, mass effect or midline shift. No abnormal extra-axial fluid collection. The gray-white differentiation is maintained without evidence of an acute infarct. There is no evidence of hydrocephalus. ORBITS: The visualized portion of the orbits demonstrate no acute abnormality. SINUSES: The visualized paranasal sinuses and mastoid air cells demonstrate no acute abnormality. SOFT TISSUES/SKULL:  No acute abnormality of the visualized skull or soft tissues. No acute intracranial abnormality. MRI would be useful if symptoms persist.    Us Non Ob Transvaginal    Result Date: 1/28/2021  EXAMINATION: PELVIC ULTRASOUND 1/28/2021 TECHNIQUE: Transvaginal pelvic ultrasound was performed. COMPARISON: CT performed the preceding day. HISTORY: ORDERING SYSTEM PROVIDED HISTORY: evaluation of cystic lesion at right adnexa TECHNOLOGIST PROVIDED HISTORY: Reason for exam:->evaluation of cystic lesion at right adnexa What reading provider will be dictating this exam?->CRC FINDINGS: Uterus: The uterus measures 6.7 x 3.4 by 3.8 cm. The uterus is morphologically normal and has homogeneous myometrial echotexture. A nabothian cyst lies within the cervix. Endometrial stripe:  4 mm, normal. Right Ovary: The right ovary is mildly enlarged measuring 5 x 3.5 by 4.2 cm. It is enlarged secondary to a septated 4 cm fluid collection with a configuration suggestive of a hydrosalpinx. It has normal color Doppler activity and spectral Doppler waveform. Left Ovary: The left ovary is not identified. The left adnexal region appears unremarkable. Free Fluid: There is mild bilateral free pelvic fluid. Right ovarian/adnexal tubular fluid collection suspicious for hydrosalpinx. Left ovary not seen Normal uterus.     Ct Abdomen Pelvis W Iv Contrast Additional Contrast? None    Result Date: 1/27/2021 EXAMINATION: CT OF THE ABDOMEN AND PELVIS WITH CONTRAST 1/27/2021 4:05 pm TECHNIQUE: CT of the abdomen and pelvis was performed with the administration of intravenous contrast. Multiplanar reformatted images are provided for review. Dose modulation, iterative reconstruction, and/or weight based adjustment of the mA/kV was utilized to reduce the radiation dose to as low as reasonably achievable. COMPARISON: January 20, 2021 HISTORY: ORDERING SYSTEM PROVIDED HISTORY: Abdominal pain, hx hepatic cirrhosis d/t alcohol TECHNOLOGIST PROVIDED HISTORY: Reason for exam:->Abdominal pain, hx hepatic cirrhosis d/t alcohol Additional Contrast?->None FINDINGS: Redemonstration of mild hepatomegaly. There is generalized decreased attenuation throughout the liver. No intrahepatic or extrahepatic bile duct dilatation. The gallbladder is distended measuring up to 6 cm in diameter. No evidence of acute pancreatitis. Spleen is enlarged measuring up to 17.5 cm in cranial caudal dimension. There is redemonstration of varices surrounding distal esophagus and gastroesophageal junction. No hydronephrosis or perinephric edema. Redemonstration of fat containing umbilical hernia involving ventral abdomen measuring approximately 2.2 by 5.8 cm. Periumbilical hernia is also present containing fat measuring 3.1 x 5.7 cm. Postsurgical changes are associated with the sigmoid colon. Redemonstration of cystic lesion at level of right adnexa measuring 3 by 1.8 cm. This lesion appears similar in size compared to the previous examination. Cannon catheter is present in urinary bladder. Appendix is not definitively visualized, however no focal inflammatory changes seen in its expected location. Previously seen loculated gas collection involving right gluteal fold is not included in this examination. 1.  Previously seen loculated gas collection in right gluteal fold is not included on today's examination. CT pelvis to level of mid thighs may be warranted for further evaluation. 2.  Stable cystic lesion at level of right adnexa may represent adnexal cyst versus hydrosalpinx. Ultrasound may be helpful for further evaluation. 3.  Redemonstration of stable ventral abdominal wall hernias. 4.  Stable hepatosplenomegaly. 5.  Redemonstration of esophageal varices. 6.  Generalized decreased attenuation throughout the liver suggestive of hepatocellular disease. 7.  Stable distended gallbladder.     Ct Abdomen Pelvis W Iv Contrast Additional Contrast? None    Result Date: 1/20/2021 EXAMINATION: CT OF THE ABDOMEN AND PELVIS WITH CONTRAST 1/20/2021 10:07 pm TECHNIQUE: CT of the abdomen and pelvis was performed with the administration of intravenous contrast. Multiplanar reformatted images are provided for review. Dose modulation, iterative reconstruction, and/or weight based adjustment of the mA/kV was utilized to reduce the radiation dose to as low as reasonably achievable. COMPARISON: None. HISTORY: ORDERING SYSTEM PROVIDED HISTORY: jaundice, probable cirrhosis TECHNOLOGIST PROVIDED HISTORY: Additional Contrast?->None Reason for exam:->jaundice, probable cirrhosis FINDINGS: Lower Chest: Images through the lung bases show no evidence of infiltrates or effusions. Organs: Hepatomegaly. Hypertrophy of the left lobe of the liver which may be seen with cirrhosis. There is no evidence of intrahepatic bile duct dilatation. Mild splenomegaly. The spleen measures approximately 14 cm longitudinally. Periesophageal varices and enlarged portosystemic collaterals, consistent with portal hypertension. Adrenals and pancreas within normal limits. Distended gallbladder with no gallstones identified. No obstructive uropathy. GI/Bowel: No bowel obstruction or free intraperitoneal air. No evidence of colitis or diverticulitis. Status post partial colectomy with anastomotic surgical changes in the sigmoid colon. Pelvis: Cystic structure in the right adnexa, likely adnexal cyst is essentially unchanged since the prior examination measuring approximately 3.7 cm in diameter. The uterus and urinary bladder are within normal limits. There is no free fluid in the pelvis. Partially visualized thickening of the soft tissues in the right perianal region and extending to the subcutaneous space of the right gluteal fold with subcutaneous air. This may represent perianal abscess, the complete extent of which is not included on the images obtained. Clinical correlation recommended.  Peritoneum/Retroperitoneum: Periumbilical ventral hernia with herniation of segments of small bowel, similar to the prior examination with no evidence of complications. Bones/Soft Tissues: No acute bony pathology. Hepatomegaly with hypertrophy of the left lobe of the liver which may be seen with cirrhosis. Mild splenomegaly. Paraesophageal varices and enlarged portosystemic collaterals, consistent with portal hypertension. Distended gallbladder with no evidence of gallstones. Status post partial colectomy with anastomotic surgical changes in the sigmoid colon Partially visualized thickening of soft tissues in the right perianal region and extending to the subcutaneous space of the right gluteal fold with subcutaneous air, which may represent perianal abscess, the complete extent of which is not included on the images obtained. Clinical correlation recommended. Stable periumbilical ventral hernia with herniation of segments of small bowel and no evidence of complications. Stable cystic structure in the right adnexa measuring approximately 3.7 cm in diameter, also present on the prior examination.     Us Gallbladder Ruq    Result Date: 1/28/2021 EXAMINATION: RIGHT UPPER QUADRANT ULTRASOUND 1/28/2021 7:29 am COMPARISON: 07/11/2019 HISTORY: ORDERING SYSTEM PROVIDED HISTORY: abd pain TECHNOLOGIST PROVIDED HISTORY: Reason for exam:->abd pain What reading provider will be dictating this exam?->CRC History FINDINGS: LIVER:   The liver demonstrates normal morphology and echogenicity. There are no focal lesions, the portal vein demonstrates normal intraluminal color doppler activity and flow direction. BILIARY SYSTEM:  The gallbladder is fluid filled, mildly distended, and free of intraluminal abnormalities. Gallbladder wall thickness is normal and there is no pericholecystic fluid. Common bile duct is within normal limits measuring 6 mm. RIGHT KIDNEY: The right kidney is 11.4 cm in length. The right kidney is morphologically normal and has appropriate cortical thickness and echotexture. There are no signs of hydronephrosis. PANCREAS:  Visualized portions of the pancreas are unremarkable. OTHER: There are no signs of free intraperitoneal fluid. 1.  Mildly distended gallbladder, no signs of calculi or wall thickening. 2.  Remainder normal.    Xr Chest 1 View    Result Date: 1/20/2021  EXAMINATION: ONE XRAY VIEW OF THE CHEST 1/20/2021 8:32 pm COMPARISON: August 14, 2020 HISTORY: ORDERING SYSTEM PROVIDED HISTORY: ams TECHNOLOGIST PROVIDED HISTORY: Reason for exam:->ams FINDINGS: The lungs are without acute focal process. There is no effusion or pneumothorax. The cardiomediastinal silhouette is without acute process. The osseous structures are without acute process. No acute process.         HOSPITAL COURSE: Romana Clark did fairly well throughout the hospitalization. She was found to be suffering from decompensated alcoholic cirrhosis complicated by acute alcoholic hepatitis. She was evaluated by the GI team and cirrhotic medications were maximized. She was also found to be suffering from a right-sided hydrosalpinx and was evaluated by the OB/GYN team with plans for close outpatient follow-up. Lactulose was adjusted keeping in mind the patient's history of ulcerative colitis. Chronic comorbidities were otherwise monitored as well. Was also determined she would benefit from inpatient alcohol rehabilitation and this was arranged accordingly. She is acceptable for transfer there today. BRIEF PHYSICAL EXAMINATION AND LABORATORIES ON DAY OF DISCHARGE:  VITALS:  /61   Pulse 82   Temp 98.4 °F (36.9 °C) (Oral)   Resp 20   Wt 200 lb (90.7 kg)   SpO2 100%   BMI 30.41 kg/m²     General appearance:  Alert, responsive, oriented to person, place, and time. Well preserved, alert, no distress. Head:  Normocephalic. No masses, lesions or tenderness. Eyes:  PERRLA. EOMI. Scleral icterus--appears less. Buccal mucosa moist.  ENT:  Ears normal. Mucosa normal.  Change of voice quality  Neck:    Supple. Trachea midline. No thyromegaly. No JVD. No bruits. Heart:    Rhythm regular. Rate controlled. No murmurs. Lungs:    Symmetrical. Clear to auscultation bilaterally. No wheezes. No rhonchi. No rales. Abdomen:   Mildly tender to palpation diffusely with some localization to the right upper quadrant. Voluntary guarding is elicited. Bowel sounds are active. Extremities:    Peripheral pulses present. No peripheral edema. No ulcers. No cyanosis. No clubbing. Neurologic:    Alert x 3. No focal deficit. Cranial nerves grossly intact. No focal weakness. Psych:   Behavior is normal. Mood appears normal. Speech is not rapid and/or pressured.   Musculoskeletal: Spine ROM normal. Muscular strength intact. Gait not assessed. Integumentary:  No rashes  Skin normal color and texture. Patient is jaundiced. Genitalia/Breast:  Deferred      DISPOSITION:  The patient's condition is good. At this time the patient is without objective evidence of an acute process requiring continuing hospitalization or inpatient management. They are stable for discharge with outpatient follow-up. I have spoken with the patient and discussed the results of the current hospitalization, in addition to providing specific details for the plan of care and counseling regarding the diagnosis and prognosis. The plan has been discussed in detail and they are aware of the specific conditions for emergent return, as well as the importance of follow-up. Their questions are answered at this time and they are agreeable with the plan for discharge to detox    DISCHARGE MEDICATIONS:    [facility] Medication Instructions XQJ:[id number]    Printed on:02/01/21 [id number]   Medication Information                      FLUoxetine (PROZAC) 40 MG capsule  Take 40 mg by mouth daily             folic acid (FOLVITE) 1 MG tablet  Take 1 tablet by mouth daily             gabapentin (NEURONTIN) 300 MG capsule  Take 1 capsule by mouth 3 times daily for 30 days.              lactulose (CHRONULAC) 10 GM/15ML solution  Take 15 mLs by mouth daily             levothyroxine (SYNTHROID) 75 MCG tablet  Take 1 tablet by mouth Daily             pantoprazole (PROTONIX) 40 MG tablet  Take 40 mg by mouth 2 times daily             potassium chloride (KLOR-CON M) 20 MEQ extended release tablet  Take 20 mEq by mouth 2 times daily             rifaximin (XIFAXAN) 550 MG tablet  Take 1 tablet by mouth 2 times daily             spironolactone (ALDACTONE) 25 MG tablet  Take 1 tablet by mouth daily             thiamine mononitrate 100 MG tablet  Take 1 tablet by mouth daily             vitamin B-12 2000 MCG tablet Take 1 tablet by mouth daily             vitamin B-6 (B-6) 25 MG tablet  Take 1 tablet by mouth daily             vitamin D (ERGOCALCIFEROL) 1.25 MG (23508 UT) CAPS capsule  Take 50,000 Units by mouth once a week Monday                 FOLLOW UP/INSTRUCTIONS:  · This patient is instructed to follow-up with her primary care physician. · Patient is instructed to follow-up with the consults listed above as directed by them. · she is instructed to resume home medications and take new medications as indicated in the list above. · If the patient has a recurrence of symptoms, she is instructed to go to the ED. Preparing for this patient's discharge, including paperwork, orders, instructions, and meeting with patient did require > 40 minutes.     Alverto Tello DO     2/1/2021  2:02 PM

## 2021-02-02 LAB — AFP-TUMOR MARKER: 12 NG/ML (ref 0–9)

## 2021-06-05 PROBLEM — K61.1 PERIRECTAL ABSCESS: Status: ACTIVE | Noted: 2021-01-01

## 2021-08-30 NOTE — ED NOTES
FIRST PROVIDER CONTACT ASSESSMENT NOTE                                                                                                Department of Emergency Medicine                                                      First Provider Note  21  8:00 PM EDT  NAME: Ramón Urbina  : 1975  MRN: [id number]    Chief Complaint: Jaundice (states she was taking tylenol PM for several months, developed jaundice about 3 weeks ago)      History of Present Illness:   Ramón Urbina is a 51 y.o. female who presents to the ED for jaundice    Focused Physical Exam:  VS:    ED Triage Vitals [21 1845]   BP Temp Temp Source Pulse Resp SpO2 Height Weight   -- 98.2 °F (36.8 °C) Temporal 104 22 100 % -- --        General: Alert and in no apparent distress. Heart regular rate and rhythm  Lungs clear    Medical History:  has a past medical history of Acute renal failure (ARF) ([address] Alcoholic cirrhosis ([facility] [address] Anxiety, Crohn's colitis ([facility] [address] Depression, History of blood transfusion, Pyoderma gangrenosum, and Thyroid disease. Surgical History:  has a past surgical history that includes Abdomen surgery; Arm Debridement; Colonoscopy; Endoscopy, colon, diagnostic; and Dilatation, esophagus. Social History:  reports that she has never smoked. She has never used smokeless tobacco. She reports current alcohol use of about 2.0 standard drinks of alcohol per week. She reports previous drug use. Family History: family history includes Cancer in her mother; Heart Disease in her father; High Blood Pressure in her sister. Allergies: Patient has no known allergies.      Initial Plan of Care:  Initiate Treatment-Testing, Proceed toTreatment Area When Bed Available for ED Attending/MLP to Continue Care    -------------------------------------------------END OF FIRST PROVIDER CONTACT ASSESSMENT NOTE--------------------------------------------------------  Electronically signed by RITA Mina   DD: 21     Snow Figueroa

## 2021-08-31 PROBLEM — K70.10 ALCOHOLIC HEPATITIS: Status: ACTIVE | Noted: 2021-01-01

## 2021-08-31 PROBLEM — K72.00 ACUTE LIVER FAILURE WITHOUT HEPATIC COMA: Status: ACTIVE | Noted: 2021-01-01

## 2021-08-31 NOTE — H&P
Department of Internal Medicine  History and Physical Examination     Primary Care Physician: Addy Shepard MD   Admitting Physician:  Jaison Lopes DO  Admission date and time: 8/30/2021  9:47 PM    Room:  08/08  Admitting diagnosis: Acute liver failure without hepatic coma [K72.00]    Patient Name: Marcellus Bagley  MRN: [id number]    Date of Service: 8/31/2021     Chief Complaint: Jaundice    HISTORY OF PRESENT ILLNESS:    Kyrie Pineda is a 57-year-old female patient who presented to [address] [place] [address] emergency department last evening with complaints of jaundice. She has a known history of liver dysfunction and is in alcohol recovery. During her last hospitalization in February she was treated for acutely decompensated cirrhosis and alcoholic hepatitis. She went to outpatient alcohol rehab following that admission. She states that she has been abstinent from alcohol largely since then. She did have a few drinks about 1 month ago but states that she caught herself from falling back into regular drinking at that time. She suffers from insomnia after stopping drinking however and has been taking Tylenol PM.  She takes this in excess. She takes this above the daily recommended use on the bottle for the average person. She has been taking at least 4 per night. She was evaluated in the emergency department where she was found to have mildly elevated liver enzymes but a grossly abnormal total bilirubin that is primarily direct. INR was elevated at 2.5. CT of the abdomen pelvis with contrast showed hepatosplenomegaly, paraesophageal varices, abnormal pancreatic lesion which is hypodense and not noted on prior imaging and a small volume of ascites. She was also found to be anemic close to her baseline with normocytic and normochromic indices with elevated RDW. Urine was unremarkable for any significant signs of infection.   Proc screening was not abnormal.  She was subsequent admitted to the service of Dr. Alphonso Dutton for further mouth daily 2/2/21   Jurgen Cormier DO   vitamin B-12 2000 MCG tablet Take 1 tablet by mouth daily 2/2/21   Jurgen Cormier DO   gabapentin (NEURONTIN) 300 MG capsule Take 1 capsule by mouth 3 times daily for 30 days. 2/1/21 3/3/21  Jurgen Cormier DO   levothyroxine (SYNTHROID) 75 MCG tablet Take 1 tablet by mouth Daily 2/2/21   Jurgen Cormier DO   spironolactone (ALDACTONE) 25 MG tablet Take 1 tablet by mouth daily 2/2/21   Jurgen Cormier DO   thiamine mononitrate 100 MG tablet Take 1 tablet by mouth daily 2/2/21 3/4/21  Jurgen Cormier DO   FLUoxetine (PROZAC) 40 MG capsule Take 40 mg by mouth daily    Historical Provider, MD   pantoprazole (PROTONIX) 40 MG tablet Take 40 mg by mouth 2 times daily    Historical Provider, MD   potassium chloride (KLOR-CON M) 20 MEQ extended release tablet Take 20 mEq by mouth 2 times daily    Historical Provider, MD   vitamin D (ERGOCALCIFEROL) 1.25 MG (90312 UT) CAPS capsule Take 50,000 Units by mouth once a week Monday    Historical Provider, MD       ALLERGIES:  Patient has no known allergies. SOCIAL Hx:  Social History     Socioeconomic History    Marital status:      Spouse name: Not on file    Number of children: Not on file    Years of education: Not on file    Highest education level: Not on file   Occupational History    Not on file   Tobacco Use    Smoking status: Never Smoker    Smokeless tobacco: Never Used   Vaping Use    Vaping Use: Never used   Substance and Sexual Activity    Alcohol use:  Yes     Alcohol/week: 2.0 standard drinks     Types: 2 Cans of beer per week     Comment: daily    Drug use: Not Currently    Sexual activity: Not on file   Other Topics Concern    Not on file   Social History Narrative    Not on file     Social Determinants of Health     Financial Resource Strain:     Difficulty of Paying Living Expenses:    Food Insecurity:     Worried About Running Out of Food in the Last Year:     [address] in the Last Year:    Transportation Needs:     Lack of Transportation (Medical):  Lack of Transportation (Non-Medical):    Physical Activity:     Days of Exercise per Week:     Minutes of Exercise per Session:    Stress:     Feeling of Stress :    Social Connections:     Frequency of Communication with Friends and Family:     Frequency of Social Gatherings with Friends and Family:     Attends [employer] Services:     Active Member of Clubs or Organizations:     Attends Club or Organization Meetings:     Marital Status:    Intimate Partner Violence:     Fear of Current or Ex-Partner:     Emotionally Abused:     Physically Abused:     Sexually Abused:        ROS: 12 point review of symptoms was conducted, pertinent positives and negative were reviewed, aside from that all 12 systems were reviewed and negative. PHYSICAL EXAM:  VITALS:  Vitals:    08/31/21 0135   BP: (!) 120/57   Pulse: 79   Resp: 18   Temp: 97.6 °F (36.4 °C)   SpO2: 100%         CONSTITUTIONAL:    Awake, alert, cooperative, mildly ill-appearing but without distress    EYES:    PERRL, EOMI, sclera clear with icterus, conjunctiva normal    ENT:    Normocephalic, atraumatic, sinuses nontender on palpation. External ears without lesions. Oral pharynx with moist mucus membranes. NECK:    Supple, symmetrical, trachea midline, no adenopathy, thyroid symmetric, not enlarged and no tenderness, skin normal, no bruits, no JVD    HEMATOLOGIC/LYMPHATICS:    No cervical lymphadenopathy and no supraclavicular lymphadenopathy    LUNGS:    Symmetric. No increased work of breathing, mildly diminished bibasilar air exchange, clear to auscultation bilaterally, no wheezes, rhonchi, or rales,     CARDIOVASCULAR:    Normal apical impulse, regular rate and rhythm, normal S1 and S2, there is a grade 2/6 systolic murmur noted    ABDOMEN:    The abdomen is obese with some component of ascites fluid on percussion. There is gross hepatosplenomegaly on palpation of the abdomen.   There is no significant tenderness to palpation. Bowel sounds are active in all 4 quadrants. MUSCULOSKELETAL:    There is no redness, warmth, or swelling of the joints. Tone is normal.    NEUROLOGIC:    Awake, alert, oriented to name, place and time. Cranial nerves II-XII are grossly intact. SKIN:    No bruising or bleeding. No redness, warmth, or swelling    EXTREMITIES:    Peripheral pulses present. There is 1+ bilateral lower extremity pitting edema on palpation.     LABORATORY DATA:  CBC with Differential:    Lab Results   Component Value Date    WBC 8.6 08/30/2021    RBC 2.50 08/30/2021    HGB 8.4 08/30/2021    HCT 24.5 08/30/2021    PLT 77 08/30/2021    MCV 98.0 08/30/2021    MCH 33.6 08/30/2021    MCHC 34.3 08/30/2021    RDW 19.2 08/30/2021    SEGSPCT 59 08/11/2011    METASPCT 3 07/05/2011    LYMPHOPCT 10.5 08/30/2021    MONOPCT 2.6 08/30/2021    MYELOPCT 2 02/22/2011    BASOPCT 0.5 08/30/2021    MONOSABS 0.26 08/30/2021    LYMPHSABS 0.95 08/30/2021    EOSABS 0.08 08/30/2021    BASOSABS 0.00 08/30/2021     CMP:    Lab Results   Component Value Date     08/30/2021    K 2.4 08/30/2021    K 2.9 01/27/2021     08/30/2021    CO2 18 08/30/2021    BUN 8 08/30/2021    CREATININE 0.9 08/30/2021    GFRAA >60 08/30/2021    LABGLOM >60 08/30/2021    GLUCOSE 115 08/30/2021    GLUCOSE 125 08/11/2011    PROT 6.7 08/30/2021    LABALBU 2.5 08/30/2021    LABALBU 3.6 08/11/2011    CALCIUM 8.3 08/30/2021    BILITOT 20.3 08/30/2021    ALKPHOS 196 08/30/2021     08/30/2021    ALT 69 08/30/2021     PT/INR:    Lab Results   Component Value Date    PROTIME 29.2 08/31/2021    PROTIME 14.7 08/11/2011    INR 2.5 08/31/2021       ASSESSMENT:  · Decompensated alcoholic cirrhosis complicated by acute alcoholic hepatitis and tylenol/benadryl overuse/misuse  · Hypervolemic hypoosmolar hyponatremia  · Hypokalemia  · Hyperammonemia with developing hepatic encephalopathy   · Normocytic anemia  · Ulcerative colitis with recent perirectal abscess and resultant bacteremia per history treated at [facility]  · Hypothyroidism  · Generalized anxiety and depression  · Dysphonia  · Thrombocytopenia secondary to portal hypertension and hepatosplenomegaly with sequestration    PLAN:  Rosa Elena Dowling was admitted from the emergency department with the above described complaint. GI was consulted from the emergency department as well. She is fatigued and mildly weak and this may be due to developing hepatic encephalopathy as her ammonia level is elevated. Lactulose therapy has been instituted and we will have goal of 3-5 loose bowel movements per day. This may need adjusted in the setting of her underlying inflammatory bowel disease. She has on clinical exam some degree of ascites and will evaluate for ultrasound-guided paracentesis and fluid analysis. Rocephin is being utilized in the setting of anemia and paraesophageal varices for SBP prophylaxis. We will ask the GI team to assist in maximization of her cirrhotic medications. Counseled extensively on avoidance of Tylenol and alcohol. We will assess urine indices and osmolality in the setting of hyponatremia although given her edema and ascites this is presumed to be hypervolemic hypoosmolar hyponatremia would benefit from diuretic therapy. Electrolyte abnormalities have been addressed. Anemia indices will be obtained. In the setting of relatively recent bacteremia, blood and urine cultures will be obtained as well. Underlying co-morbidites will be addressed during hospitalization as well. Labs and vital signs will be monitored closely and addressed accordingly. See additional orders for details.      SRIDHAR Cason CNP, APRN-CNP  6:36 AM  8/31/2021    Electronically signed by SRIDHAR Cason CNP on 8/31/21 at 6:36 AM EDT

## 2021-08-31 NOTE — CONSULTS
[facility]  Dr. Betzaida Sanders M.D., Dr. Thuan Recio M.D., TARI Huggins.O., Dr. Lavon Person M.D., Dr. Mikala Hager D.O. Patient Name: Imani Luke  MRN: [id number]  : 1975 (51 y.o. female)  Allergies: has No Known Allergies. Date of Service: 2021       Reason for Consultation:  Cirrhosis with decompensation    HISTORY OF PRESENT ILLNESS:      The patient is a 51 y.o. female with a history of alcohol use disorder, biopsy-proven alcoholic cirrhosis, multiple episodes of acute alcoholic hepatitis unresponsive to steroids, ulcerative colitis s/p IPAA, and chronic anemia who presents with jaundice. Patient reports that she has had some abdominal pain and increasing jaundice for the past three weeks. She reports that she has not been taking many of her medications in that time, and thought she could \"flush it out\" by drinking more water, but her pain and yellowing continued to get worse, prompting her ED visit. She does report diarrhea, 5-10 BM daily. She reports that overall she feels well. Of note, she reports difficulty sleeping for the past couple weeks as well, and has been taking 3-4 Tylenol PM's nightly, and did combine this with 3-4 glasses of wine on two instances. Also of note, she was recent given a \"strike 3\" and is not going to be considered a candidate for transplant at [facility]. On bringing this up with her, she says she did not want a liver transplant to begin with because it would have required her to take too much time off work. She denies any recent alcohol ingestion, denies fevers, chills, nausea, vomiting, hematemesis, hematochezia, melena, hemoptysis. She reports a poor appetitite for the past 3 weeks. REVIEW OF SYSTEMS:   Review of Systems   Respiratory: Negative for cough, chest tightness and shortness of breath. Gastrointestinal: Positive for abdominal distention and diarrhea. Negative for blood in stool, nausea and vomiting.    Skin: Positive for Units by mouth once a week Monday    Historical Provider, MD       Allergies: Patient has no known allergies. Social History:  Social History     Socioeconomic History    Marital status:      Spouse name: Not on file    Number of children: 1    Years of education: Not on file    Highest education level: Not on file   Occupational History    Not on file   Tobacco Use    Smoking status: Never Smoker    Smokeless tobacco: Never Used   Vaping Use    Vaping Use: Never used   Substance and Sexual Activity    Alcohol use: Not Currently     Comment: stopped but drink 3 weeks ago    Drug use: Not Currently    Sexual activity: Not on file   Other Topics Concern    Not on file   Social History Narrative    Not on file     Social Determinants of Health     Financial Resource Strain:     Difficulty of Paying Living Expenses:    Food Insecurity:     Worried About Running Out of Food in the Last Year:     Ran Out of Food in the Last Year:    Transportation Needs:     Lack of Transportation (Medical):      Lack of Transportation (Non-Medical):    Physical Activity:     Days of Exercise per Week:     Minutes of Exercise per Session:    Stress:     Feeling of Stress :    Social Connections:     Frequency of Communication with Friends and Family:     Frequency of Social Gatherings with Friends and Family:     Attends Orthodox Services:     Active Member of Clubs or Organizations:     Attends Club or Organization Meetings:     Marital Status:    Intimate Partner Violence:     Fear of Current or Ex-Partner:     Emotionally Abused:     Physically Abused:     Sexually Abused:        Family History:  Family History   Problem Relation Age of Onset    Cancer Mother         breast     Emphysema Mother     Lupus Mother     Heart Disease Father     High Cholesterol Father     High Blood Pressure Sister     No Known Problems Son          PHYSICAL EXAM:  Vital Signs: /69   Pulse 74   Temp 97.8 °F (36.6 °C) (Oral)   Resp 12   Ht 5' 8\" (1.727 m)   Wt 210 lb (95.3 kg)   SpO2 98%   BMI 31.93 kg/m²   GENERAL APPEARANCE:  awake, alert, oriented, cooperative, and in no acute distress  EYES:  Lids and lashes normal, PERRLA, EOMI, sclera icteric  HENT:  Normocephalic, without obvious abnormality, atramatic, oral pharynx with moist mucus membranes  NECK:  No JVD or thyromegaly. LUNGS:  No increased work of breathing, good air exchange. CARDIOVASCULAR: Regular rate and rhythm, no murmur  ABDOMEN:  normal bowel sounds in all 4 quadrants, soft, non-distended, right sided tenderness, hepatomegaly, approximately 5cm below rib margin  MUSCULOSKELETAL:  There is no redness, warmth, or swelling of the joints. EXTREMITIES: Trace edema, 2+ pulses bilaterally (radial and dorsalis pedis)  NEUROLOGIC:  Awake, alert, oriented to name, place and time. No asterixis. No confusion  SKIN: Grossly jaundiced. Normal texture, and turgor. There is no redness, warmth, or swelling. No bruising or bleeding, no mottling.    PSYCH: Affect is blunted, behavior seems avoidant and withdrawn      DATA:  Results for orders placed or performed during the hospital encounter of 08/30/21   CBC auto differential   Result Value Ref Range    WBC 8.6 4.5 - 11.5 E9/L    RBC 2.50 (L) 3.50 - 5.50 E12/L    Hemoglobin 8.4 (L) 11.5 - 15.5 g/dL    Hematocrit 24.5 (L) 34.0 - 48.0 %    MCV 98.0 80.0 - 99.9 fL    MCH 33.6 26.0 - 35.0 pg    MCHC 34.3 32.0 - 34.5 %    RDW 19.2 (H) 11.5 - 15.0 fL    Platelets 77 (L) 824 - 450 E9/L    MPV 12.1 (H) 7.0 - 12.0 fL    Neutrophils % 86.0 (H) 43.0 - 80.0 %    Lymphocytes % 10.5 (L) 20.0 - 42.0 %    Monocytes % 2.6 2.0 - 12.0 %    Eosinophils % 0.9 0.0 - 6.0 %    Basophils % 0.5 0.0 - 2.0 %    Neutrophils Absolute 7.40 (H) 1.80 - 7.30 E9/L    Lymphocytes Absolute 0.95 (L) 1.50 - 4.00 E9/L    Monocytes Absolute 0.26 0.10 - 0.95 E9/L    Eosinophils Absolute 0.08 0.05 - 0.50 E9/L    Basophils Absolute 0.00 0.00 - 0.20 E9/L    Anisocytosis 3+     Polychromasia 2+     Hypochromia 1+     Poikilocytes 1+     Schistocytes 1+     Acanthocytes 1+     Ovalocytes 1+     Target Cells 1+     Tear Drop Cells 1+    Lipase   Result Value Ref Range    Lipase 69 (H) 13 - 60 U/L   Lactic Acid, Plasma   Result Value Ref Range    Lactic Acid 1.8 0.5 - 2.2 mmol/L   Ammonia   Result Value Ref Range    Ammonia 128.0 (H) 11.0 - 51.0 umol/L   Urinalysis   Result Value Ref Range    Color, UA Yellow Straw/Yellow    Clarity, UA CLOUDY (A) Clear    Glucose, Ur 100 (A) Negative mg/dL    Bilirubin Urine LARGE (A) Negative    Ketones, Urine Negative Negative mg/dL    Specific Gravity, UA <=1.005 1.005 - 1.030    Blood, Urine SMALL (A) Negative    pH, UA 6.5 5.0 - 9.0    Protein, UA TRACE Negative mg/dL    Urobilinogen, Urine 2.0 (A) <2.0 E.U./dL    Nitrite, Urine Negative Negative    Leukocyte Esterase, Urine TRACE (A) Negative   Hepatic Function Panel   Result Value Ref Range    Total Protein 6.7 6.4 - 8.3 g/dL    Albumin 2.5 (L) 3.5 - 5.2 g/dL    Alkaline Phosphatase 196 (H) 35 - 104 U/L    ALT 69 (H) 0 - 32 U/L     (H) 0 - 31 U/L    Total Bilirubin 20.3 (H) 0.0 - 1.2 mg/dL    Bilirubin, Direct 16.3 (H) 0.0 - 0.3 mg/dL    Bilirubin, Indirect 4.0 (H) 0.0 - 1.0 mg/dL   Serum Drug Screen   Result Value Ref Range    Ethanol Lvl <10 mg/dL    Acetaminophen Level <5.0 (L) 10.0 - 63.8 mcg/mL    Salicylate, Serum <7.5 0.0 - 30.0 mg/dL    TCA Scrn NEGATIVE Cutoff:300 ng/mL   URINE DRUG SCREEN   Result Value Ref Range    Amphetamine Screen, Urine NOT DETECTED Negative <1000 ng/mL    Barbiturate Screen, Ur NOT DETECTED Negative < 200 ng/mL    Benzodiazepine Screen, Urine NOT DETECTED Negative < 200 ng/mL    Cannabinoid Scrn, Ur NOT DETECTED Negative < 50ng/mL    Cocaine Metabolite Screen, Urine NOT DETECTED Negative < 300 ng/mL    Opiate Scrn, Ur NOT DETECTED Negative < 300ng/mL    PCP Screen, Urine NOT DETECTED Negative < 25 ng/mL    Methadone Screen, Urine NOT DETECTED Negative <300 ng/mL    Oxycodone Urine NOT DETECTED Negative <100 ng/mL    FENTANYL SCREEN, URINE NOT DETECTED Negative <1 ng/mL    Drug Screen Comment: see below    Basic metabolic panel   Result Value Ref Range    Sodium 129 (L) 132 - 146 mmol/L    Potassium 2.4 (LL) 3.5 - 5.0 mmol/L    Chloride 101 98 - 107 mmol/L    CO2 18 (L) 22 - 29 mmol/L    Anion Gap 10 7 - 16 mmol/L    Glucose 115 (H) 74 - 99 mg/dL    BUN 8 6 - 20 mg/dL    CREATININE 0.9 0.5 - 1.0 mg/dL    GFR Non-African American >60 >=60 mL/min/1.73    GFR African American >60     Calcium 8.3 (L) 8.6 - 10.2 mg/dL   Platelet Confirmation   Result Value Ref Range    Platelet Confirmation CONFIRMED    Microscopic Urinalysis   Result Value Ref Range    Hyaline Casts, UA 0-2 0 - 2 /LPF    WBC, UA 1-3 0 - 5 /HPF    RBC, UA 0-1 0 - 2 /HPF    Epithelial Cells, UA FEW /HPF    Bacteria, UA MODERATE (A) None Seen /HPF   Magnesium   Result Value Ref Range    Magnesium 1.7 1.6 - 2.6 mg/dL   Protime-INR   Result Value Ref Range    Protime 29.2 (H) 9.3 - 12.4 sec    INR 2.5    Reticulocytes   Result Value Ref Range    Retic Ct Pct 3.4 (H) 0.4 - 1.9 %    Retic Ct Abs 0.080 E12/L    Immature Retic Fract 12.6 3.0 - 15.9 %    Hematocrit 23.2 (L) 34.0 - 48.0 %    Retic HGB Equivalent 35.1 28.2 - 36.6 pg   T4, Free   Result Value Ref Range    T4 Free 0.95 0.93 - 1.70 ng/dL   TSH without Reflex   Result Value Ref Range    TSH 34.680 (H) 0.270 - 4.200 uIU/mL   Basic metabolic panel   Result Value Ref Range    Sodium 134 132 - 146 mmol/L    Potassium 3.1 (L) 3.5 - 5.0 mmol/L    Chloride 107 98 - 107 mmol/L    CO2 16 (L) 22 - 29 mmol/L    Anion Gap 11 7 - 16 mmol/L    Glucose 110 (H) 74 - 99 mg/dL    BUN 7 6 - 20 mg/dL    CREATININE 0.9 0.5 - 1.0 mg/dL    GFR Non-African American >60 >=60 mL/min/1.73    GFR African American >60     Calcium 8.1 (L) 8.6 - 10.2 mg/dL   POC Pregnancy Urine Qual   Result Value Ref Range    HCG, Urine, POC Negative Negative    Lot Number [id number]     Positive QC Pass/Fail Pass     Negative QC Pass/Fail Pass          IMAGING:  CT ABDOMEN PELVIS W IV CONTRAST Additional Contrast? None    Result Date: 8/31/2021  EXAMINATION: CT OF THE ABDOMEN AND PELVIS WITH CONTRAST 8/30/2021 11:28 pm TECHNIQUE: CT of the abdomen and pelvis was performed with the administration of intravenous contrast. Multiplanar reformatted images are provided for review. Dose modulation, iterative reconstruction, and/or weight based adjustment of the mA/kV was utilized to reduce the radiation dose to as low as reasonably achievable. COMPARISON: January 27 HISTORY: ORDERING SYSTEM PROVIDED HISTORY: ruq abdominal pain, jaundice TECHNOLOGIST PROVIDED HISTORY: Additional Contrast?->None Reason for exam:->ruq abdominal pain, jaundice Decision Support Exception - unselect if not a suspected or confirmed emergency medical condition->Emergency Medical Condition (MA) FINDINGS: Lower Chest: The lung bases show no evidence of infiltrates or effusions. Organs: Hepatosplenomegaly, similar to the prior examination. Paraesophageal varices again noted and consistent with portal hypertension. There is a small hypodense lesion in the body of the pancreas not clearly identified on the prior examination and measuring approximately 1.4 cm in diameter. The pancreatic duct is not dilated. No evidence of peripancreatic inflammatory changes. Prominently distended gallbladder with no gallstones identified. Consider further evaluation with gallbladder ultrasound. No evidence of obstructive uropathy. GI/Bowel: No evidence of bowel obstruction or free intraperitoneal air. Anastomotic surgical changes in the distal sigmoid colon. No evidence of colitis or diverticulitis. No signs of appendicitis. Pelvis: The uterus and urinary bladder are within normal limits. Peritoneum/Retroperitoneum: Small volume ascites in the perihepatic space, along the right paracolic gutter and in the pelvis.  Periumbilical ventral hernia with herniation of small bowel and no evidence of complications. Bones/Soft Tissues: No acute bony pathology. Hepatosplenomegaly and paraesophageal varices, similar to the prior examination. Hypodense lesion in the body of the pancreas not clearly identified on the prior examination and measuring approximately 1.4 cm. Further evaluation and follow up with dedicated contrast enhanced MRI of the pancreas recommended. Prominently distended gallbladder with no gallstones identified. Further evaluation with gallbladder ultrasound should be considered. Small volume ascites in the abdomen and pelvis. US GALLBLADDER RUQ    Result Date: 8/30/2021  EXAMINATION: RIGHT UPPER QUADRANT ULTRASOUND 8/30/2021 9:39 pm COMPARISON: Right upper quadrant ultrasound from January 28, 2021 HISTORY: ORDERING SYSTEM PROVIDED HISTORY: Eval GB - Pain TECHNOLOGIST PROVIDED HISTORY: Reason for exam:->Eval GB - Pain What reading provider will be dictating this exam?->CRC FINDINGS: LIVER:  The liver appears diffusely heterogeneous and enlarged. There is diffusely increased echogenicity relative to the right renal cortex. No hepatic mass or intrahepatic ductal dilatation. Right hepatic lobe measured 25.1 cm. BILIARY SYSTEM:  Diffuse gallbladder wall edema. Gallbladder wall measures 8-9 mm. No shadowing intraluminal stones or echogenic sludge. Common bile duct is within normal limits measuring 6 mm. RIGHT KIDNEY: Grossly normal appearance of the imaged right kidney. PANCREAS:  Visualized portions of the pancreas are unremarkable. OTHER: No evidence of right upper quadrant ascites. Small right pleural effusion. 1.  Hepatomegaly and diffusely increased echogenicity of the liver probably reflects changes related to an underlying infiltrative pathology. No hepatic mass or intrahepatic ductal dilatation. 2.  Gallbladder wall edema. No cholelithiasis. Normal appearance of the extrahepatic common bile duct.  3.  Incidental small right pleural effusion. MRI ABDOMEN W WO CONTRAST MRCP    Result Date: 8/31/2021  EXAMINATION: MRI OF THE ABDOMEN WITH AND WITHOUT CONTRAST AND MRCP 8/31/2021 8:23 am TECHNIQUE: Multiplanar multisequence MRI of the abdomen was performed with and without the administration of intravenous contrast.  After initial T2 axial and coronal images, thick slab, thin slab and 3D coronal MRCP sequences were obtained without the administration of intravenous contrast.  MIP images are provided for review. COMPARISON: 08/31/2021 CT HISTORY: ORDERING SYSTEM PROVIDED HISTORY: Abnormal liver enzymes with significantly elevated bilirubin and abnormal appearance of the gallbladder, underlying history of cirrhosis, concern for CBD obstruction and also further characterization of the pancreatic lesion TECHNOLOGIST PROVIDED HISTORY: Reason for exam:->Abnormal liver enzymes with significantly elevated bilirubin and abnormal appearance of the gallbladder, underlying history of cirrhosis, concern for CBD obstruction and also further characterization of the pancreatic lesion FINDINGS: Technical Factors: Arterial phase timing is optimal. Each imaging sequence of this study is significantly limited by respiratory motion. Reformatted MRCP sequence is nondiagnostic. Parenchyma: Questionable pattern edema within the pancreatic parenchyma. Evaluation is limited due to technical factors. Mass: Lesion in the pancreas described on prior CT is evaluated. Location: Pancreatic body anteriorly Size: 7 mm Change: Difficult to directly compare to the prior CT due to patient motion. Solid: No complex or enhancing features. Cystic: This appears to be a simple cystic lesion. Pancreatic Duct: Evaluation of the pancreatic duct is very limited. No obvious dilation. Communication with the above-described lesion cannot be evaluated. Vascular: No vascular abnormalities Other: Limited evaluation of the liver. No significant steatosis.   There is increasing ascites along with hepatosplenomegaly that was demonstrated on prior CT. The gallbladder is contracted. There is gallbladder mucosal thickening. No intraluminal filling defects are appreciated. No obvious biliary dilatation allowing for technical factors. 1. Technically limited MRI with nondiagnostic MRCP. 2. Tiny cystic lesion in the pancreatic body with no complex features. This could represent a cyst, pseudocyst or IPMN. Follow-up imaging is recommended in 1 year. 3. Hepatosplenomegaly along with increasing ascites in the upper abdomen. Correlate with liver function studies. 4. Questionable pattern of pancreatic parenchymal edema. In the setting of increasing ascites, underlying pancreatitis may be considered. Correlate with laboratory workup. 5. Contracted gallbladder with no obvious filling defect. Mucosal thickening is felt to be secondary to a systemic process as described above with underlying ascites. Cholecystitis is considered less likely. US ABDOMEN LIMITED    Result Date: 8/31/2021  EXAMINATION: RIGHT UPPER QUADRANT ULTRASOUND 8/31/2021 8:53 am COMPARISON: CT abdomen and pelvis dated 08/31/2021 HISTORY: ORDERING SYSTEM PROVIDED HISTORY: ascites  PROVIDED HISTORY: Reason for exam:->ascites survey What reading provider will be dictating this exam?->CRC Ascites, patient scheduled for ultrasound-guided paracentesis. FINDINGS: Sonographic examination of all 4 quadrants and pelvis was performed for ascites. There is minimal amount of ascites. This was not felt to be enough to safely perform paracentesis. Therefore, the procedure was canceled. Minimal amount of ascites. Ultrasound-guided paracentesis was not performed.          ASSESSMENT and PLAN:    # Decompensated alcoholic cirrhosis  # Acute alcoholic hepatitis   # Jaundice  # Hepatic encephalopathy  -MDF 98, portends poor prognosis; hold off on steroids for now, f/u infectious workup including blood and urine

## 2021-08-31 NOTE — PROGRESS NOTES
Physical Therapy Initial Evaluation/Plan of Care    Room #:  0324/0324-02  Patient Name: Pushpa Cummings  YOB: 1975  MRN: [id number]    Date of Service: 2021     Tentative placement recommendation: Home  Equipment recommendation: None      Evaluating Physical Therapist: Jaime Singh PT, DPT #[id number]      Specific Provider Orders/Date/Referring Provider :   [id number]   PT evaluation and treat Start: 21, End: 21, ONE TIME, Standing Count: 1 Occurrences, R    Chintan Schreiber, APRN - CNP Acknowledge New    Admitting Diagnosis:   Hypokalemia [E87.6]  Jaundice, non- [R17]  Acute liver failure without hepatic coma [K72.00]      Visit Diagnoses       Codes    Jaundice, non-     R17    Hypokalemia     E87.6        Surgery: None    Patient Active Problem List   Diagnosis    Small bowel obstruction ([address]    Hernia of abdominal wall    Elevated liver enzymes    Suicidal ideations    Alcohol abuse    Anxiety disorder    Petechial rash    Acute renal failure (ARF) ([address]    Alcohol abuse with withdrawal ([address]    Decompensated hepatic cirrhosis ([address]    Perirectal abscess    Acute liver failure without hepatic coma    Alcoholic hepatitis        ASSESSMENT of Current Deficits Patient exhibits decreased strength, balance and endurance impairing functional mobility, transfers, gait , gait distance and tolerance to activity. Pt performed bed mobility with IND, with transfers, ambulation and stairs all being performed at a Supervision level. Pt demonstrated mild instability during ambulation with no major LOB, SOB, or dizziness. Pt was generally steady during stairs without the need for VC.        PHYSICAL THERAPY  PLAN OF CARE       Physical therapy plan of care is established based on physician order,  patient diagnosis and clinical assessment    Current Treatment Recommendations:    -Bed Mobility: Lower extremity exercises  and Trunk control activities   -Sitting Balance: Incorporate reaching activities to activate trunk muscles , Hands on support to maintain midline , Facilitate active trunk muscle engagement , Facilitate postural control in all planes  and Engage in core activities to allow for movement within base of support   -Standing Balance: Perform strengthening exercises in standing to promote motor control with or without upper extremity support , Instruct patient on adequate base of support to maintain balance and Challenge balance utilizing reaching  activities beyond center of gravity    -Transfers: Provide instruction on proper hand and foot position for adequate transfer of weight onto lower extremities and use of gait device, Cues for hand placement, technique and safety, Facilitate weight shift forward on to lower extremities and provide necessary stabilization of bilateral lower extremities , Support transfer of weight on to lower extremities, Assist with extension of knees trunk and hip to accept weight transfer  and Provide stabilization to prevent fall   -Gait: Gait training, Standing activities to improve: base of support, weight shift, weight bearing , Exercises to improve trunk control, Exercises to improve hip and knee control, Performance of protected weight bearing activities and Activities to increase weight bearing   -Endurance: Utilize Supervised activities to increase level of endurance to allow for safe functional mobility including transfers and gait  and Use graduated activities to promote good breathing techniques and provide support and education to maximize respiratory function  -Stairs: Stair training with instruction on proper technique and hand placement on rail    PT long term treatment goals are located in below grid    Patient and or family understand(s) diagnosis, prognosis, and plan of care. Frequency of treatments: Patient will be seen  2-3 times/week.          Prior Level of Function: Patient ambulated independently   Rehab Potential: good  for baseline    Past medical history:   Past Medical History:   Diagnosis Date    Acute renal failure (ARF) ([facility] 96/01/9018    Alcoholic cirrhosis (HCC)     Anxiety     Crohn's colitis ([facility]     Depression     History of blood transfusion     Pyoderma gangrenosum     Thyroid disease      Past Surgical History:   Procedure Laterality Date    ABDOMEN SURGERY      pt has a J-pouch     ARM DEBRIDEMENT      R arm-2010-2011??    ARM DEBRIDEMENT      COLONOSCOPY      DILATATION, ESOPHAGUS      ENDOSCOPY, COLON, DIAGNOSTIC         SUBJECTIVE:    Precautions: Activity as tolerated, falls   Social history: Patient lives with son in a townhouse  with 2 steps  to enter with Rail  Tub shower grab bars with 10 stairs and 1 HR to 2nd floor bed and bath. Equipment owned: None    AM-PAC Basic Mobility   20/24    AM-PAC Mobility Inpatient   How much difficulty turning over in bed?: None  How much difficulty sitting down on / standing up from a chair with arms?: A Little  How much difficulty moving from lying on back to sitting on side of bed?: None  How much help from another person moving to and from a bed to a chair?: A Little  How much help from another person needed to walk in hospital room?: A Little  How much help from another person for climbing 3-5 steps with a railing?: A Little  AM-PAC Inpatient Mobility Raw Score : 20  AM-PAC Inpatient T-Scale Score : 47.67  Mobility Inpatient CMS 0-100% Score: 35.83  Mobility Inpatient CMS G-Code Modifier : [address] cleared patient for PT evaluation. The admitting diagnosis and active problem list as listed above have been reviewed prior to the initiation of this evaluation. OBJECTIVE;   Initial Evaluation  Date: 8/31/2021 Treatment Date:     Short Term/ Long Term   Goals   Was pt agreeable to Eval/treatment? Yes  To be met in 2 days   Pain level   0/10       Bed Mobility    Rolling: Independent    Supine to sit:  Independent    Sit to supine: Independent    Scooting: Independent       Transfers Sit to stand: Supervision    Sit to stand: Independent    Ambulation    150 feet using  no device with Supervision    for safety   > 300 feet using  no device with Independent    Stair negotiation: ascended and descended   10 steps with 1 HR with Supervision    10 steps with 1 HR with Mod I   ROM Within functional limits    Increase range of motion 10% of affected joints    Strength BUE:  refer to OT eval  RLE:  4/5  LLE:  4/5  Increase strength in affected mm groups by 1/3 grade   Balance Sitting EOB:  good   Dynamic Standing:  fair+  Sitting EOB:  good+  Dynamic Standing: good      Patient is Alert & Oriented x person, place, time and situation and follows directions    Sensation:  Patient  denies numbness/tingling   Edema:  none noted   Endurance: good     Vitals: room air   Blood Pressure at rest  Blood Pressure during session    Heart Rate at rest  Heart Rate during session    SPO2 at rest %  SPO2 during session %     Patient education  Patient educated on role of Physical Therapy, risks of immobility, safety and plan of care, energy conservation,  importance of mobility while in hospital  and safety      Patient response to education:   Pt verbalized understanding Pt demonstrated skill Pt requires further education in this area   Yes Partial Yes      Treatment:  Patient practiced and was instructed/facilitated in the following treatment: Patient Sat edge of bed 5 minutes with Independent to increase dynamic sitting balance and activity tolerance. Pt performed bed mobility, transfers, ambulation in hallway, and stair negotiation largely with Supervision and no AD. Therapeutic Exercises:  not performed       At end of session, patient in bed with call light and phone within reach,  all lines and tubes intact, nursing notified. Patient would benefit from continued skilled Physical Therapy to improve functional independence and quality of life. Patient's/ family goals   home    Time in  46  Time out  1603    Total Treatment Time  0 minutes    Evaluation time includes thorough review of current medical information, gathering information on past medical history/social history and prior level of function, completion of standardized testing/informal observation of tasks, assessment of data, and development of Plan of care and goals.      CPT codes:  Low Complexity PT evaluation (46027)  No treatment    Orion Espinosa, PT

## 2021-08-31 NOTE — ED PROVIDER NOTES
abdominal pain and nausea. Negative for constipation, diarrhea and vomiting. Endocrine: Negative for polyuria. Genitourinary: Negative for difficulty urinating and dysuria. Musculoskeletal: Negative for back pain, neck pain and neck stiffness. Skin: Positive for color change. Negative for pallor and rash. Neurological: Negative for dizziness, speech difficulty, weakness, light-headedness and headaches. Psychiatric/Behavioral: Negative for confusion. The patient is not nervous/anxious. Physical Exam  Vitals and nursing note reviewed. Constitutional:       General: She is not in acute distress. Appearance: She is well-developed. Comments: Awake and alert. Sitting in the gurney in no obvious distress. HENT:      Head: Normocephalic and atraumatic. Right Ear: External ear normal.      Left Ear: External ear normal.      Mouth/Throat:      Mouth: Mucous membranes are moist.   Eyes:      General: Scleral icterus present. Pupils: Pupils are equal, round, and reactive to light. Cardiovascular:      Rate and Rhythm: Normal rate and regular rhythm. Heart sounds: No murmur heard. Comments: 2+ radial and dorsal pedis pulses bilaterally  Pulmonary:      Effort: Pulmonary effort is normal. No respiratory distress. Breath sounds: Normal breath sounds. No wheezing. Abdominal:      Palpations: Abdomen is soft. Tenderness: There is no abdominal tenderness. There is no guarding or rebound. Musculoskeletal:         General: No tenderness or deformity. Normal range of motion. Cervical back: Normal range of motion and neck supple. Right lower leg: No edema. Left lower leg: No edema. Skin:     General: Skin is warm and dry. Capillary Refill: Capillary refill takes less than 2 seconds. Coloration: Skin is jaundiced. Neurological:      General: No focal deficit present.       Mental Status: She is alert and oriented to person, place, and time.      Cranial Nerves: No cranial nerve deficit. Sensory: No sensory deficit. Motor: No weakness or abnormal muscle tone. Psychiatric:         Mood and Affect: Mood normal.         Behavior: Behavior normal.          Procedures     MDM   This is a 48-year-old female with history of alcohol abuse, cirrhosis who presents to the emergency department with jaundice. In the emergency department she is awake and alert, hemodynamically stable, afebrile and in no respiratory distress. Significant jaundice. Bilirubin 20.3. Ammonia also elevated 128. Patient awake and alert. Administer lactulose for hepatic encephalopathy. Patient hypokalemic and repleted potassium in the emergency department. Ethanol and Tylenol levels are negative. INR 2.5. Patient with thrombocytopenia and stable chronic anemia. Discussed with GI who did not feel N-acetylcysteine was warranted as Tylenol levels negative. Likely alcoholic hepatitis. Discussed with Dr. Jenaro Tao, accepts patient for further evaluation. CT and ultrasound imaging showed no acute cholecystitis or other acute intra-abdominal pathology but did show signs of significant cirrhosis of the liver. ED Course as of Aug 31 0312   Tue Aug 31, 2021   0231 Discussed case with the GI fellow Dr. Wesley Kay, reviewing the chart and discussing it with his attending does not recommend N-acetylcysteine. He believes it is more likely alcoholic hepatitis. Recommend admission and further evaluation. Already discussed the patient with Dr. Jenaro Tao, except patient for admission. [LM]      ED Course User Index  [LM] Ulises Pope DO         ED Course as of Aug 31 0312   Tue Aug 31, 2021   0231 Discussed case with the GI fellow Dr. Wesley Kay, reviewing the chart and discussing it with his attending does not recommend N-acetylcysteine. He believes it is more likely alcoholic hepatitis. Recommend admission and further evaluation.   Already discussed the patient with Dr. Faustino Borden, except patient for admission. [LM]      ED Course User Index  [LM] Javi Newman, DO       --------------------------------------------- PAST HISTORY ---------------------------------------------  Past Medical History:  has a past medical history of Acute renal failure (ARF) ([facility] Alcoholic cirrhosis ([facility] Anxiety, Crohn's colitis ([facility] Depression, History of blood transfusion, Pyoderma gangrenosum, and Thyroid disease. Past Surgical History:  has a past surgical history that includes Abdomen surgery; Arm Debridement; Colonoscopy; Endoscopy, colon, diagnostic; and Dilatation, esophagus. Social History:  reports that she has never smoked. She has never used smokeless tobacco. She reports current alcohol use of about 2.0 standard drinks of alcohol per week. She reports previous drug use. Family History: family history includes Cancer in her mother; Heart Disease in her father; High Blood Pressure in her sister. The patients home medications have been reviewed. Allergies: Patient has no known allergies.     -------------------------------------------------- RESULTS -------------------------------------------------    LABS:  Results for orders placed or performed during the hospital encounter of 08/30/21   CBC auto differential   Result Value Ref Range    WBC 8.6 4.5 - 11.5 E9/L    RBC 2.50 (L) 3.50 - 5.50 E12/L    Hemoglobin 8.4 (L) 11.5 - 15.5 g/dL    Hematocrit 24.5 (L) 34.0 - 48.0 %    MCV 98.0 80.0 - 99.9 fL    MCH 33.6 26.0 - 35.0 pg    MCHC 34.3 32.0 - 34.5 %    RDW 19.2 (H) 11.5 - 15.0 fL    Platelets 77 (L) 528 - 450 E9/L    MPV 12.1 (H) 7.0 - 12.0 fL    Neutrophils % 86.0 (H) 43.0 - 80.0 %    Lymphocytes % 10.5 (L) 20.0 - 42.0 %    Monocytes % 2.6 2.0 - 12.0 %    Eosinophils % 0.9 0.0 - 6.0 %    Basophils % 0.5 0.0 - 2.0 %    Neutrophils Absolute 7.40 (H) 1.80 - 7.30 E9/L    Lymphocytes Absolute 0.95 (L) 1.50 - 4.00 E9/L    Monocytes Absolute 0.26 0.10 - 0.95 E9/L Eosinophils Absolute 0.08 0.05 - 0.50 E9/L    Basophils Absolute 0.00 0.00 - 0.20 E9/L    Anisocytosis 3+     Polychromasia 2+     Hypochromia 1+     Poikilocytes 1+     Schistocytes 1+     Acanthocytes 1+     Ovalocytes 1+     Target Cells 1+     Tear Drop Cells 1+    Lipase   Result Value Ref Range    Lipase 69 (H) 13 - 60 U/L   Lactic Acid, Plasma   Result Value Ref Range    Lactic Acid 1.8 0.5 - 2.2 mmol/L   Ammonia   Result Value Ref Range    Ammonia 128.0 (H) 11.0 - 51.0 umol/L   Urinalysis   Result Value Ref Range    Color, UA Yellow Straw/Yellow    Clarity, UA CLOUDY (A) Clear    Glucose, Ur 100 (A) Negative mg/dL    Bilirubin Urine LARGE (A) Negative    Ketones, Urine Negative Negative mg/dL    Specific Gravity, UA <=1.005 1.005 - 1.030    Blood, Urine SMALL (A) Negative    pH, UA 6.5 5.0 - 9.0    Protein, UA TRACE Negative mg/dL    Urobilinogen, Urine 2.0 (A) <2.0 E.U./dL    Nitrite, Urine Negative Negative    Leukocyte Esterase, Urine TRACE (A) Negative   Hepatic Function Panel   Result Value Ref Range    Total Protein 6.7 6.4 - 8.3 g/dL    Albumin 2.5 (L) 3.5 - 5.2 g/dL    Alkaline Phosphatase 196 (H) 35 - 104 U/L    ALT 69 (H) 0 - 32 U/L     (H) 0 - 31 U/L    Total Bilirubin 20.3 (H) 0.0 - 1.2 mg/dL   Serum Drug Screen   Result Value Ref Range    Ethanol Lvl <10 mg/dL    Acetaminophen Level <5.0 (L) 10.0 - 06.7 mcg/mL    Salicylate, Serum <2.5 0.0 - 30.0 mg/dL   URINE DRUG SCREEN   Result Value Ref Range    Amphetamine Screen, Urine NOT DETECTED Negative <1000 ng/mL    Barbiturate Screen, Ur NOT DETECTED Negative < 200 ng/mL    Benzodiazepine Screen, Urine NOT DETECTED Negative < 200 ng/mL    Cannabinoid Scrn, Ur NOT DETECTED Negative < 50ng/mL    Cocaine Metabolite Screen, Urine NOT DETECTED Negative < 300 ng/mL    Opiate Scrn, Ur NOT DETECTED Negative < 300ng/mL    PCP Screen, Urine NOT DETECTED Negative < 25 ng/mL    Methadone Screen, Urine NOT DETECTED Negative <300 ng/mL    Oxycodone Urine NOT DETECTED Negative <100 ng/mL    FENTANYL SCREEN, URINE NOT DETECTED Negative <1 ng/mL    Drug Screen Comment: see below    Basic metabolic panel   Result Value Ref Range    Sodium 129 (L) 132 - 146 mmol/L    Potassium 2.4 (LL) 3.5 - 5.0 mmol/L    Chloride 101 98 - 107 mmol/L    CO2 18 (L) 22 - 29 mmol/L    Anion Gap 10 7 - 16 mmol/L    Glucose 115 (H) 74 - 99 mg/dL    BUN 8 6 - 20 mg/dL    CREATININE 0.9 0.5 - 1.0 mg/dL    GFR Non-African American >60 >=60 mL/min/1.73    GFR African American >60     Calcium 8.3 (L) 8.6 - 10.2 mg/dL   Platelet Confirmation   Result Value Ref Range    Platelet Confirmation CONFIRMED    Microscopic Urinalysis   Result Value Ref Range    Hyaline Casts, UA 0-2 0 - 2 /LPF    WBC, UA 1-3 0 - 5 /HPF    RBC, UA 0-1 0 - 2 /HPF    Epithelial Cells, UA FEW /HPF    Bacteria, UA MODERATE (A) None Seen /HPF   Magnesium   Result Value Ref Range    Magnesium 1.7 1.6 - 2.6 mg/dL   Protime-INR   Result Value Ref Range    Protime 29.2 (H) 9.3 - 12.4 sec    INR 2.5    POC Pregnancy Urine Qual   Result Value Ref Range    HCG, Urine, POC Negative Negative    Lot Number [id number]     Positive QC Pass/Fail Pass     Negative QC Pass/Fail Pass        RADIOLOGY:  CT ABDOMEN PELVIS W IV CONTRAST Additional Contrast? None   Final Result   Hepatosplenomegaly and paraesophageal varices, similar to the prior   examination. Hypodense lesion in the body of the pancreas not clearly identified on the   prior examination and measuring approximately 1.4 cm. Further evaluation and   follow up with dedicated contrast enhanced MRI of the pancreas recommended. Prominently distended gallbladder with no gallstones identified. Further   evaluation with gallbladder ultrasound should be considered. Small volume ascites in the abdomen and pelvis. US GALLBLADDER RUQ   Final Result   1.   Hepatomegaly and diffusely increased echogenicity of the liver probably   reflects changes related to an underlying infiltrative pathology. No hepatic   mass or intrahepatic ductal dilatation. 2.  Gallbladder wall edema. No cholelithiasis. Normal appearance of the   extrahepatic common bile duct. 3.  Incidental small right pleural effusion.               ------------------------- NURSING NOTES AND VITALS REVIEWED ---------------------------  Date / Time Roomed:  2021  9:47 PM  ED Bed Assignment:      The nursing notes within the ED encounter and vital signs as below have been reviewed. Patient Vitals for the past 24 hrs:   BP Temp Temp src Pulse Resp SpO2   21 0135 (!) 120/57 97.6 °F (36.4 °C) Infrared 79 18 100 %   21 (!) 168/98 -- -- -- -- --   21 -- -- -- 95 -- 99 %   21 1845 -- 98.2 °F (36.8 °C) Temporal 104 22 100 %       Oxygen Saturation Interpretation: Normal    ------------------------------------------ PROGRESS NOTES ------------------------------------------    Counseling:  I have spoken with the patient and discussed todays results, in addition to providing specific details for the plan of care and counseling regarding the diagnosis and prognosis. Their questions are answered at this time and they are agreeable with the plan of admission.    --------------------------------- ADDITIONAL PROVIDER NOTES ---------------------------------  Consultations:   Spoke with Dr. Orrie Fleischer. Discussed case. They will admit the patient. This patient's ED course included: a personal history and physicial examination, re-evaluation prior to disposition, multiple bedside re-evaluations and IV medications    This patient has remained hemodynamically stable during their ED course. Diagnosis:  1. Acute liver failure without hepatic coma    2. Jaundice, non-    3. Hypokalemia        Disposition:  Patient's disposition: Admit to med/surg floor  Patient's condition is stable.          Maxwell Canchola DO  Resident  [phone]    ATTENDING PROVIDER ATTESTATION:     I have personally performed and/or participated in the history, exam, medical decision making, and procedures and agree with all pertinent clinical information. I have also reviewed and agree with the past medical, family and social history unless otherwise noted. I have discussed this patient in detail with the resident, and provided the instruction and education regarding abdominal pain, nausea and hyperbilirubinemia. My findings/Plan: Jaundice with scleral icterus. Heart RRR. Lungs CTA bilaterally. Abdomen soft. Nontender. Bowel sounds normal. Supportive care. Consult gastroenterology. Admit for further evaluation and treatment.            Tisha Jefferson DO  09/25/21 [id number]

## 2021-08-31 NOTE — CARE COORDINATION
SS Note: No Covid testing. SW Consults received for \"For consideration of rehab\" and \"discharge planning\". SW met with pt for transition of care. Pt stated she completed inpatient [facility] 2/2021, did not attend outpatient afterward, however has been attending [place] [address] meetings via [address] [place] [address] Avenue. Stated she has been abstaining from alcohol with exception of two nights where she drank wine to try to get to sleep; she then stopped use and started taking Tylenol PM. Pt stated she has a good support system from [place] [address] and other friends, denied need for visit from [facility] or need for alcohol rehab. Pt stated she resides in a two story home, two steps to enter home, bathroom on first floor, bedroom on second floor. Stated was independent prior to admission including employment. Stated no past SNF or DME. Stated she had [place] [facility] [id number] in May for IV antibiotics, cannot recall agency. Stated her PCP is Dr. Ayla Yadav. Patient has prescription coverage, uses [id number] [employer]. Pt plans to return home upon discharge, car is at hospital and plans to drive self home, denies any needs including HHC.   Electronically signed by DAMIAN Rodrigez on 8/31/2021 at 4:39 PM

## 2021-09-01 NOTE — PROGRESS NOTES
Pharmacy Consultation Note  (Antibiotic Dosing and Monitoring)    Initial consult date: 9-1-2021  Consulting physician/provider: Dr. Jill Duque  Drug: Vancomycin  Indication: GPC bacteremia (clusters)    Age/Gender Height Weight IBW  Allergy Information   46 y.o./F 5' 8\" (172.7 cm) 210 lb (95.3 kg)     Ideal body weight: 63.9 kg (140 lb 14 oz)  Adjusted ideal body weight: 76.4 kg (168 lb 8.4 oz)   Patient has no known allergies. Renal Function:  Recent Labs     08/30/21  2258 08/31/21  0740 09/01/21  0528   BUN 8 7 7   CREATININE 0.9 0.9 0.9       Intake/Output Summary (Last 24 hours) at 9/1/2021 1327  Last data filed at 9/1/2021 1009  Gross per 24 hour   Intake 340 ml   Output 600 ml   Net -260 ml       Vancomycin Monitoring:  Trough:  No results for input(s): VANCOTROUGH in the last 72 hours. Random:  No results for input(s): VANCORANDOM in the last 72 hours. Vancomycin Administration Times:  Recent vancomycin administrations      No vancomycin IV orders with administrations found. Orders not given:          vancomycin (VANCOCIN) 1,250 mg in dextrose 5 % 250 mL IVPB                Assessment:  · 45 yo/F admitted 8/30 for hypokalemia. BC's positive for GPC in clusters (real v contaminant?); no fever, no leukocytosis. · Has been receiving ceftriaxone for SBP ppx. · ID was consulted today (9/1). · Estimated Creatinine Clearance: 93 mL/min (based on SCr of 0.9 mg/dL). · To dose vancomycin, pharmacy will be utilizing [employer] calculation software for goal AUC/JORDAN 400-600 mg/L-hr. Plan:  · Start vancomycin 1250 mg q12h: predicted AUC/JORDAN = 551, Tr = 17.7 mCg/mL. · Will check vancomycin levels when appropriate. · Will continue to monitor renal function. · Clinical pharmacy to follow.     Justino Cabello, [facility] 9/1/2021 1:27 PM

## 2021-09-01 NOTE — CARE COORDINATION
MADDY note: Per afternoon rounds patient has positive blood cultures, is scheduled for a [facility] and cardiology consult. Will follow for discharge needs. Originally had denied any discharge needs but pending treatment plan patient may now need infusion center or [facility] if she will need IV antibiotics.

## 2021-09-01 NOTE — PROGRESS NOTES
Date:2021  Patient Name: Kenton Zapata  MRN: [id number]  : 1975  ROOM #: [id number]    Occupation Therapy Screen    Occupational Therapy order received, chart reviewed and evaluation attempted this date. Pt was observed being independent with bed mobility, transfers from edge of bed and commode, functional mobility with no device, and completed lower body dressing at edge of bed. OT will discharge, if there is a change in status please re-order OT eval and Treat. Thank you.    Camillo Cogan OTR/L #[id number]

## 2021-09-02 NOTE — ANESTHESIA PRE PROCEDURE
Department of Anesthesiology  Preprocedure Note       Name:  Lelo Graham   Age:  51 y.o.  :  1975                                          MRN:  [id number]         Date:  2021      Surgeon: Adri Patino):  Margaret Dexter MD    Procedure: Procedure(s):  EGD ESOPHAGOGASTRODUODENOSCOPY    Medications prior to admission:   Prior to Admission medications    Medication Sig Start Date End Date Taking? Authorizing Provider   mirtazapine (REMERON) 15 MG tablet Take 15 mg by mouth nightly   Yes Historical Provider, MD   folic acid (FOLVITE) 1 MG tablet Take 1 tablet by mouth daily 21   Indio Sleight, DO   vitamin B-6 (B-6) 25 MG tablet Take 1 tablet by mouth daily 21   Indio Sleight, DO   vitamin B-12 2000 MCG tablet Take 1 tablet by mouth daily 21   Indio Sleight, DO   gabapentin (NEURONTIN) 300 MG capsule Take 1 capsule by mouth 3 times daily for 30 days.  2/1/21 3/3/21  Indio Sleight, DO   levothyroxine (SYNTHROID) 75 MCG tablet Take 1 tablet by mouth Daily 21   Indio Sleight, DO   spironolactone (ALDACTONE) 25 MG tablet Take 1 tablet by mouth daily 21   Indio Sleight, DO   thiamine mononitrate 100 MG tablet Take 1 tablet by mouth daily 2/2/21 3/4/21  Indio Sleight, DO   pantoprazole (PROTONIX) 40 MG tablet Take 40 mg by mouth 2 times daily    Historical Provider, MD   potassium chloride (KLOR-CON M) 20 MEQ extended release tablet Take 20 mEq by mouth 2 times daily    Historical Provider, MD   vitamin D (ERGOCALCIFEROL) 1.25 MG (60995 UT) CAPS capsule Take 50,000 Units by mouth once a week Monday    Historical Provider, MD       Current medications:    Current Facility-Administered Medications   Medication Dose Route Frequency Provider Last Rate Last Admin    vancomycin (VANCOCIN) 1,250 mg in dextrose 5 % 250 mL IVPB  1,250 mg IntraVENous Q12H Lugenia Nap, APRN - NP        lactulose (CHRONULAC) 10 GM/15ML solution 30 g  30 g Oral TID Mukarram Amine, DO   30 g at 21    pantoprazole ([address]) injection 40 mg  40 mg IntraVENous BID Mukarram Amine, DO   40 mg at 09/02/21 3309    And    sodium chloride (PF) 0.9 % injection 10 mL  10 mL IntraVENous BID Mukarram Amine, DO   10 mL at 09/02/21 0950    octreotide (SANDOSTATIN) 500 mcg in sodium chloride 0.9 % 100 mL infusion  25 mcg/hr IntraVENous Continuous Mukarram Amine, DO 5 mL/hr at 09/02/21 1049 25 mcg/hr at 09/02/21 1049    magnesium sulfate 1000 mg in dextrose 5% 100 mL IVPB  1,000 mg IntraVENous PRN Nova Cassette, APRN - CNP        sodium phosphate 15.24 mmol in dextrose 5 % 250 mL IVPB  0.16 mmol/kg IntraVENous PRN Nova Cassette, APRN - CNP        Or    sodium phosphate 30.51 mmol in dextrose 5 % 250 mL IVPB  0.32 mmol/kg IntraVENous PRN Nova Cassette, APRN - CNP        potassium chloride (KLOR-CON M) extended release tablet 40 mEq  40 mEq Oral PRN Nova Cassette, APRN - CNP        Or    potassium bicarb-citric acid (EFFER-K) effervescent tablet 40 mEq  40 mEq Oral PRN Nova Cassette, APRN - CNP        Or    potassium chloride 10 mEq/100 mL IVPB (Peripheral Line)  10 mEq IntraVENous PRN Nova Cassette, APRN - CNP   Stopped at 09/01/21 0829    0.9 % sodium chloride infusion  25 mL IntraVENous PRN Nova Cassette, APRN - CNP        enoxaparin (LOVENOX) injection 40 mg  40 mg SubCUTAneous Daily Nova Cassette, APRN - CNP   40 mg at 09/01/21 0814    ondansetron (ZOFRAN-ODT) disintegrating tablet 4 mg  4 mg Oral Q8H PRN Nova Cassette, APRN - CNP        Or    ondansetron (ZOFRAN) injection 4 mg  4 mg IntraVENous Q6H PRN Nova Cassette, APRN - CNP        polyethylene glycol (GLYCOLAX) packet 17 g  17 g Oral Daily PRN Nova Cassette, APRN - CNP        sodium chloride flush 0.9 % injection 5-40 mL  5-40 mL IntraVENous 2 times per day Nova Cassette, APRN - CNP   10 mL at 09/01/21 2023    sodium chloride flush 0.9 % injection 5-40 mL  5-40 mL IntraVENous PRN Nova Cassette, APRN - CNP        0.9 % sodium chloride infusion  25 mL IntraVENous PRN Willistine Rico Betty Alexandre APRN - CNP        thiamine mononitrate tablet 100 mg  100 mg Oral Daily Larence Moreland, APRN - CNP   100 mg at 09/01/21 0813    therapeutic multivitamin-minerals 1 tablet  1 tablet Oral Daily Larence Moreland, APRN - CNP   1 tablet at 74/15/75 4000    folic acid (FOLVITE) tablet 1 mg  1 mg Oral Daily Larence Moreland, APRN - CNP   1 mg at 09/01/21 0814    LORazepam (ATIVAN) tablet 1 mg  1 mg Oral Q1H PRN Larence Moreland, APRN - CNP   1 mg at 09/01/21 1532    Or    LORazepam (ATIVAN) injection 1 mg  1 mg IntraVENous Q1H PRN Larence Moreland, APRN - CNP        Or    LORazepam (ATIVAN) tablet 2 mg  2 mg Oral Q1H PRN Larence Moreland, APRN - CNP   2 mg at 09/02/21 1239    Or    LORazepam (ATIVAN) injection 2 mg  2 mg IntraVENous Q1H PRN Larence Moreland, APRN - CNP        Or    LORazepam (ATIVAN) tablet 3 mg  3 mg Oral Q1H PRN Larence Moreland, APRN - CNP        Or    LORazepam (ATIVAN) injection 3 mg  3 mg IntraVENous Q1H PRN Larence Moreland, APRN - CNP        Or    LORazepam (ATIVAN) tablet 4 mg  4 mg Oral Q1H PRN Larence Moreland, APRN - CNP        Or    LORazepam (ATIVAN) injection 4 mg  4 mg IntraVENous Q1H PRN Larence Moreland, APRN - CNP        rifaximin Navi Calkin) tablet 550 mg  550 mg Oral BID Valentinoentino Sandifer, DO   550 mg at 09/02/21 8874    levothyroxine (SYNTHROID) tablet 100 mcg  100 mcg Oral Daily Larence Moreland, APRN - CNP   100 mcg at 09/02/21 7814    mirtazapine (REMERON) tablet 15 mg  15 mg Oral Nightly Larence Moreland, APRN - CNP   15 mg at 09/01/21 2018    vitamin B-12 (CYANOCOBALAMIN) tablet 2,000 mcg  2,000 mcg Oral Daily Larence Moreland, APRN - CNP   2,000 mcg at 09/01/21 0817    vitamin B-6 (PYRIDOXINE) tablet 25 mg  25 mg Oral Daily Larence Moreland, APRN - CNP   25 mg at 09/01/21 9431    vitamin D (ERGOCALCIFEROL) capsule 50,000 Units  50,000 Units Oral Weekly SRIDHAR Caal CNP   50,000 Units at 08/31/21 1713       Allergies:  No Known Allergies    Problem List:    Patient Active Problem List   Diagnosis Code    Small bowel obstruction ([facility] [address] K56.609    Hernia of abdominal wall K43.9    Elevated liver enzymes R74.8    Suicidal ideations R45.851    Alcohol abuse F10.10    Anxiety disorder F41.9    Petechial rash R23.3    Acute renal failure (ARF) (HCC) N17.9    Alcohol abuse with withdrawal ([facility] [address] F10.139    Decompensated hepatic cirrhosis ([facility] [address] K72.90, K74.60    Perirectal abscess K61.1    Acute liver failure without hepatic coma H39.47    Alcoholic hepatitis H57.68       Past Medical History:        Diagnosis Date    Acute renal failure (ARF) ([facility] [address] 49/42/8900    Alcoholic cirrhosis ([facility] [address]     Anxiety     Crohn's colitis ([facility] [address]     Depression     History of blood transfusion     Pyoderma gangrenosum     Thyroid disease        Past Surgical History:        Procedure Laterality Date    ABDOMEN SURGERY      pt has a J-pouch     ARM DEBRIDEMENT      R arm-2010-2011??    ARM DEBRIDEMENT      COLONOSCOPY      DILATATION, ESOPHAGUS      ENDOSCOPY, COLON, DIAGNOSTIC         Social History:    Social History     Tobacco Use    Smoking status: Never Smoker    Smokeless tobacco: Never Used   Substance Use Topics    Alcohol use: Not Currently     Comment: stopped but drink 3 weeks ago                                Counseling given: Not Answered      Vital Signs (Current):   Vitals:    09/01/21 0811 09/01/21 1743 09/02/21 0600 09/02/21 0824   BP:  (!) 108/56 (!) 101/54 108/61   Pulse: 89 77 78 68   Resp:  18 16    Temp:  36.7 °C (98.1 °F) 37 °C (98.6 °F)    TempSrc:   Oral    SpO2:  97% 94%    Weight:       Height:                                                  BP Readings from Last 3 Encounters:   09/02/21 108/61   06/07/21 134/83   02/01/21 112/61       NPO Status: Time of last liquid consumption: 2030                        Time of last solid consumption: 2030                        Date of last liquid consumption: 09/01/21                        Date of last solid food consumption: 09/01/21    BMI:   Wt Readings from Last 3 Encounters:   08/31/21 210 lb (95.3 kg)   06/07/21 212 lb (96.2 kg)   01/30/21 200 lb (90.7 kg)     Body mass index is 31.93 kg/m². CBC:   Lab Results   Component Value Date    WBC 5.8 09/02/2021    RBC 2.39 09/02/2021    HGB 7.8 09/02/2021    HCT 24.3 09/02/2021    .3 09/02/2021    RDW 18.4 09/02/2021    PLT 79 09/02/2021       CMP:   Lab Results   Component Value Date     09/02/2021    K 3.8 09/02/2021    K 2.9 01/27/2021     09/02/2021    CO2 19 09/02/2021    BUN 8 09/02/2021    CREATININE 1.1 09/02/2021    GFRAA >60 09/02/2021    LABGLOM 53 09/02/2021    GLUCOSE 121 09/02/2021    GLUCOSE 125 08/11/2011    PROT 7.3 09/02/2021    CALCIUM 8.6 09/02/2021    BILITOT 15.7 09/02/2021    ALKPHOS 181 09/02/2021     09/02/2021    ALT 62 09/02/2021       POC Tests: No results for input(s): POCGLU, POCNA, POCK, POCCL, POCBUN, POCHEMO, POCHCT in the last 72 hours.     Coags:   Lab Results   Component Value Date    PROTIME 26.3 09/02/2021    PROTIME 14.7 08/11/2011    INR 2.3 09/02/2021    APTT 37.1 01/27/2021       HCG (If Applicable):   Lab Results   Component Value Date    PREGTESTUR negative 01/31/2017        ABGs: No results found for: PHART, PO2ART, KCC1NST, DTQ3DWE, BEART, Z2MXGRGD     Type & Screen (If Applicable):  No results found for: LABABO, LABRH    Drug/Infectious Status (If Applicable):  Lab Results   Component Value Date    HEPCAB NON REACT 04/01/2011       COVID-19 Screening (If Applicable):   Lab Results   Component Value Date    COVID19 Not Detected 01/28/2021           Anesthesia Evaluation  Patient summary reviewed and Nursing notes reviewed no history of anesthetic complications:   Airway: Mallampati: III  TM distance: >3 FB   Neck ROM: full  Mouth opening: > = 3 FB Dental: normal exam     Comment: Pt denies any loose, chipped, or missing teeth    Pulmonary:Negative Pulmonary ROS breath sounds clear to auscultation                             Cardiovascular:Negative CV ROS          ECG reviewed  Rhythm: regular  Rate: normal           Beta Blocker:  Not on Beta Blocker         Neuro/Psych:   (+) psychiatric history (ETOH Abuse):depression/anxiety             GI/Hepatic/Renal:   (+) liver disease (Alcoholic Cirrhosis):,           Endo/Other:    (+) hypothyroidism, blood dyscrasia: anemia:., .                 Abdominal:             Vascular: negative vascular ROS. Other Findings:             Anesthesia Plan      MAC     ASA 3     (PIV 22 R Wrist)  Induction: intravenous. Anesthetic plan and risks discussed with patient. Use of blood products discussed with patient whom consented to blood products. Plan discussed with CRNA and attending.     Attending anesthesiologist reviewed and agrees with Preprocedure content            Nadine Perry RN   9/2/2021

## 2021-09-02 NOTE — ANESTHESIA PRE PROCEDURE
Department of Anesthesiology  Preprocedure Note       Name:  Claudia Funez   Age:  51 y.o.  :  1975                                          MRN:  [id number]         Date:  2021      Surgeon: Lisa Hughes):  Mara Tan MD    Procedure: Procedure(s):  TRANSESOPHAGEAL ECHOCARDIOGRAM    Medications prior to admission:   Prior to Admission medications    Medication Sig Start Date End Date Taking? Authorizing Provider   mirtazapine (REMERON) 15 MG tablet Take 15 mg by mouth nightly    Historical Provider, MD   folic acid (FOLVITE) 1 MG tablet Take 1 tablet by mouth daily 21   Tammy Liu,    vitamin B-6 (B-6) 25 MG tablet Take 1 tablet by mouth daily 21   Tammy Liu,    vitamin B-12 2000 MCG tablet Take 1 tablet by mouth daily 21   Tammy Liu,    gabapentin (NEURONTIN) 300 MG capsule Take 1 capsule by mouth 3 times daily for 30 days. 2/1/21 3/3/21  Tammy Liu DO   levothyroxine (SYNTHROID) 75 MCG tablet Take 1 tablet by mouth Daily 21   Tammy Liu,    spironolactone (ALDACTONE) 25 MG tablet Take 1 tablet by mouth daily 21   Tammy Liu, DO   thiamine mononitrate 100 MG tablet Take 1 tablet by mouth daily 2/2/21 3/4/21  Tammy Liu DO   pantoprazole (PROTONIX) 40 MG tablet Take 40 mg by mouth 2 times daily    Historical Provider, MD   potassium chloride (KLOR-CON M) 20 MEQ extended release tablet Take 20 mEq by mouth 2 times daily    Historical Provider, MD   vitamin D (ERGOCALCIFEROL) 1.25 MG (73570 UT) CAPS capsule Take 50,000 Units by mouth once a week Monday    Historical Provider, MD       Current medications:    No current facility-administered medications for this visit. No current outpatient medications on file.      Facility-Administered Medications Ordered in Other Visits   Medication Dose Route Frequency Provider Last Rate Last Admin    vancomycin (VANCOCIN) 1,250 mg in dextrose 5 % 250 mL IVPB  1,250 mg IntraVENous Q12H Bao Cooper, APRN - NP        lactulose (CHRONULAC) 10 GM/15ML solution 30 g  30 g Oral TID Mukarram Amine, DO   30 g at 09/01/21 2018    pantoprazole (PROTONIX) injection 40 mg  40 mg IntraVENous BID Mukarram Amine, DO   40 mg at 09/02/21 7892    And    sodium chloride (PF) 0.9 % injection 10 mL  10 mL IntraVENous BID Mukarram Amine, DO   10 mL at 09/02/21 0950    octreotide (SANDOSTATIN) 500 mcg in sodium chloride 0.9 % 100 mL infusion  25 mcg/hr IntraVENous Continuous Mukarram Amine, DO 5 mL/hr at 09/02/21 1049 25 mcg/hr at 09/02/21 1049    magnesium sulfate 1000 mg in dextrose 5% 100 mL IVPB  1,000 mg IntraVENous PRN Krystin Krause APRN - CNP        sodium phosphate 15.24 mmol in dextrose 5 % 250 mL IVPB  0.16 mmol/kg IntraVENous PRN Krystin Krause APRN - CNP        Or    sodium phosphate 30.51 mmol in dextrose 5 % 250 mL IVPB  0.32 mmol/kg IntraVENous PRN Krystin Krause APRN - CNP        potassium chloride (KLOR-CON M) extended release tablet 40 mEq  40 mEq Oral PRN SRIDHAR Chavez - CNP        Or    potassium bicarb-citric acid (EFFER-K) effervescent tablet 40 mEq  40 mEq Oral PRN SRIDHAR Chavez - CNP        Or    potassium chloride 10 mEq/100 mL IVPB (Peripheral Line)  10 mEq IntraVENous PRN Krystin Krause APRN - CNP   Stopped at 09/01/21 0829    0.9 % sodium chloride infusion  25 mL IntraVENous PRN SRIDHAR Chavez - CNP        enoxaparin (LOVENOX) injection 40 mg  40 mg SubCUTAneous Daily SRIDHAR Chavez - CNP   40 mg at 09/01/21 0814    ondansetron (ZOFRAN-ODT) disintegrating tablet 4 mg  4 mg Oral Q8H PRN SRIDHAR Chavez - CNP        Or    ondansetron (ZOFRAN) injection 4 mg  4 mg IntraVENous Q6H PRN Krystin Krause APRN - CNP        polyethylene glycol (GLYCOLAX) packet 17 g  17 g Oral Daily PRN Krystin Krause APRN - CNP        sodium chloride flush 0.9 % injection 5-40 mL  5-40 mL IntraVENous 2 times per day SRIDHAR Chavez - CNP   10 mL at 09/01/21 2023    sodium chloride flush 0.9 % injection 5-40 mL 5-40 mL IntraVENous PRN Chintan Schreiber APRN - CNP        0.9 % sodium chloride infusion  25 mL IntraVENous PRN Chintan Schreiber APRN - CNP        thiamine mononitrate tablet 100 mg  100 mg Oral Daily Chintan Schreiber, APRN - CNP   100 mg at 09/01/21 0813    therapeutic multivitamin-minerals 1 tablet  1 tablet Oral Daily Chintan Schreiber APRN - CNP   1 tablet at 68/97/11 9031    folic acid (FOLVITE) tablet 1 mg  1 mg Oral Daily Chintan Schreiber APRN - CNP   1 mg at 09/01/21 0814    LORazepam (ATIVAN) tablet 1 mg  1 mg Oral Q1H PRN Chintan Schreiber, APRN - CNP   1 mg at 09/01/21 1532    Or    LORazepam (ATIVAN) injection 1 mg  1 mg IntraVENous Q1H PRN Chintan Schreiber, APRN - CNP        Or    LORazepam (ATIVAN) tablet 2 mg  2 mg Oral Q1H PRN Chintan Schreiber, APRN - CNP   2 mg at 09/02/21 1556    Or    LORazepam (ATIVAN) injection 2 mg  2 mg IntraVENous Q1H PRN Chintan Schreiber, APRN - CNP        Or    LORazepam (ATIVAN) tablet 3 mg  3 mg Oral Q1H PRN Chintan Schreiber, APRN - CNP        Or    LORazepam (ATIVAN) injection 3 mg  3 mg IntraVENous Q1H PRN Chintan Schreiber, APRN - CNP        Or    LORazepam (ATIVAN) tablet 4 mg  4 mg Oral Q1H PRN Chintan Schreiber, APRN - CNP        Or    LORazepam (ATIVAN) injection 4 mg  4 mg IntraVENous Q1H PRN Chintan Schreiber, APRN - CNP        rifaximin Vallorie Daft) tablet 550 mg  550 mg Oral BID Mandie Kumar DO   550 mg at 09/02/21 1700    levothyroxine (SYNTHROID) tablet 100 mcg  100 mcg Oral Daily SRIDHAR Martin - CNP   100 mcg at 09/02/21 2056    mirtazapine (REMERON) tablet 15 mg  15 mg Oral Nightly Chintan Schreiber, APRN - CNP   15 mg at 09/01/21 2018    vitamin B-12 (CYANOCOBALAMIN) tablet 2,000 mcg  2,000 mcg Oral Daily Chintan Schreiber APRN - CNP   2,000 mcg at 09/01/21 0612    vitamin B-6 (PYRIDOXINE) tablet 25 mg  25 mg Oral Daily SRIDHAR Martin CNP   25 mg at 09/01/21 0817    vitamin D (ERGOCALCIFEROL) capsule 50,000 Units  50,000 Units Oral Weekly SRIDHAR Martin CNP   50,000 Units at 08/31/21 1713       Allergies:  No Known Allergies    Problem List:    Patient Active Problem List   Diagnosis Code    Small bowel obstruction ([facility] K56.609    Hernia of abdominal wall K43.9    Elevated liver enzymes R74.8    Suicidal ideations R45.851    Alcohol abuse F10.10    Anxiety disorder F41.9    Petechial rash R23.3    Acute renal failure (ARF) (HCC) N17.9    Alcohol abuse with withdrawal (HCC) F10.139    Decompensated hepatic cirrhosis (HCC) K72.90, K74.60    Perirectal abscess K61.1    Acute liver failure without hepatic coma A47.85    Alcoholic hepatitis M25.13       Past Medical History:        Diagnosis Date    Acute renal failure (ARF) ([facility] [address] 14/68/5195    Alcoholic cirrhosis ([facility] [address]     Anxiety     [facility]     Depression     History of blood transfusion     Pyoderma gangrenosum     Thyroid disease        Past Surgical History:        Procedure Laterality Date    ABDOMEN SURGERY      pt has a J-pouch     ARM DEBRIDEMENT      R arm-2010-2011??    ARM DEBRIDEMENT      COLONOSCOPY      DILATATION, ESOPHAGUS      ENDOSCOPY, COLON, DIAGNOSTIC         Social History:    Social History     Tobacco Use    Smoking status: Never Smoker    Smokeless tobacco: Never Used   Substance Use Topics    Alcohol use: Not Currently     Comment: stopped but drink 3 weeks ago                                Counseling given: Not Answered      Vital Signs (Current): There were no vitals filed for this visit.                                            BP Readings from Last 3 Encounters:   09/02/21 108/61   06/07/21 134/83   02/01/21 112/61       NPO Status:                                                                                 BMI:   Wt Readings from Last 3 Encounters:   08/31/21 210 lb (95.3 kg)   06/07/21 212 lb (96.2 kg)   01/30/21 200 lb (90.7 kg)     There is no height or weight on file to calculate BMI.    CBC:   Lab Results   Component Value Date    WBC 5.8 09/02/2021    RBC 2.39 09/02/2021    HGB 7.8 09/02/2021    HCT 24.3 09/02/2021    .3 09/02/2021    RDW 18.4 09/02/2021    PLT 79 09/02/2021       CMP:   Lab Results   Component Value Date     09/02/2021    K 3.8 09/02/2021    K 2.9 01/27/2021     09/02/2021    CO2 19 09/02/2021    BUN 8 09/02/2021    CREATININE 1.1 09/02/2021    GFRAA >60 09/02/2021    LABGLOM 53 09/02/2021    GLUCOSE 121 09/02/2021    GLUCOSE 125 08/11/2011    PROT 7.3 09/02/2021    CALCIUM 8.6 09/02/2021    BILITOT 15.7 09/02/2021    ALKPHOS 181 09/02/2021     09/02/2021    ALT 62 09/02/2021       POC Tests: No results for input(s): POCGLU, POCNA, POCK, POCCL, POCBUN, POCHEMO, POCHCT in the last 72 hours.     Coags:   Lab Results   Component Value Date    PROTIME 26.3 09/02/2021    PROTIME 14.7 08/11/2011    INR 2.3 09/02/2021    APTT 37.1 01/27/2021       HCG (If Applicable):   Lab Results   Component Value Date    PREGTESTUR negative 01/31/2017        ABGs: No results found for: PHART, PO2ART, UNF2AQE, HDA5GRV, BEART, M3FUUGUF     Type & Screen (If Applicable):  No results found for: LABABO, LABRH    Drug/Infectious Status (If Applicable):  Lab Results   Component Value Date    HEPCAB NON REACT 04/01/2011       COVID-19 Screening (If Applicable):   Lab Results   Component Value Date    COVID19 Not Detected 01/28/2021           Anesthesia Evaluation  Patient summary reviewed and Nursing notes reviewed no history of anesthetic complications:   Airway: Mallampati: III  TM distance: >3 FB   Neck ROM: full  Mouth opening: > = 3 FB Dental: normal exam     Comment: Pt denies any loose, chipped, or missing teeth    Pulmonary:Negative Pulmonary ROS breath sounds clear to auscultation                             Cardiovascular:          ECG reviewed  Rhythm: regular  Rate: normal           Beta Blocker:  Not on Beta Blocker      ROS comment: ENDOCARDITIS      Neuro/Psych:   (+) psychiatric history (ETOH Abuse):depression/anxiety ROS comment: ALCOHOL ABUSE GI/Hepatic/Renal:   (+) liver disease (Alcoholic Cirrhosis):,      (-) no morbid obesity       Endo/Other:    (+) hypothyroidism, blood dyscrasia: anemia:., .                 Abdominal:   (+) obese,           Vascular: negative vascular ROS. Other Findings:             Anesthesia Plan      MAC     ASA 3       Induction: intravenous. MIPS: Prophylactic antiemetics administered. Anesthetic plan and risks discussed with patient. Plan discussed with CRNA. PAT Chart Review:  Chart reviewed per routine on September 2, 2021 at 1:16 PM by Ash Haskins DO.  (Final assessment and plan per day of surgery team.)      DOS STAFF ADDENDUM:    Pt seen and examined, chart reviewed (including anesthesia, drug and allergy history). Anesthetic plan, risks, benefits, alternatives, and personnel involved discussed with patient. Patient verbalized an understanding and agrees to proceed. Plan discussed with care team members and agreed upon.     Emelyn Morales MD  Staff Anesthesiologist  12:37 PM

## 2021-09-02 NOTE — CARE COORDINATION
Cm note: attempted to see pt x2 today, first attempt patient was sleeping and did not awaken when approached by CM, on second attempt, patient was leaving for procedure. Will continue to follow for antibiotics. Per ID note pt had received IV Dapto x2 weeks but no mention of what home care company, will follow up with patient.   Pt for EGD today, pt also needs [facility] and this is scheduled for 9/3 per OR case list.

## 2021-09-02 NOTE — H&P
Interval H&P    Subjective: Patient denies any hematochezia, melena, hematemesis. Perianal wound noted on rectal exam, as well as perianal condylomas. May be source of bacteremia. Objective:  Vitals:    09/02/21 0824   BP: 108/61   Pulse: 68   Resp:    Temp:    SpO2:      Assessment and Plan:  #Acute on chronic anemia  #History of esophageal varices  - Plan for EGD today    #Staphylococcal bacteremia  -Rectal exam as above  -Per ID  -Agree with plan for [facility] at some point, but would recommend to hold off pending EGD in case of varices that required banding.     Will discuss with Dr. Matthew Funes DO   GI Fellow  09/02/21 11:22 AM

## 2021-09-02 NOTE — PROGRESS NOTES
Internal Medicine Progress Note    [facility]    Chinohomar Moreland. Madelyn Nava., SAVAGEOReinaldoI. Chantelle Call D.O., SAIDA Brand D.O. Stefanie Gillette, MSN, APRN, NP-C  Darcey Duane. Francesco Rodriguez, MSN, APRN-CNP     Primary Care Physician: Lawrence Fonseca MD   Admitting Physician:  Gale Jones DO  Admission date and time: 2021  9:47 PM    Room:  [address] [place] [address]  Admitting diagnosis: Hypokalemia [E87.6]  Jaundice, non- [R17]  Acute liver failure without hepatic coma [K72.00]    Patient Name: Imani Luke  MRN: [id number]    Date of Service: 2021     Subjective:  Tiffanie Hou is a 51 y.o. female who was seen and examined today,2021, at the bedside. Tiffanie Hou is feeling well today and states that she is returning to her baseline. She now has positive blood cultures but denies systemic findings or febrile illness. She states that she was recently hospitalized at [facility] in the setting of a buttocks wound. She states she was diagnosed with VRE bacteremia and required PICC line placement and completion of antibiotics while at home. We will attempt to obtain these records. Review of System:   Constitutional:   Denies fever or chills, weight loss or gain, fatigue or malaise. HEENT:   Denies ear pain, sore throat, sinus or eye problems. Cardiovascular:   Denies any chest pain, irregular heartbeats, or palpitations. Respiratory:   Denies shortness of breath, coughing, sputum production, hemoptysis, or wheezing. Gastrointestinal:   Less abdominal pain today. She is tolerating a diet without complication. Genitourinary:    Denies any urgency, frequency, hematuria. Voiding  without difficulty. Extremities:   Denies lower extremity swelling, edema or cyanosis. Neurology:    Denies any headache or focal neurological deficits, Denies generalized weakness or memory difficulty. Psch:   Denies being anxious or depressed.   Musculoskeletal:    Denies myalgias, joint complaints or back pain. Integumentary:   Denies any rashes, ulcers, or excoriations. Denies bruising. Hematologic/Lymphatic:  Denies bruising or bleeding. Physical Exam:  No intake/output data recorded. Intake/Output Summary (Last 24 hours) at 9/2/2021 0706  Last data filed at 9/1/2021 1441  Gross per 24 hour   Intake 440 ml   Output 300 ml   Net 140 ml   I/O last 3 completed shifts: In: 440 [P.O.:440]  Out: 300 [Urine:300]  Patient Vitals for the past 96 hrs (Last 3 readings):   Weight   08/31/21 1035 210 lb (95.3 kg)     Vital Signs:   Blood pressure (!) 101/54, pulse 78, temperature 98.6 °F (37 °C), temperature source Oral, resp. rate 16, height 5' 8\" (1.727 m), weight 210 lb (95.3 kg), SpO2 94 %. General appearance:  Alert, responsive, oriented to person, place, and time. Well preserved, alert, no distress. Head:  Normocephalic. No masses, lesions or tenderness. Eyes:  PERRLA. EOMI. Sclera clear. Buccal mucosa moist.  ENT:  Ears normal. Mucosa normal.  Neck:    Supple. Trachea midline. No thyromegaly. No JVD. No bruits. Heart:    Rhythm regular. Rate controlled. No murmurs. Lungs:    Symmetrical. Clear to auscultation bilaterally. No wheezes. No rhonchi. No rales. Abdomen:   Distended. Voluntary guarding is elicited. Bowel sounds are active. Extremities:    Peripheral pulses present. No peripheral edema. No ulcers. No cyanosis. No clubbing. Neurologic:    Alert x 3. No focal deficit. Cranial nerves grossly intact. No focal weakness. Psych:   Behavior is normal. Mood appears normal. Speech is not rapid and/or pressured. Musculoskeletal:   Spine ROM normal. Muscular strength intact. Gait not assessed. Integumentary:  No rashes  Skin normal color and texture.   Genitalia/Breast:  Deferred    Medication:  Scheduled Meds:   lactulose  30 g Oral TID    pantoprazole  40 mg IntraVENous BID    And    sodium chloride (PF)  10 mL IntraVENous BID    vancomycin  1,250 mg IntraVENous Q12H    cefTRIAXone (ROCEPHIN) IV  1,000 mg IntraVENous Q24H    enoxaparin  40 mg SubCUTAneous Daily    sodium chloride flush  5-40 mL IntraVENous 2 times per day    thiamine  100 mg Oral Daily    multivitamin  1 tablet Oral Daily    folic acid  1 mg Oral Daily    rifaximin  550 mg Oral BID    levothyroxine  100 mcg Oral Daily    mirtazapine  15 mg Oral Nightly    cyanocobalamin  2,000 mcg Oral Daily    pyridoxine  25 mg Oral Daily    vitamin D  50,000 Units Oral Weekly     Continuous Infusions:   octreotide (SANDOSTATIN) infusion 25 mcg/hr (09/01/21 1509)    sodium chloride      sodium chloride         Objective Data:  CBC with Differential:    Lab Results   Component Value Date    WBC 5.8 09/02/2021    RBC 2.39 09/02/2021    HGB 8.1 09/02/2021    HCT 24.7 09/02/2021    PLT 79 09/02/2021    .3 09/02/2021    MCH 33.9 09/02/2021    MCHC 32.8 09/02/2021    RDW 18.4 09/02/2021    SEGSPCT 59 08/11/2011    METASPCT 3 07/05/2011    LYMPHOPCT 18.3 09/02/2021    MONOPCT 1.7 09/02/2021    MYELOPCT 0.9 09/02/2021    BASOPCT 0.7 09/02/2021    MONOSABS 0.12 09/02/2021    LYMPHSABS 1.04 09/02/2021    EOSABS 0.20 09/02/2021    BASOSABS 0.00 09/02/2021     CMP:    Lab Results   Component Value Date     09/02/2021    K 3.8 09/02/2021    K 2.9 01/27/2021     09/02/2021    CO2 19 09/02/2021    BUN 8 09/02/2021    CREATININE 1.1 09/02/2021    GFRAA >60 09/02/2021    LABGLOM 53 09/02/2021    GLUCOSE 121 09/02/2021    GLUCOSE 125 08/11/2011    PROT 7.3 09/02/2021    LABALBU 2.6 09/02/2021    LABALBU 3.6 08/11/2011    CALCIUM 8.6 09/02/2021    BILITOT 15.7 09/02/2021    ALKPHOS 181 09/02/2021     09/02/2021    ALT 62 09/02/2021       Assessment:  1. Staph bacteremia of unclear source  2. Decompensated alcoholic cirrhosis complicated by acute alcoholic hepatitis and tylenol/benadryl overuse/misuse  3. Hyperammonemia with developing hepatic encephalopathy   4.  Normocytic anemia  5. Ulcerative colitis with recent perirectal abscess and resultant bacteremia per history treated at [facility]  6. Hypothyroidism  7. Generalized anxiety and depression  8. Dysphonia  9. Thrombocytopenia secondary to portal hypertension and hepatosplenomegaly with sequestration    Plan:   [facility] appears comfortable today and we discussed her underlying bacteremia. She then states that she was hospitalized approximately 2 months ago at [facility] with a buttocks abscess. She states she was diagnosed with VRE bacteremia at that point and required PICC line placement and home IV antibiotic therapy. We will attempt to obtain these records. In the meantime, the infectious disease team has been asked to provide consultation. Cardiology will also provide consultation as I anticipate the need for transesophageal echocardiogram.  The patient's cirrhosis is compensated at this point. The GI team is planning endoscopic intervention for variceal screening. Chronic comorbidities are being monitored. More than 50% of my time was spent at the bedside counseling/coordinating care with the patient and/or family with face to face contact. This time was spent reviewing notes and laboratory data as well as instructing and counseling the patient. Time I spent with the family or surrogate(s) is included only if the patient was incapable of providing the necessary information or participating in medical decisions. I also discussed the differential diagnosis and all of the proposed management plans with the patient and individuals accompanying the patient. I am readily available for any further decision-making and intervention.        Promise Dyer DO, RAYNA.BA.C.O.I.  9/2/2021  7:06 AM

## 2021-09-02 NOTE — PROGRESS NOTES
Pharmacy Consultation Note  (Antibiotic Dosing and Monitoring)    Initial consult date: 9-1-2021  Consulting physician/provider: Dr. Sofia Kendall  Drug: Vancomycin  Indication: GPC bacteremia (clusters)    Age/Gender Height Weight IBW  Allergy Information   46 y.o./F 5' 8\" (172.7 cm) 210 lb (95.3 kg)     Ideal body weight: 63.9 kg (140 lb 14 oz)  Adjusted ideal body weight: 76.4 kg (168 lb 8.4 oz)   Patient has no known allergies. Renal Function:  Recent Labs     08/31/21  0740 09/01/21  0528 09/02/21  0450   BUN 7 7 8   CREATININE 0.9 0.9 1.1*       Intake/Output Summary (Last 24 hours) at 9/2/2021 0937  Last data filed at 9/1/2021 1441  Gross per 24 hour   Intake 220 ml   Output 300 ml   Net -80 ml       Vancomycin Monitoring:  Trough:  No results for input(s): VANCOTROUGH in the last 72 hours. Random:  No results for input(s): VANCORANDOM in the last 72 hours. Recent vancomycin administrations                   vancomycin (VANCOCIN) 1,250 mg in dextrose 5 % 250 mL IVPB (mg) 1,250 mg New Bag 09/02/21 0255     1,250 mg New Bag 09/01/21 1509                  Assessment:  · 47 yo/F admitted 8/30 for hypokalemia. BC's positive for GPC in clusters (real v contaminant?); no fever, no leukocytosis. · Has been receiving ceftriaxone for SBP ppx. · ID was consulted 9/1. Estimated Creatinine Clearance: 76 mL/min (A) (based on SCr of 1.1 mg/dL (H)). · To dose vancomycin, pharmacy will be utilizing MetaStat calculation software for goal AUC/JORDAN 400-600 mg/L-hr. Plan:  · Continue vancomycin 1250 mg q12h: predicted AUC/JORDAN = 551, Tr = 17.7 mCg/mL. · Check level tomorrow @ 0200. Hold dose for level > 20 mcg/mL  · Will continue to monitor renal function. · Clinical pharmacy to follow.     Shelah Barthel, PharmD, BCPS 9/2/2021 9:38 AM   Ext: [phone]

## 2021-09-02 NOTE — PLAN OF CARE
Problem: Falls - Risk of:  Goal: Will remain free from falls  Outcome: Met This Shift  Note: Patient remained free of falls for entirety of shift      Problem: Falls - Risk of:  Goal: Absence of physical injury  Outcome: Met This Shift  Note: No occurrence of physical injury during shift      Problem: Pain:  Goal: Pain level will decrease  Outcome: Met This Shift  Note: Pain less than 3 after being medicated      Problem: Pain:  Goal: Control of acute pain  Outcome: Met This Shift     Problem: Pain:  Goal: Control of chronic pain  Outcome: Met This Shift

## 2021-09-02 NOTE — OP NOTE
Operative Note      Patient: Kvng Median  YOB: 1975  MRN: [id number]    Date of Procedure: 9/2/2021    Pre-Op Diagnosis: ANEMIA, history of esophageal varices    Post-Op Diagnosis: Anemia, portal hypertensive gastropathy, gastric polyp       Procedure(s):  EGD ESOPHAGOGASTRODUODENOSCOPY    Surgeon(s):  Keo Mccall MD    Assistant:   Surgical Assistant: Kylie Montero RN    Anesthesia: Monitor Anesthesia Care    Estimated Blood Loss (mL): 0ml    Complications: None    Specimens:   * No specimens in log *    Implants:  * No implants in log *      Procedure:  Esophagogastroduodenoscopy    Indication:  Anemia, history of esophageal varices    Consent:   Informed consent was obtained from the patient including and not limited to risk of perforation, aspiration of gastric contents or teeth, bleeding, infection, dental breakage, ileus, need for surgery, or worst case death. Sedation:  MAC    Estimated Blood Loss: 0ml    Endoscope was advanced easily through mouth to second portion of duodenum      Oropharynx views are limited but grossly normal.    Esophagus:   Mucosa is normal.  GEJ at 35 cm. Irregular z-line. No esophageal varices seen. No fresh or old blood. Stomach:   Antrum with one gastric polyp    Gastric body with moderate portal hypertensive gastropathy    Retroflexed views show portal hypertensive gastropathy in fundus and cardia. Duodenum: Bulb is normal.    Second portion of duodenum is normal.    No fresh or old blood seen. IMPRESSION AND PLAN:     1. Portal hypertensive gastropathy, no fresh or old blood seen in stomach    2. No esophageal varices. 3. Gastric polyp noted in the antrum    Recommendations: No fresh or old blood seen in stomach or esophagus. No etiology found on EGD to explain acute on chronic anemia. Okay to discontinue octreotide drip and IV PPI. Resume home PO PPI.  Recommend outpatient follow up for video capsule endoscopy to r/o small bowel etiology for anemia. Okay to resume general diet. Follow up as outpatient in office, call [phone] to schedule for appointment.       Pt was seen and procedure was performed with Dr. Stephanie Talbot present for the entire procedure        Electronically signed by Ann Schaeffer DO on 9/2/2021 at 2:14 PM     I was present for entire duration of procedure; discussed findings with resident and agree with recommendations above    Mikayla Lowery MD  Gastroenterology

## 2021-09-02 NOTE — PROGRESS NOTES
Physical Therapy        ENDO POOL ROOM/NONE    Patient unavailable for physical therapy treatment due to patient being out of room for procedure. Will attempt session at later time/date.      Otto Mccauley, PTA  #[id number]

## 2021-09-02 NOTE — CONSULTS
Infectious Disease Consult Note     Admit Date: 8/30/2021  9:47 PM    Chief complaint: bacteremia, \"I feel good\"    Reason for Consult:  Staph bacteremia    Requesting Physician:  Milana Sherwood DO      HISTORY OF PRESENT ILLNESS:    This is a 51year old female with history of jaundice, alcoholic cirrhosis, extensive psych history and VRE perirectal abscess and MSSA bacteremia at [place] in May 2021 for which she was treated with Daptomycin x 14 days until June 7, 2021 via PICC line. She presented to ER with alcoholic cirrhosis from alcohol abuse, tylenol abuse, ulcerative colitis, and thrombocytopenia. Paracentesis was attempted but no ascites found. Blood cultures positive for Staph aureus. ID consulted for recommendations. REVIEW OF SYSTEMS:    Negative except as above     MEDICAL HISTORY  Past Medical History:   Diagnosis Date    Acute renal failure (ARF) ([facility] [address] 22/26/9382    Alcoholic cirrhosis (HCC)     Anxiety     Crohn's colitis ([facility] [address]     Depression     History of blood transfusion     Pyoderma gangrenosum     Thyroid disease       Immunization History   Administered Date(s) Administered    COVID-19, Pfizer, PF, 30mcg/0.3mL 04/14/2021, 06/01/2021     Past Surgical History:   Procedure Laterality Date    ABDOMEN SURGERY      pt has a J-pouch     ARM DEBRIDEMENT      R [id number]    ARM DEBRIDEMENT      COLONOSCOPY      DILATATION, ESOPHAGUS      ENDOSCOPY, COLON, DIAGNOSTIC       FAMILY HISTORY  family history includes Cancer in her mother; Emphysema in her mother; Heart Disease in her father; High Blood Pressure in her sister; High Cholesterol in her father; Lupus in her mother; No Known Problems in her son.   SOCIAL HISTORY  Social History     Socioeconomic History    Marital status:      Spouse name: Not on file    Number of children: 1    Years of education: Not on file    Highest education level: Not on file   Occupational History    Not on file   Tobacco Use    Smoking status: Never 1509)    sodium chloride      sodium chloride       PRN Meds:magnesium sulfate, sodium phosphate IVPB **OR** sodium phosphate IVPB, potassium chloride **OR** potassium alternative oral replacement **OR** potassium chloride, sodium chloride, ondansetron **OR** ondansetron, polyethylene glycol, sodium chloride flush, sodium chloride, LORazepam **OR** LORazepam **OR** LORazepam **OR** LORazepam **OR** LORazepam **OR** LORazepam **OR** LORazepam **OR** LORazepam      PHYSICAL EXAM:  Vitals:    09/01/21 0811 09/01/21 1743 09/02/21 0600 09/02/21 0824   BP:  (!) 108/56 (!) 101/54 108/61   Pulse: 89 77 78 68   Resp:  18 16    Temp:  98.1 °F (36.7 °C) 98.6 °F (37 °C)    TempSrc:   Oral    SpO2:  97% 94%    Weight:       Height:           General Appearance:       Alert, cooperative, no distress, appears stated age ++ JAUNDICED         Heent:    Normocephalic, atraumatic,     PERRL, conjunctiva/corneas JAUNDICED     no drainage or sinus tenderness      Neck:   Supple, symmetrical, trachea midline   Back:     Symmetric, no CVA tenderness   Lungs:     Clear to auscultation bilaterally, respirations unlabored   Chest Wall:    No tenderness or deformity    Heart:    Regular rate and rhythm, S1 and S2 normal, no murmur, rub or   gallop   Abdomen:     Soft, non-tender, bowel sounds active all four quadrants         Extremities:   Extremities normal, atraumatic, no cyanosis or edema   Pulses:   LE extremities   Skin:   Skin color, texture, turgor normal, no rashes or lesions   Lymph nodes:   Cervical, supraclavicular, and axillary nodes normal   Neurologic:   CNII-XII intact, no focal deficits    LINES : piv        LABS AND DATA REVIEW:     Recent Labs     08/30/21  2258 08/31/21  0740 09/01/21  0528 09/01/21  1123 09/01/21  1739 09/01/21  2331 09/02/21  0450   WBC 8.6  --  5.2  --   --   --  5.8   HGB 8.4*  --  7.5*   < > 7.4* 7.3* 8.1*   HCT 24.5*   < > 23.2*   < > 23.3* 22.9* 24.7*   MCV 98.0  --  102.7*  --   --   --  103.3* PLT 77*  --  70*  --   --   --  79*    < > = values in this interval not displayed. Recent Labs     08/30/21 2258 08/30/21 2258 08/31/21 0740 08/31/21 0740 09/01/21 0528 09/01/21  0528 09/02/21  0450   *   < > 134  --  136  --  135   K 2.4*   < > 3.1*   < > 3.0*   < > 3.8      < > 107   < > 110*   < > 107   CO2 18*   < > 16*   < > 17*   < > 19*   BUN 8   < > 7  --  7  --  8   CREATININE 0.9   < > 0.9  --  0.9  --  1.1*   GFRAA >60   < > >60  --  >60  --  >60   LABGLOM >60   < > >60  --  >60  --  53   GLUCOSE 115*   < > 110*  --  98  --  121*   PROT 6.7  --   --   --  6.5  --  7.3   LABALBU 2.5*  --   --   --  2.3*  --  2.6*   CALCIUM 8.3*   < > 8.1*  --  8.2*  --  8.6   BILITOT 20.3*  --   --   --  15.5*  --  15.7*   ALKPHOS 196*  --   --   --  172*  --  181*   *  --   --   --  139*  --  168*   ALT 69*  --   --   --  58*  --  62*    < > = values in this interval not displayed. BLOOD CX #1  Recent Labs     08/31/21  0740   BC Gram stain performed from blood culture bottle media  Gram positive cocci in clusters  Previously positive blood culture called  *  Identification and sensitivity to follow     BLOOD CX #2  Recent Labs     08/31/21  0740   BLOODCULT2 Gram stain performed from blood culture bottle media  Gram positive cocci in clusters  *  Identification and sensitivity to follow       WOUND culture  No results for input(s): WNDABS in the last 72 hours. URINE CULTURE  Recent Labs     08/31/21  1440   LABURIN Growth not present, incubation continues  24 Hours growth not present; incubation continues           ASSESSMENT & PLAN  HX MSSA bacteremia/VRE perirectal abscess treated with Dapto until June 7   Alcoholic cirrhosis/ hepatic encephalopathy  Tylenol/alcohol abuse  Thrombocytopenia PLT 79     DC Ceftriaxone  Continue on Vancomycin pending cultures- PCR MRSA/Staph   Follow cultures, monitor labs     Thank you for consult.       SRIDHAR Hernandez NP  9/2/2021  10:38

## 2021-09-03 NOTE — PROGRESS NOTES
Internal Medicine Progress Note    [facility]    [facility]. Gonsalo Jacome, RAYNA.LETHAOReinaldoIReinaldo Brown D.O., SAIDA Montana D.O. Yuliana Gordon, MSN, APRN, NP-C  Arnoldo Barron. Alanna Campbell, MSN, APRN-CNP     Primary Care Physician: Jomar Lee MD   Admitting Physician:  Susie Sanders DO  Admission date and time: 2021  9:47 PM    Room:  [address] [place] [address]  Admitting diagnosis: Hypokalemia [E87.6]  Jaundice, non- [R17]  Acute liver failure without hepatic coma [K72.00]    Patient Name: Ramón Urbina  MRN: [id number]    Date of Service: 9/3/2021     Subjective:  Mary Mistry is a 51 y.o. female who was seen and examined today,9/3/2021, at the bedside. Patient still appear to be jaundice. Being currently followed by gastroenterology. Patient scheduled today for [facility]    Review of System:   Constitutional:   Denies fever or chills, weight loss or gain, fatigue or malaise. HEENT:   Denies ear pain, sore throat, sinus or eye problems. Cardiovascular:   Denies any chest pain, irregular heartbeats, or palpitations. Respiratory:   Denies shortness of breath, coughing, sputum production, hemoptysis, or wheezing. Gastrointestinal:   Less abdominal pain today. She is tolerating a diet without complication. Genitourinary:    Denies any urgency, frequency, hematuria. Voiding  without difficulty. Extremities:   Denies lower extremity swelling, edema or cyanosis. Neurology:    Denies any headache or focal neurological deficits, Denies generalized weakness or memory difficulty. Psch:   Denies being anxious or depressed. Musculoskeletal:    Denies  myalgias, joint complaints or back pain. Integumentary:   Denies any rashes, ulcers, or excoriations. Denies bruising. Hematologic/Lymphatic:  Denies bruising or bleeding. Physical Exam:  No intake/output data recorded.     Intake/Output Summary (Last 24 hours) at 9/3/2021 1722  Last data filed at 9/3/2021 1319  Gross per Oral Daily    vitamin D  50,000 Units Oral Weekly     Continuous Infusions:   sodium chloride      sodium chloride         Objective Data:  CBC with Differential:    Lab Results   Component Value Date    WBC 6.3 09/03/2021    RBC 2.37 09/03/2021    HGB 8.0 09/03/2021    HCT 24.9 09/03/2021    PLT 80 09/03/2021    .1 09/03/2021    MCH 33.8 09/03/2021    MCHC 32.1 09/03/2021    RDW 18.1 09/03/2021    SEGSPCT 59 08/11/2011    METASPCT 3 07/05/2011    LYMPHOPCT 13.9 09/03/2021    MONOPCT 4.3 09/03/2021    MYELOPCT 0.9 09/02/2021    BASOPCT 0.9 09/03/2021    MONOSABS 0.25 09/03/2021    LYMPHSABS 0.88 09/03/2021    EOSABS 0.11 09/03/2021    BASOSABS 0.06 09/03/2021     CMP:    Lab Results   Component Value Date     09/03/2021    K 3.8 09/03/2021    K 2.9 01/27/2021     09/03/2021    CO2 19 09/03/2021    BUN 9 09/03/2021    CREATININE 1.0 09/03/2021    GFRAA >60 09/03/2021    LABGLOM 59 09/03/2021    GLUCOSE 123 09/03/2021    GLUCOSE 125 08/11/2011    PROT 7.0 09/03/2021    LABALBU 2.4 09/03/2021    LABALBU 3.6 08/11/2011    CALCIUM 8.4 09/03/2021    BILITOT 14.6 09/03/2021    ALKPHOS 173 09/03/2021     09/03/2021    ALT 65 09/03/2021       Assessment:    · Staph bacteremia of unclear source  · Decompensated alcoholic cirrhosis complicated by acute alcoholic hepatitis and tylenol/benadryl overuse/misuse  · Hyperammonemia with developing hepatic encephalopathy   · Normocytic anemia  · Ulcerative colitis with recent perirectal abscess and resultant bacteremia per history treated at [facility]  · Hypothyroidism  · Generalized anxiety and depression  · Dysphonia  · Thrombocytopenia secondary to portal hypertension and hepatosplenomegaly with sequestration    Plan:     · Continue recommendation gastroenterology  · Continue monitor lab work  · [facility] scheduled for today  · Antibiotics per recommendation of infectious disease  · Discussed behavior modification  · Outpatient ENT evaluation for dysphonia--Dr Contreras Mcrae    More than 50% of my time was spent at the bedside counseling/coordinating care with the patient and/or family with face to face contact. This time was spent reviewing notes and laboratory data as well as instructing and counseling the patient. Time I spent with the family or surrogate(s) is included only if the patient was incapable of providing the necessary information or participating in medical decisions. I also discussed the differential diagnosis and all of the proposed management plans with the patient and individuals accompanying the patient. I am readily available for any further decision-making and intervention.        Villa Wong DO, F.A.C.O.I.  9/3/2021  5:22 PM

## 2021-09-03 NOTE — PROGRESS NOTES
Physical Therapy        0324/0324-02    Patient unavailable for physical therapy treatment due to patient requesting to sleep d/t fatigue, will check back on patient at later time/date/.    Jeremy Worthington, PTA  #[id number]

## 2021-09-03 NOTE — CARE COORDINATION
CM note: received a call from Zoltan Agustin with [facility], she states that she called patient to discuss home care and costs, etc and patient informed Zoltan Agustin that she needs to return to work and cannot do so with IV antibiotics. Zoltan Agustin states patient wants oral antibiotics.

## 2021-09-03 NOTE — PROGRESS NOTES
Ultrasound office called and stated that unable to perform paracentesis d/t not enough fluid to safely drain.

## 2021-09-03 NOTE — PROGRESS NOTES
Physical Therapy        [id number]    Patient unavailable for physical therapy treatment due to patient being out of room for ENDO. Will check back for tx at later time/date.      Iwona Frost, PTA  #[id number]

## 2021-09-03 NOTE — PROGRESS NOTES
PROGRESS NOTE  [facility]  Dr. Magdalena Lema MD, Dr. Russel Glez MD, Dr Payal Herrera, Dr. Estephanie Olivas MD, Dr. Mitra Schwartz, DO      Rubina Forbes  51 y.o.  female    SUBJECTIVE: Patient underwent EGD yesterday which showed PHG but no varices, no fresh or old blood. She is complaining of anxiety, but otherwise no new complaints. Does have some left sided abdominal pain. Denies hematochezia, hematemesis, melena, fever, chills, nausea, vomiting. Is having frequent bowel movements.     OBJECTIVE:    /76   Pulse 82   Temp 98.6 °F (37 °C) (Oral)   Resp 16   Ht 5' 8\" (1.727 m)   Wt 210 lb (95.3 kg)   SpO2 99%   BMI 31.93 kg/m²     Gen: NAD, AAO x 3  HEENT:PEERL, sclera icteric  Heart: RRR, no M/R/G  Lungs: CTAB  Abd.: soft, mild left sided tenderness, ND, BS +, no G/R, no HSM  Extr.: no C/C/E, no bruising         Lab Results   Component Value Date    WBC 6.3 09/03/2021    WBC 5.8 09/02/2021    WBC 5.2 09/01/2021    HGB 8.0 09/03/2021    HGB 7.4 09/02/2021    HGB 7.6 09/02/2021    HCT 24.9 09/03/2021    .1 09/03/2021    RDW 18.1 09/03/2021    PLT 80 09/03/2021    PLT 79 09/02/2021    PLT 70 09/01/2021     Lab Results   Component Value Date     09/03/2021    K 3.8 09/03/2021    K 2.9 01/27/2021     09/03/2021    CO2 19 09/03/2021    BUN 9 09/03/2021    CREATININE 1.0 09/03/2021    CALCIUM 8.4 09/03/2021    PROT 7.0 09/03/2021    LABALBU 2.4 09/03/2021    LABALBU 3.6 08/11/2011    BILITOT 14.6 09/03/2021    BILITOT 15.7 09/02/2021    BILITOT 15.5 09/01/2021    ALKPHOS 173 09/03/2021    ALKPHOS 181 09/02/2021    ALKPHOS 172 09/01/2021     09/03/2021     09/02/2021     09/01/2021    ALT 65 09/03/2021    ALT 62 09/02/2021    ALT 58 09/01/2021     Lab Results   Component Value Date    LIPASE 63 08/31/2021    LIPASE 69 08/30/2021    LIPASE 35 01/27/2021     Lab Results   Component Value Date    AMYLASE 123 08/31/2021         ASSESSMENT/PLAN:  # Decompensated alcoholic cirrhosis/ Acute alcoholic hepatitis   -TUP 10, portends poor prognosis; no steroids in setting of bacteremia  -MELD-NA: 30 presentation, previously 20-24 and most recent admission in 1/2021  -Recently evaluated for liver transplant and was deemed not a candidate  -Jaundice most likely related to alcoholic hepatitis  - Right upper quad ultrasound and CT scan concerning for thickened GB, but more likely related to systemic effect  - MRCP without signs of focal obstruction, though limited study  -Icteric sclera, no ascites, no asterixis  -Ammonia markedly elevated on admission with sleep disturbance; Hepatic encephalopathy, type C, overt, grade 1, recurrent  - Continue rifaximin 550mg BID and lactulose, titrated to 2-3 BM daily  -Continue supportive care  -Daily CMP and INR  --US today did show some small amount of ascites; ordered paracentesis to r/o SBP     # Normocytic anemia, unspecified  -Likely secondary hypoproliferative or myelosuppression secondary to acute on chronic alcohol abuse  --H&H stable today, hemodynamically stable  -Recent EGD and colonoscopy at [facility] in December 2020 unremarkable for any etiology  -EGD on 9/2/2021 showed PHG, but no esophageal varices, and no fresh or old blood seen  -Patient should follow up outpatient for video capsule endoscopy     # Ulcerative colitis s/p IPAA  -Suspect diarrhea secondary to #1  --Not on any maintenance medications     # Staph Bacteremia  -Defer antibiotics to admitting service  -Okay to proceed with AMERICO from GI POV  -Paracentesis as above to r/o SBP  -Other source may be buttock wound previously seen on physical exam; consider wound care or general surgery consult    Pt was discussed with Dr. Marisol Lopez and Dr. Baron Salinas DO  GI Fellow   9/3/2021  10:41 AM     Pt seen and independently examined. Pertinent notes and lab work reviewed. D/w Dr. Storm Caruso with physical exam and A&P.   Discussed with patient - all questions answered - agreeable with the plan as delineated.     Zaria Chaudhry MD  9/3/2021  1:21 PM

## 2021-09-03 NOTE — CARE COORDINATION
CM note; Pt has hx of [facility] home care for home IV antibiotics, referral given to Alysa Chan, she will review but no openings until Tuesday or Wednesday of next week. Alysa Chan will run benefits/cost of current [facility] and present to her supervisor for review. PT HAS NOT BEEN OFFICIALLY ACCEPTED BY [facility]. Will need PICC line and final antibiotic script printed/signed. For AMERICO today.

## 2021-09-03 NOTE — PROGRESS NOTES
Infectious Disease   Progress Note     Chief complaint: bacteremia, \"I feel good\"    SUBJECTIVE  Seen at bedside  Awake and alert   Had EGD yesterday - no varices, no fresh blood   Afebrile   Tolerating medications, no side effects     REVIEW OF SYSTEMS:    Negative except as above     Current Medications:     Scheduled Meds:   vancomycin  1,000 mg IntraVENous Q12H    pantoprazole  40 mg Oral QAM AC    lactulose  30 g Oral TID    enoxaparin  40 mg SubCUTAneous Daily    sodium chloride flush  5-40 mL IntraVENous 2 times per day    thiamine  100 mg Oral Daily    multivitamin  1 tablet Oral Daily    folic acid  1 mg Oral Daily    rifaximin  550 mg Oral BID    levothyroxine  100 mcg Oral Daily    mirtazapine  15 mg Oral Nightly    cyanocobalamin  2,000 mcg Oral Daily    pyridoxine  25 mg Oral Daily    vitamin D  50,000 Units Oral Weekly     Continuous Infusions:   sodium chloride      sodium chloride       PRN Meds:magnesium sulfate, sodium phosphate IVPB **OR** sodium phosphate IVPB, potassium chloride **OR** potassium alternative oral replacement **OR** potassium chloride, sodium chloride, ondansetron **OR** ondansetron, polyethylene glycol, sodium chloride flush, sodium chloride, LORazepam **OR** LORazepam **OR** LORazepam **OR** LORazepam **OR** LORazepam **OR** LORazepam **OR** LORazepam **OR** LORazepam      PHYSICAL EXAM:  Vitals:    09/02/21 1456 09/02/21 1523 09/02/21 1745 09/03/21 0452   BP:  129/63 126/63 126/76   Pulse: 64 67 74 82   Resp: 21 16 16 16   Temp:  98 °F (36.7 °C) 97.6 °F (36.4 °C) 98.6 °F (37 °C)   TempSrc:  Oral Oral Oral   SpO2: 100% 96% 95% 99%   Weight:       Height:           General Appearance:       Alert, cooperative, no distress, appears stated age ++ JAUNDICED         Heent:    Normocephalic, atraumatic,     PERRL, conjunctiva/corneas JAUNDICED     no drainage or sinus tenderness      Neck:   Supple, symmetrical, trachea midline   Back:     Symmetric, no CVA tenderness   Lungs:     Clear to auscultation bilaterally, respirations unlabored   Chest Wall:    No tenderness or deformity    Heart:    Regular rate and rhythm, S1 and S2 normal, no murmur, rub or   gallop   Abdomen:     Soft, non-tender, bowel sounds active all four quadrants         Extremities:   Extremities normal, atraumatic, no cyanosis or edema   Pulses:   LE extremities   Skin:   Skin color, texture, turgor normal, no rashes or lesions   Lymph nodes:   Cervical, supraclavicular, and axillary nodes normal   Neurologic:   CNII-XII intact, no focal deficits    LINES : piv        LABS AND DATA REVIEW:     Recent Labs     09/01/21 0528 09/01/21  1123 09/02/21  0450 09/02/21  1205 09/02/21  1736 09/02/21  2321 09/03/21 0442   WBC 5.2  --  5.8  --   --   --  6.3   HGB 7.5*   < > 8.1*   < > 7.6* 7.4* 8.0*   HCT 23.2*   < > 24.7*   < > 24.2* 22.3* 24.9*   .7*  --  103.3*  --   --   --  105.1*   PLT 70*  --  79*  --   --   --  80*    < > = values in this interval not displayed. Recent Labs     09/01/21 0528 09/01/21 0528 09/02/21 0450 09/02/21  0450 09/03/21  0442     --  135  --  134   K 3.0*   < > 3.8   < > 3.8   *   < > 107   < > 106   CO2 17*   < > 19*   < > 19*   BUN 7  --  8  --  9   CREATININE 0.9  --  1.1*  --  1.0   GFRAA >60  --  >60  --  >60   LABGLOM >60  --  53  --  59   GLUCOSE 98  --  121*  --  123*   PROT 6.5  --  7.3  --  7.0   LABALBU 2.3*  --  2.6*  --  2.4*   CALCIUM 8.2*  --  8.6  --  8.4*   BILITOT 15.5*  --  15.7*  --  14.6*   ALKPHOS 172*  --  181*  --  173*   *  --  168*  --  211*   ALT 58*  --  62*  --  65*    < > = values in this interval not displayed. BLOOD CX #1  No results for input(s): BC in the last 72 hours. BLOOD CX #2  No results for input(s): Bretta Sandip in the last 72 hours. WOUND culture  No results for input(s): WNDABS in the last 72 hours.     URINE CULTURE  Recent Labs     08/31/21  1440   LABURIN Growth not present     ASSESSMENT & PLAN  HX MSSA bacteremia/VRE perirectal abscess treated with Dapto until June 7     Alcoholic cirrhosis/ hepatic encephalopathy - ammonia elevated   Tylenol/alcohol abuse  Thrombocytopenia PLT 80    Continue on Vancomycin pending cultures- Staph haemolyticus   Follow cultures, monitor labs     SRIDHAR Umana - NP  9/3/2021  1:09 PM    I have discussed the case, including pertinent history and physical  exam findings . I have seen and examined the patient and the key elements of the encounter have been performed by me. I agree with the assessment, plan and orders as documented.       Treatment plan as per my recommendation     Willia Brunner, MD, FACP  9/3/2021  2:14 PM

## 2021-09-04 NOTE — PROGRESS NOTES
Infectious Disease   Progress Note     Chief complaint: bacteremia, \"I feel good\"    SUBJECTIVE  Seen at bedside  Awake and alert   Had EGD yesterday - no varices, no fresh blood   Afebrile   Tolerating medications, no side effects     REVIEW OF SYSTEMS:    Negative except as above     Current Medications:     Scheduled Meds:   vancomycin  1,000 mg IntraVENous Q12H    pantoprazole  40 mg Oral QAM AC    lactulose  30 g Oral TID    [Held by provider] enoxaparin  40 mg SubCUTAneous Daily    sodium chloride flush  5-40 mL IntraVENous 2 times per day    thiamine  100 mg Oral Daily    multivitamin  1 tablet Oral Daily    folic acid  1 mg Oral Daily    rifaximin  550 mg Oral BID    levothyroxine  100 mcg Oral Daily    mirtazapine  15 mg Oral Nightly    cyanocobalamin  2,000 mcg Oral Daily    pyridoxine  25 mg Oral Daily    vitamin D  50,000 Units Oral Weekly     Continuous Infusions:   sodium chloride      sodium chloride       PRN Meds:magnesium sulfate, sodium phosphate IVPB **OR** sodium phosphate IVPB, potassium chloride **OR** potassium alternative oral replacement **OR** potassium chloride, sodium chloride, ondansetron **OR** ondansetron, polyethylene glycol, sodium chloride flush, sodium chloride, LORazepam **OR** LORazepam **OR** LORazepam **OR** LORazepam **OR** LORazepam **OR** LORazepam **OR** LORazepam **OR** LORazepam      PHYSICAL EXAM:  Vitals:    09/03/21 1353 09/03/21 1354 09/03/21 1715 09/04/21 0501   BP:   (!) 144/71 114/66   Pulse: 81  75 79   Resp: 18  16 16   Temp:   98.5 °F (36.9 °C) 99 °F (37.2 °C)   TempSrc:   Oral Oral   SpO2: 100% 100% 96% 98%   Weight:       Height:           General Appearance:       Alert, cooperative, no distress, appears stated age ++ JAUNDICED         Heent:    Normocephalic, atraumatic,     PERRL, conjunctiva/corneas JAUNDICED     no drainage or sinus tenderness      Neck:   Supple, symmetrical, trachea midline   Back:     Symmetric, no CVA tenderness Lungs:     Clear to auscultation bilaterally, respirations unlabored   Chest Wall:    No tenderness or deformity    Heart:    Regular rate and rhythm, S1 and S2 normal, no murmur, rub or   gallop   Abdomen:     Soft, non-tender, bowel sounds active all four quadrants         Extremities:   Extremities normal, atraumatic, no cyanosis or edema   Pulses:   LE extremities   Skin:   Skin color, texture, turgor normal, no rashes or lesions   Lymph nodes:   Cervical, supraclavicular, and axillary nodes normal   Neurologic:   CNII-XII intact, no focal deficits    LINES : piv        LABS AND DATA REVIEW:     Recent Labs     09/02/21  0450 09/02/21  1205 09/03/21 0442 09/03/21  1718 09/04/21  0548   WBC 5.8  --  6.3  --  6.0   HGB 8.1*   < > 8.0* 7.6* 7.5*   HCT 24.7*   < > 24.9* 24.0* 22.9*   .3*  --  105.1*  --  104.1*   PLT 79*  --  80*  --  62*    < > = values in this interval not displayed. Recent Labs     09/02/21  0450 09/02/21  0450 09/03/21 0442 09/03/21 0442 09/04/21  0548     --  134  --  135   K 3.8   < > 3.8   < > 3.7      < > 106   < > 112*   CO2 19*   < > 19*   < > 16*   BUN 8  --  9  --  8   CREATININE 1.1*  --  1.0  --  1.0   GFRAA >60  --  >60  --  >60   LABGLOM 53  --  59  --  59   GLUCOSE 121*  --  123*  --  101*   PROT 7.3  --  7.0  --  6.6   LABALBU 2.6*  --  2.4*  --  2.3*   CALCIUM 8.6  --  8.4*  --  8.1*   BILITOT 15.7*  --  14.6*  --  12.4*   ALKPHOS 181*  --  173*  --  187*   *  --  211*  --  168*   ALT 62*  --  65*  --  62*    < > = values in this interval not displayed. BLOOD CX #1  No results for input(s): BC in the last 72 hours. BLOOD CX #2  No results for input(s): [facility] in the last 72 hours. WOUND culture  No results for input(s): WNDABS in the last 72 hours. URINE CULTURE  No results for input(s): LABURIN in the last 72 hours.   ASSESSMENT & PLAN  HX MSSA bacteremia/VRE perirectal abscess treated with Dapto until June 7     Alcoholic cirrhosis/ hepatic encephalopathy - ammonia elevated   Tylenol/alcohol abuse  Thrombocytopenia PLT 80    Continue on Vancomycin pending cultures- Staph haemolyticus   Follow cultures, monitor labs         Jolly Patel MD, FACP  9/4/2021  9:25 AM

## 2021-09-04 NOTE — PROGRESS NOTES
Internal Medicine Progress Note    [facility]    Rosalba Trejo. Libia Evans, F.A.CReinaldoOReinaldoIReinaldo Malloy D.O., SAVAGEOReinaldoIMIGUEL ANGEL Mehta, MSN, APRN, NP-C  Cherrie Samaniego. Pushpa Dillon, MSN, APRN-CNP     Primary Care Physician: Addy Shepard MD   Admitting Physician:  Jaison Lopes DO  Admission date and time: 2021  9:47 PM    Room:  [address] [place] [address]  Admitting diagnosis: Hypokalemia [E87.6]  Jaundice, non- [R17]  Acute liver failure without hepatic coma [K72.00]    Patient Name: Marcellus Bagley  MRN: [id number]    Date of Service: 2021     Subjective:  Kyrie Pineda is a 51 y.o. female who was seen and examined today,2021, at the bedside. Patient underwent AMERICO which shows no evidence of vegetation. Awaiting antibiotics recommendation by infectious disease. Bilirubin does appear to be slowly improving    Review of System:   Constitutional:   Denies fever or chills, weight loss or gain, fatigue or malaise. HEENT:   Denies ear pain, sore throat, sinus or eye problems. Cardiovascular:   Denies any chest pain, irregular heartbeats, or palpitations. Respiratory:   Denies shortness of breath, coughing, sputum production, hemoptysis, or wheezing. Gastrointestinal:   Less abdominal pain today. She is tolerating a diet without complication. Genitourinary:    Denies any urgency, frequency, hematuria. Voiding  without difficulty. Extremities:   Denies lower extremity swelling, edema or cyanosis. Neurology:    Denies any headache or focal neurological deficits, Denies generalized weakness or memory difficulty. Psch:   Denies being anxious or depressed. Musculoskeletal:    Denies  myalgias, joint complaints or back pain. Integumentary:   Denies any rashes, ulcers, or excoriations. Denies bruising. Hematologic/Lymphatic:  Denies bruising or bleeding.     Physical Exam:  I/O this shift:  In: 190 [P.O.:180; I.V.:10]  Out: -     Intake/Output Summary (Last 24 hours) at 9/4/2021 1125  Last data filed at 9/4/2021 0953  Gross per 24 hour   Intake 650 ml   Output --   Net 650 ml   I/O last 3 completed shifts: In: 36 [P.O.:660; I.V.:100]  Out: -   No data found. Vital Signs:   Blood pressure 114/66, pulse 79, temperature 99 °F (37.2 °C), temperature source Oral, resp. rate 16, height 5' 8\" (1.727 m), weight 210 lb (95.3 kg), SpO2 98 %. General appearance:  Alert, responsive, oriented to person, place, and time. Well preserved, alert, no distress. Head:  Normocephalic. No masses, lesions or tenderness. Eyes:  PERRLA. EOMI. Sclera icteric. Buccal mucosa moist.  ENT:  Ears normal. Mucosa normal.  Neck:    Supple. Trachea midline. No thyromegaly. No JVD. No bruits. Heart:    Rhythm regular. Rate controlled. No murmurs. Lungs:    Symmetrical. Clear to auscultation bilaterally. No wheezes. No rhonchi. No rales. Abdomen:   Distended. Voluntary guarding is elicited. Bowel sounds are active. Extremities:    Peripheral pulses present. No peripheral edema. No ulcers. No cyanosis. No clubbing. Neurologic:    Alert x 3. No focal deficit. Cranial nerves grossly intact. No focal weakness. Psych:   Behavior is normal. Mood appears normal. Speech is not rapid and/or pressured. Musculoskeletal:   Spine ROM normal. Muscular strength intact. Gait not assessed.   Integumentary:  No rashes jaundice  Genitalia/Breast:  Deferred    Medication:  Scheduled Meds:   vancomycin  1,000 mg IntraVENous Q12H    pantoprazole  40 mg Oral QAM AC    lactulose  30 g Oral TID    [Held by provider] enoxaparin  40 mg SubCUTAneous Daily    sodium chloride flush  5-40 mL IntraVENous 2 times per day    thiamine  100 mg Oral Daily    multivitamin  1 tablet Oral Daily    folic acid  1 mg Oral Daily    rifaximin  550 mg Oral BID    levothyroxine  100 mcg Oral Daily    mirtazapine  15 mg Oral Nightly    cyanocobalamin  2,000 mcg Oral Daily    pyridoxine  25 mg Oral Daily    vitamin D 50,000 Units Oral Weekly     Continuous Infusions:   sodium chloride      sodium chloride         Objective Data:  CBC with Differential:    Lab Results   Component Value Date    WBC 6.0 09/04/2021    RBC 2.20 09/04/2021    HGB 7.5 09/04/2021    HCT 22.9 09/04/2021    PLT 62 09/04/2021    .1 09/04/2021    MCH 34.1 09/04/2021    MCHC 32.8 09/04/2021    RDW 18.2 09/04/2021    SEGSPCT 59 08/11/2011    METASPCT 3 07/05/2011    LYMPHOPCT 16.7 09/04/2021    MONOPCT 8.3 09/04/2021    MYELOPCT 0.9 09/02/2021    BASOPCT 0.5 09/04/2021    MONOSABS 0.50 09/04/2021    LYMPHSABS 1.01 09/04/2021    EOSABS 0.09 09/04/2021    BASOSABS 0.03 09/04/2021     CMP:    Lab Results   Component Value Date     09/04/2021    K 3.7 09/04/2021    K 2.9 01/27/2021     09/04/2021    CO2 16 09/04/2021    BUN 8 09/04/2021    CREATININE 1.0 09/04/2021    GFRAA >60 09/04/2021    LABGLOM 59 09/04/2021    GLUCOSE 101 09/04/2021    GLUCOSE 125 08/11/2011    PROT 6.6 09/04/2021    LABALBU 2.3 09/04/2021    LABALBU 3.6 08/11/2011    CALCIUM 8.1 09/04/2021    BILITOT 12.4 09/04/2021    ALKPHOS 187 09/04/2021     09/04/2021    ALT 62 09/04/2021       Assessment:    · Staph bacteremia of unclear source  · Decompensated alcoholic cirrhosis complicated by acute alcoholic hepatitis and tylenol/benadryl overuse/misuse  · Hyperammonemia with developing hepatic encephalopathy   · Normocytic anemia  · Ulcerative colitis with recent perirectal abscess and resultant bacteremia per history treated at [facility]  · Hypothyroidism  · Generalized anxiety and depression  · Dysphonia  · Thrombocytopenia secondary to portal hypertension and hepatosplenomegaly with sequestration    Plan:     · AMERICO showed no evidence of vegetation  · Awaiting recommendation for antibiotic therapy per infectious disease  · Discontinue Lovenox due to thrombocytopenia  · Continue to follow with GI  · Outpatient ENT evaluation for dysphonia-- Vanita Bill    More than 50% of my time was spent at the bedside counseling/coordinating care with the patient and/or family with face to face contact. This time was spent reviewing notes and laboratory data as well as instructing and counseling the patient. Time I spent with the family or surrogate(s) is included only if the patient was incapable of providing the necessary information or participating in medical decisions. I also discussed the differential diagnosis and all of the proposed management plans with the patient and individuals accompanying the patient. I am readily available for any further decision-making and intervention.        Gus Jo DO, F.A.C.O.I.  9/4/2021  11:25 AM

## 2021-09-04 NOTE — PROGRESS NOTES
PROGRESS NOTE  [facility]  Dr. Darion Pineda MD, Dr. Theron Simmons MD, Dr Kalia Stanford, Dr. Ava Sen MD, Dr. Teresa Roberts, DO Kenton Zapata  51 y.o.  female    SUBJECTIVE: No new complaints; patient reports having 5 bm this morning. Abdominal pain improved today. She is not confused, but does have some mild asterixis.     OBJECTIVE:    /66   Pulse 79   Temp 99 °F (37.2 °C) (Oral)   Resp 16   Ht 5' 8\" (1.727 m)   Wt 210 lb (95.3 kg)   SpO2 98%   BMI 31.93 kg/m²     Gen: NAD, AAO x 3  HEENT:PEERL, sclera icteric  Heart: RRR, no M/R/G  Lungs: CTAB  Abd.: soft, mild left sided tenderness, ND, BS +, no G/R, no HSM  Extr.: no C/C/E, no bruising         Lab Results   Component Value Date    WBC 6.0 09/04/2021    WBC 6.3 09/03/2021    WBC 5.8 09/02/2021    HGB 7.5 09/04/2021    HGB 7.6 09/03/2021    HGB 8.0 09/03/2021    HCT 22.9 09/04/2021    .1 09/04/2021    RDW 18.2 09/04/2021    PLT 62 09/04/2021    PLT 80 09/03/2021    PLT 79 09/02/2021     Lab Results   Component Value Date     09/04/2021    K 3.7 09/04/2021    K 2.9 01/27/2021     09/04/2021    CO2 16 09/04/2021    BUN 8 09/04/2021    CREATININE 1.0 09/04/2021    CALCIUM 8.1 09/04/2021    PROT 6.6 09/04/2021    LABALBU 2.3 09/04/2021    LABALBU 3.6 08/11/2011    BILITOT 12.4 09/04/2021    BILITOT 14.6 09/03/2021    BILITOT 15.7 09/02/2021    ALKPHOS 187 09/04/2021    ALKPHOS 173 09/03/2021    ALKPHOS 181 09/02/2021     09/04/2021     09/03/2021     09/02/2021    ALT 62 09/04/2021    ALT 65 09/03/2021    ALT 62 09/02/2021     Lab Results   Component Value Date    LIPASE 63 08/31/2021    LIPASE 69 08/30/2021    LIPASE 35 01/27/2021     Lab Results   Component Value Date    AMYLASE 123 08/31/2021         ASSESSMENT/PLAN:  # Decompensated alcoholic cirrhosis/ Acute alcoholic hepatitis   -RQI 24, portends poor prognosis; no steroids in setting of bacteremia   - Bilirubin improving  -MELD-NA: 30 presentation, previously 20-24 and most recent admission in 1/2021  -Recently evaluated for liver transplant and was deemed not a candidate  -Icteric sclera, no ascites; positive asterixis today  -Ammonia markedly elevated on admission with sleep disturbance; Hepatic encephalopathy, type C, overt, grade 1, recurrent  - Continue rifaximin 550mg BID and lactulose, titrated to 2-3 BM daily  -Daily CMP and INR  --Not enough ascites seen to do paracentesis     # Normocytic anemia, unspecified  -Likely hypoproliferative or myelosuppression secondary to acute on chronic alcohol abuse  --H&H stable today, hemodynamically stable  -Recent EGD and colonoscopy at [facility] in December 2020 unremarkable for any etiology  -EGD on 9/2/2021 showed PHG, but no esophageal varices, and no fresh or old blood seen  -Patient should follow up outpatient for video capsule endoscopy     # Ulcerative colitis s/p IPAA  -Suspect diarrhea secondary to #1  --Not on any maintenance medications     # Staph Bacteremia  -Defer antibiotics to admitting service  -s/p AMERICO without vegetations  -Unable to do paracentesis  -Other source may be buttock wound previously seen on physical exam; consider wound care or general surgery consult    Will discuss with Dr. Anne Tejeda DO  GI Fellow   9/4/2021  11:43 AM       Patient seen and examined independently. Discussed with fellow and agree with the plan of care stated above.     Lakesha Francis DO  9/4/2021  12:25 PM

## 2021-09-04 NOTE — PROGRESS NOTES
Physical Therapy Treatment Note/Plan of Care    Room #:  0324/0324-02  Patient Name: Lelo Graham  YOB: 1975  MRN: [id number]    Date of Service: 2021     Tentative placement recommendation: Home  Equipment recommendation: None      Evaluating Physical Therapist: Tanja Rangel PT, DPT #[id number]      Specific Provider Orders/Date/Referring Provider :   [id number]   PT evaluation and treat Start: 21, End: 21, ONE TIME, Standing Count: 1 Occurrences, R    Lety Hodge, APRN - CNP Acknowledge New    Admitting Diagnosis:   Hypokalemia [E87.6]  Jaundice, non- [R17]  Acute liver failure without hepatic coma [K72.00]      Visit Diagnoses       Codes    Jaundice, non-     R17    Hypokalemia     E87.6        Surgery: None    Patient Active Problem List   Diagnosis    Small bowel obstruction ([address]    Hernia of abdominal wall    Elevated liver enzymes    Suicidal ideations    Alcohol abuse    Anxiety disorder    Petechial rash    Acute renal failure (ARF) ([address]    Alcohol abuse with withdrawal ([address]    Decompensated hepatic cirrhosis ([address]    Perirectal abscess    Acute liver failure without hepatic coma    Alcoholic hepatitis      ASSESSMENT of Current Deficits Patient exhibits decreased strength and endurance impairing tolerance to activity. Pt performed bed mobility and transfers independently but displayed fatigue. Pt also demonstrated mild instability during ambulation but no major LOB, SOB, or dizziness. Patient does not feel need for therapy and wants to discontinue stating \"I'm fine, I can walk\".     PHYSICAL THERAPY  PLAN OF CARE     Physical therapy plan of care is established based on physician order,  patient diagnosis and clinical assessment    Current Treatment Recommendations:    -Bed Mobility: Lower extremity exercises  and Trunk control activities   -Sitting Balance: Incorporate reaching activities to activate trunk muscles , Hands on support to maintain midline , Facilitate active trunk muscle engagement , Facilitate postural control in all planes  and Engage in core activities to allow for movement within base of support   -Standing Balance: Perform strengthening exercises in standing to promote motor control with or without upper extremity support , Instruct patient on adequate base of support to maintain balance and Challenge balance utilizing reaching  activities beyond center of gravity    -Transfers: Provide instruction on proper hand and foot position for adequate transfer of weight onto lower extremities and use of gait device, Cues for hand placement, technique and safety, Facilitate weight shift forward on to lower extremities and provide necessary stabilization of bilateral lower extremities , Support transfer of weight on to lower extremities, Assist with extension of knees trunk and hip to accept weight transfer  and Provide stabilization to prevent fall   -Gait: Gait training, Standing activities to improve: base of support, weight shift, weight bearing , Exercises to improve trunk control, Exercises to improve hip and knee control, Performance of protected weight bearing activities and Activities to increase weight bearing   -Endurance: Utilize Supervised activities to increase level of endurance to allow for safe functional mobility including transfers and gait  and Use graduated activities to promote good breathing techniques and provide support and education to maximize respiratory function  -Stairs: Stair training with instruction on proper technique and hand placement on rail    PT long term treatment goals are located in below grid    Patient and or family understand(s) diagnosis, prognosis, and plan of care. Frequency of treatments: Patient will be seen  2-3 times/week.          Prior Level of Function: Patient ambulated independently   Rehab Potential: good  for baseline    Past medical history:   Past Medical History: Diagnosis Date    Acute renal failure (ARF) ([facility] 95/05/4772    Alcoholic cirrhosis (HCC)     Anxiety     Crohn's colitis ([facility]     Depression     History of blood transfusion     Pyoderma gangrenosum     Thyroid disease      Past Surgical History:   Procedure Laterality Date    ABDOMEN SURGERY      pt has a J-pouch     ARM DEBRIDEMENT      R arm-2010-2011??    ARM DEBRIDEMENT      COLONOSCOPY      DILATATION, ESOPHAGUS      ENDOSCOPY, COLON, DIAGNOSTIC      UPPER GASTROINTESTINAL ENDOSCOPY N/A 9/2/2021    EGD ESOPHAGOGASTRODUODENOSCOPY performed by Maicol Crabtree MD at [address] [place] [address]:  Precautions: Activity as tolerated, falls   Social history: Patient lives with son in a townhouse  with 2 steps  to enter with Rail  Tub shower grab bars with 10 stairs and 1 HR to 2nd floor bed and bath. Equipment owned: None    AM-PAC Basic Mobility   AM-PAC Mobility Inpatient   How much difficulty turning over in bed?: None  How much difficulty sitting down on / standing up from a chair with arms?: None  How much difficulty moving from lying on back to sitting on side of bed?: None  How much help from another person moving to and from a bed to a chair?: None  How much help from another person needed to walk in hospital room?: None  How much help from another person for climbing 3-5 steps with a railing?: A Little  AM-[facility] Inpatient Mobility Raw Score : 23  AM-PAC Inpatient T-Scale Score : 56.93  Mobility Inpatient CMS 0-100% Score: 11.2  Mobility Inpatient CMS G-Code Modifier : CI    Nursing cleared patient for PT treatment. OBJECTIVE:   Initial Evaluation  Date: 8/31/2021 Treatment Date:  9/4/2021   Short Term/ Long Term   Goals   Was pt agreeable to Eval/treatment? Yes Yes To be met in 2 days   Pain level   0/10   0/10    Bed Mobility    Rolling: Independent    Supine to sit:  Independent    Sit to supine: Independent    Scooting: Independent   Rolling: Not assessed    Supine to sit:

## 2021-09-05 NOTE — PROGRESS NOTES
Pharmacy Consultation Note  (Antibiotic Dosing and Monitoring)    Initial consult date: 9-1-2021  Consulting physician/provider: Dr. Hodge Houghton  Drug: Vancomycin  Indication: GPC bacteremia (clusters)    Age/Gender Height Weight IBW  Allergy Information   46 y.o./F 5' 8\" (172.7 cm) 210 lb (95.3 kg)     Ideal body weight: 63.9 kg (140 lb 14 oz)  Adjusted ideal body weight: 76.4 kg (168 lb 8.4 oz)   Patient has no known allergies. Renal Function:  Recent Labs     09/03/21  0442 09/04/21  0548 09/05/21  0457   BUN 9 8 8   CREATININE 1.0 1.0 0.9       Intake/Output Summary (Last 24 hours) at 9/5/2021 1548  Last data filed at 9/5/2021 1245  Gross per 24 hour   Intake 850 ml   Output --   Net 850 ml       Vancomycin Monitoring:  Trough:    Recent Labs     09/03/21  0227   VANCOTROUGH 16.4*     Random:  No results for input(s): VANCORANDOM in the last 72 hours. Recent vancomycin administrations                   vancomycin (VANCOCIN) 1,250 mg in dextrose 5 % 250 mL IVPB (mg) 1,250 mg New Bag 09/03/21 0317     1,250 mg New Bag 09/02/21 1500    vancomycin (VANCOCIN) 1,250 mg in dextrose 5 % 250 mL IVPB (mg) 1,250 mg New Bag 09/02/21 0255     1,250 mg New Bag 09/01/21 1509                      Assessment:  · 47 yo/F admitted 8/30 for hypokalemia. BC's positive for GPC in clusters (real v contaminant?); no fever, no leukocytosis. · Has been receiving ceftriaxone for SBP ppx. · ID was consulted 9/1. Estimated Creatinine Clearance: 93 mL/min (based on SCr of 0.9 mg/dL). · To dose vancomycin, pharmacy will be utilizing Rattle calculation software for goal AUC/JORDAN 400-600 mg/L-hr. · 9/3: level @ 0227 = 16.4 mcg/mL, AUC/JORDAN > 600    Plan:  · Vancomycin 1000 mg IV q12hr  · Repeat level as needed  · Will continue to monitor renal function. · Clinical pharmacy to follow.     Jacques Ware, Mathew 9/5/2021 3:48 PM   [phone]

## 2021-09-05 NOTE — PROGRESS NOTES
PROGRESS NOTE  [facility]  Dr. Rosalio Morales MD, Dr. Dinora Vázquez MD, Dr Adryan Sherwood, Dr. Contreras Berg MD, Dr. Lakesha Francis, DO Delos Schaumann  51 y.o.  female    SUBJECTIVE: No new complaints; Hgb slightly lower today, transfusion ordered per primary    OBJECTIVE:    /82   Pulse 75   Temp 98.6 °F (37 °C) (Oral)   Resp 16   Ht 5' 8\" (1.727 m)   Wt 210 lb (95.3 kg)   SpO2 98%   BMI 31.93 kg/m²     Gen: NAD, AAO x 3  HEENT:PEERL, sclera icteric  Heart: RRR, no M/R/G  Lungs: CTAB  Abd.: soft, mild left sided tenderness, ND, BS +, no G/R, no HSM  Extr.: no C/C/E, no bruising      Stool (measured) : 0 mL  Lab Results   Component Value Date    WBC 4.9 09/05/2021    WBC 6.0 09/04/2021    WBC 6.3 09/03/2021    HGB 7.1 09/05/2021    HGB 7.9 09/04/2021    HGB 7.5 09/04/2021    HCT 22.4 09/05/2021    .7 09/05/2021    RDW 17.7 09/05/2021    PLT 58 09/05/2021    PLT 62 09/04/2021    PLT 80 09/03/2021     Lab Results   Component Value Date     09/05/2021    K 3.5 09/05/2021    K 2.9 01/27/2021     09/05/2021    CO2 16 09/05/2021    BUN 8 09/05/2021    CREATININE 0.9 09/05/2021    CALCIUM 8.2 09/05/2021    PROT 6.3 09/05/2021    LABALBU 2.2 09/05/2021    LABALBU 3.6 08/11/2011    BILITOT 11.3 09/05/2021    BILITOT 12.4 09/04/2021    BILITOT 14.6 09/03/2021    ALKPHOS 151 09/05/2021    ALKPHOS 187 09/04/2021    ALKPHOS 173 09/03/2021     09/05/2021     09/04/2021     09/03/2021    ALT 54 09/05/2021    ALT 62 09/04/2021    ALT 65 09/03/2021     Lab Results   Component Value Date    LIPASE 63 08/31/2021    LIPASE 69 08/30/2021    LIPASE 35 01/27/2021     Lab Results   Component Value Date    AMYLASE 123 08/31/2021         ASSESSMENT/PLAN:  # Decompensated alcoholic cirrhosis/ Acute alcoholic hepatitis   -MDF 98, portends poor prognosis; no steroids in setting of bacteremia   - Bilirubin improving  -MELD-NA: 30 presentation, previously 20-24 and most recent

## 2021-09-05 NOTE — PROGRESS NOTES
Internal Medicine Progress Note    [facility]    Bam Duffy. Viky Ash, SAIDA Monteiro D.O., SAIDA Aldridge Asa, D.O. Archie Rios, MSN, APRN, NP-C  Alex Salas. Haylee Wooten, MSN, APRN-CNP     Primary Care Physician: Smitha Manriquez MD   Admitting Physician:  Carol Erickson DO  Admission date and time: 2021  9:47 PM    Room:  [address] [place] [address]  Admitting diagnosis: Hypokalemia [E87.6]  Jaundice, non- [R17]  Acute liver failure without hepatic coma [K72.00]    Patient Name: Risa Muro  MRN: [id number]    Date of Service: 2021     Subjective:  Harjinder Wadsworth is a 51 y.o. female who was seen and examined today,2021, at the bedside. Patient still appear anxious to be discharged. Awaiting final culture and infectious disease recommendation. Continue to follow with other subspecialists. Hemoglobin dropped to 7.1      Review of System:   Constitutional:   Denies fever or chills, weight loss or gain, fatigue or malaise. HEENT:   Denies ear pain, sore throat, sinus or eye problems. Cardiovascular:   Denies any chest pain, irregular heartbeats, or palpitations. Respiratory:   Denies shortness of breath, coughing, sputum production, hemoptysis, or wheezing. Gastrointestinal:   Less abdominal pain today. She is tolerating a diet without complication. Genitourinary:    Denies any urgency, frequency, hematuria. Voiding  without difficulty. Extremities:   Denies lower extremity swelling, edema or cyanosis. Neurology:    Denies any headache or focal neurological deficits, Denies generalized weakness or memory difficulty. Psch:   Denies being anxious or depressed. Musculoskeletal:    Denies  myalgias, joint complaints or back pain. Integumentary:   Denies any rashes, ulcers, or excoriations. Denies bruising. Hematologic/Lymphatic:  Denies bruising or bleeding. Physical Exam:  No intake/output data recorded.     Intake/Output Summary (Last 24 hours) at 9/5/2021 1512  Last data filed at 9/5/2021 1245  Gross per 24 hour   Intake 850 ml   Output --   Net 850 ml   I/O last 3 completed shifts: In: 850 [P.O.:600; I.V.:250]  Out: -   No data found. Vital Signs:   Blood pressure 120/82, pulse 75, temperature 98.6 °F (37 °C), temperature source Oral, resp. rate 16, height 5' 8\" (1.727 m), weight 210 lb (95.3 kg), SpO2 98 %. General appearance:  Alert, responsive, oriented to person, place, and time. Well preserved, alert, no distress. Head:  Normocephalic. No masses, lesions or tenderness. Eyes:  PERRLA. EOMI. Sclera icteric. Buccal mucosa moist.  ENT:  Ears normal. Mucosa normal.  Neck:    Supple. Trachea midline. No thyromegaly. No JVD. No bruits. Heart:    Rhythm regular. Rate controlled. No murmurs. Lungs:    Symmetrical. Clear to auscultation bilaterally. No wheezes. No rhonchi. No rales. Abdomen:   Distended. Voluntary guarding is elicited. Bowel sounds are active. Extremities:    Peripheral pulses present. No peripheral edema. No ulcers. No cyanosis. No clubbing. Neurologic:    Alert x 3. No focal deficit. Cranial nerves grossly intact. No focal weakness. Psych:   Behavior is normal. Mood appears normal. Speech is not rapid and/or pressured. Musculoskeletal:   Spine ROM normal. Muscular strength intact. Gait not assessed.   Integumentary:  No rashes jaundice  Genitalia/Breast:  Deferred    Medication:  Scheduled Meds:   spironolactone  25 mg Oral BID    vancomycin  1,000 mg IntraVENous Q12H    pantoprazole  40 mg Oral QAM AC    lactulose  30 g Oral TID    [Held by provider] enoxaparin  40 mg SubCUTAneous Daily    sodium chloride flush  5-40 mL IntraVENous 2 times per day    thiamine  100 mg Oral Daily    multivitamin  1 tablet Oral Daily    folic acid  1 mg Oral Daily    rifaximin  550 mg Oral BID    levothyroxine  100 mcg Oral Daily    mirtazapine  15 mg Oral Nightly    cyanocobalamin  2,000 mcg Oral Daily    pyridoxine 25 mg Oral Daily    vitamin D  50,000 Units Oral Weekly     Continuous Infusions:   sodium chloride      sodium chloride         Objective Data:  CBC with Differential:    Lab Results   Component Value Date    WBC 4.9 09/05/2021    RBC 2.10 09/05/2021    HGB 7.1 09/05/2021    HCT 22.4 09/05/2021    PLT 58 09/05/2021    .7 09/05/2021    MCH 33.8 09/05/2021    MCHC 31.7 09/05/2021    RDW 17.7 09/05/2021    SEGSPCT 59 08/11/2011    METASPCT 3 07/05/2011    LYMPHOPCT 12.2 09/05/2021    MONOPCT 3.5 09/05/2021    MYELOPCT 0.9 09/02/2021    BASOPCT 0.4 09/05/2021    MONOSABS 0.20 09/05/2021    LYMPHSABS 0.59 09/05/2021    EOSABS 0.08 09/05/2021    BASOSABS 0.00 09/05/2021     CMP:    Lab Results   Component Value Date     09/05/2021    K 3.5 09/05/2021    K 2.9 01/27/2021     09/05/2021    CO2 16 09/05/2021    BUN 8 09/05/2021    CREATININE 0.9 09/05/2021    GFRAA >60 09/05/2021    LABGLOM >60 09/05/2021    GLUCOSE 100 09/05/2021    GLUCOSE 125 08/11/2011    PROT 6.3 09/05/2021    LABALBU 2.2 09/05/2021    LABALBU 3.6 08/11/2011    CALCIUM 8.2 09/05/2021    BILITOT 11.3 09/05/2021    ALKPHOS 151 09/05/2021     09/05/2021    ALT 54 09/05/2021       Assessment:    · Staph bacteremia of unclear source  · Decompensated alcoholic cirrhosis complicated by acute alcoholic hepatitis and tylenol/benadryl overuse/misuse  · Hyperammonemia with developing hepatic encephalopathy   · Normocytic anemia  · Ulcerative colitis with recent perirectal abscess and resultant bacteremia per history treated at [facility]  · Hypothyroidism  · Generalized anxiety and depression  · Dysphonia  · Thrombocytopenia secondary to portal hypertension and hepatosplenomegaly with sequestration    Plan:     · AMERICO showed no evidence of vegetation  · Awaiting recommendation for antibiotic therapy per infectious disease  · Hemoglobin dropped to 7.1 we will transfuse  · Continue to follow with GI  · Outpatient ENT evaluation for dysphonia--Dr Bob Vera  · Follow appropriate lab work    More than 50% of my time was spent at the bedside counseling/coordinating care with the patient and/or family with face to face contact. This time was spent reviewing notes and laboratory data as well as instructing and counseling the patient. Time I spent with the family or surrogate(s) is included only if the patient was incapable of providing the necessary information or participating in medical decisions. I also discussed the differential diagnosis and all of the proposed management plans with the patient and individuals accompanying the patient. I am readily available for any further decision-making and intervention.        Jayy Wong DO, F.A.C.O.I.  9/5/2021  3:12 PM

## 2021-09-06 NOTE — PLAN OF CARE
Problem: Falls - Risk of:  Goal: Will remain free from falls  9/6/2021 1133 by Serafin Aguillon RN  Outcome: Met This Shift  9/6/2021 0405 by Dedrick Bautista RN  Outcome: Met This Shift  Goal: Absence of physical injury  Outcome: Met This Shift     Problem: Pain:  Goal: Pain level will decrease  Outcome: Met This Shift  Goal: Control of acute pain  Outcome: Met This Shift  Goal: Control of chronic pain  Outcome: Met This Shift

## 2021-09-06 NOTE — PROGRESS NOTES
[facility]  Progress Note    NAME: Delos Schaumann  MR:  [id number]  :   1975  DATE OF SERVICE:21    This is a face to face encounter with Delos Schaumann 51 y.o. female on 21    CHIEF COMPLAINT     ID following for   Chief Complaint   Patient presents with    Jaundice     states she was taking tylenol PM for several months, developed jaundice about 3 weeks ago    Anxiety     HISTORY OF PRESENT ILLNESS   Pt seen and examined  21  Asking about d/c    Patient is tolerating medications. No reported adverse drug reactions. REVIEW OF SYSTEMS     As stated above in the chief complaint, otherwise negative. CURRENT MEDICATIONS      spironolactone  25 mg Oral BID    vancomycin  1,000 mg IntraVENous Q12H    pantoprazole  40 mg Oral QAM AC    lactulose  30 g Oral TID    [Held by provider] enoxaparin  40 mg SubCUTAneous Daily    sodium chloride flush  5-40 mL IntraVENous 2 times per day    thiamine  100 mg Oral Daily    multivitamin  1 tablet Oral Daily    folic acid  1 mg Oral Daily    rifaximin  550 mg Oral BID    levothyroxine  100 mcg Oral Daily    mirtazapine  15 mg Oral Nightly    cyanocobalamin  2,000 mcg Oral Daily    pyridoxine  25 mg Oral Daily    vitamin D  50,000 Units Oral Weekly     Continuous Infusions:   sodium chloride      sodium chloride       PRN Meds:sodium chloride, LORazepam, sodium chloride, ondansetron **OR** ondansetron, sodium chloride flush    PHYSICAL EXAM     BP (!) 108/51   Pulse 72   Temp 99 °F (37.2 °C) (Oral)   Resp 16   Ht 5' 8\" (1.727 m)   Wt 210 lb (95.3 kg)   SpO2 99%   BMI 31.93 kg/m²   Temp  Av.9 °F (37.2 °C)  Min: 98.6 °F (37 °C)  Max: 99.1 °F (37.3 °C)  Constitutional:  The patient is awake, alert, and oriented. jaundice  Skin:    Warm and dry. No rashes were noted. HEENT:   Round and reactive pupils. AT/NC  Neck:    Supple to movements. Chest:   No use of accessory muscles to breathe. Symmetrical expansion. Cardiovascular:  S1 and S2 are rhythmic and regular. No murmurs appreciated. Abdomen:   Positive bowel sounds to auscultation. Benign to palpation. perierectal area    Extremities:   No clubbing, no cyanosis, no edema. CNS    AAxO   Lines: piv      DIAGNOSTIC RESULTS   Radiology:    Recent Labs     09/04/21  0548 09/04/21  1713 09/05/21 0457 09/05/21 1952 09/06/21 0423   WBC 6.0  --  4.9  --  5.0   RBC 2.20*  --  2.10*  --  2.33*   HGB 7.5*   < > 7.1* 8.0* 7.9*   HCT 22.9*   < > 22.4* 23.7* 23.6*   .1*  --  106.7*  --  101.3*   MCH 34.1  --  33.8  --  33.9   MCHC 32.8  --  31.7*  --  33.5   RDW 18.2*  --  17.7*  --  19.0*   PLT 62*  --  58*  --  63*   MPV 13.0*  --  12.9*  --  12.8*    < > = values in this interval not displayed. Recent Labs     09/04/21 0548 09/05/21 0457 09/06/21 0422    138 138   K 3.7 3.5 3.5   * 116* 115*   CO2 16* 16* 15*   BUN 8 8 8   CREATININE 1.0 0.9 1.0   GLUCOSE 101* 100* 107*   PROT 6.6 6.3* 6.5   LABALBU 2.3* 2.2* 2.3*   CALCIUM 8.1* 8.2* 8.4*   BILITOT 12.4* 11.3* 11.1*   ALKPHOS 187* 151* 151*   * 144* 141*   ALT 62* 54* 55*     Lab Results   Component Value Date    CRP 15.7 (H) 05/17/2011     Lab Results   Component Value Date    SEDRATE 95 (H) 07/09/2019    SEDRATE 80 (H) 05/17/2011    SEDRATE 63 (H) 05/14/2011     Recent Labs     09/04/21  0548 09/05/21 0457 09/05/21  0949 09/06/21 0422 09/06/21 0423   INR  --   --  2.5  --  2.5   PROTIME  --   --  29.1*  --  28.7*   * 144*  --  141*  --    ALT 62* 54*  --  55*  --      Lab Results   Component Value Date    CHOL 100 09/01/2021    TRIG 164 09/01/2021    HDL 12 09/01/2021    LDLCALC 55 09/01/2021    LABVLDL 33 09/01/2021     No results found for: VITD25    Microbiology:   No results for input(s): COVID19 in the last 72 hours.   Lab Results   Component Value Date    BC Previously positive blood culture called 08/31/2021    [facility]  08/31/2021     This isolate is

## 2021-09-06 NOTE — PROGRESS NOTES
Internal Medicine Progress Note    [facility]    Harvis Leventhal. Diana Angel., F.A.C.OReinaldoI. Jess Person D.O., SAVAGEOHERIBERTO Aguilar D.O. Clementine Tang, MSN, APRN, NP-C  Miguel Jackson. [facility], MSN, APRN-CNP     Primary Care Physician: Ivan Conrad MD   Admitting Physician:  Saintclair Drew, DO  Admission date and time: 2021  9:47 PM    Room:  [address] [place] [address]  Admitting diagnosis: Hypokalemia [E87.6]  Jaundice, non- [R17]  Acute liver failure without hepatic coma [K72.00]    Patient Name: Kenton Zapata  MRN: [id number]    Date of Service: 2021     Subjective:  Cathi Mcardle is a 51 y.o. female who was seen and examined today,2021, at the bedside. Cathi Mcardle is resting comfortably during my examination this morning. She is extremely anxious for discharge home. We are awaiting final antibiotics from the infectious disease team.    Review of System:   Constitutional:   Denies fever or chills, weight loss or gain, fatigue or malaise. HEENT:   Denies ear pain, sore throat, sinus or eye problems. Cardiovascular:   Denies any chest pain, irregular heartbeats, or palpitations. Respiratory:   Denies shortness of breath, coughing, sputum production, hemoptysis, or wheezing. Gastrointestinal:   No further abdominal pain. Tolerating a diet without complication. Genitourinary:    Denies any urgency, frequency, hematuria. Voiding  without difficulty. Extremities:   Denies lower extremity swelling, edema or cyanosis. Neurology:    Denies any headache or focal neurological deficits, Denies generalized weakness or memory difficulty. Psch:   Denies being anxious or depressed. Musculoskeletal:    Denies  myalgias, joint complaints or back pain. Integumentary:   Denies any rashes, ulcers, or excoriations. Denies bruising. Hematologic/Lymphatic:  Denies bruising or bleeding.     Physical Exam:  I/O this shift:  In: -   Out: 625 [Urine:625]    Intake/Output Summary (Last 24 hours) at 9/6/2021 0658  Last data filed at 9/6/2021 0516  Gross per 24 hour   Intake 600 ml   Output 625 ml   Net -25 ml   I/O last 3 completed shifts: In: 600 [P.O.:600]  Out: -   No data found. Vital Signs:   Blood pressure (!) 108/51, pulse 72, temperature 99 °F (37.2 °C), temperature source Oral, resp. rate 16, height 5' 8\" (1.727 m), weight 210 lb (95.3 kg), SpO2 99 %. General appearance:  Alert, responsive, oriented to person, place, and time. Well preserved, alert, no distress. Head:  Normocephalic. No masses, lesions or tenderness. Eyes:  PERRLA. EOMI. Sclera icteric. Buccal mucosa moist.  ENT:  Ears normal. Mucosa normal.  Neck:    Supple. Trachea midline. No thyromegaly. No JVD. No bruits. Heart:    Rhythm regular. Rate controlled. No murmurs. Lungs:    Symmetrical. Clear to auscultation bilaterally. No wheezes. No rhonchi. No rales. Abdomen:   Distended. Voluntary guarding is elicited. Bowel sounds are active. Extremities:    Peripheral pulses present. No peripheral edema. No ulcers. No cyanosis. No clubbing. Neurologic:    Alert x 3. No focal deficit. Cranial nerves grossly intact. No focal weakness. Psych:   Behavior is normal. Mood appears normal. Speech is not rapid and/or pressured. Musculoskeletal:   Spine ROM normal. Muscular strength intact. Gait not assessed.   Integumentary:  No rashes jaundice  Genitalia/Breast:  Deferred    Medication:  Scheduled Meds:   spironolactone  25 mg Oral BID    vancomycin  1,000 mg IntraVENous Q12H    pantoprazole  40 mg Oral QAM AC    lactulose  30 g Oral TID    [Held by provider] enoxaparin  40 mg SubCUTAneous Daily    sodium chloride flush  5-40 mL IntraVENous 2 times per day    thiamine  100 mg Oral Daily    multivitamin  1 tablet Oral Daily    folic acid  1 mg Oral Daily    rifaximin  550 mg Oral BID    levothyroxine  100 mcg Oral Daily    mirtazapine  15 mg Oral Nightly    cyanocobalamin  2,000 mcg Oral Daily    pyridoxine 25 mg Oral Daily    vitamin D  50,000 Units Oral Weekly     Continuous Infusions:   sodium chloride      sodium chloride         Objective Data:  CBC with Differential:    Lab Results   Component Value Date    WBC 5.0 09/06/2021    RBC 2.33 09/06/2021    HGB 7.9 09/06/2021    HCT 23.6 09/06/2021    PLT 63 09/06/2021    .3 09/06/2021    MCH 33.9 09/06/2021    MCHC 33.5 09/06/2021    RDW 19.0 09/06/2021    SEGSPCT 59 08/11/2011    METASPCT 3 07/05/2011    LYMPHOPCT 16.5 09/06/2021    MONOPCT 5.2 09/06/2021    MYELOPCT 0.9 09/02/2021    BASOPCT 0.9 09/06/2021    MONOSABS 0.25 09/06/2021    LYMPHSABS 0.85 09/06/2021    EOSABS 0.13 09/06/2021    BASOSABS 0.05 09/06/2021     CMP:    Lab Results   Component Value Date     09/06/2021    K 3.5 09/06/2021    K 2.9 01/27/2021     09/06/2021    CO2 15 09/06/2021    BUN 8 09/06/2021    CREATININE 1.0 09/06/2021    GFRAA >60 09/06/2021    LABGLOM 59 09/06/2021    GLUCOSE 107 09/06/2021    GLUCOSE 125 08/11/2011    PROT 6.5 09/06/2021    LABALBU 2.3 09/06/2021    LABALBU 3.6 08/11/2011    CALCIUM 8.4 09/06/2021    BILITOT 11.1 09/06/2021    ALKPHOS 151 09/06/2021     09/06/2021    ALT 55 09/06/2021       Assessment:  1. Staph bacteremia  2. Decompensated alcoholic cirrhosis complicated by acute alcoholic hepatitis and tylenol/benadryl overuse/misuse  3. Hyperammonemia with developing hepatic encephalopathy   4. Normocytic anemia  5. Ulcerative colitis with recent perirectal abscess and resultant bacteremia per history treated at [facility]  6. Hypothyroidism  7. Generalized anxiety and depression  8. Dysphonia  9. Thrombocytopenia secondary to portal hypertension and hepatosplenomegaly with sequestration    Plan:   Colonel Clock is acceptable for discharge home once antibiotics have been chosen by the infectious disease team.  Cultures and sensitivities have finalized.   She does not have a PICC line in place and I anticipate the need for IV antibiotics. I will discussed this with the infectious disease team today. More than 50% of my time was spent at the bedside counseling/coordinating care with the patient and/or family with face to face contact. This time was spent reviewing notes and laboratory data as well as instructing and counseling the patient. Time I spent with the family or surrogate(s) is included only if the patient was incapable of providing the necessary information or participating in medical decisions. I also discussed the differential diagnosis and all of the proposed management plans with the patient and individuals accompanying the patient. I am readily available for any further decision-making and intervention.        Allyson Guerrero DO, F.A.C.O.I.  9/6/2021  6:58 AM

## 2021-09-06 NOTE — PROGRESS NOTES
Pharmacy Consultation Note  (Antibiotic Dosing and Monitoring)    Initial consult date: 9-1-2021  Consulting physician/provider: Dr. Brandy Garcia  Drug: Vancomycin  Indication: GPC bacteremia (clusters)    Age/Gender Height Weight IBW  Allergy Information   46 y.o./F 5' 8\" (172.7 cm) 210 lb (95.3 kg)     Ideal body weight: 63.9 kg (140 lb 14 oz)  Adjusted ideal body weight: 76.4 kg (168 lb 8.4 oz)   Patient has no known allergies. Renal Function:  Recent Labs     09/04/21  0548 09/05/21  0457 09/06/21  0422   BUN 8 8 8   CREATININE 1.0 0.9 1.0       Intake/Output Summary (Last 24 hours) at 9/6/2021 6655  Last data filed at 9/6/2021 0847  Gross per 24 hour   Intake 1120 ml   Output 625 ml   Net 495 ml       Vancomycin Monitoring:  Trough:    No results for input(s): VANCOTROUGH in the last 72 hours. Random:  No results for input(s): VANCORANDOM in the last 72 hours. Recent vancomycin administrations                   vancomycin (VANCOCIN) 1,250 mg in dextrose 5 % 250 mL IVPB (mg) 1,250 mg New Bag 09/03/21 0317     1,250 mg New Bag 09/02/21 1500    vancomycin (VANCOCIN) 1,250 mg in dextrose 5 % 250 mL IVPB (mg) 1,250 mg New Bag 09/02/21 0255     1,250 mg New Bag 09/01/21 1509                      Assessment:  · 47 yo/F admitted 8/30 for hypokalemia. BC's positive for GPC in clusters (real v contaminant?); no fever, no leukocytosis. · Has been receiving ceftriaxone for SBP ppx. · ID was consulted 9/1. Estimated Creatinine Clearance: 84 mL/min (based on SCr of 1 mg/dL). · To dose vancomycin, pharmacy will be utilizing VOIQ calculation software for goal AUC/JORDAN 400-600 mg/L-hr. · 9/3: level @ 0227 = 16.4 mcg/mL, AUC/JORDAN > 600    Plan:  · Vancomycin 1000 mg IV q12hr  · Repeat trough tomorrow @ 0200, hold dose if trough is >20 mcg/mL  · Will continue to monitor renal function. · Clinical pharmacy to follow.     Victor Baumgarten, PharmD, BCPS 9/6/2021 9:23 AM

## 2021-09-06 NOTE — DISCHARGE SUMMARY
Internal Medicine Progress Note     [facility]     Rachel De La Cruz. Milka Christianson., F.A.C.O.I. Bib Curry D.O., RAYNA.BA.TALHAOReinaldoIReinaldo Thomas D.O. Evangelista Colvin, MSN, APRN, NP-C  Toni Cuello. Mohini Woo, MSN, APRN-CNP       Internal Medicine  Discharge Summary    NAME: Brian Orta  :  1975  MRN:  [id number]  Jet Franklin MD  ADMITTED: 2021      DISCHARGED: 21    ADMITTING PHYSICIAN: Rachel Wong DO    CONSULTANT(S):   IP CONSULT TO GI  IP CONSULT TO SOCIAL WORK  IP CONSULT TO GI  IP CONSULT TO SOCIAL WORK  IP CONSULT TO CARDIOLOGY  IP CONSULT TO INFECTIOUS DISEASES  IP CONSULT TO PHARMACY     ADMITTING DIAGNOSIS:   Hypokalemia [E87.6]  Jaundice, non- [R17]  Acute liver failure without hepatic coma [K72.00]     DISCHARGE DIAGNOSES:   1. Staph bacteremia  2. Decompensated alcoholic cirrhosis complicated by acute alcoholic hepatitis and tylenol/benadryl overuse/misuse  3. Hyperammonemia with developing hepatic encephalopathy   4. Normocytic anemia  5. Ulcerative colitis with recent perirectal abscess and resultant bacteremia per history treated at [facility]  6. Hypothyroidism  7. Generalized anxiety and depression  8. Dysphonia  9. Thrombocytopenia secondary to portal hypertension and hepatosplenomegaly with sequestration    BRIEF HISTORY OF PRESENT ILLNESS:   Darryl Coelho is a 76-year-old female patient who presented to [address] [place] [address] emergency department last evening with complaints of jaundice. She has a known history of liver dysfunction and is in alcohol recovery. During her last hospitalization in February she was treated for acutely decompensated cirrhosis and alcoholic hepatitis. She went to outpatient alcohol rehab following that admission. She states that she has been abstinent from alcohol largely since then.   She did have a few drinks about 1 month ago but states that she caught herself from falling back into regular drinking at that time. She suffers from insomnia after stopping drinking however and has been taking Tylenol PM.  She takes this in excess. She takes this above the daily recommended use on the bottle for the average person. She has been taking at least 4 per night. She was evaluated in the emergency department where she was found to have mildly elevated liver enzymes but a grossly abnormal total bilirubin that is primarily direct. INR was elevated at 2.5. CT of the abdomen pelvis with contrast showed hepatosplenomegaly, paraesophageal varices, abnormal pancreatic lesion which is hypodense and not noted on prior imaging and a small volume of ascites. She was also found to be anemic close to her baseline with normocytic and normochromic indices with elevated RDW. Urine was unremarkable for any significant signs of infection. Proc screening was not abnormal.  She was subsequent admitted to the service of Dr. Rodrick Harrison for further care and management.     Radha Hook is seen and examined at bedside today. She complains of insomnia and did not sleep well last evening which has been her recent history. She denies any headache or acute neurological symptoms but is mildly generally weak. She denies any pain in the chest, palpitations or fluttering. There is no short of breath at rest or exertion. She has no orthopnea. She admits to some abdominal swelling but states she has not deviated severely from her baseline. There is mild nausea at times without vomiting or hematemesis. She denies any significant bowel pattern change however she has abnormal bowels at baseline due to her underlying inflammatory bowel disease. She denies any hematochezia or melena. She denies any abnormal urination or any genital complaints. She admits to yellowing of the skin and eyes which is acute on chronic. She denies any regular alcohol abuse. She denies the use of illicit substances.   She is taking relatively excessive Tylenol and Benadryl. LABS[de-identified]  Lab Results   Component Value Date    WBC 5.0 09/06/2021    HGB 7.9 (L) 09/06/2021    HCT 23.6 (L) 09/06/2021    PLT 63 (L) 09/06/2021     09/06/2021    K 3.5 09/06/2021     (H) 09/06/2021    CREATININE 1.0 09/06/2021    BUN 8 09/06/2021    CO2 15 (L) 09/06/2021    GLUCOSE 107 (H) 09/06/2021    ALT 55 (H) 09/06/2021     (H) 09/06/2021    INR 2.5 09/06/2021     Lab Results   Component Value Date    INR 2.5 09/06/2021    INR 2.5 09/05/2021    INR 2.3 09/02/2021    PROTIME 28.7 (H) 09/06/2021    PROTIME 29.1 (H) 09/05/2021    PROTIME 26.3 (H) 09/02/2021      Lab Results   Component Value Date    TSH 34.680 (H) 08/31/2021     Lab Results   Component Value Date    TRIG 164 (H) 09/01/2021    TRIG 71 07/10/2019    TRIG 197 (H) 10/04/2016     Lab Results   Component Value Date    HDL 12 09/01/2021    HDL 46 07/10/2019    HDL 98 10/04/2016     Lab Results   Component Value Date    LDLCALC 55 09/01/2021    LDLCALC 89 07/10/2019    LDLCALC 107 (H) 10/04/2016     Lab Results   Component Value Date    LABA1C 4.1 09/01/2021       IMAGING:  ECHO Transesophageal    Result Date: 9/3/2021  Transesophageal Echocardiography Report (AMERICO)   Demographics   Patient Name         Sandi Abreu  Gender                Female   Medical Record       [id number]    Room Number           7616  Number   Account #            [de-identified]   Procedure Date        09/03/2021   Corporate ID                     Ordering Physician    Magdalena Gaines DO   Accession Number     [id number]  Referring Physician   Date of Birth        1975  Sonographer           Tao [de-identified] RDCS   Age                  51 year(s)  Interpreting          Army Keshia GALVAN                                   Physician                                    Any Other  Procedure Type of Study   AMERICO procedure:Transesophageal Echo (AMERICO), Color Flow Velocity Mapping.   Procedure Date Date: 09/03/2021 Start: 12:51 PM Study Location: Portable Technical Quality: Adequate visualization Indications:R/O Endocarditis. Patient Status: Routine Height: 68 inches Weight: 210 pounds BSA: 2.09 m^2 BMI: 31.93 kg/m^2 BP: 126/76 mmHg AMERICO Performed By: the attending and the sonographer  Type of Anesthesia: Moderate sedation   Findings   Left Ventricle  Ejection fraction is visually estimated at 55-60%. Right Ventricle  Normal right ventricular size and function. Left Atrium  No evidence of thrombus within left atrium/ left atrial appendage. Emptying velocity is normal at 84 cms/s. Mitral Valve  Physiologic and/or trace mitral regurgitation is present. Tricuspid Valve  Mild tricuspid regurgitation. Aortic Valve  Structurally normal aortic valve. Pulmonic Valve  Mild pulmonic regurgitation present. Pericardial Effusion  No pericardial effusion. Aorta  Mild focal atherosclerosis in the descending aorta. Conclusions   Summary  Ejection fraction is visually estimated at 55-60%. Normal right ventricular size and function. No evidence of thrombus within left atrium/ left atrial appendage. Emptying velocity is normal at 84 cms/s. Mild focal atherosclerosis in the descending aorta. No significant valvular stenosis/ regurgitation. Agitated saline contrast study negative for ASD/ PFO. No evidence of infective endocarditis. Signature   ----------------------------------------------------------------  Electronically signed by Raegan Gomez MD(Interpreting  physician) on 09/03/2021 02:47 PM  ----------------------------------------------------------------  [id number]    CT ABDOMEN PELVIS W IV CONTRAST Additional Contrast? None    Result Date: 8/31/2021  EXAMINATION: CT OF THE ABDOMEN AND PELVIS WITH CONTRAST 8/30/2021 11:28 pm TECHNIQUE: CT of the abdomen and pelvis was performed with the administration of intravenous contrast. Multiplanar reformatted images are provided for review. Dose modulation, iterative reconstruction, and/or weight based adjustment of the mA/kV was utilized to reduce the radiation dose to as low as reasonably achievable. COMPARISON: January 27 HISTORY: ORDERING SYSTEM PROVIDED HISTORY: ruq abdominal pain, jaundice TECHNOLOGIST PROVIDED HISTORY: Additional Contrast?->None Reason for exam:->ruq abdominal pain, jaundice Decision Support Exception - unselect if not a suspected or confirmed emergency medical condition->Emergency Medical Condition (MA) FINDINGS: Lower Chest: The lung bases show no evidence of infiltrates or effusions. Organs: Hepatosplenomegaly, similar to the prior examination. Paraesophageal varices again noted and consistent with portal hypertension. There is a small hypodense lesion in the body of the pancreas not clearly identified on the prior examination and measuring approximately 1.4 cm in diameter. The pancreatic duct is not dilated. No evidence of peripancreatic inflammatory changes. Prominently distended gallbladder with no gallstones identified. Consider further evaluation with gallbladder ultrasound. No evidence of obstructive uropathy. GI/Bowel: No evidence of bowel obstruction or free intraperitoneal air. Anastomotic surgical changes in the distal sigmoid colon. No evidence of colitis or diverticulitis. No signs of appendicitis. Pelvis: The uterus and urinary bladder are within normal limits. Peritoneum/Retroperitoneum: Small volume ascites in the perihepatic space, along the right paracolic gutter and in the pelvis. Periumbilical ventral hernia with herniation of small bowel and no evidence of complications. Bones/Soft Tissues: No acute bony pathology. Hepatosplenomegaly and paraesophageal varices, similar to the prior examination. Hypodense lesion in the body of the pancreas not clearly identified on the prior examination and measuring approximately 1.4 cm.   Further evaluation and follow up with dedicated contrast enhanced MRI of the pancreas recommended. Prominently distended gallbladder with no gallstones identified. Further evaluation with gallbladder ultrasound should be considered. Small volume ascites in the abdomen and pelvis. US GALLBLADDER RUQ    Result Date: 8/30/2021  EXAMINATION: RIGHT UPPER QUADRANT ULTRASOUND 8/30/2021 9:39 pm COMPARISON: Right upper quadrant ultrasound from January 28, 2021 HISTORY: ORDERING SYSTEM PROVIDED HISTORY: Eval GB - Pain TECHNOLOGIST PROVIDED HISTORY: Reason for exam:->Eval GB - Pain What reading provider will be dictating this exam?->CRC FINDINGS: LIVER:  The liver appears diffusely heterogeneous and enlarged. There is diffusely increased echogenicity relative to the right renal cortex. No hepatic mass or intrahepatic ductal dilatation. Right hepatic lobe measured 25.1 cm. BILIARY SYSTEM:  Diffuse gallbladder wall edema. Gallbladder wall measures 8-9 mm. No shadowing intraluminal stones or echogenic sludge. Common bile duct is within normal limits measuring 6 mm. RIGHT KIDNEY: Grossly normal appearance of the imaged right kidney. PANCREAS:  Visualized portions of the pancreas are unremarkable. OTHER: No evidence of right upper quadrant ascites. Small right pleural effusion. 1.  Hepatomegaly and diffusely increased echogenicity of the liver probably reflects changes related to an underlying infiltrative pathology. No hepatic mass or intrahepatic ductal dilatation. 2.  Gallbladder wall edema. No cholelithiasis. Normal appearance of the extrahepatic common bile duct. 3.  Incidental small right pleural effusion.      MRI ABDOMEN W WO CONTRAST MRCP    Result Date: 8/31/2021  EXAMINATION: MRI OF THE ABDOMEN WITH AND WITHOUT CONTRAST AND MRCP 8/31/2021 8:23 am TECHNIQUE: Multiplanar multisequence MRI of the abdomen was performed with and without the administration of intravenous contrast.  After initial T2 axial and coronal images, thick slab, thin no complex features. This could represent a cyst, pseudocyst or IPMN. Follow-up imaging is recommended in 1 year. 3. Hepatosplenomegaly along with increasing ascites in the upper abdomen. Correlate with liver function studies. 4. Questionable pattern of pancreatic parenchymal edema. In the setting of increasing ascites, underlying pancreatitis may be considered. Correlate with laboratory workup. 5. Contracted gallbladder with no obvious filling defect. Mucosal thickening is felt to be secondary to a systemic process as described above with underlying ascites. Cholecystitis is considered less likely. US ABDOMEN LIMITED    Result Date: 9/3/2021  EXAMINATION: RIGHT UPPER QUADRANT ULTRASOUND 9/3/2021 7:47 am COMPARISON: None. HISTORY: ORDERING SYSTEM PROVIDED HISTORY: Ascites TECHNOLOGIST PROVIDED HISTORY: Reason for exam:->Ascites What reading provider will be dictating this exam?->CRC FINDINGS: Imaging of the right and left upper and lower quadrants are performed. There is a small amount of ascites mostly in the pelvis. Small amount of ascites. US ABDOMEN LIMITED    Result Date: 8/31/2021  EXAMINATION: RIGHT UPPER QUADRANT ULTRASOUND 8/31/2021 8:53 am COMPARISON: CT abdomen and pelvis dated 08/31/2021 HISTORY: ORDERING SYSTEM PROVIDED HISTORY: ascites  PROVIDED HISTORY: Reason for exam:->ascites survey What reading provider will be dictating this exam?->CRC Ascites, patient scheduled for ultrasound-guided paracentesis. FINDINGS: Sonographic examination of all 4 quadrants and pelvis was performed for ascites. There is minimal amount of ascites. This was not felt to be enough to safely perform paracentesis. Therefore, the procedure was canceled. Minimal amount of ascites. Ultrasound-guided paracentesis was not performed. HOSPITAL COURSE:   Mae Llamas did well throughout the hospitalization.   She was evaluated by multiple subspecialists including GI, infectious disease, and cardiology. She was found to be suffering from coagulase-negative Staphylococcus bacteremia. She underwent transesophageal echocardiogram with no findings of infection. She also underwent EGD evaluation with the GI team.  Cirrhotic medications were maximized. She was advised against using Tylenol and Benadryl. We continue to reinforce the need to remain abstinent from alcohol. Otherwise, I had a long discussion with the infectious disease team and the patient will be transitioned to oral antibiotics upon discharge. BRIEF PHYSICAL EXAMINATION AND LABORATORIES ON DAY OF DISCHARGE:  VITALS:  BP (!) 108/51   Pulse 72   Temp 99 °F (37.2 °C) (Oral)   Resp 16   Ht 5' 8\" (1.727 m)   Wt 210 lb (95.3 kg)   SpO2 99%   BMI 31.93 kg/m²     General appearance:  Alert, responsive, oriented to person, place, and time. Well preserved, alert, no distress. Head:  Normocephalic. No masses, lesions or tenderness. Eyes:  PERRLA. EOMI. Sclera icteric. Buccal mucosa moist.  ENT:  Ears normal. Mucosa normal.  Neck:    Supple. Trachea midline. No thyromegaly. No JVD. No bruits. Heart:    Rhythm regular. Rate controlled. No murmurs. Lungs:    Symmetrical. Clear to auscultation bilaterally. No wheezes. No rhonchi. No rales. Abdomen:   Distended. Voluntary guarding is elicited. Bowel sounds are active. Extremities:    Peripheral pulses present. No peripheral edema. No ulcers. No cyanosis. No clubbing. Neurologic:    Alert x 3. No focal deficit. Cranial nerves grossly intact. No focal weakness. Psych:   Behavior is normal. Mood appears normal. Speech is not rapid and/or pressured. Musculoskeletal:   Spine ROM normal. Muscular strength intact. Gait not assessed. Integumentary:  No rashes jaundice  Genitalia/Breast:  Deferred      DISPOSITION:  The patient's condition is good. At this time the patient is without objective evidence of an acute process requiring continuing hospitalization or inpatient management. They are stable for discharge with outpatient follow-up. I have spoken with the patient and discussed the results of the current hospitalization, in addition to providing specific details for the plan of care and counseling regarding the diagnosis and prognosis. The plan has been discussed in detail and they are aware of the specific conditions for emergent return, as well as the importance of follow-up. Their questions are answered at this time and they are agreeable with the plan for discharge to home    DISCHARGE MEDICATIONS:    Raegan Larios   Home Medication Instructions [facility]:[id number]    Printed on:09/06/21 0785   Medication Information                      folic acid (FOLVITE) 1 MG tablet  Take 1 tablet by mouth daily             gabapentin (NEURONTIN) 300 MG capsule  Take 1 capsule by mouth 3 times daily for 30 days.              lactulose (CHRONULAC) 10 GM/15ML solution  Take 45 mLs by mouth 3 times daily             levothyroxine (SYNTHROID) 100 MCG tablet  Take 1 tablet by mouth Daily             mirtazapine (REMERON) 15 MG tablet  Take 15 mg by mouth nightly             pantoprazole (PROTONIX) 40 MG tablet  Take 40 mg by mouth 2 times daily             potassium chloride (KLOR-CON M) 20 MEQ extended release tablet  Take 20 mEq by mouth 2 times daily             rifaximin (XIFAXAN) 550 MG tablet  Take 1 tablet by mouth 2 times daily             spironolactone (ALDACTONE) 25 MG tablet  Take 1 tablet by mouth daily             thiamine mononitrate 100 MG tablet  Take 1 tablet by mouth daily             vitamin B-12 2000 MCG tablet  Take 1 tablet by mouth daily             vitamin B-6 (B-6) 25 MG tablet  Take 1 tablet by mouth daily             vitamin D (ERGOCALCIFEROL) 1.25 MG (29769 UT) CAPS capsule  Take 50,000 Units by mouth once a week Monday               Antibiotics as per the infectious disease team.    FOLLOW UP/INSTRUCTIONS:  · This patient is instructed to follow-up with her primary care

## 2021-09-06 NOTE — PROGRESS NOTES
PROGRESS NOTE  [facility]  Dr. Karin Molina MD, Dr. Nedra Perdue MD, Dr John Barrera, Dr. Kris Michelle MD, Dr. Tanya Berry, DO      Senait Flattery  51 y.o.  female    SUBJECTIVE: No new complaints; Hgb slightly lower today, transfusion ordered per primary    OBJECTIVE:    BP (!) 108/51   Pulse 72   Temp 99 °F (37.2 °C) (Oral)   Resp 16   Ht 5' 8\" (1.727 m)   Wt 210 lb (95.3 kg)   SpO2 99%   BMI 31.93 kg/m²     Gen: NAD, AAO x 3  HEENT:PEERL, sclera icteric  Heart: RRR, no M/R/G  Lungs: CTAB  Abd.: soft, mild left sided tenderness, ND, BS +, no G/R, no HSM  Extr.: no C/C/E, no bruising      Stool (measured) : 0 mL  Lab Results   Component Value Date    WBC 5.0 09/06/2021    WBC 4.9 09/05/2021    WBC 6.0 09/04/2021    HGB 7.9 09/06/2021    HGB 8.0 09/05/2021    HGB 7.1 09/05/2021    HCT 23.6 09/06/2021    .3 09/06/2021    RDW 19.0 09/06/2021    PLT 63 09/06/2021    PLT 58 09/05/2021    PLT 62 09/04/2021     Lab Results   Component Value Date     09/06/2021    K 3.5 09/06/2021    K 2.9 01/27/2021     09/06/2021    CO2 15 09/06/2021    BUN 8 09/06/2021    CREATININE 1.0 09/06/2021    CALCIUM 8.4 09/06/2021    PROT 6.5 09/06/2021    LABALBU 2.3 09/06/2021    LABALBU 3.6 08/11/2011    BILITOT 11.1 09/06/2021    BILITOT 11.3 09/05/2021    BILITOT 12.4 09/04/2021    ALKPHOS 151 09/06/2021    ALKPHOS 151 09/05/2021    ALKPHOS 187 09/04/2021     09/06/2021     09/05/2021     09/04/2021    ALT 55 09/06/2021    ALT 54 09/05/2021    ALT 62 09/04/2021     Lab Results   Component Value Date    LIPASE 63 08/31/2021    LIPASE 69 08/30/2021    LIPASE 35 01/27/2021     Lab Results   Component Value Date    AMYLASE 123 08/31/2021         ASSESSMENT/PLAN:  # Decompensated alcoholic cirrhosis/ Acute alcoholic hepatitis   -MDF 98, portends poor prognosis; no steroids in setting of bacteremia   - Bilirubin improving  -MELD-NA: 30 presentation, previously 20-24 and most recent admission in 1/2021  -Recently evaluated for liver transplant and was deemed not a candidate  -Icteric sclera, no ascites; no asterixis today  -Ammonia markedly elevated on admission with sleep disturbance; Hepatic encephalopathy, type C, overt, grade 1, recurrent  - Continue rifaximin 550mg BID and lactulose, titrated to 2-3 BM daily  - No signs of encephalopathy now  -Daily CMP and INR     # Normocytic anemia, unspecified  -Likely hypoproliferative or myelosuppression secondary to acute on chronic alcohol abuse  --H&H stable; hemodynamically stable  -Recent EGD and colonoscopy at [facility] in December 2020 unremarkable for any etiology  -EGD on 9/2/2021 showed PHG, but no esophageal varices, and no fresh or old blood seen  -Continue daily PPI  -Patient should follow up outpatient for video capsule endoscopy  -Hgb slowly trending down, but no signs of overt bleeding. Will hold off on repeat endscopic evaluation at this time. Can consider urgent endoscopy in case of overt bleeding or precipitous drop in hgb. # Ulcerative colitis s/p IPAA  -Suspect diarrhea secondary to #1  --Not on any maintenance medications     # Staph Bacteremia  -Defer antibiotics to admitting service  -s/p AMERICO without vegetations  -Unable to do paracentesis    Okay for discharge home from GI POV; patient has follow up with [facility] gastroenterologists on 9/20/2021. Follow in office in 1-2 weeks for capsule endoscopy or as needed - pt to call for appointment - [phone]. Will discuss with Dr. Devon Naidu DO  GI Fellow   9/6/2021  9:21 AM     Patient seen and examined independently. Discussed with fellow and agree with the plan of care stated above.     Yen Jerry DO  9/14/2021  9:08 AM

## 2021-09-08 NOTE — ADT AUTH CERT
Comments  Comment  [facility] 3 MED SURG   [address]   [facility]   [phone]   [id number]     Jayjay Ortega number: N/A     [id number] - (BX Traditional)     OBSERVATION: 21 and Admitted 21     E87.6 (ICD-10-CM) - Hypokalemia, R17 (ICD-10-CM) - Jaundice, non-, K72.00 (ICD-10-CM) - Acute liver failure without hepatic coma     Mehul Corona DO   Physician (Active)   Chuy[address] Choi [address]       U) [phone] (F) [phone]     Email address not available     UPIN: None   NPI: [id number]     Provider Summary         Return Contact:   Shelby Bush   Phone: [phone]   Fax: [phone]   Last edited by Shelby Bush on 21 at [phone]   Patient Demographics    Name Patient ID SSN Gender Identity Birth Date   Lizabeth Kong [id number]  Female [birth year] (51 yrs)   Address Phone Email Employer    [address] [place] [address] [employer] [phone] (H) -- [employer]   [address] [facility] Race Occupation Emp Status    -- White (non-) -- Full Time    Reg Status PCP Date Last Verified Next Review Date    Verified Wanda Valenzuela MD  [phone] 21    Admission Date Discharge Date Admitting Provider     21 Mehul Corona DO     Marital Status Catholic       None      Emergency Contact 1 Emergency Contact 2   Rebecca Green (j) [phone] Yuniel Borja) Devi Mallory 0) [phone] Yuniel Oliva   Subscriber Details  Hospital Account [de-identified]  CVG Subscriber Name/Sex/Relation Subscriber  Subscriber Address/Phone Subscriber Emp/Emp Phone   1.  Cristhian Aguillon   [id number] [facility] - Female   (Self) 1975 [address] [place] [address]  [id number]   [phone](A) OTHER    Utilization Reviews       Liver Disease Complications - Care Day 7 (2021) by Lakesha Copeland RN       Review Status Review Entered   Completed 2021 13:19      Criteria Review      Care Day: 7 Care Date: 2021 Level of Care: Inpatient Floor Guideline Day 1    Clinical Status    ( ) * Clinical Indications met    Routes    (X) IV fluids and medication    9/8/2021 1:19 PM EDT by Gevena Geovanni      Meds-   Cont chronulac, protonix  Rifaximin 550 bid  Added aldactone 25 bid  Vancomycin 1 gm iv q 12    Interventions    (X) CBC, electrolytes, and liver function tests    9/8/2021 1:19 PM EDT by Shun Young H/h dropped 7/22-- transfuse 1 u prbc  Plt dropped 54  Ammonia is up to 147  Ast 142  Bili 11.3  Aplk phos 151  Albumin 2.2  Pt 29, inr 2.5    Medications    (X) Possible diuretics and antibiotics    * Milestone   Additional Notes   9/5-       /82   Pulse 75   Temp 98.6 °F (37 °C) (Oral)   Resp 16        H/h dropped 7/22-- transfuse 1 u prbc   Plt dropped 54   Ammonia is up to 147   Ast 142   Bili 11.3   Aplk phos 151   Albumin 2.2   Pt 29, inr 2.5      Per infectious dz-    Alcoholic cirrhosis/ hepatic encephalopathy - ammonia elevated    Tylenol/alcohol abuse   Thrombocytopenia       Continue on Vancomycin pending cultures- Staph haemolyticus    Follow cultures, monitor labs       Per gi-    ASSESSMENT/PLAN:   # Decompensated alcoholic cirrhosis/ Acute alcoholic hepatitis    -MWK 66, portends poor prognosis; no steroids in setting of bacteremia               - Bilirubin improving   -MELD-NA: 30 presentation, previously 20-24 and most recent admission in 1/2021   -Recently evaluated for liver transplant and was deemed not a candidate   -Icteric sclera, no ascites; positive asterixis today   -Ammonia markedly elevated on admission with sleep disturbance; Hepatic encephalopathy, type C, overt, grade 1, recurrent   - Continue rifaximin 550mg BID and lactulose, titrated to 2-3 BM daily   -Daily CMP and INR       # Normocytic anemia, unspecified   -Likely hypoproliferative or myelosuppression secondary to acute on chronic alcohol abuse   --H&H decreased slightly, but near baseline, hemodynamically stable   -Recent EGD and colonoscopy at [facility] in December 2020 unremarkable for any etiology   -EGD on 9/2/2021 showed PHG, but no esophageal varices, and no fresh or old blood seen   -Continue daily PPI   -Patient should follow up outpatient for video capsule endoscopy   -Hgb slowly trending down, but no signs of overt bleeding. Will hold off on repeat endscopic evaluation at this time. Can consider urgent endoscopy in case of overt bleeding or precipitous drop in hgb.    -Transfusion per primary       # Ulcerative colitis s/p IPAA   -Suspect diarrhea secondary to #1   --Not on any maintenance medications       # Staph Bacteremia   -Defer antibiotics to admitting service   -s/p AMERICO without vegetations   -Unable to do paracentesis         Meds-    Cont chronulac, protonix   Rifaximin 550 bid   Added aldactone 25 bid   Vancomycin 1 gm iv q 12            Liver Disease Complications - Care Day 5 (9/3/2021) by Jeimy Bryan RN       Review Status Review Entered   Completed 9/8/2021 13:11      Criteria Review      Care Day: 5 Care Date: 9/3/2021 Level of Care: Inpatient Floor    Guideline Day 1    Clinical Status    ( ) * Clinical Indications met    (X) Probable ascites or altered mental status    9/8/2021 1:11 PM EDT by Tony Coelho abd us- small ascites noted    Routes    (X) IV fluids and medication    9/8/2021 1:11 PM EDT by Karina Black      Meds include-   Chronulac 30 tid  Change iv protonix to 40 po qd  Rifaximin 550 bid  Decr iv vancomycin to 1 gm iv q 12    Interventions    (X) Ultrasound or CT scan    9/8/2021 1:11 PM EDT by Karina Black      Abd us-   Small amt ascites    (X) CBC, electrolytes, and liver function tests    9/8/2021 1:11 PM EDT by Karina Black      Labs-   K 3.8, bun 9, creat 1.0   Albumin 2.4  Wbc 6, h/h 8/24, plt 80    Medications    (X) Possible diuretics and antibiotics    9/8/2021 1:11 PM EDT by Karina Black      iv vanc    * Milestone   Additional Notes   9/3-       /76   Pulse 82   Temp 98.6 °F (37 °C) (Oral)   Resp 16      She feels anxious   Abd.: soft, mild left sided tenderness, ND, BS +, no G/R, no HSM   Jaundiced      Labs-    K 3.8, bun 9, creat 1.0    Albumin 2.4   Wbc 6, h/h 8/24, plt 80          Abd us-    Small amt ascites         Per gi-    ASSESSMENT/PLAN:   # Decompensated alcoholic cirrhosis/ Acute alcoholic hepatitis    -ZYK 75, portends poor prognosis; no steroids in setting of bacteremia   -MELD-NA: 30 presentation, previously 20-24 and most recent admission in 1/2021   -Recently evaluated for liver transplant and was deemed not a candidate   -Jaundice most likely related to alcoholic hepatitis   - Right upper quad ultrasound and CT scan concerning for thickened GB, but more likely related to systemic effect   - MRCP without signs of focal obstruction, though limited study   -Icteric sclera, no ascites, no asterixis   -Ammonia markedly elevated on admission with sleep disturbance; Hepatic encephalopathy, type C, overt, grade 1, recurrent   - Continue rifaximin 550mg BID and lactulose, titrated to 2-3 BM daily   -Continue supportive care   -Daily CMP and INR   --US today did show some small amount of ascites; ordered paracentesis to r/o SBP       # Normocytic anemia, unspecified   -Likely secondary hypoproliferative or myelosuppression secondary to acute on chronic alcohol abuse   --H&H stable today, hemodynamically stable   -Recent EGD and colonoscopy at [facility] in December 2020 unremarkable for any etiology   -EGD on 9/2/2021 showed PHG, but no esophageal varices, and no fresh or old blood seen   -Patient should follow up outpatient for video capsule endoscopy       # Ulcerative colitis s/p IPAA   -Suspect diarrhea secondary to #1   --Not on any maintenance medications       # Staph Bacteremia   -Defer antibiotics to admitting service   -Okay to proceed with AMERICO from GI POV   -Paracentesis as above to r/o SBP   -Other source may be buttock wound previously seen on physical exam; consider wound care or general surgery consult      Meds include-    Chronulac 30 tid   Change iv protonix to 40 po qd   Rifaximin 550 bid   Decr iv vancomycin to 1 gm iv q 12

## 2021-10-22 NOTE — ED PROVIDER NOTES
Patient is a 52 y/o female who presents to the ED with abdominal pain. Patient states that she has had right upper quadrant abdominal pain for the past couple days. The pain has gradually increased and is currently 8/10. Tonight, she became nauseated and has vomited. She has had diarrhea. She denies any fever. She denies any hematemesis or hematochezia. Review of Systems   Constitutional: Negative for chills and fever. HENT: Negative for ear pain, sinus pressure and sore throat. Eyes: Negative for pain, discharge and redness. Respiratory: Negative for cough, shortness of breath and wheezing. Cardiovascular: Negative for chest pain. Gastrointestinal: Positive for abdominal pain, diarrhea, nausea and vomiting. Negative for abdominal distention. Genitourinary: Negative for dysuria and frequency. Musculoskeletal: Negative for arthralgias and back pain. Skin: Negative for rash and wound. Neurological: Negative for weakness and headaches. Hematological: Negative for adenopathy. All other systems reviewed and are negative. Physical Exam  Vitals and nursing note reviewed. HENT:      Head: Normocephalic and atraumatic. Mouth/Throat:      Mouth: Mucous membranes are moist.   Eyes:      General: Scleral icterus present. Pupils: Pupils are equal, round, and reactive to light. Cardiovascular:      Rate and Rhythm: Regular rhythm. Tachycardia present. Heart sounds: No murmur heard. Pulmonary:      Effort: Pulmonary effort is normal. No respiratory distress. Breath sounds: Normal breath sounds. No stridor. No wheezing, rhonchi or rales. Abdominal:      General: Abdomen is protuberant. Bowel sounds are normal. There is distension. Tenderness: There is generalized abdominal tenderness. There is no guarding. Negative signs include Horan's sign and McBurney's sign. Skin:     General: Skin is warm and dry. Coloration: Skin is jaundiced. Neurological:      Mental Status: She is alert and oriented to person, place, and time. Procedures     MDM     ED Course as of Oct 22 0912   Fri Oct 22, 2021   0725 7:25 AM EDT  I received this patient at sign out from Dr. Vini Pimentel. I have discussed the patient's initial exam, treatment and plan of care with the out going physician. I have introduced my self to the patient / family and have answered their questions to this point. I have examined the patient myself and reviewed ordered tests / medications and  reviewed any available results to this point. If a resident is involved in the Emergency Department care, I have discussed my findings and plan with them as well. [NC]   6119 Patient laying the bed resting comfortably no distress watching television, playing on her cell phone. Appears quite comfortable. Trace jaundice, trace icterus. Heart rate currently regular, lungs are clear and equal.  Abdomen soft with very mild right upper quadrant area tenderness with no particular epigastric tenderness and no other specific focal tenderness. No guarding, rebound tenderness or rigidity. Work-up results are discussed with her. She has an appointment with GI on Tuesday. She will follow closely and return if symptoms change or worsen, she is very comfortable in home at this time. [NC]      ED Course User Index  [NC] Sanjeev Bhaskar Cardinal, DO        EKG:  Normal sinus rhythm with ventricular rate of 89. KY interval, QRS duration and QT interval within normal range. Normal axis. No ST segment abnormalities to suggest acute ischemia.      7:07 AM EDT  I have signed this patient out to the oncoming physician, Dr. Gissel Bro.  I have discussed the patient's initial exam, treatment and plan of care with the on coming physician. I have notified the patient / family of the change in treating physician and answered their questions to this point.     ED Course as of Oct 22 0912   Fri Oct 22, 2021   0725 7:25 alcohol use. She reports previous drug use. Family History: family history includes Cancer in her mother; Emphysema in her mother; Heart Disease in her father; High Blood Pressure in her sister; High Cholesterol in her father; Lupus in her mother; No Known Problems in her son. The patients home medications have been reviewed. Allergies: Patient has no known allergies.     -------------------------------------------------- RESULTS -------------------------------------------------  Labs:  Results for orders placed or performed during the hospital encounter of 10/22/21   CBC auto differential   Result Value Ref Range    WBC 10.8 4.5 - 11.5 E9/L    RBC 2.72 (L) 3.50 - 5.50 E12/L    Hemoglobin 9.1 (L) 11.5 - 15.5 g/dL    Hematocrit 27.7 (L) 34.0 - 48.0 %    .8 (H) 80.0 - 99.9 fL    MCH 33.5 26.0 - 35.0 pg    MCHC 32.9 32.0 - 34.5 %    RDW 19.2 (H) 11.5 - 15.0 fL    Platelets 63 (L) 261 - 450 E9/L    MPV 12.6 (H) 7.0 - 12.0 fL    Neutrophils % 80.0 43.0 - 80.0 %    Lymphocytes % 15.7 (L) 20.0 - 42.0 %    Monocytes % 2.6 2.0 - 12.0 %    Eosinophils % 0.9 0.0 - 6.0 %    Basophils % 0.9 0.0 - 2.0 %    Neutrophils Absolute 8.64 (H) 1.80 - 7.30 E9/L    Lymphocytes Absolute 1.73 1.50 - 4.00 E9/L    Monocytes Absolute 0.32 0.10 - 0.95 E9/L    Eosinophils Absolute 0.10 0.05 - 0.50 E9/L    Basophils Absolute 0.10 0.00 - 0.20 E9/L    Anisocytosis 2+     Polychromasia 2+     Poikilocytes 2+     Schistocytes 1+     Ovalocytes 2+     Tear Drop Cells 1+    Basic Metabolic Panel   Result Value Ref Range    Sodium 139 132 - 146 mmol/L    Potassium 3.5 3.5 - 5.0 mmol/L    Chloride 108 (H) 98 - 107 mmol/L    CO2 22 22 - 29 mmol/L    Anion Gap 9 7 - 16 mmol/L    Glucose 159 (H) 74 - 99 mg/dL    BUN 5 (L) 6 - 20 mg/dL    CREATININE 0.9 0.5 - 1.0 mg/dL    GFR Non-African American >60 >=60 mL/min/1.73    GFR African American >60     Calcium 8.4 (L) 8.6 - 10.2 mg/dL   Hepatic function panel   Result Value Ref Range    Total Protein 7.0 6.4 - 8.3 g/dL    Albumin 2.9 (L) 3.5 - 5.2 g/dL    Alkaline Phosphatase 245 (H) 35 - 104 U/L     (H) 0 - 32 U/L    AST 87 (H) 0 - 31 U/L    Total Bilirubin 3.5 (H) 0.0 - 1.2 mg/dL    Bilirubin, Direct 2.0 (H) 0.0 - 0.3 mg/dL    Bilirubin, Indirect 1.5 (H) 0.0 - 1.0 mg/dL   Ammonia   Result Value Ref Range    Ammonia 96.0 (H) 11.0 - 51.0 umol/L   Protime-INR   Result Value Ref Range    Protime 20.1 (H) 9.3 - 12.4 sec    INR 1.7    APTT   Result Value Ref Range    aPTT 32.3 24.5 - 35.1 sec   Lipase   Result Value Ref Range    Lipase 100 (H) 13 - 60 U/L   Lactic Acid, Plasma   Result Value Ref Range    Lactic Acid 2.5 (H) 0.5 - 2.2 mmol/L   Urinalysis   Result Value Ref Range    Color, UA Yellow Straw/Yellow    Clarity, UA SLCLOUDY Clear    Glucose, Ur Negative Negative mg/dL    Bilirubin Urine Negative Negative    Ketones, Urine Negative Negative mg/dL    Specific Gravity, UA 1.010 1.005 - 1.030    Blood, Urine TRACE (A) Negative    pH, UA 8.0 5.0 - 9.0    Protein, UA Negative Negative mg/dL    Urobilinogen, Urine 0.2 <2.0 E.U./dL    Nitrite, Urine POSITIVE (A) Negative    Leukocyte Esterase, Urine LARGE (A) Negative   Troponin   Result Value Ref Range    Troponin, High Sensitivity 29 (H) 0 - 9 ng/L   Platelet Confirmation   Result Value Ref Range    Platelet Confirmation CONFIRMED    Microscopic Urinalysis   Result Value Ref Range    WBC, UA 5-10 (A) 0 - 5 /HPF    RBC, UA 1-3 0 - 2 /HPF    Epithelial Cells, UA RARE /HPF    Renal Epithelial, UA RARE /HPF    Bacteria, UA MANY (A) None Seen /HPF   Lactic Acid, Plasma   Result Value Ref Range    Lactic Acid 1.6 0.5 - 2.2 mmol/L   Troponin   Result Value Ref Range    Troponin, High Sensitivity 28 (H) 0 - 9 ng/L   SPECIMEN REJECTION   Result Value Ref Range    Rejected Test TRP5     Reason for Rejection see below    POC Pregnancy Urine Qual   Result Value Ref Range    HCG, Urine, POC Negative Negative    Lot Number [id number]     Positive QC Pass/Fail Pass     Negative QC Pass/Fail Pass    EKG 12 Lead   Result Value Ref Range    Ventricular Rate 89 BPM    Atrial Rate 89 BPM    P-R Interval 124 ms    QRS Duration 80 ms    Q-T Interval 354 ms    QTc Calculation (Bazett) 430 ms    P Axis 54 degrees    R Axis 15 degrees    T Axis 18 degrees       Radiology:  CT ABDOMEN PELVIS W IV CONTRAST Additional Contrast? None   Final Result   Redemonstration of hepatosplenomegaly with prominent varices, specially in   the paraesophageal region similar to previous. Moderate ascites which is similar to slightly increased from previous. Areas of bowel thickening or probably reactive to the ascites. Other   infectious or inflammatory process thought less likely but not excluded. Stranding of the fat about the pancreas may be incidental or due to low-grade   pancreatitis. Correlate with laboratory values. Stable tiny low-attenuation focus in the body of the pancreas. Please see   report from MRI dated 08/31/2021 for follow-up recommendations. Moderate anasarca. ------------------------- NURSING NOTES AND VITALS REVIEWED ---------------------------  Date / Time Roomed:  10/22/2021  3:45 AM  ED Bed Assignment:  12/12    The nursing notes within the ED encounter and vital signs as below have been reviewed. /73   Pulse 96   Temp 98.6 °F (37 °C) (Oral)   Resp 20   Ht 5' 8\" (1.727 m)   Wt 250 lb (113.4 kg)   SpO2 100%   BMI 38.01 kg/m²   Oxygen Saturation Interpretation: Normal      ------------------------------------------ PROGRESS NOTES ------------------------------------------  I have spoken with the patient and discussed todays results, in addition to providing specific details for the plan of care and counseling regarding the diagnosis and prognosis. Their questions are answered at this time and they are agreeable with the plan. I discussed at length with them reasons for immediate return here for re evaluation.  They will followup with primary care by calling their office tomorrow. --------------------------------- ADDITIONAL PROVIDER NOTES ---------------------------------  At this time the patient is without objective evidence of an acute process requiring hospitalization or inpatient management. They have remained hemodynamically stable throughout their entire ED visit and are stable for discharge with outpatient follow-up. The plan has been discussed in detail and they are aware of the specific conditions for emergent return, as well as the importance of follow-up. New Prescriptions    CEFDINIR (OMNICEF) 300 MG CAPSULE    Take 1 capsule by mouth 2 times daily for 7 days    HYDROCODONE-ACETAMINOPHEN (NORCO) 5-325 MG PER TABLET    Take 1 tablet by mouth every 6 hours as needed for Pain for up to 8 doses. Intended supply: 3 days. Take lowest dose possible to manage pain    ONDANSETRON (ZOFRAN ODT) 4 MG DISINTEGRATING TABLET    Take 1 tablet by mouth every 8 hours as needed for Nausea or Vomiting       Diagnosis:  1. Nausea and vomiting, intractability of vomiting not specified, unspecified vomiting type    2. Generalized abdominal pain    3. Alcoholic cirrhosis of liver with ascites ([facility] [address]    4. Acute cystitis without hematuria        Disposition:  Patient's disposition: Discharge to home  Patient's condition is stable.          So Florian,   10/22/21 [id number]

## 2021-10-25 NOTE — ED PROVIDER NOTES
Chief complaint:  Abdominal pain    HPI history provided by the patient  Patient was in with a history of liver disease with ascites, follows with GI. Presents here for chronic and recurrent abdominal pain frequently. States she has had increased pain for some time now with increased abdominal distention. Has been jaundiced for over a month. Has a history of alcohol abuse. States she has been clean for some time now. Denies fevers at home although has some nausea and vomiting today. Complaining of persistent abdominal pain. Has not followed with GI since her last ER visit yet. No lightheadedness or syncope. No confusion. No fevers. No chest pain, palpitations or shortness of breath. Review of Systems   Constitutional: Negative for chills, diaphoresis, fatigue and fever. HENT: Negative for congestion and sore throat. Respiratory: Negative for cough, chest tightness, shortness of breath and wheezing. Cardiovascular: Positive for leg swelling. Negative for chest pain and palpitations. Gastrointestinal: Positive for abdominal distention, abdominal pain, nausea and vomiting. Negative for diarrhea. Genitourinary: Negative for dysuria, flank pain, frequency, hematuria and urgency. Musculoskeletal: Negative for arthralgias, back pain, gait problem, joint swelling, myalgias, neck pain and neck stiffness. Skin: Negative for rash and wound. Neurological: Negative for dizziness, seizures, syncope, weakness, light-headedness, numbness and headaches. Hematological: Negative for adenopathy. All other systems reviewed and are negative. Physical Exam  Vitals and nursing note reviewed. Constitutional:       General: She is awake. She is not in acute distress. Appearance: She is well-developed. She is not ill-appearing, toxic-appearing or diaphoretic. Comments: Awake and alert, oriented x3 playing on an iPad. HENT:      Head: Normocephalic and atraumatic.    Eyes:      General: Scleral icterus present. Pupils: Pupils are equal, round, and reactive to light. Cardiovascular:      Rate and Rhythm: Regular rhythm. Tachycardia present. Heart sounds: Normal heart sounds. No murmur heard. Pulmonary:      Effort: Pulmonary effort is normal. No respiratory distress. Breath sounds: Normal breath sounds. No stridor, decreased air movement or transmitted upper airway sounds. No decreased breath sounds, wheezing, rhonchi or rales. Chest:      Chest wall: No tenderness. Abdominal:      General: Bowel sounds are normal. There is distension. Palpations: Abdomen is soft. There is fluid wave. Tenderness: There is abdominal tenderness. There is no right CVA tenderness, left CVA tenderness, guarding or rebound. Comments: Soft, protuberant ascitic abdomen with associated soft tissue pitting edema. Abdomen soft with diffuse tenderness on palpation, patient actually pushes my hand away before he can get a good palpatory exam due to tenderness. No actual rigidity or guarding   Musculoskeletal:         General: No swelling, tenderness, deformity or signs of injury. Cervical back: Normal range of motion and neck supple. Right lower leg: Edema present. Left lower leg: Edema present. Comments: +2 diffuse extremity and body pitting edema with no unilateral leg swelling or calf pain. Skin:     General: Skin is warm and dry. Coloration: Skin is jaundiced. Skin is not cyanotic, mottled or pale. Findings: No erythema or rash. Neurological:      General: No focal deficit present. Mental Status: She is alert and oriented to person, place, and time. GCS: GCS eye subscore is 4. GCS verbal subscore is 5. GCS motor subscore is 6. Cranial Nerves: Cranial nerves are intact. No cranial nerve deficit. Sensory: Sensation is intact. Motor: Motor function is intact. No tremor. Coordination: Coordination is intact.  Coordination Disease in her father; High Blood Pressure in her sister; High Cholesterol in her father; Lupus in her mother; No Known Problems in her son. The patients home medications have been reviewed. Allergies: Patient has no known allergies.     -------------------------------------------------- RESULTS -------------------------------------------------    LABS:  Results for orders placed or performed during the hospital encounter of 10/25/21   Urinalysis   Result Value Ref Range    Color, UA DKYELLOW Straw/Yellow    Clarity, UA Clear Clear    Glucose, Ur Negative Negative mg/dL    Bilirubin Urine SMALL (A) Negative    Ketones, Urine TRACE (A) Negative mg/dL    Specific Gravity, UA 1.010 1.005 - 1.030    Blood, Urine TRACE (A) Negative    pH, UA 7.0 5.0 - 9.0    Protein, UA TRACE Negative mg/dL    Urobilinogen, Urine 0.2 <2.0 E.U./dL    Nitrite, Urine POSITIVE (A) Negative    Leukocyte Esterase, Urine Negative Negative   CBC Auto Differential   Result Value Ref Range    WBC 6.8 4.5 - 11.5 E9/L    RBC 2.59 (L) 3.50 - 5.50 E12/L    Hemoglobin 8.7 (L) 11.5 - 15.5 g/dL    Hematocrit 27.1 (L) 34.0 - 48.0 %    .6 (H) 80.0 - 99.9 fL    MCH 33.6 26.0 - 35.0 pg    MCHC 32.1 32.0 - 34.5 %    RDW 18.6 (H) 11.5 - 15.0 fL    Platelets 45 (L) 365 - 450 E9/L    MPV 12.9 (H) 7.0 - 12.0 fL    Neutrophils % 74.0 43.0 - 80.0 %    Immature Granulocytes % 0.4 0.0 - 5.0 %    Lymphocytes % 15.5 (L) 20.0 - 42.0 %    Monocytes % 6.8 2.0 - 12.0 %    Eosinophils % 2.9 0.0 - 6.0 %    Basophils % 0.4 0.0 - 2.0 %    Neutrophils Absolute 5.02 1.80 - 7.30 E9/L    Immature Granulocytes # 0.03 E9/L    Lymphocytes Absolute 1.05 (L) 1.50 - 4.00 E9/L    Monocytes Absolute 0.46 0.10 - 0.95 E9/L    Eosinophils Absolute 0.20 0.05 - 0.50 E9/L    Basophils Absolute 0.03 0.00 - 0.20 E9/L   Comprehensive Metabolic Panel   Result Value Ref Range    Sodium 137 132 - 146 mmol/L    Potassium 3.5 3.5 - 5.0 mmol/L    Chloride 105 98 - 107 mmol/L    CO2 20 (L) 22 - 29 mmol/L    Anion Gap 12 7 - 16 mmol/L    Glucose 122 (H) 74 - 99 mg/dL    BUN 6 6 - 20 mg/dL    CREATININE 0.8 0.5 - 1.0 mg/dL    GFR Non-African American >60 >=60 mL/min/1.73    GFR African American >60     Calcium 8.4 (L) 8.6 - 10.2 mg/dL    Total Protein 7.1 6.4 - 8.3 g/dL    Albumin 3.0 (L) 3.5 - 5.2 g/dL    Total Bilirubin 4.5 (H) 0.0 - 1.2 mg/dL    Alkaline Phosphatase 277 (H) 35 - 104 U/L    ALT 62 (H) 0 - 32 U/L    AST 66 (H) 0 - 31 U/L   Lactic Acid, Plasma   Result Value Ref Range    Lactic Acid 1.6 0.5 - 2.2 mmol/L   Lipase   Result Value Ref Range    Lipase 67 (H) 13 - 60 U/L   Protime-INR   Result Value Ref Range    Protime 20.4 (H) 9.3 - 12.4 sec    INR 1.7    APTT   Result Value Ref Range    aPTT 24.8 24.5 - 35.1 sec   Ammonia   Result Value Ref Range    Ammonia 65.0 (H) 11.0 - 51.0 umol/L   Troponin   Result Value Ref Range    Troponin, High Sensitivity 21 (H) 0 - 9 ng/L   Brain Natriuretic Peptide   Result Value Ref Range    Pro- 0 - 125 pg/mL   Microscopic Urinalysis   Result Value Ref Range    WBC, UA 0-1 0 - 5 /HPF    RBC, UA 0-1 0 - 2 /HPF    Epithelial Cells, UA RARE /HPF    Bacteria, UA RARE (A) None Seen /HPF   Platelet Confirmation   Result Value Ref Range    Platelet Confirmation CONFIRMED    EKG 12 Lead   Result Value Ref Range    Ventricular Rate 89 BPM    Atrial Rate 89 BPM    P-R Interval 118 ms    QRS Duration 82 ms    Q-T Interval 370 ms    QTc Calculation (Bazett) 450 ms    P Axis 42 degrees    R Axis 11 degrees    T Axis 17 degrees       RADIOLOGY:  No orders to display           ------------------------- NURSING NOTES AND VITALS REVIEWED ---------------------------  Date / Time Roomed:  10/25/2021  7:02 PM  ED Bed Assignment:  22/22    The nursing notes within the ED encounter and vital signs as below have been reviewed.      Patient Vitals for the past 24 hrs:   BP Temp Temp src Pulse Resp SpO2   10/25/21 1627 (!) 142/65 -- -- -- 18 --   10/25/21 1555 -- 97.1 °F (36.2

## 2021-10-25 NOTE — ED NOTES
FIRST PROVIDER CONTACT ASSESSMENT NOTE                                                                                                Department of Emergency Medicine                                                      First Provider Note  10/25/21  4:27 PM EDT  NAME: Santa Wolf  : 1975  MRN: [id number]    Chief Complaint: Abdominal Pain (recently treated UTI, states belly is swelling ) and Other (jaundice approx month)      History of Present Illness:   Santa Wolf is a 51 y.o. female who presents to the ED for abdominal pain, swelling. Focused Physical Exam:  VS:    ED Triage Vitals [10/25/21 1555]   BP Temp Temp Source Pulse Resp SpO2 Height Weight   -- 97.1 °F (36.2 °C) Infrared 126 -- 99 % -- --        General: Alert and in no apparent distress. Medical History:  has a past medical history of Acute renal failure (ARF) ([facility] [address] Alcoholic cirrhosis ([facility] [address] Anxiety, Crohn's colitis ([facility] [address] Depression, History of blood transfusion, Pyoderma gangrenosum, and Thyroid disease. Surgical History:  has a past surgical history that includes Abdomen surgery; Arm Debridement; Colonoscopy; Endoscopy, colon, diagnostic; Dilatation, esophagus; Arm Debridement; Upper gastrointestinal endoscopy (N/A, 2021); and transesophageal echocardiogram (N/A, 9/3/2021). Social History:  reports that she has never smoked. She has never used smokeless tobacco. She reports previous alcohol use. She reports previous drug use. Family History: family history includes Cancer in her mother; Emphysema in her mother; Heart Disease in her father; High Blood Pressure in her sister; High Cholesterol in her father; Lupus in her mother; No Known Problems in her son. Allergies: Patient has no known allergies.      Initial Plan of Care:  Initiate Treatment-Testing, Proceed toTreatment Area When Bed Available for ED Attending/MLP to Continue Care    -------------------------------------------------END OF FIRST PROVIDER CONTACT ASSESSMENT NOTE--------------------------------------------------------  Electronically signed by RITA Beck   DD: 10/25/21       RITA Beck  10/25/21 1627

## 2021-10-26 NOTE — PROGRESS NOTES
[address] [facility]  [facility] [place]. [place]        Date:10/26/2021                                                  Patient Name: Joshua Torres    MRN: [id number]    : 1975    Room: [address] [place] [address]-      Evaluating OT: Carmine Luke OTR/L; [id number]     Referring Provider and Specific Provider Orders/Date:      10/25/21 2000  OT eval and treat Start: 10/25/21 2000, End: 10/25/21 2000, ONE TIME, Standing Count: 1 Occurrences, R      Romi Malik,      Placement Recommendation: Home with no skilled occupational therapy needed after discharge from inpatient. Diagnosis:   1. Generalized abdominal pain    2. Ascites due to alcoholic cirrhosis ([facility] [address]    3. Anasarca    4.  Hyperbilirubinemia         Surgery: paracentesis      Pertinent Medical History:       Past Medical History:   Diagnosis Date    Acute renal failure (ARF) ([facility] [address]     Alcoholic cirrhosis (HCC)     Anxiety     Crohn's colitis ([facility] [address]     Depression     History of blood transfusion     Pyoderma gangrenosum     Thyroid disease          Past Surgical History:   Procedure Laterality Date    ABDOMEN SURGERY      pt has a J-pouch     ARM DEBRIDEMENT      R arm--??    ARM DEBRIDEMENT      COLONOSCOPY      DILATATION, ESOPHAGUS      ENDOSCOPY, COLON, DIAGNOSTIC      TRANSESOPHAGEAL ECHOCARDIOGRAM N/A 9/3/2021    TRANSESOPHAGEAL ECHOCARDIOGRAM WITH BUBBLE STUDY performed by Tee Vallecillo MD at [address] [facility] ED N/A 2021    EGD ESOPHAGOGASTRODUODENOSCOPY performed by Lesli Bryant MD at [facility] ENDOSCOPY      Precautions:  Fall Risk, paracentesis      Assessment of current deficits:     [x] Functional mobility  [x]ADLs  [x] Strength               []Cognition    [x] Functional transfers   [x] IADLs         [] Safety Awareness   [x]Endurance    [] Fine Coordination              [x] Balance      [] Vision/perception   []Sensation     []Gross Motor Coordination  [] ROM  [] Delirium                   [] Motor Control     OT PLAN OF CARE   OT POC based on physician orders, patient diagnosis and results of clinical assessment    Frequency/Duration 1-3 days/wk for 2 weeks PRN     Specific OT Treatment Interventions to include: None, will DC from OT    Recommended Adaptive Equipment: none      Home Living: with family: son; single family home, 2 story, bedroom and bathroom on 2nd floor, 3 steps to enter wtih rail, tub shower. Equipment owned: crutches     Prior Level of Function: Independent with ADLs , Independent with IADLs; ambulated with no device     Pain Level: 8/10 pain in abdomin ; Nursing notified. Cognition: A&O: 4/4; Follows 3 step directions   Memory: good    Sequencing: good    Problem solving: good    Judgement/safety: good     [facility]   AM-PAC Daily Activity Inpatient   How much help for putting on and taking off regular lower body clothing?: None  How much help for Bathing?: None  How much help for Toileting?: None  How much help for putting on and taking off regular upper body clothing?: None  How much help for taking care of personal grooming?: None  How much help for eating meals?: None  AM-[facility] Inpatient Daily Activity Raw Score: 24  AM-PAC Inpatient ADL T-Scale Score : 57.54  ADL Inpatient CMS 0-100% Score: 0  ADL Inpatient CMS G-Code Modifier : CH     Functional Assessment:    Initial Eval Status  Date: 10/26/21   Feeding Independent    Grooming Independent    UB Dressing Independent    LB Dressing Independent    Bathing Independent   Toileting Independent    Bed Mobility  Supine to sit: Independent   Sit to supine: Independent    Functional Transfers Independent from EOB  Independent for transfer to and from low commode   Transfer training with verbal cues for hand placement throughout session to improve safety.     Functional Mobility Independent with no device to and from bathroom Balance Sitting:     Static: good     Dynamic: good   Standing: good  with no device   Activity Tolerance good    Visual/  Perceptual Glasses: contacts             Hand Dominance: right     AROM (PROM) Strength Additional Info:    RUE  WFL 5/5 good  and wfl FMC/dexterity noted during ADL tasks     LUE WFL 5/5 good  and wfl FMC/dexterity noted during ADL tasks       Hearing: WFL   Sensation:  No c/o numbness or tingling  Tone: WFL   Edema: none     Comments: Upon arrival the patient was supine. At end of session, patient was returned to supine with call light and phone within reach, all lines and tubes intact. Overall patient demonstrated decreased independence and safety during completion of ADL/functional transfer/mobility tasks. Pt would benefit from continued skilled OT to increase safety and independence with completion of ADL/IADL tasks for functional independence and quality of life.  Treatment: none    Rehab Potential: Good for established goals. Patient / Family Goal: return home      Patient and/or family were instructed on functional diagnosis, prognosis/goals and OT plan of care. Demonstrated good understanding. Eval Complexity: Low    Time In: 10:40am  Time Out: 11:03am    Total Treatment Time: 0      Min Units   OT Eval Low 97165  X  1    OT Eval Medium 11487      OT Eval High 95600      OT Re-Eval [id number]            ADL/Self Care 23624     Therapeutic Activities 94795       Therapeutic Ex 39629       Orthotic Management 20219       Manual [id number]     Neuro Re-Ed 65963       Non-Billable Time        Evaluation Time additionally includes thorough review of current medical information, gathering information on past medical history/social history and prior level of function, interpretation of standardized testing/informal observation of tasks, assessment of data and development of plan of care and goals.         Evaluating OT: Herlinda Hennessy OTR/L; [id number]

## 2021-10-26 NOTE — PLAN OF CARE
Problem: Pain:  Goal: Pain level will decrease  Outcome: Met This Shift  Note: Pain is less than 3 after being medicated     Problem: Pain:  Goal: Control of acute pain  Outcome: Met This Shift     Problem: Pain:  Goal: Control of chronic pain  Outcome: Met This Shift     Problem: SAFETY  Goal: LTG - patient will adhere to hip precautions during ADL's and transfers  Outcome: Met This Shift     Problem: SAFETY  Goal: LTG - Patient will demonstrate safety requirements appropriate to situation/environment  Outcome: Met This Shift     Problem: SAFETY  Goal: LTG - Patient will demonstrate safety requirements appropriate to situation/environment  Outcome: Met This Shift     Problem: SAFETY  Goal: LTG - patient will utilize safety techniques  Outcome: Met This Shift     Problem: SAFETY  Goal: STG - patient locks brakes on wheelchair  Outcome: Met This Shift     Problem: SAFETY  Goal: STG - Patient uses call light consistently to request assistance with transfers  Outcome: Met This Shift     Problem: SAFETY  Goal: STG - Patient uses call light consistently to request assistance with transfers  Outcome: Met This Shift     Problem: SAFETY  Goal: STG - patient uses gait belt during all transfers  Outcome: Met This Shift

## 2021-10-26 NOTE — PROGRESS NOTES
Patient here for ultrasound guided paracentesis. Instructions given and questions answered. Consent signed. Abdomen scanned and marked under the guidance of procedural Radiologist.     1005 start procedure /55  73  18  100% on room air    1010 end procedure /61  72  18  100% on room air     850 cc drained of yellow colored ascites fluid. Dry sterile dressing of 2x2 foam tape  to RLQ. Sample sent to lab, floor to send labels. Nurse to nurse given to Teri Monterroso RN    Pt sent back to the floor.

## 2021-10-26 NOTE — H&P
Department of Internal Medicine  History and Physical    PCP: Dr. Roberto Valdez   Admitting Physician: Dr. Hung Howard  Consultants: Dr. Daksha Forrest:  Abdominal pain and swelling    HISTORY OF PRESENT ILLNESS:    Amber Giang is a 54-year-old female who presented to [address] [place] [address] emergency department yesterday for the evaluation of abdominal pain and distention. Amber Giang suffers from known alcoholic cirrhosis and states she is intermittently compliant with alcohol cessation. She states her last drink was approximately 1 month ago. She was recently admitted to [facility] and states she was placed on prednisone therapy. She states this led to significant volume retention. She has obvious ascites on examination today. She was admitted to the hospital for the purpose of paracentesis and need to rule out spontaneous bacterial peritonitis. The GI team will provide consultation during the hospitalization as well.     PAST MEDICAL Hx:  Past Medical History:   Diagnosis Date    Acute renal failure (ARF) ([facility] [address] 55/19/5826    Alcoholic cirrhosis (HCC)     Anxiety     Crohn's colitis ([facility] [address]     Depression     History of blood transfusion     Pyoderma gangrenosum     Thyroid disease        PAST SURGICAL Hx:   Past Surgical History:   Procedure Laterality Date    ABDOMEN SURGERY      pt has a J-pouch     ARM DEBRIDEMENT      R arm-2010-2011??    ARM DEBRIDEMENT      COLONOSCOPY      DILATATION, ESOPHAGUS      ENDOSCOPY, COLON, DIAGNOSTIC      TRANSESOPHAGEAL ECHOCARDIOGRAM N/A 9/3/2021    TRANSESOPHAGEAL ECHOCARDIOGRAM WITH BUBBLE STUDY performed by Jerie Halsted, MD at [address] [facility] N/A 9/2/2021    EGD ESOPHAGOGASTRODUODENOSCOPY performed by Jer Maldonado MD at [address] [facility] Hx:  Family History   Problem Relation Age of Onset    Cancer Mother         breast     Emphysema Mother     Lupus Mother     Heart Disease Father     High Cholesterol Father  High Blood Pressure Sister     No Known Problems Son        HOME MEDICATIONS:  Prior to Admission medications    Medication Sig Start Date End Date Taking? Authorizing Provider   sulfamethoxazole-trimethoprim (BACTRIM DS;SEPTRA DS) 800-160 MG per tablet Take 1 tablet by mouth three times a week Monday, Wednesday, Friday   Yes Historical Provider, MD   predniSONE (DELTASONE) 20 MG tablet Take 40 mg by mouth daily   Yes Historical Provider, MD   potassium chloride (KLOR-CON M) 10 MEQ extended release tablet Take 20 mEq by mouth daily   Yes Historical Provider, MD   lactulose (CHRONULAC) 10 GM/15ML solution Take 20 g by mouth every 12 hours   Yes Historical Provider, MD   rifaximin (XIFAXAN) 550 MG tablet Take 550 mg by mouth 2 times daily   Yes Historical Provider, MD   cloNIDine (CATAPRES) 0.1 MG tablet Take 0.1 mg by mouth daily   Yes Historical Provider, MD   magnesium oxide (MAG-OX) 400 MG tablet Take 400 mg by mouth daily   Yes Historical Provider, MD   vitamin B-1 (THIAMINE) 100 MG tablet Take 100 mg by mouth daily   Yes Historical Provider, MD   cefdinir (OMNICEF) 300 MG capsule Take 1 capsule by mouth 2 times daily for 7 days 10/22/21 10/29/21 Yes Gama Mast, DO   HYDROcodone-acetaminophen (NORCO) 5-325 MG per tablet Take 1 tablet by mouth every 6 hours as needed for Pain for up to 8 doses. Intended supply: 3 days.  Take lowest dose possible to manage pain 10/22/21 10/26/21 Yes Ana Maria Mast, DO   ondansetron (ZOFRAN ODT) 4 MG disintegrating tablet Take 1 tablet by mouth every 8 hours as needed for Nausea or Vomiting 10/22/21 11/1/21 Yes Arlen Mast DO   folic acid (FOLVITE) 1 MG tablet Take 1 tablet by mouth daily 2/2/21  Yes Corbin Francisco DO   pantoprazole (PROTONIX) 40 MG tablet Take 40 mg by mouth daily    Yes Historical Provider, MD   propranolol (INDERAL) 10 MG tablet Take 20 mg by mouth every 12 hours     Historical Provider, MD   thiamine mononitrate 100 MG tablet Take 1 tablet by mouth daily 2/2/21 3/4/21  Etelvina Lemons DO       ALLERGIES:  Patient has no known allergies. SOCIAL Hx:  Social History     Socioeconomic History    Marital status:      Spouse name: Not on file    Number of children: 1    Years of education: Not on file    Highest education level: Not on file   Occupational History    Not on file   Tobacco Use    Smoking status: Never Smoker    Smokeless tobacco: Never Used   Vaping Use    Vaping Use: Never used   Substance and Sexual Activity    Alcohol use: Not Currently     Comment: stopped but drink 3 weeks ago    Drug use: Not Currently    Sexual activity: Not on file   Other Topics Concern    Not on file   Social History Narrative    Not on file     Social Determinants of Health     Financial Resource Strain: Low Risk     Difficulty of Paying Living Expenses: Not hard at all   Food Insecurity: No Food Insecurity    Worried About Running Out of Food in the Last Year: Never true    [place] of Food in the Last Year: Never true   Transportation Needs: No Transportation Needs    Lack of Transportation (Medical): No    Lack of Transportation (Non-Medical): No   Physical Activity: Unknown    Days of Exercise per Week: 0 days    Minutes of Exercise per Session: Not on file   Stress: No Stress Concern Present    Feeling of Stress : Only a little   Social Connections: Moderately Integrated    Frequency of Communication with Friends and Family: More than three times a week    Frequency of Social Gatherings with Friends and Family: Three times a week    Attends Jainism Services: More than 4 times per year    Active Member of Clubs or Organizations:  Yes    Attends Club or Organization Meetings: More than 4 times per year    Marital Status:    Intimate Partner Violence: Not At Risk    Fear of Current or Ex-Partner: No    Emotionally Abused: No    Physically Abused: No    Sexually Abused: No       ROS:  General:   Denies chills, fatigue, fever, malaise, night sweats or weight loss    Psychological:   Denies anxiety, disorientation or hallucinations    ENT:    Denies epistaxis, headaches, vertigo or visual changes    Cardiovascular:   Denies any chest pain, irregular heartbeats, or palpitations. No paroxysmal nocturnal dyspnea. Respiratory:   Denies shortness of breath, coughing, sputum production, hemoptysis, or wheezing. No orthopnea. Gastrointestinal:   Abdominal pain and distention with underlying ascites. Much of her pain is localized to the left lower quadrant. .      Genito-Urinary:    Denies any urgency, frequency, hematuria. Voiding without difficulty. Musculoskeletal:   Denies joint pain, joint stiffness, joint swelling or muscle pain    Neurology:    Denies any headache or focal neurological deficits. No weakness or paresthesia. Derm:    Denies any rashes, ulcers, or excoriations. Denies bruising. Extremities:   Denies any lower extremity swelling or edema. PHYSICAL EXAM:  VITALS:  Vitals:    10/26/21 0530   BP: 115/79   Pulse: 67   Resp: 15   Temp: 97.6 °F (36.4 °C)   SpO2: 98%         CONSTITUTIONAL:    Awake, alert, cooperative, no apparent distress, and appears stated age    EYES:    PERRL, EOMI, sclera clear, conjunctiva normal    ENT:    Normocephalic, atraumatic, sinuses nontender on palpation. External ears without lesions. Oral pharynx with moist mucus membranes. Tonsils without erythema or exudates. NECK:    Supple, symmetrical, trachea midline, no adenopathy, thyroid symmetric, not enlarged and no tenderness, skin normal, no bruits, no JVD    HEMATOLOGIC/LYMPHATICS:    No cervical lymphadenopathy and no supraclavicular lymphadenopathy    LUNGS:    Symmetric.  No increased work of breathing, good air exchange, clear to auscultation bilaterally, no wheezes, rhonchi, or rales,     CARDIOVASCULAR:    Normal apical impulse, regular rate and rhythm, normal S1 and S2, no S3 or S4, and no murmur noted    ABDOMEN:    Abdominal distention with ascites. Pain to palpation diffusely with voluntary guarding elicited. No rebound tenderness. MUSCULOSKELETAL:    There is no redness, warmth, or swelling of the joints. Full range of motion noted. Motor strength is 5 out of 5 all extremities bilaterally. Tone is normal.    NEUROLOGIC:    Awake, alert, oriented to name, place and time. Cranial nerves II-XII are grossly intact. Motor is 5 out of 5 bilaterally. SKIN:    No bruising or bleeding. No redness, warmth, or swelling    EXTREMITIES:    Peripheral pulses present. No edema, cyanosis, or swelling. OSTEOPATHIC:    Examined in seated and supine positions. Normal thoracic kyphosis and lumbar lordosis. No acute somatic dysfunction. LABORATORY DATA:  CBC with Differential:    Lab Results   Component Value Date    WBC 5.1 10/26/2021    RBC 2.35 10/26/2021    HGB 7.9 10/26/2021    HCT 24.4 10/26/2021    PLT 40 10/26/2021    .8 10/26/2021    MCH 33.6 10/26/2021    MCHC 32.4 10/26/2021    RDW 18.6 10/26/2021    SEGSPCT 59 08/11/2011    METASPCT 3 07/05/2011    LYMPHOPCT 15.5 10/25/2021    MONOPCT 6.8 10/25/2021    MYELOPCT 0.9 09/02/2021    BASOPCT 0.4 10/25/2021    MONOSABS 0.46 10/25/2021    LYMPHSABS 1.05 10/25/2021    EOSABS 0.20 10/25/2021    BASOSABS 0.03 10/25/2021     CMP:    Lab Results   Component Value Date     10/26/2021    K 3.8 10/26/2021    K 2.9 01/27/2021     10/26/2021    CO2 19 10/26/2021    BUN 6 10/26/2021    CREATININE 0.9 10/26/2021    GFRAA >60 10/26/2021    LABGLOM >60 10/26/2021    GLUCOSE 90 10/26/2021    GLUCOSE 125 08/11/2011    PROT 6.3 10/26/2021    LABALBU 2.9 10/26/2021    LABALBU 3.6 08/11/2011    CALCIUM 8.5 10/26/2021    BILITOT 4.8 10/26/2021    ALKPHOS 221 10/26/2021    AST 45 10/26/2021    ALT 47 10/26/2021       ASSESSMENT:  1.  Decompensated alcoholic cirrhosis with resultant ascites and need to rule out spontaneous bacterial peritonitis  2. Recent hospitalization with Staphylococcus bacteremia  3. Hyperammonemia without overt hepatic encephalopathy  4. Normocytic anemia of chronic disease  5. Hypothyroidism  6. Generalized anxiety and depression    PLAN:  We will move forward with IR guided paracentesis. Albumin will be administered and complete fluid analysis will be obtained. We will cover with antibiotic therapy. The GI team will provide consultation. Cirrhotic medications are being maximized. Appropriate pain control will be undertaken. We again stressed the importance of alcohol cessation as an outpatient. Blood cultures will be repeated with known history of Staphylococcus bacteremia. She will work with the therapy teams.     Zeke Dangelo DO, D.O., [place]  6:49 AM  10/26/2021    Electronically signed by Zeke Dangelo DO on 10/26/21 at 6:49 AM EDT

## 2021-10-26 NOTE — CONSULTS
[facility]  Dr. Darwin Olmos M.D., Dr. Marito Canales M.D., Dr. Cristian Contreras, D.O., Dr. Vera Card M.D., Dr. Audra Ritchie D.O. Patient Name: Kalie Espinosa  MRN: [id number]  : 1975 (51 y.o. female)  Allergies: has No Known Allergies. Date of Service: 10/26/2021       Reason for Consultation: Decompensated cirrhosis with ascites    HISTORY OF PRESENT ILLNESS:      The patient is a 51 y.o. female who presents with left side abdominal pain. Patient has past medical history significant for alcohol use cirrhosis. Patient states that she started having right abdominal pain she presented to the emergency room couple weeks ago and was found to have a UTI. Patient was sent home on antibiotics. However her symptoms progressed and she became to have left-sided abdominal pain. Patient returned to the emergency room for further evaluation. In emergency room patient was found to be distended with ascitic fluid to her abdomen. Patient was admitted for paracentesis and gastroenterology was consulted to further management. I personally saw the patient at the bedside on the stable condition. Patient admits to having worsening left-sided abdominal pain. Patient says that this symptoms are look like when she pregnancy head from UTI. Patient describes her pain as sharp and mostly localized to left costovertebral angle. Patient says that despite taking antibiotics she continued to have abdominal pain. Patient was taken for paracentesis done by interventional radiology. Patient had about 850 cc of fluid removed. Patient denies any signs of nausea or vomiting or hematemesis. Patient admits to some rectal bleeding but she attributed the symptoms to a anal skin tag. Patient last colonoscopy was about a year ago unsure of the results. Patient also states that she had a upper endoscopy but she does not remember the number and the results of the upper endoscopy.   Patient states that her procedure was done at [address]. Patient does not have any additional complaint. Patient says that she has been abstaining from alcohol since September and October. Patient denies any chest pain, shortness of breath, palpitation,. Patient is negative for fever, nausea, vomiting, and or diarrhea. Patient denies any symptoms of dysuria, or burning sensation with urination. However throughout my examination patient was sent to be slightly confused. Hepatic encephalopathy grade 1. Review of medical records showed that patient was recently admitted in a nearby facility where she was treated for call abuse and intoxication, anemia and decompensated liver failure. Patient was ultimately discharged on clonidine and propanolol 10 mg every 12 hours and advised to follow-up with GI in the outpatient setting. Patient was unable to follow-up with GI in the outpatient given that she is now admitted in the hospital.    Patient meld score was found to be 22 as well as the midday score 53.1. Current meld score is 21. Patient did not have any additional complaint. Hemodynamically stable. And afebrile. REVIEW OF SYSTEMS:   Constitutional: Denies fever, chills, or unintentional weight loss. HEENT: Denies double or blurry vision, headaches, ear pain or ringing in the ears. No drainage from the ears, nose or throat. Cardiovascular: Denies any chest pain, irregular heartbeats, or palpitations. Respiratory: Denies shortness of breath, coughing, sputum production, hemoptysis, or wheezing. Gastrointestinal: Denies nausea, vomiting, diarrhea, or constipation. Denies any abdominal pain, black or bloody stools. Genitourinary: Denies any urinary urgency, frequency, hematuria. Voiding without difficulty. Endocrine: Denies sensitivity to heat or cold. Denies changes in hair, skin or nails.  Denies excessive thirst, hunger or going to the bathroom more frequently than usual.  Extremities: Denies swelling or calf pain. Musculoskeletal: Denies muscle or joint pain, stiffness, arthritis, gout, or instability. Dermatology / Skin: Denies any rashes, ulcers, or excoriations. Denies bruising. Neurology: Denies any bowel or bladder incontinence, headache or focal neurological deficits. No weakness or paresthesia. Denies numbness or tingling in the hands or feet. Psychiatric: Denies nervousness/anxiety, depression or memory loss. Past Medical History:  Past Medical History:   Diagnosis Date    Acute renal failure (ARF) ([facility] [address] 48/45/0424    Alcoholic cirrhosis (HCC)     Anxiety     Crohn's colitis ([facility] [address]     Depression     History of blood transfusion     Pyoderma gangrenosum     Thyroid disease        Past Surgical History:  Past Surgical History:   Procedure Laterality Date    ABDOMEN SURGERY      pt has a J-pouch     ARM DEBRIDEMENT      R arm-2010-2011??    ARM DEBRIDEMENT      COLONOSCOPY      DILATATION, ESOPHAGUS      ENDOSCOPY, COLON, DIAGNOSTIC      TRANSESOPHAGEAL ECHOCARDIOGRAM N/A 9/3/2021    TRANSESOPHAGEAL ECHOCARDIOGRAM WITH BUBBLE STUDY performed by Hope Tidwell MD at [facility] N/A 9/2/2021    EGD ESOPHAGOGASTRODUODENOSCOPY performed by Marito Canales MD at [address] [place] [address] Medications:  Prior to Admission medications    Medication Sig Start Date End Date Taking?  Authorizing Provider   sulfamethoxazole-trimethoprim (BACTRIM DS;SEPTRA DS) 800-160 MG per tablet Take 1 tablet by mouth three times a week Monday, Wednesday, Friday   Yes Historical Provider, MD   predniSONE (DELTASONE) 20 MG tablet Take 40 mg by mouth daily   Yes Historical Provider, MD   potassium chloride (KLOR-CON M) 10 MEQ extended release tablet Take 20 mEq by mouth daily   Yes Historical Provider, MD   lactulose (CHRONULAC) 10 GM/15ML solution Take 20 g by mouth every 12 hours   Yes Historical Provider, MD   rifaximin (XIFAXAN) 550 MG tablet Take 550 mg by mouth 2 times daily   Yes Historical Provider, MD   cloNIDine (CATAPRES) 0.1 MG tablet Take 0.1 mg by mouth daily   Yes Historical Provider, MD   magnesium oxide (MAG-OX) 400 MG tablet Take 400 mg by mouth daily   Yes Historical Provider, MD   vitamin B-1 (THIAMINE) 100 MG tablet Take 100 mg by mouth daily   Yes Historical Provider, MD   cefdinir (OMNICEF) 300 MG capsule Take 1 capsule by mouth 2 times daily for 7 days 10/22/21 10/29/21 Yes Gama Mast DO   HYDROcodone-acetaminophen (NORCO) 5-325 MG per tablet Take 1 tablet by mouth every 6 hours as needed for Pain for up to 8 doses. Intended supply: 3 days. Take lowest dose possible to manage pain 10/22/21 10/26/21 Yes Ana Maria Mast DO   ondansetron (ZOFRAN ODT) 4 MG disintegrating tablet Take 1 tablet by mouth every 8 hours as needed for Nausea or Vomiting 10/22/21 11/1/21 Yes Arlen Mast DO   folic acid (FOLVITE) 1 MG tablet Take 1 tablet by mouth daily 2/2/21  Yes Corbin Francisco DO   pantoprazole (PROTONIX) 40 MG tablet Take 40 mg by mouth daily    Yes Historical Provider, MD   propranolol (INDERAL) 10 MG tablet Take 20 mg by mouth every 12 hours     Historical Provider, MD   thiamine mononitrate 100 MG tablet Take 1 tablet by mouth daily 2/2/21 3/4/21  Corbin Francisco DO       Allergies: Patient has no known allergies.     Social History:  Social History     Socioeconomic History    Marital status:      Spouse name: Not on file    Number of children: 1    Years of education: Not on file    Highest education level: Not on file   Occupational History    Not on file   Tobacco Use    Smoking status: Never Smoker    Smokeless tobacco: Never Used   Vaping Use    Vaping Use: Never used   Substance and Sexual Activity    Alcohol use: Not Currently     Comment: stopped but drink 3 weeks ago    Drug use: Not Currently    Sexual activity: Not on file   Other Topics Concern    Not on file   Social History Narrative    Not on file     Social Determinants of Health     Financial Resource Strain: Low Risk     Difficulty of Paying Living Expenses: Not hard at all   Food Insecurity: No Food Insecurity    Worried About Running Out of Food in the Last Year: Never true    Yumiko of Food in the Last Year: Never true   Transportation Needs: No Transportation Needs    Lack of Transportation (Medical): No    Lack of Transportation (Non-Medical): No   Physical Activity: Unknown    Days of Exercise per Week: 0 days    Minutes of Exercise per Session: Not on file   Stress: No Stress Concern Present    Feeling of Stress : Only a little   Social Connections: Moderately Integrated    Frequency of Communication with Friends and Family: More than three times a week    Frequency of Social Gatherings with Friends and Family: Three times a week    Attends Taoism Services: More than 4 times per year    Active Member of Clubs or Organizations:  Yes    Attends Club or Organization Meetings: More than 4 times per year    Marital Status:    Intimate Partner Violence: Not At Risk    Fear of Current or Ex-Partner: No    Emotionally Abused: No    Physically Abused: No    Sexually Abused: No       Family History:  Family History   Problem Relation Age of Onset    Cancer Mother         breast     Emphysema Mother     Lupus Mother     Heart Disease Father     High Cholesterol Father     High Blood Pressure Sister     No Known Problems Son          PHYSICAL EXAM:  Vital Signs: /64   Pulse 70   Temp 97.6 °F (36.4 °C) (Oral)   Resp 15   Wt 254 lb (115.2 kg)   LMP  (LMP Unknown)   SpO2 98%   BMI 38.62 kg/m²   GENERAL APPEARANCE:  awake, alert, oriented, cooperative, and in no acute distress  EYES:  Lids and lashes normal, PERRLA, EOMI, sclera clear, conjunctiva normal  HENT:  Normocephalic, without obvious abnormality, atramatic, oral pharynx with moist mucus membranes, tonsils without erythema or exudates  NECK:  Supple with no carotid bruits, JVD or thyromegaly. No cervical adenopathy  LUNGS:  Clear to auscultation bilaterally with no wheezes, rales or rhonchi. No increased work of breathing, good air exchange. CARDIOVASCULAR: Regular rate and rhythm, no murmur  ABDOMEN:  normal bowel sounds in all 4 quadrants, soft, non-distended, non-tender, no masses palpated, no hepatosplenomegally  MUSCULOSKELETAL:  There is no redness, warmth, or swelling of the joints. Full range of motion noted. Motor strength is 5 out of 5 all extremities bilaterally. Tone is normal.  EXTREMITIES: No edema, 2+ pulses bilaterally (radial and dorsalis pedis)  NEUROLOGIC:  Awake, alert, oriented to name, place and time. Cranial nerves II-XII are grossly intact. Motor is 5 out of 5 bilaterally. SKIN: Normal skin color, texture, and turgor. There is no redness, warmth, or swelling. No bruising or bleeding, no mottling. PSYCH: Affect, behavior and insight are all within normal limits.       DATA:  Results for orders placed or performed during the hospital encounter of 10/25/21   Urinalysis   Result Value Ref Range    Color, UA DKYELLOW Straw/Yellow    Clarity, UA Clear Clear    Glucose, Ur Negative Negative mg/dL    Bilirubin Urine SMALL (A) Negative    Ketones, Urine TRACE (A) Negative mg/dL    Specific Gravity, UA 1.010 1.005 - 1.030    Blood, Urine TRACE (A) Negative    pH, UA 7.0 5.0 - 9.0    Protein, UA TRACE Negative mg/dL    Urobilinogen, Urine 0.2 <2.0 E.U./dL    Nitrite, Urine POSITIVE (A) Negative    Leukocyte Esterase, Urine Negative Negative   CBC Auto Differential   Result Value Ref Range    WBC 6.8 4.5 - 11.5 E9/L    RBC 2.59 (L) 3.50 - 5.50 E12/L    Hemoglobin 8.7 (L) 11.5 - 15.5 g/dL    Hematocrit 27.1 (L) 34.0 - 48.0 %    .6 (H) 80.0 - 99.9 fL    MCH 33.6 26.0 - 35.0 pg    MCHC 32.1 32.0 - 34.5 %    RDW 18.6 (H) 11.5 - 15.0 fL    Platelets 45 (L) 277 - 450 E9/L    MPV 12.9 (H) 7.0 - 12.0 fL    Neutrophils % 74.0 43.0 - 80.0 %    Immature Granulocytes % 0.4 0.0 - 5.0 %    Lymphocytes % 15.5 (L) 20.0 - 42.0 %    Monocytes % 6.8 2.0 - 12.0 %    Eosinophils % 2.9 0.0 - 6.0 %    Basophils % 0.4 0.0 - 2.0 %    Neutrophils Absolute 5.02 1.80 - 7.30 E9/L    Immature Granulocytes # 0.03 E9/L    Lymphocytes Absolute 1.05 (L) 1.50 - 4.00 E9/L    Monocytes Absolute 0.46 0.10 - 0.95 E9/L    Eosinophils Absolute 0.20 0.05 - 0.50 E9/L    Basophils Absolute 0.03 0.00 - 0.20 E9/L   Comprehensive Metabolic Panel   Result Value Ref Range    Sodium 137 132 - 146 mmol/L    Potassium 3.5 3.5 - 5.0 mmol/L    Chloride 105 98 - 107 mmol/L    CO2 20 (L) 22 - 29 mmol/L    Anion Gap 12 7 - 16 mmol/L    Glucose 122 (H) 74 - 99 mg/dL    BUN 6 6 - 20 mg/dL    CREATININE 0.8 0.5 - 1.0 mg/dL    GFR Non-African American >60 >=60 mL/min/1.73    GFR African American >60     Calcium 8.4 (L) 8.6 - 10.2 mg/dL    Total Protein 7.1 6.4 - 8.3 g/dL    Albumin 3.0 (L) 3.5 - 5.2 g/dL    Total Bilirubin 4.5 (H) 0.0 - 1.2 mg/dL    Alkaline Phosphatase 277 (H) 35 - 104 U/L    ALT 62 (H) 0 - 32 U/L    AST 66 (H) 0 - 31 U/L   Lactic Acid, Plasma   Result Value Ref Range    Lactic Acid 1.6 0.5 - 2.2 mmol/L   Lipase   Result Value Ref Range    Lipase 67 (H) 13 - 60 U/L   Protime-INR   Result Value Ref Range    Protime 20.4 (H) 9.3 - 12.4 sec    INR 1.7    APTT   Result Value Ref Range    aPTT 24.8 24.5 - 35.1 sec   Ammonia   Result Value Ref Range    Ammonia 65.0 (H) 11.0 - 51.0 umol/L   Troponin   Result Value Ref Range    Troponin, High Sensitivity 21 (H) 0 - 9 ng/L   Brain Natriuretic Peptide   Result Value Ref Range    Pro- 0 - 125 pg/mL   Microscopic Urinalysis   Result Value Ref Range    WBC, UA 0-1 0 - 5 /HPF    RBC, UA 0-1 0 - 2 /HPF    Epithelial Cells, UA RARE /HPF    Bacteria, UA RARE (A) None Seen /HPF   Platelet Confirmation   Result Value Ref Range    Platelet Confirmation CONFIRMED    Amylase, body fluid   Result Value Ref Range    Fluid Type Ascites    Ammonia   Result Value Ref Range    Ammonia 87.0 (H) 11.0 - 51.0 umol/L   Protime-INR   Result Value Ref Range    Protime 24.0 (H) 9.3 - 12.4 sec    INR 2.0    CBC Auto Differential   Result Value Ref Range    WBC 5.1 4.5 - 11.5 E9/L    RBC 2.35 (L) 3.50 - 5.50 E12/L    Hemoglobin 7.9 (L) 11.5 - 15.5 g/dL    Hematocrit 24.4 (L) 34.0 - 48.0 %    .8 (H) 80.0 - 99.9 fL    MCH 33.6 26.0 - 35.0 pg    MCHC 32.4 32.0 - 34.5 %    RDW 18.6 (H) 11.5 - 15.0 fL    Platelets 40 (L) 057 - 450 E9/L    MPV 12.5 (H) 7.0 - 12.0 fL    Neutrophils % 64.6 43.0 - 80.0 %    Lymphocytes % 24.8 20.0 - 42.0 %    Monocytes % 3.5 2.0 - 12.0 %    Eosinophils % 5.3 0.0 - 6.0 %    Basophils % 1.8 0.0 - 2.0 %    Neutrophils Absolute 3.32 1.80 - 7.30 E9/L    Lymphocytes Absolute 1.28 (L) 1.50 - 4.00 E9/L    Monocytes Absolute 0.20 0.10 - 0.95 E9/L    Eosinophils Absolute 0.27 0.05 - 0.50 E9/L    Basophils Absolute 0.09 0.00 - 0.20 E9/L    Anisocytosis 2+     Poikilocytes 2+     [place] Cells 2+     Ovalocytes 1+    Comprehensive Metabolic Panel   Result Value Ref Range    Sodium 138 132 - 146 mmol/L    Potassium 3.8 3.5 - 5.0 mmol/L    Chloride 108 (H) 98 - 107 mmol/L    CO2 19 (L) 22 - 29 mmol/L    Anion Gap 11 7 - 16 mmol/L    Glucose 90 74 - 99 mg/dL    BUN 6 6 - 20 mg/dL    CREATININE 0.9 0.5 - 1.0 mg/dL    GFR Non-African American >60 >=60 mL/min/1.73    GFR African American >60     Calcium 8.5 (L) 8.6 - 10.2 mg/dL    Total Protein 6.3 (L) 6.4 - 8.3 g/dL    Albumin 2.9 (L) 3.5 - 5.2 g/dL    Total Bilirubin 4.8 (H) 0.0 - 1.2 mg/dL    Alkaline Phosphatase 221 (H) 35 - 104 U/L    ALT 47 (H) 0 - 32 U/L    AST 45 (H) 0 - 31 U/L   Magnesium   Result Value Ref Range    Magnesium 1.5 (L) 1.6 - 2.6 mg/dL   Phosphorus   Result Value Ref Range    Phosphorus 3.3 2.5 - 4.5 mg/dL   Platelet Confirmation   Result Value Ref Range    Platelet Confirmation CONFIRMED    EKG 12 Lead   Result Value Ref Range    Ventricular Rate 89 BPM    Atrial Rate 89 BPM    P-R Interval 118 ms QRS Duration 82 ms    Q-T Interval 370 ms    QTc Calculation (Bazett) 450 ms    P Axis 42 degrees    R Axis 11 degrees    T Axis 17 degrees         IMAGING:  CT ABDOMEN PELVIS W IV CONTRAST Additional Contrast? None    Result Date: 10/22/2021  EXAMINATION: CT OF THE ABDOMEN AND PELVIS WITH CONTRAST 10/22/2021 6:15 am TECHNIQUE: CT of the abdomen and pelvis was performed with the administration of intravenous contrast. Multiplanar reformatted images are provided for review. Dose modulation, iterative reconstruction, and/or weight based adjustment of the mA/kV was utilized to reduce the radiation dose to as low as reasonably achievable. COMPARISON: 08/31/2021 HISTORY: ORDERING SYSTEM PROVIDED HISTORY: diffuse pain, nausea, vomiting TECHNOLOGIST PROVIDED HISTORY: Reason for exam:->diffuse pain, nausea, vomiting Additional Contrast?->None Decision Support Exception - unselect if not a suspected or confirmed emergency medical condition->Emergency Medical Condition (MA) FINDINGS: Redemonstration of significant hepatosplenomegaly similar to previous. There also fairly extensive paraesophageal varices. These findings are also similar to previous. Slight nodular contour of the liver which may be related to cirrhosis. Moderate free fluid in the abdomen and pelvis similar to slightly increased from previous. Gallbladder is partially decompressed. A previously described tiny low-attenuation focus in the body of the pancreas is unchanged and was also further assessed with MRI dated 08/31/2021. There is some minimal stranding of the fat about the pancreas which may be incidental or due to mild pancreatitis. No adrenal mass. No hydronephrosis. Assessment of bowel is limited without oral contrast.  No bowel obstruction. There are some areas of mild bowel thickening which may be reactive to the free fluid. Other etiologies are thought less likely but not excluded.  There are postoperative changes in the right colon with suture material similar to previous. Postoperative changes of the sigmoid colon with anastomotic suture material extending into the rectosigmoid region unchanged from prior. Anterior protrusion of the midline of the abdominal wall with rectus diastasis is unchanged from prior. There is also a ventral hernia just above this, containing ascites. Moderate anasarca. Urinary bladder is unremarkable. Uterus has an unremarkable CT appearance. No abscess. No free air. Mild cardiomegaly. Lung bases are clear. Redemonstration of hepatosplenomegaly with prominent varices, specially in the paraesophageal region similar to previous. Moderate ascites which is similar to slightly increased from previous. Areas of bowel thickening or probably reactive to the ascites. Other infectious or inflammatory process thought less likely but not excluded. Stranding of the fat about the pancreas may be incidental or due to low-grade pancreatitis. Correlate with laboratory values. Stable tiny low-attenuation focus in the body of the pancreas. Please see report from MRI dated 08/31/2021 for follow-up recommendations. Moderate anasarca. IR US GUIDED PARACENTESIS    Result Date: 10/26/2021  PROCEDURE: PARACENTESIS WITHOUT IMAGE GUIDANCE US ABDOMEN LIMITED 10/26/2021 HISTORY: ORDERING SYSTEM PROVIDED HISTORY: Paracentesis TECHNOLOGIST PROVIDED HISTORY: Reason for exam:->Paracentesis TECHNIQUE: Informed consent was obtained after a detailed explanation of the procedure including risks, benefits, and alternatives. Universal protocol was followed. A limited ultrasound of the abdomen was performed. The right abdomen was prepped and draped in sterile fashion and local anesthesia was achieved with lidocaine. A 5 Macedonian needle sheath was advanced into ascites and paracentesis was performed. The patient tolerated the procedure well. FINDINGS: Limited ultrasound of the abdomen demonstrates ascites.  A total of 850 cc of straw-colored ascitic fluid was removed. Status post paracentesis. ASSESSMENT:  Principal Problem:    Decompensated hepatic cirrhosis (HCC)  Resolved Problems:    * No resolved hospital problems. *      PLAN:      1. Acute on chronic microcytic anemia  -Patient denies hematemesis but admits to some rectal bleeding. Patient says that when she wipes she has specks of blood.  -Patient does not have any clear evidence of overt GI bleeding.  -We will order iron study to evaluate macrocytosis. -EGD from 9/2/2021 revealed portal gastropathy.  -No need for urgent endoscopy evaluation at this time  -We will continue to monitor H&H and repeat upper endoscopy if patient shows signs of overt bleed or significant drop of hemoglobin.  -Continue to monitor H&H and transfuse if hemoglobin is less than 7 per primary team.    2. Decompensated ETOH liver cirrhosis with ascites  -Patient is status post paracentesis and had about 850 cc of ascitic fluid removed. -Patient admits to to abstinence from alcohol patient says that she has not had a drink since September or early October. Current MELD sodium 21. A week ago her meld was 22. A discriminatory function score was 53.1 during the previous hospital admission current score is 58.2 patient might benefit from glucocorticoid therapy. Currently on 1 g of ceftriaxone every 24 hours for a urinary tract infection and also for SBP prophylactic  -Ascitic fluid analysis with culture, cytology, albumin was ordered for further evaluation  -Chest x-ray and KUB as well as blood culture was ordered for further infectious work-up. -At this point we will continue with antibiotics, lactulose, rifaximin, as well as spironolactone 25mg  and address management pending microbiology result.     3. Alcohol abuse, Persistent  -Patient was again advised from restrain from alcohol which she says she has been abstaining from it since month of September and October.  -We will continue to monitor her mental status and initiate CIWA protocol as needed. 4. Vague history of Crohn disease.  -Per medical record patient had failed Humira he denies any symptoms of diarrhea or overt rectal bleeding.  -Patient says that her last colonoscopy she believes report was negative but she is unsure. 5. Hepatic encephalopathy  -Hepatic encephalopathy grade 1.  -Patient says that she has been on lactulose unsure if she is compliant with this medication.  -We will initiate lactulose and titrate to 2-3 bowel movement per day. -Monitor for mental status improvement. Please see orders for further plan of care. Reviewed above with Dr. Ana María Cross and Dr. Miguel A Kidd DO, D.O.   GI fellow  10/26/2021 at 11:12 AM    Pt seen and independently examined. Pertinent notes and lab work reviewed. D/w Dr. Tam Contreras with physical exam and A&P. Discussed with patient - all questions answered - agreeable with the plan as delineated. Thank you for the opportunity to see this patient in consultation.     Kaity Abrams MD  10/26/2021  12:24 PM

## 2021-10-26 NOTE — PROGRESS NOTES
Physical Therapy    Room #:  0314/0314-01  Patient Name: Zaheer Espinosa  YOB: 1975  MRN: [id number]      Date of Service: 10/26/2021    Chart reviewed. Spoke with nursing and patient. Patient  reports to be independent with bed mobility, transfers and gait. No skilled Physical Therapy needs at this time. Please re-order Physical Therapy if there is a change in physical status and Physical Therapy is needed. Thank you.      Genesis Hpoe, PT

## 2021-10-26 NOTE — CARE COORDINATION
SS NOTE: NO COVID TESTING DONE. SW met with pt today. Pt relates that she resides in her own 2 story home with 2 step entry with railing with her 14 yo son- that she shares custody with her ex . Pt relates that she was I pta with adls iadls and driving. She was working up until this illness and now she is on a medical leave. Pt has no dme at home. She did receive home IV infusion in August from [facility]. Pt relates that she has no hx of rehab or snf. She has gone to [facility] last February for recovery services. Pt's PCP is Dr Renata Quigley and her pharmacy is [facility] on [address]. SW offered to contact [facility] however pt declined and feels that she is not in need of recovery at this time. Pt plans on returning home at [facility]. SS to continue. DAMIAN Lo.10/26/2021.3:36 PM.

## 2021-10-27 NOTE — PLAN OF CARE
Problem: Pain:  Goal: Pain level will decrease  Outcome: Met This Shift  Goal: Control of acute pain  Outcome: Met This Shift  Goal: Control of chronic pain  Outcome: Met This Shift     Problem: Pain:  Goal: Pain level will decrease  Outcome: Met This Shift     Problem: Pain:  Goal: Control of acute pain  Outcome: Met This Shift     Problem: Pain:  Goal: Control of chronic pain  Outcome: Met This Shift     Problem: SAFETY  Goal: LTG - patient will adhere to hip precautions during ADL's and transfers  Outcome: Met This Shift     Problem: SAFETY  Goal: LTG - Patient will demonstrate safety requirements appropriate to situation/environment  Outcome: Met This Shift     Problem: SAFETY  Goal: LTG - patient will utilize safety techniques  Outcome: Met This Shift     Problem: SAFETY  Goal: STG - patient locks brakes on wheelchair  Outcome: Met This Shift     Problem: SAFETY  Goal: STG - Patient uses call light consistently to request assistance with transfers  Outcome: Met This Shift

## 2021-10-27 NOTE — PROGRESS NOTES
PROGRESS NOTE    By Linsey Kang D.O GI Fellow    [facility]  Dr. Elroy Dominguez MD, Dr. Iain Jj MD, Dr Oksana Dickens, Dr. Danna Greer MD, Dr. Imani Lawson, [facility]  51 y.o.  female    SUBJECTIVE: Patient seen and examined at bedside this morning. No acute event reported overnight. Patient admits to having some hematuria. However, denies any symptoms of rectal bleeding. Patient continued to admit to left-sided costovertebral tenderness. KUB was ordered and was unremarkable. No additional complaint. Hemodynamically stable. Afebrile otherwise    OBJECTIVE: KUB showed no free intraperitoneal air. Hepatosplenomegaly was noted with mild abdominal ileus.     /68   Pulse 80   Temp 99.7 °F (37.6 °C) (Oral)   Resp 18   Wt 254 lb (115.2 kg)   LMP  (LMP Unknown)   SpO2 98%   BMI 38.62 kg/m²     Gen: NAD, AAO x 3  HEENT:PEERL, no icterus  Heart: RRR, no M/R/G  Lungs: CTAB  Abd.: soft, left side tenderness, ND, BS +, no G/R, no HSM  Extr.: no C/C/E, no bruising         Lab Results   Component Value Date    WBC 5.7 10/27/2021    WBC 5.1 10/26/2021    WBC 6.8 10/25/2021    HGB 7.8 10/27/2021    HGB 7.9 10/26/2021    HGB 8.7 10/25/2021    HCT 24.1 10/27/2021    .4 10/27/2021    RDW 18.5 10/27/2021    PLT 46 10/27/2021    PLT 40 10/26/2021    PLT 45 10/25/2021     Lab Results   Component Value Date     10/27/2021    K 4.3 10/27/2021    K 2.9 01/27/2021     10/27/2021    CO2 18 10/27/2021    BUN 10 10/27/2021    CREATININE 1.1 10/27/2021    CALCIUM 7.9 10/27/2021    PROT 6.0 10/27/2021    LABALBU 2.5 10/27/2021    LABALBU 3.6 08/11/2011    BILITOT 2.6 10/27/2021    BILITOT 4.8 10/26/2021    BILITOT 4.5 10/25/2021    ALKPHOS 218 10/27/2021    ALKPHOS 221 10/26/2021    ALKPHOS 277 10/25/2021    AST 47 10/27/2021    AST 45 10/26/2021    AST 66 10/25/2021    ALT 40 10/27/2021    ALT 47 10/26/2021    ALT 62 10/25/2021     Lab Results   Component Value Date LIPASE 67 10/25/2021    LIPASE 100 10/22/2021    LIPASE 63 08/31/2021     Lab Results   Component Value Date    AMYLASE 123 08/31/2021         ASSESSMENT/PLAN:    1. Acute on chronic microcytic anemia  -Patient denies hematemesis but admits to hematuria. Most likely secondary to urinary tract infection.  -Patient does not have any clear evidence of overt GI bleeding.  -We will order folate and B12 study to evaluate macrocytosis. -EGD from 9/2/2021 revealed portal gastropathy.  -Hemoglobin has been stable around 7 and 8. No need for urgent endoscopy evaluation at this time  -We will continue to monitor H&H and repeat upper endoscopy if patient shows signs of overt bleed or significant drop of hemoglobin.  -Continue to monitor H&H and transfuse if hemoglobin is less than 7 per primary team.     2. Decompensated ETOH liver cirrhosis with ascites most likely secondary to her urinary tract infection  -Patient is status post paracentesis and had about 850 cc of ascitic fluid removed. -Patient admits to to abstinence from alcohol patient says that she has not had a drink since September or early October. Current MELD sodium 21. A week ago her meld was 22. [facility] current score is 58.2 patient might benefit from glucocorticoid therapy. Currently on 1 g of ceftriaxone every 24 hours for a urinary tract infection and also for SBP prophylactic  -Ascitic fluid analysis has been negative no signs of SBP.  -At this point we will continue with antibiotics, lactulose, rifaximin, as well as spironolactone 25mg  and address management pending microbiology result.     3.  Alcohol abuse, Persistent  -Patient was again advised from restrain from alcohol which she says she has been abstaining from it since month of September and October.  -We will continue to monitor her mental status and initiate CIWA protocol as needed.     4. Vague history of Crohn disease.  -Per medical record patient had failed Humira he denies any symptoms of diarrhea or overt rectal bleeding.  -Patient says that her last colonoscopy she believes report was negative but she is unsure.     5. Hepatic encephalopathy  -Hepatic encephalopathy grade 1. Mental status improved this morning.  -Patient says that she has been on lactulose unsure if she is compliant with this medication.  -Patient was on lactulose 20g every 12 hours & patient has been having 5-6 bowel movement per day. Will decrease lactulose 10 g every 12 hours and continue titrating to 2-3 bowel movement per day. Pt was discussed with Dr. Priscila Turner and Dr. Patt Cornejo DO  GI Fellow   10/27/2021  10:40 AM    Pt seen and independently examined. Pertinent notes and lab work reviewed. D/w Dr. Dawn Alberts with physical exam and A&P. Discussed with patient - all questions answered - agreeable with the plan as delineated.     Raul Correa MD  10/27/2021  12:13 PM

## 2021-10-27 NOTE — PROGRESS NOTES
Internal Medicine Progress Note    [facility]    Yash Destiney. Caity Abbasi., F.A.CReinaldoOReinaldoI. Faye Calvert D.O., SAVAGEOReinaldoIMIGUEL ANGEL Gamino, MSN, APRN, NP-C  Sherie Ruby. Alvina Mendez, MSN, APRN-CNP     Primary Care Physician: Ban Johnson MD   Admitting Physician:  Jane Ariza DO  Admission date and time: 10/25/2021  7:02 PM    Room:  [address] [place] [address]  Admitting diagnosis: Hyperbilirubinemia [E80.6]  Anasarca [R60.1]  Generalized abdominal pain [R10.84]  Ascites due to alcoholic cirrhosis ([facility] [address] [X82.80]  Decompensated hepatic cirrhosis ([facility] [address] [K72.90, K74.60]    Patient Name: Elmira Roblero  MRN: [id number]    Date of Service: 10/27/2021     Subjective:  Blossom Montano is a 51 y.o. female who was seen and examined today,10/27/2021, at the bedside. Blossom Montano continues to complain of abdominal discomfort. She underwent paracentesis yesterday with removal of less than a liter of fluid. She is maintained on antibiotic therapy as we await culture results. The GI team has provided consultation. Review of System:   Constitutional:   Denies fever or chills, weight loss or gain, fatigue or malaise. HEENT:   Denies ear pain, sore throat, sinus or eye problems. Cardiovascular:   Denies any chest pain, irregular heartbeats, or palpitations. Respiratory:   Denies shortness of breath, coughing, sputum production, hemoptysis, or wheezing. Gastrointestinal:   Persistent abdominal pain with intermittent nausea. She admits to medication-induced diarrhea. Genitourinary:    Denies any urgency, frequency, hematuria. Voiding without difficulty. Extremities:   Denies lower extremity swelling, edema or cyanosis. Neurology:    Denies any headache or focal neurological deficits, Denies generalized weakness or memory difficulty. Psch:   Denies being anxious or depressed. Musculoskeletal:    Denies  myalgias, joint complaints or back pain.    Integumentary:   Denies any rashes, ulcers, or excoriations. Denies bruising. Hematologic/Lymphatic:  Denies bruising or bleeding. Physical Exam:  No intake/output data recorded. Intake/Output Summary (Last 24 hours) at 10/27/2021 0636  Last data filed at 10/26/2021 1645  Gross per 24 hour   Intake 840 ml   Output 550 ml   Net 290 ml   I/O last 3 completed shifts: In: 840 [P.O.:840]  Out: 550 [Urine:550]  Patient Vitals for the past 96 hrs (Last 3 readings):   Weight   10/26/21 0555 254 lb (115.2 kg)     Vital Signs:   Blood pressure (!) 100/55, pulse 66, temperature 98.3 °F (36.8 °C), temperature source Oral, resp. rate 20, weight 254 lb (115.2 kg), SpO2 97 %. General appearance:  Alert, responsive, oriented to person, place, and time. Well preserved, alert, no distress. Head:  Normocephalic. No masses, lesions or tenderness. Eyes:  PERRLA. EOMI. Sclera clear. Buccal mucosa moist.  ENT:  Ears normal. Mucosa normal.  Neck:    Supple. Trachea midline. No thyromegaly. No JVD. No bruits. Heart:    Rhythm regular. Rate controlled. No murmurs. Lungs:    Symmetrical. Clear to auscultation bilaterally. No wheezes. No rhonchi. No rales. Abdomen:   Soft. Voluntary guarding elicited. Bowel sounds are hypoactive. No significant fluid wave. No rebound or guarding. Extremities:    Peripheral pulses present. No peripheral edema. No ulcers. No cyanosis. No clubbing. Neurologic:    Alert x 3. No focal deficit. Cranial nerves grossly intact. No focal weakness. Psych:   Behavior is normal. Mood appears normal. Speech is not rapid and/or pressured. Musculoskeletal:   Spine ROM normal. Muscular strength intact. Gait not assessed. Integumentary:  No rashes  Skin normal color and texture.   Genitalia/Breast:  Deferred    Medication:  Scheduled Meds:   cefTRIAXone (ROCEPHIN) IV  1,000 mg IntraVENous Q24H    pantoprazole  40 mg IntraVENous BID    thiamine mononitrate  100 mg Oral Daily    folic acid  1 mg Oral Daily    cloNIDine  0.1 mg Oral Daily  lactulose  20 g Oral Q12H    magnesium oxide  400 mg Oral Daily    mirtazapine  15 mg Oral Nightly    potassium chloride  20 mEq Oral BID    propranolol  10 mg Oral Q12H    rifaximin  550 mg Oral BID    spironolactone  25 mg Oral Daily    cyanocobalamin  2,000 mcg Oral Daily    pyridoxine  25 mg Oral Daily    [START ON 11/1/2021] vitamin D  50,000 Units Oral Weekly    influenza virus vaccine  0.5 mL IntraMUSCular Prior to discharge     Continuous Infusions:    Objective Data:  CBC with Differential:    Lab Results   Component Value Date    WBC 5.7 10/27/2021    RBC 2.33 10/27/2021    HGB 7.8 10/27/2021    HCT 24.1 10/27/2021    PLT 46 10/27/2021    .4 10/27/2021    MCH 33.5 10/27/2021    MCHC 32.4 10/27/2021    RDW 18.5 10/27/2021    SEGSPCT 59 08/11/2011    METASPCT 3 07/05/2011    LYMPHOPCT 24.8 10/26/2021    MONOPCT 3.5 10/26/2021    MYELOPCT 0.9 09/02/2021    BASOPCT 1.8 10/26/2021    MONOSABS 0.20 10/26/2021    LYMPHSABS 1.28 10/26/2021    EOSABS 0.27 10/26/2021    BASOSABS 0.09 10/26/2021     CMP:    Lab Results   Component Value Date     10/27/2021    K 4.3 10/27/2021    K 2.9 01/27/2021     10/27/2021    CO2 18 10/27/2021    BUN 10 10/27/2021    CREATININE 1.1 10/27/2021    GFRAA >60 10/27/2021    LABGLOM 53 10/27/2021    GLUCOSE 97 10/27/2021    GLUCOSE 125 08/11/2011    PROT 6.0 10/27/2021    LABALBU 2.5 10/27/2021    LABALBU 3.6 08/11/2011    CALCIUM 7.9 10/27/2021    BILITOT 2.6 10/27/2021    ALKPHOS 218 10/27/2021    AST 47 10/27/2021    ALT 40 10/27/2021       Assessment:  1. Decompensated alcoholic cirrhosis with resultant ascites and need to rule out spontaneous bacterial peritonitis  2. Recent hospitalization with Staphylococcus bacteremia  3. Hyperammonemia without overt hepatic encephalopathy  4. Normocytic anemia of chronic disease  5. Hypothyroidism  6.  Generalized anxiety and depression    Plan:   There was minimal abdominal fluid and she underwent paracentesis with less than 1 L removal.  We are awaiting final cultures as we rule out spontaneous bacterial peritonitis in the setting of persistent abdominal discomfort. She is maintained on antibiotic support. Chronic comorbidities are being monitored. Diet is being advanced and I have encouraged her to become increasingly ambulatory. I will discussed the case with the GI team today. Probable discharge home tomorrow. Continue current therapy. See orders for further plan of care. More than 50% of my time was spent at the bedside counseling/coordinating care with the patient and/or family with face to face contact. This time was spent reviewing notes and laboratory data as well as instructing and counseling the patient. Time I spent with the family or surrogate(s) is included only if the patient was incapable of providing the necessary information or participating in medical decisions. I also discussed the differential diagnosis and all of the proposed management plans with the patient and individuals accompanying the patient. I am readily available for any further decision-making and intervention.        Zeke Dangelo DO, TYLER.C.O.I.  10/27/2021  6:36 AM

## 2021-10-27 NOTE — CARE COORDINATION
SS NOTE: NO COVID TESTING DONE. Pt is r/o bacterial peritonitis. Pt had a paracentisis with 1 liter removed. Awaiting final cultures. Pt is on IV Rocephin. Possible dch tomorrow. Pt declined recovery services offered by this SW. Pt plans on returning home at [facility]. SS to continue. DAMIAN Hayes.10/27/2021.9:57AM.

## 2021-10-28 NOTE — DISCHARGE SUMMARY
Internal Medicine Progress Note     [facility]     Juliana Brown, F.A.C.OReinaldoI. Bob Oreilly D.O., RAYNA.A.C.OHERIBERTO Martel D.O. Salena Rizvi, MSN, APRN, NP-C  Garret Ferrer. Maxim Blankenship, MSN, [id number] [facility]       Internal Medicine  Discharge Summary    NAME: Gilberto Butler  :  1975  MRN:  [id number]  David Zhong MD  ADMITTED: 10/25/2021      DISCHARGED: 10/28/21    ADMITTING PHYSICIAN: José Miguel Givens DO    CONSULTANT(S):   IP CONSULT TO GI     ADMITTING DIAGNOSIS:   Hyperbilirubinemia [E80.6]  Anasarca [R60.1]  Generalized abdominal pain [R10.84]  Ascites due to alcoholic cirrhosis ([facility] [address] [J93.46]  Decompensated hepatic cirrhosis ([facility] [address] [K72.90, K74.60]     DISCHARGE DIAGNOSES:   1. Decompensated alcoholic cirrhosis with resultant ascites and need to rule out spontaneous bacterial peritonitis  2. Recent hospitalization with Staphylococcus bacteremia  3. Hyperammonemia without overt hepatic encephalopathy  4. Normocytic anemia of chronic disease  5. Hypothyroidism  6. Generalized anxiety and depression    BRIEF HISTORY OF PRESENT ILLNESS:   Jenny Pittman is a 79-year-old female who presented to [address] [place] [address] emergency department yesterday for the evaluation of abdominal pain and distention. Jenny Pittman suffers from known alcoholic cirrhosis and states she is intermittently compliant with alcohol cessation. She states her last drink was approximately 1 month ago. She was recently admitted to [facility] and states she was placed on prednisone therapy. She states this led to significant volume retention. She has obvious ascites on examination today. She was admitted to the hospital for the purpose of paracentesis and need to rule out spontaneous bacterial peritonitis. The GI team will provide consultation during the hospitalization as well.     LABS[de-identified]  Lab Results   Component Value Date    WBC 5.7 10/27/2021    HGB 7.8 (L) 10/27/2021    HCT 24.1 (L) 10/27/2021    PLT 46 (L) 10/27/2021     10/27/2021    K 4.3 10/27/2021     10/27/2021    CREATININE 1.1 (H) 10/27/2021    BUN 10 10/27/2021    CO2 18 (L) 10/27/2021    GLUCOSE 97 10/27/2021    ALT 40 (H) 10/27/2021    AST 47 (H) 10/27/2021    INR 2.0 10/27/2021     Lab Results   Component Value Date    INR 2.0 10/27/2021    INR 2.0 10/26/2021    INR 1.7 10/25/2021    PROTIME 23.6 (H) 10/27/2021    PROTIME 24.0 (H) 10/26/2021    PROTIME 20.4 (H) 10/25/2021      Lab Results   Component Value Date    TSH 34.680 (H) 08/31/2021     Lab Results   Component Value Date    TRIG 164 (H) 09/01/2021    TRIG 71 07/10/2019    TRIG 197 (H) 10/04/2016     Lab Results   Component Value Date    HDL 12 09/01/2021    HDL 46 07/10/2019    HDL 98 10/04/2016     Lab Results   Component Value Date    LDLCALC 55 09/01/2021    LDLCALC 89 07/10/2019    LDLCALC 107 (H) 10/04/2016     Lab Results   Component Value Date    LABA1C 4.1 09/01/2021       IMAGING:  XR ACUTE ABD SERIES CHEST 1 VW    Result Date: 10/26/2021  EXAMINATION: TWO XRAY VIEWS OF THE ABDOMEN AND SINGLE  XRAY VIEW OF THE CHEST 10/26/2021 1:26 pm COMPARISON: Prior CT abdomen dated 10/26/2021 and ultrasound paracentesis dated 10/26/2021 HISTORY: ORDERING SYSTEM PROVIDED HISTORY: Rule out pleural effusion TECHNOLOGIST PROVIDED HISTORY: Reason for exam:->Rule out pleural effusion FINDINGS: Mild generalized cardiomegaly is present. Lung bases are clear. No free air detected under the hemidiaphragm. There is a mild abdominal ileus pattern with dilated small bowel loops in the lower central abdomen. This could be related to portal enteropathy. Hepatomegaly is noted, spleen appears elongated. Postop changes are present in the pelvis. The osseous structures are within normal limits. No free intraperitoneal air. Hepatosplenomegaly. Mild abdominal ileus pattern.      CT ABDOMEN PELVIS W IV CONTRAST Additional Contrast? None    Result Date: 10/22/2021  EXAMINATION: CT OF THE ABDOMEN AND PELVIS WITH CONTRAST 10/22/2021 6:15 am TECHNIQUE: CT of the abdomen and pelvis was performed with the administration of intravenous contrast. Multiplanar reformatted images are provided for review. Dose modulation, iterative reconstruction, and/or weight based adjustment of the mA/kV was utilized to reduce the radiation dose to as low as reasonably achievable. COMPARISON: 08/31/2021 HISTORY: ORDERING SYSTEM PROVIDED HISTORY: diffuse pain, nausea, vomiting TECHNOLOGIST PROVIDED HISTORY: Reason for exam:->diffuse pain, nausea, vomiting Additional Contrast?->None Decision Support Exception - unselect if not a suspected or confirmed emergency medical condition->Emergency Medical Condition (MA) FINDINGS: Redemonstration of significant hepatosplenomegaly similar to previous. There also fairly extensive paraesophageal varices. These findings are also similar to previous. Slight nodular contour of the liver which may be related to cirrhosis. Moderate free fluid in the abdomen and pelvis similar to slightly increased from previous. Gallbladder is partially decompressed. A previously described tiny low-attenuation focus in the body of the pancreas is unchanged and was also further assessed with MRI dated 08/31/2021. There is some minimal stranding of the fat about the pancreas which may be incidental or due to mild pancreatitis. No adrenal mass. No hydronephrosis. Assessment of bowel is limited without oral contrast.  No bowel obstruction. There are some areas of mild bowel thickening which may be reactive to the free fluid. Other etiologies are thought less likely but not excluded. There are postoperative changes in the right colon with suture material similar to previous. Postoperative changes of the sigmoid colon with anastomotic suture material extending into the rectosigmoid region unchanged from prior.   Anterior protrusion of the midline of the abdominal wall with rectus diastasis is unchanged from prior. There is also a ventral hernia just above this, containing ascites. Moderate anasarca. Urinary bladder is unremarkable. Uterus has an unremarkable CT appearance. No abscess. No free air. Mild cardiomegaly. Lung bases are clear. Redemonstration of hepatosplenomegaly with prominent varices, specially in the paraesophageal region similar to previous. Moderate ascites which is similar to slightly increased from previous. Areas of bowel thickening or probably reactive to the ascites. Other infectious or inflammatory process thought less likely but not excluded. Stranding of the fat about the pancreas may be incidental or due to low-grade pancreatitis. Correlate with laboratory values. Stable tiny low-attenuation focus in the body of the pancreas. Please see report from MRI dated 08/31/2021 for follow-up recommendations. Moderate anasarca. IR US GUIDED PARACENTESIS    Result Date: 10/26/2021  PROCEDURE: PARACENTESIS WITHOUT IMAGE GUIDANCE US ABDOMEN LIMITED 10/26/2021 HISTORY: ORDERING SYSTEM PROVIDED HISTORY: Paracentesis TECHNOLOGIST PROVIDED HISTORY: Reason for exam:->Paracentesis TECHNIQUE: Informed consent was obtained after a detailed explanation of the procedure including risks, benefits, and alternatives. Universal protocol was followed. A limited ultrasound of the abdomen was performed. The right abdomen was prepped and draped in sterile fashion and local anesthesia was achieved with lidocaine. A 5 [place] needle sheath was advanced into ascites and paracentesis was performed. The patient tolerated the procedure well. FINDINGS: Limited ultrasound of the abdomen demonstrates ascites. A total of 850 cc of straw-colored ascitic fluid was removed. Status post paracentesis. HOSPITAL COURSE:   Marcia Kong did well throughout the hospitalization. She was found to be suffering from decompensated alcoholic cirrhosis.   She underwent paracentesis with removal of less than 1 L of fluid. Fluid analysis did not indicate any evidence of infection. She was found to be suffering from a urinary tract infection. She was placed back on her cirrhotic medications. She will complete a course of antibiotics for the urinary tract infection. We again discussed the absolute need to abstain from alcohol abuse. We will continue to follow-up with the GI team as well as [place] upon discharge. BRIEF PHYSICAL EXAMINATION AND LABORATORIES ON DAY OF DISCHARGE:  VITALS:  BP (!) 100/50   Pulse 66   Temp 98.4 °F (36.9 °C) (Oral)   Resp 18   Wt 254 lb (115.2 kg)   LMP  (LMP Unknown)   SpO2 98%   BMI 38.62 kg/m²     HEENT:  PERRLA. EOMI. Sclera clear. Buccal mucosa moist.    Neck:  Supple. Trachea midline. No thyromegaly. No JVD. No bruits. Heart:  Rhythm regular, rate controlled. No murmurs. Lungs:  Symmetrical. Clear to auscultation bilaterally. No wheezes. No rhonchi. No rales. Abdomen: Soft. No further tenderness to palpation. Less distention. Extremities:  Peripheral pulses present. No peripheral edema. No ulcers. Neurologic:  Alert x 3. No focal deficit. Cranial nerves grossly intact. Skin:  No petechia. No hemorrhage. Jaundice. DISPOSITION:  The patient's condition is good. At this time the patient is without objective evidence of an acute process requiring continuing hospitalization or inpatient management. They are stable for discharge with outpatient follow-up. I have spoken with the patient and discussed the results of the current hospitalization, in addition to providing specific details for the plan of care and counseling regarding the diagnosis and prognosis. The plan has been discussed in detail and they are aware of the specific conditions for emergent return, as well as the importance of follow-up.   Their questions are answered at this time and they are agreeable with the plan for discharge to home    DISCHARGE MEDICATIONS:    [facility]ret [facility] Home Medication Instructions XRI:[id number]    Printed on:10/28/21 9993   Medication Information                      cefdinir (OMNICEF) 300 MG capsule  Take 1 capsule by mouth 2 times daily for 7 days             cloNIDine (CATAPRES) 0.1 MG tablet  Take 0.1 mg by mouth daily             cyanocobalamin 2000 MCG tablet  Take 1 tablet by mouth daily             folic acid (FOLVITE) 1 MG tablet  Take 1 tablet by mouth daily             lactulose (CHRONULAC) 10 GM/15ML solution  Take 20 g by mouth every 12 hours             magnesium oxide (MAG-OX) 400 MG tablet  Take 400 mg by mouth daily             ondansetron (ZOFRAN ODT) 4 MG disintegrating tablet  Take 1 tablet by mouth every 8 hours as needed for Nausea or Vomiting             pantoprazole (PROTONIX) 40 MG tablet  Take 40 mg by mouth daily              potassium chloride (KLOR-CON M) 10 MEQ extended release tablet  Take 20 mEq by mouth daily             propranolol (INDERAL) 10 MG tablet  Take 20 mg by mouth every 12 hours              pyridoxine (B-6) 25 MG tablet  Take 1 tablet by mouth daily             rifaximin (XIFAXAN) 550 MG tablet  Take 550 mg by mouth 2 times daily             spironolactone (ALDACTONE) 25 MG tablet  Take 1 tablet by mouth daily             thiamine mononitrate 100 MG tablet  Take 1 tablet by mouth daily             vitamin B-1 (THIAMINE) 100 MG tablet  Take 100 mg by mouth daily             vitamin D (ERGOCALCIFEROL) 1.25 MG (01602 UT) CAPS capsule  Take 1 capsule by mouth once a week Monday                 FOLLOW UP/INSTRUCTIONS:  · This patient is instructed to follow-up with her primary care physician. · Patient is instructed to follow-up with the consults listed above as directed by them. · she is instructed to resume home medications and take new medications as indicated in the list above. · If the patient has a recurrence of symptoms, she is instructed to go to the ED.     Preparing for this patient's discharge, including paperwork, orders, instructions, and meeting with patient did require > 40 minutes.     Lul Peterson DO   10/28/2021  6:35 AM

## 2021-10-28 NOTE — PLAN OF CARE
Problem: Pain:  Goal: Pain level will decrease  Description: Pain level will decrease  10/28/2021 0154 by Miya Castellanos RN  Outcome: Met This Shift  10/27/2021 1548 by Miguel Angel Atkinson RN  Outcome: Met This Shift  Goal: Control of acute pain  Description: Control of acute pain  10/28/2021 0154 by Miya Castellanos RN  Outcome: Met This Shift  10/27/2021 1548 by Miguel Angel Atkinson RN  Outcome: Met This Shift  Goal: Control of chronic pain  Description: Control of chronic pain  10/28/2021 0154 by Miya Castellanos RN  Outcome: Met This Shift  10/27/2021 1548 by Miguel Angel Atkinson RN  Outcome: Met This Shift     Problem: SAFETY  Goal: LTG - patient will adhere to hip precautions during ADL's and transfers  10/28/2021 0154 by Miya Castellanos RN  Outcome: Met This Shift  10/27/2021 1548 by Miguel Angel Atkinson RN  Outcome: Met This Shift  Goal: LTG - Patient will demonstrate safety requirements appropriate to situation/environment  10/28/2021 0154 by Miya Castellanos RN  Outcome: Met This Shift  10/27/2021 1548 by Miguel Angel Atkinson RN  Outcome: Met This Shift  Goal: LTG - patient will utilize safety techniques  10/28/2021 0154 by Miya Castellanos RN  Outcome: Met This Shift  10/27/2021 1548 by Miguel Angel Atkinson RN  Outcome: Met This Shift  Goal: STG - patient locks brakes on wheelchair  10/28/2021 0154 by Miya Castellanos RN  Outcome: Met This Shift  10/27/2021 1548 by Miguel Angel Atkinson RN  Outcome: Met This Shift  Goal: STG - Patient uses call light consistently to request assistance with transfers  10/28/2021 0154 by Miya Castellanos RN  Outcome: Met This Shift  10/27/2021 1548 by Miguel Angel Atkinson RN  Outcome: Met This Shift  Goal: STG - patient uses gait belt during all transfers  10/28/2021 0154 by Miya Castellanos RN  Outcome: Met This Shift  10/27/2021 1548 by Miguel Angel Atkinson RN  Outcome: Met This Shift

## 2021-10-28 NOTE — PROGRESS NOTES
PROGRESS NOTE    By Josemanuel Lantigua D.O GI Fellow    [facility]  Dr. Darwni Olmos MD, Dr. Marito Canales MD, Dr Stephan Valentine, Dr. Vera Card MD, Dr. Audra Ritchie,       Kalierenny Espinosa  51 y.o.  female    SUBJECTIVE: Patient was seen and examined at bedside this morning. Patient did not have any acute event reported overnight. Patient has no GI symptoms. Patient admits to moving her bowels and denies any symptoms of melena, hematochezia. No additional complaint.     OBJECTIVE:    /69   Pulse 99   Temp 98.8 °F (37.1 °C) (Oral)   Resp 16   Wt 254 lb (115.2 kg)   LMP  (LMP Unknown)   SpO2 99%   BMI 38.62 kg/m²     Gen: NAD, AAO x 3  HEENT:PEERL, no icterus  Heart: RRR, no M/R/G  Lungs: CTAB  Abd.: soft, left abdominal spine tenderness, ND, BS +, no G/R, no HSM  Extr.: no C/C/E, no bruising         Lab Results   Component Value Date    WBC 5.7 10/27/2021    WBC 5.1 10/26/2021    WBC 6.8 10/25/2021    HGB 7.8 10/27/2021    HGB 7.9 10/26/2021    HGB 8.7 10/25/2021    HCT 24.1 10/27/2021    .4 10/27/2021    RDW 18.5 10/27/2021    PLT 46 10/27/2021    PLT 40 10/26/2021    PLT 45 10/25/2021     Lab Results   Component Value Date     10/27/2021    K 4.3 10/27/2021    K 2.9 01/27/2021     10/27/2021    CO2 18 10/27/2021    BUN 10 10/27/2021    CREATININE 1.1 10/27/2021    CALCIUM 7.9 10/27/2021    PROT 6.0 10/27/2021    LABALBU 2.5 10/27/2021    LABALBU 3.6 08/11/2011    BILITOT 2.6 10/27/2021    BILITOT 4.8 10/26/2021    BILITOT 4.5 10/25/2021    ALKPHOS 218 10/27/2021    ALKPHOS 221 10/26/2021    ALKPHOS 277 10/25/2021    AST 47 10/27/2021    AST 45 10/26/2021    AST 66 10/25/2021    ALT 40 10/27/2021    ALT 47 10/26/2021    ALT 62 10/25/2021     Lab Results   Component Value Date    LIPASE 67 10/25/2021    LIPASE 100 10/22/2021    LIPASE 63 08/31/2021     Lab Results   Component Value Date    AMYLASE 123 08/31/2021         ASSESSMENT/PLAN:    1. Acute on chronic microcytic anemia  -Patient does not have any clear evidence of overt GI bleeding. -EGD from 9/2/2021 revealed portal gastropathy.  -Hemoglobin has been stable around 7 and 8. No need for urgent endoscopy evaluation at this time  -Continue to monitor H&H and transfuse if hemoglobin is less than 7 per primary team.  -Patient has remained hemodynamically stable and afebrile patient was advised GI in the outpatient setting for further endoscopic evaluation.     2. Decompensated ETOH liver cirrhosis with ascites most likely secondary to her urinary tract infection  -Patient is status post paracentesis and had about 850 cc of ascitic fluid removed. -Patient admits to to abstinence from alcohol patient says that she has not had a drink since September or early October. Current MELD sodium 21.  A week ago her meld was 22.   Maddr discriminatory function score was 53.1 during the previous hospital admission current score is 58.2 patient might benefit from glucocorticoid therapy.  Currently on 1 g of ceftriaxone every 24 hours for a urinary tract infection and also for SBP prophylactic  -Ascitic fluid analysis has been negative no signs of SBP.  -At this point we will continue with antibiotics, lactulose, rifaximin, as well as spironolactone 25mg. Patient educated and advised on compliance of medications. Patient was also advised to follow-up with gastroenterology in the outpatient setting for further management.     3. Alcohol abuse, Persistent  -Patient was again advised from restrain from alcohol which she says she has been abstaining from it since month of September and October.       4. Vague history of Crohn disease.  -Per medical record patient had failed Humira he denies any symptoms of diarrhea or overt rectal bleeding.  -Patient says that her last colonoscopy she believes report was negative but she is unsure.     5. Hepatic encephalopathy, resolved  -Continue lactulose and rifaximin has prescribed.       Pt was discussed with Dr. Val Zuniga and Dr. Vick Nix DO  GI Fellow   10/28/2021  9:52 AM    Pt seen and independently examined. Pertinent notes and lab work reviewed. D/w Dr. Edna Pelletier with physical exam and A&P. Discussed with patient - all questions answered - agreeable with the plan as delineated.     John Plummer MD  10/28/2021  10:31 AM

## 2021-10-28 NOTE — PROGRESS NOTES
CLINICAL PHARMACY NOTE: MEDS TO BEDS    Total # of Prescriptions Filled: 5   The following medications were delivered to the patient:  · Spironolactone 25mg  · Cefdinir 300mg  · Vitamin B6 25 mg  · Vitamin D 1.25mg  · Vitamin B12 1000mg    Additional Documentation:

## 2021-11-25 PROBLEM — K76.82 HEPATIC ENCEPHALOPATHY: Status: ACTIVE | Noted: 2021-01-01

## 2021-11-25 NOTE — H&P
GASTROINTESTINAL ENDOSCOPY N/A 9/2/2021    EGD ESOPHAGOGASTRODUODENOSCOPY performed by Ginette Caal MD at [facility] 23       Prior to Admission medications    Medication Sig Start Date End Date Taking? Authorizing Provider   spironolactone (ALDACTONE) 25 MG tablet Take 1 tablet by mouth daily 10/28/21   Hannah Samson,    cyanocobalamin 2000 MCG tablet Take 1 tablet by mouth daily 10/28/21   Hannah Samson, DO   pyridoxine (B-6) 25 MG tablet Take 1 tablet by mouth daily 10/28/21   Hannah Samson DO   vitamin D (ERGOCALCIFEROL) 1.25 MG (10336 UT) CAPS capsule Take 1 capsule by mouth once a week Monday 10/28/21   Hannah Samson DO   potassium chloride (KLOR-CON M) 10 MEQ extended release tablet Take 20 mEq by mouth daily    Historical Provider, MD   lactulose (CHRONULAC) 10 GM/15ML solution Take 20 g by mouth every 12 hours    Historical Provider, MD   propranolol (INDERAL) 10 MG tablet Take 20 mg by mouth every 12 hours     Historical Provider, MD   rifaximin (XIFAXAN) 550 MG tablet Take 550 mg by mouth 2 times daily    Historical Provider, MD   cloNIDine (CATAPRES) 0.1 MG tablet Take 0.1 mg by mouth daily    Historical Provider, MD   magnesium oxide (MAG-OX) 400 MG tablet Take 400 mg by mouth daily    Historical Provider, MD   vitamin B-1 (THIAMINE) 100 MG tablet Take 100 mg by mouth daily    Historical Provider, MD   folic acid (FOLVITE) 1 MG tablet Take 1 tablet by mouth daily 2/2/21   Hannah Samson DO   thiamine mononitrate 100 MG tablet Take 1 tablet by mouth daily 2/2/21 3/4/21  Hannah Samson DO   pantoprazole (PROTONIX) 40 MG tablet Take 40 mg by mouth daily     Historical Provider, MD         Allergies:  Patient has no known allergies. Social History:    TOBACCO:   reports that she has never smoked. She has never used smokeless tobacco.  ETOH:   reports previous alcohol use. Family History:    Reviewed in detail and negative for DM, CAD, Cancer, CVA.  Positive as follows\"      Problem Relation Age of Onset    Cancer Mother         breast     Emphysema Mother     Lupus Mother     Heart Disease Father     High Cholesterol Father     High Blood Pressure Sister     No Known Problems Son        REVIEW OF SYSTEMS:   Pertinent positives as noted in the HPI. All other systems reviewed and negative. PHYSICAL EXAM:  BP (!) 156/97   Pulse 112   Temp 98 °F (36.7 °C)   Resp 17   Ht 5' 8\" (1.727 m)   Wt 254 lb (115.2 kg)   LMP  (LMP Unknown)   SpO2 98%   BMI 38.62 kg/m²   General appearance: Ill-appearing, confused  HEENT: Normal cephalic, atraumatic without obvious deformity. Pupils equal, round, and reactive to light. Extra ocular muscles intact. Conjunctivae/corneas clear. Oral mucosa dry  Neck: Supple, with full range of motion. No jugular venous distention. Trachea midline. Respiratory: Clear to auscultation bilaterally  Cardiovascular: Tachycardic  Abdomen: Soft, nontender, nondistended  Musculoskeletal: No clubbing, cyanosis, edema of bilateral lower extremities. Brisk capillary refill. Skin: Jaundiced  Neurologic:  Neurovascularly intact without any focal sensory/motor deficits. Cranial nerves: II-XII intact, grossly non-focal.      CBC:   Recent Labs     11/24/21 2001   WBC 14.2*   RBC 3.23*   HGB 10.6*   HCT 32.5*   .6*   RDW 15.9*        BMP:   Recent Labs     11/24/21 2001      K 2.6*      CO2 19*   BUN 3*   CREATININE 0.9   MG 1.5*     LFT:  Recent Labs     11/24/21 2001   PROT 8.8*   ALKPHOS 199*   ALT 81*   *   BILITOT 4.3*   LIPASE 30     CE:  No results for input(s): Rand Roa in the last 72 hours. PT/INR:   Recent Labs     11/24/21 2001   INR 2.0   APTT 35.8*     BNP: No results for input(s): BNP in the last 72 hours.   ESR:   Lab Results   Component Value Date    SEDRATE 97 (H) 11/24/2021     CRP:   Lab Results   Component Value Date    CRP 0.4 11/24/2021     D Dimer:   Lab Results   Component Value Date    DDIMER 370 08/14/2020      Folate and B12: Lab Results   Component Value Date    ZEFKSVCR85 >2000 (H) 08/31/2021   ,   Lab Results   Component Value Date    FOLATE 6.4 08/31/2021     Lactic Acid:   Lab Results   Component Value Date    LACTA 1.6 10/25/2021     Thyroid Studies:   Lab Results   Component Value Date    TSH 34.680 (H) 08/31/2021    A4EKCIV 7.6 10/03/2016       Oupatient labs:  Lab Results   Component Value Date    CHOL 100 09/01/2021    TRIG 164 (H) 09/01/2021    HDL 12 09/01/2021    LDLCALC 55 09/01/2021    TSH 34.680 (H) 08/31/2021    INR 2.0 11/24/2021    LABA1C 4.1 09/01/2021       Urinalysis:    Lab Results   Component Value Date    NITRU Negative 11/24/2021    WBCUA 0-1 10/25/2021    WBCUA 0-1 06/30/2011    BACTERIA RARE 10/25/2021    RBCUA 0-1 10/25/2021    RBCUA 0-1 06/30/2011    BLOODU Negative 11/24/2021    SPECGRAV <=1.005 11/24/2021    GLUCOSEU Negative 11/24/2021    GLUCOSEU NEGATIVE 06/30/2011       Imaging:  XR ACUTE ABD SERIES CHEST 1 VW    Result Date: 10/26/2021  EXAMINATION: TWO XRAY VIEWS OF THE ABDOMEN AND SINGLE  XRAY VIEW OF THE CHEST 10/26/2021 1:26 pm COMPARISON: Prior CT abdomen dated 10/26/2021 and ultrasound paracentesis dated 10/26/2021 HISTORY: ORDERING SYSTEM PROVIDED HISTORY: Rule out pleural effusion TECHNOLOGIST PROVIDED HISTORY: Reason for exam:->Rule out pleural effusion FINDINGS: Mild generalized cardiomegaly is present. Lung bases are clear. No free air detected under the hemidiaphragm. There is a mild abdominal ileus pattern with dilated small bowel loops in the lower central abdomen. This could be related to portal enteropathy. Hepatomegaly is noted, spleen appears elongated. Postop changes are present in the pelvis. The osseous structures are within normal limits. No free intraperitoneal air. Hepatosplenomegaly. Mild abdominal ileus pattern.      CT Head WO Contrast    Result Date: 11/25/2021  EXAMINATION: CT OF THE HEAD WITHOUT CONTRAST  11/24/2021 11:56 pm TECHNIQUE: CT of the head was performed without the administration of intravenous contrast. Dose modulation, iterative reconstruction, and/or weight based adjustment of the mA/kV was utilized to reduce the radiation dose to as low as reasonably achievable. COMPARISON: None. HISTORY: ORDERING SYSTEM PROVIDED HISTORY: ams FINDINGS: BRAIN/VENTRICLES: There is no acute intracranial hemorrhage, mass effect or midline shift. No abnormal extra-axial fluid collection. The gray-white differentiation is maintained without evidence of an acute infarct. There is no evidence of hydrocephalus. ORBITS: The visualized portion of the orbits demonstrate no acute abnormality. SINUSES: The visualized paranasal sinuses and mastoid air cells demonstrate no acute abnormality. SOFT TISSUES/SKULL:  No acute abnormality of the visualized skull or soft tissues. No acute intracranial abnormality. XR CHEST PORTABLE    Result Date: 11/24/2021  EXAMINATION: ONE XRAY VIEW OF THE CHEST 11/24/2021 10:42 pm COMPARISON: 10/26/2021. HISTORY: ORDERING SYSTEM PROVIDED HISTORY: ams, r/o pna TECHNOLOGIST PROVIDED HISTORY: Reason for exam:->ams, r/o pna What reading provider will be dictating this exam?->CRC FINDINGS: Overlying EKG leads. Stable mild enlargement of the cardiopericardial silhouette. No pneumothorax, pleural effusion, overt pulmonary edema or evidence of pneumonia. No acute osseous abnormality is identified. Negative single view chest for acute process. CTA PULMONARY W CONTRAST    Result Date: 11/25/2021  EXAMINATION: CTA OF THE CHEST 11/24/2021 11:56 pm TECHNIQUE: CTA of the chest was performed after the administration of intravenous contrast.  Multiplanar reformatted images are provided for review. MIP images are provided for review. Dose modulation, iterative reconstruction, and/or weight based adjustment of the mA/kV was utilized to reduce the radiation dose to as low as reasonably achievable. COMPARISON: None.  HISTORY: ORDERING SYSTEM PROVIDED HISTORY: r/o pe FINDINGS: Motion artifact is present. Pulmonary Arteries: Pulmonary arteries are adequately opacified for evaluation. No evidence of intraluminal filling defect to suggest pulmonary embolism. Main pulmonary artery is normal in caliber. Mediastinum: No evidence of mediastinal lymphadenopathy. The heart and pericardium demonstrate no acute abnormality. There is no acute abnormality of the thoracic aorta. Lungs/pleura: The lungs are without acute process. No focal consolidation or pulmonary edema. No evidence of pleural effusion or pneumothorax. Upper Abdomen: Limited images of the upper abdomen demonstrate hepatosplenomegaly and esophageal varices. Soft Tissues/Bones: No acute bone or soft tissue abnormality. Motion limited exam.  No evidence of pulmonary embolism or acute pulmonary abnormality. IR US GUIDED PARACENTESIS    Result Date: 10/26/2021  PROCEDURE: PARACENTESIS WITHOUT IMAGE GUIDANCE US ABDOMEN LIMITED 10/26/2021 HISTORY: ORDERING SYSTEM PROVIDED HISTORY: Paracentesis TECHNOLOGIST PROVIDED HISTORY: Reason for exam:->Paracentesis TECHNIQUE: Informed consent was obtained after a detailed explanation of the procedure including risks, benefits, and alternatives. Universal protocol was followed. A limited ultrasound of the abdomen was performed. The right abdomen was prepped and draped in sterile fashion and local anesthesia was achieved with lidocaine. A 5 Kiswahili needle sheath was advanced into ascites and paracentesis was performed. The patient tolerated the procedure well. FINDINGS: Limited ultrasound of the abdomen demonstrates ascites. A total of 850 cc of straw-colored ascitic fluid was removed. Status post paracentesis.        ASSESSMENT:  -Hepatic encephalopathy  -Hypomagnesemia  -Hypokalemia  -Lactic acidosis  -Hyperammonemia  -Hyperbilirubinemia  -Hepatic cirrhosis  -Alcohol intoxication  -Leukocytosis  -Alcohol dependence/abuse      PLAN:  -Admit to

## 2021-11-25 NOTE — CONSULTS
[address] [place] [address] [facility]    Consultation Note     Admit Date: 11/24/2021  7:48 PM    Reason for Consult:   Polymicrobial bacteremia    Attending Physician:  Cristina Tate MD     Chief Complaint: Change in mental status noted by the police department    HISTORY OF PRESENT ILLNESS:   The patient is a 51 y.o.  woman known to the Infectious Diseases service. The patient is known cirrhotic who was noted by police department to try to point to a parking lot and apparently was confused. She was found to be hypoxic and taken to the emergency room. She was noted to be jaundiced with evaluation and confused. Labs showed that potassium was 2.6 EKG had a tachycardia and although she normally is on lactulose and rifaximin she was unable to tolerate p.o.  CT of the abdomen head showed no large vessel disease and CTA of the chest was negative for pulmonary emboli although there was some movement at the time of the test is not that accurate. Other laboratory data revealed a BUN and creatinine 3 and 0.9 with a magnesium 1.5 lactic acid 5.2 and pro calcitonin 0.16. Her ammonia level was amazing 164 and bilirubin 2.5 with alkaline phos of 199 ALT of 81 and I AST of 161  Drug screen and ethanol level were negative as well as TCA  White count was 14 hemoglobin 10. Blood cultures were positive for Enterococcus possibly even staph aureus at the PCR program did indicate Enterococcus was identified. UA was not diagnostic  She was started on vancomycin but as mentioned she has a history of VRE. Patient is a poor historian at this time although weak she is somewhat slow to answer. She does mention having abdominal pain. August 2021: Seen by Dr. Gonzalo Arreola and Dr. Shawna August at [facility] for bacteremia. Documented at that point the patient has had VRE perirectal abscess and MSSA bacteremia [facility] in May 2021.   She is known alcoholic cirrhotic    May 2021 seen by Dr. Gonzalo Arreola [facility]    July 2020: Se[facility] by Dr. Chris Peterson at [facility] leukopenia and thrombocytopenia  Past Medical History:        Diagnosis Date    Acute renal failure (ARF) ([facility] [address] 22/22/8780    Alcoholic cirrhosis ([facility] [address] [facility]     Anxiety     Crohn's colitis ([facility] [address] [facility]     Depression     History of blood transfusion     Pyoderma gangrenosum     Thyroid disease      Past Surgical History:        Procedure Laterality Date    ABDOMEN SURGERY      pt has a J-pouch     ARM DEBRIDEMENT      R arm-2010-2011??    ARM DEBRIDEMENT      COLONOSCOPY      DILATATION, ESOPHAGUS      ENDOSCOPY, COLON, DIAGNOSTIC      TRANSESOPHAGEAL ECHOCARDIOGRAM N/A 9/3/2021    TRANSESOPHAGEAL ECHOCARDIOGRAM WITH BUBBLE STUDY performed by Mary Phillips MD at [address] [place] [address],Third Floor N/A 9/2/2021    EGD ESOPHAGOGASTRODUODENOSCOPY performed by Ross Maier MD at [facility] ENDOSCOPY     Current Medications:   Scheduled Meds:   cloNIDine  0.1 mg Oral Daily    folic acid  1 mg Oral Daily    lactulose  20 g Oral Q12H    pantoprazole  40 mg Oral Daily    propranolol  20 mg Oral Q12H    pyridoxine  25 mg Oral Daily    rifaximin  550 mg Oral BID    spironolactone  25 mg Oral Daily    thiamine mononitrate  100 mg Oral Daily    [START ON 11/29/2021] vitamin D  50,000 Units Oral Weekly    sodium chloride flush  10 mL IntraVENous 2 times per day    enoxaparin  40 mg SubCUTAneous Daily    vancomycin  2,250 mg IntraVENous Once    [START ON 11/26/2021] vancomycin  1,250 mg IntraVENous Q12H    rifaximin  550 mg Oral Once    lactulose  20 g Oral Once     Continuous Infusions:   sodium chloride      sodium chloride 100 mL/hr at 11/25/21 1643     PRN Meds:sodium chloride flush, sodium chloride, promethazine **OR** ondansetron, polyethylene glycol, acetaminophen **OR** acetaminophen, potassium chloride **OR** potassium alternative oral replacement **OR** potassium chloride, magnesium sulfate, LORazepam **OR** LORazepam **OR** LORazepam **OR** LORazepam **OR** LORazepam **OR** LORazepam **OR** LORazepam **OR** LORazepam    Allergies:  Patient has no known allergies. Social History:   Social History     Socioeconomic History    Marital status:      Spouse name: Not on file    Number of children: 1    Years of education: Not on file    Highest education level: Not on file   Occupational History    Not on file   Tobacco Use    Smoking status: Never Smoker    Smokeless tobacco: Never Used   Vaping Use    Vaping Use: Never used   Substance and Sexual Activity    Alcohol use: Not Currently     Comment: stopped but drink 3 weeks ago    Drug use: Not Currently    Sexual activity: Not on file   Other Topics Concern    Not on file   Social History Narrative    Not on file     Social Determinants of Health     Financial Resource Strain: Low Risk     Difficulty of Paying Living Expenses: Not hard at all   Food Insecurity: No Food Insecurity    Worried About Running Out of Food in the Last Year: Never true    Yumiko of Food in the Last Year: Never true   Transportation Needs: No Transportation Needs    Lack of Transportation (Medical): No    Lack of Transportation (Non-Medical): No   Physical Activity: Unknown    Days of Exercise per Week: 0 days    Minutes of Exercise per Session: Not on file   Stress: No Stress Concern Present    Feeling of Stress : Only a little   Social Connections: Moderately Integrated    Frequency of Communication with Friends and Family: More than three times a week    Frequency of Social Gatherings with Friends and Family: Three times a week    Attends Rastafari Services: More than 4 times per year    Active Member of Clubs or Organizations:  Yes    Attends Club or Organization Meetings: More than 4 times per year    Marital Status:    Intimate Partner Violence: Not At Risk    Fear of Current or Ex-Partner: No    Emotionally Abused: No    Physically Abused: No    Sexually Abused: No   Housing Stability: High Risk    Unable to Pay for Housing in the Last Year: No    Number of Places Lived in the Last Year: 6    Unstable Housing in the Last Year: No     Patient has significant travel history recently in [place] has been  [facility] [place] and [place]  Family History:       Problem Relation Age of Onset    Cancer Mother         breast     Emphysema Mother     Lupus Mother     Heart Disease Father     High Cholesterol Father     High Blood Pressure Sister     No Known Problems Son    . Stephanie Groves REVIEW OF SYSTEMS:    CONSTITUTIONAL:  No chills, fevers or night sweats. No loss of weight. EYES:  No double vision or drainage from eyes, ears or throat. HEENT:  No neck stiffness. No dysphagia. No drainage from eyes, ears or throat  RESPIRATORY:  No cough, productive sputum or hemoptysis. CARDIOVASCULAR:  No chest pain, palpitations, orthopnea or dyspnea on exertion. GASTROINTESTINAL:  No nausea, vomiting, positive diarrhea or constipation or hematochezia. Positive abdominal discomfort prior to admission  GENITOURINARY:  No frequency burning dysuria or hematuria. INTEGUMENT/BREAST:  No rash or breast masses. HEMATOLOGIC/LYMPHATIC:  No lymphadenopathy or blood dyscrasics. ALLERGIC/IMMUNOLOGIC:  No anaphylaxis. ENDOCRINE:  No polyuria or polydipsia or temperature intolerance. MUSCULOSKELETAL:  No myalgia or arthralgia. Full ROM. NEUROLOGICAL:  No focal motor sensory deficit. BEHAVIOR/PSYCH:  No psychosis. PHYSICAL EXAM:    Vitals:    BP (!) 160/84   Pulse 83   Temp 97.1 °F (36.2 °C) (Temporal)   Resp 18   Ht 5' 8\" (1.727 m)   Wt 254 lb (115.2 kg)   LMP  (LMP Unknown)   SpO2 97%   BMI 38.62 kg/m²   Constitutional: The patient is awake, alert, and oriented. Skin: Warm and dry. No rashes were noted. Positive jaundice. HEENT: Eyes show round, and reactive pupils. Moist mucous membranes, no ulcerations, no thrush.   Icteric  Neck: Supple to movements. No lymphadenopathy. Chest: No use of accessory muscles to breathe. Symmetrical expansion. Auscultation reveals no wheezing, crackles, or rhonchi. Cardiovascular: S1 and S2 are rhythmic and regular. No murmurs appreciated. Abdomen: Positive bowel sounds to auscultation. Benign to palpation. No masses felt. No hepatosplenomegaly. Genitourinary: Female  Extremities: No clubbing, no cyanosis, no edema.   Musculoskeletal: Equal and symmetrical  Neurological: Encephalopathic although awake  Lines: peripheral      CBC+dif:  Recent Labs     11/24/21 2001   WBC 14.2*   HGB 10.6*   HCT 32.5*   .6*      NEUTROABS 10.55*     Lab Results   Component Value Date    CRP 0.4 11/24/2021    CRP 15.7 (H) 05/17/2011     No results found for: [facility]  Lab Results   Component Value Date    SEDRATE 97 (H) 11/24/2021    SEDRATE 95 (H) 07/09/2019    SEDRATE 80 (H) 05/17/2011     Lab Results   Component Value Date    ALT 81 (H) 11/24/2021     (H) 11/24/2021    ALKPHOS 199 (H) 11/24/2021    BILITOT 4.3 (H) 11/24/2021     Lab Results   Component Value Date     11/24/2021    K 2.6 11/24/2021     11/24/2021    CO2 19 11/24/2021    BUN 3 11/24/2021    CREATININE 0.9 11/24/2021    GFRAA >60 11/24/2021    LABGLOM >60 11/24/2021    GLUCOSE 154 11/24/2021    GLUCOSE 125 08/11/2011    PROT 8.8 11/24/2021    LABALBU 3.6 11/24/2021    LABALBU 3.6 08/11/2011    CALCIUM 9.4 11/24/2021    BILITOT 4.3 11/24/2021    ALKPHOS 199 11/24/2021     11/24/2021    ALT 81 11/24/2021       Lab Results   Component Value Date    PROTIME 22.8 11/24/2021    PROTIME 14.7 08/11/2011    INR 2.0 11/24/2021       Lab Results   Component Value Date    TSH 34.680 08/31/2021       Lab Results   Component Value Date    COLORU Yellow 11/24/2021    PHUR 7.5 11/24/2021    WBCUA 0-1 10/25/2021    WBCUA 0-1 06/30/2011    RBCUA 0-1 10/25/2021    RBCUA 0-1 06/30/2011    YEAST RARE 10/03/2016    BACTERIA RARE 10/25/2021    CLARITYU Clear 11/24/2021    SPECGRAV <=1.005 11/24/2021    LEUKOCYTESUR Negative 11/24/2021    UROBILINOGEN 0.2 11/24/2021    BILIRUBINUR Negative 11/24/2021    BILIRUBINUR NEGATIVE 06/30/2011    BLOODU Negative 11/24/2021    GLUCOSEU Negative 11/24/2021    GLUCOSEU NEGATIVE 06/30/2011       No results found for: Audria Bradley, [id number], PHART, THGBART, [id number], PO2ART, [id number]  Radiology:  CT Head WO Contrast   Final Result   No acute intracranial abnormality. CTA PULMONARY W CONTRAST   Final Result   Motion limited exam.  No evidence of pulmonary embolism or acute pulmonary   abnormality. XR CHEST PORTABLE   Final Result   Negative single view chest for acute process. Microbiology:  Pending  Recent Labs     11/24/21 2038   BC Gram stain performed from blood culture bottle media  Gram positive cocci in chains  Gram positive cocci in clusters  *     No results for input(s): ORG in the last 72 hours. Recent Labs     11/24/21 2038   BLOODCULT2 Gram stain performed from blood culture bottle media  Gram positive cocci in chains  Gram positive cocci in clusters  Previously positive blood culture called  *     No results for input(s): STREPNEUMAGU in the last 72 hours. No results for input(s): LP1UAG in the last 72 hours. No results for input(s): ASO in the last 72 hours. No results for input(s): CULTRESP in the last 72 hours. Assessment:  · Enterococcus bacteremia may be polymicrobial with a history of bacteremia in the past.  Started evaluation not only for exacerbation of Crohn's with a CT of the abdomen pelvis but also echo to rule out endocarditis    Plan:    · Cont Rocephin and Unasyn which would be effective for the Enterococcus in combination and still  GI ayan and MSSA  · Check cultures  · CT of the abdomen pelvis with oral and IV contrast  · Baseline ESR, CRP  · Monitor labs  · Will follow with you    Thank you for having us see this patient in consultation.  I will be discussing this case with the treating physicians.       Electronically signed by Colette Damon MD on 11/25/2021 at 6:48 PM

## 2021-11-25 NOTE — PROGRESS NOTES
Hospitalist Progress Note      SYNOPSIS: Patient admitted on 2021 for altered mental status. A 11-year-old female with significant past medical history, particularly for alcoholic cirrhosis and blood transfusions. She was brought into the ER with altered mental status. She was stopped by the police as she was trying to pull into a parking lot. She was noted to be confused and brought into the ER. Vital signs reveal the patient to be tachycardic with a rate of 112 and hypertensive with a pressure 156/97. Patient is jaundiced. Work-up revealed potassium 2.6, magnesium 1.5, lactic acid 5.2, glucose 154, troponin 22, ammonia 164, ALT 81, , bilirubin 4.3, alcohol 148, WBC 14.2, hemoglobin 10.6. CT chest unremarkable. Potassium and magnesium were replaced. SUBJECTIVE:    Patient seen and examined in her room. Appears comfortable. Alert awake oriented x2-3. No major overnight events. Patient denies any chest pain, nausea, vomiting. Records reviewed. Stable overnight. No other overnight issues reported. Temp (24hrs), Av.9 °F (36.6 °C), Min:97.7 °F (36.5 °C), Max:98 °F (36.7 °C)    DIET: ADULT DIET; Regular  CODE: Full Code    Intake/Output Summary (Last 24 hours) at 2021 0827  Last data filed at 2021 0020  Gross per 24 hour   Intake 10 ml   Output    Net 10 ml       OBJECTIVE:    /88   Pulse 109   Temp 98 °F (36.7 °C) (Temporal)   Resp 18   Ht 5' 8\" (1.727 m)   Wt 254 lb (115.2 kg)   LMP  (LMP Unknown)   SpO2 100%   BMI 38.62 kg/m²     General appearance: No apparent distress, appears stated age and cooperative. HEENT:  Conjunctivae/corneas clear. Neck: Supple. No jugular venous distention. Respiratory: Clear to auscultation bilaterally, normal respiratory effort  Cardiovascular: Regular rate rhythm, normal S1-S2  Abdomen: Soft, nontender, nondistended  Musculoskeletal: No clubbing, cyanosis, no bilateral lower extremity edema.  Brisk capillary refill. Skin:  No rashes  on visible skin  Neurologic: awake, alert and following commands       ASSESSMENT:  -Hepatic encephalopathy, acute toxic metabolic encephalopathy  -Hypomagnesemia  -Hypokalemia  -Lactic acidosis, check repeat lactic acid on 11/25  -Hyperammonemia  -Hyperbilirubinemia  -Hepatic cirrhosis  -Alcohol intoxication  -Leukocytosis  -Alcohol dependence/abuse  -Elevated troponins, 22, then 29, continue to recheck every 6 hours for now  -Bacteremia, gram-positive cocci in chains and clusters, 2/2 from 11/24        PLAN:  -Normal saline at 125 mils per hour, continue for another 24 hours  -Monitor serum electrolytes replete as needed  -Monitor serum ammonia levels  -Rifaximin and lactulose  -Monitor lactic acid level  -[facility] protocol with Ativan dosing  -Started first dose of vancomycin on 11/25, pharmacy to dose  -Consult ID    DISPOSITION:   Clear discharge disposition at the moment.     Medications:  REVIEWED DAILY    Infusion Medications    sodium chloride      sodium chloride 125 mL/hr at 11/25/21 3473     Scheduled Medications    cloNIDine  0.1 mg Oral Daily    folic acid  1 mg Oral Daily    lactulose  20 g Oral Q12H    pantoprazole  40 mg Oral Daily    propranolol  20 mg Oral Q12H    pyridoxine  25 mg Oral Daily    rifaximin  550 mg Oral BID    spironolactone  25 mg Oral Daily    thiamine mononitrate  100 mg Oral Daily    [START ON 11/29/2021] vitamin D  50,000 Units Oral Weekly    sodium chloride flush  10 mL IntraVENous 2 times per day    enoxaparin  40 mg SubCUTAneous Daily    rifaximin  550 mg Oral Once    lactulose  20 g Oral Once     PRN Meds: sodium chloride flush, sodium chloride, promethazine **OR** ondansetron, polyethylene glycol, acetaminophen **OR** acetaminophen, potassium chloride **OR** potassium alternative oral replacement **OR** potassium chloride, magnesium sulfate, LORazepam **OR** LORazepam **OR** LORazepam **OR** LORazepam **OR** LORazepam **OR** LORazepam **OR** LORazepam **OR** LORazepam    Labs:     Recent Labs     11/24/21 2001   WBC 14.2*   HGB 10.6*   HCT 32.5*          Recent Labs     11/24/21 2001      K 2.6*      CO2 19*   BUN 3*   CREATININE 0.9   CALCIUM 9.4       Recent Labs     11/24/21 2001   PROT 8.8*   ALKPHOS 199*   ALT 81*   *   BILITOT 4.3*   LIPASE 30       Recent Labs     11/24/21 2001   INR 2.0       No results for input(s): Adonica Hoose in the last 72 hours. Chronic labs:    Lab Results   Component Value Date    CHOL 100 09/01/2021    TRIG 164 (H) 09/01/2021    HDL 12 09/01/2021    LDLCALC 55 09/01/2021    TSH 34.680 (H) 08/31/2021    INR 2.0 11/24/2021    LABA1C 4.1 09/01/2021       Radiology: REVIEWED DAILY    +++++++++++++++++++++++++++++++++++++++++++++++++  Gina Hernandez MD  [facility]ician - [address] [place], [place]  +++++++++++++++++++++++++++++++++++++++++++++++++  NOTE: This report was transcribed using voice recognition software. Every effort was made to ensure accuracy; however, inadvertent computerized transcription errors may be present.

## 2021-11-25 NOTE — ED NOTES
Radiology Procedure Waiver   Name: Vanesa William  : 1975  MRN: [id number]    Date:  21    Time: 8:00 PM EST    Benefits of immediately proceeding with Radiology exam(s) without pre-testing outweigh the risks or are not indicated as specified below and therefore the following is/are being waived:    [] Pregnancy test   [] Patients LMP on-time and regular.   [] Patient had Tubal Ligation or has other Contraception Device. [] Patient  is Menopausal or Premenarcheal.    [] Patient had Full or Partial Hysterectomy. [] Protocol for Iodine allergy    [] MRI Questionnaire     [x] BUN/Creatinine   [] Patient age w/no hx of renal dysfunction. [] Patient on Dialysis. [] Recent Normal Labs.   Electronically signed by Dion Caro MD on 21 at 8:00 PM SADIQ Caro MD PGY-2  2021 8:00 PM       Dami Reyes MD  Resident  21

## 2021-11-25 NOTE — ED PROVIDER NOTES
Patient 69-year-old female presenting emergency department for altered mental status, was apparently stopped by [employer] as she was trying to pull in a parking lot and they noticed she was confused when they are speaking to her, was apparently hypoxic for EMS and was placed on a nonrebreather. She is a history of cirrhosis, is on lactulose, upon evaluation patient is altered, did not know the year where she was, did know her name. She has no complaints, denies any headache, chest pain, chest tightness, abdominal pain, nausea, vomiting, diarrhea, did note that patient had what appeared to be liquid stool on her leg. Altered mental status unknown onset, is been persistent throughout emergency department stay, uncertain of any associated symptoms, uncertain if anything makes it better or worse, moderate to severe in severity. Review of Systems   Unable to perform ROS: Mental status change   Constitutional: Negative for chills and fever. HENT: Negative for ear pain, sinus pressure and sore throat. Eyes: Negative for pain and discharge. Respiratory: Negative for cough, shortness of breath and wheezing. Cardiovascular: Negative for chest pain. Gastrointestinal: Negative for abdominal distention, diarrhea, nausea and vomiting. Genitourinary: Negative for dysuria and frequency. Musculoskeletal: Negative for arthralgias and back pain. Skin: Positive for color change (Jaundiced). Negative for rash and wound. Neurological: Negative for weakness and headaches. Hematological: Negative for adenopathy. Psychiatric/Behavioral: Positive for confusion. All other systems reviewed and are negative. Physical Exam  Vitals and nursing note reviewed. Constitutional:       Appearance: Normal appearance. She is ill-appearing. HENT:      Head: Normocephalic and atraumatic.       Right Ear: External ear normal.      Left Ear: External ear normal.      Nose: Nose normal.      Mouth/Throat: Mouth: Mucous membranes are dry. Eyes:      Extraocular Movements: Extraocular movements intact. Pupils: Pupils are equal, round, and reactive to light. Cardiovascular:      Rate and Rhythm: Regular rhythm. Tachycardia present. Pulses: Normal pulses. Heart sounds: Normal heart sounds. Pulmonary:      Effort: Pulmonary effort is normal. No respiratory distress. Breath sounds: Normal breath sounds. No stridor. No wheezing. Abdominal:      General: Abdomen is flat. Bowel sounds are normal. There is no distension. Palpations: Abdomen is soft. There is no mass. Tenderness: There is no abdominal tenderness. Musculoskeletal:         General: Normal range of motion. Cervical back: Normal range of motion and neck supple. Comments: Asterixis present with arm extension   Skin:     General: Skin is warm and dry. Coloration: Skin is jaundiced. Neurological:      Mental Status: She is alert. She is disoriented. Cranial Nerves: No cranial nerve deficit. Sensory: No sensory deficit. Motor: Weakness (Generalized weakness throughout) present. Coordination: Coordination normal.          Procedures     [facility]     ED Course as of 11/25/21 0659   Wed Nov 24, 2021 2106 Potassium low at 2.6, will replete, will replete magnesium as well [JG]   2114 EKG: This EKG is signed by emergency department physician.     Rate: 124  Rhythm: Sinus  Interpretation: Sinus tachycardia, left atrial monitoring, nonspecific ST changes  Comparison: changes compared to previous EKG      [JG]   2146 Based off of patient's CBC and baseline CBC she appears very hemoconcentrated, does appear dry on exam, afebrile do not suspect sepsis at this time, will fluid resuscitate patient and continue monitoring [JG]   2243 Vitals improving with fluid resuscitation, awaiting CT [JG]   2338 Patient unable to tolerate p.o. so lactulose and rifaximin not given [JG]   Thu Nov 25, 2021   0114 CT is unremarkable, no large vessel occlusion seen on CT, will admit patient for severe dehydration hepatic encephalopathy and metabolic abnormalities [JG]      ED Course User Index  [JG] Ruben Rinaldi MD        ED Course as of 11/25/21 0659   Wed Nov 24, 2021 2106 Potassium low at 2.6, will replete, will replete magnesium as well [JG]   2114 EKG: This EKG is signed by emergency department physician. Rate: 124  Rhythm: Sinus  Interpretation: Sinus tachycardia, left atrial monitoring, nonspecific ST changes  Comparison: changes compared to previous EKG      [JG]   2146 Based off of patient's CBC and baseline CBC she appears very hemoconcentrated, does appear dry on exam, afebrile do not suspect sepsis at this time, will fluid resuscitate patient and continue monitoring [JG]   2243 Vitals improving with fluid resuscitation, awaiting CT [JG]   2338 Patient unable to tolerate p.o. so lactulose and rifaximin not given [JG]   Thu Nov 25, 2021   0114 CT is unremarkable, no large vessel occlusion seen on CT, will admit patient for severe dehydration hepatic encephalopathy and metabolic abnormalities [JG]      ED Course User Index  [JG] Ruben Rinaldi MD   Patient presented the emergency part for altered mental status, neurologically intact generalized weakness throughout, but disoriented, was found be in hepatic encephalopathy as well as very dehydrated based off her prior CBCs she was given fluids, refused her rifaximin and lactulose, admitted to hospital for hepatic encephalopathy and dehydration.    --------------------------------------------- PAST HISTORY ---------------------------------------------  Past Medical History:  has a past medical history of Acute [facility] failure ([facility]) [facility] Alcoholic cirrhosis ([facility] Anxiety, Crohn's colitis ([facility] Depression, History of blood transfusion, Pyoderma gangrenosum, and Thyroid disease. Past Surgical History:  has a past surgical history that includes Abdomen surgery;  Arm 2. 33 1.50 - 4.00 E9/L    Monocytes Absolute 0.96 (H) 0.10 - 0.95 E9/L    Eosinophils Absolute 0.16 0.05 - 0.50 E9/L    Basophils Absolute 0.12 0.00 - 0.20 T3/W   Basic Metabolic Panel w/ Reflex to MG   Result Value Ref Range    Sodium 135 132 - 146 mmol/L    Potassium reflex Magnesium 2.6 (LL) 3.5 - 5.0 mmol/L    Chloride 102 98 - 107 mmol/L    CO2 19 (L) 22 - 29 mmol/L    Anion Gap 14 7 - 16 mmol/L    Glucose 154 (H) 74 - 99 mg/dL    BUN 3 (L) 6 - 20 mg/dL    CREATININE 0.9 0.5 - 1.0 mg/dL    GFR Non-African American >60 >=60 mL/min/1.73    GFR African American >60     Calcium 9.4 8.6 - 10.2 mg/dL   Hepatic Function Panel   Result Value Ref Range    Total Protein 8.8 (H) 6.4 - 8.3 g/dL    Albumin 3.6 3.5 - 5.2 g/dL    Alkaline Phosphatase 199 (H) 35 - 104 U/L    ALT 81 (H) 0 - 32 U/L     (H) 0 - 31 U/L    Total Bilirubin 4.3 (H) 0.0 - 1.2 mg/dL    Bilirubin, Direct 1.8 (H) 0.0 - 0.3 mg/dL    Bilirubin, Indirect 2.5 (H) 0.0 - 1.0 mg/dL   Protime-INR   Result Value Ref Range    Protime 22.8 (H) 9.3 - 12.4 sec    INR 2.0    APTT   Result Value Ref Range    aPTT 35.8 (H) 24.5 - 35.1 sec   Lactate, Sepsis   Result Value Ref Range    Lactic Acid, Sepsis 5.2 (HH) 0.5 - 1.9 mmol/L   Lactate, Sepsis   Result Value Ref Range    Lactic Acid, Sepsis 3.5 (HH) 0.5 - 1.9 mmol/L   Lipase   Result Value Ref Range    Lipase 30 13 - 60 U/L   Troponin   Result Value Ref Range    Troponin, High Sensitivity 22 (H) 0 - 9 ng/L   Ammonia   Result Value Ref Range    Ammonia 164.0 (H) 11.0 - 51.0 umol/L   Urinalysis, reflex to microscopic   Result Value Ref Range    Color, UA Yellow Straw/Yellow    Clarity, UA Clear Clear    Glucose, Ur Negative Negative mg/dL    Bilirubin Urine Negative Negative    Ketones, Urine Negative Negative mg/dL    Specific Gravity, UA <=1.005 1.005 - 1.030    Blood, Urine Negative Negative    pH, UA 7.5 5.0 - 9.0    Protein, UA Negative Negative mg/dL    Urobilinogen, Urine 0.2 <2.0 E.U./dL    Nitrite, Urine Negative Negative    Leukocyte Esterase, Urine Negative Negative   URINE DRUG SCREEN   Result Value Ref Range    Amphetamine Screen, Urine NOT DETECTED Negative <1000 ng/mL    Barbiturate Screen, Ur NOT DETECTED Negative < 200 ng/mL    Benzodiazepine Screen, Urine NOT DETECTED Negative < 200 ng/mL    Cannabinoid Scrn, Ur NOT DETECTED Negative < 50ng/mL    Cocaine Metabolite Screen, Urine NOT DETECTED Negative < 300 ng/mL    Opiate Scrn, Ur NOT DETECTED Negative < 300ng/mL    PCP Screen, Urine NOT DETECTED Negative < 25 ng/mL    Methadone Screen, Urine NOT DETECTED Negative <300 ng/mL    Oxycodone Urine NOT DETECTED Negative <100 ng/mL    FENTANYL SCREEN, URINE NOT DETECTED Negative <1 ng/mL    Drug Screen Comment: see below    Serum Drug Screen   Result Value Ref Range    Ethanol Lvl 148 mg/dL    Acetaminophen Level <5.0 (L) 10.0 - 81.3 mcg/mL    Salicylate, Serum <5.6 0.0 - 30.0 mg/dL    TCA Scrn NEGATIVE Cutoff:300 ng/mL   Brain Natriuretic Peptide   Result Value Ref Range    Pro-BNP 27 0 - 125 pg/mL   Blood Gas, Arterial   Result Value Ref Range    Date Analyzed [id number]     Time Analyzed 2003     Source: Blood Arterial     pH, Blood Gas 7.409 7.350 - 7.450    PCO2 29.1 (L) 35.0 - 45.0 mmHg    PO2 288.6 (H) 75.0 - 100.0 mmHg    HCO3 18.0 (L) 22.0 - 26.0 mmol/L    B.E. -5.5 (L) -3.0 - 3.0 mmol/L    O2 Sat 99.7 (H) 92.0 - 98.5 %    O2Hb 99.0 (H) 94.0 - 97.0 %    COHb 0.2 0.0 - 1.5 %    MetHb 0.5 0.0 - 1.5 %    O2 Content 16.6 mL/dL    HHb 0.3 0.0 - 5.0 %    tHb (est) 11.4 (L) 11.5 - 16.5 g/dL    Mode NRB 15L     Date Of Collection      Time Collected      Pt Temp 37.0 C     ID [id number]     Lab [id number]     Critical(s) Notified .  No Critical Values    Magnesium   Result Value Ref Range    Magnesium 1.5 (L) 1.6 - 2.6 mg/dL   POCT Glucose   Result Value Ref Range    Meter Glucose 161 (H) 74 - 99 mg/dL   POC Pregnancy Urine   Result Value Ref Range    HCG, Urine, POC Negative Negative    Lot Number [id number] Positive QC Pass/Fail Pass     Negative QC Pass/Fail Pass    POCT Glucose   Result Value Ref Range    QC OK? 118    EKG 12 Lead   Result Value Ref Range    Ventricular Rate 124 BPM    Atrial Rate 124 BPM    P-R Interval 124 ms    QRS Duration 84 ms    Q-T Interval 426 ms    QTc Calculation (Bazett) 612 ms    P Axis 55 degrees    R Axis 4 degrees    T Axis 30 degrees       RADIOLOGY:  CT Head WO Contrast   Final Result   No acute intracranial abnormality. CTA PULMONARY W CONTRAST   Final Result   Motion limited exam.  No evidence of pulmonary embolism or acute pulmonary   abnormality. XR CHEST PORTABLE   Final Result   Negative single view chest for acute process. ------------------------- NURSING NOTES AND VITALS REVIEWED ---------------------------  Date / Time Roomed:  11/24/2021  7:48 PM  ED Bed Assignment:  [id number]    The nursing notes within the ED encounter and vital signs as below have been reviewed. Patient Vitals for the past 24 hrs:   BP Temp Temp src Pulse Resp SpO2 Height Weight   11/25/21 0609 129/75   107       11/25/21 0545 129/75 98 °F (36.7 °C)  107 18 100 %     11/25/21 0451 (!) 141/84 97.7 °F (36.5 °C) Infrared 105 18 100 %     11/24/21 2230    112 17      11/24/21 2205 (!) 156/97          11/24/21 2200 (!) 184/93   113 16      11/24/21 1951 (!) 157/87 98 °F (36.7 °C)  124 14 98 % 5' 8\" (1.727 m) 254 lb (115.2 kg)       Oxygen Saturation Interpretation: Normal    ------------------------------------------ PROGRESS NOTES     Counseling:  I have spoken with the patient and discussed todays results, in addition to providing specific details for the plan of care and counseling regarding the diagnosis and prognosis. Their questions are answered at this time and they are agreeable with the plan of admission.    --------------------------------- ADDITIONAL PROVIDER NOTES ---------------------------------  Consultations:  Time: 0114. Spoke with Dr. Queen Cormier. Discussed case. They will admit the patient. This patient's ED course included: a personal history and physicial examination, re-evaluation prior to disposition, multiple bedside re-evaluations, IV medications, cardiac monitoring and continuous pulse oximetry    This patient has remained hemodynamically stable during their ED course. Diagnosis:  1. Altered mental status, unspecified altered mental status type    2. Dehydration    3. Hepatic encephalopathy ([facility]    4. Acute alcoholic intoxication with complication ([facility]        Disposition:  Patient's disposition: Admit to telemetry  Patient's condition is stable.          Anjel Espinoza MD  Resident  11/25/21 4740

## 2021-11-25 NOTE — PROGRESS NOTES
Pharmacy Consultation Note  (Antibiotic Dosing and Monitoring)    Initial consult date: 11/25/21  Consulting physician/provider: Leilani Sandifer, MD  Drug: Vancomycin  Indication: bloodstream infection    Age/  Gender Height Weight IBW  Allergy Information   46 y.o./female 5' 8\" (172.7 cm) 254 lb (115.2 kg)     Ideal body weight: 63.9 kg (140 lb 14 oz)  Adjusted ideal body weight: 84.4 kg (186 lb 2 oz)   Patient has no known allergies. Renal Function:  Recent Labs     11/24/21 2001   BUN 3*   CREATININE 0.9       Intake/Output Summary (Last 24 hours) at 11/25/2021 1514  Last data filed at 11/25/2021 0020  Gross per 24 hour   Intake 10 ml   Output    Net 10 ml       Vancomycin Monitoring:  Trough:  No results for input(s): VANCOTROUGH in the last 72 hours. Random:  No results for input(s): VANCORANDOM in the last 72 hours. Vancomycin Administration Times:  Recent vancomycin administrations      No vancomycin IV orders with administrations found. Orders not given:          vancomycin (VANCOCIN) 2,250 mg in dextrose 5 % 500 mL IVPB    vancomycin (VANCOCIN) 1,250 mg in dextrose 5 % 250 mL IVPB                Assessment:  · Patient is a 51 y.o. female who has been initiated on vancomycin  · Estimated Creatinine Clearance: 103 mL/min (based on SCr of 0.9 mg/dL).   · To dose vancomycin, pharmacy will be utilizing [employer] calculation software for goal AUC/JORDAN 400-600 mg/L-hr    Plan:  · Will continue vancomycin 2250 mg IV x 1 followed by vancomycin 1250 mg q12h  · Will check vancomycin levels when appropriate  · Will continue to monitor renal function   · Clinical pharmacy to follow      Abdulkadir Higuera, PharmD  Pharmacy Resident  P: [id number]   11/25/2021 3:14 PM

## 2021-11-25 NOTE — ED NOTES
Bed: 15  Expected date:   Expected time:   Means of arrival: [employer]  Comments:  Vahe Ledesma RN  11/24/21 9185

## 2021-11-25 NOTE — ED NOTES
Informed by unit clerk that this patient has a bed assignment. This RN has been with and is still currently with a critical ICU patient and is unable to send this patient to the floor at this time.       Elvis Espinoza RN  11/25/21 5970

## 2021-11-26 NOTE — PROGRESS NOTES
SIGN OFF [address] [place] [address]  no longer has an active consult to dose and follow vancomycin. [address] signing off this patient.  Please re-consult pharmacy if future dosing is needed.     Thank you for the consult,    Lesly Gonzalez, PharmD 11/26/2021 9:17 AM

## 2021-11-26 NOTE — PROGRESS NOTES
Hospitalist Progress Note      SYNOPSIS: Patient admitted on 2021 for altered mental status. A 79-year-old female with significant past medical history, particularly for alcoholic cirrhosis and blood transfusions. She was brought into the ER with altered mental status. She was stopped by the police as she was trying to pull into a parking lot. She was noted to be confused and brought into the ER. Vital signs reveal the patient to be tachycardic with a rate of 112 and hypertensive with a pressure 156/97. Patient is jaundiced. Work-up revealed potassium 2.6, magnesium 1.5, lactic acid 5.2, glucose 154, troponin 22, ammonia 164, ALT 81, , bilirubin 4.3, alcohol 148, WBC 14.2, hemoglobin 10.6. CT chest unremarkable. Potassium and magnesium were replaced. SUBJECTIVE:    Patient seen and examined in her room. Patient appears comfortable. Alert awake oriented x3. Patient denies any chest pain, nausea, vomiting. Records reviewed. Stable overnight. No other overnight issues reported. Temp (24hrs), Av.3 °F (36.3 °C), Min:97.1 °F (36.2 °C), Max:97.6 °F (36.4 °C)    DIET: Diet NPO  CODE: Full Code    Intake/Output Summary (Last 24 hours) at 2021 0813  Last data filed at 2021 0531  Gross per 24 hour   Intake 2241 ml   Output    Net 2241 ml       OBJECTIVE:    /68   Pulse 74   Temp 97.6 °F (36.4 °C) (Temporal)   Resp 18   Ht 5' 8\" (1.727 m)   Wt 254 lb (115.2 kg)   LMP  (LMP Unknown)   SpO2 99%   BMI 38.62 kg/m²     General appearance: No apparent distress, appears stated age and cooperative. HEENT:  Conjunctivae/corneas clear. Neck: Supple. No jugular venous distention. Respiratory: Clear to auscultation bilaterally, normal respiratory effort  Cardiovascular: Regular rate rhythm, normal S1-S2  Abdomen: Soft, nontender, nondistended  Musculoskeletal: No clubbing, cyanosis, no bilateral lower extremity edema. Brisk capillary refill.    Skin:  No rashes  on chloride, magnesium sulfate, LORazepam **OR** LORazepam **OR** LORazepam **OR** LORazepam **OR** LORazepam **OR** LORazepam **OR** LORazepam **OR** LORazepam    Labs:     Recent Labs     11/24/21 2001   WBC 14.2*   HGB 10.6*   HCT 32.5*          Recent Labs     11/24/21 2001 11/26/21  0700    137   K 2.6* 2.4*    106   CO2 19* 19*   BUN 3* 5*   CREATININE 0.9 1.1*   CALCIUM 9.4 8.5*       Recent Labs     11/24/21 2001 11/26/21  0700   PROT 8.8* 7.0   ALKPHOS 199* 155*   ALT 81* 59*   * 108*   BILITOT 4.3* 2.9*   LIPASE 30  --        Recent Labs     11/24/21 2001   INR 2.0       No results for input(s): Shanice Huntley in the last 72 hours. Chronic labs:    Lab Results   Component Value Date    CHOL 100 09/01/2021    TRIG 164 (H) 09/01/2021    HDL 12 09/01/2021    LDLCALC 55 09/01/2021    TSH 34.680 (H) 08/31/2021    INR 2.0 11/24/2021    LABA1C 4.1 09/01/2021       Radiology: REVIEWED DAILY    +++++++++++++++++++++++++++++++++++++++++++++++++  Saqib Dickens MD  [facility] Physician - [address] [place], [place]  +++++++++++++++++++++++++++++++++++++++++++++++++  NOTE: This report was transcribed using voice recognition software. Every effort was made to ensure accuracy; however, inadvertent computerized transcription errors may be present.

## 2021-11-26 NOTE — PROGRESS NOTES
[address] [place] [address] [facility]  JEREMIAHIDA  Progress Note      Chief Complaint   Patient presents with    Altered Mental Status     patient was seen by PD trying to pull into a parking lot. .. PD stopped to evaluate, found patient altered, Patient hypoxic for EMS, AMS        SUBJECTIVE:  Patient is tolerating medications. No reported adverse drug reactions. No nausea, vomiting, diarrhea. On her way to Ct scan. Review of systems:  As stated above in the chief complaint, otherwise negative.     Medications:  Scheduled Meds:   cloNIDine  0.1 mg Oral Daily    folic acid  1 mg Oral Daily    lactulose  20 g Oral Q12H    pantoprazole  40 mg Oral Daily    propranolol  20 mg Oral Q12H    pyridoxine  25 mg Oral Daily    rifaximin  550 mg Oral BID    spironolactone  25 mg Oral Daily    thiamine mononitrate  100 mg Oral Daily    [START ON 2021] vitamin D  50,000 Units Oral Weekly    sodium chloride flush  10 mL IntraVENous 2 times per day    enoxaparin  40 mg SubCUTAneous Daily    ampicillin-sulbactam  3,000 mg IntraVENous Q6H    cefTRIAXone (ROCEPHIN) IV  2,000 mg IntraVENous Q24H    rifaximin  550 mg Oral Once    lactulose  20 g Oral Once     Continuous Infusions:   sodium chloride      sodium chloride 100 mL/hr at 21 0216     PRN Meds:iohexol, sodium chloride flush, sodium chloride flush, sodium chloride, promethazine **OR** [DISCONTINUED] ondansetron, polyethylene glycol, acetaminophen **OR** acetaminophen, potassium chloride **OR** potassium alternative oral replacement **OR** potassium chloride, magnesium sulfate, LORazepam **OR** LORazepam **OR** LORazepam **OR** LORazepam **OR** LORazepam **OR** LORazepam **OR** LORazepam **OR** LORazepam    OBJECTIVE:  /60   Pulse 65   Temp 96.8 °F (36 °C) (Temporal)   Resp 18   Ht 5' 8\" (1.727 m)   Wt 254 lb (115.2 kg)   LMP  (LMP Unknown)   SpO2 98%   BMI 38.62 kg/m²   Temp  Av.2 °F (36.2 °C)  Min: 96.8 °F (36 °C)  Max: 97.6 °F (36.4 °C)  Constitutional: The patient is awake, alert, and oriented. Skin: Warm and dry. No rashes were noted. jaundice  HEENT: Round and reactive pupils. Moist mucous membranes. No ulcerations or thrush. icteric  Neck: Supple to movements. Chest: No use of accessory muscles to breathe. Symmetrical expansion. No wheezing, crackles or rhonchi. Cardiovascular: S1 and S2 are rhythmic and regular. No murmurs appreciated. Abdomen: Positive bowel sounds to auscultation. Benign to palpation. No masses felt. No hepatosplenomegaly. Genitourinary: female  Extremities: No clubbing, no cyanosis, no edema.   Lines: peripheral    Laboratory and Tests Review:  Lab Results   Component Value Date    WBC 4.6 11/26/2021    WBC 14.2 (H) 11/24/2021    WBC 5.7 10/27/2021    HGB 10.4 (L) 11/26/2021    HCT 33.4 (L) 11/26/2021    .4 (H) 11/26/2021    PLT 73 (L) 11/26/2021     Lab Results   Component Value Date    NEUTROABS 10.55 (H) 11/24/2021    NEUTROABS 4.45 10/27/2021    NEUTROABS 3.32 10/26/2021     No results found for: [facility]  Lab Results   Component Value Date    ALT 59 (H) 11/26/2021     (H) 11/26/2021    ALKPHOS 155 (H) 11/26/2021    BILITOT 2.9 (H) 11/26/2021     Lab Results   Component Value Date     11/26/2021    K 2.4 11/26/2021    K 2.4 11/26/2021     11/26/2021    CO2 19 11/26/2021    BUN 5 11/26/2021    CREATININE 1.1 11/26/2021    CREATININE 0.9 11/24/2021    CREATININE 1.1 10/27/2021    GFRAA >60 11/26/2021    LABGLOM 53 11/26/2021    GLUCOSE 93 11/26/2021    GLUCOSE 125 08/11/2011    PROT 7.0 11/26/2021    LABALBU 2.5 11/26/2021    LABALBU 3.6 08/11/2011    CALCIUM 8.5 11/26/2021    BILITOT 2.9 11/26/2021    ALKPHOS 155 11/26/2021     11/26/2021    ALT 59 11/26/2021     Lab Results   Component Value Date    CRP 0.4 11/24/2021    CRP 15.7 (H) 05/17/2011     Lab Results   Component Value Date    SEDRATE 97 (H) 11/24/2021    SEDRATE 95 (H) 07/09/2019    SEDRATE 80 (H) 05/17/2011 Radiology:      Microbiology:   Lab Results   Component Value Date    [facility]  11/24/2021     Gram stain performed from blood culture bottle media  Gram positive cocci in chains  Gram positive cocci in clusters      [facility]  11/24/2021     Refer to previous culture CXBL2 11/24/2021 2038 for susceptibility  results      BC Identification and sensitivity to follow 11/24/2021    ORG Streptococcus species 11/24/2021    ORG Staphylococcus species 11/24/2021    ORG Streptococcus species 11/24/2021    ORG Staphylococcus species 11/24/2021     Lab Results   Component Value Date    BLOODCULT2  11/24/2021     Gram stain performed from blood culture bottle media  Gram positive cocci in chains  Gram positive cocci in clusters  Previously positive blood culture called      BLOODCULT2 Identification and sensitivity to follow 11/24/2021    BLOODCULT2 Identification and sensitivity to follow 11/24/2021    ORG Streptococcus species 11/24/2021    ORG Staphylococcus species 11/24/2021    ORG Streptococcus species 11/24/2021    ORG Staphylococcus species 11/24/2021     No results found for: WNDABS  No results found for: RESPSMEAR  No results found for: MPNEUMO, CLAMYDCU, LABLEGI, AFBCX, FUNGSM, LABFUNG  No results found for: CULTRESP  No results found for: CXCATHTIP  Body Fluid Culture, Sterile   Date Value Ref Range Status   10/26/2021   Final    Growth not present  Neisseria gonorrhoeae not isolated       No results found for: CXSURG  Urine Culture, Routine   Date Value Ref Range Status   11/24/2021   Preliminary    Growth not present, incubation continues  24 Hours growth not present; incubation continues     10/22/2021   Final    10 to 100,000 CFU/mL  Mixed ayan isolated. Further workup and sensitivity testing  is not routinely indicated and will not be performed.   Mixed ayan isolated includes:  Mixed gram negative rods  Nonhemolytic Strep species     08/31/2021 Growth not present  Final     No results found for: [facility]   blood cx 11/24/2021 strep species and staph species  Blood cx 11/25/2021-sent and pending  Assessment:  Enterococcus bacteremia may be polymicrobial with a history of bacteremia in the past.  Started evaluation not only for exacerbation of Crohn's with a CT of the abdomen pelvis but also echo to rule out endocarditis     Plan:    Cont Rocephin and Unasyn which would be effective for the Enterococcus in combination and still  GI ayan and MSSA  Check cultures  CT of the abdomen pelvis with oral and IV contrast  TTE  Monitor labs      Electronically signed by SRIDHAR Marrero CNP on 11/26/2021 at 1:15 PM  Pt seen and examined. Above discussed agree with advanced practice nurse. Labs, cultures, and radiographs reviewed. Face to Face encounter occurred. Changes made as necessary.      Keven Albert MD

## 2021-11-27 NOTE — PROGRESS NOTES
Pt is getting potassium iv for potassium of 2.8. She only received  2 out of 4 bags and refusing the other two. She said it burn. This nurse  dropped the rate but pt still refused. Notified Dr. Chiang All for oral potassium but he said the 2 bags of potassium is fine. He said he will recheck labs in the morning.

## 2021-11-27 NOTE — PROGRESS NOTES
Hospitalist Progress Note      SYNOPSIS: Patient admitted on 2021 for altered mental status. A 50-year-old female with significant past medical history, particularly for alcoholic cirrhosis and blood transfusions. She was brought into the ER with altered mental status. She was stopped by the police as she was trying to pull into a parking lot. She was noted to be confused and brought into the ER. Vital signs reveal the patient to be tachycardic with a rate of 112 and hypertensive with a pressure 156/97. Patient is jaundiced. Work-up revealed potassium 2.6, magnesium 1.5, lactic acid 5.2, glucose 154, troponin 22, ammonia 164, ALT 81, , bilirubin 4.3, alcohol 148, WBC 14.2, hemoglobin 10.6. CT chest unremarkable. Potassium and magnesium were replaced. SUBJECTIVE:    Patient seen and examined in her room. Patient appears comfortable. Alert awake oriented x3. Patient denies any chest pain, nausea, vomiting. Records reviewed. Stable overnight. No other overnight issues reported. Temp (24hrs), Av.7 °F (36.5 °C), Min:97.2 °F (36.2 °C), Max:98.7 °F (37.1 °C)    DIET: ADULT DIET; Regular  CODE: Full Code    Intake/Output Summary (Last 24 hours) at 2021 0835  Last data filed at 2021 1837  Gross per 24 hour   Intake    Output 650 ml   Net -650 ml       OBJECTIVE:    /62   Pulse 64   Temp 98.7 °F (37.1 °C) (Temporal)   Resp 18   Ht 5' 8\" (1.727 m)   Wt 254 lb (115.2 kg)   LMP  (LMP Unknown)   SpO2 99%   BMI 38.62 kg/m²     General appearance: No apparent distress, appears stated age and cooperative. HEENT:  Conjunctivae/corneas clear. Neck: Supple. No jugular venous distention. Respiratory: Clear to auscultation bilaterally, normal respiratory effort  Cardiovascular: Regular rate rhythm, normal S1-S2  Abdomen: Soft, nontender, nondistended  Musculoskeletal: No clubbing, cyanosis, no bilateral lower extremity edema. Brisk capillary refill.    Skin:  No rashes  on visible skin  Neurologic: awake, alert and following commands       ASSESSMENT:  -Hepatic encephalopathy, acute toxic metabolic encephalopathy  -Hypomagnesemia  -Hypokalemia  -Lactic acidosis, check repeat lactic acid on 11/25  -Hyperammonemia  -Hyperbilirubinemia  -Hepatic cirrhosis  -Alcohol intoxication  -Leukocytosis  -Alcohol dependence/abuse  -Elevated troponins, 22, then 29, continue to recheck every 6 hours for now  -Bacteremia, gram-positive cocci in chains and clusters, 2/2 from 11/24        PLAN:  -IV fluids discontinued on 11/26  -Monitor serum electrolytes replete as needed  -Monitor serum ammonia levels  -Rifaximin and lactulose  -Monitor lactic acid level  -[facility] protocol with Ativan dosing  -Started first dose of vancomycin on 11/25, changed to Rocephin and Unasyn by ID on 11/25  -Consult ID, follow recommendations, CT abdomen pelvis with oral and IV contrast as well as TTE  -Replace with KCl 40 mEq IV x1 as well as magnesium sulfate IV 2 g x 1 on 11/27    DISPOSITION:   Clear discharge disposition at the moment.     Medications:  REVIEWED DAILY    Infusion Medications    sodium chloride      sodium chloride 100 mL/hr at 11/26/21 1931     Scheduled Medications    cloNIDine  0.1 mg Oral Daily    folic acid  1 mg Oral Daily    lactulose  20 g Oral Q12H    pantoprazole  40 mg Oral Daily    propranolol  20 mg Oral Q12H    pyridoxine  25 mg Oral Daily    rifaximin  550 mg Oral BID    spironolactone  25 mg Oral Daily    thiamine mononitrate  100 mg Oral Daily    [START ON 11/29/2021] vitamin D  50,000 Units Oral Weekly    sodium chloride flush  10 mL IntraVENous 2 times per day    enoxaparin  40 mg SubCUTAneous Daily    ampicillin-sulbactam  3,000 mg IntraVENous Q6H    cefTRIAXone (ROCEPHIN) IV  2,000 mg IntraVENous Q24H    rifaximin  550 mg Oral Once    lactulose  20 g Oral Once     PRN Meds: iohexol, sodium chloride flush, sodium chloride flush, sodium chloride, promethazine **OR** [DISCONTINUED] ondansetron, polyethylene glycol, acetaminophen **OR** acetaminophen, potassium chloride **OR** potassium alternative oral replacement **OR** potassium chloride, magnesium sulfate, LORazepam **OR** LORazepam **OR** LORazepam **OR** LORazepam **OR** LORazepam **OR** LORazepam **OR** LORazepam **OR** LORazepam    Labs:     Recent Labs     11/24/21 2001 11/26/21  0830 11/27/21  0517   WBC 14.2* 4.6 4.9   HGB 10.6* 10.4* 8.5*   HCT 32.5* 33.4* 27.0*    73* 71*       Recent Labs     11/24/21 2001 11/24/21 2001 11/26/21  0700 11/26/21  1531 11/27/21  0517     --  137  --  141   K 2.6*   < > 2.4*  2.4* 3.5 2.8*     --  106  --  111*   CO2 19*  --  19*  --  17*   BUN 3*  --  5*  --  5*   CREATININE 0.9  --  1.1*  --  1.2*   CALCIUM 9.4  --  8.5*  --  8.6    < > = values in this interval not displayed. Recent Labs     11/24/21 2001 11/26/21  0700 11/27/21  0517   PROT 8.8* 7.0 6.7   ALKPHOS 199* 155* 171*   ALT 81* 59* 50*   * 108* 83*   BILITOT 4.3* 2.9* 1.8*   LIPASE 30  --   --        Recent Labs     11/24/21 2001   INR 2.0       No results for input(s): Monserrat Risk in the last 72 hours. Chronic labs:    Lab Results   Component Value Date    CHOL 100 09/01/2021    TRIG 164 (H) 09/01/2021    HDL 12 09/01/2021    LDLCALC 55 09/01/2021    TSH 34.680 (H) 08/31/2021    INR 2.0 11/24/2021    LABA1C 4.1 09/01/2021       Radiology: REVIEWED DAILY    +++++++++++++++++++++++++++++++++++++++++++++++++  Mortimer Glee, MD  [facility] Physician - [address] [place] [address]  +++++++++++++++++++++++++++++++++++++++++++++++++  NOTE: This report was transcribed using voice recognition software. Every effort was made to ensure accuracy; however, inadvertent computerized transcription errors may be present.

## 2021-11-27 NOTE — PROGRESS NOTES
[address] [place] [address] [facility]  [facility]  Progress Note      C/C : Enterococcus bacteremia       SUBJECTIVE:  Patient is tolerating medications. No reported adverse drug reactions. No nausea, vomiting, diarrhea. On her way to Ct scan. Review of systems:  As stated above in the chief complaint, otherwise negative. Medications:  Scheduled Meds:   potassium chloride  10 mEq IntraVENous Once    potassium chloride  10 mEq IntraVENous Once    cloNIDine  0.1 mg Oral Daily    folic acid  1 mg Oral Daily    lactulose  20 g Oral Q12H    pantoprazole  40 mg Oral Daily    propranolol  20 mg Oral Q12H    pyridoxine  25 mg Oral Daily    rifaximin  550 mg Oral BID    spironolactone  25 mg Oral Daily    thiamine mononitrate  100 mg Oral Daily    [START ON 2021] vitamin D  50,000 Units Oral Weekly    sodium chloride flush  10 mL IntraVENous 2 times per day    enoxaparin  40 mg SubCUTAneous Daily    ampicillin-sulbactam  3,000 mg IntraVENous Q6H    cefTRIAXone (ROCEPHIN) IV  2,000 mg IntraVENous Q24H    rifaximin  550 mg Oral Once    lactulose  20 g Oral Once     Continuous Infusions:   sodium chloride      sodium chloride 100 mL/hr at 21 1931     PRN Meds:iohexol, sodium chloride flush, sodium chloride flush, sodium chloride, promethazine **OR** [DISCONTINUED] ondansetron, polyethylene glycol, acetaminophen **OR** acetaminophen, potassium chloride **OR** potassium alternative oral replacement **OR** potassium chloride, magnesium sulfate, LORazepam **OR** LORazepam **OR** LORazepam **OR** LORazepam **OR** LORazepam **OR** LORazepam **OR** LORazepam **OR** LORazepam    OBJECTIVE:  BP (!) 94/50   Pulse 60   Temp 97 °F (36.1 °C) (Temporal)   Resp 18   Ht 5' 8\" (1.727 m)   Wt 254 lb (115.2 kg)   LMP  (LMP Unknown)   SpO2 97%   BMI 38.62 kg/m²   Temp  Av.7 °F (36.5 °C)  Min: 97 °F (36.1 °C)  Max: 98.7 °F (37.1 °C)  Constitutional: The patient is awake, alert, and oriented. Skin: Warm and dry. No rashes were noted. jaundice  HEENT: Round and reactive pupils. Moist mucous membranes. No ulcerations or thrush. icteric  Neck: Supple to movements. Chest: Clear   Cardiovascular: S1 and S2 are rhythmic and regular. No murmurs appreciated. Abdomen: Soft, non tender, bowel sound +  Genitourinary: female  Extremities: No clubbing, no cyanosis, no edema.   Lines: peripheral    Laboratory and Tests Review:  Lab Results   Component Value Date    WBC 4.9 11/27/2021    WBC 4.6 11/26/2021    WBC 14.2 (H) 11/24/2021    HGB 8.5 (L) 11/27/2021    HCT 27.0 (L) 11/27/2021    .3 (H) 11/27/2021    PLT 71 (L) 11/27/2021     Lab Results   Component Value Date    NEUTROABS 10.55 (H) 11/24/2021    NEUTROABS 4.45 10/27/2021    NEUTROABS 3.32 10/26/2021     No results found for: [facility]  Lab Results   Component Value Date    ALT 50 (H) 11/27/2021    AST 83 (H) 11/27/2021    ALKPHOS 171 (H) 11/27/2021    BILITOT 1.8 (H) 11/27/2021     Lab Results   Component Value Date     11/27/2021    K 2.8 11/27/2021    K 2.4 11/26/2021     11/27/2021    CO2 17 11/27/2021    BUN 5 11/27/2021    CREATININE 1.2 11/27/2021    CREATININE 1.1 11/26/2021    CREATININE 0.9 11/24/2021    GFRAA 58 11/27/2021    LABGLOM 48 11/27/2021    GLUCOSE 107 11/27/2021    GLUCOSE 125 08/11/2011    PROT 6.7 11/27/2021    LABALBU 2.7 11/27/2021    LABALBU 3.6 08/11/2011    CALCIUM 8.6 11/27/2021    BILITOT 1.8 11/27/2021    ALKPHOS 171 11/27/2021    AST 83 11/27/2021    ALT 50 11/27/2021     Lab Results   Component Value Date    CRP 0.4 11/24/2021    CRP 15.7 (H) 05/17/2011     Lab Results   Component Value Date    SEDRATE 97 (H) 11/24/2021    SEDRATE 95 (H) 07/09/2019    SEDRATE 80 (H) 05/17/2011     Radiology:  CT abdomen and pelvis -  Peripancreatic fat stranding and edema adjacent to the duodenum may reflect   pancreatitis versus duodenitis.  Small amount of reactive fluid identified in   the right upper quadrant.  No abscess.  Please correlate findings with   laboratory values. Thickened gallbladder wall with pericholecystic fat stranding may reflect   cholecystitis versus local inflammation from the adjacent duodenum.  Further   evaluation may be obtained with HIDA scan if there is clinical suspicion for   cholecystitis. Microbiology:   Lab Results   Component Value Date    BC 24 Hours no growth 11/25/2021    BC  11/24/2021     Gram stain performed from blood culture bottle media  Gram positive cocci in chains  Gram positive cocci in clusters      [facility]  11/24/2021     Refer to previous culture CXBL2 11/24/2021 2038 for susceptibility  results      [facility]  11/24/2021     Identification and sensitivity to follow  This isolate is presumed to be resistant based on the  detection of inducible Clindamycin resistance. Clindamycin  may still be effective in some patients. BC Identification and sensitivity to follow 11/24/2021    [facility] Enterococcus faecium 11/24/2021    ORG Staphylococcus haemolyticus 11/24/2021    ORG Gram positive cocci 11/24/2021    ORG Enterococcus faecium 11/24/2021    ORG Staphylococcus haemolyticus 11/24/2021     Lab Results   Component Value Date    BLOODCULT2  11/24/2021     Gram stain performed from blood culture bottle media  Gram positive cocci in chains  Gram positive cocci in clusters  Previously positive blood culture called      BLOODCULT2 Identification and sensitivity to follow 11/24/2021    [facility]az[facility]  11/24/2021     Identification and sensitivity to follow  This isolate is presumed to be resistant based on the  detection of inducible Clindamycin resistance. Clindamycin  may still be effective in some patients.       ORG Enterococcus faecium 11/24/2021    ORG Staphylococcus haemolyticus 11/24/2021    ORG Gram positive cocci 11/24/2021    ORG Enterococcus faecium 11/24/2021    ORG Staphylococcus haemolyticus 11/24/2021     No results found for: WNDABS  No results found for: RESPSMEAR  No results found for: Roz Gordillo, LABLEGI, AFBCX, FUNGSM, LABFUNG  No results found for: CULTRESP  No results found for: CXCATHTIP  Body Fluid Culture, Sterile   Date Value Ref Range Status   10/26/2021   Final    Growth not present  Neisseria gonorrhoeae not isolated       No results found for: CXSURG  Urine Culture, Routine   Date Value Ref Range Status   11/24/2021 <10,000 CFU/mL  Gram positive organism    Final   10/22/2021   Final    10 to 100,000 CFU/mL  Mixed ayan isolated. Further workup and sensitivity testing  is not routinely indicated and will not be performed.   Mixed ayan isolated includes:  Mixed gram negative rods  Nonhemolytic Strep species     08/31/2021 Growth not present  Final     No results found for: [facility]   blood cx 11/24/2021 strep species and staph species  Blood cx 11/25/2021-sent and pending  Assessment:  · Enterococcus bacteremia may be polymicrobial with a history of bacteremia in the past.  Started evaluation not only for exacerbation of Crohn's with a CT of the abdomen pelvis but also echo to rule out endocarditis     Plan:    · Cont Rocephin 2 grams IV q 12 hrs and Unasyn 3 grams Iv q 6 hrs    · PICC line insertion  ( blood cx on 11/25 negative )   · Echo       Electronically signed by Luba Anderson MD on 11/27/2021 at 4:16 PM

## 2021-11-28 NOTE — PROGRESS NOTES
[address] [place] [address] [facility]  [facility]  Progress Note      C/C : Enterococcus bacteremia       SUBJECTIVE:  Patient is tolerating medications. No reported adverse drug reactions. No nausea, vomiting, diarrhea. On her way to Ct scan. Review of systems:  As stated above in the chief complaint, otherwise negative.     Medications:  Scheduled Meds:   white petrolatum   Topical BID    potassium chloride  10 mEq IntraVENous Once    potassium chloride  10 mEq IntraVENous Once    lidocaine PF  5 mL IntraDERmal Once    sodium chloride flush  5-40 mL IntraVENous 2 times per day    heparin flush  3 mL IntraVENous 2 times per day    cloNIDine  0.1 mg Oral Daily    folic acid  1 mg Oral Daily    lactulose  20 g Oral Q12H    pantoprazole  40 mg Oral Daily    propranolol  20 mg Oral Q12H    pyridoxine  25 mg Oral Daily    rifaximin  550 mg Oral BID    spironolactone  25 mg Oral Daily    thiamine mononitrate  100 mg Oral Daily    [START ON 11/29/2021] vitamin D  50,000 Units Oral Weekly    sodium chloride flush  10 mL IntraVENous 2 times per day    enoxaparin  40 mg SubCUTAneous Daily    ampicillin-sulbactam  3,000 mg IntraVENous Q6H    cefTRIAXone (ROCEPHIN) IV  2,000 mg IntraVENous Q24H    rifaximin  550 mg Oral Once    lactulose  20 g Oral Once     Continuous Infusions:   sodium chloride      sodium chloride 100 mL/hr at 11/28/21 0232     PRN Meds:sodium chloride flush, sodium chloride, heparin flush, iohexol, promethazine **OR** [DISCONTINUED] ondansetron, polyethylene glycol, acetaminophen **OR** acetaminophen, potassium chloride **OR** potassium alternative oral replacement **OR** potassium chloride, magnesium sulfate, LORazepam **OR** LORazepam **OR** LORazepam **OR** LORazepam **OR** LORazepam **OR** LORazepam **OR** LORazepam **OR** LORazepam    OBJECTIVE:  BP (!) 95/48   Pulse 62   Temp 98 °F (36.7 °C) (Oral)   Resp 16   Ht 5' 8\" (1.727 m)   Wt 254 lb (115.2 kg)   SpO2 100% BMI 38.62 kg/m²   Temp  Av.8 °F (36.6 °C)  Min: 97 °F (36.1 °C)  Max: 98.4 °F (36.9 °C)  Constitutional: The patient is awake, alert, and oriented. Skin: Warm and dry. No rashes were noted. jaundice  HEENT: Round and reactive pupils. Moist mucous membranes. No ulcerations or thrush. icteric  Neck: Supple to movements. Chest: Clear   Cardiovascular: S1 and S2 are rhythmic and regular. No murmurs appreciated. Abdomen: Soft, non tender, bowel sound +  Genitourinary: female  Extremities: No clubbing, no cyanosis, no edema.   Lines: peripheral    Laboratory and Tests Review:  Lab Results   Component Value Date    WBC 5.1 2021    WBC 4.9 2021    WBC 4.6 2021    HGB 9.2 (L) 2021    HCT 31.3 (L) 2021    .1 (H) 2021    PLT 61 (L) 2021     Lab Results   Component Value Date    NEUTROABS 10.55 (H) 2021    NEUTROABS 4.45 10/27/2021    NEUTROABS 3.32 10/26/2021     No results found for: [facility]  Lab Results   Component Value Date    ALT 50 (H) 2021    AST 83 (H) 2021    ALKPHOS 171 (H) 2021    BILITOT 1.8 (H) 2021     Lab Results   Component Value Date     2021    K 3.2 2021    K 2.4 2021     2021    CO2 10 2021    BUN 6 2021    CREATININE 1.1 2021    CREATININE 1.2 2021    CREATININE 1.1 2021    GFRAA >60 2021    LABGLOM 53 2021    GLUCOSE 115 2021    GLUCOSE 125 2011    PROT 6.7 2021    LABALBU 2.7 2021    LABALBU 3.6 2011    CALCIUM 8.6 2021    BILITOT 1.8 2021    ALKPHOS 171 2021    AST 83 2021    ALT 50 2021     Lab Results   Component Value Date    CRP 0.4 2021    CRP 15.7 (H) 2011     Lab Results   Component Value Date    SEDRATE 97 (H) 2021    SEDRATE 95 (H) 2019    SEDRATE 80 (H) 2011     Radiology:  CT abdomen and pelvis -  Peripancreatic fat stranding and edema ·      Plan:    · Rocephin 2 grams IV q 12 hrs and Unasyn 3 grams Iv q 6 hrs    · Echo       Electronically signed by Niurka Cheng MD on 11/28/2021 at 4:01 PM

## 2021-11-28 NOTE — PLAN OF CARE
Problem: Falls - Risk of:  Goal: Absence of physical injury  Description: Absence of physical injury  Outcome: Met This Shift     Problem: Pain:  Goal: Pain level will decrease  Description: Pain level will decrease  Outcome: Met This Shift     Problem: Skin Integrity:  Goal: Will show no infection signs and symptoms  Description: Will show no infection signs and symptoms  Outcome: Met This Shift  Goal: Absence of new skin breakdown  Description: Absence of new skin breakdown  Outcome: Met This Shift

## 2021-11-28 NOTE — PROGRESS NOTES
Explained fecal management insertion to patient and importance of it due to her skin breakdown and frequent loose stools causing worsening skin breakdown pt stated she was okay with the fecal management system.  After attempting to insert, pt stated she does not want the fecal management system and began to hit, kick and scream.

## 2021-11-28 NOTE — PROGRESS NOTES
Spoke with Dr. Starr Gaines regarding pt being lethargic, received 2 mg Ativan 0930 this morning, hasn't received any sense. Informed Dr. Starr Gaines of ammonia of 142; however, pt has been having frequent loose stools. even checking pt frequently, her bottom is extremely red and bleeding from skin breakdown. wound care is on.  Orders received to place a fecal management system

## 2021-11-28 NOTE — PROGRESS NOTES
Hospitalist Progress Note      SYNOPSIS: Patient admitted on 2021 for altered mental status. A 41-year-old female with significant past medical history, particularly for alcoholic cirrhosis and blood transfusions. She was brought into the ER with altered mental status. She was stopped by the police as she was trying to pull into a parking lot. She was noted to be confused and brought into the ER. Vital signs reveal the patient to be tachycardic with a rate of 112 and hypertensive with a pressure 156/97. Patient is jaundiced. Work-up revealed potassium 2.6, magnesium 1.5, lactic acid 5.2, glucose 154, troponin 22, ammonia 164, ALT 81, , bilirubin 4.3, alcohol 148, WBC 14.2, hemoglobin 10.6. CT chest unremarkable. Potassium and magnesium were replaced. SUBJECTIVE:    Patient seen and examined in her room. Patient appears comfortable. Anxious to go home. Patient denies any chest pain, nausea, vomiting. Records reviewed. Stable overnight. No other overnight issues reported. Temp (24hrs), Av.5 °F (36.4 °C), Min:97 °F (36.1 °C), Max:98.4 °F (36.9 °C)    DIET: ADULT DIET; Regular  CODE: Full Code    Intake/Output Summary (Last 24 hours) at 2021 0817  Last data filed at 2021 1549  Gross per 24 hour   Intake 3263 ml   Output    Net 3263 ml       OBJECTIVE:    /69   Pulse 63   Temp 97 °F (36.1 °C) (Temporal)   Resp 16   Ht 5' 8\" (1.727 m)   Wt 254 lb (115.2 kg)   SpO2 99%   BMI 38.62 kg/m²     General appearance: No apparent distress, appears stated age and cooperative. HEENT:  Conjunctivae/corneas clear. Neck: Supple. No jugular venous distention. Respiratory: Clear to auscultation bilaterally, normal respiratory effort  Cardiovascular: Regular rate rhythm, normal S1-S2  Abdomen: Soft, nontender, nondistended  Musculoskeletal: No clubbing, cyanosis, no bilateral lower extremity edema. Brisk capillary refill.    Skin:  No rashes  on visible skin  Neurologic: awake, alert and following commands       ASSESSMENT:  -Hepatic encephalopathy, acute toxic metabolic encephalopathy  -Hypomagnesemia  -Hypokalemia  -Lactic acidosis, check repeat lactic acid on 11/25  -Hyperammonemia  -Hyperbilirubinemia  -Hepatic cirrhosis  -Alcohol intoxication  -Leukocytosis  -Alcohol dependence/abuse  -Elevated troponins, 22, then 29, continue to recheck every 6 hours for now  -Bacteremia, gram-positive cocci in chains and clusters, 2/2 from 11/24        PLAN:  -IV fluids discontinued on 11/26  -Monitor serum electrolytes replete as needed  -Monitor serum ammonia levels  -Rifaximin and lactulose  -Monitor lactic acid level  -[facility] protocol with Ativan dosing  -Started first dose of vancomycin on 11/25, changed to Rocephin and Unasyn by ID on 11/25  -PICC line inserted on 11/27  -Consult ID, follow recommendations, CT abdomen pelvis with oral and IV contrast: On 11/26: Showed nonspecific stranding around duodenum and thickening of the gallbladder wall  [place] pending  -Replace with KCl 40 mEq IV x1 as well as magnesium sulfate IV 2 g x 1 on 11/27    DISPOSITION:   Clear discharge disposition at the moment.     Medications:  REVIEWED DAILY    Infusion Medications    sodium chloride      sodium chloride 100 mL/hr at 11/28/21 0232     Scheduled Medications    potassium chloride  10 mEq IntraVENous Once    potassium chloride  10 mEq IntraVENous Once    lidocaine PF  5 mL IntraDERmal Once    sodium chloride flush  5-40 mL IntraVENous 2 times per day    heparin flush  3 mL IntraVENous 2 times per day    cloNIDine  0.1 mg Oral Daily    folic acid  1 mg Oral Daily    lactulose  20 g Oral Q12H    pantoprazole  40 mg Oral Daily    propranolol  20 mg Oral Q12H    pyridoxine  25 mg Oral Daily    rifaximin  550 mg Oral BID    spironolactone  25 mg Oral Daily    thiamine mononitrate  100 mg Oral Daily    [START ON 11/29/2021] vitamin D  50,000 Units Oral Weekly    sodium chloride flush  10 mL IntraVENous 2 times per day    enoxaparin  40 mg SubCUTAneous Daily    ampicillin-sulbactam  3,000 mg IntraVENous Q6H    cefTRIAXone (ROCEPHIN) IV  2,000 mg IntraVENous Q24H    rifaximin  550 mg Oral Once    lactulose  20 g Oral Once     PRN Meds: sodium chloride flush, sodium chloride, heparin flush, iohexol, promethazine **OR** [DISCONTINUED] ondansetron, polyethylene glycol, acetaminophen **OR** acetaminophen, potassium chloride **OR** potassium alternative oral replacement **OR** potassium chloride, magnesium sulfate, LORazepam **OR** LORazepam **OR** LORazepam **OR** LORazepam **OR** LORazepam **OR** LORazepam **OR** LORazepam **OR** LORazepam    Labs:     Recent Labs     11/26/21  0830 11/27/21  0517 11/28/21  0656   WBC 4.6 4.9 5.1   HGB 10.4* 8.5* 9.2*   HCT 33.4* 27.0* 31.3*   PLT 73* 71* 61*       Recent Labs     11/26/21  0700 11/26/21  1531 11/27/21  0517     --  141   K 2.4*  2.4* 3.5 2.8*     --  111*   CO2 19*  --  17*   BUN 5*  --  5*   CREATININE 1.1*  --  1.2*   CALCIUM 8.5*  --  8.6       Recent Labs     11/26/21  0700 11/27/21  0517   PROT 7.0 6.7   ALKPHOS 155* 171*   ALT 59* 50*   * 83*   BILITOT 2.9* 1.8*       No results for input(s): INR in the last 72 hours. No results for input(s): Irene Louis in the last 72 hours. Chronic labs:    Lab Results   Component Value Date    CHOL 100 09/01/2021    TRIG 164 (H) 09/01/2021    HDL 12 09/01/2021    LDLCALC 55 09/01/2021    TSH 34.680 (H) 08/31/2021    INR 2.0 11/24/2021    LABA1C 4.1 09/01/2021       Radiology: REVIEWED DAILY    +++++++++++++++++++++++++++++++++++++++++++++++++  Bogdan Stevens MD  [facility] - [address] [place] [address]  +++++++++++++++++++++++++++++++++++++++++++++++++  NOTE: This report was transcribed using voice recognition software.  Every effort was made to ensure accuracy; however, inadvertent computerized transcription errors may be present.

## 2021-11-29 NOTE — PROGRESS NOTES
[address] [place] [address] [facility]  Progress Note      C/C : Enterococcus bacteremia       SUBJECTIVE:  Patient is tolerating medications. No reported adverse drug reactions. No nausea, vomiting, diarrhea. Review of systems:  As stated above in the chief complaint, otherwise negative. Medications:  Scheduled Meds:   daptomycin (CUBICIN) IVPB  10 mg/kg (Adjusted) IntraVENous Q24H    white petrolatum   Topical BID    potassium chloride  10 mEq IntraVENous Once    potassium chloride  10 mEq IntraVENous Once    lidocaine PF  5 mL IntraDERmal Once    sodium chloride flush  5-40 mL IntraVENous 2 times per day    heparin flush  3 mL IntraVENous 2 times per day    cloNIDine  0.1 mg Oral Daily    folic acid  1 mg Oral Daily    lactulose  20 g Oral Q12H    pantoprazole  40 mg Oral Daily    propranolol  20 mg Oral Q12H    pyridoxine  25 mg Oral Daily    rifaximin  550 mg Oral BID    spironolactone  25 mg Oral Daily    thiamine mononitrate  100 mg Oral Daily    vitamin D  50,000 Units Oral Weekly    sodium chloride flush  10 mL IntraVENous 2 times per day    enoxaparin  40 mg SubCUTAneous Daily    lactulose  20 g Oral Once     Continuous Infusions:   sodium chloride       PRN Meds:sodium chloride flush, sodium chloride, heparin flush, iohexol, promethazine **OR** [DISCONTINUED] ondansetron, polyethylene glycol, acetaminophen **OR** acetaminophen, potassium chloride **OR** potassium alternative oral replacement **OR** potassium chloride, magnesium sulfate, LORazepam **OR** LORazepam **OR** LORazepam **OR** LORazepam **OR** LORazepam **OR** LORazepam **OR** LORazepam **OR** LORazepam    OBJECTIVE:  /65   Pulse 66   Temp 97.2 °F (36.2 °C) (Temporal)   Resp 18   Ht 5' 8\" (1.727 m)   Wt 254 lb (115.2 kg)   SpO2 100%   BMI 38.62 kg/m²   Temp  Av.7 °F (36.5 °C)  Min: 97.2 °F (36.2 °C)  Max: 98 °F (36.7 °C)  Constitutional: The patient is awake, alert, and oriented.  Please correlate findings with   laboratory values. Thickened gallbladder wall with pericholecystic fat stranding may reflect   cholecystitis versus local inflammation from the adjacent duodenum.  Further   evaluation may be obtained with HIDA scan if there is clinical suspicion for   cholecystitis. Microbiology:     Culture, Blood 2 [[id number]] (Abnormal)  Collected: 11/24/21 2038   Order Status: Completed Specimen: Blood Updated: 11/28/21 1029    Culture, Blood 2 Previously positive blood culture called Abnormal     Organism Enterococcus faecium Abnormal      Staphylococcus haemolyticus Abnormal     Culture, Blood 2 --    This isolate is presumed to be resistant based on the   detection of inducible Clindamycin resistance.  Clindamycin   may still be effective in some patients. Narrative:     Source: BLOOD       Site: Antecubital-Lef          Previous panic on this admission - call   not needed per SOP, 11/25/2021 12:44,   by [facility]   Culture, Blood 1 [[id number]] (Abnormal)  Collected: 11/24/21 2038   Order Status: Completed Specimen: Blood Updated: 11/28/21 1027    Organism Enterococcus faecium Abnormal     Blood Culture, Routine --    Refer to previous culture CXBL2 11/24/2021 2038 for susceptibility   results     Organism Staphylococcus haemolyticus Abnormal     Blood Culture, Routine --    This isolate is presumed to be resistant based on the   detection of inducible Clindamycin resistance.  Clindamycin   may still be effective in some patients. Organism Enterococcus casseliflavus Abnormal    Narrative:     Source: BLOOD       Site: Antecubital-Rig          Microbiology results called to and read   back by Broderick Hernandes RN,        Assessment:  · Enterococcus bacteremia may be polymicrobial with a history of bacteremia in the past.    ·      Plan:    · Change Rocephin and Unasyn to daptomycin. · Follow-up daptomycin sensitivity for VRE casseliflavus.   · Echo       Electronically signed by Wade Erwin MD on 11/29/2021 at 2:17 PM

## 2021-11-29 NOTE — PROGRESS NOTES
[address]       AFQJ:                                                  Patient Name: Yamila Arroyo  MRN: [id number]  : 1975  Room: [address] [place] [address]    Evaluating OT: Migue Torres, OTR/L #[id number]    Referring Provider[de-identified] Teresa Coronado MD     Specific Provider Orders/Date: OT evaluation and treatment 21    Diagnosis:  Hepatic encephalopathy ([facility] [K72.90]  Dehydration [E86.0]  Altered mental status, unspecified altered mental status type [R41.82]      Pertinent Medical History:  has a past medical history of Acute renal failure (ARF) ([facility] Alcoholic cirrhosis ([facility] Anxiety, Crohn's colitis ([facility] Depression, History of blood transfusion, Pyoderma gangrenosum, and Thyroid disease.        Precautions:  Fall Risk, contact iso (VRE blood), incontinence of urine, bed alarm, cognition    Assessment of current deficits   [x] Functional mobility   [x]ADLs  [x] Strength               [x]Cognition   [x] Functional transfers   [] IADLs         [x] Safety Awareness   [x]Endurance   [] Fine Coordination              [x] Balance      [] Vision/perception   []Sensation    []Gross Motor Coordination  [] ROM  [] Delirium                   [] Motor Control     OT PLAN OF CARE   OT POC based on physician orders, patient diagnosis and results of clinical assessment    Frequency/Duration  2-5 days/wk for 2 weeks PRN   Specific OT Treatment to include:   * Instruction/training on adapted ADL techniques and AE recommendations to increase functional independence within precautions       * Functional transfer/mobility training/DME recommendations for increased independence, safety, and fall prevention  * Patient/Family education to increase follow through with safety techniques and functional independence  * Recommendation of environmental modifications for increased safety with functional transfers/mobility and ADLs  * Cognitive retraining/development of therapeutic activities to improve problem solving, judgement, memory, and attention for increased safety/participation in ADL/IADL tasks  * Therapeutic exercise to improve motor endurance, ROM, and functional strength for ADLs/functional transfers  * Therapeutic activities to facilitate/challenge dynamic balance, stand tolerance for increased safety and independence with ADLs  * Positioning to improve skin integrity, interaction with environment and functional independence    Recommended Adaptive Equipment:  TBD     Home Living:  Questionable historian d/t impaired cognition. Per chart review Pt lives with her son in a 2 story with 2-3 step(s) to enter with rail and  bed/bath on 2nd floor   Bathroom setup: tub shower   Equipment owned: crutches    Prior Level of Function: per chart review pt was indep  with ADLs , Independent  with IADLs; ambulated with no AD  Driving: ?? Occupation/leisure: not noted    Pain Level: none noted today    Cognition: A&O: self, month and year, Confused to place (air[place]), and situation   Follows 1 step directions: fair    Memory:  poor   Sequencing:  fair    Problem solving:  poor   Judgement/safety:  poor    Functional Assessment:   AM-PAC Daily Activity Raw Score: 14/24     Initial Eval Status  Date: 11/29/21 Treatment Status  Date: STG=LTG  Time frame: 10-14 days   Feeding Minimal Assist /set up  Independent    Grooming Moderate Assist   To groom matted hair,   Modified [place]    UB Dressing Minimal Assist   Independent    LB Dressing Moderate Assist   To paresh socks in bed,   Modified [place]    Bathing Moderate Assist  Bed level  Poor safety and problem solving  Modified [place]    Toileting Maximal Assist   Incontinent of urine, pt attempting to clean perineal area but not thorough.    Modified [place]    Bed Mobility  Supine to sit: Minimal Assist   Sit to supine: Minimal Assist   Supine to sit: Independent   Sit to supine: Independent    Functional Transfers Sit to stand: Minimal Assist   Stand to sit: Minimal Assist   Stand pivot: NT   Independent    Functional Mobility Mod A with HHA  1-2 side steps , unsteady  High risk for falls  indep   Balance Sitting: SBA     Standing: mod A  Sitting: indep      Standing: indep   Activity Tolerance poor  good   Visual/  Perceptual Glasses: contacts (per chart)          BUE  ROM/Strength/  Fine motor Coordination Hand dominance: R    RUE: ROM WFL     Strength: grossly 3+/5      Strength:  WFL     Coordination:  fair    LUE: ROM  WFL     Strength: grossly 3+/5      Strength:  WFL     Coordination: fair  Increase BUE strength 1/3 muscle grade for improved indep with functional mobility/transfers     Hearing: WFL   Sensation:  NT, No c/o numbness or tingling   Tone:  WFL   Edema:  None noted    Comments:   RN cleared patient for OT. Upon arrival patient in bed. Pt found to be incontinent of urine. Pt confused to place and slow to respond to questions. . Patient presents with decreased   ADLs,  bed mobility,  functional transfers,  functional mobility,  Therapist facilitated and instructed pt on adapted  techniques & compensatory strategies to improve safety and independence with basic ADLs, bed mobility,  functional transfers & mobility to allow pt to achieve highest level of independence and safely. At end of session, patient in bed with call light and phone within reach, all lines and tubes intact. Pt would benefit from continued skilled OT to increase safety and independence with completion of ADL tasks and functional mobility for improved quality of life. Treatment: OT treatment provided this date includes:    ADL-  Instruction/training on safety and adapted techniques for completion of self feeding, grooming (seated EOB), bathing/toileting.     Mobility-  Instruction/training on safety and improved independence with bed mobility , functional transfers , and functional mobility (1-2 side steps) Therapist facilitated and provided cues for body alignment and hand/feet placement.   Sitting EOB x 10 minutes to improve dynamic sitting balance and activity tolerance during ADLs. Rehab Potential: Good  for established goals     Patient / Family Goal:  Not stated    Patient and/or family were instructed on functional diagnosis, prognosis/goals and OT plan of care. Demonstrated poor+ understanding. Eval Complexity: Low    Time In: 2:45  Time Out: 3:11  Total Treatment Time: 11 minutes    Min Units   OT Eval Low 18365  x     OT Eval Medium 89747      OT Eval High 75909       OT Re-Eval [id number]       Therapeutic Ex 11803       Therapeutic Activities 58101      ADL/Self Care 20985  11 1   Orthotic Management 01534       Neuro Re-Ed 28711       Non-Billable Time          Evaluation Time includes thorough review of current medical information, gathering information on past medical history/social history and prior level of function, completion of standardized testing/informal observation of tasks, assessment of data and education on plan of care and goals.             [place], [place] #[id number]

## 2021-11-29 NOTE — PROGRESS NOTES
Hospitalist Progress Note      SYNOPSIS: Patient admitted on 2021 for altered mental status. A 57-year-old female with significant past medical history, particularly for alcoholic cirrhosis and blood transfusions. She was brought into the ER with altered mental status. She was stopped by the police as she was trying to pull into a parking lot. She was noted to be confused and brought into the ER. Vital signs reveal the patient to be tachycardic with a rate of 112 and hypertensive with a pressure 156/97. Patient is jaundiced. Work-up revealed potassium 2.6, magnesium 1.5, lactic acid 5.2, glucose 154, troponin 22, ammonia 164, ALT 81, , bilirubin 4.3, alcohol 148, WBC 14.2, hemoglobin 10.6. CT chest unremarkable. Potassium and magnesium were replaced. SUBJECTIVE:    Patient seen and examined in her room. Patient appears comfortable. Anxious to go home. Patient denies any chest pain, nausea, vomiting. Records reviewed. Stable overnight. No other overnight issues reported. Temp (24hrs), Av.7 °F (36.5 °C), Min:97.2 °F (36.2 °C), Max:98 °F (36.7 °C)    DIET: ADULT DIET; Regular  CODE: Full Code    Intake/Output Summary (Last 24 hours) at 2021 0820  Last data filed at 2021 1816  Gross per 24 hour   Intake 360 ml   Output    Net 360 ml       OBJECTIVE:    /65   Pulse 66   Temp 97.2 °F (36.2 °C) (Temporal)   Resp 18   Ht 5' 8\" (1.727 m)   Wt 254 lb (115.2 kg)   SpO2 100%   BMI 38.62 kg/m²     General appearance: No apparent distress, appears stated age and cooperative. HEENT:  Conjunctivae/corneas clear. Neck: Supple. No jugular venous distention. Respiratory: Clear to auscultation bilaterally, normal respiratory effort  Cardiovascular: Regular rate rhythm, normal S1-S2  Abdomen: Soft, nontender, nondistended  Musculoskeletal: No clubbing, cyanosis, no bilateral lower extremity edema. Brisk capillary refill.    Skin:  No rashes  on visible skin  Neurologic: awake, alert and following commands       ASSESSMENT:  -Hepatic encephalopathy, acute toxic metabolic encephalopathy  -Hypomagnesemia  -Hypokalemia  -Lactic acidosis, check repeat lactic acid on 11/25  -Hyperammonemia  -Hyperbilirubinemia  -Hepatic cirrhosis  -Alcohol intoxication  -Leukocytosis  -Alcohol dependence/abuse  -Elevated troponins, 22, then 29, continue to recheck every 6 hours for now  -Bacteremia, gram-positive cocci in chains and clusters, 2/2 from 11/24, Enterococcus bacteremia  -Repeat TSH levels on 11/29        PLAN:  -IV fluids discontinued on 11/26  -Monitor serum electrolytes replete as needed  -Monitor serum ammonia levels  -Rifaximin and lactulose  -Monitor lactic acid level  -[facility] protocol with Ativan dosing  -Started first dose of vancomycin on 11/25, changed to Rocephin and Unasyn by ID on 11/25  -PICC line inserted on 11/27  -Consult ID, follow recommendations, CT abdomen pelvis with oral and IV contrast: On 11/26: Showed nonspecific stranding around duodenum and thickening of the gallbladder wall  TTE pending  -Replace potassium on 11/29    DISPOSITION:   unClear discharge disposition at the moment.     Medications:  REVIEWED DAILY    Infusion Medications    sodium chloride      sodium chloride 125 mL/hr at 11/28/21 2227     Scheduled Medications    white petrolatum   Topical BID    potassium chloride  10 mEq IntraVENous Once    potassium chloride  10 mEq IntraVENous Once    lidocaine PF  5 mL IntraDERmal Once    sodium chloride flush  5-40 mL IntraVENous 2 times per day    heparin flush  3 mL IntraVENous 2 times per day    cloNIDine  0.1 mg Oral Daily    folic acid  1 mg Oral Daily    lactulose  20 g Oral Q12H    pantoprazole  40 mg Oral Daily    propranolol  20 mg Oral Q12H    pyridoxine  25 mg Oral Daily    rifaximin  550 mg Oral BID    spironolactone  25 mg Oral Daily    thiamine mononitrate  100 mg Oral Daily    vitamin D  50,000 Units Oral Weekly  sodium chloride flush  10 mL IntraVENous 2 times per day    enoxaparin  40 mg SubCUTAneous Daily    ampicillin-sulbactam  3,000 mg IntraVENous Q6H    cefTRIAXone (ROCEPHIN) IV  2,000 mg IntraVENous Q24H    rifaximin  550 mg Oral Once    lactulose  20 g Oral Once     PRN Meds: sodium chloride flush, sodium chloride, heparin flush, iohexol, promethazine **OR** [DISCONTINUED] ondansetron, polyethylene glycol, acetaminophen **OR** acetaminophen, potassium chloride **OR** potassium alternative oral replacement **OR** potassium chloride, magnesium sulfate, LORazepam **OR** LORazepam **OR** LORazepam **OR** LORazepam **OR** LORazepam **OR** LORazepam **OR** LORazepam **OR** LORazepam    Labs:     Recent Labs     11/27/21  0517 11/28/21  0656 11/29/21  0625   WBC 4.9 5.1 4.5   HGB 8.5* 9.2* 7.8*   HCT 27.0* 31.3* 24.3*   PLT 71* 61* 53*       Recent Labs     11/27/21  0517 11/28/21  0656 11/29/21  0625    140 141   K 2.8* 3.2* 3.0*   * 114* 120*   CO2 17* 10* 12*   BUN 5* 6 6   CREATININE 1.2* 1.1* 0.9   CALCIUM 8.6 8.6 7.8*       Recent Labs     11/27/21  0517 11/29/21  0625   PROT 6.7 5.7*   ALKPHOS 171* 115*   ALT 50* 30   AST 83* 40*   BILITOT 1.8* 1.4*       No results for input(s): INR in the last 72 hours. No results for input(s): Sara Cisse in the last 72 hours. Chronic labs:    Lab Results   Component Value Date    CHOL 100 09/01/2021    TRIG 164 (H) 09/01/2021    HDL 12 09/01/2021    LDLCALC 55 09/01/2021    TSH 34.680 (H) 08/31/2021    INR 2.0 11/24/2021    LABA1C 4.1 09/01/2021       Radiology: REVIEWED DAILY    +++++++++++++++++++++++++++++++++++++++++++++++++  Ra Cazares MD  [facility] - [address] [place] [address]  +++++++++++++++++++++++++++++++++++++++++++++++++  NOTE: This report was transcribed using voice recognition software.  Every effort was made to ensure accuracy; however, inadvertent computerized transcription errors may be present.

## 2021-11-30 NOTE — PROGRESS NOTES
OCCUPATIONAL THERAPY BEDSIDE TREATMENT NOTE   [employer] [id number] [address]     [id number]  Patient Name: Mil Gastelum  MRN: [id number]  : 1975  Room: [address] [place] [address]     Evaluating OT: Raegan Alfaro OTR/L #[id number]     Referring Provider[de-identified] Robert Ochoa MD                                    Specific Provider Orders/Date: OT evaluation and treatment 21     Diagnosis:  Hepatic encephalopathy ([facility] [K72.90]  Dehydration [E86.0]  Altered mental status, unspecified altered mental status type [R41.82]       Pertinent Medical History:  has a past medical history of Acute renal failure (ARF) ([facility] Alcoholic cirrhosis ([facility] Anxiety, Crohn's colitis ([facility] [address] Depression, History of blood transfusion, Pyoderma gangrenosum, and Thyroid disease.         Precautions:  Fall Risk, contact iso (VRE blood), incontinence of urine, bed alarm, cognition     Assessment of current deficits   [x]? Functional mobility             [x]?ADLs           [x]? Strength                  [x]? Cognition   [x]? Functional transfers           []? IADLs         [x]? Safety Awareness   [x]? Endurance   []? Fine Coordination              [x]? Balance      []? Vision/perception   []? Sensation     []? Gross Motor Coordination  []? ROM           []?  Delirium                   []? Motor Control      OT PLAN OF CARE   OT POC based on physician orders, patient diagnosis and results of clinical assessment     Frequency/Duration  2-5 days/wk for 2 weeks PRN   Specific OT Treatment to include:   * Instruction/training on adapted ADL techniques and AE recommendations to increase functional independence within precautions       * Functional transfer/mobility training/DME recommendations for increased independence, safety, and fall prevention  * Patient/Family education to increase follow through with safety techniques and functional independence  * Recommendation of environmental modifications for increased safety with functional transfers/mobility and ADLs  * Cognitive retraining/development of therapeutic activities to improve problem solving, judgement, memory, and attention for increased safety/participation in ADL/IADL tasks  * Therapeutic exercise to improve motor endurance, ROM, and functional strength for ADLs/functional transfers  * Therapeutic activities to facilitate/challenge dynamic balance, stand tolerance for increased safety and independence with ADLs  * Positioning to improve skin integrity, interaction with environment and functional independence     Recommended Adaptive Equipment:  TBD      Home Living:  Questionable historian d/t impaired cognition. Per chart review Pt lives with her son in a 2 story with 2-3 step(s) to enter with rail and  bed/bath on 2nd floor   Bathroom setup: tub shower   Equipment owned: crutches     Prior Level of Function: per chart review pt was indep  with ADLs , Independent  with IADLs; ambulated with no AD  Driving: ??   Occupation/leisure: not noted     Pain Level: none noted today     Cognition: A&O: self, month and year, confused to place, and situation   Follows 1 step directions: fair               Memory:  poor              Sequencing:  fair               Problem solving:  poor              Judgement/safety:  poor     Functional Assessment:   AM-PAC Daily Activity Raw Score: 16/24       Initial Eval Status  Date: 11/29/21 Treatment Status  Date: 11/30/21 STG=LTG  Time frame: 10-14 days   Feeding Minimal Assist /set up  NT Independent    Grooming Moderate Assist   To groom matted hair,   Minimal Assist  Modified [place]    UB Dressing Minimal Assist  Minimal Assist to doff/don gown and manage tele monitor and tie lace Independent    LB Dressing Moderate Assist   To paresh socks in bed,  Minimal Assist to pull up on socks sitting at EOB  Modified [place]    Bathing Moderate Assist  Bed level  Poor safety and problem solving  NT Modified [place]    Toileting Maximal Assist   Incontinent of urine, pt attempting to clean perineal area but not thorough.   NT  Modified [place]    Bed Mobility  Supine to sit: Minimal Assist   Sit to supine: Minimal Assist  Supine to sit: Stand by Assist   Sit to supine: Stand by Assist   Supine to sit: Independent   Sit to supine: Independent    Functional Transfers Sit to stand: Minimal Assist   Stand to sit: Minimal Assist   Stand pivot: NT   Minimal Assist/CGA for sit <> stand transfer from household surfaces. Verbal cues for hand placement to prevent pulling up on wheeled walker. Independent    Functional Mobility Mod A with HHA  1-2 side steps , unsteady  High risk for falls  Minimal Assist for functional mobility within room household distances using wheeled walker. VC/TC for walker sequence and safety d/t tendancy to run into environmental barriers. indep   Balance Sitting: SBA     Standing: mod A Sitting: SBA  Standing: Min A  Sitting: indep      Standing: indep   Activity Tolerance poor Fair   good   Visual/  Perceptual Glasses: contacts (per chart)             BUE  ROM/Strength/  Fine motor Coordination Hand dominance: R     RUE: ROM WFL     Strength: grossly 3+/5      Strength:  WFL     Coordination:  fair     LUE: ROM  WFL     Strength: grossly 3+/5      Strength:  WFL     Coordination: fair   Increase BUE strength 1/3 muscle grade for improved indep with functional mobility/transfers      Hearing: WFL   Sensation:  NT, No c/o numbness or tingling   Tone:  WFL   Edema:  None noted     Vitals:  HR at rest:  HR with activity:  HR at end of session:    SpO2 at rest:  SpO2 with activity:  SpO2 at end of session:    BP at rest:  BP with activity:  BP at end of session:      Comments: Upon arrival to the room the patient was supine. At end of the session, the patient was supine, alarm on, telesitter in place. Call light and phone within reach.  Pt required verbal cues and instruction as noted above for safe facilitation and completion of tasks. Therapist provided skilled monitoring of the patient's response during treatment session. Prior to and at the end of session, environmental modifications/line management completed for patients safety and efficiency of treatment session. Overall, the patient demonstrates decreased independence with ADLs and functional transfers and mobility. Pt limited by generalized weakness, balance, and cognition. Pt would benefit from continued skilled OT to increase independence with BADL's and IADL's. Treatment: OT treatment provided this date includes:    Instruction/training on safety and adapted techniques for completion of ADLs   Instruction/training on safe functional mobility/transfer techniques   B UE there ex performed x15 reps through all planes to increase strength/endurance with ADLs. Instructions for pacing and proper body mechanics to improve functional performance.  Pt has made fair progress towards set goals.  Continue with current plan of care    Treatment time includes thorough review of current medical information, gathering information on past medical history/social history and prior level of function, completion of standardized testing/informal observation of tasks, assessment of data, and development of POC/Goals.     Time In: 1:30 PM    Time Out: 1:53 PM        Min Units   OT Eval Low 69906     OT Eval Medium 05366     OT Eval High 17387     OT Re-Eval 79258          ADL/Self Care 41113 10 1   Therapeutic Activities 51566     Therapeutic Ex 33632 13 1   Orthotic Management 85266     Neuro Re-Ed 05782     Non-Billable Time     TOTAL TIMED TREATMENT 23 2     Sita Quinn OTR/L #[id number]

## 2021-11-30 NOTE — PROGRESS NOTES
Physical Therapy  Physical Therapy Initial Assessment     Name: Juli Hernández  : 1975  MRN: [id number]      Date of Service: 2021    Evaluating PT:  Sean Lieberman PT, DPT    Room #:  3367/9728-J  Diagnosis:  Hepatic encephalopathy ([facility] [address] [address] [K72.90]  Dehydration [E86.0]  Altered mental status, unspecified altered mental status type [R41.82]  PMHx/PSHx:  Alcohol abuse, acute renal failure, anxiety  Procedure/Surgery:  N/A  Precautions:  Contact, Fall risk, bed/chair alarm  Equipment Needs:  TBD    SUBJECTIVE:    Pt lives with son in a 2 story home with 3 steps to enter and 1 handrail. Bed/bath is on 2nd floor. Pt ambulated with no AD PTA. OBJECTIVE:   Initial Evaluation  Date: 21 Treatment Short Term/ Long Term   Goals   AM-PAC 6 Clicks      Was pt agreeable to Eval/treatment? yes     Does pt have pain? 0/10     Bed Mobility  Rolling: SBA  Supine to sit: SBA  Sit to supine: SBA  Scooting: SBA  Rolling: IND  Supine to sit: IND  Sit to supine: IND  Scooting: IND   Transfers Sit to stand: min A  Stand to sit: min A  Stand pivot: NT  Sit to stand: IND  Stand to sit: IND  Stand pivot: mod I   Ambulation    20'x2 with HHA min A  50'x4 with AAD mod I   Stair negotiation: ascended and descended NT  14 steps with 1 handrail mod I   ROM BUE:  WFL  BLE:  WFL     Strength BUE:  WFL  BLE:  WFL     Balance Sitting EOB:  SBA  Dynamic Standing:  Min A  Sitting EOB:  IND  Dynamic Standing:   Mod I with AAD     Pt is A & O x 3 during eval  Sensation:  Pt denies numbness and tingling to extremities  Edema:  unremarkable    Therapeutic Exercises:    Bed mobility: supine<>sit, cued for positioning at EOB  Transfers: STS x2  Ambulation: 20'x2 with HHA  BLE AROM    Patient education  Pt educated on role of PT, importance of functional mobility during hospital stay    Patient response to education:   Pt verbalized understanding Pt demonstrated skill Pt requires further education in this area   yes yes reinforce ASSESSMENT:    Conditions Requiring Skilled Therapeutic Intervention:    []Decreased strength     []Decreased ROM  [x]Decreased functional mobility  [x]Decreased balance   [x]Decreased endurance   []Decreased posture  []Decreased sensation  []Decreased coordination   []Decreased vision  [x]Decreased safety awareness   []Increased pain       Comments:    Pt supine in bed upon entering, agreeable to participate. Pt somewhat drowsy during eval. Pt instructed to transfer to EOB, pt completing transfer with no reports of dizziness or lightheadedness. Pt instructed to transfer from bed and ambulate to tolerance within room 2/2 contact precautions. Pt demonstrating slow elena with narrow CINDY, pt requiring HHA to safely complete ambulation as she was reaching for various room objects to steady herself. Pt assisted back to bed at the end of session, call bell in reach, bed alarm on and all needs met prior to exiting. Treatment:  Patient practiced and was instructed in the following treatment:     Bed mobility training - pt given verbal and tactile cues to facilitate proper sequencing and safety during rolling and supine>sit as well as provided with physical assistance to complete task    STS and pivot transfer training - pt educated on proper hand and foot placement, safety and sequencing, and use of verbal and tactile cues to safely complete sit<>stand with hands on assistance to complete task safely    Gait training- pt was given verbal and tactile cues to facilitate improved elena and reduce postural sway during ambulation as well as provided with physical assistance. Pt's/ family goals   1. Return home    Prognosis is good for reaching above PT goals. Patient and or family understand(s) diagnosis, prognosis, and plan of care.   yes    PHYSICAL THERAPY PLAN OF CARE:    PT POC is established based on physician order and patient diagnosis     Referring provider/PT Order:  Alize Norton MD  Diagnosis: Hepatic encephalopathy ([facility] [K72.90]  Dehydration [E86.0]  Altered mental status, unspecified altered mental status type [R41.82]  Specific instructions for next treatment:  Progress as tolerated, gait training, stair negotiation as appropriate    Current Treatment Recommendations:     [x] Strengthening to improve independence with functional mobility   [] ROM to improve independence with functional mobility   [x] Balance Training to improve static/dynamic balance and to reduce fall risk  [x] Endurance Training to improve activity tolerance during functional mobility   [x] Transfer Training to improve safety and independence with all functional transfers   [x] Gait Training to improve gait mechanics, endurance and assess need for appropriate assistive device  [x] Stair Training in preparation for safe discharge home and/or into the community   [] Positioning to prevent skin breakdown and contractures  [x] Safety and Education Training   [x] Patient/Caregiver Education   [] HEP  [] Other     PT long term treatment goals are located in above grid    Frequency of treatments: 2-5x/week x 1-2 weeks. Time in  0925  Time out  0940    Total Treatment Time  5 minutes     Evaluation Time includes thorough review of current medical information, gathering information on past medical history/social history and prior level of function, completion of standardized testing/informal observation of tasks, assessment of data and education on plan of care and goals.     CPT codes:  [x] Low Complexity PT evaluation 14707  [] Moderate Complexity PT evaluation 62922  [] High Complexity PT evaluation 12194  [] PT Re-evaluation 16270  [x] Gait training 65508 5 minutes  [] Manual therapy [id number] -- minutes  [] Therapeutic activities 87470 -- minutes  [] Therapeutic exercises 34147 -- minutes  [] Neuromuscular reeducation 54484 -- minutes     Ana Maria Segundo, PT, DPT  [id number]

## 2021-11-30 NOTE — PROGRESS NOTES
[address] [place] [address] [facility]  Progress Note      C/C : Enterococcus bacteremia       SUBJECTIVE:  Patient is tolerating medications. No reported adverse drug reactions. No nausea, vomiting, diarrhea. Review of systems:  As stated above in the chief complaint, otherwise negative. Medications:  Scheduled Meds:   daptomycin (CUBICIN) IVPB  10 mg/kg (Adjusted) IntraVENous Q24H    white petrolatum   Topical BID    potassium chloride  10 mEq IntraVENous Once    potassium chloride  10 mEq IntraVENous Once    lidocaine PF  5 mL IntraDERmal Once    sodium chloride flush  5-40 mL IntraVENous 2 times per day    heparin flush  3 mL IntraVENous 2 times per day    cloNIDine  0.1 mg Oral Daily    folic acid  1 mg Oral Daily    lactulose  20 g Oral Q12H    pantoprazole  40 mg Oral Daily    propranolol  20 mg Oral Q12H    pyridoxine  25 mg Oral Daily    rifaximin  550 mg Oral BID    spironolactone  25 mg Oral Daily    thiamine mononitrate  100 mg Oral Daily    vitamin D  50,000 Units Oral Weekly    sodium chloride flush  10 mL IntraVENous 2 times per day    enoxaparin  40 mg SubCUTAneous Daily    lactulose  20 g Oral Once     Continuous Infusions:   sodium chloride       PRN Meds:sodium chloride flush, sodium chloride, heparin flush, iohexol, promethazine **OR** [DISCONTINUED] ondansetron, polyethylene glycol, acetaminophen **OR** acetaminophen, potassium chloride **OR** potassium alternative oral replacement **OR** potassium chloride, magnesium sulfate, LORazepam **OR** LORazepam **OR** LORazepam **OR** LORazepam **OR** LORazepam **OR** LORazepam **OR** LORazepam **OR** LORazepam    OBJECTIVE:  /62   Pulse 66   Temp 97.6 °F (36.4 °C) (Temporal)   Resp 17   Ht 5' 8\" (1.727 m)   Wt 254 lb (115.2 kg)   SpO2 98%   BMI 38.62 kg/m²   Temp  Av.8 °F (36.6 °C)  Min: 97.6 °F (36.4 °C)  Max: 98.1 °F (36.7 °C)  Constitutional: The patient is awake, alert, and oriented. Virginie Monroe MD on 11/30/2021 at 11:16 AM

## 2021-11-30 NOTE — PROGRESS NOTES
Hospitalist Progress Note      SYNOPSIS: Patient admitted on 2021 for altered mental status. A 27-year-old female with significant past medical history, particularly for alcoholic cirrhosis and blood transfusions. She was brought into the ER with altered mental status. She was stopped by the police as she was trying to pull into a parking lot. She was noted to be confused and brought into the ER. Vital signs reveal the patient to be tachycardic with a rate of 112 and hypertensive with a pressure 156/97. Patient is jaundiced. Work-up revealed potassium 2.6, magnesium 1.5, lactic acid 5.2, glucose 154, troponin 22, ammonia 164, ALT 81, , bilirubin 4.3, alcohol 148, WBC 14.2, hemoglobin 10.6. CT chest unremarkable. Potassium and magnesium were replaced. On , her blood culture came back positive for gram-positive cocci in chains and clusters, later turned out to be Enterococcus. She was given dose of vancomycin which was later changed to Rocephin and Unasyn by ID the same day. Rocephin and Unasyn changed to daptomycin on . Work-up for the source of infection included CT abdomen pelvis which showed nonspecific stranding around multiple intra-abdominal organs but no acute pathology. Echocardiogram still pending. SUBJECTIVE:    Patient seen and examined in her room. Patient appears comfortable and denies any active complaint. Patient denies any chest pain, nausea, vomiting. Records reviewed. Stable overnight. No other overnight issues reported. Temp (24hrs), Av.8 °F (36.6 °C), Min:97.6 °F (36.4 °C), Max:98.1 °F (36.7 °C)    DIET: ADULT DIET;  Regular  CODE: Full Code    Intake/Output Summary (Last 24 hours) at 2021 0907  Last data filed at 2021 0430  Gross per 24 hour   Intake 290 ml   Output    Net 290 ml       OBJECTIVE:    /62   Pulse 68   Temp 97.6 °F (36.4 °C) (Temporal)   Resp 17   Ht 5' 8\" (1.727 m)   Wt 254 lb (115.2 kg)   SpO2 98% BMI 38.62 kg/m²     General appearance: No apparent distress, appears stated age and cooperative. HEENT:  Conjunctivae/corneas clear. Neck: Supple. No jugular venous distention. Respiratory: Clear to auscultation bilaterally, normal respiratory effort  Cardiovascular: Regular rate rhythm, normal S1-S2  Abdomen: Soft, nontender, nondistended  Musculoskeletal: No clubbing, cyanosis, no bilateral lower extremity edema. Brisk capillary refill. Skin:  No rashes  on visible skin  Neurologic: awake, alert and following commands       ASSESSMENT:  -Hepatic encephalopathy, acute toxic metabolic encephalopathy  -Hypomagnesemia  -Hypokalemia  -Lactic acidosis, check repeat lactic acid on 11/25  -Hyperammonemia  -Hyperbilirubinemia  -Hepatic cirrhosis  -Alcohol intoxication  -Leukocytosis  -Alcohol dependence/abuse  -Elevated troponins, 22, then 29, continue to recheck every 6 hours for now  -Bacteremia, gram-positive cocci in chains and clusters, 2/2 from 11/24, Enterococcus bacteremia  -Repeat TSH levels on 11/29        PLAN:  -IV fluids discontinued on 11/26  -Monitor serum electrolytes replete as needed  -Monitor serum ammonia levels  -Rifaximin and lactulose  -Monitor lactic acid level  -[facility] protocol with Ativan dosing  -Started first dose of vancomycin on 11/25, changed to Rocephin and Unasyn by ID on 11/25, changed to daptomycin on 11/29  -PICC line inserted on 11/27  -Consult ID, follow recommendations, CT abdomen pelvis with oral and IV contrast: On 11/26: Showed nonspecific stranding around duodenum and thickening of the gallbladder wall  TTE pending  -Placed potassium again on 11/30    DISPOSITION:   Possible discharge in next 1 to 2 days.   Under hold is echocardiogram.    Medications:  REVIEWED DAILY    Infusion Medications    sodium chloride       Scheduled Medications    daptomycin (CUBICIN) IVPB  10 mg/kg (Adjusted) IntraVENous Q24H    white petrolatum   Topical BID    potassium chloride  10 mEq IntraVENous Once    potassium chloride  10 mEq IntraVENous Once    lidocaine PF  5 mL IntraDERmal Once    sodium chloride flush  5-40 mL IntraVENous 2 times per day    heparin flush  3 mL IntraVENous 2 times per day    cloNIDine  0.1 mg Oral Daily    folic acid  1 mg Oral Daily    lactulose  20 g Oral Q12H    pantoprazole  40 mg Oral Daily    propranolol  20 mg Oral Q12H    pyridoxine  25 mg Oral Daily    rifaximin  550 mg Oral BID    spironolactone  25 mg Oral Daily    thiamine mononitrate  100 mg Oral Daily    vitamin D  50,000 Units Oral Weekly    sodium chloride flush  10 mL IntraVENous 2 times per day    enoxaparin  40 mg SubCUTAneous Daily    lactulose  20 g Oral Once     PRN Meds: sodium chloride flush, sodium chloride, heparin flush, iohexol, promethazine **OR** [DISCONTINUED] ondansetron, polyethylene glycol, acetaminophen **OR** acetaminophen, potassium chloride **OR** potassium alternative oral replacement **OR** potassium chloride, magnesium sulfate, LORazepam **OR** LORazepam **OR** LORazepam **OR** LORazepam **OR** LORazepam **OR** LORazepam **OR** LORazepam **OR** LORazepam    Labs:     Recent Labs     11/28/21  0656 11/29/21  0625 11/30/21  0430   WBC 5.1 4.5 4.8   HGB 9.2* 7.8* 7.3*   HCT 31.3* 24.3* 23.3*   PLT 61* 53* 45*       Recent Labs     11/28/21  0656 11/29/21  0625 11/30/21  0430    141 140   K 3.2* 3.0* 3.3*   * 120* 117*   CO2 10* 12* 12*   BUN 6 6 7   CREATININE 1.1* 0.9 1.0   CALCIUM 8.6 7.8* 8.3*       Recent Labs     11/29/21  0625   PROT 5.7*   ALKPHOS 115*   ALT 30   AST 40*   BILITOT 1.4*       No results for input(s): INR in the last 72 hours. No results for input(s): Darshan Shorts in the last 72 hours.     Chronic labs:    Lab Results   Component Value Date    CHOL 100 09/01/2021    TRIG 164 (H) 09/01/2021    HDL 12 09/01/2021    LDLCALC 55 09/01/2021    TSH 6.720 (H) 11/29/2021    INR 2.0 11/24/2021    LABA1C 4.1 09/01/2021       Radiology:

## 2021-11-30 NOTE — CARE COORDINATION
Case reviewed in IDR rounds. Patient in contact isolation for VRE. Patient on 2L O2 per NC at 98%, nursing to wean. DL PICC placed. Patient pending echocardiogram, requested test to be completed via email this am to [facility]. IV Daptomycin started 11/29 by ID pending cultures. Call placed to [employer], liaison with [facility] and updated re: new antibiotic ordered and awaiting final cultures. [employer] will continue to follow. Call placed to the patient's sister Jose Hardy to update re: above and possible discharge in the next day or two. Jose Hardy expressed understanding and is agreeable to help with home antibiotics if needed after work. Will need Home care orders at discharge. PT score improved today. Will continue to follow.      Janet Lee RN.  [facility]  [phone]

## 2021-12-01 NOTE — CONSULTS
GENERAL SURGERY  CONSULT NOTE  12/1/2021    Physician Consulted: Dr. Dante Villa  Reason for Consult: alcohol induced cirrhosis, anemia, thrombocytopenia  Referring Physician: Dr. Vy Loera    MARIANNA Torres is a 51 y.o. female who presents for evaluation of altered mental status. She has a history alcoholic hepatitis, cirrhosis, ascites, and varices. MELD 17. General surgery was consulted for anemia. She states that she has been thrombocytopenic for as long as she can remember. She appears to have macrocytic anemia. She received 1 unit of pRBCs for an HgB of 7.3. Her base line appears to be around 9. She denies any abdominal pain, nausea, vomiting, and changes in bowel habits. She is not on any blood thinners. She was admitted to the hospital 2 weeks ago and was found to have VRE bacteremia. She has a history of Crohn's disease and follows at [facility]. She has a J pouch. She has a history of varices, but on EGD by Dr. Ganesh Beckwith in 9/21 he did not notice any varices. She denies recent weight loss. She denies GERD-like symptoms. She states that she drinks 4-5 drinks every day and that she has recently cut back. She denies tobacco or other drug use. Labwork is significant for Bili 1.4, ammonia of 142, HgB 7.3. Her vitals are within normal limits.        Past Medical History:   Diagnosis Date    Acute renal failure (ARF) ([facility] [address] 73/34/7356    Alcoholic cirrhosis ([facility] [address]     Anxiety     Crohn's colitis ([facility] [address]     Depression     History of blood transfusion     Pyoderma gangrenosum     Thyroid disease        Past Surgical History:   Procedure Laterality Date    ABDOMEN SURGERY      pt has a J-pouch     ARM DEBRIDEMENT      R arm-2010-2011??    ARM DEBRIDEMENT      COLONOSCOPY      DILATATION, ESOPHAGUS      ENDOSCOPY, COLON, DIAGNOSTIC      TRANSESOPHAGEAL ECHOCARDIOGRAM N/A 9/3/2021    TRANSESOPHAGEAL ECHOCARDIOGRAM WITH BUBBLE STUDY performed by Tee Vallecillo MD at [place] [address] 9/2/2021    EGD ESOPHAGOGASTRODUODENOSCOPY performed by Carline Barrientos MD at [place] [address]       Medications Prior to Admission:    Prior to Admission medications    Medication Sig Start Date End Date Taking?  Authorizing Provider   spironolactone (ALDACTONE) 25 MG tablet Take 1 tablet by mouth daily 10/28/21   [facility], DO   cyanocobalamin 2000 MCG tablet Take 1 tablet by mouth daily 10/28/21   [facility], DO   pyridoxine (B-6) 25 MG tablet Take 1 tablet by mouth daily 10/28/21   [facility], DO   vitamin D (ERGOCALCIFEROL) 1.25 MG (93324 UT) CAPS capsule Take 1 capsule by mouth once a week Monday 10/28/21   [facility], DO   potassium chloride (KLOR-CON M) 10 MEQ extended release tablet Take 20 mEq by mouth daily    Historical Provider, MD   lactulose (CHRONULAC) 10 GM/15ML solution Take 20 g by mouth every 12 hours    Historical Provider, MD   propranolol (INDERAL) 10 MG tablet Take 20 mg by mouth every 12 hours     Historical Provider, MD   rifaximin (XIFAXAN) 550 MG tablet Take 550 mg by mouth 2 times daily    Historical Provider, MD   cloNIDine (CATAPRES) 0.1 MG tablet Take 0.1 mg by mouth daily    Historical Provider, MD   magnesium oxide (MAG-OX) 400 MG tablet Take 400 mg by mouth daily    Historical Provider, MD   vitamin B-1 (THIAMINE) 100 MG tablet Take 100 mg by mouth daily    Historical Provider, MD   folic acid (FOLVITE) 1 MG tablet Take 1 tablet by mouth daily 2/2/21   [facility], DO   thiamine mononitrate 100 MG tablet Take 1 tablet by mouth daily 2/2/21 3/4/21  [facility], DO   pantoprazole (PROTONIX) 40 MG tablet Take 40 mg by mouth daily     Historical Provider, MD       No Known Allergies    Family History   Problem Relation Age of Onset    Cancer Mother         breast     Emphysema Mother     Lupus Mother     Heart Disease Father     High Cholesterol Father     High Blood Pressure Sister     No Known Problems Son        Social History     Tobacco Use    Smoking status: Never Smoker  Smokeless tobacco: Never Used   Vaping Use    Vaping Use: Never used   Substance Use Topics    Alcohol use: Not Currently     Comment: stopped but drink 3 weeks ago    Drug use: Not Currently         Review of Systems   General ROS: negative for - chills or fever  Hematological and Lymphatic ROS: negative for - bleeding problems or blood clots  Respiratory ROS: no cough, shortness of breath, or wheezing  Cardiovascular ROS: no chest pain or dyspnea on exertion  Gastrointestinal ROS: no abdominal pain, change in bowel habits, or black or bloody stools  Genito-Urinary ROS: no dysuria, trouble voiding, or hematuria  Musculoskeletal ROS: negative for - muscle pain or muscular weakness      PHYSICAL EXAM:    Vitals:    12/01/21 1520   BP: (!) 103/57   Pulse: 67   Resp: 17   Temp: 96.5 °F (35.8 °C)   SpO2: 100%       General Appearance:  Awake, confused  Skin:  Skin texture, turgor normal. No rashes or lesions. Slightly jaundiced  Head/face:  Atraumatic, normocephalic  Eyes:  No gross abnormalities. Lungs:  Normal work of breathing on room air  Heart:  Regular rate, normotensive  Abdomen:  Soft, non-tender, non-distended  Extremities: Extremities warm to touch, pink, with no edema. Female Rectal: Stool assess: Consistency- soft and brown and Guiac positive  Abnormal visualization: External hemorrhoids- multiple, large, non-bleeding. Previous perianal fistula on R buttock. Rectal exam limited due to pain. LABS:    CBC  Recent Labs     12/01/21  0430 12/01/21  0430 12/01/21  1144   WBC 3.7*  --   --    HGB 6.7*   < > 7.3*   HCT 21.4*   < > 23.2*   PLT 43*  --   --     < > = values in this interval not displayed.      BMP  Recent Labs     12/01/21  0430      K 3.6   *   CO2 13*   BUN 8   CREATININE 1.1*   CALCIUM 8.3*     Liver Function  Recent Labs     11/29/21  0625   BILITOT 1.4*   AST 40*   ALT 30   ALKPHOS 115*   PROT 5.7*   LABALBU 2.4*     No results for input(s): LACTATE in the last 72 hours. No results for input(s): INR, PTT in the last 72 hours. Invalid input(s): PT    RADIOLOGY    CT Head WO Contrast    Result Date: 11/25/2021  EXAMINATION: CT OF THE HEAD WITHOUT CONTRAST  11/24/2021 11:56 pm TECHNIQUE: CT of the head was performed without the administration of intravenous contrast. Dose modulation, iterative reconstruction, and/or weight based adjustment of the mA/kV was utilized to reduce the radiation dose to as low as reasonably achievable. COMPARISON: None. HISTORY: ORDERING SYSTEM PROVIDED HISTORY: ams FINDINGS: BRAIN/VENTRICLES: There is no acute intracranial hemorrhage, mass effect or midline shift. No abnormal extra-axial fluid collection. The gray-white differentiation is maintained without evidence of an acute infarct. There is no evidence of hydrocephalus. ORBITS: The visualized portion of the orbits demonstrate no acute abnormality. SINUSES: The visualized paranasal sinuses and mastoid air cells demonstrate no acute abnormality. SOFT TISSUES/SKULL:  No acute abnormality of the visualized skull or soft tissues. No acute intracranial abnormality. XR CHEST PORTABLE    Result Date: 11/24/2021  EXAMINATION: ONE XRAY VIEW OF THE CHEST 11/24/2021 10:42 pm COMPARISON: 10/26/2021. HISTORY: ORDERING SYSTEM PROVIDED HISTORY: ams, r/o pna TECHNOLOGIST PROVIDED HISTORY: Reason for exam:->ams, r/o pna What reading provider will be dictating this exam?->CRC FINDINGS: Overlying EKG leads. Stable mild enlargement of the cardiopericardial silhouette. No pneumothorax, pleural effusion, overt pulmonary edema or evidence of pneumonia. No acute osseous abnormality is identified. Negative single view chest for acute process. CTA PULMONARY W CONTRAST    Result Date: 11/25/2021  EXAMINATION: CTA OF THE CHEST 11/24/2021 11:56 pm TECHNIQUE: CTA of the chest was performed after the administration of intravenous contrast.  Multiplanar reformatted images are provided for review.   MIP images are provided for review. Dose modulation, iterative reconstruction, and/or weight based adjustment of the mA/kV was utilized to reduce the radiation dose to as low as reasonably achievable. COMPARISON: None. HISTORY: ORDERING SYSTEM PROVIDED HISTORY: r/o pe FINDINGS: Motion artifact is present. Pulmonary Arteries: Pulmonary arteries are adequately opacified for evaluation. No evidence of intraluminal filling defect to suggest pulmonary embolism. Main pulmonary artery is normal in caliber. Mediastinum: No evidence of mediastinal lymphadenopathy. The heart and pericardium demonstrate no acute abnormality. There is no acute abnormality of the thoracic aorta. Lungs/pleura: The lungs are without acute process. No focal consolidation or pulmonary edema. No evidence of pleural effusion or pneumothorax. Upper Abdomen: Limited images of the upper abdomen demonstrate hepatosplenomegaly and esophageal varices. Soft Tissues/Bones: No acute bone or soft tissue abnormality. Motion limited exam.  No evidence of pulmonary embolism or acute pulmonary abnormality. ASSESSMENT:  51 y.o. female with anemia and concern for upper GI bleed. PLAN:  Patient likely needs endoscopy. However, there is a chance that her upper GI bleed is variceal in nature. Will discuss case with Dr. Yusuf Lao as patient could need GI intervention during endoscopy and possible banding. Monitor H/H  PPI BID  Octreotide. Patient findings and plan discussed with Dr. Dora Bautista.     Electronically signed by Lilly Blizzard, MD on 12/1/21 at 6:42 PM EST

## 2021-12-01 NOTE — PROGRESS NOTES
OCCUPATIONAL THERAPY BEDSIDE TREATMENT NOTE   [address]     [address] [facility]:  Patient Name: Mil Gastelum  MRN: [id number]  : 1975  [facility]     Evaluating OT: Raegan Alfaro, OTR/L #[id number]     Referring Provider[de-identified] Robert Ochoa MD                                    Specific Provider Orders/Date: OT evaluation and treatment 21     Diagnosis:  Hepatic encephalopathy ([facility] [address] [K72.90]  Dehydration [E86.0]  Altered mental status, unspecified altered mental status type [R41.82]       Pertinent Medical History:  has a past medical history of Acute renal failure (ARF) ([facility] [address] Alcoholic cirrhosis ([facility] [address] Anxiety, Crohn's colitis ([facility] [address] Depression, History of blood transfusion, Pyoderma gangrenosum, and Thyroid disease.         Precautions:  Fall Risk, contact iso (VRE blood), incontinence of urine, bed alarm, cognition     Assessment of current deficits   [x]? Functional mobility             [x]?ADLs           [x]? Strength                  [x]? Cognition   [x]? Functional transfers           []? IADLs         [x]? Safety Awareness   [x]? Endurance   []? Fine Coordination              [x]? Balance      []? Vision/perception   []? Sensation     []? Gross Motor Coordination  []? ROM           []?  Delirium                   []? Motor Control      OT PLAN OF CARE   OT POC based on physician orders, patient diagnosis and results of clinical assessment     Frequency/Duration  2-5 days/wk for 2 weeks PRN   Specific OT Treatment to include:   * Instruction/training on adapted ADL techniques and AE recommendations to increase functional independence within precautions       * Functional transfer/mobility training/DME recommendations for increased independence, safety, and fall prevention  * Patient/Family education to increase follow through with safety techniques and functional independence  * Recommendation of environmental modifications for increased safety with functional transfers/mobility and ADLs  * Cognitive retraining/development of therapeutic activities to improve problem solving, judgement, memory, and attention for increased safety/participation in ADL/IADL tasks  * Therapeutic exercise to improve motor endurance, ROM, and functional strength for ADLs/functional transfers  * Therapeutic activities to facilitate/challenge dynamic balance, stand tolerance for increased safety and independence with ADLs  * Positioning to improve skin integrity, interaction with environment and functional independence     Recommended Adaptive Equipment:  TBD      Home Living:  Questionable historian d/t impaired cognition. Per chart review Pt lives with her son in a 2 story with 2-3 step(s) to enter with rail and  bed/bath on 2nd floor   Bathroom setup: tub shower   Equipment owned: crutches     Prior Level of Function: per chart review pt was indep  with ADLs , Independent  with IADLs; ambulated with no AD  Driving: ??   Occupation/leisure: not noted     Pain Level: none noted today     Cognition: A&O: self, month and year, confused to place, and situation   Follows 1 step directions: fair               Memory:  poor              Sequencing:  fair               Problem solving:  poor              Judgement/safety:  poor     Functional Assessment:   AM-PAC Daily Activity Raw Score: 16/24       Initial Eval Status  Date: 11/29/21 Treatment Status  Date: 12/1/21 STG=LTG  Time frame: 10-14 days   Feeding Minimal Assist /set up SBA seated EOB Independent    Grooming Moderate Assist   To groom matted hair,   SBA pt to brush hair seated EOB Modified [place]    UB Dressing Minimal Assist  SBA to paresh hospital gown simulating jacket  Independent    LB Dressing Moderate Assist   To paresh socks in bed,  SBA to paresh/doff socks seated EOB using cross over method  Modified [place]    Bathing Moderate Assist  Bed level  Poor safety and problem solving  NT Modified [place]    Toileting Maximal Assist   Incontinent of urine, pt attempting to clean perineal area but not thorough.   NT  Modified [place]    Bed Mobility  Supine to sit: Minimal Assist   Sit to supine: Minimal Assist  Supine to sit: Stand by Assist   Sit to supine: Stand by Assist   Supine to sit: Independent   Sit to supine: Independent    Functional Transfers Sit to stand: Minimal Assist   Stand to sit: Minimal Assist   Stand pivot: NT  SBA sit<>stand from EOB  Independent    Functional Mobility Mod A with HHA  1-2 side steps , unsteady  High risk for falls  MIN A side<>step to HOB no AD  indep   Balance Sitting: SBA     Standing: mod A Sitting: SBA  Standing: SBA Sitting: indep      Standing: indep   Activity Tolerance poor Fair   good   Visual/  Perceptual Glasses: contacts (per chart)             BUE  ROM/Strength/  Fine motor Coordination Hand dominance: R     RUE: ROM WFL     Strength: grossly 3+/5      Strength:  WFL     Coordination:  fair     LUE: ROM  WFL     Strength: grossly 3+/5      Strength:  WFL     Coordination: fair   Increase BUE strength 1/3 muscle grade for improved indep with functional mobility/transfers        Comments: Upon arrival to the room the patient was supine in bed. At end of the session, the patient was supine, alarm on, telesitter in place. Call light and phone within reach. Pt required verbal cues and instruction as noted above for safe facilitation and completion of tasks. Therapist provided skilled monitoring of the patient's response during treatment session. Prior to and at the end of session, environmental modifications/line management completed for patients safety and efficiency of treatment session. Overall, the patient demonstrates decreased independence with ADLs and functional transfers and mobility. Pt limited by generalized weakness, balance, and cognition.  Pt would benefit from continued skilled OT to increase independence with BADL's and IADL's. Treatment: OT treatment provided this date includes:    Instruction/training on safety and adapted techniques for completion of ADLs   Instruction/training on safe functional mobility/transfer techniques          Pt has made fair progress towards set goals.  Continue with current plan of care    Treatment time includes thorough review of current medical information, gathering information on past medical history/social history and prior level of function, completion of standardized testing/informal observation of tasks, assessment of data, and development of POC/Goals.     Time In: 1455   Time Out: 1520        Min Units   OT Eval Low 97701     OT Eval Medium 76575     OT Eval High 27131     OT Re-Eval [id number]          ADL/Self Care 20927 10 1   Therapeutic Activities 46808     Therapeutic Ex 09168 15 1   Orthotic Management 03760     Neuro Re-Ed 95574     Non-Billable Time     TOTAL TIMED TREATMENT [address] [address]

## 2021-12-01 NOTE — PROGRESS NOTES
Hospitalist Progress Note      SYNOPSIS: Patient admitted on 2021 for altered mental status. A 58-year-old female with significant past medical history, particularly for alcoholic cirrhosis and blood transfusions. She was brought into the ER with altered mental status. She was stopped by the police as she was trying to pull into a parking lot. She was noted to be confused and brought into the ER. Vital signs reveal the patient to be tachycardic with a rate of 112 and hypertensive with a pressure 156/97. Patient is jaundiced. Work-up revealed potassium 2.6, magnesium 1.5, lactic acid 5.2, glucose 154, troponin 22, ammonia 164, ALT 81, , bilirubin 4.3, alcohol 148, WBC 14.2, hemoglobin 10.6. CT chest unremarkable. Potassium and magnesium were replaced. On , her blood culture came back positive for gram-positive cocci in chains and clusters, later turned out to be Enterococcus. She was given dose of vancomycin which was later changed to Rocephin and Unasyn by ID the same day. Rocephin and Unasyn changed to daptomycin on . Work-up for the source of infection included CT abdomen pelvis which showed nonspecific stranding around multiple intra-abdominal organs but no acute pathology. Echocardiogram still pending. SUBJECTIVE:  Patient seen and examined in her room. Hb dropped to 6.7 S/P transfusion of one unit of blood   Patient appears comfortable and denies any active complaint. Patient denies any chest pain, nausea, vomiting. Records reviewed. Stable overnight. No other overnight issues reported. Temp (24hrs), Av.9 °F (36.1 °C), Min:96.7 °F (35.9 °C), Max:97.5 °F (36.4 °C)    DIET: ADULT DIET;  Regular  CODE: Full Code    Intake/Output Summary (Last 24 hours) at 2021 1457  Last data filed at 2021 1400  Gross per 24 hour   Intake 2160 ml   Output    Net 2160 ml       OBJECTIVE:    BP 90/68   Pulse 66   Temp 96.7 °F (35.9 °C) (Temporal)   Resp 18 Ht 5' 8\" (1.727 m)   Wt 254 lb (115.2 kg)   SpO2 100%   BMI 38.62 kg/m²     General appearance: No apparent distress, appears stated age and cooperative. HEENT:  Conjunctivae/corneas Jaundiced. .   Neck: Supple. No jugular venous distention. Respiratory: Clear to auscultation bilaterally, normal respiratory effort  Cardiovascular: Regular rate rhythm, normal S1-S2  Abdomen: Soft, nontender, nondistended  Musculoskeletal: No clubbing, cyanosis, no bilateral lower extremity edema. Brisk capillary refill. Skin:  No rashes  on visible skin  Neurologic: awake, alert and following commands       Assessment and plan:  #Vancomycin resistant Enterococcus bacteremia   -may be polymicrobial with a history of bacteremia in the past.  -Continue daptomycin  -Started first dose of vancomycin on 11/25, changed to Rocephin and Unasyn by ID on 11/25, changed to daptomycin on 11/29  -PICC line inserted on 11/27  -AMERICO ordered by infectious disease    #Alcoholic liver disease with hyperlipidemia and hepatic encephalopathy  #Hepatic encephalopathy, acute toxic metabolic encephalopathy  #Thrombocytopenia  -Hyperammonemia  -Monitor serum ammonia levels  -Consult GI  -Rifaximin and lactulose    #Acute on chronic anemia  -Hb dropped to 6.7 S/P transfusion of one unit of blood 12/1  -Per RN stool is normal color  -Consult GI    #Alcohol abuse dependence disorder  -[facility] protocol with Ativan dosing    #Hypomagnesemia and hypokalemia  -Replaced     #Lactic acidosis  -Improved with IV fluids        DISPOSITION:   Possible discharge in next 1 to 2 days.   Under hold is echocardiogram.    Medications:  REVIEWED DAILY    Infusion Medications    sodium chloride       Scheduled Medications    daptomycin (CUBICIN) IVPB  10 mg/kg (Adjusted) IntraVENous Q24H    white petrolatum   Topical BID    potassium chloride  10 mEq IntraVENous Once    potassium chloride  10 mEq IntraVENous Once    lidocaine PF  5 mL IntraDERmal Once    sodium chloride flush  5-40 mL IntraVENous 2 times per day    heparin flush  3 mL IntraVENous 2 times per day    cloNIDine  0.1 mg Oral Daily    folic acid  1 mg Oral Daily    lactulose  20 g Oral Q12H    pantoprazole  40 mg Oral Daily    propranolol  20 mg Oral Q12H    pyridoxine  25 mg Oral Daily    rifaximin  550 mg Oral BID    spironolactone  25 mg Oral Daily    thiamine mononitrate  100 mg Oral Daily    vitamin D  50,000 Units Oral Weekly    sodium chloride flush  10 mL IntraVENous 2 times per day    enoxaparin  40 mg SubCUTAneous Daily    lactulose  20 g Oral Once     PRN Meds: sodium chloride flush, sodium chloride, heparin flush, iohexol, promethazine **OR** [DISCONTINUED] ondansetron, polyethylene glycol, acetaminophen **OR** acetaminophen, potassium chloride **OR** potassium alternative oral replacement **OR** potassium chloride, magnesium sulfate, LORazepam **OR** LORazepam **OR** LORazepam **OR** LORazepam **OR** LORazepam **OR** LORazepam **OR** LORazepam **OR** LORazepam    Labs:     Recent Labs     11/29/21 0625 11/29/21 0625 11/30/21 0430 12/01/21 0430 12/01/21  1144   WBC 4.5  --  4.8 3.7*  --    HGB 7.8*   < > 7.3* 6.7* 7.3*   HCT 24.3*   < > 23.3* 21.4* 23.2*   PLT 53*  --  45* 43*  --     < > = values in this interval not displayed. Recent Labs     11/29/21 0625 11/30/21 0430 12/01/21 0430    140 139   K 3.0* 3.3* 3.6   * 117* 117*   CO2 12* 12* 13*   BUN 6 7 8   CREATININE 0.9 1.0 1.1*   CALCIUM 7.8* 8.3* 8.3*       Recent Labs     11/29/21 0625   PROT 5.7*   ALKPHOS 115*   ALT 30   AST 40*   BILITOT 1.4*       No results for input(s): INR in the last 72 hours. No results for input(s): [place] in the last 72 hours.     Chronic labs:    Lab Results   Component Value Date    CHOL 100 09/01/2021    TRIG 164 (H) 09/01/2021    HDL 12 09/01/2021    LDLCALC 55 09/01/2021    TSH 6.720 (H) 11/29/2021    INR 2.0 11/24/2021    LABA1C 4.1 09/01/2021       Radiology: REVIEWED DAILY    +++++++++++++++++++++++++++++++++++++++++++++++++  Rossy Arredondo MD  [address]  +++++++++++++++++++++++++++++++++++++++++++++++++  NOTE: This report was transcribed using voice recognition software. Every effort was made to ensure accuracy; however, inadvertent computerized transcription errors may be present.

## 2021-12-01 NOTE — PROGRESS NOTES
[address] [place] [address] [facility]  Progress Note      C/C : Enterococcus bacteremia       SUBJECTIVE:  Patient is tolerating medications. No reported adverse drug reactions. No nausea, vomiting, diarrhea. Review of systems:  As stated above in the chief complaint, otherwise negative. Medications:  Scheduled Meds:   daptomycin (CUBICIN) IVPB  10 mg/kg (Adjusted) IntraVENous Q24H    white petrolatum   Topical BID    potassium chloride  10 mEq IntraVENous Once    potassium chloride  10 mEq IntraVENous Once    lidocaine PF  5 mL IntraDERmal Once    sodium chloride flush  5-40 mL IntraVENous 2 times per day    heparin flush  3 mL IntraVENous 2 times per day    cloNIDine  0.1 mg Oral Daily    folic acid  1 mg Oral Daily    lactulose  20 g Oral Q12H    pantoprazole  40 mg Oral Daily    propranolol  20 mg Oral Q12H    pyridoxine  25 mg Oral Daily    rifaximin  550 mg Oral BID    spironolactone  25 mg Oral Daily    thiamine mononitrate  100 mg Oral Daily    vitamin D  50,000 Units Oral Weekly    sodium chloride flush  10 mL IntraVENous 2 times per day    enoxaparin  40 mg SubCUTAneous Daily    lactulose  20 g Oral Once     Continuous Infusions:   sodium chloride       PRN Meds:sodium chloride flush, sodium chloride, heparin flush, iohexol, promethazine **OR** [DISCONTINUED] ondansetron, polyethylene glycol, acetaminophen **OR** acetaminophen, potassium chloride **OR** potassium alternative oral replacement **OR** potassium chloride, magnesium sulfate, LORazepam **OR** LORazepam **OR** LORazepam **OR** LORazepam **OR** LORazepam **OR** LORazepam **OR** LORazepam **OR** LORazepam    OBJECTIVE:  BP 90/68   Pulse 66   Temp 96.7 °F (35.9 °C) (Temporal)   Resp 18   Ht 5' 8\" (1.727 m)   Wt 254 lb (115.2 kg)   SpO2 100%   BMI 38.62 kg/m²   Temp  Av °F (36.1 °C)  Min: 96.7 °F (35.9 °C)  Max: 97.5 °F (36.4 °C)  Constitutional: The patient is awake, alert, and oriented. Skin: Warm and dry. No rashes were noted. jaundice  HEENT: Round and reactive pupils. Moist mucous membranes. No ulcerations or thrush. icteric  Neck: Supple to movements. Chest: Clear   Cardiovascular: S1 and S2 are rhythmic and regular. No murmurs appreciated. Abdomen: Soft, non tender, bowel sound +  Genitourinary: female  Extremities: No clubbing, no cyanosis, no edema.   Lines: peripheral    Laboratory and Tests Review:  Lab Results   Component Value Date    WBC 3.7 (L) 12/01/2021    WBC 4.8 11/30/2021    WBC 4.5 11/29/2021    HGB 6.7 (LL) 12/01/2021    HCT 21.4 (L) 12/01/2021    .9 (H) 12/01/2021    PLT 43 (L) 12/01/2021     Lab Results   Component Value Date    NEUTROABS 10.55 (H) 11/24/2021    NEUTROABS 4.45 10/27/2021    NEUTROABS 3.32 10/26/2021     No results found for: [facility]  Lab Results   Component Value Date    ALT 30 11/29/2021    AST 40 (H) 11/29/2021    ALKPHOS 115 (H) 11/29/2021    BILITOT 1.4 (H) 11/29/2021     Lab Results   Component Value Date     12/01/2021    K 3.6 12/01/2021    K 2.4 11/26/2021     12/01/2021    CO2 13 12/01/2021    BUN 8 12/01/2021    CREATININE 1.1 12/01/2021    CREATININE 1.0 11/30/2021    CREATININE 0.9 11/29/2021    GFRAA >60 12/01/2021    LABGLOM 53 12/01/2021    GLUCOSE 101 12/01/2021    GLUCOSE 125 08/11/2011    PROT 5.7 11/29/2021    LABALBU 2.4 11/29/2021    LABALBU 3.6 08/11/2011    CALCIUM 8.3 12/01/2021    BILITOT 1.4 11/29/2021    ALKPHOS 115 11/29/2021    AST 40 11/29/2021    ALT 30 11/29/2021     Lab Results   Component Value Date    CRP 0.4 11/24/2021    CRP 15.7 (H) 05/17/2011     Lab Results   Component Value Date    SEDRATE 97 (H) 11/24/2021    SEDRATE 95 (H) 07/09/2019    SEDRATE 80 (H) 05/17/2011     Radiology:  CT abdomen and pelvis -  Peripancreatic fat stranding and edema adjacent to the duodenum may reflect   pancreatitis versus duodenitis.  Small amount of reactive fluid identified in   the right upper quadrant.  No abscess.  Please correlate findings with   laboratory values. Thickened gallbladder wall with pericholecystic fat stranding may reflect   cholecystitis versus local inflammation from the adjacent duodenum.  Further   evaluation may be obtained with HIDA scan if there is clinical suspicion for   cholecystitis. Microbiology:     Culture, Blood 2 [[id number]] (Abnormal)  Collected: 11/24/21 2038   Order Status: Completed Specimen: Blood Updated: 11/28/21 1029    Culture, Blood 2 Previously positive blood culture called Abnormal     Organism Enterococcus faecium Abnormal      Staphylococcus haemolyticus Abnormal     Culture, Blood 2 --    This isolate is presumed to be resistant based on the   detection of inducible Clindamycin resistance.  Clindamycin   may still be effective in some patients. Narrative:     Source: BLOOD       Site: Antecubital-Lef          Previous panic on this admission - call   not needed per SOP, 11/25/2021 12:44,   by [facility]   Culture, Blood 1 [[id number]] (Abnormal)  Collected: 11/24/21 2038   Order Status: Completed Specimen: Blood Updated: 11/28/21 1027    Organism Enterococcus faecium Abnormal     Blood Culture, Routine --    Refer to previous culture CXBL2 11/24/2021 2038 for susceptibility   results     Organism Staphylococcus haemolyticus Abnormal     Blood Culture, Routine --    This isolate is presumed to be resistant based on the   detection of inducible Clindamycin resistance.  Clindamycin   may still be effective in some patients. Organism Enterococcus casseliflavus Abnormal    Narrative:     Source: BLOOD       Site: Antecubital-Rig          Microbiology results called to and read   back by Cody Quintero RN,        Assessment:  · Vancomycin resistant Enterococcus bacteremia may be polymicrobial with a history of bacteremia in the past.    ·      Plan:    · Continue daptomycin 10 mg/kg adjusted body weight q24h. · Check AMERICO.       Electronically signed by Linda Berger Adam Menjivar MD on 12/1/2021 at 11:26 AM

## 2021-12-01 NOTE — FLOWSHEET NOTE
Inpatient Wound Care (Initial consult) [id number]    Admit Date: 11/24/2021  7:48 PM    Reason for consult:  Buttocks    Significant history: Per H&P     Chief Complaint:  had concerns including Altered Mental Status (patient was seen by PD trying to pull into a parking lot. .. PD stopped to evaluate, found patient altered, Patient hypoxic for EMS, AMS ).     History Of Present Illness:    Ms. Juli Hernández, a 51y.o. year old female  who  has a past medical history of Acute renal failure (ARF) ([address] Alcoholic cirrhosis ([address] Anxiety, Crohn's colitis ([facility] [address] Depression, History of blood transfusion, Pyoderma gangrenosum, and Thyroid disease.      Patient presented to the emergency department with altered mental status. She was stopped by the police as she was trying to pull into a parking lot. They noticed she was confused. Patient has a history of hepatic cirrhosis. Patient herself has no complaints at this time. Vital signs reveal the patient to be tachycardic with a rate of 112 and hypertensive with a pressure 156/97. Patient is jaundiced. Work-up revealed potassium 2.6, magnesium 1.5, lactic acid 5.2, glucose 154, troponin 22, ammonia 164, ALT 81, , bilirubin 4.3, alcohol 148, WBC 14.2, hemoglobin 10.6. CT chest unremarkable. Potassium and magnesium were replaced.  Medicine consulted for admission    Findings:      12/01/21 1509   Skin Integrity   Skin Integrity   (dry,flaky)   Location   (bilateral medial buttocks)   Skin Integrity Site 2   Skin Integrity Location 2   (dry, flaky)   Location 2   (bilateral heels)       Impression:  Skin assessment complete: see above    Plan: Aquaphor  Seat cushion  Patient will need continued preventative care      Ariana Valencia RN 12/1/2021 3:10 PM

## 2021-12-01 NOTE — PROGRESS NOTES
Dr. Lorna Walker notified of new GI consult    Dr Lorna Walker not on call for inpatients this week Dr Colleen Bell notified

## 2021-12-01 NOTE — PROGRESS NOTES
Hospitalist Progress Note      SYNOPSIS: Patient admitted on 2021 for altered mental status. A 70-year-old female with significant past medical history, particularly for alcoholic cirrhosis and blood transfusions. She was brought into the ER with altered mental status. She was stopped by the police as she was trying to pull into a parking lot. She was noted to be confused and brought into the ER. Vital signs reveal the patient to be tachycardic with a rate of 112 and hypertensive with a pressure 156/97. Patient is jaundiced. Work-up revealed potassium 2.6, magnesium 1.5, lactic acid 5.2, glucose 154, troponin 22, ammonia 164, ALT 81, , bilirubin 4.3, alcohol 148, WBC 14.2, hemoglobin 10.6. CT chest unremarkable. Potassium and magnesium were replaced. On , her blood culture came back positive for gram-positive cocci in chains and clusters, later turned out to be Enterococcus. She was given dose of vancomycin which was later changed to Rocephin and Unasyn by ID the same day. Rocephin and Unasyn changed to daptomycin on . Work-up for the source of infection included CT abdomen pelvis which showed nonspecific stranding around multiple intra-abdominal organs but no acute pathology. Echocardiogram still pending. SUBJECTIVE:    Patient seen and examined in her room. Patient appears comfortable and denies any active complaint. Patient denies any chest pain, nausea, vomiting. Records reviewed. Stable overnight. No other overnight issues reported. Temp (24hrs), Av.9 °F (36.1 °C), Min:96.7 °F (35.9 °C), Max:97.5 °F (36.4 °C)    DIET: ADULT DIET;  Regular  CODE: Full Code    Intake/Output Summary (Last 24 hours) at 2021 1436  Last data filed at 2021 1400  Gross per 24 hour   Intake 1800 ml   Output    Net 1800 ml       OBJECTIVE:    BP 90/68   Pulse 66   Temp 96.7 °F (35.9 °C) (Temporal)   Resp 18   Ht 5' 8\" (1.727 m)   Wt 254 lb (115.2 kg)   SpO2 100% BMI 38.62 kg/m²     General appearance: No apparent distress, appears stated age and cooperative. HEENT:  Conjunctivae/corneas clear. Neck: Supple. No jugular venous distention. Respiratory: Clear to auscultation bilaterally, normal respiratory effort  Cardiovascular: Regular rate rhythm, normal S1-S2  Abdomen: Soft, nontender, nondistended  Musculoskeletal: No clubbing, cyanosis, no bilateral lower extremity edema. Brisk capillary refill. Skin:  No rashes  on visible skin  Neurologic: awake, alert and following commands       Assessment and plan:  #Vancomycin resistant Enterococcus bacteremia   -may be polymicrobial with a history of bacteremia in the past.  -Continue daptomycin  -Started first dose of vancomycin on 11/25, changed to Rocephin and Unasyn by ID on 11/25, changed to daptomycin on 11/29  -PICC line inserted on 11/27    -AMERICO ordered by infectious disease    #Alcoholic liver disease with hyperlipidemia and hepatic encephalopathy  #Hepatic encephalopathy, acute toxic metabolic encephalopathy  -Hyperammonemia  -Monitor serum ammonia levels  -Rifaximin and lactulose    #Alcohol abuse dependence disorder  -[facility] protocol with Ativan dosing    #Hypomagnesemia and hypokalemia  -Replaced     #Lactic acidosis  -Improved with IV fluids        DISPOSITION:   Possible discharge in next 1 to 2 days.   Under hold is echocardiogram.    Medications:  REVIEWED DAILY    Infusion Medications    sodium chloride       Scheduled Medications    daptomycin (CUBICIN) IVPB  10 mg/kg (Adjusted) IntraVENous Q24H    white petrolatum   Topical BID    potassium chloride  10 mEq IntraVENous Once    potassium chloride  10 mEq IntraVENous Once    lidocaine PF  5 mL IntraDERmal Once    sodium chloride flush  5-40 mL IntraVENous 2 times per day    heparin flush  3 mL IntraVENous 2 times per day    cloNIDine  0.1 mg Oral Daily    folic acid  1 mg Oral Daily    lactulose  20 g Oral Q12H    pantoprazole  40 mg Oral Daily    propranolol  20 mg Oral Q12H    pyridoxine  25 mg Oral Daily    rifaximin  550 mg Oral BID    spironolactone  25 mg Oral Daily    thiamine mononitrate  100 mg Oral Daily    vitamin D  50,000 Units Oral Weekly    sodium chloride flush  10 mL IntraVENous 2 times per day    enoxaparin  40 mg SubCUTAneous Daily    lactulose  20 g Oral Once     PRN Meds: sodium chloride flush, sodium chloride, heparin flush, iohexol, promethazine **OR** [DISCONTINUED] ondansetron, polyethylene glycol, acetaminophen **OR** acetaminophen, potassium chloride **OR** potassium alternative oral replacement **OR** potassium chloride, magnesium sulfate, LORazepam **OR** LORazepam **OR** LORazepam **OR** LORazepam **OR** LORazepam **OR** LORazepam **OR** LORazepam **OR** LORazepam    Labs:     Recent Labs     11/29/21 0625 11/29/21  0625 11/30/21  0430 12/01/21  0430 12/01/21  1144   WBC 4.5  --  4.8 3.7*  --    HGB 7.8*   < > 7.3* 6.7* 7.3*   HCT 24.3*   < > 23.3* 21.4* 23.2*   PLT 53*  --  45* 43*  --     < > = values in this interval not displayed. Recent Labs     11/29/21 0625 11/30/21 0430 12/01/21  0430    140 139   K 3.0* 3.3* 3.6   * 117* 117*   CO2 12* 12* 13*   BUN 6 7 8   CREATININE 0.9 1.0 1.1*   CALCIUM 7.8* 8.3* 8.3*       Recent Labs     11/29/21 0625   PROT 5.7*   ALKPHOS 115*   ALT 30   AST 40*   BILITOT 1.4*       No results for input(s): INR in the last 72 hours. No results for input(s): [place] in the last 72 hours.     Chronic labs:    Lab Results   Component Value Date    CHOL 100 09/01/2021    TRIG 164 (H) 09/01/2021    HDL 12 09/01/2021    LDLCALC 55 09/01/2021    TSH 6.720 (H) 11/29/2021    INR 2.0 11/24/2021    LABA1C 4.1 09/01/2021       Radiology: REVIEWED DAILY    +++++++++++++++++++++++++++++++++++++++++++++++++  Corey Webster MD  [address], [place]  +++++++++++++++++++++++++++++++++++++++++++++++++  NOTE: This report was transcribed using voice recognition software. Every effort was made to ensure accuracy; however, inadvertent computerized transcription errors may be present.

## 2021-12-01 NOTE — ADT AUTH CERT
Liver Disease Complications - Care Day 6 (11/30/2021) by Ana Luisa Grey RN       Review Status Review Entered   Completed 12/1/2021 12:31      Criteria Review      Care Day: 6 Care Date: 11/30/2021 Level of Care:    Guideline Day 3    Clinical Status    (X) * Hemodynamic stability    12/1/2021 12:31 PM EST by Mendoza Donnelly      VITALS: T 96.8; P 70; RR 14; /68; PULSE  % RA    ( ) * Tachypnea absent    ( ) * Afebrile    ( ) * No bleeding    ( ) * No peritonitis, or treatment adequate    ( ) * Ascites absent or adequately controlled    ( ) * Volume status adequately controlled    ( ) * Renal function at baseline or acceptable for next level of care    ( ) * Electrolyte abnormalities absent or acceptable for next level of care    ( ) * Mental status at baseline    ( ) * Diet tolerated    ( ) * Discharge plans and education understood    Activity    ( ) * Ambulatory or acceptable for next level of care    Routes    ( ) * Oral hydration    ( ) * Oral medications or regimen acceptable for next level of care    ( ) * Oral diet or acceptable for next level of care    * Milestone   Additional Notes   11/30/2021   MEDICATIONS:   cloNIDine (CATAPRES) tablet 0.1 mg   Dose: 0.1 mg   Freq: DAILY Route: PO      DAPTOmycin (CUBICIN) 850 mg in sodium chloride 0.9 % 50 mL IVPB   Dose: 10 mg/kg   Weight Dosing Info: 84.4 kg (Adjusted)   Freq: EVERY 24 HOURS Route: IV      enoxaparin (LOVENOX) injection 40 mg   Dose: 40 mg   Freq: DAILY Route: SC      folic acid (FOLVITE) tablet 1 mg   Dose: 1 mg   Freq: DAILY Route: PO      heparin flush 100 UNIT/ML injection 300 Units   Dose: 3 mL   Freq: 2 times per day Route: IV      lactulose (CHRONULAC) 10 GM/15ML solution 20 g   Dose: 20 g   Freq: EVERY 12 HOURS Route: PO      pantoprazole (PROTONIX) tablet 40 mg   Dose: 40 mg   Freq: DAILY Route: PO      potassium chloride (KLOR-CON M) extended release tablet 40 mEq   Dose: 40 mEq   Freq: ONCE Route: PO      propranolol (INDERAL) tablet 20 mg   Dose: 20 mg   Freq: EVERY 12 HOURS Route: PO      rifaximin (XIFAXAN) tablet 550 mg   Dose: 550 mg   Freq: 2 TIMES DAILY Route: PO      spironolactone (ALDACTONE) tablet 25 mg   Dose: 25 mg   Freq: DAILY Route: PO      thiamine mononitrate tablet 100 mg   Dose: 100 mg   Freq: DAILY Route: PO      vitamin B-6 (PYRIDOXINE) tablet 25 mg   Dose: 25 mg   Freq: DAILY Route: PO      white petrolatum ointment   Freq: 2 TIMES DAILY Route: [facility]      LABS;   11/30/2021 04:30   Potassium: 3.3 (L)   Chloride: 117 (H)   CO2: 12 (L)   Glucose: 105 (H)   Calcium: 8.3 (L)   Troponin, High Sensitivity: 20 (H)   RBC: 2.22 (L)   Hemoglobin Quant: 7.3 (L)   Hematocrit: 23.3 (L)   Platelet Count: 45 (L)      * * INTERNAL MEDICINE * *    ASSESSMENT:   -Hepatic encephalopathy, acute toxic metabolic encephalopathy   -Hypomagnesemia   -Hypokalemia   -Lactic acidosis, check repeat lactic acid on 11/25   -Hyperammonemia   -Hyperbilirubinemia   -Hepatic cirrhosis   -Alcohol intoxication   -Leukocytosis   -Alcohol dependence/abuse   -Elevated troponins, 22, then 29, continue to recheck every 6 hours for now   -Bacteremia, gram-positive cocci in chains and clusters, 2/2 from 11/24, Enterococcus bacteremia   -Repeat TSH levels on 11/29           PLAN:   -IV fluids discontinued on 11/26   -Monitor serum electrolytes replete as needed   -Monitor serum ammonia levels   -Rifaximin and lactulose   -Monitor lactic acid level   -[facility] protocol with Ativan dosing   -Started first dose of vancomycin on 11/25, changed to Rocephin and Unasyn by ID on 11/25, changed to daptomycin on 11/29   -PICC line inserted on 11/27   -Consult ID, follow recommendations, CT abdomen pelvis with oral and IV contrast: On 11/26: Showed nonspecific stranding around duodenum and thickening of the gallbladder wall   TTE pending   -Placed potassium again on 11/30      * * INFECTIOUS DISEASE * *    Assessment:   · Vancomycin resistant Enterococcus bacteremia may be polymicrobial with a history of bacteremia in the past.     ·         Plan:     · Continue daptomycin 10 mg/kg adjusted body weight q24h.     · Check AMERICO.            Liver Disease Complications - Care Day 4 (11/28/2021) by Jadyn Hooper RN       Review Status Review Entered   Completed 12/1/2021 12:25      Criteria Review      Care Day: 4 Care Date: 11/28/2021 Level of Care:    Guideline Day 3    Clinical Status    (X) * Hemodynamic stability    12/1/2021 12:25 PM EST by Annika Dean      VITALS: T  98; P 62; RR 16; BP 95/48; PULSE  % RA    ( ) * Tachypnea absent    ( ) * Afebrile    ( ) * No bleeding    ( ) * No peritonitis, or treatment adequate    ( ) * Ascites absent or adequately controlled    ( ) * Volume status adequately controlled    ( ) * Renal function at baseline or acceptable for next level of care    ( ) * Electrolyte abnormalities absent or acceptable for next level of care    ( ) * Mental status at baseline    ( ) * Diet tolerated    ( ) * Discharge plans and education understood    Activity    ( ) * Ambulatory or acceptable for next level of care    Routes    ( ) * Oral hydration    ( ) * Oral medications or regimen acceptable for next level of care    ( ) * Oral diet or acceptable for next level of care    * Milestone   Additional Notes   11/28/2021   MEDICATIONS:   0.9 % sodium chloride bolus   Dose: 500 mL   Freq: ONCE Route: IV      ampicillin-sulbactam (UNASYN) 3000 mg ivpb minibag   Dose: 3,000 mg   Freq: EVERY 6 HOURS Route: IV      cefTRIAXone (ROCEPHIN) 2,000 mg in sterile water 20 mL IV syringe   Dose: 2,000 mg   Freq: EVERY 24 HOURS Route: IV      cloNIDine (CATAPRES) tablet 0.1 mg   Dose: 0.1 mg   Freq: DAILY Route: PO      enoxaparin (LOVENOX) injection 40 mg   Dose: 40 mg   Freq: DAILY Route: SC      folic acid (FOLVITE) tablet 1 mg   Dose: 1 mg   Freq: DAILY Route: PO      heparin flush 100 UNIT/ML injection 300 Units   Dose: 3 mL   Freq: 2 times per day Route: IV lactulose (CHRONULAC) 10 GM/15ML solution 20 g   Dose: 20 g   Freq: EVERY 12 HOURS Route: PO      pantoprazole (PROTONIX) tablet 40 mg   Dose: 40 mg   Freq: DAILY Route: PO      propranolol (INDERAL) tablet 20 mg   Dose: 20 mg   Freq: EVERY 12 HOURS Route: PO      rifaximin (XIFAXAN) tablet 550 mg   Dose: 550 mg   Freq: 2 TIMES DAILY Route: PO      spironolactone (ALDACTONE) tablet 25 mg   Dose: 25 mg   Freq: DAILY Route: PO      thiamine mononitrate tablet 100 mg   Dose: 100 mg   Freq: DAILY Route: PO      vitamin B-6 (PYRIDOXINE) tablet 25 mg   Dose: 25 mg   Freq: DAILY Route: PO      white petrolatum ointment   Freq: 2 TIMES DAILY Route: TOP      0.9 % sodium chloride infusion   Rate: 125 mL/hr Freq: CONTINUOUS Route: IV X2      acetaminophen (TYLENOL) tablet 650 mg   Dose: 650 mg   Freq: EVERY 6 HOURS PRN Route: PO X1      LORazepam (ATIVAN) tablet 2 mg   Dose: 2 mg   Freq: EVERY 1 HOUR PRN (WITHDRAWAL) Route: PO X2      LORazepam (ATIVAN) tablet 3 mg   Dose: 3 mg   Freq: EVERY 1 HOUR PRN (WITHDRAWAL) Route: PO X2      potassium chloride (KLOR-CON M) extended release tablet 40 mEq   Dose: 40 mEq   Freq: PRN Route: PO X1      LABS:   11/28/2021 06:56   Potassium: 3.2 (L)   Chloride: 114 (H)   CO2: 10 (L)   Creatinine: 1.1 (H)   Glucose: 115 (H)   Troponin, High Sensitivity: 20 (H)   RBC: 2.87 (L)   Hemoglobin Quant: 9.2 (L)   Hematocrit: 31.3 (L)   Platelet Count: 61 (L)      11/28/2021 14:58   Troponin, High Sensitivity: 17 (H)   Ammonia: 142.0 (H)      11/28/2021 20:41   Troponin, High Sensitivity: 17 (H)      * * INTERNAL MEDICINE * *    ASSESSMENT:   -Hepatic encephalopathy, acute toxic metabolic encephalopathy   -Hypomagnesemia   -Hypokalemia   -Lactic acidosis, check repeat lactic acid on 11/25   -Hyperammonemia   -Hyperbilirubinemia   -Hepatic cirrhosis   -Alcohol intoxication   -Leukocytosis   -Alcohol dependence/abuse   -Elevated troponins, 22, then 29, continue to recheck every 6 hours for now -Bacteremia, gram-positive cocci in chains and clusters, 2/2 from 11/24           PLAN:   -IV fluids discontinued on 11/26   -Monitor serum electrolytes replete as needed   -Monitor serum ammonia levels   -Rifaximin and lactulose   -Monitor lactic acid level   -[facility] protocol with Ativan dosing   -Started first dose of vancomycin on 11/25, changed to Rocephin and Unasyn by ID on 11/25   -PICC line inserted on 11/27   -Consult ID, follow recommendations, CT abdomen pelvis with oral and IV contrast: On 11/26: Showed nonspecific stranding around duodenum and thickening of the gallbladder wall   AMERICO AMERICO pending   -Replace with KCl 40 mEq IV x1 as well as magnesium sulfate IV 2 g x 1 on 11/27      * * INFECTIOUS DISEASE * *    Assessment:   · Enterococcus bacteremia may be polymicrobial with a history of bacteremia in the past.     ·     Plan:     · Rocephin 2 grams IV q 12 hrs and Unasyn 3 grams Iv q 6 hrs     · Echo

## 2021-12-02 NOTE — PROGRESS NOTES
Hemoccult Positive    Pt oob to BR for BM for her occult blood test- Results + Positive. She does have bright red blood when wiping and thinks it may be from hemorrhoids. Awaiting GI to see ot and decide on if she needs to be scoped. She is complaining about clear liquid diet and would like to eat.

## 2021-12-02 NOTE — PROGRESS NOTES
GENERAL SURGERY  DAILY PROGRESS NOTE  12/2/2021  Chief Complaint   Patient presents with    Altered Mental Status     patient was seen by PD trying to pull into a parking lot. .. PD stopped to evaluate, found patient altered, Patient hypoxic for EMS, AMS        Subjective:  Patient resting comfortably in her bed. She endorsed some hematuria this morning. She denies nausea and vomiting. HgB stable. Objective:  /63   Pulse 53   Temp 96.4 °F (35.8 °C) (Temporal)   Resp 18   Ht 5' 8\" (1.727 m)   Wt 254 lb (115.2 kg)   SpO2 98%   BMI 38.62 kg/m²     General appearance: alert, cooperative and in no acute distress. Eyes: grossly normal  Lungs: nonlabored breathing on room air  Heart: regular rate  Abdomen: soft, non-tender, non distended  Skin: No skin abnormalities  Neurologic: Alert and oriented x 3. Grossly normal  Musculoskeletal: No clubbing cyanosis or edema    Assessment/Plan:  51 y.o. female with anemia and FOBT+ stools. Patient has history of varices, but on recent EGD, no varices were seen. Monitor H/H  PPI BID  Check UA due to complaint of hematuria  Will place consult to Dr. Marilou Deras for evaluation of cirrhosis and Crohn's disease. Patient findings and plan discussed with Dr. Ketan Marlow.      Electronically signed by Deanna Gracia MD on 12/2/2021 at 12:41 PM  Electronically signed by Maria Fernanda Yu MD on 12/2/2021 at 1:48 PM

## 2021-12-02 NOTE — PROGRESS NOTES
[address] [place] [address] [facility]  Progress Note      C/C : Enterococcus bacteremia       SUBJECTIVE:  Patient is tolerating medications. No reported adverse drug reactions. No nausea, vomiting, has diarrhea. Review of systems:  As stated above in the chief complaint, otherwise negative.     Medications:  Scheduled Meds:   pantoprazole  40 mg IntraVENous BID    And    sodium chloride (PF)  10 mL IntraVENous BID    daptomycin (CUBICIN) IVPB  10 mg/kg (Adjusted) IntraVENous Q24H    white petrolatum   Topical BID    potassium chloride  10 mEq IntraVENous Once    potassium chloride  10 mEq IntraVENous Once    lidocaine PF  5 mL IntraDERmal Once    sodium chloride flush  5-40 mL IntraVENous 2 times per day    cloNIDine  0.1 mg Oral Daily    folic acid  1 mg Oral Daily    lactulose  20 g Oral Q12H    propranolol  20 mg Oral Q12H    pyridoxine  25 mg Oral Daily    rifaximin  550 mg Oral BID    spironolactone  25 mg Oral Daily    thiamine mononitrate  100 mg Oral Daily    vitamin D  50,000 Units Oral Weekly    sodium chloride flush  10 mL IntraVENous 2 times per day    [Held by provider] enoxaparin  40 mg SubCUTAneous Daily    lactulose  20 g Oral Once     Continuous Infusions:   octreotide (SANDOSTATIN) infusion 25 mcg/hr (21 1847)     PRN Meds:sodium chloride flush, iohexol, promethazine **OR** [DISCONTINUED] ondansetron, polyethylene glycol, acetaminophen **OR** acetaminophen, potassium chloride **OR** potassium alternative oral replacement **OR** potassium chloride, magnesium sulfate, LORazepam **OR** LORazepam **OR** LORazepam **OR** LORazepam **OR** LORazepam **OR** LORazepam **OR** LORazepam **OR** LORazepam    OBJECTIVE:  /61   Pulse 55   Temp 96.7 °F (35.9 °C) (Temporal)   Resp 18   Ht 5' 8\" (1.727 m)   Wt 254 lb (115.2 kg)   SpO2 95%   BMI 38.62 kg/m²   Temp  Av.2 °F (36.2 °C)  Min: 96.5 °F (35.8 °C)  Max: 98.2 °F (36.8 °C)  Constitutional: The patient is awake, alert, and oriented. Skin: Warm and dry. No rashes were noted. jaundice  HEENT: Round and reactive pupils. Moist mucous membranes. No ulcerations or thrush. icteric  Neck: Supple to movements. Chest: Clear   Cardiovascular: S1 and S2 are rhythmic and regular. No murmurs appreciated. Abdomen: Soft, non tender, bowel sound +  Genitourinary: female  Extremities: No clubbing, no cyanosis, no edema.   Lines: peripheral    Laboratory and Tests Review:  Lab Results   Component Value Date    WBC 4.0 (L) 12/02/2021    WBC 3.7 (L) 12/01/2021    WBC 4.8 11/30/2021    HGB 8.1 (L) 12/02/2021    HCT 25.0 (L) 12/02/2021    .4 (H) 12/02/2021    PLT 46 (L) 12/02/2021     Lab Results   Component Value Date    NEUTROABS 10.55 (H) 11/24/2021    NEUTROABS 4.45 10/27/2021    NEUTROABS 3.32 10/26/2021     No results found for: [facility]  Lab Results   Component Value Date    ALT 30 11/29/2021    AST 40 (H) 11/29/2021    ALKPHOS 115 (H) 11/29/2021    BILITOT 1.4 (H) 11/29/2021     Lab Results   Component Value Date     12/02/2021    K 3.6 12/02/2021    K 2.4 11/26/2021     12/02/2021    CO2 14 12/02/2021    BUN 5 12/02/2021    CREATININE 1.0 12/02/2021    CREATININE 1.1 12/01/2021    CREATININE 1.0 11/30/2021    GFRAA >60 12/02/2021    LABGLOM 59 12/02/2021    GLUCOSE 139 12/02/2021    GLUCOSE 125 08/11/2011    PROT 5.7 11/29/2021    LABALBU 2.4 11/29/2021    LABALBU 3.6 08/11/2011    CALCIUM 8.4 12/02/2021    BILITOT 1.4 11/29/2021    ALKPHOS 115 11/29/2021    AST 40 11/29/2021    ALT 30 11/29/2021     Lab Results   Component Value Date    CRP 0.4 11/24/2021    CRP 15.7 (H) 05/17/2011     Lab Results   Component Value Date    SEDRATE 97 (H) 11/24/2021    SEDRATE 95 (H) 07/09/2019    SEDRATE 80 (H) 05/17/2011     Radiology:  CT abdomen and pelvis -  Peripancreatic fat stranding and edema adjacent to the duodenum may reflect   pancreatitis versus duodenitis.  Small amount of reactive fluid identified in   the right upper quadrant.  No abscess.  Please correlate findings with   laboratory values. Thickened gallbladder wall with pericholecystic fat stranding may reflect   cholecystitis versus local inflammation from the adjacent duodenum.  Further   evaluation may be obtained with HIDA scan if there is clinical suspicion for   cholecystitis. Microbiology:     Culture, Blood 2 [[id number]] (Abnormal)  Collected: 11/24/21 2038   Order Status: Completed Specimen: Blood Updated: 11/28/21 1029    Culture, Blood 2 Previously positive blood culture called Abnormal     Organism Enterococcus faecium Abnormal      Staphylococcus haemolyticus Abnormal     Culture, Blood 2 --    This isolate is presumed to be resistant based on the   detection of inducible Clindamycin resistance.  Clindamycin   may still be effective in some patients. Narrative:     Source: BLOOD       Site: Antecubital-Lef          Previous panic on this admission - call   not needed per SOP, 11/25/2021 12:44,   by [facility]   Culture, Blood 1 [[id number]] (Abnormal)  Collected: 11/24/21 2038   Order Status: Completed Specimen: Blood Updated: 11/28/21 1027    Organism Enterococcus faecium Abnormal     Blood Culture, Routine --    Refer to previous culture CXBL2 11/24/2021 2038 for susceptibility   results     Organism Staphylococcus haemolyticus Abnormal     Blood Culture, Routine --    This isolate is presumed to be resistant based on the   detection of inducible Clindamycin resistance.  Clindamycin   may still be effective in some patients. Organism Enterococcus casseliflavus Abnormal    Narrative:     Source: BLOOD       Site: Antecubital-Rig          Microbiology results called to and read   back by Marvine Blizzard RN,        Assessment:  · Vancomycin resistant Enterococcus bacteremia may be polymicrobial with a history of bacteremia in the past.    ·      Plan:    · Continue daptomycin 10 mg/kg adjusted body weight q24h.     · Check AMERICO.  · For upper GI bleed work-up per Surgery and GI noted.       Electronically signed by Lucía Aquino MD on 12/2/2021 at 11:15 AM

## 2021-12-02 NOTE — PROGRESS NOTES
Hospitalist Progress Note      SYNOPSIS: Patient admitted on 2021 for altered mental status. A 48-year-old female with significant past medical history, particularly for alcoholic cirrhosis and blood transfusions. She was brought into the ER with altered mental status. She was stopped by the police as she was trying to pull into a parking lot. She was noted to be confused and brought into the ER. Vital signs reveal the patient to be tachycardic with a rate of 112 and hypertensive with a pressure 156/97. Patient is jaundiced. Work-up revealed potassium 2.6, magnesium 1.5, lactic acid 5.2, glucose 154, troponin 22, ammonia 164, ALT 81, , bilirubin 4.3, alcohol 148, WBC 14.2, hemoglobin 10.6. CT chest unremarkable. Potassium and magnesium were replaced. On , her blood culture came back positive for gram-positive cocci in chains and clusters, later turned out to be Enterococcus. She was given dose of vancomycin which was later changed to Rocephin and Unasyn by ID the same day. Rocephin and Unasyn changed to daptomycin on . Work-up for the source of infection included CT abdomen pelvis which showed nonspecific stranding around multiple intra-abdominal organs but no acute pathology. Echocardiogram still pending. SUBJECTIVE:  Patient seen and examined in her room. Hemoglobin stable overnight. Discussed with GI for evaluation for EGD. Patient only spots of blood on her tissue after wiping  Patient appears comfortable and denies any active complaint. Patient denies any chest pain, nausea, vomiting. Records reviewed. Stable overnight. No other overnight issues reported. Temp (24hrs), Av.1 °F (36.2 °C), Min:96.4 °F (35.8 °C), Max:98.2 °F (36.8 °C)    DIET: ADULT DIET;  Clear Liquid  CODE: Full Code    Intake/Output Summary (Last 24 hours) at 2021 1446  Last data filed at 2021 0930  Gross per 24 hour   Intake 600 ml   Output    Net 600 ml OBJECTIVE:    /63   Pulse 53   Temp 96.4 °F (35.8 °C) (Temporal)   Resp 18   Ht 5' 8\" (1.727 m)   Wt 254 lb (115.2 kg)   SpO2 98%   BMI 38.62 kg/m²     General appearance: No apparent distress, appears stated age and cooperative. HEENT:  Conjunctivae/corneas Jaundiced. .   Neck: Supple. No jugular venous distention. Respiratory: Clear to auscultation bilaterally, normal respiratory effort  Cardiovascular: Regular rate rhythm, normal S1-S2  Abdomen: Soft, nontender, nondistended  Musculoskeletal: No clubbing, cyanosis, no bilateral lower extremity edema. Brisk capillary refill. Skin:  No rashes  on visible skin  Neurologic: awake, alert and following commands       Assessment and plan:  #Vancomycin resistant Enterococcus bacteremia   -may be polymicrobial with a history of bacteremia in the past.  -Continue daptomycin  -Started first dose of vancomycin on 11/25, changed to Rocephin and Unasyn by ID on 11/25, changed to daptomycin on 11/29  -PICC line inserted on 11/27  -AMERICO ordered by infectious disease    #Alcoholic liver disease with hyperlipidemia and hepatic encephalopathy  #Hepatic encephalopathy, acute toxic metabolic encephalopathy  #Thrombocytopenia  -Hyperammonemia  -Monitor serum ammonia levels  -GI consulted  -Rifaximin and lactulose    #Acute on chronic anemia  -Hb dropped to 6.7 S/P transfusion of one unit of blood 12/1  -Per RN stool is normal color  -Consult GI    #Alcohol abuse dependence disorder  -[facility] protocol with Ativan dosing    #Hypomagnesemia and hypokalemia  -Replaced     #Lactic acidosis  -Improved with IV fluids        DISPOSITION:   Possible discharge in next 1 to 2 days.   Under hold is echocardiogram.    Medications:  REVIEWED DAILY    Infusion Medications    octreotide (SANDOSTATIN) infusion 25 mcg/hr (12/01/21 9021)     Scheduled Medications    pantoprazole  40 mg IntraVENous BID    And    sodium chloride (PF)  10 mL IntraVENous BID    daptomycin (CUBICIN) IVPB  10 mg/kg (Adjusted) IntraVENous Q24H    white petrolatum   Topical BID    potassium chloride  10 mEq IntraVENous Once    potassium chloride  10 mEq IntraVENous Once    lidocaine PF  5 mL IntraDERmal Once    sodium chloride flush  5-40 mL IntraVENous 2 times per day    cloNIDine  0.1 mg Oral Daily    folic acid  1 mg Oral Daily    lactulose  20 g Oral Q12H    propranolol  20 mg Oral Q12H    pyridoxine  25 mg Oral Daily    rifaximin  550 mg Oral BID    spironolactone  25 mg Oral Daily    thiamine mononitrate  100 mg Oral Daily    vitamin D  50,000 Units Oral Weekly    sodium chloride flush  10 mL IntraVENous 2 times per day    [Held by provider] enoxaparin  40 mg SubCUTAneous Daily    lactulose  20 g Oral Once     PRN Meds: sodium chloride flush, iohexol, promethazine **OR** [DISCONTINUED] ondansetron, polyethylene glycol, acetaminophen **OR** acetaminophen, potassium chloride **OR** potassium alternative oral replacement **OR** potassium chloride, magnesium sulfate, LORazepam **OR** LORazepam **OR** LORazepam **OR** LORazepam **OR** LORazepam **OR** LORazepam **OR** LORazepam **OR** LORazepam    Labs:     Recent Labs     11/30/21  0430 11/30/21  0430 12/01/21  0430 12/01/21  1144 12/02/21  0500   WBC 4.8  --  3.7*  --  4.0*   HGB 7.3*   < > 6.7* 7.3* 8.1*   HCT 23.3*   < > 21.4* 23.2* 25.0*   PLT 45*  --  43*  --  46*    < > = values in this interval not displayed. Recent Labs     11/30/21  0430 12/01/21  0430 12/02/21  0500    139 135   K 3.3* 3.6 3.6   * 117* 112*   CO2 12* 13* 14*   BUN 7 8 5*   CREATININE 1.0 1.1* 1.0   CALCIUM 8.3* 8.3* 8.4*       No results for input(s): PROT, ALB, ALKPHOS, ALT, AST, BILITOT, AMYLASE, LIPASE in the last 72 hours. No results for input(s): INR in the last 72 hours. No results for input(s): Jennifer Comber in the last 72 hours.     Chronic labs:    Lab Results   Component Value Date    CHOL 100 09/01/2021    TRIG 164 (H) 09/01/2021

## 2021-12-03 NOTE — CARE COORDINATION
Chart reviewed:  Contact Isolation for VRE in the blood. Hbg 7.6 1 U PRBC yesterday. Sandostatin gtt @ 5 cc/hr. IV Daptomycin IV Q 24 hrs continues. Per Surgery:  Anemia with FOBT+ stools, New consult to GI Dr Dillon Castle pending. New Consult to Cardiology received from Dr Ronit Morse. Await input. Patient now on room air and sating well. [facility] home care continues to follow at this time for transition of care. If IV antibiotics are needed at discharge, new HC orders will be needed at discharge to include these as well as vitals, meds, PT and OT.  AMERICO and Echocardiogram ordered and still pending completion at this time. Will continue to follow.     Wili Hughes RN.  Christine Atwood  [phone]

## 2021-12-03 NOTE — PROGRESS NOTES
GENERAL SURGERY  DAILY PROGRESS NOTE  12/3/2021  Chief Complaint   Patient presents with    Altered Mental Status     patient was seen by PD trying to pull into a parking lot. .. PD stopped to evaluate, found patient altered, Patient hypoxic for EMS, AMS        Subjective:  Patient resting comfortably in bed. She denies any pain. UA yesterday was positive for RBCs. She had a brown, FOBT+ stool yesterday. HgB 8.1 from 7.3. Objective:  /64   Pulse 54   Temp 98 °F (36.7 °C) (Temporal)   Resp 16   Ht 5' 8\" (1.727 m)   Wt 254 lb (115.2 kg)   SpO2 97%   BMI 38.62 kg/m²     General appearance: alert, cooperative and in no acute distress. Eyes: grossly normal  Lungs: nonlabored breathing on room air  Heart: regular rate  Abdomen: soft, non-tender, non distended  Skin: No skin abnormalities  Neurologic: Alert and oriented x 3. Grossly normal  Musculoskeletal: No clubbing cyanosis or edema    Assessment/Plan:  51 y.o. female with anemia and FOBT+ stools. Patient has history of varices, but on recent EGD, no varices were seen. Monitor H/H  PPI BID  Ok for diet from general surgery standpoint. Advance if ok with GI. Will place consult to Dr. Cely Yen for evaluation of cirrhosis and Crohn's disease. Patient findings and plan discussed with Dr. Andreia Vidal.      Electronically signed by Amari Castillo MD on 12/3/2021 at 7:20 AM

## 2021-12-03 NOTE — CONSULTS
Inpatient Cardiology Consultation      Reason for Consult: AMERICO    Consulting Physician: Dr. Leonor Zhong    Requesting Physician: Dr. Tyler Lopez    Date of Consultation: 12/3/2021    HISTORY OF PRESENT ILLNESS:     This 16-year-old female is new to [facility] cardiology. She is followed at the [facility]    She was recently admitted to [facility]- from October 25 to October 28 with hyperbilirubinemia and anasarca, decompensated alcoholic cirrhosis. She had paracentesis for less than 1 L of fluid and there was no evidence of infection on fluid analysis. She had a urinary tract infection and was treated with antibiotics. She was admitted on November 24 with altered mental status changes, hypoxia. She was found in a parking lot by  and was altered. She cannot give me a lot of details. Serum glucose was 154. CT of the head unremarkable and CTA of the chest was done due to tachycardia but there was no PE. She was in SEVEN with a creatinine of 3 and lactic acidosis 5.2 and ammonia level was 164. Drug screen was negative. Her H&H on admission was 10.6 and 32.5. Blood cultures are positive for entero coccus possibly staph aureus. She was started on vancomycin and does have a history of VRE. A 2D echocardiogram has been ordered and AMERICO requested by ID. Troponins were 17 1715 and 18-20    Her H&H then dropped to 6.7 and 21.4 on December 1. General surgery is evaluating for anemia. She admits to a few flecks of blood in her stool. She denies hematemesis or abdominal pain. She has had no chest pain or dyspnea. She was transfused for 1 unit on December 1. A consult with GI is pending. She tells me her last alcoholic beverage was 5 days ago. FOBT is positive. Per ID, vancomycin resistant Enterococcus bacteremia and on daptomycin    Past medical history  1. Obesity  2. Non-smoker  3. Ulcerative colitis, untreated Crohn's disease  4. Autoimmune vasculitis/rash  5.  Alcoholic cirrhosis, complicated by varices and ascites /hepatitis and elevated hepatic transaminases with an AST of 165 ALT 61 with a total bilirubin of 7.3 in September 2021  6. Hyperlipidemia  7. Perirectal abscess and bacteremia  8. Anxiety and depression  9. Anemia/thrombocytopenia with an H&H in September 2021 9.6 and 29.7 and platelets 07/WILDER 1.8  10. Thyroid disease  11. Dilatation of the esophagus  12. J-pouch  13. Arm debridement  14. History of VRE bacteremia  15. EGD 9/21 no varices  16. AMERICO September 3, 2021    Ejection fraction is visually estimated at 55-60%. Normal right ventricular size and function. No evidence of thrombus within left atrium/ left atrial appendage. Emptying velocity is normal at 84 cms/s. Mild focal atherosclerosis in the descending aorta. No significant valvular stenosis/ regurgitation. Agitated saline contrast study negative for ASD/ PFO. No evidence of infective endocarditis. 17.           Medications Prior to admit:  Prior to Admission medications    Medication Sig Start Date End Date Taking?  Authorizing Provider   spironolactone (ALDACTONE) 25 MG tablet Take 1 tablet by mouth daily 10/28/21   Romi Malik, DO   cyanocobalamin 2000 MCG tablet Take 1 tablet by mouth daily 10/28/21   Romi Malik, DO   pyridoxine (B-6) 25 MG tablet Take 1 tablet by mouth daily 10/28/21   Romi Malik, DO   vitamin D (ERGOCALCIFEROL) 1.25 MG (44368 UT) CAPS capsule Take 1 capsule by mouth once a week Monday 10/28/21   Romi Malik, DO   potassium chloride (KLOR-CON M) 10 MEQ extended release tablet Take 20 mEq by mouth daily    Historical Provider, MD   lactulose (CHRONULAC) 10 GM/15ML solution Take 20 g by mouth every 12 hours    Historical Provider, MD   propranolol (INDERAL) 10 MG tablet Take 20 mg by mouth every 12 hours     Historical Provider, MD   rifaximin (XIFAXAN) 550 MG tablet Take 550 mg by mouth 2 times daily    Historical Provider, MD   cloNIDine (CATAPRES) 0.1 MG tablet Take 0.1 mg by mouth daily    Historical Provider, MD   magnesium oxide (MAG-OX) 400 MG tablet Take 400 mg by mouth daily    Historical Provider, MD   vitamin B-1 (THIAMINE) 100 MG tablet Take 100 mg by mouth daily    Historical Provider, MD   folic acid (FOLVITE) 1 MG tablet Take 1 tablet by mouth daily 2/2/21   Corbin Arrow, DO   thiamine mononitrate 100 MG tablet Take 1 tablet by mouth daily 2/2/21 3/4/21  Corbin Arrow, DO   pantoprazole (PROTONIX) 40 MG tablet Take 40 mg by mouth daily     Historical Provider, MD       Current Medications:    Current Facility-Administered Medications: pantoprazole (PROTONIX) injection 40 mg, 40 mg, IntraVENous, BID **AND** sodium chloride (PF) 0.9 % injection 10 mL, 10 mL, IntraVENous, BID  octreotide (SANDOSTATIN) 500 mcg in sodium chloride 0.9 % 100 mL infusion, 25 mcg/hr, IntraVENous, Continuous  DAPTOmycin (CUBICIN) 850 mg in sodium chloride 0.9 % 50 mL IVPB, 10 mg/kg (Adjusted), IntraVENous, Q24H  white petrolatum ointment, , Topical, BID  potassium chloride 10 mEq/100 mL IVPB (Peripheral Line), 10 mEq, IntraVENous, Once  potassium chloride 10 mEq/100 mL IVPB (Peripheral Line), 10 mEq, IntraVENous, Once  lidocaine PF 1 % injection 5 mL, 5 mL, IntraDERmal, Once  sodium chloride flush 0.9 % injection 5-40 mL, 5-40 mL, IntraVENous, 2 times per day  sodium chloride flush 0.9 % injection 5-40 mL, 5-40 mL, IntraVENous, PRN  iohexol (OMNIPAQUE 240) injection 50 mL, 50 mL, Oral, ONCE PRN  cloNIDine (CATAPRES) tablet 0.1 mg, 0.1 mg, Oral, Daily  folic acid (FOLVITE) tablet 1 mg, 1 mg, Oral, Daily  lactulose (CHRONULAC) 10 GM/15ML solution 20 g, 20 g, Oral, Q12H  propranolol (INDERAL) tablet 20 mg, 20 mg, Oral, Q12H  vitamin B-6 (PYRIDOXINE) tablet 25 mg, 25 mg, Oral, Daily  rifaximin (XIFAXAN) tablet 550 mg, 550 mg, Oral, BID  spironolactone (ALDACTONE) tablet 25 mg, 25 mg, Oral, Daily  thiamine mononitrate tablet 100 mg, 100 mg, Oral, Daily  vitamin D (ERGOCALCIFEROL) capsule 50,000 Units, 50,000 Units, Oral, Weekly  sodium chloride flush 0.9 % injection 10 mL, 10 mL, IntraVENous, 2 times per day  [Held by provider] enoxaparin (LOVENOX) injection 40 mg, 40 mg, SubCUTAneous, Daily  promethazine (PHENERGAN) tablet 12.5 mg, 12.5 mg, Oral, Q6H PRN **OR** [DISCONTINUED] ondansetron (ZOFRAN) injection 4 mg, 4 mg, IntraVENous, Q6H PRN  polyethylene glycol (GLYCOLAX) packet 17 g, 17 g, Oral, Daily PRN  acetaminophen (TYLENOL) tablet 650 mg, 650 mg, Oral, Q6H PRN **OR** acetaminophen (TYLENOL) suppository 650 mg, 650 mg, Rectal, Q6H PRN  potassium chloride (KLOR-CON M) extended release tablet 40 mEq, 40 mEq, Oral, PRN **OR** potassium bicarb-citric acid (EFFER-K) effervescent tablet 40 mEq, 40 mEq, Oral, PRN **OR** potassium chloride 10 mEq/100 mL IVPB (Peripheral Line), 10 mEq, IntraVENous, PRN  magnesium sulfate 2000 mg in 50 mL IVPB premix, 2,000 mg, IntraVENous, PRN  LORazepam (ATIVAN) tablet 1 mg, 1 mg, Oral, Q1H PRN **OR** LORazepam (ATIVAN) injection 1 mg, 1 mg, IntraVENous, Q1H PRN **OR** LORazepam (ATIVAN) tablet 2 mg, 2 mg, Oral, Q1H PRN **OR** LORazepam (ATIVAN) injection 2 mg, 2 mg, IntraVENous, Q1H PRN **OR** LORazepam (ATIVAN) tablet 3 mg, 3 mg, Oral, Q1H PRN **OR** LORazepam (ATIVAN) injection 3 mg, 3 mg, IntraVENous, Q1H PRN **OR** LORazepam (ATIVAN) tablet 4 mg, 4 mg, Oral, Q1H PRN **OR** LORazepam (ATIVAN) injection 4 mg, 4 mg, IntraVENous, Q1H PRN  lactulose (CHRONULAC) 10 GM/15ML solution 20 g, 20 g, Oral, Once    Allergies:  Patient has no known allergies.     Social History:  and non-smoker injured, alcoholic, no illicit drugs, single  Seen      Family History:   Family History   Problem Relation Age of Onset    Cancer Mother         breast     Emphysema Mother     Lupus Mother     Heart Disease Father     High Cholesterol Father     High Blood Pressure Sister     No Known Problems Son        REVIEW OF SYSTEMS:     · Constitutional: Denies fatigue, fevers, chills or night sweats  · Eyes: Denies visual changes 2021  4. murmur  5. Anemia/GI bleed with last colonoscopy about a year ago  6. Recent urinary tract infection  7. Obesity  8. History of ulcerative colitis and Crohn's disease  9. Autoimmune vasculitis  10. History of perirectal abscess and bacteremia  11. Anxiety and depression  12. Status post J-pouch  13. History of VRE bacteremia  14. AMERICO in September of this year showed an EF of 55 to 60% and no evidence of endocarditis. Plans    1. GI consult is pending and if there are no esophageal varices per GI we will proceed with AMERICO on Monday  2. 2D echo is pending    Elect  ronically signed by SRIDHAR Abdi CNP on 12/3/2021 at 1:45 PM     Patient seen and examined and case discussed in detail with cardiology nurse practitioner and agree with assessment/plan and history/physical examination discussed in detail and documented above.

## 2021-12-03 NOTE — PROGRESS NOTES
Anatoliy Kaminski notified of [facility] cardio consult.   Pt added to census

## 2021-12-03 NOTE — CONSULTS
History and Physical      ASSESSMENT AND PLAN:    46y/F w/ history of EtOH cirrhosis and IBD s/p TAC w/ IPAA on no maintenance medication who presents w/ hepatic encephalopathy in the setting of polymicrobial bacteremia. MELD on admission: 19    PLAN:  1. Cirrhosis:  -Please trend CBC/CMP/INR daily. No need for trending labs more than daily for now. -HE - lactulose TID and more if needed w/ goal 3-4 bowel movements daily. -EV/Anemia: Will plan for EGD on Monday for esophageal variceal screen and potential treatment.   -Ascites: Currently no large volume ascites though in the setting of no current source of infection, would want to rule out SBP. Would plan for IR-guided diagnostic paracentesis. After paracentesis, patient should be placed on low dose Lasix 20mg and Aldactone 25mg daily w/ Cr monitored to ensure no re-accumulation in the future.   -Coagulopathy: INR elevated. I will be ordering 3 runs of 10mg IV Vit K.    2. IBD s/p TAC w/ IPAA:  -One of the bacteria which grew in the blood was E. Faecium.  -Will plan for pouchoscopy on Monday. Enemas will need to be given Sunday night and Monday morning. 3. Polymicrobial Bacteremia:  -I will order IR-guided diagnostic paracentesis and place orders for labs. -I will be performing pouchoscopy for disease assessment on Monday.  -I will be performing EGD Monday for variceal screen/treatment prior to AMERICO by Cardiology for further assessment. I will follow. Thank you for including us in the care of this patient. Please do not hesitate to contact us with any additional questions or concerns. Marion Green MD  Gastroenterology/Hepatology  Advanced Endoscopy            HISTORY OF PRESENT ILLNESS:      Ms. Román Meier is a 46y/F w/ history of EtOH cirrhosis, IBD s/p TAC w/ IPAA currently on no medical therapy who presents w/ hepatic encephalopathy in the setting of polymicrobial bacteremia.      EtOH Cirrhosis:  Hepatic Encephalopathy: Currently reason for admission. On lactulose and rifaximin at home. EV: Previous history though EGD in 9/2021 showed no varices. CT on admission shows prominent paraesophageal varices. Ascites: Not on any home diuretics though had a paracentesis in 10/2021. Extremely small amount of lillian-hepatic ascites appreciated on CT scan which I personally reviewed and interpreted. HCC: No history. Hgb on admission was 10.6 which downtrended to 6.7 and is now 7.6 after 1u PRBC. She was noted to have portal hypertensive gastropathy on her EGD in Sept with Dr Noemi Lyons. The patient also reportedly has a history of Crohn's Disease but is on no maintenance medication. It is also interesting that she underwent a TAC w/ IPAA which is almost exclusively done for UC unless she strictly had Crohn's colitis which is possible. She was found to have polymicrobial bacteremia with Enterococcus faecium, Staph haemolyticus, and Enterococcus casseliflavus. ID is following. The patient's mental status has now greatly improved. Past Medical History:        Diagnosis Date    Acute renal failure (ARF) ([facility] [address] 59/00/4685    Alcoholic cirrhosis (HCC)     Anxiety     Crohn's colitis ([facility] [address]     Depression     History of blood transfusion     Pyoderma gangrenosum     Thyroid disease      Past Surgical History:        Procedure Laterality Date    ABDOMEN SURGERY      pt has a J-pouch     ARM DEBRIDEMENT      R arm-2010-2011??    ARM DEBRIDEMENT      COLONOSCOPY      DILATATION, ESOPHAGUS      ENDOSCOPY, COLON, DIAGNOSTIC      TRANSESOPHAGEAL ECHOCARDIOGRAM N/A 9/3/2021    TRANSESOPHAGEAL ECHOCARDIOGRAM WITH BUBBLE STUDY performed by Luis Alfredo Fish MD at [address] N/A 9/2/2021    EGD ESOPHAGOGASTRODUODENOSCOPY performed by Simon Welch MD at [address] History:    TOBACCO:   reports that she has never smoked.  She has never used smokeless tobacco.  ETOH:   reports previous alcohol use.  DRUGS:   reports previous drug use.   Family History:       Problem Relation Age of Onset    Cancer Mother         breast     Emphysema Mother     Lupus Mother     Heart Disease Father     High Cholesterol Father     High Blood Pressure Sister     No Known Problems Son          Current Facility-Administered Medications:     pantoprazole (PROTONIX) injection 40 mg, 40 mg, IntraVENous, BID, 40 mg at 12/03/21 0923 **AND** sodium chloride (PF) 0.9 % injection 10 mL, 10 mL, IntraVENous, BID, Roro Vasquez MD, 10 mL at 12/03/21 0923    octreotide (SANDOSTATIN) 500 mcg in sodium chloride 0.9 % 100 mL infusion, 25 mcg/hr, IntraVENous, Continuous, Roro Vasquez MD, Last Rate: 5 mL/hr at 12/03/21 1218, 25 mcg/hr at 12/03/21 1218    DAPTOmycin (CUBICIN) 850 mg in sodium chloride 0.9 % 50 mL IVPB, 10 mg/kg (Adjusted), IntraVENous, Q24H, Herminia Delgado MD, Stopped at 12/03/21 1259    white petrolatum ointment, , Topical, BID, Arnold Aguilar MD, Given at 12/03/21 0928    potassium chloride 10 mEq/100 mL IVPB (Peripheral Line), 10 mEq, IntraVENous, Once, Abbi Fraga MD, Stopped at 11/27/21 1157    potassium chloride 10 mEq/100 mL IVPB (Peripheral Line), 10 mEq, IntraVENous, Once, Abbi Fraga MD    lidocaine PF 1 % injection 5 mL, 5 mL, IntraDERmal, Once, Hunter Navarrete MD    sodium chloride flush 0.9 % injection 5-40 mL, 5-40 mL, IntraVENous, 2 times per day, Josh David MD, 10 mL at 12/03/21 0927    sodium chloride flush 0.9 % injection 5-40 mL, 5-40 mL, IntraVENous, PRN, Hunter Navarrete MD    iohexol (OMNIPAQUE 240) injection 50 mL, 50 mL, Oral, ONCE PRN, Gabe Chapman DO    cloNIDine (CATAPRES) tablet 0.1 mg, 0.1 mg, Oral, Daily, Shorty Sherman MD, 0.1 mg at 55/23/29 0577    folic acid (FOLVITE) tablet 1 mg, 1 mg, Oral, Daily, Khalida Londono DO, 1 mg at 12/03/21 0931    lactulose (CHRONULAC) 10 GM/15ML solution 20 g, 20 g, Oral, Q12H, Khalida Londono DO, 20 g at 12/03/21 1814   propranolol (INDERAL) tablet 20 mg, 20 mg, Oral, Q12H, Lm Boone MD, 20 mg at 12/02/21 1848    vitamin B-6 (PYRIDOXINE) tablet 25 mg, 25 mg, Oral, Daily, Glorious Small, DO, 25 mg at 12/03/21 4264    rifaximin (XIFAXAN) tablet 550 mg, 550 mg, Oral, BID, Glorious Small, DO, 550 mg at 12/03/21 9647    spironolactone (ALDACTONE) tablet 25 mg, 25 mg, Oral, Daily, Glorious Small, DO, 25 mg at 12/03/21 5133    thiamine mononitrate tablet 100 mg, 100 mg, Oral, Daily, Glorious Small, DO, 100 mg at 12/03/21 1768    vitamin D (ERGOCALCIFEROL) capsule 50,000 Units, 50,000 Units, Oral, Weekly, Glorious Small, DO, 50,000 Units at 11/29/21 1030    sodium chloride flush 0.9 % injection 10 mL, 10 mL, IntraVENous, 2 times per day, Glorious Small, DO, 10 mL at 12/03/21 0926    [Held by provider] enoxaparin (LOVENOX) injection 40 mg, 40 mg, SubCUTAneous, Daily, Glorious Small, DO, 40 mg at 11/30/21 1000    promethazine (PHENERGAN) tablet 12.5 mg, 12.5 mg, Oral, Q6H PRN **OR** [DISCONTINUED] ondansetron (ZOFRAN) injection 4 mg, 4 mg, IntraVENous, Q6H PRN, Glorious Small, DO    polyethylene glycol (GLYCOLAX) packet 17 g, 17 g, Oral, Daily PRN, Glorious Small, DO    acetaminophen (TYLENOL) tablet 650 mg, 650 mg, Oral, Q6H PRN, 650 mg at 11/28/21 1836 **OR** acetaminophen (TYLENOL) suppository 650 mg, 650 mg, Rectal, Q6H PRN, Glorious Small, DO    potassium chloride (KLOR-CON M) extended release tablet 40 mEq, 40 mEq, Oral, PRN, 40 mEq at 12/03/21 0924 **OR** potassium bicarb-citric acid (EFFER-K) effervescent tablet 40 mEq, 40 mEq, Oral, PRN **OR** potassium chloride 10 mEq/100 mL IVPB (Peripheral Line), 10 mEq, IntraVENous, PRN, Glorious Small, DO, Last Rate: 100 mL/hr at 11/26/21 1349, 10 mEq at 11/26/21 1349    magnesium sulfate 2000 mg in 50 mL IVPB premix, 2,000 mg, IntraVENous, PRN, Glorious Small, DO    LORazepam (ATIVAN) tablet 1 mg, 1 mg, Oral, Q1H PRN **OR** LORazepam (ATIVAN) injection 1 mg, 1 mg, IntraVENous, Q1H PRN, 1 mg at 11/26/21 1944 **OR** LORazepam (ATIVAN) tablet 2 mg, 2 mg, Oral, Q1H PRN, 2 mg at 11/28/21 0922 **OR** LORazepam (ATIVAN) injection 2 mg, 2 mg, IntraVENous, Q1H PRN, 2 mg at 11/26/21 1348 **OR** LORazepam (ATIVAN) tablet 3 mg, 3 mg, Oral, Q1H PRN, 3 mg at 11/28/21 0231 **OR** LORazepam (ATIVAN) injection 3 mg, 3 mg, IntraVENous, Q1H PRN, 3 mg at 11/27/21 1430 **OR** LORazepam (ATIVAN) tablet 4 mg, 4 mg, Oral, Q1H PRN **OR** LORazepam (ATIVAN) injection 4 mg, 4 mg, IntraVENous, Q1H PRN, Lurene Woodworth, DO    lactulose (CHRONULAC) 10 GM/15ML solution 20 g, 20 g, Oral, Once, Lurene Silverio, DO     Allergies:  Patient has no known allergies. ROS:  General: Patient denies n/v/f/c or weight loss. HEENT: Patient denies persistent postnasal drip, scleral icterus, drooling, persistent bleeding from nose/mouth. Resp: Patient denies SOB, wheezing, productive cough. Cards: Patient denies CP, palpitations, significant edema  GI: As above. Derm: Patient denies jaundice/rashes. Musc: Patient denies diffuse/irregular joint swelling or myalgias. PHYSICAL EXAM:  BP (!) 114/56   Pulse 60   Temp 98.5 °F (36.9 °C) (Oral)   Resp 17   Ht 5' 8\" (1.727 m)   Wt 254 lb (115.2 kg)   SpO2 100%   BMI 38.62 kg/m²   Physical Exam:  General: Chronically ill-appearing, NAD  HEENT: PERRLA, EOMI, Scleral icterus, MMM, no rhinorrhea  Cards: RRR, no LE edema  Resp: Breathing comfortably on room air, good air movement, no use of accessory muscles, no audible wheezing  Abdomen: soft, NT, ND. No appreciable ascites. Extremities: Moves all extremities, no effusions or bruising.   Skin: No rashes or jaundice  Neuro: A&O x 3, CN grossly intact, non-focal exam              Electronically signed by Hyun Barraza MD on 12/3/2021 at 6:19 PM

## 2021-12-03 NOTE — PROGRESS NOTES
Hospitalist Progress Note      SYNOPSIS: Patient admitted on 2021 for altered mental status. A 79-year-old female with significant past medical history, particularly for alcoholic cirrhosis and blood transfusions. She was brought into the ER with altered mental status. She was stopped by the police as she was trying to pull into a parking lot. She was noted to be confused and brought into the ER. Vital signs reveal the patient to be tachycardic with a rate of 112 and hypertensive with a pressure 156/97. Patient is jaundiced. Work-up revealed potassium 2.6, magnesium 1.5, lactic acid 5.2, glucose 154, troponin 22, ammonia 164, ALT 81, , bilirubin 4.3, alcohol 148, WBC 14.2, hemoglobin 10.6. CT chest unremarkable. Potassium and magnesium were replaced. On , her blood culture came back positive for gram-positive cocci in chains and clusters, later turned out to be Enterococcus. She was given dose of vancomycin which was later changed to Rocephin and Unasyn by ID the same day. Rocephin and Unasyn changed to daptomycin on . Work-up for the source of infection included CT abdomen pelvis which showed nonspecific stranding around multiple intra-abdominal organs but no acute pathology. Echocardiogram still pending. SUBJECTIVE:  Patient seen and examined in her room. Hemoglobin dropped to 7.6 from 8.1. Discussed with GI for possible EGD. GI consultation is still pending  Patient appears comfortable and denies any active complaint. Patient denies any chest pain, nausea, vomiting. Records reviewed. Stable overnight. No other overnight issues reported. Temp (24hrs), Av.3 °F (36.8 °C), Min:98 °F (36.7 °C), Max:98.6 °F (37 °C)    DIET: ADULT DIET;  Clear Liquid  CODE: Full Code    Intake/Output Summary (Last 24 hours) at 12/3/2021 1356  Last data filed at 12/3/2021 1333  Gross per 24 hour   Intake 480 ml   Output 2 ml   Net 478 ml       OBJECTIVE:    BP (!) 93/44   Pulse (PF)  10 mL IntraVENous BID    daptomycin (CUBICIN) IVPB  10 mg/kg (Adjusted) IntraVENous Q24H    white petrolatum   Topical BID    potassium chloride  10 mEq IntraVENous Once    potassium chloride  10 mEq IntraVENous Once    lidocaine PF  5 mL IntraDERmal Once    sodium chloride flush  5-40 mL IntraVENous 2 times per day    cloNIDine  0.1 mg Oral Daily    folic acid  1 mg Oral Daily    lactulose  20 g Oral Q12H    propranolol  20 mg Oral Q12H    pyridoxine  25 mg Oral Daily    rifaximin  550 mg Oral BID    spironolactone  25 mg Oral Daily    thiamine mononitrate  100 mg Oral Daily    vitamin D  50,000 Units Oral Weekly    sodium chloride flush  10 mL IntraVENous 2 times per day    [Held by provider] enoxaparin  40 mg SubCUTAneous Daily    lactulose  20 g Oral Once     PRN Meds: sodium chloride flush, iohexol, promethazine **OR** [DISCONTINUED] ondansetron, polyethylene glycol, acetaminophen **OR** acetaminophen, potassium chloride **OR** potassium alternative oral replacement **OR** potassium chloride, magnesium sulfate, LORazepam **OR** LORazepam **OR** LORazepam **OR** LORazepam **OR** LORazepam **OR** LORazepam **OR** LORazepam **OR** LORazepam    Labs:     Recent Labs     12/01/21  0430 12/01/21  0430 12/01/21  1144 12/02/21  0500 12/03/21  0550   WBC 3.7*  --   --  4.0* 3.5*   HGB 6.7*   < > 7.3* 8.1* 7.6*   HCT 21.4*   < > 23.2* 25.0* 23.7*   PLT 43*  --   --  46* 43*    < > = values in this interval not displayed. Recent Labs     12/01/21  0430 12/02/21  0500 12/03/21  0550    135 140   K 3.6 3.6 3.4*   * 112* 115*   CO2 13* 14* 15*   BUN 8 5* 4*   CREATININE 1.1* 1.0 1.0   CALCIUM 8.3* 8.4* 8.1*       No results for input(s): PROT, ALB, ALKPHOS, ALT, AST, BILITOT, AMYLASE, LIPASE in the last 72 hours. No results for input(s): INR in the last 72 hours. No results for input(s): [facility] in the last 72 hours.     Chronic labs:    Lab Results   Component Value Date    CHOL 100 09/01/2021    TRIG 164 (H) 09/01/2021    HDL 12 09/01/2021    LDLCALC 55 09/01/2021    TSH 6.720 (H) 11/29/2021    INR 2.0 11/24/2021    LABA1C 4.1 09/01/2021       Radiology: REVIEWED DAILY    +++++++++++++++++++++++++++++++++++++++++++++++++  Laure Sheppard MD  [facility] Physician - [address], [place]  +++++++++++++++++++++++++++++++++++++++++++++++++  NOTE: This report was transcribed using voice recognition software. Every effort was made to ensure accuracy; however, inadvertent computerized transcription errors may be present.

## 2021-12-03 NOTE — PROGRESS NOTES
patient is awake, alert, and oriented. Skin: Warm and dry. No rashes were noted. jaundice  HEENT: Round and reactive pupils. Moist mucous membranes. No ulcerations or thrush. icteric  Neck: Supple to movements. Chest: Clear   Cardiovascular: S1 and S2 are rhythmic and regular. No murmurs appreciated. Abdomen: Soft, non tender, bowel sound +  Genitourinary: female  Extremities: No clubbing, no cyanosis, no edema.   Lines: peripheral    Laboratory and Tests Review:  Lab Results   Component Value Date    WBC 3.5 (L) 12/03/2021    WBC 4.0 (L) 12/02/2021    WBC 3.7 (L) 12/01/2021    HGB 7.6 (L) 12/03/2021    HCT 23.7 (L) 12/03/2021    .2 (H) 12/03/2021    PLT 43 (L) 12/03/2021     Lab Results   Component Value Date    NEUTROABS 10.55 (H) 11/24/2021    NEUTROABS 4.45 10/27/2021    NEUTROABS 3.32 10/26/2021     No results found for: [facility]  Lab Results   Component Value Date    ALT 30 11/29/2021    AST 40 (H) 11/29/2021    ALKPHOS 115 (H) 11/29/2021    BILITOT 1.4 (H) 11/29/2021     Lab Results   Component Value Date     12/03/2021    K 3.4 12/03/2021    K 2.4 11/26/2021     12/03/2021    CO2 15 12/03/2021    BUN 4 12/03/2021    CREATININE 1.0 12/03/2021    CREATININE 1.0 12/02/2021    CREATININE 1.1 12/01/2021    GFRAA >60 12/03/2021    LABGLOM 59 12/03/2021    GLUCOSE 116 12/03/2021    GLUCOSE 125 08/11/2011    PROT 5.7 11/29/2021    LABALBU 2.4 11/29/2021    LABALBU 3.6 08/11/2011    CALCIUM 8.1 12/03/2021    BILITOT 1.4 11/29/2021    ALKPHOS 115 11/29/2021    AST 40 11/29/2021    ALT 30 11/29/2021     Lab Results   Component Value Date    CRP 0.4 11/24/2021    CRP 15.7 (H) 05/17/2011     Lab Results   Component Value Date    SEDRATE 97 (H) 11/24/2021    SEDRATE 95 (H) 07/09/2019    SEDRATE 80 (H) 05/17/2011     Radiology:  CT abdomen and pelvis -  Peripancreatic fat stranding and edema adjacent to the duodenum may reflect   pancreatitis versus duodenitis.  Small amount of reactive fluid identified in   the right upper quadrant.  No abscess.  Please correlate findings with   laboratory values. Thickened gallbladder wall with pericholecystic fat stranding may reflect   cholecystitis versus local inflammation from the adjacent duodenum.  Further   evaluation may be obtained with HIDA scan if there is clinical suspicion for   cholecystitis. Microbiology:     Culture, Blood 2 [[id number]] (Abnormal)  Collected: 11/24/21 2038   Order Status: Completed Specimen: Blood Updated: 11/28/21 1029    Culture, Blood 2 Previously positive blood culture called Abnormal     Organism Enterococcus faecium Abnormal      Staphylococcus haemolyticus Abnormal     Culture, Blood 2 --    This isolate is presumed to be resistant based on the   detection of inducible Clindamycin resistance.  Clindamycin   may still be effective in some patients. Narrative:     Source: BLOOD       Site: Antecubital-Lef          Previous panic on this admission - call   not needed per SOP, 11/25/2021 12:44,   by [facility]   Culture, Blood 1 [[id number]] (Abnormal)  Collected: 11/24/21 2038   Order Status: Completed Specimen: Blood Updated: 11/28/21 1027    Organism Enterococcus faecium Abnormal     Blood Culture, Routine --    Refer to previous culture CXBL2 11/24/2021 2038 for susceptibility   results     Organism Staphylococcus haemolyticus Abnormal     Blood Culture, Routine --    This isolate is presumed to be resistant based on the   detection of inducible Clindamycin resistance.  Clindamycin   may still be effective in some patients.      Organism Enterococcus casseliflavus Abnormal    Narrative:     Source: BLOOD       Site: Antecubital-Rig          Microbiology results called to and read   back by Ben Gallardo RN,        Assessment:  · Vancomycin resistant Enterococcus bacteremia may be polymicrobial with a history of bacteremia in the past.    · CONS IN BOTH BOTTLES WILL ALSO BE ADDRESSED     Plan:    · Continue daptomycin 10 mg/kg adjusted body weight q24h. · Check AMERICO. · For upper GI bleed work-up per Surgery and GI noted.   · CHECK STOOL FOR VRE      Electronically signed by Juanjose Bright MD on 12/3/2021 at 12:57 PM

## 2021-12-04 NOTE — PLAN OF CARE
Problem: Falls - Risk of:  Goal: Will remain free from falls  Description: Will remain free from falls  12/4/2021 0111 by Edgar Ellis RN  Outcome: Met This Shift  12/3/2021 1922 by Shanna Iraheta RN  Outcome: Met This Shift  Goal: Absence of physical injury  Description: Absence of physical injury  Outcome: Met This Shift     Problem: Pain:  Goal: Pain level will decrease  Description: Pain level will decrease  12/4/2021 0111 by Edgar Ellis RN  Outcome: Met This Shift  12/3/2021 1922 by Shanna Iraheta RN  Outcome: Met This Shift  Goal: Control of acute pain  Description: Control of acute pain  Outcome: Met This Shift  Goal: Control of chronic pain  Description: Control of chronic pain  Outcome: Met This Shift     Problem: Skin Integrity:  Goal: Will show no infection signs and symptoms  Description: Will show no infection signs and symptoms  Outcome: Met This Shift  Goal: Absence of new skin breakdown  Description: Absence of new skin breakdown  12/4/2021 0111 by Edgar Ellis RN  Outcome: Met This Shift  12/3/2021 1922 by Shanna Iraheta RN  Outcome: Met This Shift

## 2021-12-04 NOTE — PROGRESS NOTES
Hospitalist Progress Note      SYNOPSIS: Patient admitted on 2021 for altered mental status. A 58-year-old female with significant past medical history, particularly for alcoholic cirrhosis and blood transfusions. She was brought into the ER with altered mental status. She was stopped by the police as she was trying to pull into a parking lot. She was noted to be confused and brought into the ER. Vital signs reveal the patient to be tachycardic with a rate of 112 and hypertensive with a pressure 156/97. Patient is jaundiced. Work-up revealed potassium 2.6, magnesium 1.5, lactic acid 5.2, glucose 154, troponin 22, ammonia 164, ALT 81, , bilirubin 4.3, alcohol 148, WBC 14.2, hemoglobin 10.6. CT chest unremarkable. Potassium and magnesium were replaced. On , her blood culture came back positive for gram-positive cocci in chains and clusters, later turned out to be Enterococcus. She was given dose of vancomycin which was later changed to Rocephin and Unasyn by ID the same day. Rocephin and Unasyn changed to daptomycin on . Work-up for the source of infection included CT abdomen pelvis which showed nonspecific stranding around multiple intra-abdominal organs but no acute pathology. Echocardiogram still pending. SUBJECTIVE:  Patient seen and examined in her room. Hemoglobin dropped to 7.6. Patient appears comfortable and denies any active complaint. Patient denies any chest pain, nausea, vomiting. Records reviewed. Stable overnight. No other overnight issues reported. Temp (24hrs), Av.3 °F (36.8 °C), Min:98.2 °F (36.8 °C), Max:98.3 °F (36.8 °C)    DIET: Diet NPO  ADULT DIET;  Regular; 4 carb choices (60 gm/meal)  CODE: Full Code    Intake/Output Summary (Last 24 hours) at 2021 1550  Last data filed at 2021 1457  Gross per 24 hour   Intake 1040 ml   Output    Net 1040 ml       OBJECTIVE:    BP (!) 102/56   Pulse 62   Temp 98.2 °F (36.8 °C) (Temporal) Resp 12   Ht 5' 8\" (1.727 m)   Wt 254 lb (115.2 kg)   SpO2 97%   BMI 38.62 kg/m²     General appearance: No apparent distress, appears stated age and cooperative. HEENT:  Conjunctivae/corneas Jaundiced. .   Neck: Supple. No jugular venous distention. Respiratory: Clear to auscultation bilaterally, normal respiratory effort  Cardiovascular: Regular rate rhythm, normal S1-S2  Abdomen: Soft, nontender, nondistended  Musculoskeletal: No clubbing, cyanosis, no bilateral lower extremity edema. Brisk capillary refill. Skin:  No rashes  on visible skin  Neurologic: awake, alert and following commands       Assessment and plan:  #Vancomycin resistant Enterococcus bacteremia.   -may be polymicrobial with a history of bacteremia in the past.  -Continue daptomycin  -Started first dose of vancomycin on 11/25, changed to Rocephin and Unasyn by ID on 11/25, changed to daptomycin on 11/29.  -PICC line inserted on 11/27. -AMERICO ordered by infectious disease. Cardiology consulted      #Alcoholic liver disease with hyperlipidemia and hepatic encephalopathy  #Hepatic encephalopathy, acute toxic metabolic encephalopathy  #Thrombocytopenia  -MELD on admission: 19  -Hyperammonemia  -Monitor serum ammonia levels  -GI consulted. GI recommended EGD on Monday  -Rifaximin and lactulose      #IBD s/p total abdominal colectomy with ileal pouchanal anastomosis  on no maintenance medication    #Acute on chronic anemia  -Hb dropped to 6.7 S/P transfusion of one unit of blood 12/1  -Hemoglobin dropped to 7.6 today  -Per RN stool is normal color    #Alcohol abuse dependence disorder  -CIWA protocol with Ativan dosing    #Hypomagnesemia and hypokalemia  -Replaced     #Lactic acidosis  -Improved with IV fluids        DISPOSITION:   Possible discharge in next 1 to 2 days.   Under hold is echocardiogram.    Medications:  REVIEWED DAILY    Infusion Medications    octreotide (SANDOSTATIN) infusion 25 mcg/hr (12/04/21 1024)     Scheduled Medications    phytonadione (VITAMIN K)  IVPB  10 mg IntraVENous Daily    lactulose  20 g Oral TID    pantoprazole  40 mg IntraVENous BID    And    sodium chloride (PF)  10 mL IntraVENous BID    daptomycin (CUBICIN) IVPB  10 mg/kg (Adjusted) IntraVENous Q24H    white petrolatum   Topical BID    potassium chloride  10 mEq IntraVENous Once    potassium chloride  10 mEq IntraVENous Once    lidocaine PF  5 mL IntraDERmal Once    sodium chloride flush  5-40 mL IntraVENous 2 times per day    cloNIDine  0.1 mg Oral Daily    folic acid  1 mg Oral Daily    propranolol  20 mg Oral Q12H    pyridoxine  25 mg Oral Daily    rifaximin  550 mg Oral BID    spironolactone  25 mg Oral Daily    thiamine mononitrate  100 mg Oral Daily    vitamin D  50,000 Units Oral Weekly    sodium chloride flush  10 mL IntraVENous 2 times per day    [Held by provider] enoxaparin  40 mg SubCUTAneous Daily     PRN Meds: sodium chloride flush, iohexol, promethazine **OR** [DISCONTINUED] ondansetron, polyethylene glycol, acetaminophen **OR** acetaminophen, potassium chloride **OR** potassium alternative oral replacement **OR** potassium chloride, magnesium sulfate, LORazepam **OR** LORazepam **OR** LORazepam **OR** LORazepam **OR** LORazepam **OR** LORazepam **OR** LORazepam **OR** LORazepam    Labs:     Recent Labs     12/02/21  0500 12/03/21  0550 12/04/21  0521   WBC 4.0* 3.5* 3.7*   HGB 8.1* 7.6* 7.5*   HCT 25.0* 23.7* 24.0*   PLT 46* 43* 40*       Recent Labs     12/02/21  0500 12/03/21  0550 12/04/21  0521    140 139   K 3.6 3.4* 3.1*   * 115* 113*   CO2 14* 15* 16*   BUN 5* 4* 4*   CREATININE 1.0 1.0 1.0   CALCIUM 8.4* 8.1* 8.0*       Recent Labs     12/04/21  0521   PROT 5.8*   ALKPHOS 93   ALT 43*   AST 72*   BILITOT 1.8*       Recent Labs     12/04/21  0521   INR 2.2       No results for input(s): CKTOTAL, TROPONINI in the last 72 hours.     Chronic labs:    Lab Results   Component Value Date    CHOL 100 09/01/2021 TRIG 164 (H) 09/01/2021    HDL 12 09/01/2021    LDLCALC 55 09/01/2021    TSH 6.720 (H) 11/29/2021    INR 2.2 12/04/2021    LABA1C 4.1 09/01/2021       Radiology: REVIEWED DAILY    +++++++++++++++++++++++++++++++++++++++++++++++++  Flaquito Lopez MD  [facility] - [address] [place] [address]  +++++++++++++++++++++++++++++++++++++++++++++++++  NOTE: This report was transcribed using voice recognition software. Every effort was made to ensure accuracy; however, inadvertent computerized transcription errors may be present.

## 2021-12-04 NOTE — PROGRESS NOTES
GENERAL SURGERY  DAILY PROGRESS NOTE  12/4/2021  Chief Complaint   Patient presents with    Altered Mental Status     patient was seen by PD trying to pull into a parking lot. .. PD stopped to evaluate, found patient altered, Patient hypoxic for EMS, AMS        Subjective:  Patient resting comfortably in bed. She denies any pain, nausea, vomiting. States she has hx of UC with J pouch for which she follows with Dr. Ban Aparicio. Having BM. Objective:  BP (!) 104/55   Pulse 57   Temp 98.3 °F (36.8 °C) (Temporal)   Resp 18   Ht 5' 8\" (1.727 m)   Wt 254 lb (115.2 kg)   SpO2 98%   BMI 38.62 kg/m²     General appearance: alert, cooperative and in no acute distress. Eyes: grossly normal  Lungs: nonlabored breathing on room air  Heart: regular rate  Abdomen: soft, non-tender, non distended  Skin: No skin abnormalities  Neurologic: Alert and oriented x 3. Grossly normal  Musculoskeletal: No clubbing cyanosis or edema    Assessment/Plan:  51 y.o. female with hx of varices, alcoholic liver thrombocytopenia and anemia. Monitor H/H  PPI BID  Ok for diet from general surgery standpoint. Advance if ok with GI. Appreciate Dr. Julius Rodriguez for evaluation of cirrhosis and Crohn's disease and recommendations- will defer to him at this time. Call with any acute interventions if needed. Electronically signed by Reno Milton MD on 12/4/2021 at 7:55 AM     And     Lily Jung M.D., Ph.D., PGY-4  12/4/2021  10:25 AM    Attending Note:    Patient seen/examined 12/4/2021. I discussed case with the resident and reviewed all relevant labs/imaging. Agree with resident's assessment and plan.

## 2021-12-04 NOTE — PROGRESS NOTES
[address] [facility]  [place]  Progress Note      C/C : Enterococcus bacteremia   Face to face encounter   SUBJECTIVE:  Patient is tolerating medications. No reported adverse drug reactions. ROS: No nausea, vomiting, has diarrhea. No distress. Has been afebrile. On room air. In bed. Review of systems:  As stated above in the chief complaint, otherwise negative.     Medications:  Scheduled Meds:   phytonadione (VITAMIN K)  IVPB  10 mg IntraVENous Daily    lactulose  20 g Oral TID    pantoprazole  40 mg IntraVENous BID    And    sodium chloride (PF)  10 mL IntraVENous BID    daptomycin (CUBICIN) IVPB  10 mg/kg (Adjusted) IntraVENous Q24H    white petrolatum   Topical BID    potassium chloride  10 mEq IntraVENous Once    potassium chloride  10 mEq IntraVENous Once    lidocaine PF  5 mL IntraDERmal Once    sodium chloride flush  5-40 mL IntraVENous 2 times per day    cloNIDine  0.1 mg Oral Daily    folic acid  1 mg Oral Daily    propranolol  20 mg Oral Q12H    pyridoxine  25 mg Oral Daily    rifaximin  550 mg Oral BID    spironolactone  25 mg Oral Daily    thiamine mononitrate  100 mg Oral Daily    vitamin D  50,000 Units Oral Weekly    sodium chloride flush  10 mL IntraVENous 2 times per day    [Held by provider] enoxaparin  40 mg SubCUTAneous Daily     Continuous Infusions:   octreotide (SANDOSTATIN) infusion 25 mcg/hr (12/04/21 1024)     PRN Meds:sodium chloride flush, iohexol, promethazine **OR** [DISCONTINUED] ondansetron, polyethylene glycol, acetaminophen **OR** acetaminophen, potassium chloride **OR** potassium alternative oral replacement **OR** potassium chloride, magnesium sulfate, LORazepam **OR** LORazepam **OR** LORazepam **OR** LORazepam **OR** LORazepam **OR** LORazepam **OR** LORazepam **OR** LORazepam    OBJECTIVE:  BP (!) 102/56   Pulse 62   Temp 98.2 °F (36.8 °C) (Temporal)   Resp 12   Ht 5' 8\" (1.727 m)   Wt 254 lb (115.2 kg)   SpO2 97%   BMI 38.62 kg/m²   Temp Av.3 °F (36.8 °C)  Min: 98.2 °F (36.8 °C)  Max: 98.5 °F (36.9 °C)  Constitutional: The patient is awake, alert, and oriented. In bed. Skin: Warm and dry. No rashes were noted. + jaundice  HEENT: Round and reactive pupils. Moist mucous membranes. No ulcerations or thrush. icteric  Neck: Supple to movements. Chest: Clear   Cardiovascular: S1 and S2 are rhythmic and regular. + murmur. Abdomen: Soft, non tender, bowel sound +  Genitourinary: female  Extremities: No clubbing, no cyanosis, + edema.   Lines: left arm with line- no phlebitis     Laboratory and Tests Review:  Lab Results   Component Value Date    WBC 3.7 (L) 2021    WBC 3.5 (L) 2021    WBC 4.0 (L) 2021    HGB 7.5 (L) 2021    HCT 24.0 (L) 2021    .4 (H) 2021    PLT 40 (L) 2021     Lab Results   Component Value Date    NEUTROABS 10.55 (H) 2021    NEUTROABS 4.45 10/27/2021    NEUTROABS 3.32 10/26/2021     No results found for: [facility]  Lab Results   Component Value Date    ALT 43 (H) 2021    AST 72 (H) 2021    ALKPHOS 93 2021    BILITOT 1.8 (H) 2021     Lab Results   Component Value Date     2021    K 3.1 2021    K 2.4 2021     2021    CO2 16 2021    BUN 4 2021    CREATININE 1.0 2021    CREATININE 1.0 2021    CREATININE 1.0 2021    GFRAA >60 2021    LABGLOM 59 2021    GLUCOSE 127 2021    GLUCOSE 125 2011    PROT 5.8 2021    LABALBU 2.6 2021    LABALBU 3.6 2011    CALCIUM 8.0 2021    BILITOT 1.8 2021    ALKPHOS 93 2021    AST 72 2021    ALT 43 2021     Lab Results   Component Value Date    CRP 0.4 2021    CRP 15.7 (H) 2011     Lab Results   Component Value Date    SEDRATE 97 (H) 2021    SEDRATE 95 (H) 2019    SEDRATE 80 (H) 2011     Radiology:  CT abdomen and pelvis -  Peripancreatic fat stranding and edema adjacent to the duodenum may reflect   pancreatitis versus duodenitis. Small amount of reactive fluid identified in   the right upper quadrant. No abscess. Please correlate findings with   laboratory values. Thickened gallbladder wall with pericholecystic fat stranding may reflect   cholecystitis versus local inflammation from the adjacent duodenum. Further   evaluation may be obtained with HIDA scan if there is clinical suspicion for   cholecystitis. Microbiology:     Culture, Blood 2 [[id number]] (Abnormal)  Collected: 11/24/21 2038   Order Status: Completed Specimen: Blood Updated: 11/28/21 1029    Culture, Blood 2 Previously positive blood culture called Abnormal     Organism Enterococcus faecium Abnormal      Staphylococcus haemolyticus Abnormal     Culture, Blood 2 --    This isolate is presumed to be resistant based on the   detection of inducible Clindamycin resistance. Clindamycin   may still be effective in some patients. Narrative:     Source: BLOOD       Site: Antecubital-Lef          Previous panic on this admission - call   not needed per SOP, [facility]/2021 12:44,   [facility]   Culture, Blood 1 [[id number]] (Abnormal)  Collected: 11/24/21 2038   Order Status: Completed Specimen: Blood Updated: 11/28/21 1027    Organism Enterococcus faecium Abnormal     Blood Culture, Routine --    Refer to previous culture CXBL2 11/24/2021 2038 for susceptibility   results     Organism Staphylococcus haemolyticus Abnormal     Blood Culture, Routine --    This isolate is presumed to be resistant based on the   detection of inducible Clindamycin resistance. Clindamycin   may still be effective in some patients.      Organism Enterococcus casseliflavus Abnormal    Narrative:     Source: BLOOD       Site: Antecubital-Rig          Microbiology results called to and read   back by Addy Ford RN,        Assessment:  Vancomycin resistant Enterococcus bacteremia may be polymicrobial with a history of bacteremia in the past.    Staph haemolyticus bacteremia  Cirrhosis   Leukopenia      Plan:    Continue daptomycin 10 mg/kg adjusted body weight q24h. Check CK total , repeat sed rate   Check AMERICO. - ordered   For upper GI bleed work-up per Surgery and GI noted. Monitor labs     Electronically signed by SRIDHAR Andrade on 12/4/2021 at 2:29 PM    Pt seen and examined. Above discussed agree with advanced practice nurse. Labs, cultures, and radiographs reviewed. Face to Face encounter occurred. Changes made as necessary.      Veronica Bill MD

## 2021-12-04 NOTE — PROGRESS NOTES
Gastroenterology assessment and plans reviewed with plans of endoscopy for assessment of the presence or absence of esophageal varices early next week. In addition based on review of laboratory assessment in her present thrombocytopenia, a relative contraindication to the performance of transesophageal echocardiographic assessment is additionally noted.

## 2021-12-05 NOTE — PROGRESS NOTES
Hospitalist Progress Note      SYNOPSIS: Patient admitted on 2021 for altered mental status. A 41-year-old female with significant past medical history, particularly for alcoholic cirrhosis and blood transfusions. She was brought into the ER with altered mental status. She was stopped by the police as she was trying to pull into a parking lot. She was noted to be confused and brought into the ER. Vital signs reveal the patient to be tachycardic with a rate of 112 and hypertensive with a pressure 156/97. Patient is jaundiced. Work-up revealed potassium 2.6, magnesium 1.5, lactic acid 5.2, glucose 154, troponin 22, ammonia 164, ALT 81, , bilirubin 4.3, alcohol 148, WBC 14.2, hemoglobin 10.6. CT chest unremarkable. Potassium and magnesium were replaced. On , her blood culture came back positive for gram-positive cocci in chains and clusters, later turned out to be Enterococcus. She was given dose of vancomycin which was later changed to Rocephin and Unasyn by ID the same day. Rocephin and Unasyn changed to daptomycin on . Work-up for the source of infection included CT abdomen pelvis which showed nonspecific stranding around multiple intra-abdominal organs but no acute pathology. AMERICO pending GI clearance. GI consulted and recommended ultrasound-guided paracentesis . GI plan for EGD and pouchoscopy. SUBJECTIVE:  Patient seen and examined in her room. Hemoglobin dropped to 7.5. Patient appears comfortable and denies any active complaint. Patient denies any chest pain, nausea, vomiting. Records reviewed. Stable overnight. No other overnight issues reported. Temp (24hrs), Av.3 °F (36.8 °C), Min:97.2 °F (36.2 °C), Max:99 °F (37.2 °C)    DIET: Diet NPO  ADULT DIET;  Regular; 4 carb choices (60 gm/meal)  CODE: Full Code    Intake/Output Summary (Last 24 hours) at 2021 1113  Last data filed at 2021 0855  Gross per 24 hour   Intake 890 ml   Output    Net 890 ml       OBJECTIVE:    BP (!) 104/56   Pulse 58   Temp 97.2 °F (36.2 °C) (Temporal)   Resp 12   Ht 5' 8\" (1.727 m)   Wt 254 lb (115.2 kg)   SpO2 97%   BMI 38.62 kg/m²     General appearance: No apparent distress, jaundiced. HEENT:  Conjunctivae/corneas Jaundiced. .   Neck: Supple. No jugular venous distention. Respiratory: Clear to auscultation bilaterally, normal respiratory effort  Cardiovascular: Regular rate rhythm, normal S1-S2  Abdomen: Soft, nontender, nondistended  Musculoskeletal: No clubbing, cyanosis, no bilateral lower extremity edema. Brisk capillary refill. Skin:  No rashes  on visible skin  Neurologic: awake, alert and following commands       Assessment and plan:  #Vancomycin resistant Enterococcus bacteremia.   -may be polymicrobial with a history of bacteremia in the past.  -Continue daptomycin  -Started first dose of vancomycin on 11/25, changed to Rocephin and Unasyn by ID on 11/25, changed to daptomycin on 11/29.  -PICC line inserted on 11/27. -AMERICO ordered by infectious disease.    -Cardiology consulted      #Alcoholic liver disease with hyperbilirubinemia a and hepatic encephalopathy  #Hepatic encephalopathy, acute toxic metabolic encephalopathy  #Thrombocytopenia, INR coagulopathy  -MELD on admission: 19  -Hyperammonemia  -Monitor serum ammonia levels  -GI recommendation:  IR-guided diagnostic paracentesis and place orders for labs. Plan for pouchoscopy for disease assessment on Monday.  Plan for EGD Monday for variceal screen/treatment prior to AMERICO by Cardiology for further assessment.    -Rifaximin and lactulose      #IBD s/p total abdominal colectomy with ileal pouchanal anastomosis  on no maintenance medication    #Acute on chronic anemia  -Hb dropped to 6.7 S/P transfusion of one unit of blood 12/1  -Hemoglobin dropped to 7.5 today  -Per RN stool is normal color    #Alcohol abuse dependence disorder  -No signs of withdrawal  -CIWA protocol with Ativan dosing    #Hypomagnesemia and hypokalemia  -Replaced     #Lactic acidosis  -Improved with IV fluids        DISPOSITION:   Possible discharge in next 1 to 2 days.   Under hold is echocardiogram.    Medications:  REVIEWED DAILY    Infusion Medications    octreotide (SANDOSTATIN) infusion 25 mcg/hr (12/04/21 1024)     Scheduled Medications    phytonadione (VITAMIN K)  IVPB  10 mg IntraVENous Daily    lactulose  20 g Oral TID    pantoprazole  40 mg IntraVENous BID    And    sodium chloride (PF)  10 mL IntraVENous BID    daptomycin (CUBICIN) IVPB  10 mg/kg (Adjusted) IntraVENous Q24H    white petrolatum   Topical BID    potassium chloride  10 mEq IntraVENous Once    potassium chloride  10 mEq IntraVENous Once    lidocaine PF  5 mL IntraDERmal Once    sodium chloride flush  5-40 mL IntraVENous 2 times per day    cloNIDine  0.1 mg Oral Daily    folic acid  1 mg Oral Daily    propranolol  20 mg Oral Q12H    pyridoxine  25 mg Oral Daily    rifaximin  550 mg Oral BID    spironolactone  25 mg Oral Daily    thiamine mononitrate  100 mg Oral Daily    vitamin D  50,000 Units Oral Weekly    sodium chloride flush  10 mL IntraVENous 2 times per day    [Held by provider] enoxaparin  40 mg SubCUTAneous Daily     PRN Meds: sodium chloride flush, iohexol, promethazine **OR** [DISCONTINUED] ondansetron, polyethylene glycol, acetaminophen **OR** acetaminophen, potassium chloride **OR** potassium alternative oral replacement **OR** potassium chloride, magnesium sulfate, LORazepam **OR** LORazepam **OR** LORazepam **OR** LORazepam **OR** LORazepam **OR** LORazepam **OR** LORazepam **OR** LORazepam    Labs:     Recent Labs     12/03/21  0550 12/04/21  0521 12/05/21  0519   WBC 3.5* 3.7* 3.3*   HGB 7.6* 7.5* 7.3*   HCT 23.7* 24.0* 22.9*   PLT 43* 40* 39*       Recent Labs     12/03/21  0550 12/04/21  0521 12/05/21  0519    139 142   K 3.4* 3.1* 3.3*   * 113* 117*   CO2 15* 16* 17*   BUN 4* 4* 6   CREATININE 1.0 1.0 1.0   CALCIUM 8.1* 8.0* 8.1*       Recent Labs     12/04/21  0521 12/05/21 0519   PROT 5.8* 5.8*   ALKPHOS 93 98   ALT 43* 35*   AST 72* 47*   BILITOT 1.8* 1.5*       Recent Labs     12/04/21  0521 12/05/21 0519   INR 2.2 2.2       No results for input(s): Jenn Gao in the last 72 hours. Chronic labs:    Lab Results   Component Value Date    CHOL 100 09/01/2021    TRIG 164 (H) 09/01/2021    HDL 12 09/01/2021    LDLCALC 55 09/01/2021    TSH 6.720 (H) 11/29/2021    INR 2.2 12/05/2021    LABA1C 4.1 09/01/2021       Radiology: REVIEWED DAILY    +++++++++++++++++++++++++++++++++++++++++++++++++  Medhat Jiménez MD  [address]  +++++++++++++++++++++++++++++++++++++++++++++++++  NOTE: This report was transcribed using voice recognition software. Every effort was made to ensure accuracy; however, inadvertent computerized transcription errors may be present.

## 2021-12-05 NOTE — PROGRESS NOTES
[address] [place] [address] [facility]  [facility]  Progress Note      C/C : Enterococcus bacteremia   Face to face encounter   SUBJECTIVE:  Patient is tolerating medications. No reported adverse drug reactions. ROS: No nausea, vomiting, has diarrhea. No distress. Has been afebrile. On room air. In bed. No new issues overnight. Review of systems:  As stated above in the chief complaint, otherwise negative.     Medications:  Scheduled Meds:   phytonadione (VITAMIN K)  IVPB  10 mg IntraVENous Daily    lactulose  20 g Oral TID    pantoprazole  40 mg IntraVENous BID    And    sodium chloride (PF)  10 mL IntraVENous BID    daptomycin (CUBICIN) IVPB  10 mg/kg (Adjusted) IntraVENous Q24H    white petrolatum   Topical BID    potassium chloride  10 mEq IntraVENous Once    potassium chloride  10 mEq IntraVENous Once    lidocaine PF  5 mL IntraDERmal Once    sodium chloride flush  5-40 mL IntraVENous 2 times per day    cloNIDine  0.1 mg Oral Daily    folic acid  1 mg Oral Daily    propranolol  20 mg Oral Q12H    pyridoxine  25 mg Oral Daily    rifaximin  550 mg Oral BID    spironolactone  25 mg Oral Daily    thiamine mononitrate  100 mg Oral Daily    vitamin D  50,000 Units Oral Weekly    sodium chloride flush  10 mL IntraVENous 2 times per day    [Held by provider] enoxaparin  40 mg SubCUTAneous Daily     Continuous Infusions:   octreotide (SANDOSTATIN) infusion 25 mcg/hr (12/04/21 1024)     PRN Meds:sodium chloride flush, iohexol, promethazine **OR** [DISCONTINUED] ondansetron, polyethylene glycol, acetaminophen **OR** acetaminophen, potassium chloride **OR** potassium alternative oral replacement **OR** potassium chloride, magnesium sulfate, LORazepam **OR** LORazepam **OR** LORazepam **OR** LORazepam **OR** LORazepam **OR** LORazepam **OR** LORazepam **OR** LORazepam    OBJECTIVE:  BP (!) 104/56   Pulse 58   Temp 97.2 °F (36.2 °C) (Temporal)   Resp 12   Ht 5' 8\" (1.727 m)   Wt 254 lb (115.2 kg)   SpO2 97% -  Peripancreatic fat stranding and edema adjacent to the duodenum may reflect   pancreatitis versus duodenitis. Small amount of reactive fluid identified in   the right upper quadrant. No abscess. Please correlate findings with   laboratory values. Thickened gallbladder wall with pericholecystic fat stranding may reflect   cholecystitis versus local inflammation from the adjacent duodenum. Further   evaluation may be obtained with HIDA scan if there is clinical suspicion for   cholecystitis. Microbiology:   11/25/2021- blood culture- no growth     Culture, Blood 2 [[id number]] (Abnormal)  Collected: 11/24/21 2038   Order Status: Completed Specimen: Blood Updated: 11/28/21 1029    Culture, Blood 2 Previously positive blood culture called Abnormal     Organism Enterococcus faecium Abnormal      Staphylococcus haemolyticus Abnormal     Culture, Blood 2 --    This isolate is presumed to be resistant based on the   detection of inducible Clindamycin resistance. Clindamycin   may still be effective in some patients. Narrative:     Source: BLOOD       Site: Antecubital-Lef          Previous panic on this admission - call   not needed per SOP, 11/25/2021 12:44,   by [facility]   Culture, Blood 1 [[id number]] (Abnormal)  Collected: 11/24/21 2038   Order Status: Completed Specimen: Blood Updated: 11/28/21 1027    Organism Enterococcus faecium Abnormal     Blood Culture, Routine --    Refer to previous culture CXBL2 11/24/2021 2038 for susceptibility   results     Organism Staphylococcus haemolyticus Abnormal     Blood Culture, Routine --    This isolate is presumed to be resistant based on the   detection of inducible Clindamycin resistance. Clindamycin   may still be effective in some patients.      Organism Enterococcus casseliflavus Abnormal    Narrative:     Source: BLOOD       Site: Antecubital-Rig          Microbiology results called to and read   back by Marvine Blizzard RN,        Assessment:  Vancomycin resistant Enterococcus bacteremia may be polymicrobial with a history of bacteremia in the past.    Staph haemolyticus bacteremia  Cirrhosis   Leukopenia      Plan:    Continue daptomycin 10 mg/kg adjusted body weight q24h. Check CK total  sed rate - will repeat   Check AMERICO. - ordered - scheduled for Monday   Surgery and GI following   Monitor labs     Electronically signed by SRIDHAR Ramirez on 12/5/2021 at 10:51 AM    Pt seen and examined. Above discussed agree with advanced practice nurse. Labs, cultures, and radiographs reviewed. Face to Face encounter occurred. Changes made as necessary.      Merrick Harada, MD

## 2021-12-06 NOTE — ANESTHESIA POSTPROCEDURE EVALUATION
Department of Anesthesiology  Postprocedure Note    Patient: Cathay Halsted  MRN: [id number]  YOB: 1975  Date of evaluation: 12/6/2021  Time:  2:57 PM     Procedure Summary     Date: 12/06/21 Room / Location: [facility] 01 / [facility]    Anesthesia Start: 1418 Anesthesia Stop: 6789    Procedures:       EGD CONTROL HEMORRHAGE (N/A )      SIGMOIDOSCOPY BIOPSY FLEXIBLE (N/A )      EGD BIOPSY (N/A ) Diagnosis: (Cirrhosis with Varices, Inflammatory Bowel Disease with J-Pouch)    Surgeons: Aaron Worley MD Responsible Provider: Mariposa Gomes MD    Anesthesia Type: MAC ASA Status: 3          Anesthesia Type: MAC    Celena Phase I: Celena Score: 10    Celena Phase II:      Last vitals: Reviewed and per EMR flowsheets.        Anesthesia Post Evaluation    Patient location during evaluation: PACU  Patient participation: complete - patient participated  Level of consciousness: awake  Pain score: 0  Airway patency: patent  Nausea & Vomiting: no nausea and no vomiting  Complications: no  Cardiovascular status: hemodynamically stable  Respiratory status: acceptable  Hydration status: stable

## 2021-12-06 NOTE — BRIEF OP NOTE
Brief Postoperative Note      Patient: Cathay Halsted  YOB: 1975  MRN: [id number]    Date of Procedure: 12/6/2021    Pre-Op Diagnosis: Cirrhosis with Varices, Inflammatory Bowel Disease with J-Pouch    Post-Op Diagnosis: Grade I/II varices without high risk features, LA Grade C esophagitis w/ ulceration, nodular GAVE w/ active bleeding, irregular duodenal mucosa. Pouch without significant active inflammation or ulceration. Procedure(s):  EGD CONTROL HEMORRHAGE  SIGMOIDOSCOPY DIAGNOSTIC FLEXIBLE  EGD BIOPSY    Surgeon(s):  Aaron Worley MD    Assistant:  * No surgical staff found *    Anesthesia: Monitor Anesthesia Care    Estimated Blood Loss (mL): Minimal    Complications: None    Specimens:   ID Type Source Tests Collected by Time Destination   A : biopsy duodenum r/o C sprue Tissue Duodenum SURGICAL PATHOLOGY Aaron Worley MD 12/6/2021 1424    B : biopsy colon pouch Tissue Colon SURGICAL PATHOLOGY Aaron Worley MD 12/6/2021 1446    C : biopsy rectal cup Tissue Colon SURGICAL PATHOLOGY Aaron Worley MD 12/6/2021 1448        Implants:  * No implants in log *      Drains: * No LDAs found *    Findings:     EGD:  Grade I/II esophageal varices without high risk features. LA Grade C esophagitis with ulceration. Portal hypertensive gastropathy. Nodular GAVE w/ associated active bleeding. APC therapy performed. Irregular appearing duodenal mucosa. Biopsies performed. Pouchoscopy:  Pouch healthy in appearance with no active inflammation or ulceration appreciated. Biopsied. Rectal pouch 1.5-2cm in length. No active inflammation. Biopsies performed. Large internal/external hemorrhoids. Two abscesses on R buttock. Recommendations:  -The patient will be observed post procedure until stable for transfer to floor.    -Please continue pantoprazole 40mg BID and propanolol therapy.   -Await pathology results.  -Consider wound care consult for abscess on R buttock.    -Diet can be advanced as tolerated. -Safe to undergo AMERICO as varices are low-risk.   -I will schedule for follow-up EGD in 4 weeks for repeat endoscopic treatment of GAVE.        Electronically signed by Jeremiah Robbins MD on 12/6/2021 at 2:53 PM

## 2021-12-06 NOTE — ANESTHESIA PRE PROCEDURE
Provider Last Rate Last Admin    phytonadione (ADULT) (VITAMIN K) 10 mg in dextrose 5 % 100 mL IVPB  10 mg IntraVENous Daily Xiomara Deras MD   Stopped at 12/05/21 1122    lactulose (CHRONULAC) 10 GM/15ML solution 20 g  20 g Oral TID Xiomara Deras MD   20 g at 12/05/21 2046    pantoprazole (PROTONIX) injection 40 mg  40 mg IntraVENous BID Aleyda Espinal MD   40 mg at 12/05/21 2046    And    sodium chloride (PF) 0.9 % injection 10 mL  10 mL IntraVENous BID Aleyda Espinal MD   10 mL at 12/05/21 2046    octreotide (SANDOSTATIN) 500 mcg in sodium chloride 0.9 % 100 mL infusion  25 mcg/hr IntraVENous Continuous Aleyda Espinal MD 5 mL/hr at 12/05/21 1156 25 mcg/hr at 12/05/21 1156    DAPTOmycin (CUBICIN) 850 mg in sodium chloride 0.9 % 50 mL IVPB  10 mg/kg (Adjusted) IntraVENous Q24H Es Carter MD   Stopped at 12/05/21 1155    white petrolatum ointment   Topical BID Corey Webster MD   Given at 12/05/21 0947    potassium chloride 10 mEq/100 mL IVPB (Peripheral Line)  10 mEq IntraVENous Once Angy Norwood MD   Stopped at 11/27/21 1157    potassium chloride 10 mEq/100 mL IVPB (Peripheral Line)  10 mEq IntraVENous Once Angy Norwood MD        lidocaine PF 1 % injection 5 mL  5 mL IntraDERmal Once Hunter Navarrete MD        sodium chloride flush 0.9 % injection 5-40 mL  5-40 mL IntraVENous 2 times per day Governor Shakeel MD   10 mL at 12/05/21 0947    sodium chloride flush 0.9 % injection 5-40 mL  5-40 mL IntraVENous PRN Hunter Navarrete MD        iohexol (OMNIPAQUE 240) injection 50 mL  50 mL Oral ONCE PRN Gabe Chapman DO        cloNIDine (CATAPRES) tablet 0.1 mg  0.1 mg Oral Daily Abdulkadir Doan MD   0.1 mg at 41/40/07 3435    folic acid (FOLVITE) tablet 1 mg  1 mg Oral Daily Barry Castro DO   1 mg at 12/05/21 0942    propranolol (INDERAL) tablet 20 mg  20 mg Oral Q12H Abdulkadir Doan MD   20 mg at 12/02/21 1848    vitamin B-6 (PYRIDOXINE) tablet 25 mg  25 mg Oral Daily Barry Castro, DO Vazquez mg IntraVENous Q1H PRN Kali [place], DO   2 mg at 11/26/21 1348    Or    LORazepam (ATIVAN) tablet 3 mg  3 mg Oral Q1H PRN Kali [place], DO   3 mg at 11/28/21 0231    Or    LORazepam (ATIVAN) injection 3 mg  3 mg IntraVENous Q1H PRN Kali Matt, DO   3 mg at 11/27/21 1430    Or    LORazepam (ATIVAN) tablet 4 mg  4 mg Oral Q1H PRN Kali [place], DO        Or    LORazepam (ATIVAN) injection 4 mg  4 mg IntraVENous Q1H PRN Kali [place], DO           Allergies:  No Known Allergies    Problem List:    Patient Active Problem List   Diagnosis Code    Small bowel obstruction ([facility] [address] K56.609    Hernia of abdominal wall K43.9    Elevated liver enzymes R74.8    Suicidal ideations R45.851    Alcohol abuse F10.10    Anxiety disorder F41.9    Petechial rash R23.3    Acute renal failure (ARF) (HCC) N17.9    Alcohol abuse with withdrawal ([facility] [address] [facility] F10.139    Decompensated hepatic cirrhosis (HCC) K72.90, K74.60    Perirectal abscess K61.1    Acute liver failure without hepatic coma O34.38    Alcoholic hepatitis B14.63    Hepatic encephalopathy (HCC) K72.90       Past Medical History:        Diagnosis Date    Acute renal failure (ARF) ([facility] [address] 67/36/3212    Alcoholic cirrhosis ([facility] [address] [facility]     Anxiety     Crohn's colitis ([facility] [address] [facility]     Depression     History of blood transfusion     Pyoderma gangrenosum     Thyroid disease        Past Surgical History:        Procedure Laterality Date    ABDOMEN SURGERY      pt has a J-pouch     ARM DEBRIDEMENT      R arm-2010-2011??    ARM DEBRIDEMENT      COLONOSCOPY      DILATATION, ESOPHAGUS      ENDOSCOPY, COLON, DIAGNOSTIC      TRANSESOPHAGEAL ECHOCARDIOGRAM N/A 9/3/2021    TRANSESOPHAGEAL ECHOCARDIOGRAM WITH BUBBLE STUDY performed by Contreras Stewart MD at [facility] N/A 9/2/2021    EGD ESOPHAGOGASTRODUODENOSCOPY performed by Oneil Yanez MD at [address] History:    Social History     Tobacco Use    Smoking status: Never Smoker    Smokeless tobacco: Never Used   Substance Use Topics    Alcohol use: Not Currently     Comment: stopped but drink 3 weeks ago                                Counseling given: Not Answered      Vital Signs (Current):   Vitals:    12/05/21 1845 12/05/21 1945 12/05/21 2330 12/06/21 0745   BP: (!) 106/52 105/63 120/64 (!) 95/52   Pulse:  62 62 60   Resp:  16 16 16   Temp:  36.6 °C (97.9 °F) 36.4 °C (97.5 °F) 36.1 °C (97 °F)   TempSrc:  Temporal Temporal Temporal   SpO2:  98% 96% 99%   Weight:       Height:                                                  BP Readings from Last 3 Encounters:   12/06/21 (!) 95/52   10/28/21 135/69   10/22/21 123/78       NPO Status:  12/5/2021 2000                                                                               BMI:   Wt Readings from Last 3 Encounters:   11/24/21 254 lb (115.2 kg)   10/26/21 254 lb (115.2 kg)   10/22/21 250 lb (113.4 kg)     Body mass index is 38.62 kg/m². CBC:   Lab Results   Component Value Date    WBC 3.1 12/06/2021    RBC 2.37 12/06/2021    HGB 7.6 12/06/2021    HCT 24.5 12/06/2021    .4 12/06/2021    RDW 16.3 12/06/2021    PLT 37 12/06/2021       CMP:   Lab Results   Component Value Date     12/06/2021    K 3.4 12/06/2021    K 2.4 11/26/2021     12/06/2021    CO2 17 12/06/2021    BUN 8 12/06/2021    CREATININE 0.9 12/06/2021    GFRAA >60 12/06/2021    LABGLOM >60 12/06/2021    GLUCOSE 114 12/06/2021    GLUCOSE 125 08/11/2011    PROT 6.1 12/06/2021    CALCIUM 8.4 12/06/2021    BILITOT 1.4 12/06/2021    ALKPHOS 94 12/06/2021    AST 38 12/06/2021    ALT 30 12/06/2021       POC Tests: No results for input(s): POCGLU, POCNA, POCK, POCCL, POCBUN, POCHEMO, POCHCT in the last 72 hours.     Coags:   Lab Results   Component Value Date    PROTIME 24.2 12/06/2021    PROTIME 14.7 08/11/2011    INR 2.2 12/06/2021    APTT 35.8 11/24/2021       HCG (If Applicable):   Lab Results   Component Value Date    PREGTESTUR negative 01/31/2017 ABGs: No results found for: PHART, PO2ART, TFS6TUK, GKC3VLX, BEART, V6NFPLOM     Type & Screen (If Applicable):  No results found for: LABABO, LABRH    Drug/Infectious Status (If Applicable):  Lab Results   Component Value Date    HEPCAB NON REACT 04/01/2011       COVID-19 Screening (If Applicable):   Lab Results   Component Value Date    COVID19 Not Detected 11/24/2021       EKG 11/24/2021    Narrative & Impression    Sinus tachycardia  Possible Left atrial enlargement  Nonspecific ST abnormality  Abnormal ECG        ECHO 9/3/2021   Conclusions      Summary   Ejection fraction is visually estimated at 55-60%. Normal right ventricular size and function. No evidence of thrombus within left atrium/ left atrial appendage. Emptying velocity is normal at 84 cms/s. Mild focal atherosclerosis in the descending aorta. No significant valvular stenosis/ regurgitation. Agitated saline contrast study negative for ASD/ PFO. No evidence of infective endocarditis. CXR 11/24/2021  Impression   Negative single view chest for acute process. CT Abdomen Pelvis 11/26/2021  Impression   Peripancreatic fat stranding and edema adjacent to the duodenum may reflect   pancreatitis versus duodenitis.  Small amount of reactive fluid identified in   the right upper quadrant.  No abscess.  Please correlate findings with   laboratory values.       Thickened gallbladder wall with pericholecystic fat stranding may reflect   cholecystitis versus local inflammation from the adjacent duodenum.  Further   evaluation may be obtained with HIDA scan if there is clinical suspicion for   cholecystitis.       No change in size midline ventral hernia.       Again demonstrated are prominent periesophageal varices likely reflects   underlying portal hypertension.            Anesthesia Evaluation  Patient summary reviewed and Nursing notes reviewed no history of anesthetic complications:   Airway: Mallampati: III  TM distance: <3 FB Neck ROM: full  Mouth opening: < 3 FB Dental:      Comment: All teeth intact. Per patient, no teeth are loose, chipped, or cracked. Pulmonary:Negative Pulmonary ROS and normal exam  breath sounds clear to auscultation                             Cardiovascular:Negative CV ROS  Exercise tolerance: good (>4 METS),           Rhythm: regular                      Neuro/Psych:   (+) psychiatric history (Hx of suicidal ideations, Alcohol Abuse with withdrawal):depression/anxiety             GI/Hepatic/Renal:   (+) hiatal hernia (Ventral hernia), liver disease (Acute liver failure without hepatic coma, Alcoholic hepatitis, Decompensated hepatic cirrhosis): hepatic encephalopathy, renal disease: ARF,           Endo/Other: Negative Endo/Other ROS                    Abdominal:       Abdomen: soft. Vascular: negative vascular ROS. Other Findings:           Anesthesia Plan      MAC     ASA 3     (Left upper arm PICC)  Induction: intravenous. Anesthetic plan and risks discussed with patient. Use of blood products discussed with patient whom consented to blood products. Plan discussed with CRNA and attending.                 Ceci Betancourt RN   12/6/2021

## 2021-12-06 NOTE — PROGRESS NOTES
Report called to [facility] on [phone] .  All questions answered at this time report also faxed

## 2021-12-06 NOTE — OP NOTE
on R buttock.       Detailed Description of Procedure:   Pre-Anesthesia Assessment:  Prior to the procedure, a history and physical was performed and patient medications and allergies were reviewed. The risks, benefits, complications, treatment options and expected outcomes were discussed with the patient. The possibilities of reaction to medication, pulmonary aspiration, perforation of the gastrointestinal tract, bleeding requiring transfusion or operation, respiratory failure requiring placement on a ventilator, and failure to diagnose a condition were discussed with the patient. All questions were answered and informed consent was obtained. Patient identification and proposed procedure were verified by the physician, the nurse, the anesthesiologist, the anesthetist, and the technician in the preprocedure area. Please see accompanying documentation. All staff in attendance for the performed procedure act in the role of the Chaperone. After I obtained informed consent, the scope was passed under direct vision. Throughout the procedure, the patient's blood pressure, pulse, and oxygen saturations were monitored continuously. The gastroscope was introduced through the mouth and advanced to the duodenum. The gastroscope was introduced through the anus and advanced to the terminal ileum. The procedure was performed without difficulty. The patient tolerated the procedure well. EGD:  The mucosa of the esophagus appeared pale but otherwise normal.   In the distal esophagus, Grade I/II esophageal varices without high risk features were appreciated. No evidence of recent bleeding was appreciated. In the distal esophagus, LA Grade C esophagitis was appreciated with associated esophageal ulceration. The ulceration was clean-based. There was no evidence of recent or active bleeding. The Z-line was irregular. A small to moderate sized hiatal hernia was appreciated.     The mucosa of the stomach was consistent with moderate portal hypertensive gastropathy in the body. There was associated erythema and superficial erosion present without signs of active bleeding. In the gastric antrum, mucosal changes consistent with nodular GAVE were present. One area was with active bleeding. To stop the active bleeding and to treat the nodular GAVE, APC treatment was performed using the pre-determined gastric ERBE settings. No active bleeding was present at the conclusion of treatment. The fundus and cardia were carefully inspected in retroflexion and changes consistent with moderate portal hypertensive gastropathy with no active bleeding were appreciated. The mucosa of the duodenum appeared irregular in the second portion. The tissue was clustered and villi not readily distinguishable. Biopsies for celiac disease were obtained. There was not presence of inflammation, erosion, ulceration, or active bleeding. Pouchoscopy: On digital rectal exam, the patient was noted to have two small to moderate sized abscesses on her R buttock. No active draining or bleeding was appreciated. External hemorrhoids were present. No other perianal disease was noted. The patient had not received enemas the night before or morning of the procedure and stool was present throughout the pouch. This limited the quality of examination. The below description pertains only to the areas of mucosa that were able to be assessed carefully and that were not in close proximity to the large stool burden. The pouch was normal in appearance. No active inflammation, erosion, or ulceration was present. Biopsies were obtained. The anastomosis was healthy in appearance without any associated ulceration or inflammation. The rectal cuff measured 1.5-2cm in length. It was regular in appearance. No inflammation, irregular mucosal changes, or ulceration was present. Biopsies were obtained.   In retroflexion, large internal hemorrhoids with no associated bleeding were appreciated. Recommendations:  -The patient will be observed post procedure until stable for transfer to floor.    -Please continue pantoprazole 40mg BID and propanolol therapy.   -Await pathology results.  -Consider wound care consult for abscess on R buttock. -Diet can be advanced as tolerated. -Safe to undergo AMERICO to the mid-esophagus as varices are low-risk and only present in the distal third of the esophagus.  -I will schedule for follow-up EGD in 4 weeks for repeat endoscopic treatment of GAVE.      Electronically signed by Ana Trujillo MD on 12/6/2021 at 2:53 PM

## 2021-12-06 NOTE — PROGRESS NOTES
daptomycin. PICC line in place  -awaiting AMERICO; has to have EGD to rule out varices before she can have the AMERICO      #Alcoholic liver cirrhosis  -MELD score on admission was 19  -GI on board. On rifaximin and lactulose  -for EGD today to assess for varices  -on spironolactone and rifaximin  -on octreotide infusion  -on lactulose    #Elevated INR  -INR is 2.2 today. This is likely due to liver disease  -receiving Vitamin K to bring INR to <2 for AMERICO today    #Hypokalemia: K is 3.4 today. Will prelace and trend. #Inflammatory bowel disease  -s/p total abdominal colectomy with ileal pouch-anal anastomosis  -stable    #Acute on chronic anemia and pancytopenia  -s/p transfusion of one unit of PRBC  -Hb today: 7.6  -on PPI  -platelets are 37 today, and wbc is 3.7  -all due to alcohol abuse and liver disewe    #Hypomagnesemia and hypokalemia  -resolved    #History of alcohol abuse:  - on thiamine, folic acid and multivites. -on alcohol withdrawal protocol with ativan       DVT prophylaxis: lovenox    DISPOSITION: to be determined.      Medications:  REVIEWED DAILY    Infusion Medications    octreotide (SANDOSTATIN) infusion 25 mcg/hr (12/05/21 1156)     Scheduled Medications    lactulose  20 g Oral TID    pantoprazole  40 mg IntraVENous BID    And    sodium chloride (PF)  10 mL IntraVENous BID    daptomycin (CUBICIN) IVPB  10 mg/kg (Adjusted) IntraVENous Q24H    white petrolatum   Topical BID    potassium chloride  10 mEq IntraVENous Once    potassium chloride  10 mEq IntraVENous Once    lidocaine PF  5 mL IntraDERmal Once    sodium chloride flush  5-40 mL IntraVENous 2 times per day    cloNIDine  0.1 mg Oral Daily    folic acid  1 mg Oral Daily    propranolol  20 mg Oral Q12H    pyridoxine  25 mg Oral Daily    rifaximin  550 mg Oral BID    spironolactone  25 mg Oral Daily    thiamine mononitrate  100 mg Oral Daily    vitamin D  50,000 Units Oral Weekly    sodium chloride flush  10 mL IntraVENous 2 times per day    [Held by provider] enoxaparin  40 mg SubCUTAneous Daily     PRN Meds: sodium chloride flush, iohexol, promethazine **OR** [DISCONTINUED] ondansetron, polyethylene glycol, acetaminophen **OR** acetaminophen, potassium chloride **OR** potassium alternative oral replacement **OR** potassium chloride, magnesium sulfate, LORazepam **OR** LORazepam **OR** LORazepam **OR** LORazepam **OR** LORazepam **OR** LORazepam **OR** LORazepam **OR** LORazepam    Labs:     Recent Labs     12/04/21 0521 12/05/21  0519 12/06/21  0510   WBC 3.7* 3.3* 3.1*   HGB 7.5* 7.3* 7.6*   HCT 24.0* 22.9* 24.5*   PLT 40* 39* 37*       Recent Labs     12/04/21  0521 12/05/21  0519 12/06/21  0510    142 140   K 3.1* 3.3* 3.4*   * 117* 115*   CO2 16* 17* 17*   BUN 4* 6 8   CREATININE 1.0 1.0 0.9   CALCIUM 8.0* 8.1* 8.4*       Recent Labs     12/04/21  0521 12/05/21  0519 12/06/21  0510   PROT 5.8* 5.8* 6.1*   ALKPHOS 93 98 94   ALT 43* 35* 30   AST 72* 47* 38*   BILITOT 1.8* 1.5* 1.4*       Recent Labs     12/04/21  0521 12/05/21  0519 12/06/21  0510   INR 2.2 2.2 2.2       Recent Labs     12/05/21  0519   CKTOTAL 78       Chronic labs:    Lab Results   Component Value Date    CHOL 100 09/01/2021    TRIG 164 (H) 09/01/2021    HDL 12 09/01/2021    LDLCALC 55 09/01/2021    TSH 6.720 (H) 11/29/2021    INR 2.2 12/06/2021    LABA1C 4.1 09/01/2021       Radiology: REVIEWED DAILY    +++++++++++++++++++++++++++++++++++++++++++++++++  Joe Vera MD  [facility] Department Physician - [address] [place] [address]  +++++++++++++++++++++++++++++++++++++++++++++++++  NOTE: This report was transcribed using voice recognition software. Every effort was made to ensure accuracy; however, inadvertent computerized transcription errors may be present.

## 2021-12-06 NOTE — PROGRESS NOTES
[address] [place] [address] [facility]  JEREMIAHIDA  Progress Note      C/C : Enterococcus bacteremia   Face to face encounter   SUBJECTIVE:  Patient is tolerating medications. No reported adverse drug reactions. ROS: No nausea, vomiting, has diarrhea. No distress. Has been afebrile. On room air. In bed. No new issues overnight. Review of systems:  As stated above in the chief complaint, otherwise negative.     Medications:  Scheduled Meds:   lactulose  20 g Oral TID    pantoprazole  40 mg IntraVENous BID    And    sodium chloride (PF)  10 mL IntraVENous BID    daptomycin (CUBICIN) IVPB  10 mg/kg (Adjusted) IntraVENous Q24H    white petrolatum   Topical BID    potassium chloride  10 mEq IntraVENous Once    potassium chloride  10 mEq IntraVENous Once    lidocaine PF  5 mL IntraDERmal Once    sodium chloride flush  5-40 mL IntraVENous 2 times per day    cloNIDine  0.1 mg Oral Daily    folic acid  1 mg Oral Daily    propranolol  20 mg Oral Q12H    pyridoxine  25 mg Oral Daily    rifaximin  550 mg Oral BID    spironolactone  25 mg Oral Daily    thiamine mononitrate  100 mg Oral Daily    vitamin D  50,000 Units Oral Weekly    sodium chloride flush  10 mL IntraVENous 2 times per day    [Held by provider] enoxaparin  40 mg SubCUTAneous Daily     Continuous Infusions:   octreotide (SANDOSTATIN) infusion 25 mcg/hr (21 1156)     PRN Meds:sodium chloride flush, iohexol, promethazine **OR** [DISCONTINUED] ondansetron, polyethylene glycol, acetaminophen **OR** acetaminophen, potassium chloride **OR** potassium alternative oral replacement **OR** potassium chloride, magnesium sulfate, LORazepam **OR** LORazepam **OR** LORazepam **OR** LORazepam **OR** LORazepam **OR** LORazepam **OR** LORazepam **OR** LORazepam    OBJECTIVE:  /60   Pulse 59   Temp 98 °F (36.7 °C) (Tympanic)   Resp 20   Ht 5' 8\" (1.727 m)   Wt 254 lb (115.2 kg)   SpO2 95%   BMI 38.62 kg/m²   Temp  Av.6 °F (36.4 °C)  Min: 97 °F (36.1 °C)  Max: 98 °F (36.7 °C)  Constitutional: The patient is awake, alert, and oriented. In bed. Skin: Warm and dry. No rashes were noted. + jaundice- improved a littler   HEENT: Round and reactive pupils. Moist mucous membranes. No ulcerations or thrush. icteric  Neck: Supple to movements. Chest: Clear   Cardiovascular: S1 and S2 are rhythmic and regular. + murmur. Abdomen: Soft, non tender, bowel sound +  Genitourinary: female  Extremities: No clubbing, no cyanosis, + edema.   Lines: left arm with line- no phlebitis     Laboratory and Tests Review:  Lab Results   Component Value Date    WBC 3.1 (L) 12/06/2021    WBC 3.3 (L) 12/05/2021    WBC 3.7 (L) 12/04/2021    HGB 7.6 (L) 12/06/2021    HCT 24.5 (L) 12/06/2021    .4 (H) 12/06/2021    PLT 37 (L) 12/06/2021     Lab Results   Component Value Date    NEUTROABS 10.55 (H) 11/24/2021    NEUTROABS 4.45 10/27/2021    NEUTROABS 3.32 10/26/2021     No results found for: [facility]  Lab Results   Component Value Date    ALT 30 12/06/2021    AST 38 (H) 12/06/2021    ALKPHOS 94 12/06/2021    BILITOT 1.4 (H) 12/06/2021     Lab Results   Component Value Date     12/06/2021    K 3.4 12/06/2021    K 2.4 11/26/2021     12/06/2021    CO2 17 12/06/2021    BUN 8 12/06/2021    CREATININE 0.9 12/06/2021    CREATININE 1.0 12/05/2021    CREATININE 1.0 12/04/2021    GFRAA >60 12/06/2021    LABGLOM >60 12/06/2021    GLUCOSE 114 12/06/2021    GLUCOSE 125 08/11/2011    PROT 6.1 12/06/2021    LABALBU 2.4 12/06/2021    LABALBU 3.6 08/11/2011    CALCIUM 8.4 12/06/2021    BILITOT 1.4 12/06/2021    ALKPHOS 94 12/06/2021    AST 38 12/06/2021    ALT 30 12/06/2021     Lab Results   Component Value Date    CRP 0.4 11/24/2021    CRP 15.7 (H) 05/17/2011     Lab Results   Component Value Date    SEDRATE 25 (H) 12/05/2021    SEDRATE 97 (H) 11/24/2021    SEDRATE 95 (H) 07/09/2019     Radiology:  CT abdomen and pelvis -  Peripancreatic fat stranding and edema adjacent to the duodenum may reflect   pancreatitis versus duodenitis. Small amount of reactive fluid identified in   the right upper quadrant. No abscess. Please correlate findings with   laboratory values. Thickened gallbladder wall with pericholecystic fat stranding may reflect   cholecystitis versus local inflammation from the adjacent duodenum. Further   evaluation may be obtained with HIDA scan if there is clinical suspicion for   cholecystitis. Microbiology:   11/25/2021- blood culture- no growth     Culture, Blood 2 [[id number]] (Abnormal)  Collected: 11/24/21 2038   Order Status: Completed Specimen: Blood Updated: 11/28/21 1029    Culture, Blood 2 Previously positive blood culture called Abnormal     Organism Enterococcus faecium Abnormal      Staphylococcus haemolyticus Abnormal     Culture, Blood 2 --    This isolate is presumed to be resistant based on the   detection of inducible Clindamycin resistance. Clindamycin   may still be effective in some patients. Narrative:     Source: BLOOD       Site: Antecubital-Lef          Previous panic on this admission - call   not needed per SOP, 11/25/2021 12:44,   by [facility]   Culture, Blood 1 [[id number]] (Abnormal)  Collected: 11/24/21 2038   Order Status: Completed Specimen: Blood Updated: 11/28/21 1027    Organism Enterococcus faecium Abnormal     Blood Culture, Routine --    Refer to previous culture CXBL2 11/24/2021 2038 for susceptibility   results     Organism Staphylococcus haemolyticus Abnormal     Blood Culture, Routine --    This isolate is presumed to be resistant based on the   detection of inducible Clindamycin resistance. Clindamycin   may still be effective in some patients.      Organism Enterococcus casseliflavus Abnormal    Narrative:     Source: BLOOD       Site: Antecubital-Rig          Microbiology results called to and read   back by Jonathan Orlando RN,        Assessment:  · Vancomycin resistant Enterococcus bacteremia may be polymicrobial with a history of bacteremia in the past.    · Staph haemolyticus bacteremia  · Cirrhosis   · Leukopenia      Plan:    · Continue daptomycin 10 mg/kg adjusted body weight q24h. day 7  · Check CK total  · sed rate - will repeat   · Check AMERICO. - ordered -still pending  · Surgery and GI following   · Monitor labs     Electronically signed by Gissell Evans MD on 12/6/2021 at 6:48 PM

## 2021-12-06 NOTE — ANESTHESIA PRE PROCEDURE
Department of Anesthesiology  Preprocedure Note       Name:  Rosalind Mitchell   Age:  51 y.o.  :  1975                                          MRN:  [id number]         Date:  2021      Surgeon: Ismael Bazan):  Rogerio Rankin MD    Procedure: Procedure(s):  EGD CONTROL HEMORRHAGE  SIGMOIDOSCOPY DIAGNOSTIC FLEXIBLE    Medications prior to admission:   Prior to Admission medications    Medication Sig Start Date End Date Taking?  Authorizing Provider   spironolactone (ALDACTONE) 25 MG tablet Take 1 tablet by mouth daily 10/28/21   Breann Huffmnae, DO   cyanocobalamin 2000 MCG tablet Take 1 tablet by mouth daily 10/28/21   Breann Ananya, DO   pyridoxine (B-6) 25 MG tablet Take 1 tablet by mouth daily 10/28/21   Breann Huffmane, DO   vitamin D (ERGOCALCIFEROL) 1.25 MG (75779 UT) CAPS capsule Take 1 capsule by mouth once a week Monday 10/28/21   Breann Hare, DO   potassium chloride (KLOR-CON M) 10 MEQ extended release tablet Take 20 mEq by mouth daily    Historical Provider, MD   lactulose (CHRONULAC) 10 GM/15ML solution Take 20 g by mouth every 12 hours    Historical Provider, MD   propranolol (INDERAL) 10 MG tablet Take 20 mg by mouth every 12 hours     Historical Provider, MD   rifaximin (XIFAXAN) 550 MG tablet Take 550 mg by mouth 2 times daily    Historical Provider, MD   cloNIDine (CATAPRES) 0.1 MG tablet Take 0.1 mg by mouth daily    Historical Provider, MD   magnesium oxide (MAG-OX) 400 MG tablet Take 400 mg by mouth daily    Historical Provider, MD   vitamin B-1 (THIAMINE) 100 MG tablet Take 100 mg by mouth daily    Historical Provider, MD   folic acid (FOLVITE) 1 MG tablet Take 1 tablet by mouth daily 21   Breann Hare,    thiamine mononitrate 100 MG tablet Take 1 tablet by mouth daily 2/2/21 3/4/21  Breann Hare,    pantoprazole (PROTONIX) 40 MG tablet Take 40 mg by mouth daily     Historical Provider, MD       Current medications:    Current Facility-Administered Medications   Medication Dose Route Frequency mg at 12/05/21 0943    rifaximin (XIFAXAN) tablet 550 mg  550 mg Oral BID Charkaitlynn Kurtmanoj, DO   550 mg at 12/05/21 2046    spironolactone (ALDACTONE) tablet 25 mg  25 mg Oral Daily Wisam Kurtmanoj, DO   25 mg at 12/05/21 1570    thiamine mononitrate tablet 100 mg  100 mg Oral Daily Wisam Fare, DO   100 mg at 12/05/21 2805    vitamin D (ERGOCALCIFEROL) capsule 50,000 Units  50,000 Units Oral Weekly Charkaitlynn Kurtmanoj, DO   50,000 Units at 11/29/21 1030    sodium chloride flush 0.9 % injection 10 mL  10 mL IntraVENous 2 times per day Wisam Tellez, DO   10 mL at 12/05/21 2047    [Held by provider] enoxaparin (LOVENOX) injection 40 mg  40 mg SubCUTAneous Daily Wisam Kurtmanoj, DO   40 mg at 11/30/21 1000    promethazine (PHENERGAN) tablet 12.5 mg  12.5 mg Oral Q6H PRN Wisam Tellez, DO        polyethylene glycol (GLYCOLAX) packet 17 g  17 g Oral Daily PRN Wisam Tellez, DO        acetaminophen (TYLENOL) tablet 650 mg  650 mg Oral Q6H PRN Wisam Tellez, DO   650 mg at 11/28/21 1836    Or    acetaminophen (TYLENOL) suppository 650 mg  650 mg Rectal Q6H PRN Wisam Tellez, DO        potassium chloride (KLOR-CON M) extended release tablet 40 mEq  40 mEq Oral PRN Wisam Tellez, DO   40 mEq at 12/05/21 0142    Or    potassium bicarb-citric acid (EFFER-K) effervescent tablet 40 mEq  40 mEq Oral PRN Chardebraie Kurte, DO        Or    potassium chloride 10 mEq/100 mL IVPB (Peripheral Line)  10 mEq IntraVENous PRN Wisam Tellez,  mL/hr at 11/26/21 1349 10 mEq at 11/26/21 1349    magnesium sulfate 2000 mg in 50 mL IVPB premix  2,000 mg IntraVENous PRN Charkaitlynn Hickse, DO        LORazepam (ATIVAN) tablet 1 mg  1 mg Oral Q1H PRN Wisam Tellez, DO   1 mg at 12/05/21 0982    Or    LORazepam (ATIVAN) injection 1 mg  1 mg IntraVENous Q1H PRN Wisam Tellez, DO   1 mg at 11/26/21 1944    Or    LORazepam (ATIVAN) tablet 2 mg  2 mg Oral Q1H PRN Wisam Tellez, DO   2 mg at 11/28/21 7024    Or    LORazepam (ATIVAN) injection 2 mg  2 Smoker    Smokeless tobacco: Never Used   Substance Use Topics    Alcohol use: Not Currently     Comment: stopped but drink 3 weeks ago                                Counseling given: Not Answered      Vital Signs (Current):   Vitals:    12/05/21 1845 12/05/21 1945 12/05/21 2330 12/06/21 0745   BP: (!) 106/52 105/63 120/64 (!) 95/52   Pulse:  62 62 60   Resp:  16 16 16   Temp:  97.9 °F (36.6 °C) 97.5 °F (36.4 °C) 97 °F (36.1 °C)   TempSrc:  Temporal Temporal Temporal   SpO2:  98% 96% 99%   Weight:       Height:                                                  BP Readings from Last 3 Encounters:   12/06/21 (!) 95/52   10/28/21 135/69   10/22/21 123/78       NPO Status:                                                                                 BMI:   Wt Readings from Last 3 Encounters:   11/24/21 254 lb (115.2 kg)   10/26/21 254 lb (115.2 kg)   10/22/21 250 lb (113.4 kg)     Body mass index is 38.62 kg/m². CBC:   Lab Results   Component Value Date    WBC 3.1 12/06/2021    RBC 2.37 12/06/2021    HGB 7.6 12/06/2021    HCT 24.5 12/06/2021    .4 12/06/2021    RDW 16.3 12/06/2021    PLT 37 12/06/2021       CMP:   Lab Results   Component Value Date     12/06/2021    K 3.4 12/06/2021    K 2.4 11/26/2021     12/06/2021    CO2 17 12/06/2021    BUN 8 12/06/2021    CREATININE 0.9 12/06/2021    GFRAA >60 12/06/2021    LABGLOM >60 12/06/2021    GLUCOSE 114 12/06/2021    GLUCOSE 125 08/11/2011    PROT 6.1 12/06/2021    CALCIUM 8.4 12/06/2021    BILITOT 1.4 12/06/2021    ALKPHOS 94 12/06/2021    AST 38 12/06/2021    ALT 30 12/06/2021       POC Tests: No results for input(s): POCGLU, POCNA, POCK, POCCL, POCBUN, POCHEMO, POCHCT in the last 72 hours.     Coags:   Lab Results   Component Value Date    PROTIME 24.2 12/06/2021    PROTIME 14.7 08/11/2011    INR 2.2 12/06/2021    APTT 35.8 11/24/2021       HCG (If Applicable):   Lab Results   Component Value Date    PREGTESTUR negative 01/31/2017        ABGs: No results found for: PHART, PO2ART, WWV2YSG, SDZ6BXM, BEART, [id number]     Type & Screen (If Applicable):  No results found for: LABABO, LABRH    Drug/Infectious Status (If Applicable):  Lab Results   Component Value Date    HEPCAB NON REACT 04/01/2011       COVID-19 Screening (If Applicable):   Lab Results   Component Value Date    COVID19 Not Detected 11/24/2021           Anesthesia Evaluation  Patient summary reviewed  Airway: Mallampati: II  TM distance: >3 FB   Neck ROM: full  Mouth opening: > = 3 FB Dental: normal exam         Pulmonary:Negative Pulmonary ROS breath sounds clear to auscultation                             Cardiovascular:Negative CV ROS          ECG reviewed  Rhythm: regular  Rate: normal  Echocardiogram reviewed         Beta Blocker:  Dose within 24 Hrs      ROS comment: EKG: Sinus Tachycardia 124. Arely Dami ECHO:  Ejection fraction is visually estimated at 55-60%. Normal right ventricular size and function. No evidence of thrombus within left atrium/ left atrial appendage. Emptying velocity is normal at 84 cms/s. Mild focal atherosclerosis in the descending aorta. No significant valvular stenosis/ regurgitation. Agitated saline contrast study negative for ASD/ PFO. No evidence of infective endocarditis. Neuro/Psych:   (+) psychiatric history:            GI/Hepatic/Renal:   (+) liver disease (Alcoholic Cirrhosis.):, renal disease: ARF,          ROS comment: Crohn's Colitis. .   Endo/Other:    (+) hypothyroidism::., .                  ROS comment: Pyoderma Gangrenosum. Abdominal:   (+) obese,           Vascular: negative vascular ROS. Other Findings:           Anesthesia Plan      MAC     ASA 3       Induction: intravenous. Anesthetic plan and risks discussed with patient. Plan discussed with CRNA. Attending anesthesiologist reviewed and agrees with Kanika Chanel MD   12/6/2021      Patient evaluated prior to endo  . Balta Ross Lolis Barlow, APRN - CRNA

## 2021-12-06 NOTE — H&P
History and Physical      ASSESSMENT AND PLAN:     46y/F w/ history of EtOH cirrhosis and IBD s/p TAC w/ IPAA on no maintenance medication who presents w/ hepatic encephalopathy in the setting of polymicrobial bacteremia.      MELD on admission: 19     PLAN:  EGD and Pouchoscopy today.           HISTORY OF PRESENT ILLNESS:       Ms. Juli Hernández is a 46y/F w/ history of EtOH cirrhosis, IBD s/p TAC w/ IPAA currently on no medical therapy who presents w/ hepatic encephalopathy in the setting of polymicrobial bacteremia.      EtOH Cirrhosis:  Hepatic Encephalopathy: Currently reason for admission. On lactulose and rifaximin at home. EV: Previous history though EGD in 9/2021 showed no varices. CT on admission shows prominent paraesophageal varices. Ascites: Not on any home diuretics though had a paracentesis in 10/2021. Extremely small amount of lillian-hepatic ascites appreciated on CT scan which I personally reviewed and interpreted. HCC: No history.      Hgb on admission was 10.6 which downtrended to 6.7 and is now 7.6 after 1u PRBC. She was noted to have portal hypertensive gastropathy on her EGD in Sept with Dr Alicia Garcia.     The patient also reportedly has a history of Crohn's Disease but is on no maintenance medication. It is also interesting that she underwent a TAC w/ IPAA which is almost exclusively done for UC unless she strictly had Crohn's colitis which is possible.      She was found to have polymicrobial bacteremia with Enterococcus faecium, Staph haemolyticus, and Enterococcus casseliflavus. ID is following.      The patient's mental status has now greatly improved.      Past Medical History:        Diagnosis Date    Acute renal failure (ARF) ([address] 38/47/0801    Alcoholic cirrhosis (HCC)     Anxiety     [address]'s colitis ([facility] [address]     Depression     History of blood transfusion     Pyoderma gangrenosum     Thyroid disease      Past Surgical History:        Procedure Laterality Date    ABDOMEN SURGERY      pt has a J-pouch     ARM DEBRIDEMENT      R arm-2010-2011??    ARM DEBRIDEMENT      COLONOSCOPY      DILATATION, ESOPHAGUS      ENDOSCOPY, COLON, DIAGNOSTIC      TRANSESOPHAGEAL ECHOCARDIOGRAM N/A 9/3/2021    TRANSESOPHAGEAL ECHOCARDIOGRAM WITH BUBBLE STUDY performed by Layla Leone MD at [facility] N/A 9/2/2021    EGD ESOPHAGOGASTRODUODENOSCOPY performed by Lisa Sanchez MD at [address] History:    TOBACCO:   reports that she has never smoked. She has never used smokeless tobacco.  ETOH:   reports previous alcohol use. DRUGS:   reports previous drug use.   Family History:       Problem Relation Age of Onset    Cancer Mother         breast     Emphysema Mother     Lupus Mother     Heart Disease Father     High Cholesterol Father     High Blood Pressure Sister     No Known Problems Son          Current Facility-Administered Medications:     lactulose (CHRONULAC) 10 GM/15ML solution 20 g, 20 g, Oral, TID, Broderick Albarran MD, 20 g at 12/06/21 0953    pantoprazole (PROTONIX) injection 40 mg, 40 mg, IntraVENous, BID, 40 mg at 12/06/21 0954 **AND** sodium chloride (PF) 0.9 % injection 10 mL, 10 mL, IntraVENous, BID, Marjorie Bernard MD, 10 mL at 12/06/21 0956    octreotide (SANDOSTATIN) 500 mcg in sodium chloride 0.9 % 100 mL infusion, 25 mcg/hr, IntraVENous, Continuous, Marjorie Bernard MD, Last Rate: 5 mL/hr at 12/05/21 1156, 25 mcg/hr at 12/05/21 1156    DAPTOmycin (CUBICIN) 850 mg in sodium chloride 0.9 % 50 mL IVPB, 10 mg/kg (Adjusted), IntraVENous, Q24H, Taryn Randle MD, Stopped at 12/05/21 1155    white petrolatum ointment, , Topical, BID, Hira Berrios MD, Given at 12/06/21 0956    potassium chloride 10 mEq/100 mL IVPB (Peripheral Line), 10 mEq, IntraVENous, Once, Praveen Gutierrez MD, Stopped at 11/27/21 1157    potassium chloride 10 mEq/100 mL IVPB (Peripheral Line), 10 mEq, IntraVENous, Once, Praveen Gutierrez MD    lidocaine PF 1 % injection 5 mL, 5 mL, IntraDERmal, Once, Hunter Navarrete MD    sodium chloride flush 0.9 % injection 5-40 mL, 5-40 mL, IntraVENous, 2 times per day, Cely Navarrete MD, 10 mL at 12/05/21 0947    sodium chloride flush 0.9 % injection 5-40 mL, 5-40 mL, IntraVENous, PRN, Hunter Navarrete MD    iohexol (OMNIPAQUE 240) injection 50 mL, 50 mL, Oral, ONCE PRN, Gabe Chapman DO    cloNIDine (CATAPRES) tablet 0.1 mg, 0.1 mg, Oral, Daily, Tom Chavez MD, 0.1 mg at 93/00/56 0213    folic acid (FOLVITE) tablet 1 mg, 1 mg, Oral, Daily, [facility] , 1 mg at 12/06/21 0954    propranolol (INDERAL) tablet 20 mg, 20 mg, Oral, Q12H, Tom Chavez MD, 20 mg at 12/02/21 1848    vitamin B-6 (PYRIDOXINE) tablet 25 mg, 25 mg, Oral, Daily, [facility], , 25 mg at 12/06/21 0954    rifaximin (XIFAXAN) tablet 550 mg, 550 mg, Oral, BID, [facility], 550 mg at 12/06/21 0660    spironolactone (ALDACTONE) tablet 25 mg, 25 mg, Oral, Daily, [facility], , 25 mg at 12/06/21 5822    thiamine mononitrate tablet 100 mg, 100 mg, Oral, Daily, [facility], , 100 mg at 12/06/21 6939    vitamin D (ERGOCALCIFEROL) capsule 50,000 Units, 50,000 Units, Oral, Weekly, [facility], , 50,000 Units at 12/06/21 0954    sodium chloride flush 0.9 % injection 10 mL, 10 mL, IntraVENous, 2 times per day, [facility] , 10 mL at 12/06/21 0956    [Held by provider] enoxaparin (LOVENOX) injection 40 mg, 40 mg, SubCUTAneous, Daily, [facility] , 40 mg at 11/30/21 1000    promethazine (PHENERGAN) tablet 12.5 mg, 12.5 mg, Oral, Q6H PRN **OR** [DISCONTINUED] ondansetron (ZOFRAN) injection 4 mg, 4 mg, IntraVENous, Q6H PRN, [facility], DO    polyethylene glycol (GLYCOLAX) packet 17 g, 17 g, Oral, Daily PRN, [facility], DO    acetaminophen (TYLENOL) tablet 650 mg, 650 mg, Oral, Q6H PRN, 650 mg at 11/28/21 1836 **OR** acetaminophen (TYLENOL) suppository 650 mg, 650 mg, Rectal, Q6H PRN, [facility], DO    potassium chloride (KLOR-CON M) extended release tablet 40 mEq, 40 mEq, Oral, PRN, 40 mEq at 12/06/21 0954 **OR** potassium bicarb-citric acid (EFFER-K) effervescent tablet 40 mEq, 40 mEq, Oral, PRN **OR** potassium chloride 10 mEq/100 mL IVPB (Peripheral Line), 10 mEq, IntraVENous, PRN, [place], DO, Last Rate: 100 mL/hr at 11/26/21 1349, 10 mEq at 11/26/21 1349    magnesium sulfate 2000 mg in 50 mL IVPB premix, 2,000 mg, IntraVENous, PRN, [place], DO    LORazepam (ATIVAN) tablet 1 mg, 1 mg, Oral, Q1H PRN, 1 mg at 12/05/21 0954 **OR** LORazepam (ATIVAN) injection 1 mg, 1 mg, IntraVENous, Q1H PRN, 1 mg at 11/26/21 1944 **OR** LORazepam (ATIVAN) tablet 2 mg, 2 mg, Oral, Q1H PRN, 2 mg at 11/28/21 0922 **OR** LORazepam (ATIVAN) injection 2 mg, 2 mg, IntraVENous, Q1H PRN, 2 mg at 11/26/21 1348 **OR** LORazepam (ATIVAN) tablet 3 mg, 3 mg, Oral, Q1H PRN, 3 mg at 11/28/21 0231 **OR** LORazepam (ATIVAN) injection 3 mg, 3 mg, IntraVENous, Q1H PRN, 3 mg at 11/27/21 1430 **OR** LORazepam (ATIVAN) tablet 4 mg, 4 mg, Oral, Q1H PRN **OR** LORazepam (ATIVAN) injection 4 mg, 4 mg, IntraVENous, Q1H PRN, [place], DO     Allergies:  Patient has no known allergies. PHYSICAL EXAM:  BP (!) 95/52   Pulse 60   Temp 97 °F (36.1 °C) (Temporal)   Resp 16   Ht 5' 8\" (1.727 m)   Wt 254 lb (115.2 kg)   SpO2 99%   BMI 38.62 kg/m²   Physical Exam:  General: Chronically ill-appearing, NAD  HEENT: PERRLA, EOMI, Scleral icterus, MMM, no rhinorrhea  Cards: RRR, no LE edema  Resp: Breathing comfortably on room air, good air movement, no use of accessory muscles, no audible wheezing  Abdomen: soft, NT, ND. No appreciable ascites. Extremities: Moves all extremities, no effusions or bruising.   Skin: No rashes or jaundice  Neuro: A&O x 3, CN grossly intact, non-focal exam              Electronically signed by Edith Singer MD on 12/6/2021 at 2:17 PM

## 2021-12-07 NOTE — ADT AUTH CERT
Liver Disease Complications - Care Day 11 (12/5/2021) by Brayan Hernandez RN       Review Status Review Entered   Completed 12/6/2021 16:20      Criteria Review      Care Day: 11 Care Date: 12/5/2021 Level of Care: Inpatient Floor    Guideline Day 3    Clinical Status    (X) * Hemodynamic stability    12/6/2021 4:20 PM EST by Gema Cage 99%/63 62    (X) * Tachypnea absent    12/6/2021 4:20 PM EST by Marielos Palacios      16    (X) * Afebrile    12/6/2021 4:20 PM EST by Marielos Palacios      97.9    ( ) * No bleeding    ( ) * No peritonitis, or treatment adequate    ( ) * Ascites absent or adequately controlled    ( ) * Volume status adequately controlled    ( ) * Renal function at baseline or acceptable for next level of care    ( ) * Electrolyte abnormalities absent or acceptable for next level of care    (X) * Mental status at baseline    12/6/2021 4:20 PM EST by Marielos Palacios      yes    ( ) * Diet tolerated    ( ) * Discharge plans and education understood    Activity    (X) * Ambulatory or acceptable for next level of care    12/6/2021 4:20 PM EST by Marielos Palacios      yes    Routes    ( ) * Oral hydration    ( ) * Oral medications or regimen acceptable for next level of care    12/6/2021 4:20 PM EST by Marielos Palacios      cubicin 850mg iv q24hrs  protonix 40mg iv bid  thiamine and folic acid po daily  vit k 10mg iv daily  ativan 1mg pox1  potassium 40meq po x1   SANDOSTATIN 500 mcg continuous at 5ml/hr    ( ) * Oral diet or acceptable for next level of care    12/6/2021 4:20 PM EST by Marielos Palacios      npo    * Milestone   Additional Notes   DATE: 12/5          Pertinent Updates:   Hemoglobin dropped to 7.5. Physical Exam:   General appearance: No apparent distress, jaundiced. HEENT:  Conjunctivae/corneas Jaundiced. .    Neck: Supple. No jugular venous distention.     Respiratory: Clear to auscultation bilaterally, normal respiratory effort   Cardiovascular: Regular rate rhythm, normal S1-S2   Abdomen: Soft, nontender, nondistended   Musculoskeletal: No clubbing, cyanosis, no bilateral lower extremity edema. Brisk capillary refill. Skin:  No rashes  on visible skin   Neurologic: awake, alert and following commands               Abnl/Pertinent Labs/Radiology/Diagnostic Studies:      Potassium: 3.3 (L)   Chloride: 117 (H)   CO2: 17 (L)   BUN: 6   Creatinine: 1.0   Anion Gap: 8   GFR Non-: 59   GFR : >60   Glucose: 135 (H)   Calcium: 8.1 (L)   Total Protein: 5.8 (L)   Total CK: 78   Albumin: 2.4 (L)   Alk Phos: 98   ALT: 35 (H)   AST: 47 (H)   Bilirubin: 1.5 (H)   WBC: 3.3 (L)   RBC: 2.27 (L)   Hemoglobin Quant: 7.3 (L)   Hematocrit: 22.9 (L)   MCV: 100.9 (H)   MCH: 32.2   MCHC: 31.9 (L)   MPV: 13.5 (H)   RDW: 16.6 (H)   Platelet Count: 39 (L)   Platelet Confirmation: CONFIRMED   Sed Rate: 25 (H)   Prothrombin Time: 24.5 (H)         Medications:   Lactulose 20g po qid         MD Consults/Assessments & Plans:   IM-   Assessment and plan:   #Vancomycin resistant Enterococcus bacteremia.    -may be polymicrobial with a history of bacteremia in the past.   -Continue daptomycin   -Started first dose of vancomycin on 11/25, changed to Rocephin and Unasyn by ID on 11/25, changed to daptomycin on 11/29.   -PICC line inserted on 11/27. -AMERICO ordered by infectious disease.     -Cardiology consulted           #Alcoholic liver disease with hyperbilirubinemia a and hepatic encephalopathy   #Hepatic encephalopathy, acute toxic metabolic encephalopathy   #Thrombocytopenia, INR coagulopathy   -MELD on admission: 19   -Hyperammonemia   -Monitor serum ammonia levels   -GI recommendation:  IR-guided diagnostic paracentesis and place orders for labs.  Plan for pouchoscopy for disease assessment on Monday.  Plan for EGD Monday for variceal screen/treatment prior to AMERICO by Cardiology for further assessment.       -Rifaximin and lactulose           #IBD s/p total abdominal colectomy with ileal pouch-anal anastomosis   on no maintenance medication       #Acute on chronic anemia   -Hb dropped to 6.7 S/P transfusion of one unit of blood 12/1   -Hemoglobin dropped to 7.5 today   -Per RN stool is normal color       #Alcohol abuse dependence disorder   -No signs of withdrawal   -[facility] protocol with Ativan dosing       #Hypomagnesemia and hypokalemia   -Replaced        #Lactic acidosis   -Improved with IV fluids          ----------   ID:   Plan:     · Continue daptomycin 10 mg/kg adjusted body weight q24h.     · Check CK total   · sed rate - will repeat    · Check AMERICO. - ordered - scheduled for Monday    · Surgery and GI following    · Monitor labs                            Liver Disease Complications - Care Day 10 (12/4/2021) by Mathew Sorenson RN       Review Status Review Entered   Completed 12/6/2021 16:13      Criteria Review      Care Day: 10 Care Date: 12/4/2021 Level of Care: Inpatient Floor    Guideline Day 3    Clinical Status    (X) * Hemodynamic stability    12/6/2021 4:13 PM EST by Nahed Mixon      64 103/59 100%RA    (X) * Tachypnea absent    12/6/2021 4:13 PM EST by Nahed Mixon      12-14    (X) * Afebrile    12/6/2021 4:13 PM EST by Nahed Mixon      99    ( ) * No bleeding    ( ) * No peritonitis, or treatment adequate    ( ) * Ascites absent or adequately controlled    ( ) * Volume status adequately controlled    ( ) * Renal function at baseline or acceptable for next level of care    ( ) * Electrolyte abnormalities absent or acceptable for next level of care    (X) * Mental status at baseline    ( ) * Diet tolerated    ( ) * Discharge plans and education understood    Activity    (X) * Ambulatory or acceptable for next level of care    12/6/2021 4:13 PM EST by Nahed Mixon      yes    Routes    ( ) * Oral hydration    ( ) * Oral medications or regimen acceptable for next level of care    12/6/2021 4:13 PM EST by Linda Freedman Marilee      cubicin 850mg iv q24hrs  protonix 40mg iv bid  thiamine and folic acid po daily  vit k 10mg iv daily    SANDOSTATIN 500 mcg continuous at 5ml/hr  ativan 1mg pox1  potassium 40meq po x1    ( ) * Oral diet or acceptable for next level of care    12/6/2021 4:13 PM EST by June Hutchison npo    * Milestone   Additional Notes   DATE: 12/4            Physical Exam:   General appearance: No apparent distress, appears stated age and cooperative. HEENT:  Conjunctivae/corneas Jaundiced. .    Neck: Supple. No jugular venous distention. Respiratory: Clear to auscultation bilaterally, normal respiratory effort   Cardiovascular: Regular rate rhythm, normal S1-S2   Abdomen: Soft, nontender, nondistended   Musculoskeletal: No clubbing, cyanosis, no bilateral lower extremity edema. Brisk capillary refill. Skin:  No rashes  on visible skin   Neurologic: awake, alert and following commands       Abnl/Pertinent Labs/Radiology/Diagnostic Studies:      Potassium: 3.1 (L)   Chloride: 113 (H)   CO2: 16 (L)   BUN: 4 (L)   Creatinine: 1.0   Anion Gap: 10   GFR Non-: 59   GFR : >60   Glucose: 127 (H)   Calcium: 8.0 (L)   Total Protein: 5.8 (L)   Albumin: 2.6 (L)   Alk Phos: 93   ALT: 43 (H)   AST: 72 (H)   Bilirubin: 1.8 (H)   WBC: 3.7 (L)   RBC: 2.32 (L)   Hemoglobin Quant: 7.5 (L)   Hematocrit: 24.0 (L)   MCV: 103.4 (H)   MCH: 32.3   MCHC: 31.3 (L)   MPV: 13.0 (H)   RDW: 16.5 (H)   Platelet Count: 40 (L)   Platelet Confirmation: CONFIRMED   Prothrombin Time: 23.7 (H)      Medications:   Lactulose 20mg po tid       MD Consults/Assessments & Plans:   IM:   Assessment and plan:   #Vancomycin resistant Enterococcus bacteremia.    -may be polymicrobial with a history of bacteremia in the past.   -Continue daptomycin   -Started first dose of vancomycin on 11/25, changed to Rocephin and Unasyn by ID on 11/25, changed to daptomycin on 11/29.   -PICC line inserted on 11/27.    -AMERICO ordered by infectious disease.  Cardiology consulted           #Alcoholic liver disease with hyperlipidemia and hepatic encephalopathy   #Hepatic encephalopathy, acute toxic metabolic encephalopathy   #Thrombocytopenia   -MELD on admission: 19   -Hyperammonemia   -Monitor serum ammonia levels   -GI consulted. Martha Cummins recommended EGD on Monday   -Rifaximin and lactulose           #IBD s/p total abdominal colectomy with ileal pouch-anal anastomosis   on no maintenance medication       #Acute on chronic anemia   -Hb dropped to 6.7 S/P transfusion of one unit of blood 12/1   -Hemoglobin dropped to 7.6 today   -Per RN stool is normal color       #Alcohol abuse dependence disorder   -[facility] protocol with Ativan dosing       #Hypomagnesemia and hypokalemia   -Replaced        #Lactic acidosis   -Improved with IV fluids      ------   ID:       Plan:     · Continue daptomycin 10 mg/kg adjusted body weight q24h.     · Check CK total , repeat sed rate    · Check AMERICO. - ordered    · For upper GI bleed work-up per Surgery and GI noted. · Monitor labs       -----   Cardiology;   Gastroenterology assessment and plans reviewed with plans of endoscopy for assessment of the presence or absence of esophageal varices early next week.  In addition based on review of laboratory assessment in her present thrombocytopenia, a relative contraindication to the performance of transesophageal echocardiographic assessment is additionally noted. ------------   Gen surg:   Monitor H/H   PPI BID   Ok for diet from general surgery standpoint. Advance if ok with GI. Appreciate Dr. Sophie Orozco for evaluation of cirrhosis and Crohn's disease and recommendations- will defer to him at this time.  Call with any acute interventions if needed.                         Liver Disease Complications - Care Day 9 (12/3/2021) by Concetta Bhatia RN       Review Status Review Entered   Completed 12/6/2021 15:14      Criteria Review      Care Day: 9 Care Date: 12/3/2021 Level of Care: Inpatient Floor    Guideline Day 3    Clinical Status    (X) * Hemodynamic stability    12/6/2021 3:14 PM EST by Nessa De La Garza      18 68 123/44   98%RA    ( ) * Tachypnea absent    12/6/2021 3:14 PM EST by Nessa De La Garza      17    (X) * Afebrile    12/6/2021 3:14 PM EST by Nessa De La Garza      98.5    ( ) * No bleeding    ( ) * No peritonitis, or treatment adequate    ( ) * Ascites absent or adequately controlled    ( ) * Volume status adequately controlled    ( ) * Renal function at baseline or acceptable for next level of care    ( ) * Electrolyte abnormalities absent or acceptable for next level of care    (X) * Mental status at baseline    12/6/2021 3:14 PM EST by Nessa De La Garza      yes    ( ) * Diet tolerated    ( ) * Discharge plans and education understood    Activity    (X) * Ambulatory or acceptable for next level of care    12/6/2021 3:14 PM EST by Nessa De La Garza      yes    Routes    ( ) * Oral hydration    ( ) * Oral medications or regimen acceptable for next level of care    12/6/2021 3:14 PM EST by Saira robleroin 850mg iv q24hrs  protonix 40mg iv bid  thiamine and folic acid po daily  SANDOSTATIN) 500 mcg continuous at 5ml/hr  potassium 40meq po x1    ( ) * Oral diet or acceptable for next level of care    12/6/2021 3:14 PM EST by Nessa De La Garza      npo    * Milestone   Additional Notes   DATE: 12/3          Pertinent Updates:   Patient seen and examined in her room.  Hemoglobin dropped to 7.6 from 8. 1.  Discussed with GI for possible EGD. [address] consultation is still pending         Physical Exam:   General appearance: No apparent distress, appears stated age and cooperative. HEENT:  Conjunctivae/corneas Jaundiced. .    Neck: Supple. No jugular venous distention.     Respiratory: Clear to auscultation bilaterally, normal respiratory effort   Cardiovascular: Regular rate rhythm, normal S1-S2   Abdomen: Soft, nontender, nondistended   Musculoskeletal: STOOL FOR VRE   -----   GI;   PLAN:   1. Cirrhosis:   -Please trend CBC/CMP/INR daily. No need for trending labs more than daily for now. -HE - lactulose TID and more if needed w/ goal 3-4 bowel movements daily. -EV/Anemia: Will plan for EGD on Monday for esophageal variceal screen and potential treatment.    -Ascites: Currently no large volume ascites though in the setting of no current source of infection, would want to rule out SBP. Would plan for IR-guided diagnostic paracentesis. After paracentesis, patient should be placed on low dose Lasix 20mg and Aldactone 25mg daily w/ Cr monitored to ensure no re-accumulation in the future.    -Coagulopathy: INR elevated. I will be ordering 3 runs of 10mg IV Vit K.       2. IBD s/p TAC w/ IPAA:   -One of the bacteria which grew in the blood was E. Faecium.   -Will plan for pouchoscopy on Monday. Enemas will need to be given Sunday night and Monday morning.       3. Polymicrobial Bacteremia:   -I will order IR-guided diagnostic paracentesis and place orders for labs. -I will be performing pouchoscopy for disease assessment on Monday.   -I will be performing EGD Monday for variceal screen/treatment prior to AMERICO by Cardiology for further assessment.       ----   Gen surg;       Monitor H/H   PPI BID   Ok for diet from general surgery standpoint. Advance if ok with GI. Will place consult to Dr. Kylee Castillo for evaluation of cirrhosis and Crohn's disease.        PT/OT/SLP/CM Assessments or Notes:   CM-Chart reviewed:  Contact Isolation for VRE in the blood.  Hbg 7.6 1 U PRBC yesterday.  Sandostatin gtt @ 5 cc/hr.  IV Daptomycin IV Q 24 hrs continues.  Per Surgery:  Anemia with FOBT+ stools, New consult to GI Dr Kylee Griffith Consult to Cardiology received from Dr Wolfgang Hui input.   Patient now on room air and sating well.  [facility] home care continues to follow at this time for transition of care.  If IV antibiotics are needed at discharge, new HC orders will be needed at discharge to include these as well as vitals, meds, PT and OT.  AMERICO and Echocardiogram ordered and still pending completion at this time. Anthony Sommers continue to follow.

## 2021-12-07 NOTE — PROGRESS NOTES
Notified Dr. Cody Grey that IR is unable to do patient's paracentesis today due to pts INR and plt count

## 2021-12-07 NOTE — PROGRESS NOTES
time of the procedure if INR not improving.      5. IBD s/p TAC w/ IPAA:  -One of the bacteria which grew in the blood was E. Faecium.  -Pouchoscopy with no active inflammation or deep ulceration. Unlikely to be a source of infection.  -Biopsies obtained to assess overall status of pouch.     6. Polymicrobial Bacteremia:  -I will order IR-guided diagnostic paracentesis and place orders for labs. -Safe to proceed w/ AMERICO.  -On MIGUEL, patient noted to have abscess on buttock which may be a cause of infection. This may require I&D w/ surgery if not improving with topical treatments and antibiotics.      I will follow.     Thank you for including us in the care of this patient.  Please do not hesitate to contact us with any additional questions or concerns.     Jt Woods MD  Gastroenterology/Hepatology  [facility]

## 2021-12-07 NOTE — PROGRESS NOTES
[address] [place] [address] [facility]  ASHLEY  Progress Note      C/C : Enterococcus bacteremia   Face to face encounter   SUBJECTIVE:  Patient is tolerating medications. No reported adverse drug reactions. ROS: No nausea, vomiting, has diarrhea. No distress. Has been afebrile. On room air. In bed. No new issues overnight. Appetite good. She believes AMERICO is scheduled for today. Review of systems:  As stated above in the chief complaint, otherwise negative.     Medications:  Scheduled Meds:   lactulose  20 g Oral TID    pantoprazole  40 mg IntraVENous BID    And    sodium chloride (PF)  10 mL IntraVENous BID    daptomycin (CUBICIN) IVPB  10 mg/kg (Adjusted) IntraVENous Q24H    white petrolatum   Topical BID    potassium chloride  10 mEq IntraVENous Once    potassium chloride  10 mEq IntraVENous Once    lidocaine PF  5 mL IntraDERmal Once    sodium chloride flush  5-40 mL IntraVENous 2 times per day    cloNIDine  0.1 mg Oral Daily    folic acid  1 mg Oral Daily    propranolol  20 mg Oral Q12H    pyridoxine  25 mg Oral Daily    rifaximin  550 mg Oral BID    spironolactone  25 mg Oral Daily    thiamine mononitrate  100 mg Oral Daily    vitamin D  50,000 Units Oral Weekly    sodium chloride flush  10 mL IntraVENous 2 times per day    [Held by provider] enoxaparin  40 mg SubCUTAneous Daily     Continuous Infusions:   octreotide (SANDOSTATIN) infusion 25 mcg/hr (12/05/21 1156)     PRN Meds:sodium chloride flush, iohexol, promethazine **OR** [DISCONTINUED] ondansetron, polyethylene glycol, acetaminophen **OR** acetaminophen, potassium chloride **OR** potassium alternative oral replacement **OR** potassium chloride, magnesium sulfate, LORazepam **OR** LORazepam **OR** LORazepam **OR** LORazepam **OR** LORazepam **OR** LORazepam **OR** LORazepam **OR** LORazepam    OBJECTIVE:  BP (!) 102/58   Pulse 66   Temp 98.3 °F (36.8 °C) (Oral)   Resp 16   Ht 5' 8\" (1.727 m)   Wt 254 lb (115.2 kg)   SpO2 98%   BMI Telephone Encounter by Lata Keys CMA at 09/26/17 03:14 PM     Author:  Lata Keys CMA Service:  (none) Author Type:  Certified Medical Assistant     Filed:  09/26/17 03:15 PM Encounter Date:  9/26/2017 Status:  Signed     :  Lata Keys CMA (Certified Medical Assistant)            Left message on answering machine to call back.[MA1.1T]  Patient can be guided to see Dr. Goodson, no referral is needed with her insurance. Has to make sure provider is with in her network. He does order infusions.[MA1.1M]       Revision History        User Key Date/Time User Provider Type Action    > MA1.1 09/26/17 03:15 PM Lata Keys CMA Certified Medical Assistant Sign    M - Manual, T - Template             38.62 kg/m²   Temp  Av °F (36.7 °C)  Min: 97.7 °F (36.5 °C)  Max: 98.5 °F (36.9 °C)  Constitutional: The patient is awake, alert, and oriented. In bed. Skin: Warm and dry. No rashes were noted. + jaundice-   HEENT: Round and reactive pupils. Moist mucous membranes. No ulcerations or thrush. icteric  Neck: Supple to movements. Chest: Clear   Cardiovascular: S1 and S2 are rhythmic and regular. + murmur. Abdomen: Soft, non tender, bowel sound +  Genitourinary: female  Extremities: No clubbing, no cyanosis, + edema.   Lines: left arm with line- no phlebitis  21    Laboratory and Tests Review:  Lab Results   Component Value Date    WBC 4.8 2021    WBC 3.1 (L) 2021    WBC 3.3 (L) 2021    HGB 7.6 (L) 2021    HCT 24.3 (L) 2021    .4 (H) 2021    PLT 37 (L) 2021     Lab Results   Component Value Date    NEUTROABS 10.55 (H) 2021    NEUTROABS 4.45 10/27/2021    NEUTROABS 3.32 10/26/2021     No results found for: [facility]  Lab Results   Component Value Date    ALT 26 2021    AST 33 (H) 2021    ALKPHOS 88 2021    BILITOT 1.9 (H) 2021     Lab Results   Component Value Date     2021    K 3.7 2021    K 2.4 2021     2021    CO2 16 2021    BUN 6 2021    CREATININE 0.9 2021    CREATININE 0.9 2021    CREATININE 1.0 2021    GFRAA >60 2021    LABGLOM >60 2021    GLUCOSE 115 2021    GLUCOSE 125 2011    PROT 6.0 2021    LABALBU 2.5 2021    LABALBU 3.6 2011    CALCIUM 8.3 2021    BILITOT 1.9 2021    ALKPHOS 88 2021    AST 33 2021    ALT 26 2021     Lab Results   Component Value Date    CRP 0.4 2021    CRP 15.7 (H) 2011     Lab Results   Component Value Date    SEDRATE 25 (H) 2021    SEDRATE 97 (H) 2021    SEDRATE 95 (H) 2019     Radiology:  CT abdomen and pelvis -  Peripancreatic fat stranding and edema adjacent to the duodenum may reflect   pancreatitis versus duodenitis. Small amount of reactive fluid identified in   the right upper quadrant. No abscess. Please correlate findings with   laboratory values. Thickened gallbladder wall with pericholecystic fat stranding may reflect   cholecystitis versus local inflammation from the adjacent duodenum. Further   evaluation may be obtained with HIDA scan if there is clinical suspicion for   cholecystitis. Microbiology:   11/25/2021- blood culture- no growth     Culture, Blood 2 [[id number]] (Abnormal)  Collected: 11/24/21 2038   Order Status: Completed Specimen: Blood Updated: 11/28/21 1029    Culture, Blood 2 Previously positive blood culture called Abnormal     Organism Enterococcus faecium Abnormal      Staphylococcus haemolyticus Abnormal     Culture, Blood 2 --    This isolate is presumed to be resistant based on the   detection of inducible Clindamycin resistance. Clindamycin   may still be effective in some patients. Narrative:     Source: BLOOD       Site: Antecubital-Lef          Previous panic on this admission - call   not needed per SOP, 11/25/2021 12:44,   by [facility]   Culture, Blood 1 [[id number]] (Abnormal)  Collected: 11/24/21 2038   Order Status: Completed Specimen: Blood Updated: 11/28/21 1027    Organism Enterococcus faecium Abnormal     Blood Culture, Routine --    Refer to previous culture CXBL2 11/24/2021 2038 for susceptibility   results     Organism Staphylococcus haemolyticus Abnormal     Blood Culture, Routine --    This isolate is presumed to be resistant based on the   detection of inducible Clindamycin resistance. Clindamycin   may still be effective in some patients.      Organism Enterococcus casseliflavus Abnormal    Narrative:     Source: BLOOD       Site: Antecubital-Rig          Microbiology results called to and read   back by Wen Davis RN,    Urine gnr    Assessment:  Vancomycin resistant

## 2021-12-07 NOTE — PROGRESS NOTES
Hospitalist Progress Note      SYNOPSIS: Patient admitted on 2021 for altered mental status. She has a history of alcoholic cirrhosis and blood transfusions nad was brought into the ED with a complaint of altered mental status. On admission, she was found to be tachycardic and hypertensive as well as jaundiced. She was also hypokalemic and hypomagnesemic with elevated lactic acid elevated ammonia. CT of the chest was unremarkable. Blood cultures were positive for Enterococcus. She was admitted and managed for acute hepatic encephalopathy as well as Enterococcus bacteremia. SUBJECTIVE:    Patient seen and examined. She had the EGD yesterday which showed some nonbleeding grade I and II varices. She has no complaints this morning and review of systems is otherwise negative. She is due for AMERICO today. Records reviewed. Temp (24hrs), Av °F (36.7 °C), Min:97.7 °F (36.5 °C), Max:98.5 °F (36.9 °C)    DIET: Diet NPO Exceptions are: Sips of Water with Meds  CODE: Full Code    Intake/Output Summary (Last 24 hours) at 2021 1014  Last data filed at 2021 1740  Gross per 24 hour   Intake 150 ml   Output    Net 150 ml       OBJECTIVE:    BP (!) 102/58   Pulse 66   Temp 98.3 °F (36.8 °C) (Oral)   Resp 16   Ht 5' 8\" (1.727 m)   Wt 254 lb (115.2 kg)   SpO2 98%   BMI 38.62 kg/m²     General appearance: No apparent distress, appears stated age and cooperative. HEENT:  Conjunctivae/corneas clear. Pale conjuctiva  Neck: Supple. No jugular venous distention. Respiratory: Clear to auscultation bilaterally, normal respiratory effort  Cardiovascular: Regular rate rhythm, normal S1-S2  Abdomen: Soft, nontender, full, positive fluid thrill  Musculoskeletal: No clubbing, cyanosis, no bilateral lower extremity edema. Brisk capillary refill. Skin:  No rashes  on visible skin  Neurologic: awake, alert and following commands     ASSESSMENT:  #Enterococcus  Bacteremia  -now on daptomycin.  PICC line in place  -for AMERICO today; had EGD yesterday  -awaiting AMERICO; has to have EGD to rule out varices before she can have the AMERICO        #Alcoholic liver cirrhosis  -MELD score on admission was 19  -GI on board. On rifaximin and lactulose  -EGD yesterday showed ramy I/II varices without high risk features, esophagitis with ulceration, nodular GAVE with active bleeding and irregular duodenal mucosa. -on spironolactone and rifaximin  -on octreotide infusion  -on lactulose  -on PPI     #Elevated INR  -INR is 2.5 today. This is likely due to liver disease     #Hypokalemia: resolved. Will monitor     #Inflammatory bowel disease  -s/p total abdominal colectomy with ileal pouch-anal anastomosis  -stable     #Acute on chronic anemia and pancytopenia  -s/p transfusion of one unit of PRBC  -Hb today: 7.6  -on PPI  -platelets are 37 today, and wbc is up to 4.8 today  -all due to alcohol abuse and liver disease     #Hypomagnesemia and hypokalemia  -resolved       #Anemia; hb is 7.6. Will trend and monitor    #History of alcohol abuse:  - on thiamine, folic acid and multivites. -on alcohol withdrawal protocol with ativan        DVT prophylaxis: SCDs.  lovenox held o/a of thrombocytopenia        DISPOSITION: to be determined    Medications:  REVIEWED DAILY    Infusion Medications    octreotide (SANDOSTATIN) infusion 25 mcg/hr (12/05/21 1156)     Scheduled Medications    lactulose  20 g Oral TID    pantoprazole  40 mg IntraVENous BID    And    sodium chloride (PF)  10 mL IntraVENous BID    daptomycin (CUBICIN) IVPB  10 mg/kg (Adjusted) IntraVENous Q24H    white petrolatum   Topical BID    potassium chloride  10 mEq IntraVENous Once    potassium chloride  10 mEq IntraVENous Once    lidocaine PF  5 mL IntraDERmal Once    sodium chloride flush  5-40 mL IntraVENous 2 times per day    cloNIDine  0.1 mg Oral Daily    folic acid  1 mg Oral Daily    propranolol  20 mg Oral Q12H    pyridoxine  25 mg Oral Daily    rifaximin 550 mg Oral BID    spironolactone  25 mg Oral Daily    thiamine mononitrate  100 mg Oral Daily    vitamin D  50,000 Units Oral Weekly    sodium chloride flush  10 mL IntraVENous 2 times per day    [Held by provider] enoxaparin  40 mg SubCUTAneous Daily     PRN Meds: sodium chloride flush, iohexol, promethazine **OR** [DISCONTINUED] ondansetron, polyethylene glycol, acetaminophen **OR** acetaminophen, potassium chloride **OR** potassium alternative oral replacement **OR** potassium chloride, magnesium sulfate, LORazepam **OR** LORazepam **OR** LORazepam **OR** LORazepam **OR** LORazepam **OR** LORazepam **OR** LORazepam **OR** LORazepam    Labs:     Recent Labs     12/05/21 0519 12/06/21 0510 12/07/21 0522   WBC 3.3* 3.1* 4.8   HGB 7.3* 7.6* 7.6*   HCT 22.9* 24.5* 24.3*   PLT 39* 37* 37*       Recent Labs     12/05/21 0519 12/06/21  0510 12/07/21 0522    140 140   K 3.3* 3.4* 3.7   * 115* 115*   CO2 17* 17* 16*   BUN 6 8 6   CREATININE 1.0 0.9 0.9   CALCIUM 8.1* 8.4* 8.3*       Recent Labs     12/05/21  0519 12/06/21  0510 12/07/21  0522   PROT 5.8* 6.1* 6.0*   ALKPHOS 98 94 88   ALT 35* 30 26   AST 47* 38* 33*   BILITOT 1.5* 1.4* 1.9*       Recent Labs     12/05/21  0519 12/06/21  0510 12/07/21  0522   INR 2.2 2.2 2.5       Recent Labs     12/05/21 0519   CKTOTAL 78       Chronic labs:    Lab Results   Component Value Date    CHOL 100 09/01/2021    TRIG 164 (H) 09/01/2021    HDL 12 09/01/2021    LDLCALC 55 09/01/2021    TSH 6.720 (H) 11/29/2021    INR 2.5 12/07/2021    LABA1C 4.1 09/01/2021       Radiology: REVIEWED DAILY    +++++++++++++++++++++++++++++++++++++++++++++++++  Nelida Shankar MD  [facility] - [address], [place]  +++++++++++++++++++++++++++++++++++++++++++++++++  NOTE: This report was transcribed using voice recognition software.  Every effort was made to ensure accuracy; however, inadvertent computerized transcription errors may be present.

## 2021-12-08 NOTE — PROGRESS NOTES
Hospitalist Progress Note      SYNOPSIS: Patient admitted on 2021 for altered mental status. She has a history of alcoholic cirrhosis and blood transfusions nad was brought into the ED with a complaint of altered mental status. On admission, she was found to be tachycardic and hypertensive as well as jaundiced. She was also hypokalemic and hypomagnesemic with elevated lactic acid elevated ammonia. CT of the chest was unremarkable. Blood cultures were positive for Enterococcus. She was admitted and managed for acute hepatic encephalopathy as well as Enterococcus bacteremia. SUBJECTIVE:    Patient seen and examined. She has no complaints this morning. She couldn't have the AMERICO yesterday due to her varices and INR being >2, per cardiology. Review of systems is otherwise negative. Records reviewed. Temp (24hrs), Av.2 °F (36.8 °C), Min:97.2 °F (36.2 °C), Max:98.9 °F (37.2 °C)    DIET: ADULT DIET; Regular; 4 carb choices (60 gm/meal)  CODE: Full Code    Intake/Output Summary (Last 24 hours) at 2021 1116  Last data filed at 2021 1827  Gross per 24 hour   Intake 400 ml   Output    Net 400 ml       OBJECTIVE:    BP (!) 96/46   Pulse 56   Temp 97.2 °F (36.2 °C) (Temporal)   Resp 15   Ht 5' 8\" (1.727 m)   Wt 254 lb (115.2 kg)   SpO2 98%   BMI 38.62 kg/m²     General appearance: No apparent distress, appears stated age and cooperative. HEENT:  Conjunctivae/corneas clear. Pale conjuctivea  Neck: Supple. No jugular venous distention. Respiratory: Clear to auscultation bilaterally, normal respiratory effort  Cardiovascular: Regular rate rhythm, normal S1-S2  Abdomen: Soft, nontender, nondistended  Musculoskeletal: No clubbing, cyanosis, no bilateral lower extremity edema. Brisk capillary refill. Skin:  No rashes  on visible skin  Neurologic: awake, alert and following commands     ASSESSMENT:  #Enterococcus  Bacteremia  -now on daptomycin.  PICC line in place  -had EGD which showed evidence of varices  -awaiting AMERICO; cardiology canceled it yesterday due to presence of esophageal varices and her INR being >2  -cardiology to determine when she can have AMERICO      #Alcoholic liver cirrhosis  -GI on board. On rifaximin and lactulose  -EGD yesterday showed ramy I/II varices without high risk features, esophagitis with ulceration, nodular GAVE with active bleeding and irregular duodenal mucosa. -on spironolactone and rifaximin  -on octreotide infusion  -on lactulose  -on PPI     #Elevated INR  -INR is 2.2 today. This is likely due to liver disease     #Hypokalemia: resolved. Will monitor     #Inflammatory bowel disease  -s/p total abdominal colectomy with ileal pouch-anal anastomosis  -stable     #Acute on chronic anemia and pancytopenia  -s/p transfusion of one unit of PRBC  -Hb today: 7.7  -on PPI  -platelets are 35 today, and wbc is 3.6 today  -all due to alcohol abuse and liver disease     #Hypomagnesemia and hypokalemia  -resolved        #Anemia; hb is 7.7. Will trend and monitor     #History of alcohol abuse:  - on thiamine, folic acid and multivites. -on alcohol withdrawal protocol with ativan        DVT prophylaxis: SCDs.  lovenox held o/a of thrombocytopenia               Medications:  REVIEWED DAILY    Infusion Medications    Scheduled Medications    lactulose  20 g Oral 6 times per day    pantoprazole  40 mg IntraVENous BID    And    sodium chloride (PF)  10 mL IntraVENous BID    daptomycin (CUBICIN) IVPB  10 mg/kg (Adjusted) IntraVENous Q24H    white petrolatum   Topical BID    potassium chloride  10 mEq IntraVENous Once    potassium chloride  10 mEq IntraVENous Once    lidocaine PF  5 mL IntraDERmal Once    sodium chloride flush  5-40 mL IntraVENous 2 times per day    cloNIDine  0.1 mg Oral Daily    folic acid  1 mg Oral Daily    propranolol  20 mg Oral Q12H    pyridoxine  25 mg Oral Daily    rifaximin  550 mg Oral BID    spironolactone  25 mg Oral Daily    thiamine mononitrate 100 mg Oral Daily    vitamin D  50,000 Units Oral Weekly    sodium chloride flush  10 mL IntraVENous 2 times per day    [Held by provider] enoxaparin  40 mg SubCUTAneous Daily     PRN Meds: sodium chloride flush, iohexol, promethazine **OR** [DISCONTINUED] ondansetron, polyethylene glycol, acetaminophen **OR** acetaminophen, potassium chloride **OR** potassium alternative oral replacement **OR** potassium chloride, magnesium sulfate, LORazepam **OR** LORazepam **OR** LORazepam **OR** LORazepam **OR** LORazepam **OR** LORazepam **OR** LORazepam **OR** LORazepam    Labs:     Recent Labs     12/06/21  0510 12/07/21  0522 12/08/21  0510   WBC 3.1* 4.8 3.6*   HGB 7.6* 7.6* 7.7*   HCT 24.5* 24.3* 24.1*   PLT 37* 37* 35*       Recent Labs     12/06/21  0510 12/07/21  0522 12/08/21  0510    140 142   K 3.4* 3.7 3.7   * 115* 114*   CO2 17* 16* 18*   BUN 8 6 6   CREATININE 0.9 0.9 1.1*   CALCIUM 8.4* 8.3* 8.3*       Recent Labs     12/06/21  0510 12/07/21  0522 12/08/21  0510   PROT 6.1* 6.0* 5.9*   ALKPHOS 94 88 85   ALT 30 26 23   AST 38* 33* 32*   BILITOT 1.4* 1.9* 1.7*       Recent Labs     12/06/21  0510 12/07/21  0522 12/08/21  0510   INR 2.2 2.5 2.2       No results for input(s): CKTOTAL, TROPONINI in the last 72 hours. Chronic labs:    Lab Results   Component Value Date    CHOL 100 09/01/2021    TRIG 164 (H) 09/01/2021    HDL 12 09/01/2021    LDLCALC 55 09/01/2021    TSH 6.720 (H) 11/29/2021    INR 2.2 12/08/2021    LABA1C 4.1 09/01/2021       Radiology: REVIEWED DAILY    +++++++++++++++++++++++++++++++++++++++++++++++++  Kolton Encarnacion MD  [facility] - [address] [place] [address]  +++++++++++++++++++++++++++++++++++++++++++++++++  NOTE: This report was transcribed using voice recognition software. Every effort was made to ensure accuracy; however, inadvertent computerized transcription errors may be present.

## 2021-12-08 NOTE — PROGRESS NOTES
Messaged cardio again regarding AMERICO. Awaiting call back to see if AMERICO is still needed or can pt just have ECHO.

## 2021-12-08 NOTE — INTERVAL H&P NOTE
H&P Update    Patient's History and Physical  was reviewed. The patient appears likely to able to tolerate the procedure. Risk and benefits discussed including ultimate complications, possibly death and consent obtained.     Brooklyn Gray, II

## 2021-12-08 NOTE — PROGRESS NOTES
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

## 2021-12-08 NOTE — PROGRESS NOTES
[address] [place] [address] [facility]  ASHLEY  Progress Note      C/C : Enterococcus bacteremia   Face to face encounter   SUBJECTIVE:  Patient is tolerating medications. No reported adverse drug reactions. ROS: No nausea, vomiting, has diarrhea. No distress. Has been afebrile. On room air. No new issues overnight. Appetite good. Afebrile  Stool cultures positive for VRE  Review of systems:  As stated above in the chief complaint, otherwise negative.     Medications:  Scheduled Meds:   lactulose  20 g Oral 6 times per day    pantoprazole  40 mg IntraVENous BID    And    sodium chloride (PF)  10 mL IntraVENous BID    daptomycin (CUBICIN) IVPB  10 mg/kg (Adjusted) IntraVENous Q24H    white petrolatum   Topical BID    potassium chloride  10 mEq IntraVENous Once    potassium chloride  10 mEq IntraVENous Once    lidocaine PF  5 mL IntraDERmal Once    sodium chloride flush  5-40 mL IntraVENous 2 times per day    cloNIDine  0.1 mg Oral Daily    folic acid  1 mg Oral Daily    propranolol  20 mg Oral Q12H    pyridoxine  25 mg Oral Daily    rifaximin  550 mg Oral BID    spironolactone  25 mg Oral Daily    thiamine mononitrate  100 mg Oral Daily    vitamin D  50,000 Units Oral Weekly    sodium chloride flush  10 mL IntraVENous 2 times per day    [Held by provider] enoxaparin  40 mg SubCUTAneous Daily     Continuous Infusions:    PRN Meds:sodium chloride flush, iohexol, promethazine **OR** [DISCONTINUED] ondansetron, polyethylene glycol, acetaminophen **OR** acetaminophen, potassium chloride **OR** potassium alternative oral replacement **OR** potassium chloride, magnesium sulfate, LORazepam **OR** LORazepam **OR** LORazepam **OR** LORazepam **OR** LORazepam **OR** LORazepam **OR** LORazepam **OR** LORazepam    OBJECTIVE:  BP (!) 100/56   Pulse 62   Temp 97.5 °F (36.4 °C) (Temporal)   Resp 16   Ht 5' 8\" (1.727 m)   Wt 254 lb (115.2 kg)   SpO2 99%   BMI 38.62 kg/m²   Temp  Av.1 °F (36.7 °C)  Min: 97.2 °F (36.2 °C)  Max: 98.9 °F (37.2 °C)  Constitutional: The patient is awake, alert, and oriented. In bed. Skin: Warm and dry. No rashes were noted. + jaundice-   HEENT: Round and reactive pupils. Moist mucous membranes. No ulcerations or thrush. icteric  Neck: Supple to movements. Chest: Clear   Cardiovascular: S1 and S2 are rhythmic and regular. + murmur. Abdomen: Soft, non tender, bowel sound +  Genitourinary: female  Extremities: No clubbing, no cyanosis, + edema.   Lines: left arm with line- no phlebitis  11/28/21    Laboratory and Tests Review:  Lab Results   Component Value Date    WBC 3.6 (L) 12/08/2021    WBC 4.8 12/07/2021    WBC 3.1 (L) 12/06/2021    HGB 7.7 (L) 12/08/2021    HCT 24.1 (L) 12/08/2021    .6 (H) 12/08/2021    PLT 35 (L) 12/08/2021     Lab Results   Component Value Date    NEUTROABS 10.55 (H) 11/24/2021    NEUTROABS 4.45 10/27/2021    NEUTROABS 3.32 10/26/2021     No results found for: [facility]  Lab Results   Component Value Date    ALT 23 12/08/2021    AST 32 (H) 12/08/2021    ALKPHOS 85 12/08/2021    BILITOT 1.7 (H) 12/08/2021     Lab Results   Component Value Date     12/08/2021    K 3.7 12/08/2021    K 2.4 11/26/2021     12/08/2021    CO2 18 12/08/2021    BUN 6 12/08/2021    CREATININE 1.1 12/08/2021    CREATININE 0.9 12/07/2021    CREATININE 0.9 12/06/2021    GFRAA >60 12/08/2021    LABGLOM 53 12/08/2021    GLUCOSE 115 12/08/2021    GLUCOSE 125 08/11/2011    PROT 5.9 12/08/2021    LABALBU 2.5 12/08/2021    LABALBU 3.6 08/11/2011    CALCIUM 8.3 12/08/2021    BILITOT 1.7 12/08/2021    ALKPHOS 85 12/08/2021    AST 32 12/08/2021    ALT 23 12/08/2021     Lab Results   Component Value Date    CRP 0.4 11/24/2021    CRP 15.7 (H) 05/17/2011     Lab Results   Component Value Date    SEDRATE 25 (H) 12/05/2021    SEDRATE 97 (H) 11/24/2021    SEDRATE 95 (H) 07/09/2019     Radiology:  CT abdomen and pelvis -  Peripancreatic fat stranding and edema adjacent to the duodenum may reflect   pancreatitis versus duodenitis. Small amount of reactive fluid identified in   the right upper quadrant. No abscess. Please correlate findings with   laboratory values. Thickened gallbladder wall with pericholecystic fat stranding may reflect   cholecystitis versus local inflammation from the adjacent duodenum. Further   evaluation may be obtained with HIDA scan if there is clinical suspicion for   cholecystitis. Microbiology:   11/25/2021- blood culture- no growth     Culture, Blood 2 [[id number]] (Abnormal)  Collected: 11/24/21 2038   Order Status: Completed Specimen: Blood Updated: 11/28/21 1029    Culture, Blood 2 Previously positive blood culture called Abnormal     Organism Enterococcus faecium Abnormal      Staphylococcus haemolyticus Abnormal     Culture, Blood 2 --    This isolate is presumed to be resistant based on the   detection of inducible Clindamycin resistance. Clindamycin   may still be effective in some patients. Narrative:     Source: BLOOD       Site: Antecubital-Lef          Previous panic on this admission - call   not needed per SOP, 11/25/2021 12:44,   [facility]   Culture, Blood 1 [[id number]] (Abnormal)  Collected: 11/24/21 2038   Order Status: Completed Specimen: Blood Updated: 11/28/21 1027    Organism Enterococcus faecium Abnormal     Blood Culture, Routine --    Refer to previous culture CXBL2 11/24/2021 2038 for susceptibility   results     Organism Staphylococcus haemolyticus Abnormal     Blood Culture, Routine --    This isolate is presumed to be resistant based on the   detection of inducible Clindamycin resistance. Clindamycin   may still be effective in some patients.      Organism Enterococcus casseliflavus Abnormal    Narrative:     Source: BLOOD       Site: Antecubital-Rig          Microbiology results called to and read   back by Lin Lin RN,    Urine gnr    Assessment:  · Vancomycin resistant Enterococcus bacteremia may be

## 2021-12-08 NOTE — BRIEF OP NOTE
Brief Postoperative Note    Jericho Lopes  YOB: 1975  [id number]    No ascites identified on ultrasound      Nirmala Yusuf II, MD   12/8/2021 1:30 PM

## 2021-12-09 NOTE — PROGRESS NOTES
Nutrition Assessment     Type and Reason for Visit: RD Nutrition Re-Screen/LOS (rd re-screen negative)    Nutrition Assessment:  Pt assessed per LOS protocol. Chart reviewed. Pt currently eating ~75% of most meals and w/ no other significant nutritional issues noted at this time. Will follow-up per policy. Please consult if RD needed.     Electronically signed by Kizzy Bateman RD, LD on 12/9/21 at 12:11 PM EST    Contact: ext [phone]

## 2021-12-09 NOTE — PROGRESS NOTES
[address] [place] [address] [facility]  ASHLEY  Progress Note      C/C : Enterococcus bacteremia   Face to face encounter   SUBJECTIVE:  Patient is tolerating medications. No reported adverse drug reactions. ROS: No nausea, vomiting, has diarrhea. No distress. Has been afebrile. On room air. No new issues overnight. Appetite good. Afebrile  No frequency burning or dysuria  Stool cultures positive for VRE  Review of systems:  As stated above in the chief complaint, otherwise negative.     Medications:  Scheduled Meds:   lactulose  20 g Oral 6 times per day    pantoprazole  40 mg IntraVENous BID    And    sodium chloride (PF)  10 mL IntraVENous BID    daptomycin (CUBICIN) IVPB  10 mg/kg (Adjusted) IntraVENous Q24H    white petrolatum   Topical BID    potassium chloride  10 mEq IntraVENous Once    potassium chloride  10 mEq IntraVENous Once    lidocaine PF  5 mL IntraDERmal Once    sodium chloride flush  5-40 mL IntraVENous 2 times per day    cloNIDine  0.1 mg Oral Daily    folic acid  1 mg Oral Daily    propranolol  20 mg Oral Q12H    pyridoxine  25 mg Oral Daily    rifaximin  550 mg Oral BID    spironolactone  25 mg Oral Daily    thiamine mononitrate  100 mg Oral Daily    vitamin D  50,000 Units Oral Weekly    sodium chloride flush  10 mL IntraVENous 2 times per day    [Held by provider] enoxaparin  40 mg SubCUTAneous Daily     Continuous Infusions:    PRN Meds:sodium chloride flush, iohexol, promethazine **OR** [DISCONTINUED] ondansetron, polyethylene glycol, acetaminophen **OR** acetaminophen, potassium chloride **OR** potassium alternative oral replacement **OR** potassium chloride, magnesium sulfate, LORazepam **OR** LORazepam **OR** LORazepam **OR** LORazepam **OR** LORazepam **OR** LORazepam **OR** LORazepam **OR** LORazepam    OBJECTIVE:  /63   Pulse 55   Temp 97.9 °F (36.6 °C) (Temporal)   Resp 16   Ht 5' 8\" (1.727 m)   Wt 254 lb (115.2 kg)   SpO2 97%   BMI 38.62 kg/m²   Temp  Av.7 °F (36.5 °C)  Min: 97.1 °F (36.2 °C)  Max: 97.9 °F (36.6 °C)  Constitutional: The patient is awake, alert, and oriented. In bed. Skin: Warm and dry. No rashes were noted. + jaundice-   HEENT: Round and reactive pupils. Moist mucous membranes. No ulcerations or thrush. icteric  Neck: Supple to movements. Chest: Clear   Cardiovascular: S1 and S2 are rhythmic and regular. + murmur. Abdomen: Soft, non tender, bowel sound +  Genitourinary: female  Extremities: No clubbing, no cyanosis, + edema.   Lines: left arm with line- no phlebitis  21    Laboratory and Tests Review:  Lab Results   Component Value Date    WBC 3.4 (L) 2021    WBC 3.6 (L) 2021    WBC 4.8 2021    HGB 7.5 (L) 2021    HCT 23.5 (L) 2021    .0 (H) 2021    PLT 37 (L) 2021     Lab Results   Component Value Date    NEUTROABS 10.55 (H) 2021    NEUTROABS 4.45 10/27/2021    NEUTROABS 3.32 10/26/2021     No results found for: [facility]  Lab Results   Component Value Date    ALT 24 2021    AST 38 (H) 2021    ALKPHOS 87 2021    BILITOT 1.5 (H) 2021     Lab Results   Component Value Date     2021    K 3.3 2021    K 2.4 2021     2021    CO2 18 2021    BUN 5 2021    CREATININE 0.9 2021    CREATININE 1.1 2021    CREATININE 0.9 2021    GFRAA >60 2021    LABGLOM >60 2021    GLUCOSE 106 2021    GLUCOSE 125 2011    PROT 6.4 2021    LABALBU 2.6 2021    LABALBU 3.6 2011    CALCIUM 8.4 2021    BILITOT 1.5 2021    ALKPHOS 87 2021    AST 38 2021    ALT 24 2021     Lab Results   Component Value Date    CRP 0.4 2021    CRP 15.7 (H) 2011     Lab Results   Component Value Date    SEDRATE 25 (H) 2021    SEDRATE 97 (H) 2021    SEDRATE 95 (H) 2019     Radiology:  CT abdomen and pelvis -  Peripancreatic fat stranding and edema adjacent to the duodenum may reflect   pancreatitis versus duodenitis. Small amount of reactive fluid identified in   the right upper quadrant. No abscess. Please correlate findings with   laboratory values. Thickened gallbladder wall with pericholecystic fat stranding may reflect   cholecystitis versus local inflammation from the adjacent duodenum. Further   evaluation may be obtained with HIDA scan if there is clinical suspicion for   cholecystitis. Microbiology:   11/25/2021- blood culture- no growth     Culture, Blood 2 [[id number]] (Abnormal)  Collected: 11/24/21 2038   Order Status: Completed Specimen: Blood Updated: 11/28/21 1029    Culture, Blood 2 Previously positive blood culture called Abnormal     Organism Enterococcus faecium Abnormal      Staphylococcus haemolyticus Abnormal     Culture, Blood 2 --    This isolate is presumed to be resistant based on the   detection of inducible Clindamycin resistance. Clindamycin   may still be effective in some patients. Narrative:     Source: BLOOD       Site: Antecubital-Lef          Previous panic on this admission - call   not needed per SOP, 11/25/2021 12:44,   by [facility]   Culture, Blood 1 [[id number]] (Abnormal)  Collected: 11/24/21 2038   Order Status: Completed Specimen: Blood Updated: 11/28/21 1027    Organism Enterococcus faecium Abnormal     Blood Culture, Routine --    Refer to previous culture CXBL2 11/24/2021 2038 for susceptibility   results     Organism Staphylococcus haemolyticus Abnormal     Blood Culture, Routine --    This isolate is presumed to be resistant based on the   detection of inducible Clindamycin resistance. Clindamycin   may still be effective in some patients.      Organism Enterococcus casseliflavus Abnormal    Narrative:     Source: BLOOD       Site: Antecubital-Rig          Microbiology results called to and read   back by Hyla Heimlich RN,    Urine gnr    Assessment:  · Vancomycin resistant Enterococcus bacteremia may be polymicrobial with a history of bacteremia in the past.    · Staph haemolyticus bacteremia  · Cirrhosis   · Leukopenia   · Stool was positive for VRE-patient stays in isolation  · Urine is colonized with Citrobacter would not treat at this time-asymptomatic bacteriuria     Plan:    · Continue daptomycin 10 mg/kg adjusted body weight q24h.  day 10 Of unless the echo showed some evidence of valvular problems  · Last CK total 12/5/21 (78)  · sed rate - 24  · Check TTE.- ordered -still pending but when the echo is completed and that there is no vegetations we can stop antibiotics on 12/13/2024  · Surgery and GI following   · Monitor labs     Electronically signed by Tico Cantrell MD on 12/9/2021 at 12:05 PM

## 2021-12-09 NOTE — PROGRESS NOTES
Nutrition Assessment     Type and Reason for Visit: Initial, RD Nutrition Re-Screen/LOS (rd re-screen negative)    Nutrition Assessment:  Pt assessed per LOS protocol. Chart reviewed. Pt currently eating ~75% of most meals (noted to be on DAGOBERTO x ~1d (today) d/t GIB) and w/ no other significant nutritional issues noted at this time. Will follow-up per policy. Please consult if RD needed.     Electronically signed by Nancy Ann RD, DOMINICK on 12/2/21 at 12:09 PM EST    Contact: ext [phone]

## 2021-12-09 NOTE — PROGRESS NOTES
OCCUPATIONAL THERAPY BEDSIDE TREATMENT NOTE   [address] [address] [address]     PZMV:  Patient Name: Jamie Rosado  MRN: [id number]  : 1975  Room: [address] [place] [address]     Evaluating OT: Mariam Wright, OTR/L #[id number]     Referring Provider[de-identified] Michael Harrell MD                                    Specific Provider Orders/Date: OT evaluation and treatment 21     Diagnosis:  Hepatic encephalopathy ([facility] [address] [K72.90]  Dehydration [E86.0]  Altered mental status, unspecified altered mental status type [R41.82]       Pertinent Medical History:  has a past medical history of Acute renal failure (ARF) ([facility] [address] Alcoholic cirrhosis ([facility] [address] Anxiety, Crohn's colitis ([facility] [address] Depression, History of blood transfusion, Pyoderma gangrenosum, and Thyroid disease.      Precautions:  Fall Risk, contact iso (VRE blood), incontinence of urine, bed alarm, cognition     Assessment of current deficits   [x]? Functional mobility             [x]?ADLs           [x]? Strength                  [x]? Cognition   [x]? Functional transfers           []? IADLs         [x]? Safety Awareness   [x]? Endurance   []? Fine Coordination              [x]? Balance      []? Vision/perception   []? Sensation     []? Gross Motor Coordination  []? ROM           []?  Delirium                   []? Motor Control      OT PLAN OF CARE   OT POC based on physician orders, patient diagnosis and results of clinical assessment     Frequency/Duration  2-5 days/wk for 2 weeks PRN   Specific OT Treatment to include:   * Instruction/training on adapted ADL techniques and AE recommendations to increase functional independence within precautions       * Functional transfer/mobility training/DME recommendations for increased independence, safety, and fall prevention  * Patient/Family education to increase follow through with safety techniques and functional independence  * Recommendation of environmental modifications for increased safety with functional transfers/mobility and ADLs  * Cognitive retraining/development of therapeutic activities to improve problem solving, judgement, memory, and attention for increased safety/participation in ADL/IADL tasks  * Therapeutic exercise to improve motor endurance, ROM, and functional strength for ADLs/functional transfers  * Therapeutic activities to facilitate/challenge dynamic balance, stand tolerance for increased safety and independence with ADLs  * Positioning to improve skin integrity, interaction with environment and functional independence     Recommended Adaptive Equipment:  TBD      Home Living:  Questionable historian d/t impaired cognition. Per chart review Pt lives with her son in a 2 story with 2-3 step(s) to enter with rail and  bed/bath on 2nd floor   Bathroom setup: tub shower   Equipment owned: crutches     Prior Level of Function: per chart review pt was indep  with ADLs , Independent  with IADLs; ambulated with no AD  Driving: ??   Occupation/leisure: not noted     Pain Level: no pain      Cognition: A&O: x 3, pleasant & cooperative  Follows 1-2 step direction, required assist to manage lines around gown, unable to problem solve with task               Memory:  fair              Sequencing:  fair               Problem solving: fair-              Judgement/safety: fair     Functional Assessment:   AM-PAC Daily Activity Raw Score: 21/24       Initial Eval Status  Date: 11/29/21 Treatment Status  Date: 12/9/21 STG=LTG  Time frame: 10-14 days   Feeding Minimal Assist /set up Indep Independent    Grooming Moderate Assist   To groom matted hair,  S/Indep  Pt washed hands standing at sink,  Modified [place]    UB Dressing Minimal Assist  Mod Indep   [facility] gown and robe standing, assistance to manage snaps on gown & heart monitor but able to tie gown in back Independent    LB Dressing Moderate Assist   To paresh socks in bed,  Mod Indep  Pt donned/doffed hospital socks using cross over technique seated EOB   Simulated pants, declined need Modified [place]    Bathing Moderate Assist  Bed level  Poor safety and problem solving Supervision/Mod Indep  Simulated Task due to pt stating of already getting wash up earlier this date   Modified [place]    Toileting Maximal Assist   Incontinent of urine, pt attempting to clean perineal area but not thorough. Mod Indep  Simulated, pt reports completing toileting Indep in room  Modified [place]    Bed Mobility  Supine to sit: Minimal Assist   Sit to supine: Minimal Assist  Mod Indep  Supine<>EOB  EOB<>Supine  Supine to sit: Independent   Sit to supine: Independent    Functional Transfers Sit to stand: Minimal Assist   Stand to sit: Minimal Assist   Stand pivot: NT  Supervision/Indep  Sit to Stand  Stand to Sit   Independent    Functional Mobility Mod A with HHA  1-2 side steps , unsteady  High risk for falls  Supervision/Indep  In room distances due to isolation  indep   Balance Sitting: SBA     Standing: mod A Sitting: Indep    Standing: S/Indep Sitting: indep      Standing: indep   Activity Tolerance poor Fair+ good   Visual/  Perceptual Glasses: contacts (per chart)             BUE  ROM/Strength/  Fine motor Coordination Hand dominance: R     RUE: ROM WFL     Strength: grossly 3+/5      Strength:  WFL     Coordination:  fair     LUE: ROM  WFL     Strength: grossly 3+/5      Strength:  WFL     Coordination: fair  Increase BUE strength 1/3 muscle grade for improved indep with functional mobility/transfers      Treatment/Education/Comments: Upon arrival, pt supine in bed, agreeable to therapy. Pt compelted of bed mobility, functional mobility, transfers and light ADL tasks this session. Pt educated on role of OT, goals to be reached, safety with bed mobility, safety and hand placement with transfers, techniques to assist with LB dressing/bathing tasks.  Spoke to pt in regards to home setup, with pt denying need for tucker DME at time of discharge. At end of session, pt seated upright in bed, all tubes and lines intact, call light within reach.  Pt has made Good progress towards set goals.  Continue with current plan of care focusing on increasing of independency with transfers and ADL tasks    Time In: 11:45am  Time Out: 12:08pm   Total Tx TIme: 23 minutes     Min Units   OT Eval Low 26252     OT Eval Medium 31901     OT Eval High 28160     OT Re-Eval 40678          ADL/Self Care 69468 13 1   Therapeutic Activities 44075 10 1   Therapeutic Ex 98205     Orthotic Management 21304     Neuro Re-Ed 52589     Non-Billable Time     TOTAL TIMED TREATMENT         French Montero.  [address], [address] [place] [address]

## 2021-12-09 NOTE — PROGRESS NOTES
Hospitalist Progress Note      SYNOPSIS: Patient admitted on 2021 for altered mental status. Mariam Batista has a history of alcoholic cirrhosis and blood transfusions nad was brought into the ED with a complaint of altered mental status.  On admission, she was found to be tachycardic and hypertensive as well as jaundiced.  She was also hypokalemic and hypomagnesemic with elevated lactic acid elevated ammonia.  CT of the chest was unremarkable.  Blood cultures were positive for Enterococcus.  She was admitted and managed for acute hepatic encephalopathy as well as Enterococcus bacteremia. SUBJECTIVE:    Patient seen and examined. She had no complaints and feels well. She is still awaiting the AMERICO. Records reviewed. Temp (24hrs), Av.7 °F (36.5 °C), Min:97.1 °F (36.2 °C), Max:97.9 °F (36.6 °C)    DIET: ADULT DIET; Regular; 4 carb choices (60 gm/meal)  CODE: Full Code    Intake/Output Summary (Last 24 hours) at 2021 1312  Last data filed at 2021 1414  Gross per 24 hour   Intake 360 ml   Output    Net 360 ml       OBJECTIVE:    /63   Pulse 55   Temp 97.9 °F (36.6 °C) (Temporal)   Resp 16   Ht 5' 8\" (1.727 m)   Wt 254 lb (115.2 kg)   SpO2 97%   BMI 38.62 kg/m²     General appearance: No apparent distress, appears stated age and cooperative. HEENT:  Conjunctivae/corneas clear. Neck: Supple. No jugular venous distention. Respiratory: Clear to auscultation bilaterally, normal respiratory effort  Cardiovascular: Regular rate rhythm, normal S1-S2  Abdomen: Soft, nontender, nondistended  Musculoskeletal: No clubbing, cyanosis, no bilateral lower extremity edema. Brisk capillary refill. Skin:  No rashes  on visible skin  Neurologic: awake, alert and following commands     ASSESSMENT:  #Enterococcus  Bacteremia  -now on daptomycin.  PICC line in place  -had EGD which showed evidence of varices  -awaiting AMERICO; cardiology canceled it yesterday due to presence of esophageal varices and her INR being >2  -cardiology to determine when she can have AMERICO/TTE     #Alcoholic liver cirrhosis  -GI on board. On rifaximin and lactulose  -EGD yesterday showed ramy I/II varices without high risk features, esophagitis with ulceration, nodular GAVE with active bleeding and irregular duodenal mucosa.   -on spironolactone and rifaximin  -on octreotide infusion  -on lactulose  -on PPI     #Elevated INR  -INR is 2.1 today.   -This is likely due to liver disease     #Hypokalemia: resolved. Will monitor     #Inflammatory bowel disease  -s/p total abdominal colectomy with ileal pouch-anal anastomosis  -stable     #Acute on chronic anemia and pancytopenia  -s/p transfusion of one unit of PRBC  -Hb today: 7.5  -on PPI  -platelets are 35 today, and wbc is 3.6 today  -all due to alcohol abuse and liver disease     #Hypomagnesemia and hypokalemia  -potassium is 3.3. Will replace and trend.      #Anemia;  - Hb is 7.5. Will trend and monitor     #History of alcohol abuse:  - on thiamine, folic acid and multivites. -on alcohol withdrawal protocol with ativan  -liver enzymes are elevated. Bilirubin is 1.5        DVT prophylaxis:  -SCDs.   - lovenox held o/a of thrombocytopenia       DISPOSITION: to be determined    Medications:  REVIEWED DAILY    Infusion Medications   Scheduled Medications    lactulose  20 g Oral 6 times per day    pantoprazole  40 mg IntraVENous BID    And    sodium chloride (PF)  10 mL IntraVENous BID    daptomycin (CUBICIN) IVPB  10 mg/kg (Adjusted) IntraVENous Q24H    white petrolatum   Topical BID    potassium chloride  10 mEq IntraVENous Once    potassium chloride  10 mEq IntraVENous Once    lidocaine PF  5 mL IntraDERmal Once    sodium chloride flush  5-40 mL IntraVENous 2 times per day    cloNIDine  0.1 mg Oral Daily    folic acid  1 mg Oral Daily    propranolol  20 mg Oral Q12H    pyridoxine  25 mg Oral Daily    rifaximin  550 mg Oral BID    spironolactone  25 mg Oral Daily    thiamine mononitrate  100 mg Oral Daily    vitamin D  50,000 Units Oral Weekly    sodium chloride flush  10 mL IntraVENous 2 times per day    [Held by provider] enoxaparin  40 mg SubCUTAneous Daily     PRN Meds: sodium chloride flush, iohexol, promethazine **OR** [DISCONTINUED] ondansetron, polyethylene glycol, acetaminophen **OR** acetaminophen, potassium chloride **OR** potassium alternative oral replacement **OR** potassium chloride, magnesium sulfate, LORazepam **OR** LORazepam **OR** LORazepam **OR** LORazepam **OR** LORazepam **OR** LORazepam **OR** LORazepam **OR** LORazepam    Labs:     Recent Labs     12/07/21  0522 12/08/21  0510 12/09/21  0510   WBC 4.8 3.6* 3.4*   HGB 7.6* 7.7* 7.5*   HCT 24.3* 24.1* 23.5*   PLT 37* 35* 37*       Recent Labs     12/07/21  0522 12/08/21  0510 12/09/21  0510    142 139   K 3.7 3.7 3.3*   * 114* 112*   CO2 16* 18* 18*   BUN 6 6 5*   CREATININE 0.9 1.1* 0.9   CALCIUM 8.3* 8.3* 8.4*       Recent Labs     12/07/21  0522 12/08/21  0510 12/09/21  0510   PROT 6.0* 5.9* 6.4   ALKPHOS 88 85 87   ALT 26 23 24   AST 33* 32* 38*   BILITOT 1.9* 1.7* 1.5*       Recent Labs     12/07/21  0522 12/08/21  0510 12/09/21  0510   INR 2.5 2.2 2.1       No results for input(s): CKTOTAL, TROPONINI in the last 72 hours. Chronic labs:    Lab Results   Component Value Date    CHOL 100 09/01/2021    TRIG 164 (H) 09/01/2021    HDL 12 09/01/2021    LDLCALC 55 09/01/2021    TSH 6.720 (H) 11/29/2021    INR 2.1 12/09/2021    LABA1C 4.1 09/01/2021       Radiology: REVIEWED DAILY    +++++++++++++++++++++++++++++++++++++++++++++++++  Len Zamora MD  [facility] - [address] [place] [address]  +++++++++++++++++++++++++++++++++++++++++++++++++  NOTE: This report was transcribed using voice recognition software. Every effort was made to ensure accuracy; however, inadvertent computerized transcription errors may be present.

## 2021-12-09 NOTE — PROGRESS NOTES
response to education:   Pt verbalized understanding Pt demonstrated skill Pt requires further education in this area   yes yes no     ASSESSMENT:    Comments:  Patient semi-supine in bed upon entry and agreeable to PT treatment. Patient able to stand and ambulate within room without physical assist with no LOBs or unsteadiness noted. Patient reports that she is at her baseline functioning. Mild SOB after activity that resolved quickly with seated rest. Patient to be discharged from PT service at this time d/t return to independent PLOF. Treatment:  Patient practiced and was instructed in the following treatment:     Bed Mobility: VCs provided for sequencing and safety during mobility.  Transfer Training: Verbal and tactile cueing provided for sequencing and safety during mobility.  Gait Training: Ambulation with no AD and verbal cues for proper technique and safety. PLAN:    Patient to be discharged from PT service at this time d/t return to independent PLOF.     Time in  1106  Time out  1116    Total Treatment Time  10 minutes     CPT codes:  [] Gait training 23170 - minutes  [] Manual therapy 63946 - minutes  [x] Therapeutic activities 56291 10 minutes  [] Therapeutic exercises 04442 - minutes  [] Neuromuscular reeducation 28353 - minutes    Richar Arana PT, DPT  [id number]

## 2021-12-10 PROBLEM — Z16.21 BACTEREMIA DUE TO VANCOMYCIN RESISTANT ENTEROCOCCUS: Status: ACTIVE | Noted: 2021-01-01

## 2021-12-10 PROBLEM — R78.81 BACTEREMIA DUE TO VANCOMYCIN RESISTANT ENTEROCOCCUS: Status: ACTIVE | Noted: 2021-01-01

## 2021-12-10 PROBLEM — B95.2 BACTEREMIA DUE TO VANCOMYCIN RESISTANT ENTEROCOCCUS: Status: ACTIVE | Noted: 2021-01-01

## 2021-12-10 NOTE — DISCHARGE SUMMARY
Hospital Medicine Discharge Summary    Patient ID: Lucía Du      Patient's PCP: Estela Ford MD    Admit Date: 11/24/2021     Discharge Date:   12/10/2021    Admitting Physician: Wisam Tellez DO     Discharge Physician: Khadijah Coates MD     Discharge Diagnoses: Active Hospital Problems    Diagnosis Date Noted    Hepatic encephalopathy [facility]) [K72.90] 11/25/2021       The patient was seen and examined on day of discharge and this discharge summary is in conjunction with any daily progress note from day of discharge. Hospital Course: Patient admitted on 11/24/2021 for altered mental status. Anuj Russell has a history of alcoholic cirrhosis and blood transfusions nad was brought into the ED with a complaint of altered mental status.  On admission, she was found to be tachycardic and hypertensive as well as jaundiced.  She was also hypokalemic and hypomagnesemic with elevated lactic acid elevated ammonia.  CT of the chest was unremarkable.  Blood cultures were positive for Enterococcus.  She was admitted and managed for acute hepatic encephalopathy as well as Enterococcus bacteremia. She had TTE which was negative for any evidence of vegetation. She had EGD which showed grade I/II varices without high risk features, esophagitis with ulceration, nodular GAVE with active bleeding and irregular duodenal mucosa. She was put on spironolactone and rifaximin, and octreotide infusion. Hospital course was also complicated by elevated INR of 2.1, which was due to liver disease. She had a PICC line inserted. She was discharged home on 12/10/2021, on IV daptomycin as prescribed by ID till 12/13/2021. Patient seen and examined prior to discharge. She felt well and had no active complaints. Review of systems was otherwise negative. Labs and vitals reviewed. Home meds reviewed and reconciled.     Patient clinically stable at time of discharge      Exam:     BP (!) 112/58   Pulse 63   Temp 97 °F (36.1 °C) (Temporal) Resp 16   Ht 5' 8\" (1.727 m)   Wt 254 lb (115.2 kg)   SpO2 96%   BMI 38.62 kg/m²     General appearance: No apparent distress, appears stated age and cooperative. HEENT: Pupils equal, round, and reactive to light. Conjunctivae/corneas clear. Neck: Supple, with full range of motion. No jugular venous distention. Trachea midline. Respiratory:  Normal respiratory effort. Clear to auscultation, bilaterally without Rales/Wheezes/Rhonchi. Cardiovascular: Regular rate and rhythm with normal S1/S2 without murmurs, rubs or gallops. Abdomen: Soft, non-tender, non-distended with normal bowel sounds. Musculoskeletal: No clubbing, cyanosis or edema bilaterally. Full range of motion without deformity. Skin: Skin color, texture, turgor normal.  No rashes or lesions. Neurologic:  Neurovascularly intact without any focal sensory/motor deficits. Cranial nerves: II-XII intact, grossly non-focal.  Psychiatric: Alert and oriented, thought content appropriate, normal insight      Consults:     IP CONSULT TO SOCIAL WORK  IP CONSULT TO INFECTIOUS DISEASES  IP CONSULT TO GENERAL SURGERY  IP CONSULT TO GI  IP CONSULT TO CARDIOLOGY    Significant Diagnostic Studies:     IR US ABDOMINAL LIMITED   Final Result   Insufficient fluid for paracentesis         CT ABDOMEN PELVIS W WO CONTRAST Additional Contrast? Oral   Final Result   Peripancreatic fat stranding and edema adjacent to the duodenum may reflect   pancreatitis versus duodenitis. Small amount of reactive fluid identified in   the right upper quadrant. No abscess. Please correlate findings with   laboratory values. Thickened gallbladder wall with pericholecystic fat stranding may reflect   cholecystitis versus local inflammation from the adjacent duodenum. Further   evaluation may be obtained with HIDA scan if there is clinical suspicion for   cholecystitis. No change in size midline ventral hernia.       Again demonstrated are prominent periesophageal varices likely reflects   underlying portal hypertension. CT Head WO Contrast   Final Result   No acute intracranial abnormality. CTA PULMONARY W CONTRAST   Final Result   Motion limited exam.  No evidence of pulmonary embolism or acute pulmonary   abnormality. XR CHEST PORTABLE   Final Result   Negative single view chest for acute process. Disposition:  home    Discharge Instructions/Follow-up:  Follow up with PCP and ID in 1-2 weeks. Follow up at infusion center as scheduled for IV daptomycin infusion    Code Status:  Full Code     Activity: activity as tolerated    Diet: cardiac diet    Labs: For convenience and continuity at follow-up the following most recent labs are provided:      CBC:    Lab Results   Component Value Date    WBC 2.7 12/10/2021    HGB 7.3 12/10/2021    HCT 22.8 12/10/2021    PLT 35 12/10/2021       Renal:    Lab Results   Component Value Date     12/10/2021    K 3.6 12/10/2021    K 2.4 11/26/2021     12/10/2021    CO2 19 12/10/2021    BUN 5 12/10/2021    CREATININE 0.9 12/10/2021    CALCIUM 8.6 12/10/2021    PHOS 3.3 10/26/2021       Discharge Medications:     Current Discharge Medication List           Details   DAPTOmycin (CUBICIN) infusion Infuse 844 mg intravenously every 24 hours for 3 days Compound per protocol.  Stop date 12/13/21  Qty: 3 g, Refills: 0              Details   spironolactone (ALDACTONE) 25 MG tablet Take 1 tablet by mouth daily  Qty: 30 tablet, Refills: 3      cyanocobalamin 2000 MCG tablet Take 1 tablet by mouth daily  Qty: 30 tablet, Refills: 3      pyridoxine (B-6) 25 MG tablet Take 1 tablet by mouth daily  Qty: 30 tablet, Refills: 3      vitamin D (ERGOCALCIFEROL) 1.25 MG (11795 UT) CAPS capsule Take 1 capsule by mouth once a week Monday  Qty: 5 capsule, Refills: 0      potassium chloride (KLOR-CON M) 10 MEQ extended release tablet Take 20 mEq by mouth daily      lactulose (CHRONULAC) 10 GM/15ML solution Take 20 g by mouth every 12 hours      propranolol (INDERAL) 10 MG tablet Take 20 mg by mouth every 12 hours       rifaximin (XIFAXAN) 550 MG tablet Take 550 mg by mouth 2 times daily      cloNIDine (CATAPRES) 0.1 MG tablet Take 0.1 mg by mouth daily      magnesium oxide (MAG-OX) 400 MG tablet Take 400 mg by mouth daily      vitamin B-1 (THIAMINE) 100 MG tablet Take 100 mg by mouth daily      folic acid (FOLVITE) 1 MG tablet Take 1 tablet by mouth daily  Qty: 30 tablet, Refills: 3      pantoprazole (PROTONIX) 40 MG tablet Take 40 mg by mouth daily              Time Spent on discharge is more than 45 minutes in the examination, evaluation, counseling and review of medications and discharge plan.       Signed:    Rodríguez Myles MD   12/10/2021

## 2021-12-10 NOTE — CARE COORDINATION
Patient for possible discharge home today. Have received script from ID for IV Dapto for 3 more days. Spoke with patient in her room and offered her the infusion center in [facility]. She is agreeable. Call placed to [facility] [phone]. Have spoken with Meera De La Torre there and faxed order and demographics. They have arranged an appointment for tomorrow, Saturday 1211/21 at 9:15am.  Have notified patient in room and provided directions with where to go for infusion center. Have placed appointment on AVS.  Perfect serve message sent to Dr Nirav Pride regarding possible discharge.

## 2021-12-10 NOTE — ADT AUTH CERT
Liver Disease Complications - Care Day 15 (12/9/2021) by Kuldip Riggs RN       Review Status Review Entered   Completed 12/10/2021 12:15      Criteria Review      Care Day: 15 Care Date: 12/9/2021 Level of Care: Inpatient Floor    Guideline Day 3    Clinical Status    (X) * Hemodynamic stability    12/10/2021 12:15 PM EST by Harrold Dubin      102/58 85 16 97.4 98% room air    (X) * Tachypnea absent    (X) * Afebrile    (X) * No bleeding    (X) * No peritonitis, or treatment adequate    (X) * Ascites absent or adequately controlled    (X) * Volume status adequately controlled    ( ) * Renal function at baseline or acceptable for next level of care    (X) * Electrolyte abnormalities absent or acceptable for next level of care    (X) * Mental status at baseline    ( ) * Diet tolerated    ( ) * Discharge plans and education understood    Activity    (X) * Ambulatory or acceptable for next level of care    Routes    (X) * Oral hydration    12/10/2021 12:15 PM EST by Harrold Dubin      ADULT DIET; Regular; 4 carb choices (60 gm/meal) [[id number]]    ( ) * Oral medications or regimen acceptable for next level of care    (X) * Oral diet or acceptable for next level of care    12/10/2021 12:15 PM EST by Lucius Hwang DIET;  Regular; 4 carb choices (60 gm/meal) [[id number]]    * Milestone   Additional Notes   DATE: 12/9/21            Pertinent Updates:      IM Note:      SYNOPSIS: Patient admitted on 11/24/2021 for altered mental status. Howard Boyle has a history of alcoholic cirrhosis and blood transfusions nad was brought into the ED with a complaint of altered mental status.  On admission, she was found to be tachycardic and hypertensive as well as jaundiced.  She was also hypokalemic and hypomagnesemic with elevated lactic acid elevated ammonia.  CT of the chest was unremarkable.  Blood cultures were positive for Enterococcus.  She was admitted and managed for acute hepatic encephalopathy as well as Enterococcus bacteremia.            SUBJECTIVE:       Patient seen and examined. She had no complaints and feels well. She is still awaiting the AMERICO. Records reviewed. ASSESSMENT:   #Enterococcus  Bacteremia   -now on daptomycin. PICC line in place   -had EGD which showed evidence of varices   -awaiting AMERICO; cardiology canceled it yesterday due to presence of esophageal varices and her INR being >2   -cardiology to determine when she can have AMERICO/TTE       #Alcoholic liver cirrhosis   -GI on board. On rifaximin and lactulose   -EGD yesterday showed ramy I/II varices without high risk features, esophagitis with ulceration, nodular GAVE with active bleeding and irregular duodenal mucosa.    -on spironolactone and rifaximin   -on octreotide infusion   -on lactulose   -on PPI       #Elevated INR   -INR is 2.1 today.    -This is likely due to liver disease       #Hypokalemia: resolved. Will monitor       #Inflammatory bowel disease   -s/p total abdominal colectomy with ileal pouch-anal anastomosis   -stable       #Acute on chronic anemia and pancytopenia   -s/p transfusion of one unit of PRBC   -Hb today: 7.5   -on PPI   -platelets are 49 XSJXW, and wbc is 3.6 today   -all due to alcohol abuse and liver disease       #Hypomagnesemia and hypokalemia   -potassium is 3.3. Will replace and trend.        #Anemia;   - Hb is 7.5. Will trend and monitor       #History of alcohol abuse:   - on thiamine, folic acid and multivites. -on alcohol withdrawal protocol with ativan   -liver enzymes are elevated. Bilirubin is 1.5           DVT prophylaxis:   -SCDs. - lovenox held o/a of thrombocytopenia           DISPOSITION: to be determined         Physical Exam:   Constitutional: The patient is awake, alert, and oriented. In bed. Skin: Warm and dry. No rashes were noted. + jaundice-    HEENT: Round and reactive pupils.  Moist mucous membranes.  No ulcerations or thrush. icteric   Neck: Supple to movements.     Chest: Clear Cardiovascular: S1 and S2 are rhythmic and regular. + murmur. Abdomen: Soft, non tender, bowel sound +   Genitourinary: female   Extremities: No clubbing, no cyanosis, + edema. Lines: left arm with line- no phlebitis  11/28/21      Abnl/Pertinent Labs/Radiology/Diagnostic Studies:      Results for Sara Gillette (MRN [id number]) as of 12/10/2021 11:56      12/9/2021 05:10   Sodium: 139   Potassium: 3.3 (L)   Chloride: 112 (H)   CO2: 18 (L)   BUN: 5 (L)   Creatinine: 0.9   Anion Gap: 9   GFR Non-: >60   GFR African American: >60   Glucose: 106 (H)   Calcium: 8.4 (L)   Total Protein: 6.4   Albumin: 2.6 (L)   Alk Phos: 87   ALT: 24   AST: 38 (H)   Bilirubin: 1.5 (H)   WBC: 3.4 (L)   RBC: 2.35 (L)   Hemoglobin Quant: 7.5 (L)   Hematocrit: 23.5 (L)   MCV: 100.0 (H)   MCH: 31.9   MCHC: 31.9 (L)   MPV: 14.0 (H)   RDW: 15.8 (H)   Platelet Count: 37 (L)   Platelet Confirmation: CONFIRMED   Prothrombin Time: 23.0 (H)   INR: 2.1      12/9/2021 11:32   ECHOCARDIOGRAM COMPLETE 2D W DOPPLER W COLOR: Attch    Summary    No valvular vegetation.    Normal left ventricular systolic function.    Ejection fraction is visually estimated at > 60%.    Normal right ventricular size and function (TAPSE 3.2 cm).    Physiologic and/or trace tricuspid regurgitation.    PASP is estimated at 26 mmHg (normal estimated PASP).       Medications:      DAPTOmycin (CUBICIN) 850 mg in sodium chloride 0.9 % 50 mL IVPB   Dose: 10 mg/kg   Weight Dosing Info: 84.4 kg (Adjusted)   Freq: EVERY 24 HOURS Route: IV         folic acid (FOLVITE) tablet 1 mg   Dose: 1 mg   Freq: DAILY Route: PO      lactulose (CHRONULAC) 10 GM/15ML solution 20 g   Dose: 20 g   Freq: 6 times per day Route: PO      pantoprazole (PROTONIX) injection 40 mg   Dose: 40 mg   Freq: 2 TIMES DAILY Route: IV      rifaximin (XIFAXAN) tablet 550 mg   Dose: 550 mg   Freq: 2 TIMES DAILY Route: PO      spironolactone (ALDACTONE) tablet 25 mg   Dose: 25 mg   Freq: DAILY Route: PO thiamine mononitrate tablet 100 mg   Dose: 100 mg   Freq: DAILY Route: PO      vitamin B-6 (PYRIDOXINE) tablet 25 mg   Dose: 25 mg   Freq: DAILY Route: PO      LORazepam (ATIVAN) tablet 1 mg   Dose: 1 mg   Freq: EVERY 1 HOUR PRN (WITHDRAWAL) Route: PO x 3       potassium chloride (KLOR-CON M) extended release tablet 40 mEq   Dose: 40 mEq   Freq: PRN Route: PO   PRN Comment: Potassium Replacement x 1          MD Consults/Assessments & Plans:      ID:      Assessment:   · Vancomycin resistant Enterococcus bacteremia may be polymicrobial with a history of bacteremia in the past.     · Staph haemolyticus bacteremia   · Cirrhosis    · Leukopenia    · Stool was positive for VRE-patient stays in isolation   · Urine is colonized with Citrobacter would not treat at this time-asymptomatic bacteriuria       Plan:     · Continue daptomycin 10 mg/kg adjusted body weight q24h. day 10 Of unless the echo showed some evidence of valvular problems   · Last CK total 12/5/21 (78)   · sed rate - 24   · Check TTE.- ordered -still pending but when the echo is completed and that there is no vegetations we can stop antibiotics on 12/13/2024   · Surgery and GI following    · Monitor labs          PT/OT/SLP/CM Assessments or Notes:      PT:      ASSESSMENT:       Comments:  Patient semi-supine in bed upon entry and agreeable to PT treatment. Patient able to stand and ambulate within room without physical assist with no LOBs or unsteadiness noted. Patient reports that she is at her baseline functioning. Mild SOB after activity that resolved quickly with seated rest. Patient to be discharged from PT service at this time d/t return to independent PLOF.           OT:      Treatment/Education/Comments: Upon arrival, pt supine in bed, agreeable to therapy. Pt compelted of bed mobility, functional mobility, transfers and light ADL tasks this session.  Pt educated on role of OT, goals to be reached, safety with bed mobility, safety and hand placement with transfers, techniques to assist with LB dressing/bathing tasks. Spoke to pt in regards to home setup, with pt denying need for tucker DME at time of discharge. At end of session, pt seated upright in bed, all tubes and lines intact, call light within reach.        · Pt has made Good progress towards set goals. · Continue with current plan of care focusing on increasing of independency with transfers and ADL tasks      Dietician note:      Type and Reason for Visit: RD Nutrition Re-Screen/LOS (rd re-screen negative)       Nutrition Assessment:  Pt assessed per LOS protocol.  Chart reviewed.  Pt currently eating ~75% of most meals and w/ no other significant nutritional issues noted at this time.  Will follow-up per policy.  Please consult if RD needed.            Liver Disease Complications - Care Day 14 (12/8/2021) by Venice Chavez RN       Review Status Review Entered   Completed 12/10/2021 12:08      Criteria Review      Care Day: 14 Care Date: 12/8/2021 Level of Care: Inpatient Floor    Guideline Day 3    Clinical Status    (X) * Hemodynamic stability    12/10/2021 12:08 PM EST by [employer]      100/56 62 16 97.1 99% room air    (X) * Tachypnea absent    (X) * Afebrile    (X) * No bleeding    (X) * No peritonitis, or treatment adequate    (X) * Ascites absent or adequately controlled    (X) * Volume status adequately controlled    ( ) * Renal function at baseline or acceptable for next level of care    (X) * Electrolyte abnormalities absent or acceptable for next level of care    (X) * Mental status at baseline    ( ) * Diet tolerated    ( ) * Discharge plans and education understood    Activity    (X) * Ambulatory or acceptable for next level of care    Routes    (X) * Oral hydration    12/10/2021 12:08 PM EST by [employer]      ADULT DIET;  Regular; 4 carb choices (60 gm/meal) [[id number]]    ( ) * Oral medications or regimen acceptable for next level of care    12/10/2021 12:08 PM EST by Dany Meraz      DAPTOmycin (CUBICIN) 850 mg in sodium chloride 0.9 % 50 mL IVPB  Dose: 10 mg/kg  Weight Dosing Info: 84.4 kg (Adjusted)  Freq: EVERY 24 HOURS Route: IV    (X) * Oral diet or acceptable for next level of care    12/10/2021 12:08 PM EST by Lindsay Michelle DIET; Regular; 4 carb choices (60 gm/meal) [[id number]]    * Milestone   Additional Notes   DATE: 12/8/21      Pertinent Updates:      IM Note:      SYNOPSIS: Patient admitted on 11/24/2021 for altered mental status. Ashleigh Headley has a history of alcoholic cirrhosis and blood transfusions nad was brought into the ED with a complaint of altered mental status.  On admission, she was found to be tachycardic and hypertensive as well as jaundiced.  She was also hypokalemic and hypomagnesemic with elevated lactic acid elevated ammonia.  CT of the chest was unremarkable.  Blood cultures were positive for Enterococcus.  She was admitted and managed for acute hepatic encephalopathy as well as Enterococcus bacteremia.            SUBJECTIVE:       Patient seen and examined. She has no complaints this morning.  She couldn't have the AMERICO yesterday due to her varices and INR being >2, per cardiology. Review of systems is otherwise negative. Records reviewed. ASSESSMENT:   #Enterococcus  Bacteremia   -now on daptomycin. PICC line in place   -had EGD which showed evidence of varices   -awaiting AMERICO; cardiology canceled it yesterday due to presence of esophageal varices and her INR being >2   -cardiology to determine when she can have AMERICO        #Alcoholic liver cirrhosis   -GI on board. On rifaximin and lactulose   -EGD yesterday showed ramy I/II varices without high risk features, esophagitis with ulceration, nodular GAVE with active bleeding and irregular duodenal mucosa.    -on spironolactone and rifaximin   -on octreotide infusion   -on lactulose   -on PPI       #Elevated INR   -INR is 2.2 today.  This is likely due to liver disease     #Hypokalemia: resolved. Will monitor       #Inflammatory bowel disease   -s/p total abdominal colectomy with ileal pouch-anal anastomosis   -stable       #Acute on chronic anemia and pancytopenia   -s/p transfusion of one unit of PRBC   -Hb today: 7.7   -on PPI   -platelets are 35 today, and wbc is 3.6 today   -all due to alcohol abuse and liver disease       #Hypomagnesemia and hypokalemia   -resolved           #Anemia; hb is 7.7. Will trend and monitor       #History of alcohol abuse:   - on thiamine, folic acid and multivites. -on alcohol withdrawal protocol with ativan           DVT prophylaxis: SCDs. lovenox held o/a of thrombocytopenia            Physical Exam:      General appearance: No apparent distress, appears stated age and cooperative. HEENT:  Conjunctivae/corneas clear. Pale conjuctivea   Neck: Supple. No jugular venous distention. Respiratory: Clear to auscultation bilaterally, normal respiratory effort   Cardiovascular: Regular rate rhythm, normal S1-S2   Abdomen: Soft, nontender, nondistended   Musculoskeletal: No clubbing, cyanosis, no bilateral lower extremity edema. Brisk capillary refill. Skin:  No rashes  on visible skin   Neurologic: awake, alert and following commands       Abnl/Pertinent Labs/Radiology/Diagnostic Studies:         US: Findings:   Four-quadrant ultrasound was performed.  There is insufficient fluid   for paracentesis         Results for Bk Venegas (MRN [id number]) as of 12/10/2021 11:56      12/8/2021 05:10   Sodium: 142   Potassium: 3.7   Chloride: 114 (H)   CO2: 18 (L)   BUN: 6   Creatinine: 1.1 (H)   Anion Gap: 10   GFR Non-: 53   GFR : >60   Glucose: 115 (H)   Calcium: 8.3 (L)   Total Protein: 5.9 (L)   Albumin: 2.5 (L)   Alk Phos: 85   ALT: 23   AST: 32 (H)   Bilirubin: 1.7 (H)   WBC: 3.6 (L)   RBC: 2.35 (L)   Hemoglobin Quant: 7.7 (L)   Hematocrit: 24.1 (L)   MCV: 102.6 (H)   MCH: 32.8   MCHC: 32.0   MPV: 13.0 (H)   RDW: Review Entered   Completed 12/10/2021 11:58      Criteria Review      Care Day: 13 Care Date: 12/7/2021 Level of Care: Inpatient Floor    Guideline Day 3    Clinical Status    (X) * Hemodynamic stability    12/10/2021 11:58 AM EST by Gino Grey      100/56 62 14 98.2 97% room air    (X) * Tachypnea absent    (X) * Afebrile    (X) * No bleeding    (X) * No peritonitis, or treatment adequate    (X) * Ascites absent or adequately controlled    (X) * Volume status adequately controlled    ( ) * Renal function at baseline or acceptable for next level of care    (X) * Electrolyte abnormalities absent or acceptable for next level of care    12/10/2021 11:58 AM EST by Yamile Cartagena Results for Christianne Cranker (MRN [id number]) as of 12/10/2021 11:56    12/7/2021 05:22  Sodium: 140  Potassium: 3.7  Chloride: 115 (H)  CO2: 16 (L)  BUN: 6  Creatinine: 0.9  Anion Gap: 9  GFR Non-: >60  GFR African American: >60  Glucose: 115 (H)  Calci    (X) * Mental status at baseline    ( ) * Diet tolerated    ( ) * Discharge plans and education understood    Activity    (X) * Ambulatory or acceptable for next level of care    Routes    (X) * Oral hydration    12/10/2021 11:58 AM EST by Gino Grey      ADULT DIET; Regular; 4 carb choices (60 gm/meal) [[id number]]    ( ) * Oral medications or regimen acceptable for next level of care    12/10/2021 11:58 AM EST by Gino Grey      DAPTOmycin (CUBICIN) 850 mg in sodium chloride 0.9 % 50 mL IVPB  Dose: 10 mg/kg  Weight Dosing Info: 84.4 kg (Adjusted)  Freq: EVERY 24 HOURS Route: IV    (X) * Oral diet or acceptable for next level of care    12/10/2021 11:58 AM EST by Yonny Cesar DIET;  Regular; 4 carb choices (60 gm/meal) [[id number]]    * Milestone   Additional Notes   DATE: 12/7/21            Pertinent Updates:      GI:      ASSESSMENT AND PLAN:       46y/F w/ history of EtOH cirrhosis and IBD s/p TAC w/ IPAA on no maintenance medication who presents w/ hepatic encephalopathy in the setting of polymicrobial bacteremia.        MELD on admission: 19   MELD today: 19       PLAN:   1. Cirrhosis:   -Please trend CBC/CMP/INR daily. No need for trending labs more than daily for now.    -albumin 25g q 4 hours       2. HE    - lactulose q 4 hours. Daily doses can be stopped after patient has 4-6 bowel movements per day.        3. EV/Anemia:   -EGD w/ Grade I/II varices with no high risk features   -Nodular GAVE w/ oozing appreciated and treated w/ APC. -Portal hypertensive gastropathy. -PPI BID   -Okay to d/c octreotide   -Continue propanolol.   -Safe to perform AMERICO.       -4. Ascites: Currently no large volume ascites though in the setting of no current source of infection, would want to rule out SBP. Would plan for IR-guided diagnostic paracentesis. After paracentesis, patient should be placed on low dose Lasix 20mg and Aldactone 25mg daily w/ Cr monitored to ensure no re-accumulation in the future.    -Coagulopathy: INR elevated. I will be ordering 3 runs of 10mg IV Vit K. She may also require FFP around the time of the procedure if INR not improving.        5. IBD s/p TAC w/ IPAA:   -One of the bacteria which grew in the blood was E. Faecium.   -Pouchoscopy with no active inflammation or deep ulceration. Unlikely to be a source of infection.   -Biopsies obtained to assess overall status of pouch.       6. Polymicrobial Bacteremia:   -I will order IR-guided diagnostic paracentesis and place orders for labs. -Safe to proceed w/ AMERICO.   -On MIGUEL, patient noted to have abscess on buttock which may be a cause of infection. This may require I&D w/ surgery if not improving with topical treatments and antibiotics. Physical Exam:      Constitutional: The patient is awake, alert, and oriented. In bed. Skin: Warm and dry. No rashes were noted. + jaundice-    HEENT: Round and reactive pupils.  Moist mucous membranes.  No ulcerations or thrush.  icteric   Neck: Supple to movements. Chest: Clear    Cardiovascular: S1 and S2 are rhythmic and regular. + murmur. Abdomen: Soft, non tender, bowel sound +   Genitourinary: female   Extremities: No clubbing, no cyanosis, + edema.    Lines: left arm with line- no phlebitis  11/28/21      Abnl/Pertinent Labs/Radiology/Diagnostic Studies:      Results for Yodit Menon (MRN [id number]) as of 12/10/2021 11:56      12/7/2021 05:22   Sodium: 140   Potassium: 3.7   Chloride: 115 (H)   CO2: 16 (L)   BUN: 6   Creatinine: 0.9   Anion Gap: 9   GFR Non-: >60   GFR African American: >60   Glucose: 115 (H)   Calcium: 8.3 (L)   Total Protein: 6.0 (L)   Albumin: 2.5 (L)   Alk Phos: 88   ALT: 26   AST: 33 (H)   Bilirubin: 1.9 (H)   WBC: 4.8   RBC: 2.35 (L)   Hemoglobin Quant: 7.6 (L)   Hematocrit: 24.3 (L)   MCV: 103.4 (H)   MCH: 32.3   MCHC: 31.3 (L)   MPV: 13.0 (H)   RDW: 16.1 (H)   Platelet Count: 37 (L)   Platelet Confirmation: CONFIRMED   Prothrombin Time: 27.3 (H)   INR: 2.5         Medications:      DAPTOmycin (CUBICIN) 850 mg in sodium chloride 0.9 % 50 mL IVPB   Dose: 10 mg/kg   Weight Dosing Info: 84.4 kg (Adjusted)   Freq: EVERY 24 HOURS Route: IV      folic acid (FOLVITE) tablet 1 mg   Dose: 1 mg   Freq: DAILY Route: PO      lactulose (CHRONULAC) 10 GM/15ML solution 20 g   Dose: 20 g   Freq: 3 TIMES DAILY Route: PO      pantoprazole (PROTONIX) injection 40 mg   Dose: 40 mg   Freq: 2 TIMES DAILY Route: IV      rifaximin (XIFAXAN) tablet 550 mg   Dose: 550 mg   Freq: 2 TIMES DAILY Route: PO      spironolactone (ALDACTONE) tablet 25 mg   Dose: 25 mg   Freq: DAILY Route: PO      vitamin B-6 (PYRIDOXINE) tablet 25 mg   Dose: 25 mg   Freq: DAILY Route: PO      LORazepam (ATIVAN) tablet 1 mg   Dose: 1 mg   Freq: EVERY 1 HOUR PRN (WITHDRAWAL) Route: PO x 1       octreotide (SANDOSTATIN) 500 mcg in sodium chloride 0.9 % 100 mL infusion   Rate: 5 mL/hr Dose: 25 mcg/hr   Freq: CONTINUOUS Route: IV      MD Consults/Assessments & Plans: Card note:      Per GI patient has gastric antral vascular ectasia with active bleeding, patient is INR is 2.5 with a platelet count of 68,009, risk of bleeding significantly outweighs the the benefit of transesophageal echocardiogram, will reevaluate when patient coagulopathy and platelets improved      ID:      Assessment:   · Vancomycin resistant Enterococcus bacteremia may be polymicrobial with a history of bacteremia in the past.     · Staph haemolyticus bacteremia   · Cirrhosis    · Leukopenia        Plan:     · Continue daptomycin 10 mg/kg adjusted body weight q24h. day 8   · Last CK total 12/5/21 (78)   · sed rate - 24   · Check AMERICO. - ordered -still pending   · Surgery and GI following    · Monitor labs                            Liver Disease Complications - Care Day 12 (12/6/2021) by Chandan Sanchez RN       Review Status Review Entered   Completed 12/9/2021 15:48      Criteria Review      Care Day: 12 Care Date: 12/6/2021 Level of Care: Inpatient Floor    Guideline Day 3    Level Of Care    ( ) Floor to discharge    12/9/2021 3:48 PM EST by Ozzy      Intermediate    Clinical Status    ( ) * Hemodynamic stability    12/9/2021 3:48 PM EST by [employer]      97.0-60-16-85/48--100% on RA    (X) * Tachypnea absent    (X) * Afebrile    12/9/2021 3:48 PM EST by [employer]      97.0    ( ) * No bleeding    ( ) * No peritonitis, or treatment adequate    ( ) * Ascites absent or adequately controlled    ( ) * Volume status adequately controlled    ( ) * Renal function at baseline or acceptable for next level of care    ( ) * Electrolyte abnormalities absent or acceptable for next level of care    (X) * Mental status at baseline    ( ) * Diet tolerated    ( ) * Discharge plans and education understood    Activity    (X) * Ambulatory or acceptable for next level of care    Routes    ( ) * Oral hydration    ( ) * Oral medications or regimen acceptable for next level of care 12/9/2021 3:48 PM EST by Elaine Ayala      IV cubicin 076 mg R57, folic acid 1 mg daily, chronulac 20 g tid, IV protonix 40 mg daily, IV vit K 10 mg daily, rifaximin 550 mg bid, Aldactone 25 mg daily, Thiamine 100 mg daily, Vit B-6 25 mg daily, IV sandostatin gtt @ 5 ml/hr , klorcon 40 meq x1    ( ) * Oral diet or acceptable for next level of care    * Milestone   Additional Notes   12/06/2021      na 140   k+3.4   chloride 115   co2 17   florentin 8.4   alb 2.4   total protein 6.1   bilirubin 1.4   wbc 3.1   hgb 7.6, hct 24.5   INR 2.2         Per Internal Medicine-Her INR is 2.2 today so she is receiving vitamin K.       ASSESSMENT:   #Enterococcus  Bacteremia   -now on daptomycin. PICC line in place   -awaiting AMERICO; has to have EGD to rule out varices before she can have the AMERICO           #Alcoholic liver cirrhosis   -MELD score on admission was 19   -GI on board. On rifaximin and lactulose   -for EGD today to assess for varices   -on spironolactone and rifaximin   -on octreotide infusion   -on lactulose       #Elevated INR   -INR is 2.2 today. This is likely due to liver disease   -receiving Vitamin K to bring INR to <2 for AMERICO today       #Hypokalemia: K is 3.4 today. Will prelace and trend.       #Inflammatory bowel disease   -s/p total abdominal colectomy with ileal pouch-anal anastomosis   -stable       #Acute on chronic anemia and pancytopenia   -s/p transfusion of one unit of PRBC   -Hb today: 7.6   -on PPI   -platelets are 37 today, and wbc is 3.7   -all due to alcohol abuse and liver disewe       #Hypomagnesemia and hypokalemia   -resolved       #History of alcohol abuse:   - on thiamine, folic acid and multivites.    -on alcohol withdrawal protocol with ativan           DVT prophylaxis: lovenox       DISPOSITION: to be determined.           Per ID-Assessment:   ·Vancomycin resistant Enterococcus bacteremia may be polymicrobial with a history of bacteremia in the past.     ·Staph haemolyticus bacteremia ·Cirrhosis    ·Leukopenia        Plan:  Continue daptomycin 10 mg/kg adjusted body weight q24h. day 7   ·Check CK total   ·sed rate - will repeat    ·Check AMERICO. - ordered -still pending   ·Surgery and GI following    ·Monitor labs

## 2021-12-10 NOTE — PROGRESS NOTES
[address] [place] [address] [facility]  [facility]  Progress Note      C/C : Enterococcus bacteremia   Face to face encounter   SUBJECTIVE:  Patient is tolerating medications. No reported adverse drug reactions. ROS: No nausea, vomiting, has diarrhea. No distress. Has been afebrile. On room air. No new issues overnight. No new complaints or concerns  Stool cultures positive for VRE  Review of systems:  As stated above in the chief complaint, otherwise negative.     Medications:  Scheduled Meds:   potassium chloride  40 mEq Oral Once    lactulose  20 g Oral 6 times per day    pantoprazole  40 mg IntraVENous BID    And    sodium chloride (PF)  10 mL IntraVENous BID    daptomycin (CUBICIN) IVPB  10 mg/kg (Adjusted) IntraVENous Q24H    white petrolatum   Topical BID    potassium chloride  10 mEq IntraVENous Once    potassium chloride  10 mEq IntraVENous Once    lidocaine PF  5 mL IntraDERmal Once    sodium chloride flush  5-40 mL IntraVENous 2 times per day    cloNIDine  0.1 mg Oral Daily    folic acid  1 mg Oral Daily    propranolol  20 mg Oral Q12H    pyridoxine  25 mg Oral Daily    rifaximin  550 mg Oral BID    spironolactone  25 mg Oral Daily    thiamine mononitrate  100 mg Oral Daily    vitamin D  50,000 Units Oral Weekly    sodium chloride flush  10 mL IntraVENous 2 times per day    [Held by provider] enoxaparin  40 mg SubCUTAneous Daily     Continuous Infusions:     PRN Meds:sodium chloride flush, iohexol, promethazine **OR** [DISCONTINUED] ondansetron, polyethylene glycol, acetaminophen **OR** acetaminophen, potassium chloride **OR** potassium alternative oral replacement **OR** potassium chloride, magnesium sulfate, LORazepam **OR** LORazepam **OR** LORazepam **OR** LORazepam **OR** LORazepam **OR** LORazepam **OR** LORazepam **OR** LORazepam    OBJECTIVE:  BP (!) 112/58   Pulse 63   Temp 97 °F (36.1 °C) (Temporal)   Resp 16   Ht 5' 8\" (1.727 m)   Wt 254 lb (115.2 kg)   SpO2 96%   BMI 38.62 kg/m² Temp  Av.6 °F (36.4 °C)  Min: 97 °F (36.1 °C)  Max: 98.1 °F (36.7 °C)  Constitutional: The patient is awake, alert, and oriented. In bed. Skin: Warm and dry. No rashes were noted. + jaundice-   HEENT: Round and reactive pupils. Moist mucous membranes. No ulcerations or thrush. icteric  Neck: Supple to movements. Chest: Clear   Cardiovascular: S1 and S2 are rhythmic and regular. + murmur. Abdomen: Soft, non tender, bowel sound +  Genitourinary: female  Extremities: No clubbing, no cyanosis, + edema.   Lines: left arm with line- no phlebitis  21    Laboratory and Tests Review:  Lab Results   Component Value Date    WBC 2.7 (L) 12/10/2021    WBC 3.4 (L) 2021    WBC 3.6 (L) 2021    HGB 7.3 (L) 12/10/2021    HCT 22.8 (L) 12/10/2021    .8 (H) 12/10/2021    PLT 35 (L) 12/10/2021     Lab Results   Component Value Date    NEUTROABS 10.55 (H) 2021    NEUTROABS 4.45 10/27/2021    NEUTROABS 3.32 10/26/2021     No results found for: [facility]  Lab Results   Component Value Date    ALT 22 12/10/2021    AST 37 (H) 12/10/2021    ALKPHOS 82 12/10/2021    BILITOT 1.6 (H) 12/10/2021     Lab Results   Component Value Date     12/10/2021    K 3.6 12/10/2021    K 2.4 2021     12/10/2021    CO2 19 12/10/2021    BUN 5 12/10/2021    CREATININE 0.9 12/10/2021    CREATININE 0.9 2021    CREATININE 1.1 2021    GFRAA >60 12/10/2021    LABGLOM >60 12/10/2021    GLUCOSE 91 12/10/2021    GLUCOSE 125 2011    PROT 6.1 12/10/2021    LABALBU 2.5 12/10/2021    LABALBU 3.6 2011    CALCIUM 8.6 12/10/2021    BILITOT 1.6 12/10/2021    ALKPHOS 82 12/10/2021    AST 37 12/10/2021    ALT 22 12/10/2021     Lab Results   Component Value Date    CRP 0.4 2021    CRP 15.7 (H) 2011     Lab Results   Component Value Date    SEDRATE 25 (H) 2021    SEDRATE 97 (H) 2021    SEDRATE 95 (H) 2019     Radiology:  CT abdomen and pelvis -  Peripancreatic fat stranding and edema adjacent to the duodenum may reflect   pancreatitis versus duodenitis. Small amount of reactive fluid identified in   the right upper quadrant. No abscess. Please correlate findings with   laboratory values. Thickened gallbladder wall with pericholecystic fat stranding may reflect   cholecystitis versus local inflammation from the adjacent duodenum. Further   evaluation may be obtained with HIDA scan if there is clinical suspicion for   cholecystitis. Microbiology:   11/25/2021- blood culture- no growth     Culture, Blood 2 [[id number]] (Abnormal)  Collected: 11/24/21 2038   Order Status: Completed Specimen: Blood Updated: 11/28/21 1029    Culture, Blood 2 Previously positive blood culture called Abnormal     Organism Enterococcus faecium Abnormal      Staphylococcus haemolyticus Abnormal     Culture, Blood 2 --    This isolate is presumed to be resistant based on the   detection of inducible Clindamycin resistance. Clindamycin   may still be effective in some patients. Narrative:     Source: BLOOD       Site: Antecubital-Lef          Previous panic on this admission - call   not needed per SOP, 11/25/2021 12:44,   by [facility]   Culture, Blood 1 [[id number]] (Abnormal)  Collected: 11/24/21 2038   Order Status: Completed Specimen: Blood Updated: 11/28/21 1027    Organism Enterococcus faecium Abnormal     Blood Culture, Routine --    Refer to previous culture CXBL2 11/24/2021 2038 for susceptibility   results     Organism Staphylococcus haemolyticus Abnormal     Blood Culture, Routine --    This isolate is presumed to be resistant based on the   detection of inducible Clindamycin resistance. Clindamycin   may still be effective in some patients.      Organism Enterococcus casseliflavus Abnormal    Narrative:     Source: BLOOD       Site: Antecubital-Rig          Microbiology results called to and read   back by Lin Lin RN,    Urine gnr    Assessment:  Vancomycin resistant Enterococcus bacteremia may be polymicrobial with a history of bacteremia in the past.    Staph haemolyticus bacteremia  Cirrhosis   Leukopenia   Stool was positive for VRE-patient stays in isolation  Urine is colonized with Citrobacter would not treat at this time-asymptomatic bacteriuria     Plan:    Continue daptomycin 10 mg/kg adjusted body weight q24h. day 10 Of unless the echo showed some evidence of valvular problems  Last CK total 12/5/21 (78)  sed rate - 24  Check TTE.- no vegetations we can stop antibiotics on 12/13/2021  Med rec complete  Monitor labs   [id number] Jessica Valles for discharge  Will sign off    Electronically signed by SRIDHAR Kennedy CNP on 12/10/2021 at 1:43 PM  Pt seen and examined. Above discussed agree with advanced practice nurse. Labs, cultures, and radiographs reviewed. Face to Face encounter occurred. Changes made as necessary.      Kamlesh Bertrand MD

## 2021-12-11 NOTE — CARE COORDINATION
Received call from [facility], they have been following and didn't get c orders. Reviewed chart, pt was discharge and set up for infusion center.  [facility] to cancel earlier referral.Martha Contreras, MSW, LSW

## 2022-01-01 ENCOUNTER — APPOINTMENT (OUTPATIENT)
Dept: ULTRASOUND IMAGING | Age: 47
DRG: 871 | End: 2022-01-01
Payer: COMMERCIAL

## 2022-01-01 ENCOUNTER — HOSPITAL ENCOUNTER (INPATIENT)
Age: 47
LOS: 3 days | DRG: 871 | End: 2022-03-22
Attending: STUDENT IN AN ORGANIZED HEALTH CARE EDUCATION/TRAINING PROGRAM | Admitting: INTERNAL MEDICINE
Payer: COMMERCIAL

## 2022-01-01 ENCOUNTER — APPOINTMENT (OUTPATIENT)
Dept: GENERAL RADIOLOGY | Age: 47
DRG: 871 | End: 2022-01-01
Payer: COMMERCIAL

## 2022-01-01 ENCOUNTER — APPOINTMENT (OUTPATIENT)
Dept: CT IMAGING | Age: 47
DRG: 871 | End: 2022-01-01
Payer: COMMERCIAL

## 2022-01-01 VITALS
DIASTOLIC BLOOD PRESSURE: 61 MMHG | WEIGHT: 230.38 LBS | SYSTOLIC BLOOD PRESSURE: 123 MMHG | HEIGHT: 68 IN | TEMPERATURE: 97.7 F | BODY MASS INDEX: 34.92 KG/M2 | OXYGEN SATURATION: 91 %

## 2022-01-01 DIAGNOSIS — T68.XXXA HYPOTHERMIA, INITIAL ENCOUNTER: ICD-10-CM

## 2022-01-01 DIAGNOSIS — E87.20 LACTIC ACIDOSIS: ICD-10-CM

## 2022-01-01 DIAGNOSIS — E16.2 HYPOGLYCEMIA: ICD-10-CM

## 2022-01-01 DIAGNOSIS — N39.0 URINARY TRACT INFECTION WITH HEMATURIA, SITE UNSPECIFIED: ICD-10-CM

## 2022-01-01 DIAGNOSIS — F10.11 HISTORY OF ALCOHOL ABUSE: ICD-10-CM

## 2022-01-01 DIAGNOSIS — K92.2 GASTROINTESTINAL HEMORRHAGE, UNSPECIFIED GASTROINTESTINAL HEMORRHAGE TYPE: ICD-10-CM

## 2022-01-01 DIAGNOSIS — R79.1 ELEVATED INR: ICD-10-CM

## 2022-01-01 DIAGNOSIS — R31.9 URINARY TRACT INFECTION WITH HEMATURIA, SITE UNSPECIFIED: ICD-10-CM

## 2022-01-01 DIAGNOSIS — D62 ACUTE BLOOD LOSS ANEMIA: ICD-10-CM

## 2022-01-01 DIAGNOSIS — E72.20 HYPERAMMONEMIA (HCC): ICD-10-CM

## 2022-01-01 DIAGNOSIS — R77.8 ELEVATED TROPONIN: ICD-10-CM

## 2022-01-01 DIAGNOSIS — G93.41 METABOLIC ENCEPHALOPATHY: Primary | ICD-10-CM

## 2022-01-01 LAB
AADO2: 132.7 MMHG
AADO2: 188.4 MMHG
ABO/RH: NORMAL
ACETAMINOPHEN LEVEL: <5 MCG/ML (ref 10–30)
ACINETOBACTER CALCOAC BAUMANNII COMPLEX BY PCR: NOT DETECTED
ADENOVIRUS BY PCR: NOT DETECTED
ALBUMIN SERPL-MCNC: 1.9 G/DL (ref 3.5–5.2)
ALBUMIN SERPL-MCNC: 2 G/DL (ref 3.5–5.2)
ALBUMIN SERPL-MCNC: 2.8 G/DL (ref 3.5–5.2)
ALBUMIN SERPL-MCNC: 3.4 G/DL (ref 3.5–5.2)
ALBUMIN SERPL-MCNC: 3.5 G/DL (ref 3.5–5.2)
ALP BLD-CCNC: 112 U/L (ref 35–104)
ALP BLD-CCNC: 149 U/L (ref 35–104)
ALP BLD-CCNC: 150 U/L (ref 35–104)
ALP BLD-CCNC: 64 U/L (ref 35–104)
ALP BLD-CCNC: 79 U/L (ref 35–104)
ALT SERPL-CCNC: 42 U/L (ref 0–32)
ALT SERPL-CCNC: 43 U/L (ref 0–32)
ALT SERPL-CCNC: 54 U/L (ref 0–32)
ALT SERPL-CCNC: 57 U/L (ref 0–32)
ALT SERPL-CCNC: 79 U/L (ref 0–32)
AMMONIA: 143 UMOL/L (ref 11–51)
AMMONIA: 152 UMOL/L (ref 11–51)
AMMONIA: 325 UMOL/L (ref 11–51)
AMPHETAMINE SCREEN, URINE: NOT DETECTED
ANION GAP SERPL CALCULATED.3IONS-SCNC: 14 MMOL/L (ref 7–16)
ANION GAP SERPL CALCULATED.3IONS-SCNC: 15 MMOL/L (ref 7–16)
ANION GAP SERPL CALCULATED.3IONS-SCNC: 16 MMOL/L (ref 7–16)
ANION GAP SERPL CALCULATED.3IONS-SCNC: 19 MMOL/L (ref 7–16)
ANION GAP SERPL CALCULATED.3IONS-SCNC: 30 MMOL/L (ref 7–16)
ANION GAP SERPL CALCULATED.3IONS-SCNC: 32 MMOL/L (ref 7–16)
ANISOCYTOSIS: ABNORMAL
ANISOCYTOSIS: ABNORMAL
ANTIBODY SCREEN: NORMAL
AST SERPL-CCNC: 139 U/L (ref 0–31)
AST SERPL-CCNC: 179 U/L (ref 0–31)
AST SERPL-CCNC: 245 U/L (ref 0–31)
AST SERPL-CCNC: 384 U/L (ref 0–31)
AST SERPL-CCNC: 98 U/L (ref 0–31)
B.E.: -1.6 MMOL/L (ref -3–3)
B.E.: -10.6 MMOL/L (ref -3–3)
B.E.: -10.9 MMOL/L (ref -3–3)
B.E.: -11.7 MMOL/L (ref -3–3)
B.E.: -3.7 MMOL/L (ref -3–3)
BACTERIA: ABNORMAL /HPF
BACTEROIDES FRAGILIS BY PCR: NOT DETECTED
BARBITURATE SCREEN URINE: NOT DETECTED
BASOPHILS ABSOLUTE: 0 E9/L (ref 0–0.2)
BASOPHILS ABSOLUTE: 0.08 E9/L (ref 0–0.2)
BASOPHILS RELATIVE PERCENT: 0 % (ref 0–2)
BASOPHILS RELATIVE PERCENT: 0 % (ref 0–2)
BASOPHILS RELATIVE PERCENT: 0.2 % (ref 0–2)
BASOPHILS RELATIVE PERCENT: 1 % (ref 0–2)
BENZODIAZEPINE SCREEN, URINE: NOT DETECTED
BILIRUB SERPL-MCNC: 13.1 MG/DL (ref 0–1.2)
BILIRUB SERPL-MCNC: 13.3 MG/DL (ref 0–1.2)
BILIRUB SERPL-MCNC: 17 MG/DL (ref 0–1.2)
BILIRUB SERPL-MCNC: 17.2 MG/DL (ref 0–1.2)
BILIRUB SERPL-MCNC: 17.4 MG/DL (ref 0–1.2)
BILIRUBIN URINE: ABNORMAL
BLOOD BANK DISPENSE STATUS: NORMAL
BLOOD BANK PRODUCT CODE: NORMAL
BLOOD, URINE: ABNORMAL
BORDETELLA PARAPERTUSSIS BY PCR: NOT DETECTED
BORDETELLA PERTUSSIS BY PCR: NOT DETECTED
BOTTLE TYPE: ABNORMAL
BPU ID: NORMAL
BUN BLDV-MCNC: 13 MG/DL (ref 6–20)
BUN BLDV-MCNC: 14 MG/DL (ref 6–20)
BUN BLDV-MCNC: 16 MG/DL (ref 6–20)
BUN BLDV-MCNC: 20 MG/DL (ref 6–20)
BUN BLDV-MCNC: 22 MG/DL (ref 6–20)
BUN BLDV-MCNC: 28 MG/DL (ref 6–20)
BURR CELLS: ABNORMAL
BURR CELLS: ABNORMAL
CALCIUM SERPL-MCNC: 7.8 MG/DL (ref 8.6–10.2)
CALCIUM SERPL-MCNC: 8 MG/DL (ref 8.6–10.2)
CALCIUM SERPL-MCNC: 8.2 MG/DL (ref 8.6–10.2)
CALCIUM SERPL-MCNC: 8.3 MG/DL (ref 8.6–10.2)
CALCIUM SERPL-MCNC: 8.5 MG/DL (ref 8.6–10.2)
CALCIUM SERPL-MCNC: 8.6 MG/DL (ref 8.6–10.2)
CANDIDA ALBICANS BY PCR: NOT DETECTED
CANDIDA AURIS BY PCR: NOT DETECTED
CANDIDA GLABRATA BY PCR: NOT DETECTED
CANDIDA KRUSEI BY PCR: NOT DETECTED
CANDIDA PARAPSILOSIS BY PCR: NOT DETECTED
CANDIDA TROPICALIS BY PCR: NOT DETECTED
CANNABINOID SCREEN URINE: NOT DETECTED
CARBAPENEM RESISTANCE IMP GENE BY PCR: NOT DETECTED
CARBAPENEM RESISTANCE KPC BY PCR: NOT DETECTED
CARBAPENEM RESISTANCE NDM GENE BY PCR: NOT DETECTED
CARBAPENEM RESISTANCE OXA-48 GENE BY PCR: NOT DETECTED
CARBAPENEM RESISTANCE VIM GENE BY PCR: NOT DETECTED
CEA: 3.8 NG/ML (ref 0–5.2)
CEPHALOSPORIN RESISTANCE CTX-M GENE BY PCR: NOT DETECTED
CHLAMYDOPHILIA PNEUMONIAE BY PCR: NOT DETECTED
CHLORIDE BLD-SCNC: 89 MMOL/L (ref 98–107)
CHLORIDE BLD-SCNC: 89 MMOL/L (ref 98–107)
CHLORIDE BLD-SCNC: 90 MMOL/L (ref 98–107)
CHLORIDE BLD-SCNC: 94 MMOL/L (ref 98–107)
CHLORIDE BLD-SCNC: 96 MMOL/L (ref 98–107)
CHLORIDE BLD-SCNC: 96 MMOL/L (ref 98–107)
CHLORIDE URINE RANDOM: 47 MMOL/L
CHLORIDE URINE RANDOM: <20 MMOL/L
CLARITY: ABNORMAL
CO2: 12 MMOL/L (ref 22–29)
CO2: 12 MMOL/L (ref 22–29)
CO2: 19 MMOL/L (ref 22–29)
CO2: 20 MMOL/L (ref 22–29)
CO2: 21 MMOL/L (ref 22–29)
CO2: 22 MMOL/L (ref 22–29)
COCAINE METABOLITE SCREEN URINE: NOT DETECTED
COHB: 0.5 % (ref 0–1.5)
COHB: 0.8 % (ref 0–1.5)
COLISTIN RESISTANCE MCR-1 GENE BY PCR: NOT DETECTED
COLOR: ABNORMAL
CORONAVIRUS 229E BY PCR: NOT DETECTED
CORONAVIRUS HKU1 BY PCR: NOT DETECTED
CORONAVIRUS NL63 BY PCR: NOT DETECTED
CORONAVIRUS OC43 BY PCR: NOT DETECTED
CREAT SERPL-MCNC: 1.8 MG/DL (ref 0.5–1)
CREAT SERPL-MCNC: 1.9 MG/DL (ref 0.5–1)
CREAT SERPL-MCNC: 2.5 MG/DL (ref 0.5–1)
CREAT SERPL-MCNC: 2.7 MG/DL (ref 0.5–1)
CREAT SERPL-MCNC: 2.7 MG/DL (ref 0.5–1)
CREAT SERPL-MCNC: 2.8 MG/DL (ref 0.5–1)
CRITICAL: ABNORMAL
CRITICAL: ABNORMAL
CRYPTOCOCCUS NEOFORMANS/GATTII BY PCR: NOT DETECTED
DATE ANALYZED: ABNORMAL
DATE ANALYZED: ABNORMAL
DATE OF COLLECTION: ABNORMAL
DATE OF COLLECTION: ABNORMAL
DELIVERY SYSTEMS: ABNORMAL
DESCRIPTION BLOOD BANK: NORMAL
DEVICE: ABNORMAL
EKG ATRIAL RATE: 100 BPM
EKG P AXIS: 70 DEGREES
EKG P-R INTERVAL: 128 MS
EKG Q-T INTERVAL: 436 MS
EKG QRS DURATION: 84 MS
EKG QTC CALCULATION (BAZETT): 562 MS
EKG R AXIS: 21 DEGREES
EKG T AXIS: 53 DEGREES
EKG VENTRICULAR RATE: 100 BPM
ENTEROBACTER CLOACAE COMPLEX BY PCR: NOT DETECTED
ENTEROBACTERALES BY PCR: DETECTED
ENTEROCOCCUS FAECALIS BY PCR: NOT DETECTED
ENTEROCOCCUS FAECIUM BY PCR: NOT DETECTED
EOSINOPHILS ABSOLUTE: 0 E9/L (ref 0.05–0.5)
EOSINOPHILS ABSOLUTE: 0.08 E9/L (ref 0.05–0.5)
EOSINOPHILS RELATIVE PERCENT: 0 % (ref 0–6)
EOSINOPHILS RELATIVE PERCENT: 1 % (ref 0–6)
EPITHELIAL CELLS, UA: ABNORMAL /HPF
ESCHERICHIA COLI BY PCR: NOT DETECTED
ETHANOL: <10 MG/DL (ref 0–0.08)
FENTANYL SCREEN, URINE: NOT DETECTED
FIBRINOGEN: 105 MG/DL (ref 225–540)
FIBRINOGEN: 126 MG/DL (ref 225–540)
FIO2: 100
FIO2: 100
FIO2: 40 %
FIO2: 40 %
GFR AFRICAN AMERICAN: 22
GFR AFRICAN AMERICAN: 23
GFR AFRICAN AMERICAN: 23
GFR AFRICAN AMERICAN: 25
GFR AFRICAN AMERICAN: 34
GFR AFRICAN AMERICAN: 36
GFR NON-AFRICAN AMERICAN: 18 ML/MIN/1.73
GFR NON-AFRICAN AMERICAN: 19 ML/MIN/1.73
GFR NON-AFRICAN AMERICAN: 19 ML/MIN/1.73
GFR NON-AFRICAN AMERICAN: 21 ML/MIN/1.73
GFR NON-AFRICAN AMERICAN: 28 ML/MIN/1.73
GFR NON-AFRICAN AMERICAN: 30 ML/MIN/1.73
GLUCOSE BLD-MCNC: 104 MG/DL (ref 74–99)
GLUCOSE BLD-MCNC: 169 MG/DL (ref 74–99)
GLUCOSE BLD-MCNC: 171 MG/DL (ref 74–99)
GLUCOSE BLD-MCNC: 198 MG/DL (ref 74–99)
GLUCOSE BLD-MCNC: 225 MG/DL (ref 74–99)
GLUCOSE BLD-MCNC: 328 MG/DL (ref 74–99)
GLUCOSE URINE: 100 MG/DL
HAEMOPHILUS INFLUENZAE BY PCR: NOT DETECTED
HCO3: 12.9 MMOL/L (ref 22–26)
HCO3: 13.2 MMOL/L (ref 22–26)
HCO3: 13.9 MMOL/L (ref 22–26)
HCO3: 21 MMOL/L (ref 22–26)
HCO3: 21.3 MMOL/L (ref 22–26)
HCT VFR BLD CALC: 16.6 % (ref 34–48)
HCT VFR BLD CALC: 17.3 % (ref 34–48)
HCT VFR BLD CALC: 18.2 % (ref 34–48)
HCT VFR BLD CALC: 18.5 % (ref 34–48)
HCT VFR BLD CALC: 18.7 % (ref 34–48)
HCT VFR BLD CALC: 19.6 % (ref 34–48)
HCT VFR BLD CALC: 20.6 % (ref 34–48)
HCT VFR BLD CALC: 20.6 % (ref 34–48)
HCT VFR BLD CALC: 21.8 % (ref 34–48)
HEMOGLOBIN: 5.2 G/DL (ref 11.5–15.5)
HEMOGLOBIN: 6 G/DL (ref 11.5–15.5)
HEMOGLOBIN: 6.1 G/DL (ref 11.5–15.5)
HEMOGLOBIN: 6.1 G/DL (ref 11.5–15.5)
HEMOGLOBIN: 6.3 G/DL (ref 11.5–15.5)
HEMOGLOBIN: 6.7 G/DL (ref 11.5–15.5)
HEMOGLOBIN: 7 G/DL (ref 11.5–15.5)
HEMOGLOBIN: 7 G/DL (ref 11.5–15.5)
HEMOGLOBIN: 7.2 G/DL (ref 11.5–15.5)
HHB: 2.4 % (ref 0–5)
HHB: 26.6 % (ref 0–5)
HUMAN METAPNEUMOVIRUS BY PCR: NOT DETECTED
HUMAN RHINOVIRUS/ENTEROVIRUS BY PCR: NOT DETECTED
HYPOCHROMIA: ABNORMAL
HYPOCHROMIA: ABNORMAL
INFLUENZA A BY PCR: NOT DETECTED
INFLUENZA B BY PCR: NOT DETECTED
INR BLD: 3.6
INR BLD: 3.9
INR BLD: 5
INR BLD: 5.3
KETONES, URINE: 15 MG/DL
KLEBSIELLA AEROGENES BY PCR: NOT DETECTED
KLEBSIELLA OXYTOCA BY PCR: NOT DETECTED
KLEBSIELLA PNEUMONIAE GROUP BY PCR: DETECTED
L. PNEUMOPHILA SEROGP 1 UR AG: NORMAL
LAB: ABNORMAL
LAB: ABNORMAL
LACTIC ACID, SEPSIS: 19.9 MMOL/L (ref 0.5–1.9)
LACTIC ACID: 1.9 MMOL/L (ref 0.5–2.2)
LACTIC ACID: 15.5 MMOL/L (ref 0.5–2.2)
LACTIC ACID: 3.7 MMOL/L (ref 0.5–2.2)
LEUKOCYTE ESTERASE, URINE: ABNORMAL
LISTERIA MONOCYTOGENES BY PCR: NOT DETECTED
LYMPHOCYTES ABSOLUTE: 0.24 E9/L (ref 1.5–4)
LYMPHOCYTES ABSOLUTE: 0.24 E9/L (ref 1.5–4)
LYMPHOCYTES ABSOLUTE: 0.31 E9/L (ref 1.5–4)
LYMPHOCYTES ABSOLUTE: 0.37 E9/L (ref 1.5–4)
LYMPHOCYTES RELATIVE PERCENT: 3.5 % (ref 20–42)
LYMPHOCYTES RELATIVE PERCENT: 4 % (ref 20–42)
Lab: ABNORMAL
Lab: ABNORMAL
Lab: NORMAL
MAGNESIUM: 1.2 MG/DL (ref 1.6–2.6)
MAGNESIUM: 1.2 MG/DL (ref 1.6–2.6)
MAGNESIUM: 1.6 MG/DL (ref 1.6–2.6)
MAGNESIUM: 1.7 MG/DL (ref 1.6–2.6)
MAGNESIUM: 1.8 MG/DL (ref 1.6–2.6)
MAGNESIUM: 1.8 MG/DL (ref 1.6–2.6)
MCH RBC QN AUTO: 30.7 PG (ref 26–35)
MCH RBC QN AUTO: 31.2 PG (ref 26–35)
MCH RBC QN AUTO: 31.3 PG (ref 26–35)
MCH RBC QN AUTO: 31.6 PG (ref 26–35)
MCH RBC QN AUTO: 31.8 PG (ref 26–35)
MCH RBC QN AUTO: 31.9 PG (ref 26–35)
MCH RBC QN AUTO: 32.7 PG (ref 26–35)
MCH RBC QN AUTO: 33.2 PG (ref 26–35)
MCHC RBC AUTO-ENTMCNC: 31.3 % (ref 32–34.5)
MCHC RBC AUTO-ENTMCNC: 32.6 % (ref 32–34.5)
MCHC RBC AUTO-ENTMCNC: 33 % (ref 32–34.5)
MCHC RBC AUTO-ENTMCNC: 34 % (ref 32–34.5)
MCHC RBC AUTO-ENTMCNC: 34 % (ref 32–34.5)
MCHC RBC AUTO-ENTMCNC: 34.2 % (ref 32–34.5)
MCHC RBC AUTO-ENTMCNC: 34.6 % (ref 32–34.5)
MCHC RBC AUTO-ENTMCNC: 34.7 % (ref 32–34.5)
MCV RBC AUTO: 101.6 FL (ref 80–99.9)
MCV RBC AUTO: 104.4 FL (ref 80–99.9)
MCV RBC AUTO: 90.4 FL (ref 80–99.9)
MCV RBC AUTO: 91.1 FL (ref 80–99.9)
MCV RBC AUTO: 91.9 FL (ref 80–99.9)
MCV RBC AUTO: 92 FL (ref 80–99.9)
MCV RBC AUTO: 93.3 FL (ref 80–99.9)
MCV RBC AUTO: 94.4 FL (ref 80–99.9)
METER GLUCOSE: 111 MG/DL (ref 74–99)
METER GLUCOSE: 178 MG/DL (ref 74–99)
METER GLUCOSE: 183 MG/DL (ref 74–99)
METER GLUCOSE: 191 MG/DL (ref 74–99)
METER GLUCOSE: 191 MG/DL (ref 74–99)
METER GLUCOSE: 195 MG/DL (ref 74–99)
METER GLUCOSE: 202 MG/DL (ref 74–99)
METER GLUCOSE: 225 MG/DL (ref 74–99)
METER GLUCOSE: 318 MG/DL (ref 74–99)
METER GLUCOSE: 86 MG/DL (ref 74–99)
METHADONE SCREEN, URINE: NOT DETECTED
METHB: 0.4 % (ref 0–1.5)
METHB: 0.6 % (ref 0–1.5)
MODE: AC
MONOCYTES ABSOLUTE: 0.12 E9/L (ref 0.1–0.95)
MONOCYTES ABSOLUTE: 0.16 E9/L (ref 0.1–0.95)
MONOCYTES ABSOLUTE: 0.24 E9/L (ref 0.1–0.95)
MONOCYTES ABSOLUTE: 0.74 E9/L (ref 0.1–0.95)
MONOCYTES RELATIVE PERCENT: 2 % (ref 2–12)
MONOCYTES RELATIVE PERCENT: 2 % (ref 2–12)
MONOCYTES RELATIVE PERCENT: 4.3 % (ref 2–12)
MONOCYTES RELATIVE PERCENT: 8 % (ref 2–12)
MRSA CULTURE ONLY: NORMAL
MYCOPLASMA PNEUMONIAE BY PCR: NOT DETECTED
MYELOCYTE PERCENT: 1 % (ref 0–0)
MYELOCYTE PERCENT: 2 % (ref 0–0)
NEISSERIA MENINGITIDIS BY PCR: NOT DETECTED
NEUTROPHILS ABSOLUTE: 5.55 E9/L (ref 1.8–7.3)
NEUTROPHILS ABSOLUTE: 5.61 E9/L (ref 1.8–7.3)
NEUTROPHILS ABSOLUTE: 7.18 E9/L (ref 1.8–7.3)
NEUTROPHILS ABSOLUTE: 8.1 E9/L (ref 1.8–7.3)
NEUTROPHILS RELATIVE PERCENT: 87 % (ref 43–80)
NEUTROPHILS RELATIVE PERCENT: 90 % (ref 43–80)
NEUTROPHILS RELATIVE PERCENT: 92.2 % (ref 43–80)
NEUTROPHILS RELATIVE PERCENT: 94 % (ref 43–80)
NITRITE, URINE: POSITIVE
O2 CONTENT: 7.1 ML/DL
O2 CONTENT: 9.1 ML/DL
O2 SATURATION: 100 % (ref 92–98.5)
O2 SATURATION: 73 % (ref 92–98.5)
O2 SATURATION: 88.8 % (ref 92–98.5)
O2 SATURATION: 97.6 % (ref 92–98.5)
O2 SATURATION: 99 % (ref 92–98.5)
O2HB: 72 % (ref 94–97)
O2HB: 96.7 % (ref 94–97)
OPERATOR ID: 274
OPERATOR ID: 30
OPERATOR ID: 557
OPERATOR ID: 9689
OPERATOR ID: 9689
OPIATE SCREEN URINE: NOT DETECTED
ORDER NUMBER: ABNORMAL
ORGANISM: ABNORMAL
OVALOCYTES: ABNORMAL
OVALOCYTES: ABNORMAL
OXYCODONE URINE: NOT DETECTED
PARAINFLUENZA VIRUS 1 BY PCR: NOT DETECTED
PARAINFLUENZA VIRUS 2 BY PCR: NOT DETECTED
PARAINFLUENZA VIRUS 3 BY PCR: NOT DETECTED
PARAINFLUENZA VIRUS 4 BY PCR: NOT DETECTED
PATIENT TEMP: 37
PATIENT TEMP: 37 C
PATIENT TEMP: 37 C
PCO2 (TEMP CORRECTED): 23 MMHG (ref 35–45)
PCO2 37: 20.5 MMHG (ref 35–45)
PCO2 37: 29 MMHG (ref 35–45)
PCO2: 26.1 MMHG (ref 35–45)
PCO2: 38 MMHG (ref 35–45)
PDW BLD-RTO: 17 FL (ref 11.5–15)
PDW BLD-RTO: 17.1 FL (ref 11.5–15)
PDW BLD-RTO: 18.6 FL (ref 11.5–15)
PDW BLD-RTO: 18.6 FL (ref 11.5–15)
PDW BLD-RTO: 18.8 FL (ref 11.5–15)
PDW BLD-RTO: 19 FL (ref 11.5–15)
PDW BLD-RTO: 19.3 FL (ref 11.5–15)
PDW BLD-RTO: 19.3 FL (ref 11.5–15)
PEEP/CPAP: 5 CMH2O
PEEP/CPAP: 5 CMH2O
PFO2: 1.08 MMHG/%
PFO2: 2.81 MMHG/%
PH (TEMPERATURE CORRECTED): 7.37 (ref 7.35–7.45)
PH 37: 7.29 (ref 7.35–7.45)
PH 37: 7.41 (ref 7.35–7.45)
PH BLOOD GAS: 7.37 (ref 7.35–7.45)
PH BLOOD GAS: 7.52 (ref 7.35–7.45)
PH UA: 8 (ref 5–9)
PHENCYCLIDINE SCREEN URINE: NOT DETECTED
PHOSPHORUS: 4.7 MG/DL (ref 2.5–4.5)
PHOSPHORUS: 6.4 MG/DL (ref 2.5–4.5)
PLATELET # BLD: 18 E9/L (ref 130–450)
PLATELET # BLD: 22 E9/L (ref 130–450)
PLATELET # BLD: 23 E9/L (ref 130–450)
PLATELET # BLD: 25 E9/L (ref 130–450)
PLATELET # BLD: 28 E9/L (ref 130–450)
PLATELET # BLD: 35 E9/L (ref 130–450)
PLATELET # BLD: 36 E9/L (ref 130–450)
PLATELET # BLD: 37 E9/L (ref 130–450)
PLATELET CONFIRMATION: NORMAL
PMV BLD AUTO: 11.6 FL (ref 7–12)
PMV BLD AUTO: 11.9 FL (ref 7–12)
PMV BLD AUTO: 12.1 FL (ref 7–12)
PMV BLD AUTO: 12.2 FL (ref 7–12)
PMV BLD AUTO: 12.3 FL (ref 7–12)
PMV BLD AUTO: 12.6 FL (ref 7–12)
PMV BLD AUTO: 12.9 FL (ref 7–12)
PMV BLD AUTO: 13 FL (ref 7–12)
PO2 (TEMP CORRECTED): 373.3 MMHG (ref 60–100)
PO2 37: 145.1 MMHG (ref 80–100)
PO2 37: 53.8 MMHG (ref 80–100)
PO2: 112.5 MMHG (ref 75–100)
PO2: 43.1 MMHG (ref 75–100)
POC SOURCE: ABNORMAL
POIKILOCYTES: ABNORMAL
POIKILOCYTES: ABNORMAL
POLYCHROMASIA: ABNORMAL
POSITIVE END EXP PRESS: 5 CMH2O
POSITIVE END EXP PRESS: 5 CMH2O
POTASSIUM REFLEX MAGNESIUM: 4 MMOL/L (ref 3.5–5)
POTASSIUM SERPL-SCNC: 2.8 MMOL/L (ref 3.5–5)
POTASSIUM SERPL-SCNC: 3.2 MMOL/L (ref 3.5–5)
POTASSIUM SERPL-SCNC: 3.3 MMOL/L (ref 3.5–5)
POTASSIUM SERPL-SCNC: 3.3 MMOL/L (ref 3.5–5)
POTASSIUM SERPL-SCNC: 3.5 MMOL/L (ref 3.5–5)
POTASSIUM, UR: 21.1 MMOL/L
POTASSIUM, UR: 47.7 MMOL/L
PROCALCITONIN: 1.5 NG/ML (ref 0–0.08)
PROTEIN UA: >=300 MG/DL
PROTEUS SPECIES BY PCR: NOT DETECTED
PROTHROMBIN TIME: 42.8 SEC (ref 9.3–12.4)
PROTHROMBIN TIME: 46.8 SEC (ref 9.3–12.4)
PROTHROMBIN TIME: 58.9 SEC (ref 9.3–12.4)
PROTHROMBIN TIME: 62.8 SEC (ref 9.3–12.4)
PSEUDOMONAS AERUGINOSA BY PCR: NOT DETECTED
RBC # BLD: 1.59 E12/L (ref 3.5–5.5)
RBC # BLD: 1.84 E12/L (ref 3.5–5.5)
RBC # BLD: 1.9 E12/L (ref 3.5–5.5)
RBC # BLD: 1.98 E12/L (ref 3.5–5.5)
RBC # BLD: 2.1 E12/L (ref 3.5–5.5)
RBC # BLD: 2.24 E12/L (ref 3.5–5.5)
RBC # BLD: 2.28 E12/L (ref 3.5–5.5)
RBC # BLD: 2.31 E12/L (ref 3.5–5.5)
RBC UA: ABNORMAL /HPF (ref 0–2)
RESPIRATORY RATE: 18 B/MIN
RESPIRATORY RATE: 25 B/MIN
RESPIRATORY SYNCYTIAL VIRUS BY PCR: NOT DETECTED
RI(T): 1.18
RI(T): 4.37
RR MECHANICAL: 14 B/MIN
RR MECHANICAL: 25 B/MIN
SALICYLATE, SERUM: <0.3 MG/DL (ref 0–30)
SALMONELLA SPECIES BY PCR: NOT DETECTED
SARS-COV-2, PCR: NOT DETECTED
SERRATIA MARCESCENS BY PCR: NOT DETECTED
SODIUM BLD-SCNC: 127 MMOL/L (ref 132–146)
SODIUM BLD-SCNC: 131 MMOL/L (ref 132–146)
SODIUM BLD-SCNC: 131 MMOL/L (ref 132–146)
SODIUM BLD-SCNC: 132 MMOL/L (ref 132–146)
SODIUM BLD-SCNC: 132 MMOL/L (ref 132–146)
SODIUM BLD-SCNC: 133 MMOL/L (ref 132–146)
SODIUM URINE: 21 MMOL/L
SODIUM URINE: 77 MMOL/L
SOURCE OF BLOOD CULTURE: ABNORMAL
SOURCE, BLOOD GAS: ABNORMAL
SOURCE, BLOOD GAS: ABNORMAL
SPECIFIC GRAVITY UA: 1.01 (ref 1–1.03)
STAPHYLOCOCCUS AUREUS BY PCR: NOT DETECTED
STAPHYLOCOCCUS EPIDERMIDIS BY PCR: NOT DETECTED
STAPHYLOCOCCUS LUGDUNENSIS BY PCR: NOT DETECTED
STAPHYLOCOCCUS SPECIES BY PCR: DETECTED
STENOTROPHOMONAS MALTOPHILIA BY PCR: NOT DETECTED
STREP PNEUMONIAE ANTIGEN, URINE: NORMAL
STREPTOCOCCUS AGALACTIAE BY PCR: NOT DETECTED
STREPTOCOCCUS PNEUMONIAE BY PCR: NOT DETECTED
STREPTOCOCCUS PYOGENES  BY PCR: NOT DETECTED
STREPTOCOCCUS SPECIES BY PCR: NOT DETECTED
T4 FREE: 0.77 NG/DL (ref 0.93–1.7)
TARGET CELLS: ABNORMAL
TARGET CELLS: ABNORMAL
TEAR DROP CELLS: ABNORMAL
THB: 6.5 G/DL (ref 11.5–16.5)
THB: 7 G/DL (ref 11.5–16.5)
TIDAL VOLUME: 400 ML
TIDAL VOLUME: 480 ML
TIME ANALYZED: 1314
TIME ANALYZED: 1830
TOTAL CK: 135 U/L (ref 20–180)
TOTAL CK: 229 U/L (ref 20–180)
TOTAL PROTEIN: 6 G/DL (ref 6.4–8.3)
TOTAL PROTEIN: 6.2 G/DL (ref 6.4–8.3)
TOTAL PROTEIN: 6.4 G/DL (ref 6.4–8.3)
TOTAL PROTEIN: 6.5 G/DL (ref 6.4–8.3)
TOTAL PROTEIN: 6.6 G/DL (ref 6.4–8.3)
TOXIC GRANULATION: ABNORMAL
TRICYCLIC ANTIDEPRESSANTS SCREEN SERUM: NEGATIVE NG/ML
TROPONIN, HIGH SENSITIVITY: 35 NG/L (ref 0–9)
TROPONIN, HIGH SENSITIVITY: 67 NG/L (ref 0–9)
TSH SERPL DL<=0.05 MIU/L-ACNC: 2.07 UIU/ML (ref 0.27–4.2)
URINE CULTURE, ROUTINE: NORMAL
UROBILINOGEN, URINE: >=8 E.U./DL
VT MECHANICAL: 380 ML
VT MECHANICAL: 400 ML
WBC # BLD: 5.9 E9/L (ref 4.5–11.5)
WBC # BLD: 6.1 E9/L (ref 4.5–11.5)
WBC # BLD: 6.2 E9/L (ref 4.5–11.5)
WBC # BLD: 6.2 E9/L (ref 4.5–11.5)
WBC # BLD: 7.8 E9/L (ref 4.5–11.5)
WBC # BLD: 8 E9/L (ref 4.5–11.5)
WBC # BLD: 9.2 E9/L (ref 4.5–11.5)
WBC # BLD: 9.9 E9/L (ref 4.5–11.5)
WBC UA: >20 /HPF (ref 0–5)

## 2022-01-01 PROCEDURE — 6360000002 HC RX W HCPCS: Performed by: STUDENT IN AN ORGANIZED HEALTH CARE EDUCATION/TRAINING PROGRAM

## 2022-01-01 PROCEDURE — 83735 ASSAY OF MAGNESIUM: CPT

## 2022-01-01 PROCEDURE — 82436 ASSAY OF URINE CHLORIDE: CPT

## 2022-01-01 PROCEDURE — 0BH17EZ INSERTION OF ENDOTRACHEAL AIRWAY INTO TRACHEA, VIA NATURAL OR ARTIFICIAL OPENING: ICD-10-PCS | Performed by: STUDENT IN AN ORGANIZED HEALTH CARE EDUCATION/TRAINING PROGRAM

## 2022-01-01 PROCEDURE — P9047 ALBUMIN (HUMAN), 25%, 50ML: HCPCS | Performed by: INTERNAL MEDICINE

## 2022-01-01 PROCEDURE — 87081 CULTURE SCREEN ONLY: CPT

## 2022-01-01 PROCEDURE — 2580000003 HC RX 258: Performed by: INTERNAL MEDICINE

## 2022-01-01 PROCEDURE — 6360000002 HC RX W HCPCS: Performed by: INTERNAL MEDICINE

## 2022-01-01 PROCEDURE — 2720000010 HC SURG SUPPLY STERILE

## 2022-01-01 PROCEDURE — 82105 ALPHA-FETOPROTEIN SERUM: CPT

## 2022-01-01 PROCEDURE — 99222 1ST HOSP IP/OBS MODERATE 55: CPT | Performed by: STUDENT IN AN ORGANIZED HEALTH CARE EDUCATION/TRAINING PROGRAM

## 2022-01-01 PROCEDURE — 83605 ASSAY OF LACTIC ACID: CPT

## 2022-01-01 PROCEDURE — 87040 BLOOD CULTURE FOR BACTERIA: CPT

## 2022-01-01 PROCEDURE — 71045 X-RAY EXAM CHEST 1 VIEW: CPT

## 2022-01-01 PROCEDURE — 86901 BLOOD TYPING SEROLOGIC RH(D): CPT

## 2022-01-01 PROCEDURE — 84133 ASSAY OF URINE POTASSIUM: CPT

## 2022-01-01 PROCEDURE — 31500 INSERT EMERGENCY AIRWAY: CPT

## 2022-01-01 PROCEDURE — 96365 THER/PROPH/DIAG IV INF INIT: CPT

## 2022-01-01 PROCEDURE — 36430 TRANSFUSION BLD/BLD COMPNT: CPT

## 2022-01-01 PROCEDURE — 76705 ECHO EXAM OF ABDOMEN: CPT

## 2022-01-01 PROCEDURE — P9035 PLATELET PHERES LEUKOREDUCED: HCPCS

## 2022-01-01 PROCEDURE — 85014 HEMATOCRIT: CPT

## 2022-01-01 PROCEDURE — P9073 PLATELETS PHERESIS PATH REDU: HCPCS

## 2022-01-01 PROCEDURE — 6370000000 HC RX 637 (ALT 250 FOR IP): Performed by: INTERNAL MEDICINE

## 2022-01-01 PROCEDURE — C9113 INJ PANTOPRAZOLE SODIUM, VIA: HCPCS | Performed by: INTERNAL MEDICINE

## 2022-01-01 PROCEDURE — 6370000000 HC RX 637 (ALT 250 FOR IP): Performed by: STUDENT IN AN ORGANIZED HEALTH CARE EDUCATION/TRAINING PROGRAM

## 2022-01-01 PROCEDURE — 87088 URINE BACTERIA CULTURE: CPT

## 2022-01-01 PROCEDURE — 2000000000 HC ICU R&B

## 2022-01-01 PROCEDURE — 84439 ASSAY OF FREE THYROXINE: CPT

## 2022-01-01 PROCEDURE — 85610 PROTHROMBIN TIME: CPT

## 2022-01-01 PROCEDURE — 80143 DRUG ASSAY ACETAMINOPHEN: CPT

## 2022-01-01 PROCEDURE — P9016 RBC LEUKOCYTES REDUCED: HCPCS

## 2022-01-01 PROCEDURE — 6360000002 HC RX W HCPCS: Performed by: NURSE PRACTITIONER

## 2022-01-01 PROCEDURE — 82962 GLUCOSE BLOOD TEST: CPT

## 2022-01-01 PROCEDURE — 80053 COMPREHEN METABOLIC PANEL: CPT

## 2022-01-01 PROCEDURE — 96374 THER/PROPH/DIAG INJ IV PUSH: CPT

## 2022-01-01 PROCEDURE — 81001 URINALYSIS AUTO W/SCOPE: CPT

## 2022-01-01 PROCEDURE — 2580000003 HC RX 258: Performed by: SPECIALIST

## 2022-01-01 PROCEDURE — 80307 DRUG TEST PRSMV CHEM ANLYZR: CPT

## 2022-01-01 PROCEDURE — 5A1945Z RESPIRATORY VENTILATION, 24-96 CONSECUTIVE HOURS: ICD-10-PCS | Performed by: STUDENT IN AN ORGANIZED HEALTH CARE EDUCATION/TRAINING PROGRAM

## 2022-01-01 PROCEDURE — 86923 COMPATIBILITY TEST ELECTRIC: CPT

## 2022-01-01 PROCEDURE — 85384 FIBRINOGEN ACTIVITY: CPT

## 2022-01-01 PROCEDURE — 87150 DNA/RNA AMPLIFIED PROBE: CPT

## 2022-01-01 PROCEDURE — 36556 INSERT NON-TUNNEL CV CATH: CPT

## 2022-01-01 PROCEDURE — 84145 PROCALCITONIN (PCT): CPT

## 2022-01-01 PROCEDURE — 2580000003 HC RX 258: Performed by: STUDENT IN AN ORGANIZED HEALTH CARE EDUCATION/TRAINING PROGRAM

## 2022-01-01 PROCEDURE — 73700 CT LOWER EXTREMITY W/O DYE: CPT

## 2022-01-01 PROCEDURE — 99233 SBSQ HOSP IP/OBS HIGH 50: CPT | Performed by: STUDENT IN AN ORGANIZED HEALTH CARE EDUCATION/TRAINING PROGRAM

## 2022-01-01 PROCEDURE — 72125 CT NECK SPINE W/O DYE: CPT

## 2022-01-01 PROCEDURE — 99285 EMERGENCY DEPT VISIT HI MDM: CPT

## 2022-01-01 PROCEDURE — 84100 ASSAY OF PHOSPHORUS: CPT

## 2022-01-01 PROCEDURE — 2500000003 HC RX 250 WO HCPCS: Performed by: INTERNAL MEDICINE

## 2022-01-01 PROCEDURE — 82140 ASSAY OF AMMONIA: CPT

## 2022-01-01 PROCEDURE — 71250 CT THORAX DX C-: CPT

## 2022-01-01 PROCEDURE — 82378 CARCINOEMBRYONIC ANTIGEN: CPT

## 2022-01-01 PROCEDURE — 85027 COMPLETE CBC AUTOMATED: CPT

## 2022-01-01 PROCEDURE — P9047 ALBUMIN (HUMAN), 25%, 50ML: HCPCS | Performed by: STUDENT IN AN ORGANIZED HEALTH CARE EDUCATION/TRAINING PROGRAM

## 2022-01-01 PROCEDURE — 99291 CRITICAL CARE FIRST HOUR: CPT

## 2022-01-01 PROCEDURE — 94003 VENT MGMT INPAT SUBQ DAY: CPT

## 2022-01-01 PROCEDURE — 85025 COMPLETE CBC W/AUTO DIFF WBC: CPT

## 2022-01-01 PROCEDURE — 87449 NOS EACH ORGANISM AG IA: CPT

## 2022-01-01 PROCEDURE — 82550 ASSAY OF CK (CPK): CPT

## 2022-01-01 PROCEDURE — A4216 STERILE WATER/SALINE, 10 ML: HCPCS | Performed by: INTERNAL MEDICINE

## 2022-01-01 PROCEDURE — 74176 CT ABD & PELVIS W/O CONTRAST: CPT

## 2022-01-01 PROCEDURE — 84443 ASSAY THYROID STIM HORMONE: CPT

## 2022-01-01 PROCEDURE — 84484 ASSAY OF TROPONIN QUANT: CPT

## 2022-01-01 PROCEDURE — C9113 INJ PANTOPRAZOLE SODIUM, VIA: HCPCS | Performed by: STUDENT IN AN ORGANIZED HEALTH CARE EDUCATION/TRAINING PROGRAM

## 2022-01-01 PROCEDURE — P9059 PLASMA, FRZ BETWEEN 8-24HOUR: HCPCS

## 2022-01-01 PROCEDURE — 36415 COLL VENOUS BLD VENIPUNCTURE: CPT

## 2022-01-01 PROCEDURE — 99222 1ST HOSP IP/OBS MODERATE 55: CPT

## 2022-01-01 PROCEDURE — 2500000003 HC RX 250 WO HCPCS: Performed by: STUDENT IN AN ORGANIZED HEALTH CARE EDUCATION/TRAINING PROGRAM

## 2022-01-01 PROCEDURE — 86900 BLOOD TYPING SEROLOGIC ABO: CPT

## 2022-01-01 PROCEDURE — 87186 SC STD MICRODIL/AGAR DIL: CPT

## 2022-01-01 PROCEDURE — 2580000003 HC RX 258

## 2022-01-01 PROCEDURE — 93005 ELECTROCARDIOGRAM TRACING: CPT | Performed by: STUDENT IN AN ORGANIZED HEALTH CARE EDUCATION/TRAINING PROGRAM

## 2022-01-01 PROCEDURE — P9012 CRYOPRECIPITATE EACH UNIT: HCPCS

## 2022-01-01 PROCEDURE — 0202U NFCT DS 22 TRGT SARS-COV-2: CPT

## 2022-01-01 PROCEDURE — 02HV33Z INSERTION OF INFUSION DEVICE INTO SUPERIOR VENA CAVA, PERCUTANEOUS APPROACH: ICD-10-PCS | Performed by: STUDENT IN AN ORGANIZED HEALTH CARE EDUCATION/TRAINING PROGRAM

## 2022-01-01 PROCEDURE — 2500000003 HC RX 250 WO HCPCS

## 2022-01-01 PROCEDURE — 84300 ASSAY OF URINE SODIUM: CPT

## 2022-01-01 PROCEDURE — 80048 BASIC METABOLIC PNL TOTAL CA: CPT

## 2022-01-01 PROCEDURE — 82805 BLOOD GASES W/O2 SATURATION: CPT

## 2022-01-01 PROCEDURE — 99222 1ST HOSP IP/OBS MODERATE 55: CPT | Performed by: SURGERY

## 2022-01-01 PROCEDURE — 96367 TX/PROPH/DG ADDL SEQ IV INF: CPT

## 2022-01-01 PROCEDURE — 70450 CT HEAD/BRAIN W/O DYE: CPT

## 2022-01-01 PROCEDURE — 82803 BLOOD GASES ANY COMBINATION: CPT

## 2022-01-01 PROCEDURE — 80179 DRUG ASSAY SALICYLATE: CPT

## 2022-01-01 PROCEDURE — 85018 HEMOGLOBIN: CPT

## 2022-01-01 PROCEDURE — 82077 ASSAY SPEC XCP UR&BREATH IA: CPT

## 2022-01-01 PROCEDURE — 94002 VENT MGMT INPAT INIT DAY: CPT

## 2022-01-01 PROCEDURE — 6360000002 HC RX W HCPCS: Performed by: SPECIALIST

## 2022-01-01 PROCEDURE — 36592 COLLECT BLOOD FROM PICC: CPT

## 2022-01-01 PROCEDURE — 87077 CULTURE AEROBIC IDENTIFY: CPT

## 2022-01-01 PROCEDURE — 86850 RBC ANTIBODY SCREEN: CPT

## 2022-01-01 RX ORDER — FOLIC ACID 1 MG/1
1 TABLET ORAL DAILY
Status: DISCONTINUED | OUTPATIENT
Start: 2022-01-01 | End: 2022-03-23 | Stop reason: HOSPADM

## 2022-01-01 RX ORDER — MIDODRINE HYDROCHLORIDE 5 MG/1
15 TABLET ORAL EVERY 8 HOURS
Status: DISCONTINUED | OUTPATIENT
Start: 2022-01-01 | End: 2022-03-23 | Stop reason: HOSPADM

## 2022-01-01 RX ORDER — LACTULOSE 10 G/15ML
20 SOLUTION ORAL 3 TIMES DAILY
Status: DISCONTINUED | OUTPATIENT
Start: 2022-01-01 | End: 2022-01-01

## 2022-01-01 RX ORDER — POTASSIUM CHLORIDE 7.45 MG/ML
10 INJECTION INTRAVENOUS
Status: COMPLETED | OUTPATIENT
Start: 2022-01-01 | End: 2022-01-01

## 2022-01-01 RX ORDER — SODIUM CHLORIDE 9 MG/ML
INJECTION, SOLUTION INTRAVENOUS PRN
Status: DISCONTINUED | OUTPATIENT
Start: 2022-01-01 | End: 2022-01-01

## 2022-01-01 RX ORDER — SODIUM CHLORIDE 0.9 % (FLUSH) 0.9 %
5-40 SYRINGE (ML) INJECTION PRN
Status: DISCONTINUED | OUTPATIENT
Start: 2022-01-01 | End: 2022-03-23 | Stop reason: HOSPADM

## 2022-01-01 RX ORDER — 0.9 % SODIUM CHLORIDE 0.9 %
1000 INTRAVENOUS SOLUTION INTRAVENOUS ONCE
Status: COMPLETED | OUTPATIENT
Start: 2022-01-01 | End: 2022-01-01

## 2022-01-01 RX ORDER — DEXTROSE MONOHYDRATE 100 MG/ML
INJECTION, SOLUTION INTRAVENOUS CONTINUOUS
Status: DISCONTINUED | OUTPATIENT
Start: 2022-01-01 | End: 2022-01-01

## 2022-01-01 RX ORDER — CHLORHEXIDINE GLUCONATE 0.12 MG/ML
15 RINSE ORAL 2 TIMES DAILY
Status: DISCONTINUED | OUTPATIENT
Start: 2022-01-01 | End: 2022-03-23 | Stop reason: HOSPADM

## 2022-01-01 RX ORDER — LINEZOLID 2 MG/ML
600 INJECTION, SOLUTION INTRAVENOUS EVERY 12 HOURS
Status: DISCONTINUED | OUTPATIENT
Start: 2022-01-01 | End: 2022-01-01

## 2022-01-01 RX ORDER — FENTANYL CITRATE 0.05 MG/ML
50 INJECTION, SOLUTION INTRAMUSCULAR; INTRAVENOUS ONCE
Status: DISCONTINUED | OUTPATIENT
Start: 2022-01-01 | End: 2022-01-01

## 2022-01-01 RX ORDER — SODIUM CHLORIDE 9 MG/ML
INJECTION, SOLUTION INTRAVENOUS
Status: DISPENSED
Start: 2022-01-01 | End: 2022-01-01

## 2022-01-01 RX ORDER — ONDANSETRON 2 MG/ML
4 INJECTION INTRAMUSCULAR; INTRAVENOUS EVERY 6 HOURS PRN
Status: DISCONTINUED | OUTPATIENT
Start: 2022-01-01 | End: 2022-01-01

## 2022-01-01 RX ORDER — ALBUMIN (HUMAN) 12.5 G/50ML
25 SOLUTION INTRAVENOUS EVERY 6 HOURS
Status: DISCONTINUED | OUTPATIENT
Start: 2022-01-01 | End: 2022-01-01

## 2022-01-01 RX ORDER — SODIUM CHLORIDE, SODIUM LACTATE, POTASSIUM CHLORIDE, CALCIUM CHLORIDE 600; 310; 30; 20 MG/100ML; MG/100ML; MG/100ML; MG/100ML
INJECTION, SOLUTION INTRAVENOUS CONTINUOUS
Status: DISCONTINUED | OUTPATIENT
Start: 2022-01-01 | End: 2022-01-01

## 2022-01-01 RX ORDER — LACTULOSE 10 G/15ML
20 SOLUTION ORAL
Status: DISCONTINUED | OUTPATIENT
Start: 2022-01-01 | End: 2022-01-01

## 2022-01-01 RX ORDER — POLYETHYLENE GLYCOL 3350 17 G/17G
17 POWDER, FOR SOLUTION ORAL DAILY PRN
Status: DISCONTINUED | OUTPATIENT
Start: 2022-01-01 | End: 2022-03-23 | Stop reason: HOSPADM

## 2022-01-01 RX ORDER — SODIUM CHLORIDE 9 MG/ML
INJECTION, SOLUTION INTRAVENOUS PRN
Status: DISCONTINUED | OUTPATIENT
Start: 2022-01-01 | End: 2022-03-23 | Stop reason: HOSPADM

## 2022-01-01 RX ORDER — DEXTROSE MONOHYDRATE 25 G/50ML
12.5 INJECTION, SOLUTION INTRAVENOUS PRN
Status: DISCONTINUED | OUTPATIENT
Start: 2022-01-01 | End: 2022-01-01 | Stop reason: CLARIF

## 2022-01-01 RX ORDER — PROPOFOL 10 MG/ML
5-50 INJECTION, EMULSION INTRAVENOUS CONTINUOUS
Status: DISCONTINUED | OUTPATIENT
Start: 2022-01-01 | End: 2022-01-01

## 2022-01-01 RX ORDER — DEXTROSE MONOHYDRATE 100 MG/ML
INJECTION, SOLUTION INTRAVENOUS
Status: COMPLETED
Start: 2022-01-01 | End: 2022-01-01

## 2022-01-01 RX ORDER — GLYCOPYRROLATE 0.2 MG/ML
0.2 INJECTION INTRAMUSCULAR; INTRAVENOUS EVERY 4 HOURS PRN
Status: DISCONTINUED | OUTPATIENT
Start: 2022-01-01 | End: 2022-03-23 | Stop reason: HOSPADM

## 2022-01-01 RX ORDER — 0.9 % SODIUM CHLORIDE 0.9 %
2000 INTRAVENOUS SOLUTION INTRAVENOUS ONCE
Status: COMPLETED | OUTPATIENT
Start: 2022-01-01 | End: 2022-01-01

## 2022-01-01 RX ORDER — LACTULOSE 10 G/15ML
20 SOLUTION ORAL EVERY 8 HOURS
Status: DISCONTINUED | OUTPATIENT
Start: 2022-01-01 | End: 2022-03-23 | Stop reason: HOSPADM

## 2022-01-01 RX ORDER — FOLIC ACID 5 MG/ML
1 INJECTION, SOLUTION INTRAMUSCULAR; INTRAVENOUS; SUBCUTANEOUS ONCE
Status: DISCONTINUED | OUTPATIENT
Start: 2022-01-01 | End: 2022-01-01 | Stop reason: CLARIF

## 2022-01-01 RX ORDER — MORPHINE SULFATE 2 MG/ML
2 INJECTION, SOLUTION INTRAMUSCULAR; INTRAVENOUS
Status: DISCONTINUED | OUTPATIENT
Start: 2022-01-01 | End: 2022-03-23 | Stop reason: HOSPADM

## 2022-01-01 RX ORDER — SODIUM CHLORIDE 0.9 % (FLUSH) 0.9 %
5-40 SYRINGE (ML) INJECTION EVERY 12 HOURS SCHEDULED
Status: DISCONTINUED | OUTPATIENT
Start: 2022-01-01 | End: 2022-03-23 | Stop reason: HOSPADM

## 2022-01-01 RX ORDER — FENTANYL CITRATE 0.05 MG/ML
50 INJECTION, SOLUTION INTRAMUSCULAR; INTRAVENOUS EVERY 30 MIN PRN
Status: DISCONTINUED | OUTPATIENT
Start: 2022-01-01 | End: 2022-01-01 | Stop reason: SDUPTHER

## 2022-01-01 RX ORDER — MORPHINE SULFATE 4 MG/ML
4 INJECTION, SOLUTION INTRAMUSCULAR; INTRAVENOUS
Status: DISCONTINUED | OUTPATIENT
Start: 2022-01-01 | End: 2022-03-23 | Stop reason: HOSPADM

## 2022-01-01 RX ORDER — ACETAMINOPHEN 650 MG/1
650 SUPPOSITORY RECTAL EVERY 6 HOURS PRN
Status: DISCONTINUED | OUTPATIENT
Start: 2022-01-01 | End: 2022-03-23 | Stop reason: HOSPADM

## 2022-01-01 RX ORDER — ROCURONIUM BROMIDE 10 MG/ML
100 INJECTION, SOLUTION INTRAVENOUS ONCE
Status: COMPLETED | OUTPATIENT
Start: 2022-01-01 | End: 2022-01-01

## 2022-01-01 RX ORDER — ETOMIDATE 2 MG/ML
INJECTION INTRAVENOUS
Status: COMPLETED
Start: 2022-01-01 | End: 2022-01-01

## 2022-01-01 RX ORDER — ONDANSETRON 4 MG/1
4 TABLET, ORALLY DISINTEGRATING ORAL EVERY 8 HOURS PRN
Status: DISCONTINUED | OUTPATIENT
Start: 2022-01-01 | End: 2022-01-01

## 2022-01-01 RX ORDER — SODIUM CHLORIDE 9 MG/ML
25 INJECTION, SOLUTION INTRAVENOUS PRN
Status: DISCONTINUED | OUTPATIENT
Start: 2022-01-01 | End: 2022-03-23 | Stop reason: HOSPADM

## 2022-01-01 RX ORDER — ETOMIDATE 2 MG/ML
20 INJECTION INTRAVENOUS ONCE
Status: COMPLETED | OUTPATIENT
Start: 2022-01-01 | End: 2022-01-01

## 2022-01-01 RX ORDER — ACETAMINOPHEN 325 MG/1
650 TABLET ORAL EVERY 6 HOURS PRN
Status: DISCONTINUED | OUTPATIENT
Start: 2022-01-01 | End: 2022-03-23 | Stop reason: HOSPADM

## 2022-01-01 RX ORDER — ROCURONIUM BROMIDE 10 MG/ML
INJECTION, SOLUTION INTRAVENOUS
Status: COMPLETED
Start: 2022-01-01 | End: 2022-01-01

## 2022-01-01 RX ORDER — MAGNESIUM SULFATE IN WATER 40 MG/ML
2000 INJECTION, SOLUTION INTRAVENOUS ONCE
Status: COMPLETED | OUTPATIENT
Start: 2022-01-01 | End: 2022-01-01

## 2022-01-01 RX ORDER — SODIUM CHLORIDE 9 MG/ML
INJECTION, SOLUTION INTRAVENOUS CONTINUOUS
Status: DISCONTINUED | OUTPATIENT
Start: 2022-01-01 | End: 2022-01-01

## 2022-01-01 RX ORDER — POTASSIUM CHLORIDE 7.45 MG/ML
10 INJECTION INTRAVENOUS
Status: DISPENSED | OUTPATIENT
Start: 2022-01-01 | End: 2022-01-01

## 2022-01-01 RX ORDER — OCTREOTIDE ACETATE 50 UG/ML
50 INJECTION, SOLUTION INTRAVENOUS; SUBCUTANEOUS ONCE
Status: COMPLETED | OUTPATIENT
Start: 2022-01-01 | End: 2022-01-01

## 2022-01-01 RX ORDER — ALBUMIN (HUMAN) 12.5 G/50ML
25 SOLUTION INTRAVENOUS EVERY 6 HOURS
Status: DISCONTINUED | OUTPATIENT
Start: 2022-01-01 | End: 2022-03-23 | Stop reason: HOSPADM

## 2022-01-01 RX ORDER — SODIUM CHLORIDE 9 MG/ML
INJECTION, SOLUTION INTRAVENOUS ONCE
Status: DISCONTINUED | OUTPATIENT
Start: 2022-01-01 | End: 2022-03-23 | Stop reason: HOSPADM

## 2022-01-01 RX ORDER — MIDODRINE HYDROCHLORIDE 5 MG/1
15 TABLET ORAL
Status: DISCONTINUED | OUTPATIENT
Start: 2022-01-01 | End: 2022-01-01

## 2022-01-01 RX ORDER — DEXTROSE MONOHYDRATE 50 MG/ML
100 INJECTION, SOLUTION INTRAVENOUS PRN
Status: DISCONTINUED | OUTPATIENT
Start: 2022-01-01 | End: 2022-03-23 | Stop reason: HOSPADM

## 2022-01-01 RX ORDER — NOREPINEPHRINE BIT/0.9 % NACL 16MG/250ML
1-100 INFUSION BOTTLE (ML) INTRAVENOUS CONTINUOUS
Status: DISCONTINUED | OUTPATIENT
Start: 2022-01-01 | End: 2022-01-01

## 2022-01-01 RX ORDER — LACTULOSE 10 G/15ML
30 SOLUTION ORAL ONCE
Status: DISCONTINUED | OUTPATIENT
Start: 2022-01-01 | End: 2022-01-01

## 2022-01-01 RX ORDER — NICOTINE POLACRILEX 4 MG
15 LOZENGE BUCCAL PRN
Status: DISCONTINUED | OUTPATIENT
Start: 2022-01-01 | End: 2022-03-23 | Stop reason: HOSPADM

## 2022-01-01 RX ORDER — MAGNESIUM SULFATE 1 G/100ML
1000 INJECTION INTRAVENOUS ONCE
Status: DISCONTINUED | OUTPATIENT
Start: 2022-01-01 | End: 2022-03-23 | Stop reason: HOSPADM

## 2022-01-01 RX ORDER — THIAMINE HYDROCHLORIDE 100 MG/ML
100 INJECTION, SOLUTION INTRAMUSCULAR; INTRAVENOUS ONCE
Status: COMPLETED | OUTPATIENT
Start: 2022-01-01 | End: 2022-01-01

## 2022-01-01 RX ORDER — FENTANYL CITRATE 0.05 MG/ML
50 INJECTION, SOLUTION INTRAMUSCULAR; INTRAVENOUS EVERY 30 MIN PRN
Status: DISCONTINUED | OUTPATIENT
Start: 2022-01-01 | End: 2022-01-01

## 2022-01-01 RX ORDER — POTASSIUM CHLORIDE 29.8 MG/ML
20 INJECTION INTRAVENOUS ONCE
Status: COMPLETED | OUTPATIENT
Start: 2022-01-01 | End: 2022-01-01

## 2022-01-01 RX ADMIN — DEXTROSE MONOHYDRATE: 100 INJECTION, SOLUTION INTRAVENOUS at 13:35

## 2022-01-01 RX ADMIN — HYDROCORTISONE SODIUM SUCCINATE 50 MG: 100 INJECTION, POWDER, FOR SOLUTION INTRAMUSCULAR; INTRAVENOUS at 10:43

## 2022-01-01 RX ADMIN — LACTULOSE 20 G: 20 SOLUTION ORAL at 04:08

## 2022-01-01 RX ADMIN — ALBUMIN (HUMAN) 25 G: 0.25 INJECTION, SOLUTION INTRAVENOUS at 00:30

## 2022-01-01 RX ADMIN — ALBUMIN (HUMAN) 25 G: 0.25 INJECTION, SOLUTION INTRAVENOUS at 08:15

## 2022-01-01 RX ADMIN — ALBUMIN (HUMAN) 25 G: 0.25 INJECTION, SOLUTION INTRAVENOUS at 12:18

## 2022-01-01 RX ADMIN — LACTULOSE 20 G: 20 SOLUTION ORAL at 12:27

## 2022-01-01 RX ADMIN — RIFAXIMIN 550 MG: 550 TABLET ORAL at 21:30

## 2022-01-01 RX ADMIN — POTASSIUM CHLORIDE 10 MEQ: 7.46 INJECTION, SOLUTION INTRAVENOUS at 18:55

## 2022-01-01 RX ADMIN — LACTULOSE 20 G: 20 SOLUTION ORAL at 00:13

## 2022-01-01 RX ADMIN — SODIUM CHLORIDE, POTASSIUM CHLORIDE, SODIUM LACTATE AND CALCIUM CHLORIDE: 600; 310; 30; 20 INJECTION, SOLUTION INTRAVENOUS at 08:25

## 2022-01-01 RX ADMIN — SODIUM CHLORIDE, PRESERVATIVE FREE 40 MG: 5 INJECTION INTRAVENOUS at 15:57

## 2022-01-01 RX ADMIN — PIPERACILLIN SODIUM AND TAZOBACTAM SODIUM 3375 MG: 3; 375 INJECTION, POWDER, FOR SOLUTION INTRAVENOUS at 19:09

## 2022-01-01 RX ADMIN — RIFAXIMIN 550 MG: 550 TABLET ORAL at 21:16

## 2022-01-01 RX ADMIN — POTASSIUM CHLORIDE 20 MEQ: 29.8 INJECTION, SOLUTION INTRAVENOUS at 12:33

## 2022-01-01 RX ADMIN — RIFAXIMIN 550 MG: 550 TABLET ORAL at 14:25

## 2022-01-01 RX ADMIN — HYDROCORTISONE SODIUM SUCCINATE 50 MG: 100 INJECTION, POWDER, FOR SOLUTION INTRAMUSCULAR; INTRAVENOUS at 22:38

## 2022-01-01 RX ADMIN — RIFAXIMIN 550 MG: 550 TABLET ORAL at 08:18

## 2022-01-01 RX ADMIN — OCTREOTIDE ACETATE 50 MCG/HR: 500 INJECTION, SOLUTION INTRAVENOUS; SUBCUTANEOUS at 10:45

## 2022-01-01 RX ADMIN — SODIUM CHLORIDE, PRESERVATIVE FREE 10 ML: 5 INJECTION INTRAVENOUS at 08:49

## 2022-01-01 RX ADMIN — PHYTONADIONE 10 MG: 10 INJECTION, EMULSION INTRAMUSCULAR; INTRAVENOUS; SUBCUTANEOUS at 11:06

## 2022-01-01 RX ADMIN — RIFAXIMIN 550 MG: 550 TABLET ORAL at 13:43

## 2022-01-01 RX ADMIN — THIAMINE HYDROCHLORIDE 100 MG: 100 INJECTION, SOLUTION INTRAMUSCULAR; INTRAVENOUS at 13:59

## 2022-01-01 RX ADMIN — FENTANYL CITRATE 75 MCG/HR: 50 INJECTION, SOLUTION INTRAMUSCULAR; INTRAVENOUS at 15:03

## 2022-01-01 RX ADMIN — OCTREOTIDE ACETATE 50 MCG/HR: 500 INJECTION, SOLUTION INTRAVENOUS; SUBCUTANEOUS at 06:41

## 2022-01-01 RX ADMIN — MAGNESIUM SULFATE HEPTAHYDRATE 2000 MG: 2 INJECTION, SOLUTION INTRAVENOUS at 13:27

## 2022-01-01 RX ADMIN — MIDODRINE HYDROCHLORIDE 15 MG: 5 TABLET ORAL at 19:11

## 2022-01-01 RX ADMIN — PHYTONADIONE 10 MG: 10 INJECTION, EMULSION INTRAMUSCULAR; INTRAVENOUS; SUBCUTANEOUS at 07:47

## 2022-01-01 RX ADMIN — RIFAXIMIN 550 MG: 550 TABLET ORAL at 20:32

## 2022-01-01 RX ADMIN — RIFAXIMIN 550 MG: 550 TABLET ORAL at 08:48

## 2022-01-01 RX ADMIN — POTASSIUM BICARBONATE 40 MEQ: 782 TABLET, EFFERVESCENT ORAL at 08:46

## 2022-01-01 RX ADMIN — DOXYCYCLINE 100 MG: 100 INJECTION, POWDER, LYOPHILIZED, FOR SOLUTION INTRAVENOUS at 15:28

## 2022-01-01 RX ADMIN — OCTREOTIDE ACETATE 50 MCG/HR: 500 INJECTION, SOLUTION INTRAVENOUS; SUBCUTANEOUS at 03:47

## 2022-01-01 RX ADMIN — LACTULOSE 20 G: 20 SOLUTION ORAL at 06:05

## 2022-01-01 RX ADMIN — LACTULOSE 20 G: 20 SOLUTION ORAL at 18:09

## 2022-01-01 RX ADMIN — LINEZOLID 600 MG: 600 INJECTION, SOLUTION INTRAVENOUS at 17:35

## 2022-01-01 RX ADMIN — HYDROCORTISONE SODIUM SUCCINATE 50 MG: 100 INJECTION, POWDER, FOR SOLUTION INTRAMUSCULAR; INTRAVENOUS at 15:57

## 2022-01-01 RX ADMIN — HYDROCORTISONE SODIUM SUCCINATE 50 MG: 100 INJECTION, POWDER, FOR SOLUTION INTRAMUSCULAR; INTRAVENOUS at 16:32

## 2022-01-01 RX ADMIN — SODIUM CHLORIDE, POTASSIUM CHLORIDE, SODIUM LACTATE AND CALCIUM CHLORIDE: 600; 310; 30; 20 INJECTION, SOLUTION INTRAVENOUS at 11:04

## 2022-01-01 RX ADMIN — LACTULOSE 20 G: 20 SOLUTION ORAL at 15:58

## 2022-01-01 RX ADMIN — LACTULOSE 20 G: 20 SOLUTION ORAL at 10:14

## 2022-01-01 RX ADMIN — ALBUMIN (HUMAN) 25 G: 0.25 INJECTION, SOLUTION INTRAVENOUS at 06:15

## 2022-01-01 RX ADMIN — THIAMINE HYDROCHLORIDE 500 MG: 100 INJECTION, SOLUTION INTRAMUSCULAR; INTRAVENOUS at 22:37

## 2022-01-01 RX ADMIN — ETOMIDATE 20 MG: 2 INJECTION INTRAVENOUS at 13:28

## 2022-01-01 RX ADMIN — ROCURONIUM BROMIDE 100 MG: 10 SOLUTION INTRAVENOUS at 13:28

## 2022-01-01 RX ADMIN — OCTREOTIDE ACETATE 50 MCG/HR: 500 INJECTION, SOLUTION INTRAVENOUS; SUBCUTANEOUS at 00:15

## 2022-01-01 RX ADMIN — POTASSIUM BICARBONATE 20 MEQ: 782 TABLET, EFFERVESCENT ORAL at 12:10

## 2022-01-01 RX ADMIN — THIAMINE HYDROCHLORIDE 500 MG: 100 INJECTION, SOLUTION INTRAMUSCULAR; INTRAVENOUS at 15:22

## 2022-01-01 RX ADMIN — THIAMINE HYDROCHLORIDE 500 MG: 100 INJECTION, SOLUTION INTRAMUSCULAR; INTRAVENOUS at 15:33

## 2022-01-01 RX ADMIN — HYDROCORTISONE SODIUM SUCCINATE 50 MG: 100 INJECTION, POWDER, FOR SOLUTION INTRAMUSCULAR; INTRAVENOUS at 04:52

## 2022-01-01 RX ADMIN — RIFAXIMIN 550 MG: 550 TABLET ORAL at 08:50

## 2022-01-01 RX ADMIN — LINEZOLID 600 MG: 600 INJECTION, SOLUTION INTRAVENOUS at 06:07

## 2022-01-01 RX ADMIN — DOXYCYCLINE 100 MG: 100 INJECTION, POWDER, LYOPHILIZED, FOR SOLUTION INTRAVENOUS at 17:54

## 2022-01-01 RX ADMIN — PIPERACILLIN SODIUM AND TAZOBACTAM SODIUM 3375 MG: 3; 375 INJECTION, POWDER, FOR SOLUTION INTRAVENOUS at 11:27

## 2022-01-01 RX ADMIN — GLYCOPYRROLATE 0.2 MG: 0.2 INJECTION INTRAMUSCULAR; INTRAVENOUS at 17:23

## 2022-01-01 RX ADMIN — LACTULOSE 20 G: 20 SOLUTION ORAL at 00:18

## 2022-01-01 RX ADMIN — SODIUM CHLORIDE 25 ML: 9 INJECTION, SOLUTION INTRAVENOUS at 23:16

## 2022-01-01 RX ADMIN — LINEZOLID 600 MG: 600 INJECTION, SOLUTION INTRAVENOUS at 17:41

## 2022-01-01 RX ADMIN — CHLORHEXIDINE GLUCONATE 0.12% ORAL RINSE 15 ML: 1.2 LIQUID ORAL at 07:51

## 2022-01-01 RX ADMIN — FOLIC ACID 1 MG: 5 INJECTION, SOLUTION INTRAMUSCULAR; INTRAVENOUS; SUBCUTANEOUS at 13:53

## 2022-01-01 RX ADMIN — SODIUM CHLORIDE 1000 ML: 9 INJECTION, SOLUTION INTRAVENOUS at 14:15

## 2022-01-01 RX ADMIN — HYDROCORTISONE SODIUM SUCCINATE 50 MG: 100 INJECTION, POWDER, FOR SOLUTION INTRAMUSCULAR; INTRAVENOUS at 22:36

## 2022-01-01 RX ADMIN — LINEZOLID 600 MG: 600 INJECTION, SOLUTION INTRAVENOUS at 20:07

## 2022-01-01 RX ADMIN — SODIUM CHLORIDE, PRESERVATIVE FREE 40 MG: 5 INJECTION INTRAVENOUS at 04:16

## 2022-01-01 RX ADMIN — LACTULOSE 20 G: 20 SOLUTION ORAL at 22:48

## 2022-01-01 RX ADMIN — LACTULOSE 20 G: 20 SOLUTION ORAL at 00:31

## 2022-01-01 RX ADMIN — LACTULOSE 20 G: 20 SOLUTION ORAL at 08:16

## 2022-01-01 RX ADMIN — SODIUM CHLORIDE, PRESERVATIVE FREE 10 ML: 5 INJECTION INTRAVENOUS at 21:28

## 2022-01-01 RX ADMIN — HYDROCORTISONE SODIUM SUCCINATE 50 MG: 100 INJECTION, POWDER, FOR SOLUTION INTRAMUSCULAR; INTRAVENOUS at 04:17

## 2022-01-01 RX ADMIN — HYDROCORTISONE SODIUM SUCCINATE 50 MG: 100 INJECTION, POWDER, FOR SOLUTION INTRAMUSCULAR; INTRAVENOUS at 21:25

## 2022-01-01 RX ADMIN — LACTULOSE 20 G: 20 SOLUTION ORAL at 16:32

## 2022-01-01 RX ADMIN — THIAMINE HYDROCHLORIDE 500 MG: 100 INJECTION, SOLUTION INTRAMUSCULAR; INTRAVENOUS at 07:04

## 2022-01-01 RX ADMIN — WATER 2000 MG: 1 INJECTION INTRAMUSCULAR; INTRAVENOUS; SUBCUTANEOUS at 14:45

## 2022-01-01 RX ADMIN — HYDROCORTISONE SODIUM SUCCINATE 50 MG: 100 INJECTION, POWDER, FOR SOLUTION INTRAMUSCULAR; INTRAVENOUS at 10:14

## 2022-01-01 RX ADMIN — SODIUM CHLORIDE 80 MG: 9 INJECTION, SOLUTION INTRAVENOUS at 14:42

## 2022-01-01 RX ADMIN — PIPERACILLIN SODIUM AND TAZOBACTAM SODIUM 3375 MG: 3; 375 INJECTION, POWDER, FOR SOLUTION INTRAVENOUS at 04:49

## 2022-01-01 RX ADMIN — LACTULOSE 20 G: 20 SOLUTION ORAL at 21:16

## 2022-01-01 RX ADMIN — LACTULOSE 20 G: 20 SOLUTION ORAL at 03:08

## 2022-01-01 RX ADMIN — LINEZOLID 600 MG: 600 INJECTION, SOLUTION INTRAVENOUS at 05:28

## 2022-01-01 RX ADMIN — SODIUM CHLORIDE 2000 ML: 9 INJECTION, SOLUTION INTRAVENOUS at 12:09

## 2022-01-01 RX ADMIN — MIDODRINE HYDROCHLORIDE 15 MG: 5 TABLET ORAL at 03:10

## 2022-01-01 RX ADMIN — SODIUM CHLORIDE, PRESERVATIVE FREE 40 MG: 5 INJECTION INTRAVENOUS at 04:52

## 2022-01-01 RX ADMIN — CHLORHEXIDINE GLUCONATE 0.12% ORAL RINSE 15 ML: 1.2 LIQUID ORAL at 20:07

## 2022-01-01 RX ADMIN — HYDROCORTISONE SODIUM SUCCINATE 50 MG: 100 INJECTION, POWDER, FOR SOLUTION INTRAMUSCULAR; INTRAVENOUS at 17:40

## 2022-01-01 RX ADMIN — CHLORHEXIDINE GLUCONATE 0.12% ORAL RINSE 15 ML: 1.2 LIQUID ORAL at 08:17

## 2022-01-01 RX ADMIN — OCTREOTIDE ACETATE 50 MCG/HR: 500 INJECTION, SOLUTION INTRAVENOUS; SUBCUTANEOUS at 15:06

## 2022-01-01 RX ADMIN — LACTULOSE 20 G: 20 SOLUTION ORAL at 08:48

## 2022-01-01 RX ADMIN — SODIUM CHLORIDE, PRESERVATIVE FREE 40 MG: 5 INJECTION INTRAVENOUS at 16:32

## 2022-01-01 RX ADMIN — MAGNESIUM SULFATE HEPTAHYDRATE 2000 MG: 2 INJECTION, SOLUTION INTRAVENOUS at 00:12

## 2022-01-01 RX ADMIN — LACTULOSE 20 G: 20 SOLUTION ORAL at 18:20

## 2022-01-01 RX ADMIN — Medication 5 MCG/MIN: at 16:42

## 2022-01-01 RX ADMIN — MIDODRINE HYDROCHLORIDE 15 MG: 5 TABLET ORAL at 13:24

## 2022-01-01 RX ADMIN — OCTREOTIDE ACETATE 50 MCG/HR: 500 INJECTION, SOLUTION INTRAVENOUS; SUBCUTANEOUS at 20:50

## 2022-01-01 RX ADMIN — SODIUM CHLORIDE 500 MG: 9 INJECTION, SOLUTION INTRAVENOUS at 12:49

## 2022-01-01 RX ADMIN — SODIUM CHLORIDE, PRESERVATIVE FREE 10 ML: 5 INJECTION INTRAVENOUS at 21:29

## 2022-01-01 RX ADMIN — DOXYCYCLINE 100 MG: 100 INJECTION, POWDER, LYOPHILIZED, FOR SOLUTION INTRAVENOUS at 04:12

## 2022-01-01 RX ADMIN — ALBUMIN (HUMAN) 25 G: 0.25 INJECTION, SOLUTION INTRAVENOUS at 15:06

## 2022-01-01 RX ADMIN — MORPHINE SULFATE 4 MG: 4 INJECTION, SOLUTION INTRAMUSCULAR; INTRAVENOUS at 17:23

## 2022-01-01 RX ADMIN — SODIUM CHLORIDE 1000 ML: 9 INJECTION, SOLUTION INTRAVENOUS at 14:30

## 2022-01-01 RX ADMIN — SODIUM CHLORIDE 25 ML: 9 INJECTION, SOLUTION INTRAVENOUS at 05:13

## 2022-01-01 RX ADMIN — ALBUMIN (HUMAN) 25 G: 0.25 INJECTION, SOLUTION INTRAVENOUS at 22:33

## 2022-01-01 RX ADMIN — THIAMINE HYDROCHLORIDE 500 MG: 100 INJECTION, SOLUTION INTRAMUSCULAR; INTRAVENOUS at 22:54

## 2022-01-01 RX ADMIN — LACTULOSE 20 G: 20 SOLUTION ORAL at 02:57

## 2022-01-01 RX ADMIN — SODIUM CHLORIDE, PRESERVATIVE FREE 40 MG: 5 INJECTION INTRAVENOUS at 04:08

## 2022-01-01 RX ADMIN — LACTULOSE 20 G: 20 SOLUTION ORAL at 04:49

## 2022-01-01 RX ADMIN — CHLORHEXIDINE GLUCONATE 0.12% ORAL RINSE 15 ML: 1.2 LIQUID ORAL at 21:28

## 2022-01-01 RX ADMIN — THIAMINE HYDROCHLORIDE 500 MG: 100 INJECTION, SOLUTION INTRAMUSCULAR; INTRAVENOUS at 14:30

## 2022-01-01 RX ADMIN — LACTULOSE 20 G: 20 SOLUTION ORAL at 17:41

## 2022-01-01 RX ADMIN — THIAMINE HYDROCHLORIDE 500 MG: 100 INJECTION, SOLUTION INTRAMUSCULAR; INTRAVENOUS at 07:02

## 2022-01-01 RX ADMIN — OCTREOTIDE ACETATE 50 MCG: 50 INJECTION, SOLUTION INTRAVENOUS; SUBCUTANEOUS at 15:02

## 2022-01-01 RX ADMIN — ALBUMIN (HUMAN) 25 G: 0.25 INJECTION, SOLUTION INTRAVENOUS at 03:22

## 2022-01-01 RX ADMIN — CHLORHEXIDINE GLUCONATE 0.12% ORAL RINSE 15 ML: 1.2 LIQUID ORAL at 07:54

## 2022-01-01 RX ADMIN — DOXYCYCLINE 100 MG: 100 INJECTION, POWDER, LYOPHILIZED, FOR SOLUTION INTRAVENOUS at 04:08

## 2022-01-01 RX ADMIN — POTASSIUM CHLORIDE 10 MEQ: 7.46 INJECTION, SOLUTION INTRAVENOUS at 17:30

## 2022-01-01 RX ADMIN — FOLIC ACID 1 MG: 1 TABLET ORAL at 08:48

## 2022-01-01 RX ADMIN — PIPERACILLIN SODIUM AND TAZOBACTAM SODIUM 3375 MG: 3; 375 INJECTION, POWDER, FOR SOLUTION INTRAVENOUS at 02:59

## 2022-01-01 RX ADMIN — SODIUM CHLORIDE, POTASSIUM CHLORIDE, SODIUM LACTATE AND CALCIUM CHLORIDE: 600; 310; 30; 20 INJECTION, SOLUTION INTRAVENOUS at 21:32

## 2022-01-01 RX ADMIN — DEXTROSE MONOHYDRATE: 100 INJECTION, SOLUTION INTRAVENOUS at 22:43

## 2022-01-01 RX ADMIN — CHLORHEXIDINE GLUCONATE 0.12% ORAL RINSE 15 ML: 1.2 LIQUID ORAL at 02:10

## 2022-01-01 RX ADMIN — DOXYCYCLINE 100 MG: 100 INJECTION, POWDER, LYOPHILIZED, FOR SOLUTION INTRAVENOUS at 17:26

## 2022-01-01 RX ADMIN — LACTULOSE 20 G: 20 SOLUTION ORAL at 22:30

## 2022-01-01 RX ADMIN — LACTULOSE 20 G: 20 SOLUTION ORAL at 20:30

## 2022-01-01 RX ADMIN — ALBUMIN (HUMAN) 25 G: 0.25 INJECTION, SOLUTION INTRAVENOUS at 06:36

## 2022-01-01 RX ADMIN — ALBUMIN (HUMAN) 25 G: 0.25 INJECTION, SOLUTION INTRAVENOUS at 12:58

## 2022-01-01 RX ADMIN — ALBUMIN (HUMAN) 25 G: 0.25 INJECTION, SOLUTION INTRAVENOUS at 18:24

## 2022-01-01 RX ADMIN — LACTULOSE 20 G: 20 SOLUTION ORAL at 05:46

## 2022-01-01 RX ADMIN — PIPERACILLIN SODIUM AND TAZOBACTAM SODIUM 3375 MG: 3; 375 INJECTION, POWDER, FOR SOLUTION INTRAVENOUS at 03:13

## 2022-01-01 RX ADMIN — LACTULOSE 20 G: 20 SOLUTION ORAL at 06:04

## 2022-01-01 RX ADMIN — ALBUMIN (HUMAN) 25 G: 0.25 INJECTION, SOLUTION INTRAVENOUS at 17:30

## 2022-01-01 RX ADMIN — DOXYCYCLINE 100 MG: 100 INJECTION, POWDER, LYOPHILIZED, FOR SOLUTION INTRAVENOUS at 04:42

## 2022-01-01 RX ADMIN — RIFAXIMIN 550 MG: 550 TABLET ORAL at 14:41

## 2022-01-01 RX ADMIN — LACTULOSE 20 G: 20 SOLUTION ORAL at 02:10

## 2022-01-01 RX ADMIN — POTASSIUM CHLORIDE 10 MEQ: 7.46 INJECTION, SOLUTION INTRAVENOUS at 16:10

## 2022-01-01 RX ADMIN — FOLIC ACID 1 MG: 1 TABLET ORAL at 11:19

## 2022-01-01 RX ADMIN — LACTULOSE 20 G: 20 SOLUTION ORAL at 04:12

## 2022-01-01 RX ADMIN — THIAMINE HYDROCHLORIDE 100 MG: 100 INJECTION, SOLUTION INTRAMUSCULAR; INTRAVENOUS at 13:11

## 2022-01-01 RX ADMIN — THIAMINE HYDROCHLORIDE 500 MG: 100 INJECTION, SOLUTION INTRAMUSCULAR; INTRAVENOUS at 06:45

## 2022-01-01 RX ADMIN — LINEZOLID 600 MG: 600 INJECTION, SOLUTION INTRAVENOUS at 05:50

## 2022-01-01 RX ADMIN — THIAMINE HYDROCHLORIDE 500 MG: 100 INJECTION, SOLUTION INTRAMUSCULAR; INTRAVENOUS at 21:32

## 2022-01-01 RX ADMIN — HYDROCORTISONE SODIUM SUCCINATE 50 MG: 100 INJECTION, POWDER, FOR SOLUTION INTRAMUSCULAR; INTRAVENOUS at 04:08

## 2022-01-01 RX ADMIN — LACTULOSE 20 G: 20 SOLUTION ORAL at 20:08

## 2022-01-01 RX ADMIN — PIPERACILLIN SODIUM AND TAZOBACTAM SODIUM 3375 MG: 3; 375 INJECTION, POWDER, FOR SOLUTION INTRAVENOUS at 12:26

## 2022-01-01 RX ADMIN — PIPERACILLIN SODIUM AND TAZOBACTAM SODIUM 3375 MG: 3; 375 INJECTION, POWDER, FOR SOLUTION INTRAVENOUS at 20:14

## 2022-01-01 RX ADMIN — LACTULOSE 20 G: 20 SOLUTION ORAL at 14:25

## 2022-01-01 RX ADMIN — PIPERACILLIN SODIUM AND TAZOBACTAM SODIUM 3375 MG: 3; 375 INJECTION, POWDER, FOR SOLUTION INTRAVENOUS at 12:49

## 2022-01-01 RX ADMIN — PHYTONADIONE 10 MG: 10 INJECTION, EMULSION INTRAMUSCULAR; INTRAVENOUS; SUBCUTANEOUS at 16:43

## 2022-01-01 RX ADMIN — HYDROCORTISONE SODIUM SUCCINATE 50 MG: 100 INJECTION, POWDER, FOR SOLUTION INTRAMUSCULAR; INTRAVENOUS at 11:00

## 2022-01-01 RX ADMIN — LACTULOSE 20 G: 20 SOLUTION ORAL at 14:40

## 2022-01-01 RX ADMIN — SODIUM CHLORIDE, PRESERVATIVE FREE 10 ML: 5 INJECTION INTRAVENOUS at 12:27

## 2022-01-01 RX ADMIN — OCTREOTIDE ACETATE 50 MCG/HR: 500 INJECTION, SOLUTION INTRAVENOUS; SUBCUTANEOUS at 17:24

## 2022-01-01 RX ADMIN — ROCURONIUM BROMIDE 100 MG: 10 INJECTION, SOLUTION INTRAVENOUS at 13:28

## 2022-01-01 RX ADMIN — LACTULOSE 20 G: 20 SOLUTION ORAL at 10:43

## 2022-01-01 RX ADMIN — SODIUM CHLORIDE, PRESERVATIVE FREE 10 ML: 5 INJECTION INTRAVENOUS at 08:50

## 2022-01-01 RX ADMIN — ALBUMIN (HUMAN) 25 G: 0.25 INJECTION, SOLUTION INTRAVENOUS at 00:32

## 2022-01-01 RX ADMIN — PROPOFOL 20 MCG/KG/MIN: 10 INJECTION, EMULSION INTRAVENOUS at 14:32

## 2022-01-01 RX ADMIN — MIDODRINE HYDROCHLORIDE 15 MG: 5 TABLET ORAL at 11:19

## 2022-01-01 RX ADMIN — FENTANYL CITRATE 25 MCG/HR: 50 INJECTION, SOLUTION INTRAMUSCULAR; INTRAVENOUS at 13:55

## 2022-01-01 ASSESSMENT — PULMONARY FUNCTION TESTS
PIF_VALUE: 20
PIF_VALUE: 19
PIF_VALUE: 29
PIF_VALUE: 28
PIF_VALUE: 26
PIF_VALUE: 29
PIF_VALUE: 24
PIF_VALUE: 29
PIF_VALUE: 21
PIF_VALUE: 30
PIF_VALUE: 25
PIF_VALUE: 20
PIF_VALUE: 26
PIF_VALUE: 22
PIF_VALUE: 24
PIF_VALUE: 30
PIF_VALUE: 30
PIF_VALUE: 23
PIF_VALUE: 26
PIF_VALUE: 20
PIF_VALUE: 14
PIF_VALUE: 23
PIF_VALUE: 21
PIF_VALUE: 25
PIF_VALUE: 24
PIF_VALUE: 19
PIF_VALUE: 25
PIF_VALUE: 26
PIF_VALUE: 25
PIF_VALUE: 26
PIF_VALUE: 31
PIF_VALUE: 26
PIF_VALUE: 29
PIF_VALUE: 16
PIF_VALUE: 24
PIF_VALUE: 21
PIF_VALUE: 23
PIF_VALUE: 20
PIF_VALUE: 24
PIF_VALUE: 22
PIF_VALUE: 7
PIF_VALUE: 24
PIF_VALUE: 20
PIF_VALUE: 20
PIF_VALUE: 13
PIF_VALUE: 21
PIF_VALUE: 26
PIF_VALUE: 28
PIF_VALUE: 33
PIF_VALUE: 27
PIF_VALUE: 29
PIF_VALUE: 24
PIF_VALUE: 24
PIF_VALUE: 23
PIF_VALUE: 27
PIF_VALUE: 24
PIF_VALUE: 20
PIF_VALUE: 23
PIF_VALUE: 28
PIF_VALUE: 27
PIF_VALUE: 22
PIF_VALUE: 0
PIF_VALUE: 25
PIF_VALUE: 25
PIF_VALUE: 26
PIF_VALUE: 26
PIF_VALUE: 23
PIF_VALUE: 24
PIF_VALUE: 25
PIF_VALUE: 30
PIF_VALUE: 16
PIF_VALUE: 27
PIF_VALUE: 26
PIF_VALUE: 25
PIF_VALUE: 29
PIF_VALUE: 14
PIF_VALUE: 21
PIF_VALUE: 26
PIF_VALUE: 26
PIF_VALUE: 19
PIF_VALUE: 30
PIF_VALUE: 26
PIF_VALUE: 20
PIF_VALUE: 17
PIF_VALUE: 22
PIF_VALUE: 28
PIF_VALUE: 30
PIF_VALUE: 26
PIF_VALUE: 22
PIF_VALUE: 25
PIF_VALUE: 26
PIF_VALUE: 27
PIF_VALUE: 20
PIF_VALUE: 23
PIF_VALUE: 26
PIF_VALUE: 21
PIF_VALUE: 29
PIF_VALUE: 22
PIF_VALUE: 25
PIF_VALUE: 19
PIF_VALUE: 21
PIF_VALUE: 20
PIF_VALUE: 25
PIF_VALUE: 25
PIF_VALUE: 29
PIF_VALUE: 25
PIF_VALUE: 27
PIF_VALUE: 17
PIF_VALUE: 19
PIF_VALUE: 26
PIF_VALUE: 26
PIF_VALUE: 27
PIF_VALUE: 24
PIF_VALUE: 27

## 2022-01-01 ASSESSMENT — PAIN SCALES - GENERAL
PAINLEVEL_OUTOF10: 0
PAINLEVEL_OUTOF10: 10

## 2022-01-01 ASSESSMENT — ENCOUNTER SYMPTOMS: TACHYPNEA: 1

## 2022-01-01 NOTE — ANESTHESIA POSTPROCEDURE EVALUATION
Department of Anesthesiology  Postprocedure Note    Patient: Arely Mayen  MRN: [id number]  YOB: 1975  Date of evaluation: 9/2/2021  Time:  3:14 PM     Procedure Summary     Date: 09/02/21 Room / Location: [facility] ENDO 01 / [address]    Anesthesia Start: 1350 Anesthesia Stop: 9611    Procedure: EGD ESOPHAGOGASTRODUODENOSCOPY (N/A ) Diagnosis: (ANEMIA)    Surgeons: Trena Smith MD Responsible Provider: Austin Leon DO    Anesthesia Type: MAC ASA Status: 3          Anesthesia Type: MAC    Celena Phase I:      Celena Phase II: Celena Score: 10    Last vitals: Reviewed and per EMR flowsheets.        Anesthesia Post Evaluation    Patient location during evaluation: PACU  Patient participation: complete - patient participated  Level of consciousness: awake and alert  Airway patency: patent  Nausea & Vomiting: no nausea and no vomiting  Complications: no  Cardiovascular status: hemodynamically stable  Respiratory status: acceptable  Hydration status: euvolemic Eye

## 2022-01-03 NOTE — TELEPHONE ENCOUNTER
Follow-up with cardiology in 4 to 12 weeks. Follow-up sooner if there are symptoms. If symptoms are significant please go to the emergency room.   Also, please follow-up with your primary care physician in 1 to 4 weeks

## 2022-01-03 NOTE — TELEPHONE ENCOUNTER
Left message for patient to contact office.     Patient may be scheduled with Tonio Daniels NP at the [place] office on 1/4 or 1/5/22

## 2022-03-19 PROBLEM — K74.60 DECOMPENSATION OF CIRRHOSIS OF LIVER (HCC): Status: ACTIVE | Noted: 2022-01-01

## 2022-03-19 PROBLEM — K72.90 DECOMPENSATION OF CIRRHOSIS OF LIVER (HCC): Status: ACTIVE | Noted: 2022-01-01

## 2022-03-19 NOTE — ED NOTES
CVC site has clotted, will leave sand bag at site, otherwise WNL.      Marcelino Flores RN  03/19/22 5911

## 2022-03-19 NOTE — PROGRESS NOTES
Pharmacy consulted to dose daptomycin. Patient was previously on daptomycin up to several months prior due to previous blood cultures of VSE and VRE. Pharmacy entered order for daptomycin 700 mg IV Q24H (~8 mg/kg). After discussion with pulm, the daptomycin will be switched to linezolid to also provide pneumonia coverage. This along with the doxycycline will also provide coverage of the enterococcus. Pharmacy will sign off on the daptomycin culture. Please re-consult if needed.       Elliot Carrasco, PharmD 3/19/2022 5:36 PM   [phone]

## 2022-03-19 NOTE — ED NOTES
Emergent blood donor number would not scan on flowsheet. Blood verified by [place] Sender in lab. Unit number [id number], A- , bbid [id number]. Two nurse verification bedside. Second unit initiated.       Sarai Zapata RN  03/19/22 6650

## 2022-03-19 NOTE — ED NOTES
Sand bag applied to right fem CVC d/t seeping at insertion site.       Albert Herron RN  03/19/22 2787

## 2022-03-19 NOTE — ED NOTES
100 rocuronium given by dr Flip Ventura, RN  03/19/22 1301 [facility], RN  03/19/22 6057

## 2022-03-19 NOTE — H&P
Department of Internal Medicine  History and Physical    PCP: Ema Renae MD  Admitting Physician: Dr. Felipe Bahena  Consultants:   Date of Service: 3/19/2022    CHIEF COMPLAINT: Altered mental status    HISTORY OF PRESENT ILLNESS:    Patient is 48-year-old female presented to the ED with altered mental status. Patient unable to provide HPI. Additionally she has no contact listed that we can get in contact with. However patient was brought in unresponsive. She was found at home unresponsive. Apparently she has been unresponsive for the last 2 days. Patient was hypoglycemic as reported by EMS.      PAST MEDICAL Hx:  Past Medical History:   Diagnosis Date    Acute renal failure (ARF) ([address] 78/93/7872    Alcoholic cirrhosis (HCC)     Anxiety     Crohn's colitis ([address]     Depression     History of blood transfusion     Pyoderma gangrenosum     Thyroid disease        PAST SURGICAL Hx:   Past Surgical History:   Procedure Laterality Date    ABDOMEN SURGERY      pt has a J-pouch     ARM DEBRIDEMENT      R arm-2010-2011??    ARM DEBRIDEMENT      COLONOSCOPY      DILATATION, ESOPHAGUS      ENDOSCOPY, COLON, DIAGNOSTIC      SIGMOIDOSCOPY N/A 12/6/2021    SIGMOIDOSCOPY BIOPSY FLEXIBLE performed by Caryl Redd MD at [address] [facility] TRANSESOPHAGEAL ECHOCARDIOGRAM N/A 9/3/2021    TRANSESOPHAGEAL ECHOCARDIOGRAM WITH BUBBLE STUDY performed by Elly Monroe MD at [address] [facility] N/A 9/2/2021    EGD ESOPHAGOGASTRODUODENOSCOPY performed by Shanthi De La Vega MD at [address] [facility] N/A 12/6/2021    EGD CONTROL HEMORRHAGE performed by Caryl Redd MD at [address] [facility] N/A 12/6/2021    EGD BIOPSY performed by Caryl Redd MD at [address] Hx:  Family History   Problem Relation Age of Onset    Cancer Mother         breast     Emphysema Mother     Lupus Mother     Heart Disease Father    24 \[facility] [place] [facility]\"" High Cholesterol Father     High Blood Pressure Sister     No Known Problems Son        HOME MEDICATIONS:  Prior to Admission medications    Medication Sig Start Date End Date Taking? Authorizing Provider   spironolactone (ALDACTONE) 25 MG tablet Take 1 tablet by mouth daily 10/28/21   Leilani Shelton DO   cyanocobalamin 2000 MCG tablet Take 1 tablet by mouth daily 10/28/21   Leilani Shelton DO   pyridoxine (B-6) 25 MG tablet Take 1 tablet by mouth daily 10/28/21   Leilani Shelton DO   vitamin D (ERGOCALCIFEROL) 1.25 MG (80656 UT) CAPS capsule Take 1 capsule by mouth once a week Monday 10/28/21   Leilani Shelton DO   potassium chloride (KLOR-CON M) 10 MEQ extended release tablet Take 20 mEq by mouth daily    Historical Provider, MD   lactulose (CHRONULAC) 10 GM/15ML solution Take 20 g by mouth every 12 hours    Historical Provider, MD   propranolol (INDERAL) 10 MG tablet Take 20 mg by mouth every 12 hours     Historical Provider, MD   rifaximin (XIFAXAN) 550 MG tablet Take 550 mg by mouth 2 times daily    Historical Provider, MD   cloNIDine (CATAPRES) 0.1 MG tablet Take 0.1 mg by mouth daily    Historical Provider, MD   magnesium oxide (MAG-OX) 400 MG tablet Take 400 mg by mouth daily    Historical Provider, MD   vitamin B-1 (THIAMINE) 100 MG tablet Take 100 mg by mouth daily    Historical Provider, MD   folic acid (FOLVITE) 1 MG tablet Take 1 tablet by mouth daily 2/2/21   Leilani Shelton DO   thiamine mononitrate 100 MG tablet Take 1 tablet by mouth daily 2/2/21 3/4/21  Leilani Shelton DO   pantoprazole (PROTONIX) 40 MG tablet Take 40 mg by mouth daily     Historical Provider, MD       ALLERGIES:  Patient has no known allergies.     SOCIAL Hx:  Social History     Socioeconomic History    Marital status:      Spouse name: Not on file    Number of children: 1    Years of education: Not on file    Highest education level: Not on file   Occupational History    Not on file   Tobacco Use    Smoking status: Never Smoker    Smokeless tobacco: Never Used   Vaping Use    Vaping Use: Never used   Substance and Sexual Activity    Alcohol use: Not Currently     Comment: stopped but drink 3 weeks ago    Drug use: Not Currently    Sexual activity: Not on file   Other Topics Concern    Not on file   Social History Narrative    Not on file     Social Determinants of Health     Financial Resource Strain: Low Risk     Difficulty of Paying Living Expenses: Not hard at all   Food Insecurity: No Food Insecurity    Worried About Running Out of Food in the Last Year: Never true    Yumiko of Food in the Last Year: Never true   Transportation Needs: No Transportation Needs    Lack of Transportation (Medical): No    Lack of Transportation (Non-Medical): No   Physical Activity: Unknown    Days of Exercise per Week: 0 days    Minutes of Exercise per Session: Not on file   Stress: No Stress Concern Present    Feeling of Stress : Only a little   Social Connections: Moderately Integrated    Frequency of Communication with Friends and Family: More than three times a week    Frequency of Social Gatherings with Friends and Family: Three times a week    Attends [employer] Services: More than 4 times per year    Active Member of Clubs or Organizations: Yes    Attends Club or Organization Meetings: More than 4 times per year    Marital Status:    Intimate Partner Violence: Not At Risk    Fear of Current or Ex-Partner: No    Emotionally Abused: No    Physically Abused: No    Sexually Abused: No   Housing Stability: High Risk    Unable to Pay for Housing in the Last Year: No    Number of Places Lived in the Last Year: 6    Unstable Housing in the Last Year: No       ROS: Positive in bold. Unable to perform secondary to altered mental status.   General:   Denies chills, fatigue, fever, malaise, night sweats or weight loss    Psychological:   Denies anxiety, disorientation or hallucinations    ENT:    Denies epistaxis, headaches, vertigo or visual changes    Cardiovascular:   Denies any chest pain, irregular heartbeats, or palpitations. No paroxysmal nocturnal dyspnea. Respiratory:   Denies shortness of breath, coughing, sputum production, hemoptysis, or wheezing. No orthopnea. Gastrointestinal:   Denies nausea, vomiting, diarrhea, or constipation. Denies any abdominal pain. Denies change in bowel habits or stools. Genito-Urinary:    Denies any urgency, frequency, hematuria. Voiding without difficulty. Musculoskeletal:   Denies joint pain, joint stiffness, joint swelling or muscle pain    Neurology:    Denies any headache or focal neurological deficits. No weakness or paresthesia. Derm:    Denies any rashes, ulcers, or excoriations. Denies bruising. Extremities:   Denies any lower extremity swelling or edema. PHYSICAL EXAM: Abnormal findings noted. Limited secondary to altered mental status. VITALS:  Vitals:    03/19/22 1500   BP: (!) 106/41   Pulse: 104   Resp: 18   Temp:    SpO2: 100%         CONSTITUTIONAL:    Patient is intubated and sedated    EYES:    Patient has scleral icterus, pupils are reactive but unequal    ENT:    Normocephalic, atraumatic    NECK:    Supple, symmetrical, trachea midline,  no JVD    HEMATOLOGIC/LYMPHATICS:    No cervical lymphadenopathy and no supraclavicular lymphadenopathy    LUNGS:    Symmetric. No increased work of breathing, good air exchange, clear to auscultation bilaterally, no wheezes, rhonchi, or rales,  Patient has coarse breath sounds bilaterally    CARDIOVASCULAR:    Normal apical impulse, regular rate and rhythm, normal S1 and S2, no S3 or S4, and no murmur noted    ABDOMEN:    Abdomen distended      MUSCULOSKELETAL:    There is no redness, warmth, or swelling of the joints. NEUROLOGIC:    Patient intubated and sedated on mechanical ventilation    SKIN:    No bruising or bleeding.   No redness, warmth, or swelling  Patient is jaundiced    EXTREMITIES: Peripheral pulses present. No edema, cyanosis, or swelling. LINES/CATHETERS   Cannon catheter in place, ET tube and OG G tube in place, right femoral CVC in place    LABORATORY DATA:  CBC with Differential:    Lab Results   Component Value Date    WBC 7.8 03/19/2022    RBC 1.59 03/19/2022    HGB 5.2 03/19/2022    HCT 16.6 03/19/2022    PLT 36 03/19/2022    .4 03/19/2022    MCH 32.7 03/19/2022    MCHC 31.3 03/19/2022    RDW 17.0 03/19/2022    SEGSPCT 59 08/11/2011    METASPCT 3 07/05/2011    LYMPHOPCT 4.0 03/19/2022    MONOPCT 2.0 03/19/2022    MYELOPCT 2.0 03/19/2022    BASOPCT 1.0 03/19/2022    MONOSABS 0.16 03/19/2022    LYMPHSABS 0.31 03/19/2022    EOSABS 0.08 03/19/2022    BASOSABS 0.08 03/19/2022     CMP:    Lab Results   Component Value Date     03/19/2022    K 4.0 03/19/2022    CL 89 03/19/2022    CO2 12 03/19/2022    BUN 13 03/19/2022    CREATININE 1.8 03/19/2022    GFRAA 36 03/19/2022    LABGLOM 30 03/19/2022    GLUCOSE 104 03/19/2022    GLUCOSE 125 08/11/2011    PROT 6.4 03/19/2022    LABALBU 1.9 03/19/2022    LABALBU 3.6 08/11/2011    CALCIUM 8.2 03/19/2022    BILITOT 13.1 03/19/2022    ALKPHOS 150 03/19/2022     03/19/2022    ALT 42 03/19/2022       ASSESSMENT/PLAN:  1. Sepsis with pnuemonia possibly aspiration, uti, peritonitis/enterocolitis  2. Acute on chronic anemia  3. Hepatic encephalopathy  4. Decompensated cirrhosis  5. SEVEN  6. Thrombocytopenia  7. Hypothyroidism  8. History of alcoholic cirrhosis  9. History of Crohn's colitis  10. Depression and anxiety    Patient presented with altered mental status. Patient intubated due to need for airway protection. Patient found to be anemic. Intensivist consulted and patient accepted to ICU. GI consulted. Patient placed on octreotide and Protonix. Patient to be transfused 2 units PRBC. Patient also placed on IV antibiotics. Cultures ordered. ID consulted. Patient to be given lactulose for encephalopathy.   Patient on fluids.         Alesha Orantes  3:38 PM  3/19/2022    Electronically signed by Kanika Vaughn DO on 3/19/22 at 3:38 PM EDT

## 2022-03-19 NOTE — ED NOTES
bgl recheck 86, per ems bgl was 41 upon arrival and corrected in the field     Rolf Dempsey RN  03/19/22 0625

## 2022-03-19 NOTE — ED NOTES
Bolus on 0.9% nacl started per resident dr Cristal Alas, RN  03/19/22 [address], RN  03/19/22 4485

## 2022-03-19 NOTE — ED NOTES
Vicente, patients sister [phone] notified of patient going to icu and provided with icu unit contact info for further updates     Maxx Douglass RN  03/19/22 4445

## 2022-03-19 NOTE — CONSULTS
CRITICAL CARE PROGRESS NOTE    The patient's case was discussed in multidisciplinary rounds including critical care specialist, nursing, RT and pharmacy. 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 The patient is comatose and unable to provide information, as per ED notes, the patient was found unconscious at home and with noisy breathing, was thought to be hypoglycemic therefore was treated with D50W; in ED she was intubated for airway protection, she was found to have macrocytic anemia, thrombocytopenia, elevated INR to 5, lactic acidosis, SEVEN, prolonged QTc, sepsis due to pyelonephritis, pneumonia and possible peritonitis, with ascites. Ms Genna Gillette is receiving transfusion of PRBC, FFP and platelets; mechanical ventilation, antibiotics are being administered. BP (!) 115/47   Pulse 104   Temp 93.9 °F (34.4 °C) (Core)   Resp 19   Wt 254 lb (115.2 kg)   SpO2 100%   BMI 38.62 kg/m²   General:Comatose, sedated and ventilated, with jaundice  HEENT: No head lesions, PERRL, mouth with ETT, no nasal lesions, no cervical adenopathy palpated  Respiratory: Lungs with equal breath sounds bilaterally, no adventitious sounds auscultated, no accessory muscle use  CV: Regular rate, 3/6 LLSB murmur, no JVD, 1+ leg edema  Abdomen: Soft, apparently non tender, (-) bowel sounds  Skin: Hydrated, adequate turgor, jaundiced, capillary refill <2 seconds  Extremities: Muscular strength: Flaccid limbs, does not respond to pain, distal pulses present  Neurology: Comatose, intubated, flaccid limbs, does not respond to pain, neck is supple, no meningitic signs present.     A/P:  1) Acute blood loss anemia due to gastrointestinal hemorrhage (peptic ulcer disease vs variceal hemorrhage)  in a patient with cirrhosis   --Transfusion to Hb of 7 and platelets to 45M due to acute bleeding. To have 4 units of PRBC on hold at all times. --Pantoprazole 40 mg IV q12h  --Octreotide drip  --Cefepime for management of SBP.   --GI consult for endoscopic evaluation     2) Chronic liver failure due to decompensated cirrhosis and alcoholic hepatitis with hepatic encephalopathy, thrombocytopenia and coagulopathy.  --CT liver with regenerative nodules vs hepatocellular carcinoma  --Lactulose and rifaximin for management of encephalopathy  --Paracentesis for evaluation of SBP and management of ascites as needed    3) Septic shock secondary to pyelonephritis, pneumonia (pneumococcus/legionella/ viral) and possible peritonitis/ enterocolitis. --CT scan with possible cholecystitis  --Crystalloid fluid: 30 ml/kg  --Antibiotic regimen: Cefepime, Linezolid (history of VRE), doxycycline. --Vasopressor support with norepinephrine   --Steroids: Hydrocortisone 200 mg/day  --Lactate level 19.9    Repeat level pending  --Cultures obtained  --Surgical consult    4) Acute kidney injury secondary to Sepsis/prerenal vs hepatorenal syndrome  --Avoid nephrotoxins and dose medications according GFR  --Urinary electolytes ordered     5) Prolonged QTc  --Avoid medications that prolong QT    6) Lactic acidosis due to the above  --Monitor q2 hours  --Resuscitation with crystalloids    DVT prophylaxis with SCDs  Nutrition: Tube feedings to start once NG is inserted  Vascular catheters: TLC in femoral vein  Urinary catheter needed for strict input/output  Goals of care:  Full code  Prognosis is poor, it is expected Ms J Luis Wallace will not survive this hospital admission      Component      Latest Ref Rng & Units 3/19/2022 3/19/2022           1:00 PM  1:00 PM   Total CK      20 - 180 U/L 135    AMMONIA, PLASMA, [id number]      11.0 - 51.0 umol/L  325.0 (H)     Component      Latest Ref Rng & Units 3/19/2022           1:00 PM   Lactic Acid, Sepsis      0.5 - 1.9 mmol/L 19.9 (HH)     Recent Labs     03/19/22  1300   INR 5.0   PROTIME 58.9*   *   ALT 42*     Lab Results   Component Value Date    CHOL 100 09/01/2021    TRIG 164 09/01/2021    HDL 12 09/01/2021    LDLCALC 55 09/01/2021    LABVLDL 33 09/01/2021     No results found for: VITD25  Last 3 CMP:    Recent Labs     03/19/22  1300      K 4.0   CL 89*   CO2 12*   BUN 13   CREATININE 1.8*   GLUCOSE 104*   CALCIUM 8.2*   PROT 6.4   LABALBU 1.9*   BILITOT 13.1*   ALKPHOS 150*   *   ALT 42*     Recent Labs     03/19/22  1300   WBC 7.8   RBC 1.59*   HGB 5.2*   HCT 16.6*   .4*   MCH 32.7   MCHC 31.3*   RDW 17.0*   PLT 36*   MPV 12.9*     Component      Latest Ref Rng & Units 3/19/2022           3:01 PM   PH 37      7.350 - 7.450 7.287 (L)   PCO2 37      35.0 - 45.0 mmHg 29.0 (L)   PO2 37      80.0 - 100.0 mmHg 145.1 (H)   HCO3      22.0 - 26.0 mmol/L 13.9 (L)   Base Excess      -3.0 - 3.0 mmol/L -11.7 (L)   O2 Sat      92.0 - 98.5 % 99.0 (H)     Component      Latest Ref Rng & Units 3/19/2022           1:12 PM   Color, UA      Straw/Yellow TOMMY (A)   Clarity, UA      Clear CLOUDY (A)   Glucose, UA      Negative mg/dL 100 (A)   Bilirubin, Urine      Negative MODERATE (A)   Ketones, Urine      Negative mg/dL 15 (A)   Specific [place], UA      1.005 - 1.030 1.015   Blood, Urine      Negative SMALL (A)   pH, UA      5.0 - 9.0 8.0   Protein, UA      Negative mg/dL >=300 (A)   Urobilinogen, Urine      <2.0 E.U./dL >=8.0 (A)   Nitrite, Urine      Negative POSITIVE (A)   Leukocyte Esterase, Urine      Negative LARGE (A)   WBC, UA      0 - 5 /HPF >20 (A)   RBC, UA      0 - 2 /HPF 1-3   Epithelial Cells, UA      /HPF FEW   Bacteria, UA      None Seen /HPF MANY (A)     No results for input(s): BC in the last 72 hours. No results for input(s): Viona Croak in the last 72 hours.     24 HR INTAKE/OUTPUT:      Intake/Output Summary (Last 24 hours) at 3/19/2022 1415  Last data filed at 3/19/2022 1305  Gross per 24 hour   Intake    Output 100 ml   Net -100 ml     MEDICATIONS:   pantoprazole (PROTONIX) bolus  80 mg IntraVENous Once    octreotide  50 mcg IntraVENous Once    sodium chloride  1,000 mL IntraVENous Once    sodium chloride  1,000 mL IntraVENous Once    cefTRIAXone (ROCEPHIN) IV  2,000 mg IntraVENous Q24H    thiamine (VITAMIN B1) IVPB  500 mg IntraVENous Q8H    Followed by   Ally Berman ON 3/22/2022] thiamine (VITAMIN B1) IVPB  100 mg IntraVENous Q24H    chlorhexidine  15 mL Mouth/Throat BID    rifaximin  550 mg Oral TID      dextrose 100 mL/hr at 03/19/22 1335    sodium chloride      octreotide (SandoSTATIN) infusion 50 mcg/hr (03/19/22 1506)    sodium chloride      sodium chloride      propofol 15 mcg/kg/min (03/19/22 1508)    fentaNYL 5 mcg/ml in 0.9%  ml infusion 69 mcg/hr (03/19/22 1508)    sodium chloride       fentanNYL, sodium chloride, sodium chloride    OBJECTIVE:  Vitals:    03/19/22 1530   BP: (!) 107/43   Pulse: 102   Resp: 18   Temp: 94.1 °F (34.5 °C)   SpO2: 100%     FiO2 : 10 %     O2 Device: None (Room air)        LABS:  WBC   Date Value Ref Range Status   03/19/2022 7.8 4.5 - 11.5 E9/L Final   12/13/2021 7.5 4.5 - 11.5 E9/L Final   12/10/2021 2.7 (L) 4.5 - 11.5 E9/L Final     Hemoglobin   Date Value Ref Range Status   03/19/2022 5.2 (LL) 11.5 - 15.5 g/dL Final   12/13/2021 8.9 (L) 11.5 - 15.5 g/dL Final   12/10/2021 7.3 (L) 11.5 - 15.5 g/dL Final     Hematocrit   Date Value Ref Range Status   03/19/2022 16.6 (L) 34.0 - 48.0 % Final   12/13/2021 28.1 (L) 34.0 - 48.0 % Final   12/10/2021 22.8 (L) 34.0 - 48.0 % Final     MCV   Date Value Ref Range Status   03/19/2022 104.4 (H) 80.0 - 99.9 fL Final   12/13/2021 102.9 (H) 80.0 - 99.9 fL Final   12/10/2021 101.8 (H) 80.0 - 99.9 fL Final     Platelets   Date Value Ref Range Status   03/19/2022 36 (L) 130 - 450 E9/L Final   12/13/2021 86 (L) 130 - 450 E9/L Final   12/10/2021 35 (L) 130 - 450 E9/L Final     Sodium   Date Value Ref Range Status   03/19/2022 133 132 - 146 mmol/L Final   12/13/2021 142 132 - 146 mmol/L Final   12/10/2021 142 132 - 146 mmol/L Final     Potassium   Date Value Ref Range Status   12/13/2021 4.2 3.5 - 5.0 mmol/L Final   12/10/2021 3.6 3.5 - 5.0 mmol/L Final   12/09/2021 3.3 (L) 3.5 - 5.0 mmol/L Final     Potassium reflex Magnesium   Date Value Ref Range Status   03/19/2022 4.0 3.5 - 5.0 mmol/L Final   11/26/2021 2.4 (LL) 3.5 - 5.0 mmol/L Final   11/24/2021 2.6 (LL) 3.5 - 5.0 mmol/L Final     Chloride   Date Value Ref Range Status   03/19/2022 89 (L) 98 - 107 mmol/L Final   12/13/2021 110 (H) 98 - 107 mmol/L Final   12/10/2021 113 (H) 98 - 107 mmol/L Final     CO2   Date Value Ref Range Status   03/19/2022 12 (L) 22 - 29 mmol/L Final   12/13/2021 17 (L) 22 - 29 mmol/L Final   12/10/2021 19 (L) 22 - 29 mmol/L Final     BUN   Date Value Ref Range Status   03/19/2022 13 6 - 20 mg/dL Final   12/13/2021 5 (L) 6 - 20 mg/dL Final   12/10/2021 5 (L) 6 - 20 mg/dL Final     CREATININE   Date Value Ref Range Status   03/19/2022 1.8 (H) 0.5 - 1.0 mg/dL Final   12/13/2021 1.0 0.5 - 1.0 mg/dL Final   12/10/2021 0.9 0.5 - 1.0 mg/dL Final     Glucose   Date Value Ref Range Status   03/19/2022 104 (H) 74 - 99 mg/dL Final   12/13/2021 104 (H) 74 - 99 mg/dL Final   12/10/2021 91 74 - 99 mg/dL Final   08/11/2011 125 (H) 70 - 110 mg/dL Final   07/05/2011 88 70 - 110 mg/dL Final   07/02/2011 85 70 - 110 mg/dL Final     Calcium   Date Value Ref Range Status   03/19/2022 8.2 (L) 8.6 - 10.2 mg/dL Final   12/13/2021 8.6 8.6 - 10.2 mg/dL Final   12/10/2021 8.6 8.6 - 10.2 mg/dL Final     Total Protein   Date Value Ref Range Status   03/19/2022 6.4 6.4 - 8.3 g/dL Final   12/13/2021 7.1 6.4 - 8.3 g/dL Final   12/10/2021 6.1 (L) 6.4 - 8.3 g/dL Final     Albumin   Date Value Ref Range Status   03/19/2022 1.9 (L) 3.5 - 5.2 g/dL Final   12/13/2021 3.1 (L) 3.5 - 5.2 g/dL Final   12/10/2021 2.5 (L) 3.5 - 5.2 g/dL Final   08/11/2011 3.6 3.2 - 4.8 g/dL Final   07/05/2011 3.3 3.2 - 4.8 g/dL Final   07/01/2011 3.5 3.2 - 4.8 g/dL Final     Total Bilirubin   Date Value Ref Range Status   03/19/2022 13.1 (H) 0.0 - 1.2 mg/dL Final   12/13/2021 1.2 0.0 - 1.2 mg/dL Final   12/10/2021 1.6 (H) 0.0 - 1.2 mg/dL Final     Alkaline Phosphatase   Date Value Ref Range Status   03/19/2022 150 (H) 35 - 104 U/L Final   12/13/2021 130 (H) 35 - 104 U/L Final   12/10/2021 82 35 - 104 U/L Final     AST   Date Value Ref Range Status   03/19/2022 139 (H) 0 - 31 U/L Final   12/13/2021 71 (H) 0 - 31 U/L Final   12/10/2021 37 (H) 0 - 31 U/L Final     ALT   Date Value Ref Range Status   03/19/2022 42 (H) 0 - 32 U/L Final   12/13/2021 32 0 - 32 U/L Final   12/10/2021 22 0 - 32 U/L Final     GFR Non-   Date Value Ref Range Status   03/19/2022 30 >=60 mL/min/1.73 Final     Comment:     Chronic Kidney Disease: less than 60 ml/min/1.73 sq.m. Kidney Failure: less than 15 ml/min/1.73 sq.m. Results valid for patients 18 years and older. 12/13/2021 59 >=60 mL/min/1.73 Final     Comment:     Chronic Kidney Disease: less than 60 ml/min/1.73 sq.m. Kidney Failure: less than 15 ml/min/1.73 sq.m. Results valid for patients 18 years and older. 12/10/2021 >60 >=60 mL/min/1.73 Final     Comment:     Chronic Kidney Disease: less than 60 ml/min/1.73 sq.m. Kidney Failure: less than 15 ml/min/1.73 sq.m. Results valid for patients 18 years and older.        GFR    Date Value Ref Range Status   03/19/2022 36  Final   12/13/2021 >60  Final   12/10/2021 >60  Final     Magnesium   Date Value Ref Range Status   11/26/2021 1.6 1.6 - 2.6 mg/dL Final   11/26/2021 1.5 (L) 1.6 - 2.6 mg/dL Final   11/24/2021 1.5 (L) 1.6 - 2.6 mg/dL Final     Phosphorus   Date Value Ref Range Status   10/26/2021 3.3 2.5 - 4.5 mg/dL Final   09/06/2021 3.3 2.5 - 4.5 mg/dL Final   09/05/2021 2.9 2.5 - 4.5 mg/dL Final Recent Labs     03/19/22  1317   HCO3 12.9*   BE -10.9*   O2SAT 88.8*       RADIOLOGY:  XR CHEST ABDOMEN NG PLACEMENT   Final Result   1. Satisfactory position of the enteric tube. 2.  Dense left retrocardiac opacity         CT HEAD WO CONTRAST   Final Result   No acute intracranial abnormality. Specifically, there is no acute   intracranial hemorrhage         CT CHEST WO CONTRAST   Final Result   Significantly limited study due to lack of contrast.      Atelectasis/infiltrates in the lung bases concerning for pneumonia. Hepatosplenomegaly with heterogeneous ill-defined hepatic lesions as noted   concerning for cirrhosis with possible regenerating nodules or diffuse   malignancy. Further assessment by CT with contrast or MRI may be considered. There is portal venous hypertension with venous varices as noted and moderate   amount of high density ascites. Diffuse edema in the mesentery with dilated thickened bowel loops concerning   for enterocolitis. Developing peritonitis cannot be excluded. There is   anasarca. Distended and thickened gallbladder. Consider ultrasonography to evaluate   cholecystitis. CT ABDOMEN PELVIS WO CONTRAST Additional Contrast? None   Final Result   Significantly limited study due to lack of contrast.      Atelectasis/infiltrates in the lung bases concerning for pneumonia. Hepatosplenomegaly with heterogeneous ill-defined hepatic lesions as noted   concerning for cirrhosis with possible regenerating nodules or diffuse   malignancy. Further assessment by CT with contrast or MRI may be considered. There is portal venous hypertension with venous varices as noted and moderate   amount of high density ascites. Diffuse edema in the mesentery with dilated thickened bowel loops concerning   for enterocolitis. Developing peritonitis cannot be excluded. There is   anasarca. Distended and thickened gallbladder.   Consider ultrasonography to evaluate   cholecystitis. CT CERVICAL SPINE WO CONTRAST   Final Result   1. There is no acute fracture or subluxation of the cervical spine. US GALLBLADDER RUQ    (Results Pending)       PROBLEM LIST:  Active Problems:    * No active hospital problems. *  Resolved Problems:    * No resolved hospital problems. *      ATTESTATION:  ICU Staff Physician note of personal involvement in Care  As the attending physician, I certify that I personally reviewed the patients history and personnally examined the patient to confirm the physical findings described above,  And that I reviewed the relevant imaging studies and available reports. I also discussed the differential diagnosis and all of the proposed management plans with the patient and individuals accompanying the patient to this visit. They had the opportunity to ask questions about the proposed management plans and to have those questions answered. This patient has a high probability of sudden, clinically significant deterioration, which requires the highest level of physician preparedness to intervene urgently. I managed/supervised life or organ supporting interventions that required frequent physician assessment. I devoted my full attention to the direct care of this patient for the amount of time indicated below. Time I spent with the family or surrogate(s) is included only if the patient was incapable of providing the necessary information or participating in medical decisions  Time devoted to teaching and to any procedures I billed separately is not included.      CRITICAL CARE TIME:  30 minutes    Lee Pillai MD  Pulmonary and Critical Care Medicine

## 2022-03-19 NOTE — ED PROVIDER NOTES
The history is provided by medical records and the EMS personnel. Patient present emergency room by EMS complaint unresponsive last known well was 36 hours ago. Patient does have significant history of alcohol abuse as well as being on the liver transplant list.  Patient on chart review does not appear to be on the liver transplant list at this time and last saw her GI doctor back in October and had not followed up with a return appointment. She was sonorous respirations unable to answer any questions history limited due to this fact. EMS report the patient was hypoglycemic and received an amp of D50 otherwise reported no other acute complaints she was found her house unresponsive. The patient presents with unresponsive that has been going on for 2 days. These symptoms are moderate in severity. Symptoms are made better by nothing. Symptoms are made worse by nothing. Associated symptoms include none. Review of Systems   Unable to perform ROS: Patient unresponsive        Physical Exam  Vitals and nursing note reviewed. Constitutional:       General: She is in acute distress. Appearance: She is toxic-appearing. Comments: Unresponsive   HENT:      Head: Normocephalic and atraumatic. Eyes:      General: Scleral icterus present. Cardiovascular:      Rate and Rhythm: Normal rate and regular rhythm. Pulses: Normal pulses. Heart sounds: Normal heart sounds. No murmur heard. No gallop. Pulmonary:      Effort: Pulmonary effort is normal. No respiratory distress. Breath sounds: Normal breath sounds. No wheezing or rales. Abdominal:      General: Abdomen is flat. Bowel sounds are normal. There is distension. Palpations: Abdomen is soft. Tenderness: There is no guarding. Comments: Abdominal distention, jaundice   Musculoskeletal:         General: No swelling or deformity. Skin:     General: Skin is warm and dry.       Capillary Refill: Capillary refill takes less than 2 seconds. Coloration: Skin is jaundiced. Neurological:      GCS: GCS eye subscore is 1. GCS verbal subscore is 1. GCS motor subscore is 2. Comments: Sonorous respiration no response to painful stimuli pupils equal and reactive          Procedures   PROCEDURE  3/19/22       Time: [address]  Risks, benefits and alternatives (for applicable procedures below) described. Performed By: Karly Maher MD.    Indication: hypovolemia, long term access and inability to gain peripheral access. Informed consent: Consent unable to be obtained due to the emergent nature of this procedure. .  Procedure: After routine sterile preparation, local anesthesia obtained by infiltration using 1% Lidocaine without epinephrine. A right 3-Lumen 7F Central Venous Catheter was placed by femoral vein approach and secured by standard fashion. Ultrasound Guidance:   used. Number of Attempts: 1  Post-procedure Findings: A post procedural chest x-ray  was not indicated. Patient tolerated the procedure well. PROCEDURE  3/19/22       Time: 1328    INTUBATION  Risks, benefits and alternatives if able (for applicable procedures below) described. Performed By: Karly Maher MD.    Indication:  impending respiratory failure, impending airway compromise, comatose state and airway protection. Informed consent: Consent unable to be obtained due to the emergent nature of this procedure. .  Procedure: Following Preoxygenation the patient was pretreated with etomidate followed by rocuronium. Intubation was performed after single attempt(s) by direct laryngoscopy using a Glidescope and 8.0mm cuffed endotracheal tube was inserted . Initial post procedure placement:  confirmed by bilateral breath sounds, ETCO2 detection, and absence of sounds over stomach. Tube Secured @ 23cm at the Lip. Post procedure chest x-ray: showed appropriate tube position. Procedural Complications: None.    Anesthesia Consult: No.         MDM     79-year-old female presented emergency department altered mental status. GCS of 3. Patient will be severely jaundiced. She does have a history of liver disease as well as alcoholic. Patient due to concern patient had a central line placed in the right femoral.  Patient was then intubated due to inability protect airway. Laboratory work-up metabolic encephalopathy as well as a urinary tract infection. She was noted to be with a GI bleed she was positive on Gastroccult as well as Hemoccult. She had a lactic acidosis of 20 as well as an elevated troponin elevated INR. Hemoglobin was significantly low at 5.2. To this she was given 2 units of blood 1 unit of FFP and a unit of platelets. Platelets were also significantly low. ICU was consulted and patient will be transferred to the ICU. Did speak with the hospitalist as well. Do have a call to gastroenterology as well due to the patient's GI bleed and significant elevated ammonia. ED Course as of 03/20/22 1709   Sat Mar 19, 2022   1401 Hemoccult and Gastroccult both positive [CB]   1403 Eosinophils %: 1.0 [CB]   1425 Patient accepted to ICU for admission [SS]   31 [phone] with GI Dr. Kenneth Mata will be evaluating patient, ok with NG lactulose q2hr at this time  Will likely scope tomorrow unless patient's status worsens [SS]      ED Course User Index  [CB] Maxine Garcia MD  [SS] Libby Guerrero MD        EKG: This EKG is signed by emergency department physician.     Rate: 100  Rhythm: Sinus  Interpretation: Sinus rhythm no acute ST elevation depression, normal axis  Comparison: stable as compared to patient's most recent EKG     ED Course as of 03/20/22 1709   Sat Mar 19, 2022   1401 Hemoccult and Gastroccult both positive [CB]   1403 Eosinophils %: 1.0 [CB]   1425 Patient accepted to ICU for admission [SS]   31 [phone] with GI Dr. Kenneth Mata will be evaluating patient, ok with NG lactulose q2hr at this time  Will likely scope tomorrow unless patient's status worsens [SS]      ED Course User Index  [CB] Karly Maher MD  [SS] Selene Schneider MD       --------------------------------------------- PAST HISTORY ---------------------------------------------  Past Medical History:  has a past medical history of Acute renal failure (ARF) ([facility] [address] Alcoholic cirrhosis ([facility] [address] Anxiety, Crohn's colitis ([facility] [address] Depression, History of blood transfusion, Pyoderma gangrenosum, and Thyroid disease. Past Surgical History:  has a past surgical history that includes Abdomen surgery; Arm Debridement; Colonoscopy; Endoscopy, colon, diagnostic; Dilatation, esophagus; Arm Debridement; Upper gastrointestinal endoscopy (N/A, 9/2/2021); transesophageal echocardiogram (N/A, 9/3/2021); Upper gastrointestinal endoscopy (N/A, 12/6/2021); sigmoidoscopy (N/A, 12/6/2021); and Upper gastrointestinal endoscopy (N/A, 12/6/2021). Social History:  reports that she has never smoked. She has never used smokeless tobacco. She reports previous alcohol use. She reports previous drug use. Family History: family history includes Cancer in her mother; Emphysema in her mother; Heart Disease in her father; High Blood Pressure in her sister; High Cholesterol in her father; Lupus in her mother; No Known Problems in her son. The patients home medications have been reviewed. Allergies: Patient has no known allergies.     -------------------------------------------------- RESULTS -------------------------------------------------    Lab  Results for orders placed or performed during the hospital encounter of 03/19/22   Culture, Blood 1    Specimen: Blood   Result Value Ref Range    Blood Culture, Routine (A)      Gram stain performed from blood culture bottle media  Gram positive cocci in clusters     Culture, Blood 2    Specimen: Blood   Result Value Ref Range    Culture, Blood 2 (A)      Gram stain performed from blood culture bottle media  Gram negative rods     Culture, Urine Specimen: Urine, clean catch   Result Value Ref Range    Urine Culture, Routine <10,000 CFU/mL  Proteus species   (A)     Organism Gram negative coni (A)     Urine Culture, Routine       >100,000 CFU/ml  Identification and sensitivity to follow     Legionella antigen, urine    Specimen: Urine voided   Result Value Ref Range    L. pneumophila Serogp 1 Ur Ag       Presumptive Negative -suggesting no recent or current infections  with Legionella pneumophila serogroup 1. Infection to Legionella cannot be ruled out since other serogroups  and species may cause infection, antigen may not be present in  early infection, or level of antigen may be below the  detection limit. Normal Range: Presumptive Negative     Strep Pneumoniae Antigen    Specimen: Urine voided   Result Value Ref Range    STREP PNEUMONIAE ANTIGEN, URINE       Presumptive negative- suggests no current or recent  pneumococcal infection.   Infection due to Strep pneumoniae cannot be  ruled out since the antigen present in the sample  may be below the detection limit of the test.  Normal Range:Presumptive Negative     Respiratory Panel, Molecular, with COVID-19 (Restricted: peds pts or suitable admitted adults)    Specimen: Nasopharyngeal   Result Value Ref Range    Adenovirus by PCR Not Detected Not Detected    Bordetella parapertussis by PCR Not Detected Not Detected    Bordetella pertussis by PCR Not Detected Not Detected    Chlamydophilia pneumoniae by PCR Not Detected Not Detected    Coronavirus 229E by PCR Not Detected Not Detected    Coronavirus HKU1 by PCR Not Detected Not Detected    Coronavirus NL63 by PCR Not Detected Not Detected    Coronavirus OC43 by PCR Not Detected Not Detected    SARS-CoV-2, PCR Not Detected Not Detected    Human Metapneumovirus by PCR Not Detected Not Detected    Human Rhinovirus/Enterovirus by PCR Not Detected Not Detected    Influenza A by PCR Not Detected Not Detected    Influenza B by PCR Not Detected Not Detected Mycoplasma pneumoniae by PCR Not Detected Not Detected    Parainfluenza Virus 1 by PCR Not Detected Not Detected    Parainfluenza Virus 2 by PCR Not Detected Not Detected    Parainfluenza Virus 3 by PCR Not Detected Not Detected    Parainfluenza Virus 4 by PCR Not Detected Not Detected    Respiratory Syncytial Virus by PCR Not Detected Not Detected   CBC with Auto Differential   Result Value Ref Range    WBC 7.8 4.5 - 11.5 E9/L    RBC 1.59 (L) 3.50 - 5.50 E12/L    Hemoglobin 5.2 (LL) 11.5 - 15.5 g/dL    Hematocrit 16.6 (L) 34.0 - 48.0 %    .4 (H) 80.0 - 99.9 fL    MCH 32.7 26.0 - 35.0 pg    MCHC 31.3 (L) 32.0 - 34.5 %    RDW 17.0 (H) 11.5 - 15.0 fL    Platelets 36 (L) 285 - 450 E9/L    MPV 12.9 (H) 7.0 - 12.0 fL    Neutrophils % 90.0 (H) 43.0 - 80.0 %    Lymphocytes % 4.0 (L) 20.0 - 42.0 %    Monocytes % 2.0 2.0 - 12.0 %    Eosinophils % 1.0 0.0 - 6.0 %    Basophils % 1.0 0.0 - 2.0 %    Neutrophils Absolute 7.18 1.80 - 7.30 E9/L    Lymphocytes Absolute 0.31 (L) 1.50 - 4.00 E9/L    Monocytes Absolute 0.16 0.10 - 0.95 E9/L    Eosinophils Absolute 0.08 0.05 - 0.50 E9/L    Basophils Absolute 0.08 0.00 - 0.20 E9/L    Myelocyte Percent 2.0 0 - 0 %   Protime-INR   Result Value Ref Range    Protime 58.9 (H) 9.3 - 12.4 sec    INR 5.0    Comprehensive Metabolic Panel w/ Reflex to MG   Result Value Ref Range    Sodium 133 132 - 146 mmol/L    Potassium reflex Magnesium 4.0 3.5 - 5.0 mmol/L    Chloride 89 (L) 98 - 107 mmol/L    CO2 12 (L) 22 - 29 mmol/L    Anion Gap 32 (H) 7 - 16 mmol/L    Glucose 104 (H) 74 - 99 mg/dL    BUN 13 6 - 20 mg/dL    CREATININE 1.8 (H) 0.5 - 1.0 mg/dL    GFR Non-African American 30 >=60 mL/min/1.73    GFR African American 36     Calcium 8.2 (L) 8.6 - 10.2 mg/dL    Total Protein 6.4 6.4 - 8.3 g/dL    Albumin 1.9 (L) 3.5 - 5.2 g/dL    Total Bilirubin 13.1 (H) 0.0 - 1.2 mg/dL    Alkaline Phosphatase 150 (H) 35 - 104 U/L    ALT 42 (H) 0 - 32 U/L     (H) 0 - 31 U/L   Serum Drug Screen Result Value Ref Range    Ethanol Lvl <10 mg/dL    Acetaminophen Level <5.0 (L) 10.0 - 53.4 mcg/mL    Salicylate, Serum <7.9 0.0 - 30.0 mg/dL    TCA Scrn NEGATIVE Cutoff:300 ng/mL   CK   Result Value Ref Range    Total  20 - 180 U/L   Ammonia   Result Value Ref Range    Ammonia 325.0 (H) 11.0 - 51.0 umol/L   Lactate, Sepsis   Result Value Ref Range    Lactic Acid, Sepsis 19.9 (HH) 0.5 - 1.9 mmol/L   Urinalysis with Microscopic   Result Value Ref Range    Color, UA TOMMY (A) Straw/Yellow    Clarity, UA CLOUDY (A) Clear    Glucose, Ur 100 (A) Negative mg/dL    Bilirubin Urine MODERATE (A) Negative    Ketones, Urine 15 (A) Negative mg/dL    Specific Gravity, UA 1.015 1.005 - 1.030    Blood, Urine SMALL (A) Negative    pH, UA 8.0 5.0 - 9.0    Protein, UA >=300 (A) Negative mg/dL    Urobilinogen, Urine >=8.0 (A) <2.0 E.U./dL    Nitrite, Urine POSITIVE (A) Negative    Leukocyte Esterase, Urine LARGE (A) Negative    WBC, UA >20 (A) 0 - 5 /HPF    RBC, UA 1-3 0 - 2 /HPF    Epithelial Cells, UA FEW /HPF    Bacteria, UA MANY (A) None Seen /HPF   Troponin   Result Value Ref Range    Troponin, High Sensitivity 35 (H) 0 - 9 ng/L   Urine Drug Screen   Result Value Ref Range    Amphetamine Screen, Urine NOT DETECTED Negative <1000 ng/mL    Barbiturate Screen, Ur NOT DETECTED Negative < 200 ng/mL    Benzodiazepine Screen, Urine NOT DETECTED Negative < 200 ng/mL    Cannabinoid Scrn, Ur NOT DETECTED Negative < 50ng/mL    Cocaine Metabolite Screen, Urine NOT DETECTED Negative < 300 ng/mL    Opiate Scrn, Ur NOT DETECTED Negative < 300ng/mL    PCP Screen, Urine NOT DETECTED Negative < 25 ng/mL    Methadone Screen, Urine NOT DETECTED Negative <300 ng/mL    Oxycodone Urine NOT DETECTED Negative <100 ng/mL    FENTANYL SCREEN, URINE NOT DETECTED Negative <1 ng/mL    Drug Screen Comment: see below    Arterial Blood Gas, Respiratory Only   Result Value Ref Range    POC Source Arterial     PH 37 7.406 7.350 - 7.450    PCO2 37 20.5 (L) 35.0 - 45.0 mmHg    PO2 37 53.8 (L) 80.0 - 100.0 mmHg    HCO3 12.9 (L) 22.0 - 26.0 mmol/L    B.E. -10.9 (L) -3.0 - 3.0 mmol/L    O2 Sat 88.8 (L) 92.0 - 98.5 %          DEVICE 17,310,[id number]     [employer]    Platelet Confirmation   Result Value Ref Range    Platelet Confirmation CONFIRMED    Troponin   Result Value Ref Range    Troponin, High Sensitivity 67 (H) 0 - 9 ng/L   CBC   Result Value Ref Range    WBC 9.9 4.5 - 11.5 E9/L    RBC 1.84 (L) 3.50 - 5.50 E12/L    Hemoglobin 6.1 (LL) 11.5 - 15.5 g/dL    Hematocrit 18.7 (L) 34.0 - 48.0 %    .6 (H) 80.0 - 99.9 fL    MCH 33.2 26.0 - 35.0 pg    MCHC 32.6 32.0 - 34.5 %    RDW 17.1 (H) 11.5 - 15.0 fL    Platelets 37 (L) 939 - 450 E9/L    MPV 12.6 (H) 7.0 - 12.0 fL   Magnesium   Result Value Ref Range    Magnesium 1.2 (L) 1.6 - 2.6 mg/dL   Arterial Blood Gas, Respiratory Only   Result Value Ref Range    POC Source Arterial     FIO2 100.0     PH 37 7.287 (L) 7.350 - 7.450    PCO2 37 29.0 (L) 35.0 - 45.0 mmHg    PO2 37 145.1 (H) 80.0 - 100.0 mmHg    HCO3 13.9 (L) 22.0 - 26.0 mmol/L    B.E. -11.7 (L) -3.0 - 3.0 mmol/L    O2 Sat 99.0 (H) 92.0 - 98.5 %     ID 30     DEVICE 17,310,[id number],678     Mode AC     Tidal Volume 480 mL    Positive End EXP Press 5 cmH2O    Respiratory Rate 18 b/min    [employer]    URINE ELECTROLYTES   Result Value Ref Range    Sodium, Ur 77 Not Established mmol/L    Potassium, Ur 21.1 Not Established mmol/L    Chloride 47 Not Established mmol/L   CBC with Auto Differential   Result Value Ref Range    WBC 9.2 4.5 - 11.5 E9/L    RBC 2.10 (L) 3.50 - 5.50 E12/L    Hemoglobin 6.7 (LL) 11.5 - 15.5 g/dL    Hematocrit 19.6 (L) 34.0 - 48.0 %    MCV 93.3 80.0 - 99.9 fL    MCH 31.9 26.0 - 35.0 pg    MCHC 34.2 32.0 - 34.5 %    RDW 18.8 (H) 11.5 - 15.0 fL    Platelets 28 (L) 897 - 450 E9/L    MPV 12.3 (H) 7.0 - 12.0 fL    Neutrophils % 87.0 (H) 43.0 - 80.0 %    Lymphocytes % 4.0 (L) 20.0 - 42.0 % Monocytes % 8.0 2.0 - 12.0 %    Eosinophils % 0.0 0.0 - 6.0 %    Basophils % 0.0 0.0 - 2.0 %    Neutrophils Absolute 8.10 (H) 1.80 - 7.30 E9/L    Lymphocytes Absolute 0.37 (L) 1.50 - 4.00 E9/L    Monocytes Absolute 0.74 0.10 - 0.95 E9/L    Eosinophils Absolute 0.00 (L) 0.05 - 0.50 E9/L    Basophils Absolute 0.00 0.00 - 0.20 E9/L    Myelocyte Percent 1.0 0 - 0 %   Comprehensive Metabolic Panel   Result Value Ref Range    Sodium 127 (L) 132 - 146 mmol/L    Potassium 2.8 (L) 3.5 - 5.0 mmol/L    Chloride 89 (L) 98 - 107 mmol/L    CO2 19 (L) 22 - 29 mmol/L    Anion Gap 19 (H) 7 - 16 mmol/L    Glucose 328 (H) 74 - 99 mg/dL    BUN 16 6 - 20 mg/dL    CREATININE 2.5 (H) 0.5 - 1.0 mg/dL    GFR Non-African American 21 >=60 mL/min/1.73    GFR African American 25     Calcium 8.0 (L) 8.6 - 10.2 mg/dL    Total Protein 6.6 6.4 - 8.3 g/dL    Albumin 2.8 (L) 3.5 - 5.2 g/dL    Total Bilirubin 17.2 (H) 0.0 - 1.2 mg/dL    Alkaline Phosphatase 112 (H) 35 - 104 U/L    ALT 79 (H) 0 - 32 U/L     (H) 0 - 31 U/L   Magnesium   Result Value Ref Range    Magnesium 1.6 1.6 - 2.6 mg/dL   Lactic Acid   Result Value Ref Range    Lactic Acid 15.5 (HH) 0.5 - 2.2 mmol/L   Comprehensive Metabolic Panel   Result Value Ref Range    Sodium 132 132 - 146 mmol/L    Potassium 3.5 3.5 - 5.0 mmol/L    Chloride 90 (L) 98 - 107 mmol/L    CO2 12 (L) 22 - 29 mmol/L    Anion Gap 30 (H) 7 - 16 mmol/L    Glucose 225 (H) 74 - 99 mg/dL    BUN 14 6 - 20 mg/dL    CREATININE 1.9 (H) 0.5 - 1.0 mg/dL    GFR Non-African American 28 >=60 mL/min/1.73    GFR African American 34     Calcium 7.8 (L) 8.6 - 10.2 mg/dL    Total Protein 6.0 (L) 6.4 - 8.3 g/dL    Albumin 2.0 (L) 3.5 - 5.2 g/dL    Total Bilirubin 13.3 (H) 0.0 - 1.2 mg/dL    Alkaline Phosphatase 149 (H) 35 - 104 U/L    ALT 57 (H) 0 - 32 U/L     (H) 0 - 31 U/L   Magnesium   Result Value Ref Range    Magnesium 1.2 (L) 1.6 - 2.6 mg/dL   Phosphorus   Result Value Ref Range    Phosphorus 6.4 (H) 2.5 - 4.5 mg/dL Detected Not Detected    Klebsiella aerogenes by PCR Not Detected Not Detected    Klebsiella oxytoca by PCR Not Detected Not Detected    Klebsiella pneumoniae group by PCR DETECTED (AA) Not Detected    Listeria monocytogenes by PCR Not Detected Not Detected    Neisseria meningitidis by PCR Not Detected Not Detected    Proteus species by PCR Not Detected Not Detected    Pseudomonas aeruginosa by PCR Not Detected Not Detected    Salmonella species by PCR Not Detected Not Detected    Streptococcus agalactiae by PCR Not Detected Not Detected    Staphylococcus aureus by PCR Not Detected Not Detected    Staphylococcus epidermidis by PCR Not Detected Not Detected    Staphylococcus lugdunensis by PCR Not Detected Not Detected    Staphylococcus species by PCR DETECTED (AA) Not Detected    Serratia marcescens by PCR Not Detected Not Detected    Streptococcus pneumoniae by PCR Not Detected Not Detected    Streptococcus pyogenes  by PCR Not Detected Not Detected    Streptococcus species by PCR Not Detected Not Detected    Stenotrophomonas maltophilia by PCR Not Detected Not Detected    Candida albicans by PCR Not Detected Not Detected    Candida auris by PCR Not Detected Not Detected    Candida glabrata by PCR Not Detected Not Detected    Candida krusei by PCR Not Detected Not Detected    Candida parapsilosis by PCR Not Detected Not Detected    Candida tropicalis by PCR Not Detected Not Detected    Cryptococcus neoformans/gattii by PCR Not Detected Not Detected    Cephalosporin Resistance CTX-M gene by PCR Not Detected Not Detected    Carbapenem Resistance IMP gene by PCR Not Detected Not Detected    Carbapenem Resistance KPC by PCR Not Detected Not Detected    Colistin Resistance mcr-1 gene by PCR Not Detected Not Detected    Carbapenem Resistance NDM gene by PCR Not Detected Not Detected    Carbapenem Resistance OXA-48 gene by PCR Not Detected Not Detected    Carbapenem Resistance VIM gene Detected Not Detected Not Detected Platelet Confirmation   Result Value Ref Range    Platelet Confirmation CONFIRMED    Lactic Acid   Result Value Ref Range    Lactic Acid 3.7 (HH) 0.5 - 2.2 mmol/L   Fibrinogen   Result Value Ref Range    Fibrinogen 126 (L) 225 - 540 mg/dL   URINE ELECTROLYTES   Result Value Ref Range    Sodium, Ur 21 Not Established mmol/L    Potassium, Ur 47.7 Not Established mmol/L    Chloride <20 Not Established mmol/L   Blood Gas, Arterial   Result Value Ref Range    Date Analyzed 20220320     Time Analyzed 1314     Source: Blood Arterial     pH, Blood Gas 7.523 (H) 7.350 - 7.450    PCO2 26.1 (L) 35.0 - 45.0 mmHg    PO2 112.5 (H) 75.0 - 100.0 mmHg    HCO3 21.0 (L) 22.0 - 26.0 mmol/L    B.E. -1.6 -3.0 - 3.0 mmol/L    O2 Sat 97.6 92.0 - 98.5 %    PO2/FIO2 2.81 mmHg/%    AaDO2 132.7 mmHg    RI(T) 1.18     O2Hb 96.7 94.0 - 97.0 %    COHb 0.5 0.0 - 1.5 %    MetHb 0.4 0.0 - 1.5 %    O2 Content 9.1 mL/dL    HHb 2.4 0.0 - 5.0 %    tHb (est) 6.5 (L) 11.5 - 16.5 g/dL    Mode AC     FIO2 40.0 %    Rr Mechanical 25.0 b/min    Vt Mechanical 400.0 mL    Peep/Cpap 5.0 cmH2O    Date Of Collection      Time Collected      Pt Temp 37.0 C          Lab 85070     Critical(s) Notified .  No Critical Values    Platelet Confirmation   Result Value Ref Range    Platelet Confirmation CONFIRMED    POCT Glucose   Result Value Ref Range    Meter Glucose 111 (H) 74 - 99 mg/dL   POCT Glucose   Result Value Ref Range    Meter Glucose 86 74 - 99 mg/dL   POCT Glucose   Result Value Ref Range    Meter Glucose 318 (H) 74 - 99 mg/dL   EKG 12 Lead   Result Value Ref Range    Ventricular Rate 100 BPM    Atrial Rate 100 BPM    P-R Interval 128 ms    QRS Duration 84 ms    Q-T Interval 436 ms    QTc Calculation (Bazett) 562 ms    P Axis 70 degrees    R Axis 21 degrees    T Axis 53 degrees   TYPE AND SCREEN   Result Value Ref Range    ABO/Rh A NEG     Antibody Screen NEG    PREPARE PLASMA, 1 Units   Result Value Ref Range    Product Code Blood Bank [id number]     Description Blood Bank Plasma, Frozen <=24h     Unit Number [id number]     Dispense Status Blood Bank chanGed     Product Code Blood Bank K6960V12     Description Blood Bank Plasma, 5 Day, Thawed     Unit Number [id number]     Dispense Status Blood Bank released     Product Code Blood Bank [id number]     Description Blood Bank Plasma, 5 Day, Thawed     Unit Number [id number]     Dispense Status Blood Bank transfused    PREPARE RBC (CROSSMATCH), 2 Units   Result Value Ref Range    Product Code Blood Bank R6730Q51     Description Blood Bank Red Blood Cells, Apheresis, Leuko-reduced     Unit Number [id number]     Dispense Status Blood Bank transfused     Product Code Blood Bank Z4358O14     Description Blood Bank Red Blood Cells, Leuko-reduced     Unit Number [id number]     Dispense Status Blood Bank transfused     Product Code Blood Bank M6301L10     Description Blood Bank Red Blood Cells, Leuko-reduced     Unit Number [id number]     Dispense Status Blood Bank issued     Product Code Blood Bank P8082Y23     Description Blood Bank Red Blood Cells, Apheresis, Leuko-reduced     Unit Number [id number]     Dispense Status Blood Bank issued    PREPARE PLATELETS, 1 Product   Result Value Ref Range    Product Code Blood Bank [id number]     Description Blood Bank      Unit Number [id number]     Dispense Status Blood Bank issued    PREPARE CRYOPRECIPITATE, 1 Product   Result Value Ref Range    Product Code Blood Bank [id number]     Description Blood Bank Cryoprecipitate, Pooled, Thawed     Unit Number [id number]     Dispense Status Blood Bank issued        Radiology  XR CHEST PORTABLE   Final Result   1. Endotracheal tube terminates in the midthoracic trachea. Enteric tube   extends subdiaphragmatic and off the field of view. 2.  Prominence of the cardiac silhouette. Faint interstitial opacities   bilaterally. US GALLBLADDER RUQ   Final Result   1.  Densely echogenic liver with nodular contour consistent with cirrhosis but   also as correlated with CT cannot exclude internal lesions. Recommend MRI   evaluation as malignancy is not excluded   2. Associated ascites and possible medical renal disease   3. Gallbladder wall thickening and sludge also may relate to the   hepatocellular disease, but if concern for cholecystitis HIDA scan could   further evaluate         XR CHEST ABDOMEN NG PLACEMENT   Final Result   1. Satisfactory position of the enteric tube. 2.  Dense left retrocardiac opacity         CT HEAD WO CONTRAST   Final Result   No acute intracranial abnormality. Specifically, there is no acute   intracranial hemorrhage         CT CHEST WO CONTRAST   Final Result   Significantly limited study due to lack of contrast.      Atelectasis/infiltrates in the lung bases concerning for pneumonia. Hepatosplenomegaly with heterogeneous ill-defined hepatic lesions as noted   concerning for cirrhosis with possible regenerating nodules or diffuse   malignancy. Further assessment by CT with contrast or MRI may be considered. There is portal venous hypertension with venous varices as noted and moderate   amount of high density ascites. Diffuse edema in the mesentery with dilated thickened bowel loops concerning   for enterocolitis. Developing peritonitis cannot be excluded. There is   anasarca. Distended and thickened gallbladder. Consider ultrasonography to evaluate   cholecystitis. CT ABDOMEN PELVIS WO CONTRAST Additional Contrast? None   Final Result   Significantly limited study due to lack of contrast.      Atelectasis/infiltrates in the lung bases concerning for pneumonia. Hepatosplenomegaly with heterogeneous ill-defined hepatic lesions as noted   concerning for cirrhosis with possible regenerating nodules or diffuse   malignancy. Further assessment by CT with contrast or MRI may be considered.    There is portal venous hypertension with venous varices as noted and moderate   amount of high density ascites. Diffuse edema in the mesentery with dilated thickened bowel loops concerning   for enterocolitis. Developing peritonitis cannot be excluded. There is   anasarca. Distended and thickened gallbladder. Consider ultrasonography to evaluate   cholecystitis. CT CERVICAL SPINE WO CONTRAST   Final Result   1. There is no acute fracture or subluxation of the cervical spine.               ------------------------- NURSING NOTES AND VITALS REVIEWED ---------------------------  Date / Time Roomed:  3/19/2022 12:40 PM  ED Bed Assignment:  [id number]    The nursing notes within the ED encounter and vital signs as below have been reviewed.    Patient Vitals for the past 24 hrs:   BP Temp Temp src Pulse Resp SpO2   03/20/22 1530 113/67 -- -- 72 25 100 %   03/20/22 1500 116/66 -- -- 76 26 99 %   03/20/22 1430 111/60 97 °F (36.1 °C) Bladder 75 18 100 %   03/20/22 1415 113/62 97 °F (36.1 °C) -- 78 18 100 %   03/20/22 1400 113/62 97 °F (36.1 °C) -- 78 17 100 %   03/20/22 1345 -- -- -- 76 26 99 %   03/20/22 1330 121/65 -- -- 75 19 99 %   03/20/22 1300 -- -- -- 80 25 100 %   03/20/22 1230 124/62 -- -- 81 25 100 %   03/20/22 1215 126/65 97.9 °F (36.6 °C) Bladder -- -- --   03/20/22 1200 (!) 119/58 97.9 °F (36.6 °C) -- 77 25 100 %   03/20/22 1130 (!) 120/59 -- -- 74 25 100 %   03/20/22 1100 (!) 106/55 -- -- 77 25 100 %   03/20/22 1030 (!) 100/50 -- -- 78 29 100 %   03/20/22 1000 (!) 95/46 -- -- 80 25 100 %   03/20/22 0930 (!) 110/48 -- -- 83 25 100 %   03/20/22 0900 108/60 -- -- 84 25 100 %   03/20/22 0845 (!) 109/54 -- -- 86 25 100 %   03/20/22 0830 (!) 104/49 -- -- 85 25 100 %   03/20/22 0815 (!) 89/56 -- -- 87 25 100 %   03/20/22 0800 (!) 91/46 98.1 °F (36.7 °C) Bladder 95 25 100 %   03/20/22 0745 (!) 98/52 -- -- 94 24 100 %   03/20/22 0730 (!) 85/42 -- -- 94 20 100 %   03/20/22 0715 (!) 93/32 98.2 °F (36.8 °C) -- 94 25 100 %   03/20/22 0700 98/64 98.2 106/43 -- -- 104 20 100 %   03/19/22 2130 (!) 106/43 -- -- 102 29 100 %   03/19/22 2115 (!) 97/51 -- -- 99 30 100 %   03/19/22 2100 (!) 99/48 -- -- 99 (!) 32 100 %   03/19/22 2045 (!) 103/42 -- -- 99 29 100 %   03/19/22 2030 (!) 103/44 -- -- 97 29 100 %   03/19/22 2015 (!) 95/48 -- -- 95 (!) 31 100 %   03/19/22 2000 (!) 101/52 96.6 °F (35.9 °C) Bladder 94 30 100 %   03/19/22 1945 (!) 100/49 -- -- 96 26 100 %   03/19/22 1930 (!) 108/52 -- -- 98 24 100 %   03/19/22 1915 (!) 109/59 -- -- 100 19 100 %   03/19/22 1900 (!) 130/56 95.2 °F (35.1 °C) Bladder 102 29 100 %   03/19/22 1845 (!) 108/49 95.2 °F (35.1 °C) Bladder 101 22 100 %   03/19/22 1745 117/60 94.6 °F (34.8 °C) CORE 101 17 100 %   03/19/22 1730 (!) 124/58 -- -- 102 18 100 %   03/19/22 1715 124/68 94.3 °F (34.6 °C) CORE 101 18 100 %       Oxygen Saturation Interpretation: on ventilator      ------------------------------------------ PROGRESS NOTES ------------------------------------------  Re-evaluation(s):  Time: 1500. Patients symptoms show no change  Repeat physical examination is not changed    Time: 1401. Patients symptoms show no change  Repeat physical examination is not changed    I have spoken with the patient and discussed todays results, in addition to providing specific details for the plan of care and counseling regarding the diagnosis and prognosis. Their questions are answered at this time and they are agreeable with the plan.      --------------------------------- ADDITIONAL PROVIDER NOTES ---------------------------------  Consultations:  Spoke with Dr. Darlin Mueller,  They will admit this patient.     This patient's ED course included: a personal history and physicial examination, re-evaluation prior to disposition, multiple bedside re-evaluations, IV medications, cardiac monitoring, continuous pulse oximetry and complex medical decision making and emergency management    This patient has remained hemodynamically stable during their ED course. Please note that the withdrawal or failure to initiate urgent interventions for this patient would likely result in a life threatening deterioration or permanent disability. Accordingly this patient received 55 minutes of critical care time, excluding separately billable procedures. Systems at risk for deterioration include: cardiac, pulm gi. Clinical Impression  1. Metabolic encephalopathy    2. Urinary tract infection with hematuria, site unspecified    3. Gastrointestinal hemorrhage, unspecified gastrointestinal hemorrhage type    4. Lactic acidosis    5. Elevated troponin    6. Acute blood loss anemia    7. Hyperammonemia ([address]    8. Elevated INR    9. Hypoglycemia    10. History of alcohol abuse    11.  Hypothermia, initial encounter          Disposition  Patient's disposition: Admit to CCU/ICU  Patient's condition is critical.         Jeronimo Hope MD  Resident  03/19/22 1533       Jeronimo Hope MD  Resident  03/19/22 2562 Magdy Haskins MD  Resident  03/19/22 Noel Anna MD  03/20/22 [id number]

## 2022-03-19 NOTE — CONSULTS
[facility]  Consult Note    [address] [facility] [address]  [place], [address]  Phone [phone]   [phone]      Admit Date: 3/19/2022 12:40 PM  Pt Name: Antonio Brown  MRN: [id number]  : 1975  Reason for Consult:    Chief Complaint   Patient presents with    Altered Mental Status     unresponsive, jaundiced, liver disease, lkw 36 hours ago, found on couch in urine and feces     Requesting Physician:  Marlo Campos DO  PCP: Mayuri Peter MD  History Obtained From:  patient, chart   ID consulted for Metabolic encephalopathy [J32.41]  Lactic acidosis [E87.2]  Acute blood loss anemia [D62]  Hyperammonemia ([address] [E72.20]  Hypoglycemia [E16.2]  Elevated INR [R79.1]  Elevated troponin [R77.8]  History of alcohol abuse [F10.11]  Hypothermia, initial encounter [T68. XXXA]  Urinary tract infection with hematuria, site unspecified [N39.0, R31.9]  Gastrointestinal hemorrhage, unspecified gastrointestinal hemorrhage type [K92.2]  Decompensation of cirrhosis of liver ([address] [K72.90, K74.60]  on hospital day 0  CHIEF COMPLAINT       Chief Complaint   Patient presents with    Altered Mental Status     unresponsive, jaundiced, liver disease, lkw 36 hours ago, found on couch in urine and feces     HISTORYOF PRESENT ILLNESS   Antonio Brown is a 51 y.o. female who presents with   has a past medical history of Acute renal failure (ARF) ([address] Alcoholic cirrhosis ([address] Anxiety, Crohn's colitis ([address] Depression, History of blood transfusion, Pyoderma gangrenosum, and Thyroid disease.    ED TRIAGEVITALS  BP: 117/60, Temp: 94.6 °F (34.8 °C), Pulse: 101, Resp: 17, SpO2: 100 %  HPI  Pt is known to ID  Last seen on 2021 for vre/cons bacteremia  dapto ended 2021  Chart reviewed    Pt came from home  in due to found at home unresponsive  Pt with h/o ETOH/cirrhosis    Pt was seen in ICU unable to obtain hpi/ros  afebrile intubated  Cr1.8  alt42 nh3 325 [id number] wbc7.8 plt36  UA cloudy LE/protein  ID was asked to see for infection   Currently received ceftriaxone/doxy  daptomycin  was ordered  Spoke with Dr Lavelle Gaucher:   unablr due to intubated    Medications Prior to Admission: spironolactone (ALDACTONE) 25 MG tablet, Take 1 tablet by mouth daily  cyanocobalamin 2000 MCG tablet, Take 1 tablet by mouth daily  pyridoxine (B-6) 25 MG tablet, Take 1 tablet by mouth daily  vitamin D (ERGOCALCIFEROL) 1.25 MG (85940 UT) CAPS capsule, Take 1 capsule by mouth once a week Monday  potassium chloride (KLOR-CON M) 10 MEQ extended release tablet, Take 20 mEq by mouth daily  lactulose (CHRONULAC) 10 GM/15ML solution, Take 20 g by mouth every 12 hours  propranolol (INDERAL) 10 MG tablet, Take 20 mg by mouth every 12 hours   rifaximin (XIFAXAN) 550 MG tablet, Take 550 mg by mouth 2 times daily  cloNIDine (CATAPRES) 0.1 MG tablet, Take 0.1 mg by mouth daily  magnesium oxide (MAG-OX) 400 MG tablet, Take 400 mg by mouth daily  vitamin B-1 (THIAMINE) 100 MG tablet, Take 100 mg by mouth daily  folic acid (FOLVITE) 1 MG tablet, Take 1 tablet by mouth daily  thiamine mononitrate 100 MG tablet, Take 1 tablet by mouth daily  pantoprazole (PROTONIX) 40 MG tablet, Take 40 mg by mouth daily   CURRENT MEDICATIONS     Current Facility-Administered Medications:     dextrose 10 % infusion, , IntraVENous, Continuous, Victor Hugo Comer MD, Last Rate: 100 mL/hr at 03/19/22 1335, New Bag at 03/19/22 1335    0.9 % sodium chloride infusion, , IntraVENous, PRN, Stephane Earl MD    octreotide (SANDOSTATIN) 500 mcg in sodium chloride 0.9 % 100 mL infusion, 50 mcg/hr, IntraVENous, Continuous, Victor Hugo Comer MD, Last Rate: 10 mL/hr at 03/19/22 1506, 50 mcg/hr at 03/19/22 1506    0.9 % sodium chloride infusion, , IntraVENous, Once, Stephane Earl MD    0.9 % sodium chloride infusion, , IntraVENous, PRN, Stephane Earl MD    thiamine (B-1) 500 mg in sodium chloride 0.9 % 100 mL IVPB, 500 mg, IntraVENous, Q8H, Stopped at 03/19/22 1601 **FOLLOWED BY** [START ON 3/22/2022] thiamine (B-1) 100 mg in sodium chloride 0.9 % 100 mL IVPB, 100 mg, IntraVENous, Q24H, Shannen Herzog MD    propofol injection, 5-50 mcg/kg/min, IntraVENous, Continuous, Shannen Herzog MD, Stopped at 03/19/22 1551    chlorhexidine (PERIDEX) 0.12 % solution 15 mL, 15 mL, Mouth/Throat, BID, Shannen Herzog MD    rifaximin Claudeen Held) tablet 550 mg, 550 mg, Oral, TID, Shannen Herzog MD    fentaNYL 5 mcg/ml in 0.9%  ml infusion,  mcg/hr, IntraVENous, Continuous, Shannen Herzog MD, Last Rate: 5 mL/hr at 03/19/22 1607, 25 mcg/hr at 03/19/22 1607    sodium chloride 0.9 % infusion, , , ,     doxycycline (VIBRAMYCIN) 100 mg in dextrose 5 % 100 mL IVPB, 100 mg, IntraVENous, Q12H, Shannen Herzog MD, Last Rate: 100 mL/hr at 03/19/22 1754, 100 mg at 03/19/22 1754    cefepime (MAXIPIME) 1000 mg IVPB extended (mini-bag), 1,000 mg, IntraVENous, Q8H, Shannen Herzog MD    lactulose (CHRONULAC) 10 GM/15ML solution 30 g, 30 g, Rectal, Once, Michael Ortega MD    phytonadione (ADULT) (VITAMIN K) 10 mg in dextrose 5 % 100 mL IVPB, 10 mg, IntraVENous, Daily, Shannen Herzog MD, Stopped at 03/19/22 1757    lactulose (CHRONULAC) 10 GM/15ML solution 20 g, 20 g, Oral, TID, Shannen Herzog MD, 20 g at 03/19/22 1741    hydrocortisone sodium succinate PF (SOLU-CORTEF) injection 50 mg, 50 mg, IntraVENous, Q6H, Shannen Herzog MD, 50 mg at 03/19/22 1740    pantoprazole (PROTONIX) 40 mg in sodium chloride (PF) 10 mL injection, 40 mg, IntraVENous, Q12H, Shannen Herzog MD    sodium chloride 0.9 % infusion, , , ,     norepinephrine (LEVOPHED) 16 mg in sodium chloride 0.9 % 250 mL infusion (non-weight-based), 1-100 mcg/min, IntraVENous, Continuous, Shannen Herzog MD, Last Rate: 4.7 mL/hr at 03/19/22 1642, 5 mcg/min at 03/19/22 1642    linezolid (ZYVOX) IVPB 600 mg, 600 mg, IntraVENous, Q12H, Lilly Zelaya MD  ALLERGIES     Patient has no known allergies.   Immunization History   Administered Date(s) Administered    COVID-19, Pfizer Purple top, DILUTE for use, 12+ yrs, 30mcg/0.3mL dose 04/14/2021, 06/01/2021    Influenza, Quadv, IM, PF (6 mo and older Fluzone, Flulaval, Fluarix, and 3 yrs and older Afluria) 10/28/2021      Internal Administration   First Dose COVID-19, Pfizer Purple top, DILUTE for use, 12+ yrs, 30mcg/0.3mL dose  04/14/2021   Second Dose COVID-19, Pfizer Purple top, DILUTE for use, 12+ yrs, 30mcg/0.3mL dose   06/01/2021       Last COVID Lab SARS-CoV-2, NAAT (no units)   Date Value   11/24/2021 Not Detected            PAST MEDICAL HISTORY     Past Medical History:   Diagnosis Date    Acute renal failure (ARF) ([facility] 25/02/9968    Alcoholic cirrhosis ([facility]     Anxiety     Crohn's colitis ([facility]     Depression     History of blood transfusion     Pyoderma gangrenosum     Thyroid disease      SURGICAL HISTORY       Past Surgical History:   Procedure Laterality Date    ABDOMEN SURGERY      pt has a J-pouch     ARM DEBRIDEMENT      R arm-2010-2011??    ARM DEBRIDEMENT      COLONOSCOPY      DILATATION, ESOPHAGUS      ENDOSCOPY, COLON, DIAGNOSTIC      SIGMOIDOSCOPY N/A 12/6/2021    SIGMOIDOSCOPY BIOPSY FLEXIBLE performed by Betzaida Garcia MD at [address] TRANSESOPHAGEAL ECHOCARDIOGRAM N/A 9/3/2021    TRANSESOPHAGEAL ECHOCARDIOGRAM WITH BUBBLE STUDY performed by Karin Tubbs MD at [address] N/A 9/2/2021    EGD ESOPHAGOGASTRODUODENOSCOPY performed by Davin Corcoran MD at [address] 12/6/2021    EGD CONTROL HEMORRHAGE performed by Betzaida Garcia MD at [address] N/A 12/6/2021    EGD BIOPSY performed by Betzaida Garcia MD at [place] [address]       Family History Problem Relation Age of Onset    Cancer Mother         breast     Emphysema Mother     Lupus Mother     Heart Disease Father     High Cholesterol Father     High Blood Pressure Sister     No Known Problems Son      SOCIAL HISTORY       Social History     Socioeconomic History    Marital status:      Spouse name: None    Number of children: 1    Years of education: None    Highest education level: None   Occupational History    None   Tobacco Use    Smoking status: Never Smoker    Smokeless tobacco: Never Used   Vaping Use    Vaping Use: Never used   Substance and Sexual Activity    Alcohol use: Not Currently     Comment: stopped but drink 3 weeks ago    Drug use: Not Currently    Sexual activity: None   Other Topics Concern    None   Social History Narrative    None     Social Determinants of Health     Financial Resource Strain: Low Risk     Difficulty of Paying Living Expenses: Not hard at all   Food Insecurity: No Food Insecurity    Worried About Running Out of Food in the Last Year: Never true    Yumiko of Food in the Last Year: Never true   Transportation Needs: No Transportation Needs    Lack of Transportation (Medical): No    Lack of Transportation (Non-Medical): No   Physical Activity: Unknown    Days of Exercise per Week: 0 days    Minutes of Exercise per Session: Not on file   Stress: No Stress Concern Present    Feeling of Stress : Only a little   Social Connections: Moderately Integrated    Frequency of Communication with Friends and Family: More than three times a week    Frequency of Social Gatherings with Friends and Family: Three times a week    Attends Hinduism Services: More than 4 times per year    Active Member of Clubs or Organizations:  Yes    Attends Club or Organization Meetings: More than 4 times per year    Marital Status:    Intimate Partner Violence: Not At Risk    Fear of Current or Ex-Partner: No    Emotionally Abused: No    iterative reconstruction, and/or weight based adjustment of the mA/kV was utilized to reduce the radiation dose to as low as reasonably achievable. COMPARISON: 11/25/2021, and CT abdomen pelvis 11/26/2021 HISTORY: ORDERING SYSTEM PROVIDED HISTORY: AMS, effusion, pna? TECHNOLOGIST PROVIDED HISTORY: Reason for exam:->AMS, effusion, pna? Decision Support Exception - unselect if not a suspected or confirmed emergency medical condition->Emergency Medical Condition (MA) FINDINGS: The examination is very limited due to lack of contrast. CT chest. Heart size is in the upper limits of normal.  The great vessels are normal. Endotracheal tube and nasogastric tube are noted. There is atelectasis/infiltrates in the lung bases concerning for pneumonia. There is thickening of the GE junction likely hiatal hernia with paraesophageal varices. CT abdomen pelvis. There is hepatosplenomegaly with liver measuring 21 cm with diffuse lesions in the left and right hepatic lobe measuring up to 2.8 x 3.7 cm. These may represent hepatic cirrhosis with regenerating nodules or diffuse multifocal malignancy. There is splenomegaly with spleen measuring 18 cm with suggestion of portal venous hypertension, and venous varices. Gallbladder is distended with Ca cholecystic edema and wall thickening. Consider ultrasonography. Pancreas, the adrenals and the kidneys are grossly normal. There is suggestion of recanalization of umbilical vein. Umbilical hernia is identified with herniation of bowel loops. Pelvis. Postoperative changes are identified in the colon with the poor delineation of the bowel anatomy. There is diffusely thickened bowel loops which may include small bowel loops and colon with edema in the mesentery and between bowel loops. Ascites is present in the pelvis. Bladder is collapsed with indwelling Cannon catheter and wall thickening. Ascites fluid is present in the pelvis. There is anasarca.      Significantly limited study due to lack of contrast. Atelectasis/infiltrates in the lung bases concerning for pneumonia. Hepatosplenomegaly with heterogeneous ill-defined hepatic lesions as noted concerning for cirrhosis with possible regenerating nodules or diffuse malignancy. Further assessment by CT with contrast or MRI may be considered. There is portal venous hypertension with venous varices as noted and moderate amount of high density ascites. Diffuse edema in the mesentery with dilated thickened bowel loops concerning for enterocolitis. Developing peritonitis cannot be excluded. There is anasarca. Distended and thickened gallbladder. Consider ultrasonography to evaluate cholecystitis. CT HEAD WO CONTRAST    Result Date: 3/19/2022  EXAMINATION: CT OF THE HEAD WITHOUT CONTRAST  3/19/2022 1:34 pm TECHNIQUE: CT of the head was performed without the administration of intravenous contrast. Dose modulation, iterative reconstruction, and/or weight based adjustment of the mA/kV was utilized to reduce the radiation dose to as low as reasonably achievable. COMPARISON: None. HISTORY: ORDERING SYSTEM PROVIDED HISTORY: AMS TECHNOLOGIST PROVIDED HISTORY: Has a \"code stroke\" or \"stroke alert\" been called? ->No Reason for exam:->AMS Decision Support Exception - unselect if not a suspected or confirmed emergency medical condition->Emergency Medical Condition (MA) FINDINGS: BRAIN/VENTRICLES: There is no acute intracranial hemorrhage, mass effect or midline shift. No abnormal extra-axial fluid collection. The gray-white differentiation is maintained without evidence of an acute infarct. There is no evidence of hydrocephalus. ORBITS: The visualized portion of the orbits demonstrate no acute abnormality. SINUSES: The visualized paranasal sinuses and mastoid air cells demonstrate no acute abnormality. SOFT TISSUES/SKULL:  No acute abnormality of the visualized skull or soft tissues. No acute intracranial abnormality.   Specifically, there is no acute intracranial hemorrhage     CT CHEST WO CONTRAST    Result Date: 3/19/2022  EXAMINATION: CT OF THE CHEST WITHOUT CONTRAST; CT OF THE ABDOMEN AND PELVIS WITHOUT CONTRAST 3/19/2022 1:34 pm TECHNIQUE: CT of the chest was performed without the administration of intravenous contrast. Multiplanar reformatted images are provided for review. Dose modulation, iterative reconstruction, and/or weight based adjustment of the mA/kV was utilized to reduce the radiation dose to as low as reasonably achievable.; CT of the abdomen and pelvis was performed without the administration of intravenous contrast. Multiplanar reformatted images are provided for review. Dose modulation, iterative reconstruction, and/or weight based adjustment of the mA/kV was utilized to reduce the radiation dose to as low as reasonably achievable. COMPARISON: 11/25/2021, and CT abdomen pelvis 11/26/2021 HISTORY: ORDERING SYSTEM PROVIDED HISTORY: AMS, effusion, pna? TECHNOLOGIST PROVIDED HISTORY: Reason for exam:->AMS, effusion, pna? Decision Support Exception - unselect if not a suspected or confirmed emergency medical condition->Emergency Medical Condition (MA) FINDINGS: The examination is very limited due to lack of contrast. CT chest. Heart size is in the upper limits of normal.  The great vessels are normal. Endotracheal tube and nasogastric tube are noted. There is atelectasis/infiltrates in the lung bases concerning for pneumonia. There is thickening of the GE junction likely hiatal hernia with paraesophageal varices. CT abdomen pelvis. There is hepatosplenomegaly with liver measuring 21 cm with diffuse lesions in the left and right hepatic lobe measuring up to 2.8 x 3.7 cm. These may represent hepatic cirrhosis with regenerating nodules or diffuse multifocal malignancy. There is splenomegaly with spleen measuring 18 cm with suggestion of portal venous hypertension, and venous varices.   Gallbladder is distended with Ca cholecystic edema and wall thickening. Consider ultrasonography. Pancreas, the adrenals and the kidneys are grossly normal. There is suggestion of recanalization of umbilical vein. Umbilical hernia is identified with herniation of bowel loops. Pelvis. Postoperative changes are identified in the colon with the poor delineation of the bowel anatomy. There is diffusely thickened bowel loops which may include small bowel loops and colon with edema in the mesentery and between bowel loops. Ascites is present in the pelvis. Bladder is collapsed with indwelling Cannon catheter and wall thickening. Ascites fluid is present in the pelvis. There is anasarca. Significantly limited study due to lack of contrast. Atelectasis/infiltrates in the lung bases concerning for pneumonia. Hepatosplenomegaly with heterogeneous ill-defined hepatic lesions as noted concerning for cirrhosis with possible regenerating nodules or diffuse malignancy. Further assessment by CT with contrast or MRI may be considered. There is portal venous hypertension with venous varices as noted and moderate amount of high density ascites. Diffuse edema in the mesentery with dilated thickened bowel loops concerning for enterocolitis. Developing peritonitis cannot be excluded. There is anasarca. Distended and thickened gallbladder. Consider ultrasonography to evaluate cholecystitis. CT CERVICAL SPINE WO CONTRAST    Result Date: 3/19/2022  EXAMINATION: CT OF THE CERVICAL SPINE WITHOUT CONTRAST 3/19/2022 1:34 pm TECHNIQUE: CT of the cervical spine was performed without the administration of intravenous contrast. Multiplanar reformatted images are provided for review. Dose modulation, iterative reconstruction, and/or weight based adjustment of the mA/kV was utilized to reduce the radiation dose to as low as reasonably achievable. COMPARISON: None.  HISTORY: ORDERING SYSTEM PROVIDED HISTORY: AMS possible fall TECHNOLOGIST PROVIDED HISTORY: Reason for exam:->AMS possible fall Decision Support Exception - unselect if not a suspected or confirmed emergency medical condition->Emergency Medical Condition (MA) FINDINGS: BONES/ALIGNMENT: There is no acute fracture or traumatic malalignment. The ring of C1 is intact as is the dense. There is no compression fracture of the cervical spine. No jumped or perched facet is noted. There is a stable 6 mm cyst seen within the posterior aspect of the C5 vertebral body. This is unchanged when compared to patient's prior study of 12/29/2016. DEGENERATIVE CHANGES: No significant degenerative changes. SOFT TISSUES: There is no prevertebral soft tissue swelling. 1. There is no acute fracture or subluxation of the cervical spine. XR CHEST ABDOMEN NG PLACEMENT    Result Date: 3/19/2022  EXAMINATION: ONE SUPINE XRAY VIEW(S) OF THE ABDOMEN 3/19/2022 2:09 pm COMPARISON: CT chest from the same day. HISTORY: ORDERING SYSTEM PROVIDED HISTORY: AMS, ng placement TECHNOLOGIST PROVIDED HISTORY: Reason for exam:->AMS, ng placement FINDINGS: Partially imaged endotracheal tube in the distal thoracic trachea. Increased pulmonary vascular congestion. Mild prominence of the cardiac silhouette. Confluent opacity in the left retrocardiac region. Paucity of bowel gas in the upper abdomen. Partially imaged enteric tube with distal tip and side port extending subdiaphragmatic. Distal tip projects over the left upper quadrant. 1.  Satisfactory position of the enteric tube. 2.  Dense left retrocardiac opacity     LABS  Recent Labs     03/19/22  1300   WBC 7.8   HGB 5.2*   HCT 16.6*   .4*   PLT 36*     Recent Labs     03/19/22  1300      K 4.0   CL 89*   CO2 12*   BUN 13   CREATININE 1.8*   GFRAA 36   LABGLOM 30   GLUCOSE 104*   PROT 6.4   LABALBU 1.9*   CALCIUM 8.2*   BILITOT 13.1*   ALKPHOS 150*   *   ALT 42*     No results for input(s): PROCAL in the last 72 hours.   Lab Results   Component Value Date    CRP 0.5 (H) 12/13/2021 CRP 0.4 11/24/2021    CRP 15.7 (H) 05/17/2011     Lab Results   Component Value Date    SEDRATE 38 (H) 12/13/2021    SEDRATE 25 (H) 12/05/2021    SEDRATE 97 (H) 11/24/2021     No results found for: [id number]  Lab Results   Component Value Date    COVID19 Not Detected 11/24/2021     COVID-19/YUE-COV2 LABS  Recent Labs     03/19/22  1300   INR 5.0   PROTIME 58.9*   *   ALT 42*     Lab Results   Component Value Date    CHOL 100 09/01/2021    TRIG 164 09/01/2021    HDL 12 09/01/2021    LDLCALC 55 09/01/2021    LABVLDL 33 09/01/2021         Lab Results   Component Value Date    HAV Negative 04/01/2011    HEPAIGM Non-Reactive 07/10/2019    HEPBIGM Non-Reactive 07/10/2019    HEPCAB NON REACT 04/01/2011    HCVABI Non-Reactive 07/10/2019     Hep C Ab Interp   Date Value Ref Range Status   07/10/2019 Non-Reactive NON REACT Final        MICROBIOLOGY:       Lab Results   Component Value Date    BC 5 Days no growth 11/25/2021    BC  11/24/2021     Refer to previous culture CXBL2 11/24/2021 2038 for susceptibility  results      [facility]  11/24/2021     This isolate is presumed to be resistant based on the  detection of inducible Clindamycin resistance. Clindamycin  may still be effective in some patients. ORG Citrobacter species 12/05/2021    ORG Enterococcus faecium 12/03/2021    ORG Enterococcus faecium 11/24/2021    ORG Staphylococcus haemolyticus 11/24/2021    ORG Enterococcus casseliflavus 11/24/2021    ORG Enterococcus faecium 11/24/2021    ORG Staphylococcus haemolyticus 11/24/2021     Lab Results   Component Value Date    BLOODCULT2 Previously positive blood culture called 11/24/2021    [facility]  11/24/2021     This isolate is presumed to be resistant based on the  detection of inducible Clindamycin resistance. Clindamycin  may still be effective in some patients.       BLOODCULT2 5 Days no growth 10/26/2021    ORG Citrobacter species 12/05/2021    ORG Enterococcus faecium 12/03/2021    ORG Enterococcus faecium 11/24/2021    ORG Staphylococcus haemolyticus 11/24/2021    ORG Enterococcus casseliflavus 11/24/2021    ORG Enterococcus faecium 11/24/2021    ORG Staphylococcus haemolyticus 11/24/2021        Body Fluid Culture, Sterile   Date Value Ref Range Status   10/26/2021   Final    Growth not present  Neisseria gonorrhoeae not isolated       No results found for: CXSURG  Urine Culture, Routine   Date Value Ref Range Status   12/05/2021 >100,000 CFU/ml  Final   11/24/2021 <10,000 CFU/mL  Gram positive organism    Final   10/22/2021   Final    10 to 100,000 CFU/mL  Mixed ayan isolated. Further workup and sensitivity testing  is not routinely indicated and will not be performed. Mixed ayan isolated includes:  Mixed gram negative rods  Nonhemolytic Strep species            FINAL IMPRESSION    Patient is a 51 y.o. female who presented with   Chief Complaint   Patient presents with    Altered Mental Status     unresponsive, jaundiced, liver disease, lkw 36 hours ago, found on couch in urine and feces    and admitted for Metabolic encephalopathy [W06.44]  Lactic acidosis [E87.2]  Acute blood loss anemia [D62]  Hyperammonemia ([address] [E72.20]  Hypoglycemia [E16.2]  Elevated INR [R79.1]  Elevated troponin [R77.8]  History of alcohol abuse [F10.11]  Hypothermia, initial encounter [T68. XXXA]  Urinary tract infection with hematuria, site unspecified [N39.0, R31.9]  Gastrointestinal hemorrhage, unspecified gastrointestinal hemorrhage type [K92.2]  Decompensation of cirrhosis of liver (HCC) [K72.90, K74.60]     sepsis  respiratory failure  eval for pneumonia  Gallbladder wall thickening and sludge    Diffuse edema in the mesentery with dilated thickened bowel loops concerning   for enterocolitis.  Developing peritonitis cannot be excluded  -check cx    Suggest pitazo cover anaerobe         chlorhexidine (PERIDEX) 0.12 % solution 15 mL, BID  rifaximin (XIFAXAN) tablet 550 mg, TID    doxycycline (VIBRAMYCIN) 100 mg in dextrose 5 % 100 mL IVPB, Q12H  cefepime (MAXIPIME) 1000 mg IVPB extended (mini-bag), Q8H  lactulose (CHRONULAC) 10 GM/15ML solution 30 g, Once  lactulose (CHRONULAC) 10 GM/15ML solution 20 g, TID  hydrocortisone sodium succinate PF (SOLU-CORTEF) injection 50 mg, Q6H  pantoprazole (PROTONIX) 40 mg in sodium chloride (PF) 10 mL injection, Q12H  norepinephrine (LEVOPHED) 16 mg in sodium chloride 0.9 % 250 mL infusion (non-weight-based), Continuous  linezolid (ZYVOX) IVPB 600 mg, Q12H          Imaging and labs were reviewed per medical records    Thank you for involving me in the care of Bobbi Paget. Please do not hesitate to call for any questions or concerns.          Electronically signed by Arely Roach MD on 3/19/2022 at 6:49 PM

## 2022-03-19 NOTE — ED NOTES
Verification with blood bank on correct unit , alena. Unit [id number], unable to document in blood administration narrator. bbid rte [id number], a- expires 4/22/22. Two patient identification complete, two nurse verification completed.       Marcelino Flores RN  03/19/22 [id number]

## 2022-03-20 NOTE — PROGRESS NOTES
CRITICAL CARE PROGRESS NOTE    Ms Kamila Lee (biological mother) called to discuss clinical condition of Ms. Eleanor Salinas. Juvenal Camejo is a stepmother but Ms Son Majano does not know if Ana Maria Fontenot is adoptive mother or not, states she lived in [place] and may have adopted her, states : I don't know, she may have adopted her\". Ms Eleanor Salinas is  legally from [place], it is OK to talk to him. Gali's dad  one year ago and since then the stepmother has not talked to Josie Apple since then and even told Josie Apple not to talk to her. Nara Felix states Ms Eleanor Salinas would be upset to know Ana Maria Fontenot is around. States Lexis Medley the patient's sister can make decisions. She asked me how long she would be on the ventilator. I asked her what would Josie Apple say about the current situation, would she be ok with the treatment? States \"I do not know, nobody wants to die but also nobody wants to suffer\". She will discuss with Lexis Medley about this situation. Ms Son Majano states she would want her to be Do not resuscitate. She asked my opinion, I told her I recommend against chest compressions and shocks. It is reasonable to give her full support and evaluate how she does in the next 2 days, if she is better, then that would be great and to continue with the treatment. If not better then removing life support would be recommended as continuing therapy will prolong her agony and dying process. She has cirrhosis that per se is a terminal illness without liver transplant, and Ms Eleanor Salinas is not a candidate for this therapy.        Elvira Costa MD  Pulmonary and Critical Care Medicine

## 2022-03-20 NOTE — CONSULTS
History and Physical      ASSESSMENT AND PLAN:    46y/F w/ EtOH cirrhosis and IBD s/p TAC w/ IPAA who presents to the ED after being found unconscious at home with EtOH present. She is found to have UTI and PNA on admission workup w/ significant HE requiring intubation and anemia to 5.2. MELD on admission: 40  DF:227    DANIEL-C Score:  30 Day Mortality: 66%  90 Day Mortality: 83%    PLAN:  1. Acute on Chronic Liver Failure secondary to likely continued EtOH use and infection  -Please monitor CBC, CMP, and INR daily.  -Albumin 25g q 6hrs  -Was once reportedly on liver transplant list, unsure of current status. There are also reports that she was found down at home with [employer] in the vicinity. 2. Hepatic Encephalopathy:  -Present on admission and reason for intubation  -Continue Rifaximin 550mg BID  -Lactulose q2 hours via NG tube until mental status improves. -Okay for FCS in this setting    3. 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   -Please transfuse to Hgb >7  -INR noted to be 5. Will give 10mg Vit K IV x 3 days.  -Will check Fibrinogen. Keep fibrinogen >220 w/ Cryo. -Keep Plts >20. If signs/suggestion of high-volume bleeding, transfuse to Plts>50.   -Antibiotics   -Will allow for stabilization before pursuing endoscopic evaluation and further treatment of likely nodular GAVE w/ bleeding.  -If not responding to blood transfusions or showing ai blood loss per rectum or mouth, will pursue more urgently. 4. SEVEN:  -Albumin 25g q 6hrs  -Avoid nephrotoxic agents    5. Ascites:  -Please obtain diagnostic paracentesis. Send fluid for Cell Count w/ Diff and Culture  -Hold on performing large volume paracentesis until Cr normalizes. -Hold diuretics until SEVEN improves. 6. UTI/PNA:  -Antibiotics per ID and CCM  -Blood Cultures sent and pending    7.  Hepatic Lesions on Imaging:  -Concern for metastatic disease vs HCC  -When Cr improves, will require CTA Triphasic Liver   -AFP sent  -Will send CEA    I will follow. Thank you for including us in the care of this patient. Please do not hesitate to contact us with any additional questions or concerns. Diego Dance, MD  Gastroenterology/Hepatology  [facility]              HISTORY OF PRESENT ILLNESS:      Ms. Staci Parker is a 46y/F w/ history of EtOH cirrhosis and IBD s/p TAC w/ IPAA who presents to the ED w/ significant hepatic decompensation. The patient was reportedly found on the couch at home covered in urine and feces with bottles [employer] present. Vitals on admission showed the patient to be tachycardic to 115, hypotensive to 77/45, and afebrile. Labwork on admission showed Cr 1.8, lactate 19.9, Tbili 13.1, Hgb 5.2, Plt 36, INR of 5. Urine Tox screen was negative. UA was positive UTI. Imaging included a CT Head which was negative for acute process, CT Chest which showed b/l infiltrates in the lungs concerning for PNA, CT A/P which showed ill-defined hepatic lesions throughout the entire examined liver, and diffuse edema of the mesentery. EtOH Cirrhosis:  Hepatic Encephalopathy: Currently reason for admission. On lactulose and rifaximin at home. EV: EGD in 11/2021 showed Grade I/II esophageal varices without high risk features and nodular GAVE w/ active bleeding s/p treatment w/ APC. On propanolol as outpatient. On PPI daily. Ascites: On spironolactone 25mg daily. Present on admission. HCC: No history. Concerning findings on admission CT.      Past Medical History:        Diagnosis Date    Acute renal failure (ARF) ([facility] 26/98/5937    Alcoholic cirrhosis (HCC)     Anxiety     Crohn's colitis ([facility]     Depression     History of blood transfusion     Pyoderma gangrenosum     Thyroid disease      Past Surgical History:        Procedure Laterality Date    ABDOMEN SURGERY      pt has a J-pouch     ARM DEBRIDEMENT      R arm-2010-2011??    ARM DEBRIDEMENT      COLONOSCOPY      DILATATION, ESOPHAGUS      ENDOSCOPY, COLON, DIAGNOSTIC      SIGMOIDOSCOPY N/A 12/6/2021    SIGMOIDOSCOPY BIOPSY FLEXIBLE performed by Everton Joiner MD at [address] [facility] TRANSESOPHAGEAL ECHOCARDIOGRAM N/A 9/3/2021    TRANSESOPHAGEAL ECHOCARDIOGRAM WITH BUBBLE STUDY performed by Jeremiah Arevalo MD at [address] [facility] N/A 9/2/2021    EGD ESOPHAGOGASTRODUODENOSCOPY performed by David Grande MD at [facility] 12/6/2021    EGD CONTROL HEMORRHAGE performed by Everton Joiner MD at [facility] 12/6/2021    EGD BIOPSY performed by Everton Joiner MD at [address] [facility] History:    TOBACCO:   reports that she has never smoked. She has never used smokeless tobacco.  ETOH:   reports previous alcohol use. DRUGS:   reports previous drug use.   Family History:       Problem Relation Age of Onset    Cancer Mother         breast     Emphysema Mother     Lupus Mother     Heart Disease Father     High Cholesterol Father     High Blood Pressure Sister     No Known Problems Son          Current Facility-Administered Medications:     dextrose 10 % infusion, , IntraVENous, Continuous, Olman Katz MD, Last Rate: 100 mL/hr at 03/19/22 1335, New Bag at 03/19/22 1335    0.9 % sodium chloride infusion, , IntraVENous, PRN, Sae Daniels MD    octreotide (SANDOSTATIN) 500 mcg in sodium chloride 0.9 % 100 mL infusion, 50 mcg/hr, IntraVENous, Continuous, Olman Katz MD, Last Rate: 10 mL/hr at 03/19/22 1506, 50 mcg/hr at 03/19/22 1506    0.9 % sodium chloride infusion, , IntraVENous, Once, Sae Daniels MD    0.9 % sodium chloride infusion, , IntraVENous, PRN, Sae Daniels MD    thiamine (B-1) 500 mg in sodium chloride 0.9 % 100 mL IVPB, 500 mg, IntraVENous, Q8H, Stopped at 03/19/22 1601 **FOLLOWED BY** [START ON 3/22/2022] thiamine (B-1) 100 mg in sodium chloride 0.9 % 100 mL IVPB, 100 mg, IntraVENous, Q24H, Tulio Victoria MD    propofol injection, 5-50 mcg/kg/min, IntraVENous, Continuous, Tulio Victoria MD, Stopped at 03/19/22 1551    chlorhexidine (PERIDEX) 0.12 % solution 15 mL, 15 mL, Mouth/Throat, BID, Tulio Victoria MD, 15 mL at 03/19/22 2007    rifaximin (XIFAXAN) tablet 550 mg, 550 mg, Oral, TID, Tulio Victoria MD    fentaNYL 5 mcg/ml in 0.9%  ml infusion,  mcg/hr, IntraVENous, Continuous, Tulio Victoria MD, Last Rate: 5 mL/hr at 03/19/22 1607, 25 mcg/hr at 03/19/22 1607    sodium chloride 0.9 % infusion, , , ,     doxycycline (VIBRAMYCIN) 100 mg in dextrose 5 % 100 mL IVPB, 100 mg, IntraVENous, Q12H, Tulio Victoria MD, Stopped at 03/19/22 1855    lactulose (CHRONULAC) 10 GM/15ML solution 30 g, 30 g, Rectal, Once, Anthony Horan MD    phytonadione (ADULT) (VITAMIN K) 10 mg in dextrose 5 % 100 mL IVPB, 10 mg, IntraVENous, Daily, Tulio Victoria MD, Stopped at 03/19/22 1757    lactulose (CHRONULAC) 10 GM/15ML solution 20 g, 20 g, Oral, TID, Tulio Victoria MD, 20 g at 03/19/22 2008    hydrocortisone sodium succinate PF (SOLU-CORTEF) injection 50 mg, 50 mg, IntraVENous, Q6H, Tulio Victoria MD, 50 mg at 03/19/22 1740    pantoprazole (PROTONIX) 40 mg in sodium chloride (PF) 10 mL injection, 40 mg, IntraVENous, Q12H, Tulio Victoria MD    sodium chloride 0.9 % infusion, , , ,     norepinephrine (LEVOPHED) 16 mg in sodium chloride 0.9 % 250 mL infusion (non-weight-based), 1-100 mcg/min, IntraVENous, Continuous, Tulio Victoria MD, Last Rate: 4.7 mL/hr at 03/19/22 1642, 5 mcg/min at 03/19/22 1642    linezolid (ZYVOX) IVPB 600 mg, 600 mg, IntraVENous, Q12H, Tulio Victoria MD, Last Rate: 300 mL/hr at 03/19/22 2007, 600 mg at 03/19/22 2007    piperacillin-tazobactam (ZOSYN) 3,375 mg in dextrose 5 % 50 mL IVPB extended infusion (mini-bag), 3,375 mg, IntraVENous, Q8H, Willi Solis MD     Allergies:  Patient has no known allergies. ROS:  Intubated and sedated. Unable to obtain. PHYSICAL EXAM:  BP (!) 109/59   Pulse 100   Temp 95.2 °F (35.1 °C)   Resp 19   Wt 254 lb (115.2 kg)   SpO2 100%   BMI 38.62 kg/m²   Physical Exam:  General: Chronically ill-appearing, intubated and sedated  HEENT: PERRLA, EOMI, ET tube in place, NG tube in place, scleral icterus  Cards: RRR, LE  present  Resp: Intubated, FiO2 50%, PEEP 5, good air movement  Abdomen: distended, ascites present  Extremities: No effusions or bruising. Skin: No rashes. Jaundice.   Neuro: Intubated due to encephalopathy              Electronically signed by Gilberto Arellano MD on 3/19/2022 at 8:10 PM

## 2022-03-20 NOTE — PROGRESS NOTES
[facility]     [address] [place] [address]  [place], [facility] [address]  Phone [phone]   [phone]      Admit Date: 3/19/2022 12:40 PM  Pt Name: Staci Parker  MRN: [id number]  : 1975  Reason for Consult:    Chief Complaint   Patient presents with    Altered Mental Status     unresponsive, jaundiced, liver disease, lkw 36 hours ago, found on couch in urine and feces     Requesting Physician:  Zuhair Orlando DO  PCP: Marky Vernon MD  History Obtained From:  patient, chart   ID consulted for Metabolic encephalopathy [U55.71]  Lactic acidosis [E87.2]  Acute blood loss anemia [D62]  Hyperammonemia ([address] [E72.20]  Hypoglycemia [E16.2]  Elevated INR [R79.1]  Elevated troponin [R77.8]  History of alcohol abuse [F10.11]  Hypothermia, initial encounter [T68. XXXA]  Urinary tract infection with hematuria, site unspecified [N39.0, R31.9]  Gastrointestinal hemorrhage, unspecified gastrointestinal hemorrhage type [K92.2]  Decompensation of cirrhosis of liver ([address] [K72.90, K74.60]  on hospital day 1  CHIEF COMPLAINT       Chief Complaint   Patient presents with    Altered Mental Status     unresponsive, jaundiced, liver disease, lkw 36 hours ago, found on couch in urine and feces     HISTORYOF PRESENT ILLNESS   Staci Parker is a 51 y.o. female who presents with   has a past medical history of Acute renal failure (ARF) ([address] Alcoholic cirrhosis ([address] Anxiety, Crohn's colitis ([address] Depression, History of blood transfusion, Pyoderma gangrenosum, and Thyroid disease.       HPI  Pt is known to ID  Last seen on 2021 for vre/cons bacteremia  dapto ended 2021  Chart reviewed    Pt came from home  in due to found at home unresponsive  Pt with h/o ETOH/cirrhosis    Pt was seen in ICU unable to obtain hpi/ros  afebrile intubated  Cr1.8  alt42 nh3 325 mhd647 wbc7.8 plt36  UA cloudy LE/protein  ID was asked to see for infection   Currently received ceftriaxone/doxy  daptomycin was ordered  Spoke with Dr Aleksandar Vázquez  03/20/22   In ICU  In bed/intubated  Jaundice   afebrile jtg302%  Cr1.8  ->2.5  9.2 plt28   Blood c Klebsiella/CONS   REVIEW OF SYSTEMS     CONSTITUTIONAL:   unablr due to intubated    Medications Prior to Admission: spironolactone (ALDACTONE) 25 MG tablet, Take 1 tablet by mouth daily  cyanocobalamin 2000 MCG tablet, Take 1 tablet by mouth daily  pyridoxine (B-6) 25 MG tablet, Take 1 tablet by mouth daily  vitamin D (ERGOCALCIFEROL) 1.25 MG (80217 UT) CAPS capsule, Take 1 capsule by mouth once a week Monday  potassium chloride (KLOR-CON M) 10 MEQ extended release tablet, Take 20 mEq by mouth daily  lactulose (CHRONULAC) 10 GM/15ML solution, Take 20 g by mouth every 12 hours  propranolol (INDERAL) 10 MG tablet, Take 20 mg by mouth every 12 hours   rifaximin (XIFAXAN) 550 MG tablet, Take 550 mg by mouth 2 times daily  cloNIDine (CATAPRES) 0.1 MG tablet, Take 0.1 mg by mouth daily  magnesium oxide (MAG-OX) 400 MG tablet, Take 400 mg by mouth daily  vitamin B-1 (THIAMINE) 100 MG tablet, Take 100 mg by mouth daily  folic acid (FOLVITE) 1 MG tablet, Take 1 tablet by mouth daily  thiamine mononitrate 100 MG tablet, Take 1 tablet by mouth daily  pantoprazole (PROTONIX) 40 MG tablet, Take 40 mg by mouth daily   CURRENT MEDICATIONS     Current Facility-Administered Medications:     0.9 % sodium chloride infusion, , IntraVENous, PRN, SRIDHAR Kauffman - CNP    0.9 % sodium chloride infusion, , IntraVENous, PRN, Jon Brewer APRN - CNP    0.9 % sodium chloride infusion, , IntraVENous, PRN, SRIDHAR Mcnally - CNP    dextrose 10 % infusion, , IntraVENous, Continuous, SRIDHAR Holt - CNP, Last Rate: 10 mL/hr at 03/20/22 0958, Rate Change at 03/20/22 0958    0.9 % sodium chloride infusion, , IntraVENous, PRN, Jef Gifford MD    octreotide (SANDOSTATIN) 500 mcg in sodium chloride 0.9 % 100 mL infusion, 50 mcg/hr, IntraVENous, Continuous, Phuong Kelly MD, Last Rate: 10 mL/hr at 03/20/22 1045, 50 mcg/hr at 03/20/22 1045    0.9 % sodium chloride infusion, , IntraVENous, Once, Karly Maher MD    0.9 % sodium chloride infusion, , IntraVENous, PRN, Karly Maher MD    thiamine (B-1) 500 mg in sodium chloride 0.9 % 100 mL IVPB, 500 mg, IntraVENous, Q8H, Stopped at 03/20/22 0740 **FOLLOWED BY** [START ON 3/22/2022] thiamine (B-1) 100 mg in sodium chloride 0.9 % 100 mL IVPB, 100 mg, IntraVENous, Q24H, Wolfgang Chaves MD    propofol injection, 5-50 mcg/kg/min, IntraVENous, Continuous, Wolfgang Chaves MD, Stopped at 03/19/22 1551    chlorhexidine (PERIDEX) 0.12 % solution 15 mL, 15 mL, Mouth/Throat, BID, Wolfgang Chaves MD, 15 mL at 03/20/22 0817    rifaximin (XIFAXAN) tablet 550 mg, 550 mg, Oral, TID, Wolfgang Chaves MD, 550 mg at 03/20/22 0818    fentaNYL 5 mcg/ml in 0.9%  ml infusion,  mcg/hr, IntraVENous, Continuous, Wolfgang Chaves MD, Last Rate: 15 mL/hr at 03/20/22 0314, 75 mcg/hr at 03/20/22 0314    doxycycline (VIBRAMYCIN) 100 mg in dextrose 5 % 100 mL IVPB, 100 mg, IntraVENous, Q12H, Wolfgang Chaves MD, Stopped at 03/20/22 0545    lactulose (CHRONULAC) 10 GM/15ML solution 30 g, 30 g, Rectal, Once, Selene Schneider MD    phytonadione (ADULT) (VITAMIN K) 10 mg in dextrose 5 % 100 mL IVPB, 10 mg, IntraVENous, Daily, Wolfgang Chaves MD, Stopped at 03/20/22 0848    hydrocortisone sodium succinate PF (SOLU-CORTEF) injection 50 mg, 50 mg, IntraVENous, Q6H, Wolfgang Chaves MD, 50 mg at 03/20/22 1043    pantoprazole (PROTONIX) 40 mg in sodium chloride (PF) 10 mL injection, 40 mg, IntraVENous, Q12H, Wolfgang Chaves MD, 40 mg at 03/20/22 0452    norepinephrine (LEVOPHED) 16 mg in sodium chloride 0.9 % 250 mL infusion (non-weight-based), 1-100 mcg/min, IntraVENous, Continuous, Wolfgang Chaves MD, Last Rate: 4.7 mL/hr at 03/20/22 0830, 5 mcg/min at 03/20/22 0830   linezolid (ZYVOX) IVPB 600 mg, 600 mg, IntraVENous, Q12H, Judith Mcgraw MD, Stopped at 03/20/22 0713    piperacillin-tazobactam (ZOSYN) 3,375 mg in dextrose 5 % 50 mL IVPB extended infusion (mini-bag), 3,375 mg, IntraVENous, Q8H, Judith Mcgraw MD, Stopped at 03/20/22 0848    albumin human 25 % IV solution 25 g, 25 g, IntraVENous, Q6H, Veronica Montgomery MD, Stopped at 03/20/22 0848    lactulose (CHRONULAC) 10 GM/15ML solution 20 g, 20 g, Oral, Q2H, Veronica Montgomery MD, 20 g at 03/20/22 1043  ALLERGIES     Patient has no known allergies.   Immunization History   Administered Date(s) Administered    COVID-19, Pfizer Purple top, DILUTE for use, 12+ yrs, 30mcg/0.3mL dose 04/14/2021, 06/01/2021    Influenza, Quadv, IM, PF (6 mo and older Fluzone, Flulaval, Fluarix, and 3 yrs and older Afluria) 10/28/2021      Internal Administration   First Dose COVID-19, Pfizer Purple top, DILUTE for use, 12+ yrs, 30mcg/0.3mL dose  04/14/2021   Second Dose COVID-19, Pfizer Purple top, DILUTE for use, 12+ yrs, 30mcg/0.3mL dose   06/01/2021       Last COVID Lab SARS-CoV-2, PCR (no units)   Date Value   03/19/2022 Not Detected     SARS-CoV-2, NAAT (no units)   Date Value   11/24/2021 Not Detected        PAST MEDICAL HISTORY     Past Medical History:   Diagnosis Date    Acute renal failure (ARF) ([address] 30/40/8536    Alcoholic cirrhosis ([address]     Anxiety     [facility]'s colitis [facility]     Depression     History of blood transfusion     Pyoderma gangrenosum     Thyroid disease      SURGICAL HISTORY       Past Surgical History:   Procedure Laterality Date    ABDOMEN SURGERY      pt has a J-pouch     ARM DEBRIDEMENT      R arm-2010-2011??    ARM DEBRIDEMENT      COLONOSCOPY      DILATATION, ESOPHAGUS      ENDOSCOPY, COLON, DIAGNOSTIC      SIGMOIDOSCOPY N/A 12/6/2021    SIGMOIDOSCOPY BIOPSY FLEXIBLE performed by Veronica Montgomery MD at [address] [facility] TRANSESOPHAGEAL ECHOCARDIOGRAM N/A 9/3/2021    TRANSESOPHAGEAL ECHOCARDIOGRAM WITH BUBBLE STUDY performed by Pooja Nuñez MD at [address] [place] [address] N/A 9/2/2021    EGD ESOPHAGOGASTRODUODENOSCOPY performed by Blank You MD at [address] [place] [address] 12/6/2021    EGD CONTROL HEMORRHAGE performed by Davion Puente MD at [address] [place] [address] 12/6/2021    EGD BIOPSY performed by Davion Puente MD at [address] [place] [address] Road:    03/20/22 0900 03/20/22 0930 03/20/22 1000 03/20/22 1030   BP: 108/60 (!) 110/48 (!) 95/46 (!) 100/50   Pulse: 84 83 80 78   Resp: 25 25 25 29   Temp:       TempSrc:       SpO2: 100% 100% 100% 100%   Weight:         Physical Exam   Constitutional/General: vent sedated NAD  Head: NC/AT  Pulmonary: Lungs dec  to auscultation bilaterally. Vent   Cardiovascular:  tachy    no murmurs, gallops, or rubs. Abdomen: Soft, dec BS. No distension. Nontender. Extremities:  Warm and well perfused edema  Skin: Warm and dry without rash  Neurologic:    sedated   right fem tlc 3/19     DIAGNOSTIC RESULTS   RADIOLOGY:   CT ABDOMEN PELVIS WO CONTRAST Additional Contrast? None    Result Date: 3/19/2022  EXAMINATION: CT OF THE CHEST WITHOUT CONTRAST; CT OF THE ABDOMEN AND PELVIS WITHOUT CONTRAST 3/19/2022 1:34 pm TECHNIQUE: CT of the chest was performed without the administration of intravenous contrast. Multiplanar reformatted images are provided for review. Dose modulation, iterative reconstruction, and/or weight based adjustment of the mA/kV was utilized to reduce the radiation dose to as low as reasonably achievable.; CT of the abdomen and pelvis was performed without the administration of intravenous contrast. Multiplanar reformatted images are provided for review. Dose modulation, iterative reconstruction, and/or weight based adjustment of the mA/kV was utilized to reduce the radiation dose to as low as reasonably achievable.  COMPARISON: 11/25/2021, and CT abdomen pelvis 11/26/2021 HISTORY: ORDERING SYSTEM PROVIDED HISTORY: AMS, effusion, pna? TECHNOLOGIST PROVIDED HISTORY: Reason for exam:->AMS, effusion, pna? Decision Support Exception - unselect if not a suspected or confirmed emergency medical condition->Emergency Medical Condition (MA) FINDINGS: The examination is very limited due to lack of contrast. CT chest. Heart size is in the upper limits of normal.  The great vessels are normal. Endotracheal tube and nasogastric tube are noted. There is atelectasis/infiltrates in the lung bases concerning for pneumonia. There is thickening of the GE junction likely hiatal hernia with paraesophageal varices. CT abdomen pelvis. There is hepatosplenomegaly with liver measuring 21 cm with diffuse lesions in the left and right hepatic lobe measuring up to 2.8 x 3.7 cm. These may represent hepatic cirrhosis with regenerating nodules or diffuse multifocal malignancy. There is splenomegaly with spleen measuring 18 cm with suggestion of portal venous hypertension, and venous varices. Gallbladder is distended with Ca cholecystic edema and wall thickening. Consider ultrasonography. Pancreas, the adrenals and the kidneys are grossly normal. There is suggestion of recanalization of umbilical vein. Umbilical hernia is identified with herniation of bowel loops. Pelvis. Postoperative changes are identified in the colon with the poor delineation of the bowel anatomy. There is diffusely thickened bowel loops which may include small bowel loops and colon with edema in the mesentery and between bowel loops. Ascites is present in the pelvis. Bladder is collapsed with indwelling Cannon catheter and wall thickening. Ascites fluid is present in the pelvis. There is anasarca. Significantly limited study due to lack of contrast. Atelectasis/infiltrates in the lung bases concerning for pneumonia.  Hepatosplenomegaly with heterogeneous ill-defined hepatic lesions as noted concerning for cirrhosis with possible regenerating nodules or diffuse malignancy. Further assessment by CT with contrast or MRI may be considered. There is portal venous hypertension with venous varices as noted and moderate amount of high density ascites. Diffuse edema in the mesentery with dilated thickened bowel loops concerning for enterocolitis. Developing peritonitis cannot be excluded. There is anasarca. Distended and thickened gallbladder. Consider ultrasonography to evaluate cholecystitis. CT HEAD WO CONTRAST    Result Date: 3/19/2022  EXAMINATION: CT OF THE HEAD WITHOUT CONTRAST  3/19/2022 1:34 pm TECHNIQUE: CT of the head was performed without the administration of intravenous contrast. Dose modulation, iterative reconstruction, and/or weight based adjustment of the mA/kV was utilized to reduce the radiation dose to as low as reasonably achievable. COMPARISON: None. HISTORY: ORDERING SYSTEM PROVIDED HISTORY: AMS TECHNOLOGIST PROVIDED HISTORY: Has a \"code stroke\" or \"stroke alert\" been called? ->No Reason for exam:->[facility] Decision Support Exception - unselect if not a suspected or confirmed emergency medical condition->Emergency Medical Condition (MA) FINDINGS: BRAIN/VENTRICLES: There is no acute intracranial hemorrhage, mass effect or midline shift. No abnormal extra-axial fluid collection. The gray-white differentiation is maintained without evidence of an acute infarct. There is no evidence of hydrocephalus. ORBITS: The visualized portion of the orbits demonstrate no acute abnormality. SINUSES: The visualized paranasal sinuses and mastoid air cells demonstrate no acute abnormality. SOFT TISSUES/SKULL:  No acute abnormality of the visualized skull or soft tissues. No acute intracranial abnormality.   Specifically, there is no acute intracranial hemorrhage     CT CHEST WO CONTRAST    Result Date: 3/19/2022  EXAMINATION: CT OF THE CHEST WITHOUT CONTRAST; CT OF THE ABDOMEN AND PELVIS WITHOUT CONTRAST 3/19/2022 1:34 pm TECHNIQUE: CT of the chest was performed without the administration of intravenous contrast. Multiplanar reformatted images are provided for review. Dose modulation, iterative reconstruction, and/or weight based adjustment of the mA/kV was utilized to reduce the radiation dose to as low as reasonably achievable.; CT of the abdomen and pelvis was performed without the administration of intravenous contrast. Multiplanar reformatted images are provided for review. Dose modulation, iterative reconstruction, and/or weight based adjustment of the mA/kV was utilized to reduce the radiation dose to as low as reasonably achievable. COMPARISON: 11/25/2021, and CT abdomen pelvis 11/26/2021 HISTORY: ORDERING SYSTEM PROVIDED HISTORY: AMS, effusion, pna? TECHNOLOGIST PROVIDED HISTORY: Reason for exam:->AMS, effusion, pna? Decision Support Exception - unselect if not a suspected or confirmed emergency medical condition->Emergency Medical Condition (MA) FINDINGS: The examination is very limited due to lack of contrast. CT chest. Heart size is in the upper limits of normal.  The great vessels are normal. Endotracheal tube and nasogastric tube are noted. There is atelectasis/infiltrates in the lung bases concerning for pneumonia. There is thickening of the GE junction likely hiatal hernia with paraesophageal varices. CT abdomen pelvis. There is hepatosplenomegaly with liver measuring 21 cm with diffuse lesions in the left and right hepatic lobe measuring up to 2.8 x 3.7 cm. These may represent hepatic cirrhosis with regenerating nodules or diffuse multifocal malignancy. There is splenomegaly with spleen measuring 18 cm with suggestion of portal venous hypertension, and venous varices. Gallbladder is distended with Ca cholecystic edema and wall thickening. Consider ultrasonography.   Pancreas, the adrenals and the kidneys are grossly normal. There is suggestion of recanalization of umbilical vein.  Umbilical hernia is identified with herniation of bowel loops. Pelvis. Postoperative changes are identified in the colon with the poor delineation of the bowel anatomy. There is diffusely thickened bowel loops which may include small bowel loops and colon with edema in the mesentery and between bowel loops. Ascites is present in the pelvis. Bladder is collapsed with indwelling Cannon catheter and wall thickening. Ascites fluid is present in the pelvis. There is anasarca. Significantly limited study due to lack of contrast. Atelectasis/infiltrates in the lung bases concerning for pneumonia. Hepatosplenomegaly with heterogeneous ill-defined hepatic lesions as noted concerning for cirrhosis with possible regenerating nodules or diffuse malignancy. Further assessment by CT with contrast or MRI may be considered. There is portal venous hypertension with venous varices as noted and moderate amount of high density ascites. Diffuse edema in the mesentery with dilated thickened bowel loops concerning for enterocolitis. Developing peritonitis cannot be excluded. There is anasarca. Distended and thickened gallbladder. Consider ultrasonography to evaluate cholecystitis. CT CERVICAL SPINE WO CONTRAST    Result Date: 3/19/2022  EXAMINATION: CT OF THE CERVICAL SPINE WITHOUT CONTRAST 3/19/2022 1:34 pm TECHNIQUE: CT of the cervical spine was performed without the administration of intravenous contrast. Multiplanar reformatted images are provided for review. Dose modulation, iterative reconstruction, and/or weight based adjustment of the mA/kV was utilized to reduce the radiation dose to as low as reasonably achievable. COMPARISON: None.  HISTORY: ORDERING SYSTEM PROVIDED HISTORY: AMS possible fall TECHNOLOGIST PROVIDED HISTORY: Reason for exam:->AMS possible fall Decision Support Exception - unselect if not a suspected or confirmed emergency medical condition->Emergency Medical Condition (MA) FINDINGS: BONES/ALIGNMENT: There is no acute fracture or traumatic malalignment. The ring of C1 is intact as is the dense. There is no compression fracture of the cervical spine. No jumped or perched facet is noted. There is a stable 6 mm cyst seen within the posterior aspect of the C5 vertebral body. This is unchanged when compared to patient's prior study of 12/29/2016. DEGENERATIVE CHANGES: No significant degenerative changes. SOFT TISSUES: There is no prevertebral soft tissue swelling. 1. There is no acute fracture or subluxation of the cervical spine. XR CHEST ABDOMEN NG PLACEMENT    Result Date: 3/19/2022  EXAMINATION: ONE SUPINE XRAY VIEW(S) OF THE ABDOMEN 3/19/2022 2:09 pm COMPARISON: CT chest from the same day. HISTORY: ORDERING SYSTEM PROVIDED HISTORY: AMS, ng placement TECHNOLOGIST PROVIDED HISTORY: Reason for exam:->AMS, ng placement FINDINGS: Partially imaged endotracheal tube in the distal thoracic trachea. Increased pulmonary vascular congestion. Mild prominence of the cardiac silhouette. Confluent opacity in the left retrocardiac region. Paucity of bowel gas in the upper abdomen. Partially imaged enteric tube with distal tip and side port extending subdiaphragmatic. Distal tip projects over the left upper quadrant. 1.  Satisfactory position of the enteric tube. 2.  Dense left retrocardiac opacity     LABS  Recent Labs     03/19/22  1300 03/19/22  1300 03/19/22  1905 03/19/22  2245 03/20/22  0658   WBC 7.8  --  9.9  --  9.2   HGB 5.2*   < > 6.1* 6.1* 6.7*   HCT 16.6*   < > 18.7* 18.5* 19.6*   .4*  --  101.6*  --  93.3   PLT 36*  --  37*  --  28*    < > = values in this interval not displayed.      Recent Labs     03/19/22  1300 03/19/22  1300 03/19/22  1905 03/19/22  1905 03/20/22  0658     --  132  --  127*   K 4.0  --  3.5   < > 2.8*   CL 89*   < > 90*   < > 89*   CO2 12*   < > 12*   < > 19*   BUN 13  --  14  --  16   CREATININE 1.8*  --  1.9*  --  2.5*   GFRAA 36  --  34  --  25 LABGLOM 30  --  28  --  21   GLUCOSE 104*  --  225*  --  328*   PROT 6.4  --  6.0*  --  6.6   LABALBU 1.9*  --  2.0*  --  2.8*   CALCIUM 8.2*  --  7.8*  --  8.0*   BILITOT 13.1*  --  13.3*  --  17.2*   ALKPHOS 150*  --  149*  --  112*   *  --  245*  --  384*   ALT 42*  --  57*  --  79*    < > = values in this interval not displayed. No results for input(s): PROCAL in the last 72 hours. Lab Results   Component Value Date    CRP 0.5 (H) 12/13/2021    CRP 0.4 11/24/2021    CRP 15.7 (H) 05/17/2011     Lab Results   Component Value Date    SEDRATE 38 (H) 12/13/2021    SEDRATE 25 (H) 12/05/2021    SEDRATE 97 (H) 11/24/2021     No results found for: [id number]  Lab Results   Component Value Date    COVID19 Not Detected 03/19/2022     COVID-19/YUE-COV2 LABS  Recent Labs     03/19/22  1300 03/19/22  1905 03/19/22  2218 03/20/22  0658   FIBRINOGEN  --   --  105*  --    INR 5.0  --   --  3.6   PROTIME 58.9*  --   --  42.8*   * 245*  --  384*   ALT 42* 57*  --  79*     Lab Results   Component Value Date    CHOL 100 09/01/2021    TRIG 164 09/01/2021    HDL 12 09/01/2021    LDLCALC 55 09/01/2021    LABVLDL 33 09/01/2021         Lab Results   Component Value Date    HAV Negative 04/01/2011    HEPAIGM Non-Reactive 07/10/2019    HEPBIGM Non-Reactive 07/10/2019    HEPCAB NON REACT 04/01/2011    HCVABI Non-Reactive 07/10/2019     Hep C Ab Interp   Date Value Ref Range Status   07/10/2019 Non-Reactive NON REACT Final        MICROBIOLOGY:       Lab Results   Component Value Date    BC 5 Days no growth 11/25/2021    BC  11/24/2021     Refer to previous culture CXBL2 11/24/2021 2038 for susceptibility  results      [facility]  11/24/2021     This isolate is presumed to be resistant based on the  detection of inducible Clindamycin resistance. Clindamycin  may still be effective in some patients.       ORG Citrobacter species 12/05/2021    ORG Enterococcus faecium 12/03/2021    ORG Enterococcus faecium 11/24/2021    ORG Staphylococcus haemolyticus 11/24/2021    ORG Enterococcus casseliflavus 11/24/2021    ORG Enterococcus faecium 11/24/2021    ORG Staphylococcus haemolyticus 11/24/2021     Lab Results   Component Value Date    BLOODCULT2  03/19/2022     Gram stain performed from blood culture bottle media  Gram negative rods      BLOODCULT2 Previously positive blood culture called 11/24/2021    Ariela Gonzalez  11/24/2021     This isolate is presumed to be resistant based on the  detection of inducible Clindamycin resistance. Clindamycin  may still be effective in some patients. ORG Citrobacter species 12/05/2021    ORG Enterococcus faecium 12/03/2021    ORG Enterococcus faecium 11/24/2021    ORG Staphylococcus haemolyticus 11/24/2021    ORG Enterococcus casseliflavus 11/24/2021    ORG Enterococcus faecium 11/24/2021    ORG Staphylococcus haemolyticus 11/24/2021        Body Fluid Culture, Sterile   Date Value Ref Range Status   10/26/2021   Final    Growth not present  Neisseria gonorrhoeae not isolated       No results found for: CXSURG  Urine Culture, Routine   Date Value Ref Range Status   12/05/2021 >100,000 CFU/ml  Final   11/24/2021 <10,000 CFU/mL  Gram positive organism    Final   10/22/2021   Final    10 to 100,000 CFU/mL  Mixed ayan isolated. Further workup and sensitivity testing  is not routinely indicated and will not be performed.   Mixed ayan isolated includes:  Mixed gram negative rods  Nonhemolytic Strep species            FINAL IMPRESSION    Patient is a 51 y.o. female who presented with   Chief Complaint   Patient presents with    Altered Mental Status     unresponsive, jaundiced, liver disease, lkw 36 hours ago, found on couch in urine and feces    and admitted for Metabolic encephalopathy [K98.66]  Lactic acidosis [E87.2]  Acute blood loss anemia [D62]  Hyperammonemia ([address] [E72.20]  Hypoglycemia [E16.2]  Elevated INR [R79.1]  Elevated troponin [R77.8]  History of alcohol abuse [F10.11]  Hypothermia, initial encounter Laura Cosby. XXXA]  Urinary tract infection with hematuria, site unspecified [N39.0, R31.9]  Gastrointestinal hemorrhage, unspecified gastrointestinal hemorrhage type [K92.2]  Decompensation of cirrhosis of liver ([facility] [K72.90, K74.60]     Sepsis  GN Klebsiella with CONS repeat blood cx  respiratory failure eval for pneumonia  Gallbladder wall thickening and sludge  Surgery following   Diffuse edema in the mesentery with dilated thickened bowel loops concerning   for enterocolitis.  Developing peritonitis cannot be excluded  -check cx  Liver cirrhosis/GI following      doxycycline (VIBRAMYCIN) 100 mg in dextrose 5 % 100 mL IVPB, Q12H  linezolid (ZYVOX) IVPB 600 mg, Q12H  piperacillin-tazobactam (ZOSYN) 3,375 mg in dextrose 5 % 50 mL IVPB extended infusion (mini-bag), Q8H     Await final  ID and sensi        Imaging and labs were reviewed per medical records    Thank you for involving me in the care of Andressa Almonte. Please do not hesitate to call for any questions or concerns.          Electronically signed by Ivan Castillo MD on 3/20/2022 at 10:45 AM

## 2022-03-20 NOTE — CONSULTS
[facility] Nephrology Consult       Patient's Name:  Michelle Craft  Date of Service:  3/20/2022  Requesting    Shelby Hopkins MD     Reason for Consult:             SEVEN     Chief Complaint:                  Found unresponsive     Information obtained from: Chart     History of Present Illness: 51 yrs young known H/o Alcohol related liver disease     Was found unresponsive at home and hence brought to er   H/O Alcohol induced liver disease ( had been on transplant list in past)   Stopped following her GI physician since last October     Work up in er suggested hypoglycemic , unresponsive , jaundiced , GCS of 3 ,   Lactic acidosis , coagulopathy , Anemic , thrombocytopenic , hepatic encephalopathy   SEVEN , hypomagnesemia , hypoalbuminemia , high bilirubin , high lfts , Blood c/s - gm neg rods , urine possible UTI ,     CT head N , CT abdo/chest - basal pneumonia , ascites , cirrhotic liver         Past Medical History:   Diagnosis Date    Acute renal failure (ARF) ([facility] [address] 83/60/3194    Alcoholic cirrhosis ([facility] [address]     Anxiety     Crohn's colitis ([facility] [address]     Depression     History of blood transfusion     Pyoderma gangrenosum     Thyroid disease          Past Surgical History:   Procedure Laterality Date    ABDOMEN SURGERY      pt has a J-pouch     ARM DEBRIDEMENT      R arm-2010-2011??    ARM DEBRIDEMENT      COLONOSCOPY      DILATATION, ESOPHAGUS      ENDOSCOPY, COLON, DIAGNOSTIC      SIGMOIDOSCOPY N/A 12/6/2021    SIGMOIDOSCOPY BIOPSY FLEXIBLE performed by Pual Snider MD at [address] TRANSESOPHAGEAL ECHOCARDIOGRAM N/A 9/3/2021    TRANSESOPHAGEAL ECHOCARDIOGRAM WITH BUBBLE STUDY performed by Pallavi Mason MD at [address] [facility] N/A 9/2/2021    EGD ESOPHAGOGASTRODUODENOSCOPY performed by Kvng Bruno MD at [address] [facility] 12/6/2021    EGD CONTROL HEMORRHAGE performed by Paul Snider MD at [address] [facility] Pay for Housing in the Last Year: No    Number of Places Lived in the Last Year: 6    Unstable Housing in the Last Year: No       Family History  Family History   Problem Relation Age of Onset    Cancer Mother         breast     Emphysema Mother     Lupus Mother     Heart Disease Father     High Cholesterol Father     High Blood Pressure Sister     No Known Problems Son        Scheduled Meds:   magnesium sulfate  2,000 mg IntraVENous Once    albumin human  25 g IntraVENous Q6H    thiamine (VITAMIN B1) IVPB  500 mg IntraVENous Q8H    Followed by   Lonza Bent ON 3/22/2022] thiamine (VITAMIN B1) IVPB  100 mg IntraVENous Q24H    chlorhexidine  15 mL Mouth/Throat BID    rifaximin  550 mg Oral TID    doxycycline (VIBRAMYCIN) IV  100 mg IntraVENous Q12H    lactulose  30 g Rectal Once    phytonadione (VITAMIN K)  IVPB  10 mg IntraVENous Daily    hydrocortisone sodium succinate PF  50 mg IntraVENous Q6H    pantoprazole (PROTONIX) 40 mg injection  40 mg IntraVENous Q12H    linezolid  600 mg IntraVENous Q12H    piperacillin-tazobactam  3,375 mg IntraVENous Q8H    albumin human  25 g IntraVENous Q6H    lactulose  20 g Oral Q2H       Continuous Infusions:  [unfilled]    PRN meds  sodium chloride, sodium chloride, sodium chloride, sodium chloride, sodium chloride    Allergies:  Patient has no known allergies. Review of Systems     Pertinent positives and negatives as in HPI. Other systems reviewed and were negative.      LAST 3 CMP  Recent Labs     03/19/22  1300 03/19/22  1905 03/20/22  0658    132 127*   K 4.0 3.5 2.8*   CL 89* 90* 89*   CO2 12* 12* 19*   BUN 13 14 16   CREATININE 1.8* 1.9* 2.5*   GLUCOSE 104* 225* 328*   CALCIUM 8.2* 7.8* 8.0*   MG  --  1.2*  1.2* 1.6   PHOS  --  6.4*  --    PROT 6.4 6.0* 6.6   LABALBU 1.9* 2.0* 2.8*   BILITOT 13.1* 13.3* 17.2*   ALKPHOS 150* 149* 112*   * 245* 384*       LAST 3 CBC:  Recent Labs     03/19/22  1905 03/19/22  1905 03/19/22  2245 03/20/22 0500 110/62 98.2 °F (36.8 °C)  97 25 100 %   03/20/22 0445 130/69   100 24 100 %   03/20/22 0430 (!) 111/58   99 25 100 %   03/20/22 0415 (!) 119/58   99 25 100 %   03/20/22 0400 (!) 114/57 98.4 °F (36.9 °C) Bladder 100 25 100 %   03/20/22 0345 113/63   100 24 100 %   03/20/22 0330 114/62   101 22 100 %   03/20/22 0315 (!) 112/54 98.4 °F (36.9 °C)  102 25 100 %   03/20/22 0300 (!) 116/52 98.4 °F (36.9 °C) Bladder 102 27 100 %   03/20/22 0245 (!) 111/53   103 25 100 %   03/20/22 0230 (!) 115/54   102 26 100 %   03/20/22 0215 (!) 109/52   103 24 100 %   03/20/22 0200 114/65   103 25 100 %   03/20/22 0145 (!) 120/47   104 29 100 %   03/20/22 0137    104 28 100 %   03/20/22 0130 (!) 116/55   104 20 100 %   03/20/22 0115 108/61   105 29 100 %   03/20/22 0100 (!) 111/54   104 15 100 %   03/20/22 0045 (!) 100/44   103 19 100 %   03/20/22 0030 (!) 107/54   104 21 100 %   03/20/22 0015 (!) 117/53   109 23 100 %   03/20/22 0000 (!) 120/53 98.2 °F (36.8 °C) Bladder 108 22 100 %   03/19/22 2345 (!) 111/49   107 15 100 %   03/19/22 2330 (!) 110/47   107 28 100 %   03/19/22 2315 (!) 109/42   107 23 100 %   03/19/22 2300 (!) 103/41   107 20 100 %   03/19/22 2245 (!) 101/41   108 19 100 %   03/19/22 2230 (!) 104/43   105 (!) 33 100 %   03/19/22 2215 (!) 103/40   103 30 100 %   03/19/22 2200 (!) 98/44   103 (!) 37 100 %   03/19/22 2155    101 27 100 %   03/19/22 2145 (!) 106/43   104 20 100 %   03/19/22 2130 (!) 106/43   102 29 100 %   03/19/22 2115 (!) 97/51   99 30 100 %   03/19/22 2100 (!) 99/48   99 (!) 32 100 %   03/19/22 2045 (!) 103/42   99 29 100 %   03/19/22 2030 (!) 103/44   97 29 100 %   03/19/22 2015 (!) 95/48   95 (!) 31 100 %   03/19/22 2000 (!) 101/52 96.6 °F (35.9 °C) Bladder 94 30 100 %   03/19/22 1945 (!) 100/49   96 26 100 %   03/19/22 1930 (!) 108/52   98 24 100 %   03/19/22 1915 (!) 109/59   100 19 100 %   03/19/22 1900 (!) 130/56 95.2 °F (35.1 °C) Bladder 102 29 100 %   03/19/22 1845 (!) 108/49 95.2 °F (35.1 °C) Bladder 101 22 100 %   03/19/22 1745 117/60 94.6 °F (34.8 °C) CORE 101 17 100 %   03/19/22 1730 (!) 124/58   102 18 100 %   03/19/22 1715 124/68 94.3 °F (34.6 °C) CORE 101 18 100 %   03/19/22 1700 (!) 120/59 94.1 °F (34.5 °C) CORE 103 18 100 %   03/19/22 1645 (!) 113/58 93.9 °F (34.4 °C) CORE 104 18 100 %   03/19/22 1632 (!) 115/47 93.9 °F (34.4 °C) CORE 104 19 100 %   03/19/22 1630    102 18 100 %   03/19/22 1626 (!) 109/46 93.9 °F (34.4 °C) CORE 102 19 100 %   03/19/22 1615    100 18 100 %   03/19/22 1600 (!) 98/37 94.1 °F (34.5 °C) CORE 99 21 100 %   03/19/22 1545 (!) 95/37   100 18 100 %   03/19/22 1530 (!) 107/43 94.1 °F (34.5 °C) CORE 102 18 100 %   03/19/22 1515    101 18 100 %   03/19/22 1500 (!) 106/41  CORE 104 18 100 %   03/19/22 1445    107 18 100 %       General appearance:  Appears stated age  Skin: color, texture, turgor normal. No rashes or lesions. Jaundice ++   Neck: supple, no masses, no JVD, No carotid bruits, No thyromegaly  Lungs: respirations reduced air entry bases , pt intubated on vent   Heart RRR. pmi not laterally displaced. No S3 or S4, no rub  Abdomen:  Soft,  Distended   Extremities: warm to touch. No LE edema or cyanosis. No fem bruit. 2+ upstrokes and equal bilaterally. PT/Pedal 2+ equal bilat deep jaundice , no edema   Neuro: Cr N 2-12 grossly intact. No focal motor neuro deficit. No alteration in recent remote memory.     Assessment/Plan    1) SEVEN with oliguric in the setting of septic shock , hepatic encephalopathy , obtunded state       Poor po intake last few days  , ascites , severe anemia -- she is making dark urine       Will continue hydration , metabolic acidosis better on fluid resuscitation    2) Alcoholic liver cirrhosis , acute hepatitis ( most likely alcohol related) , coagulopathy       Severe hypoalbuminemia , hepatic encephalopathy portal HTN , and ascites 3) Severe anemia - possible GI bleed , thrombocytopenia - liver disease related     4)  Septic shock ( Gm neg rods ) - UTI related , shock related lactic acidosis , also        Multiple organisms detected , staph , enterobacteria  and Kleb     5) Acute hypoxic resp failure - Nathan basal pneumonia , intubated on vent     6) Hypokalemia , hypomagnesemia , hyperphosphatemia       Antibiotics per ID , will replace mag , K as needed , will hydrate at this point as making some urine   And will monitor her       Thank you for allowing us to participate in the care of Deena Boothe MD  3/20/2022  2:40 PM

## 2022-03-20 NOTE — PROGRESS NOTES
Internal Medicine Progress Note    [facility]    Sujatha Ceja, SAVAGEOHERIBERTO Sherwood D.O., MIGUEL ANGEL Arriaga Cap, MSN, APRN, NP-C  Alli Michelle. Manfred Corona, MSN, APRN-CNP     Primary Care Physician: Nitesh August MD   Admitting Physician:  Lizeth Baron DO  Admission date and time: 3/19/2022 12:40 PM    Room:  [place] [address]  Admitting diagnosis: Metabolic encephalopathy [K53.45]  Lactic acidosis [E87.2]  Acute blood loss anemia [D62]  Hyperammonemia ([facility] [address] [E72.20]  Hypoglycemia [E16.2]  Elevated INR [R79.1]  Elevated troponin [R77.8]  History of alcohol abuse [F10.11]  Hypothermia, initial encounter [T68. XXXA]  Urinary tract infection with hematuria, site unspecified [N39.0, R31.9]  Gastrointestinal hemorrhage, unspecified gastrointestinal hemorrhage type [K92.2]  Decompensation of cirrhosis of liver ([facility] [address] [K72.90, K74.60]    Patient Name: Olena Mcfarlane  MRN: [id number]    Date of Service: 3/20/2022     Subjective:  Eddi Huertas is a 51 y.o. female who was seen and examined today,3/20/2022, at the bedside. Patient remained critically ill in the intensive care unit and surprising doing better than I thought she would. Still have multiple system organ involvement. Being followed by multiple subspecialists    No family present during my examination. Review of System: Unobtainable at the present time due to intubation  Constitutional:   Denies fever or chills, weight loss or gain, fatigue or malaise. HEENT:   Denies ear pain, sore throat, sinus or eye problems. Cardiovascular:   Denies any chest pain, irregular heartbeats, or palpitations. Respiratory:   Denies shortness of breath, coughing, sputum production, hemoptysis, or wheezing. Gastrointestinal:   Denies nausea, vomiting, diarrhea, or constipation. Denies any abdominal pain. Genitourinary:    Denies any urgency, frequency, hematuria. Voiding  without difficulty.   Extremities: Denies lower extremity swelling, edema or cyanosis. Neurology:    Denies any headache or focal neurological deficits, Denies generalized weakness or memory difficulty. Psch:   Denies being anxious or depressed. Musculoskeletal:    Denies  myalgias, joint complaints or back pain. Integumentary:   Denies any rashes, ulcers, or excoriations. Denies bruising. Hematologic/Lymphatic:  Denies bruising or bleeding. Physical Exam:  I/O this shift:  In: 7941 [I.V.:2010; Blood:349; NG/GT:300]  Out: 0     Intake/Output Summary (Last 24 hours) at 3/20/2022 1432  Last data filed at 3/20/2022 1317  Gross per 24 hour   Intake 6342.41 ml   Output 1060 ml   Net 5282.41 ml   I/O last 3 completed shifts: In: 3683.4 [I.V.:1693.4; Blood:789.3; IV Piggyback:1200.7]  Out: 1160 [Urine:160; Emesis/NG output:1000]  Patient Vitals for the past 96 hrs (Last 3 readings):   Weight   03/19/22 1245 254 lb (115.2 kg)     Vital Signs:   Blood pressure 113/62, pulse 78, temperature 97 °F (36.1 °C), resp. rate 18, weight 254 lb (115.2 kg), SpO2 100 %. General appearance:  Appears chronically ill  Head:  Normocephalic. No masses, lesions or tenderness. Eyes:  PERRLA. EOMI. Sclera icteric. .  ENT:  Ears normal. Mucosa slightly dry. Intubated  Neck:    Supple. Trachea midline. No thyromegaly. No JVD. No bruits. Heart:    Rhythm regular. Rate controlled. No murmurs. Lungs:    Diminished with mechanical breath sounds  Abdomen:   Rounded ascites  Extremities:    Peripheral pulses present. No peripheral edema. No ulcers. No cyanosis. No clubbing. Neurologic:    Unresponsive due to sedation  Musculoskeletal:   Spine ROM normal. Muscular strength intact. Gait not assessed.   Integumentary:  Jaundice  Genitalia/Breast:  Cannon catheter    Medication:  Scheduled Meds:   magnesium sulfate  2,000 mg IntraVENous Once    albumin human  25 g IntraVENous Q6H    thiamine (VITAMIN B1) IVPB  500 mg IntraVENous Q8H    Followed by   Mercedes Palumbo ON 3/22/2022] thiamine (VITAMIN B1) IVPB  100 mg IntraVENous Q24H    chlorhexidine  15 mL Mouth/Throat BID    rifaximin  550 mg Oral TID    doxycycline (VIBRAMYCIN) IV  100 mg IntraVENous Q12H    lactulose  30 g Rectal Once    phytonadione (VITAMIN K)  IVPB  10 mg IntraVENous Daily    hydrocortisone sodium succinate PF  50 mg IntraVENous Q6H    pantoprazole (PROTONIX) 40 mg injection  40 mg IntraVENous Q12H    linezolid  600 mg IntraVENous Q12H    piperacillin-tazobactam  3,375 mg IntraVENous Q8H    albumin human  25 g IntraVENous Q6H    lactulose  20 g Oral Q2H     Continuous Infusions:   sodium chloride      sodium chloride      sodium chloride      dextrose 10 mL/hr at 03/20/22 0958    sodium chloride      octreotide (SandoSTATIN) infusion 50 mcg/hr (03/20/22 1045)    sodium chloride      sodium chloride      propofol Stopped (03/19/22 1551)    fentaNYL 5 mcg/ml in 0.9%  ml infusion 75 mcg/hr (03/20/22 0314)    norepinephrine-sodium chloride 3 mcg/min (03/20/22 1218)       Objective Data:  CBC with Differential:    Lab Results   Component Value Date    WBC 8.0 03/20/2022    RBC 1.90 03/20/2022    HGB 6.0 03/20/2022    HCT 17.3 03/20/2022    PLT 35 03/20/2022    MCV 91.1 03/20/2022    MCH 31.6 03/20/2022    MCHC 34.7 03/20/2022    RDW 19.0 03/20/2022    SEGSPCT 59 08/11/2011    METASPCT 3 07/05/2011    LYMPHOPCT 4.0 03/20/2022    MONOPCT 8.0 03/20/2022    MYELOPCT 1.0 03/20/2022    BASOPCT 0.0 03/20/2022    MONOSABS 0.74 03/20/2022    LYMPHSABS 0.37 03/20/2022    EOSABS 0.00 03/20/2022    BASOSABS 0.00 03/20/2022     CMP:    Lab Results   Component Value Date     03/20/2022    K 2.8 03/20/2022    K 4.0 03/19/2022    CL 89 03/20/2022    CO2 19 03/20/2022    BUN 16 03/20/2022    CREATININE 2.5 03/20/2022    GFRAA 25 03/20/2022    LABGLOM 21 03/20/2022    GLUCOSE 328 03/20/2022    GLUCOSE 125 08/11/2011    PROT 6.6 03/20/2022    LABALBU 2.8 03/20/2022    LABALBU 3.6 08/11/2011 CALCIUM 8.0 03/20/2022    BILITOT 17.2 03/20/2022    ALKPHOS 112 03/20/2022     03/20/2022    ALT 79 03/20/2022              Assessment:    · Sepsis with pnuemonia possibly aspiration, uti, peritonitis/enterocolitis  · Acute on chronic anemia with associated hemorrhagic anemia  · Hepatic encephalopathy secondary to decompensated alcoholic cirrhosis with associated coagulopathy--end-stage disease  · Acute kidney injury with multiple electrolyte imbalance  · Coagulopathy secondary to liver disease with associated thrombocytopenia  · Hypothyroidism  · History of Crohn's colitis  · Depression and anxiety            Plan:       · The patient is seen in the intensive care unit in acute to be acutely ill  · Currently intubated on the ventilator with ventilatory support  · Continue to follow with multiple subspecialists including intensivist nephrology and GI  · Monitor lab work accordingly and transfuse. Continue PPIs and Sandostatin  · Monitor blood pressure and vasopressors if needed  · Broad-spectrum antibiotics  · Prognosis guarded    Greater than 40 minutes of critical care time was spent with the patient. This includes chart review, , and discussion with those consultants involved in the patient's care. More than 50% of my  time was spent at the bedside counseling/coordinating care with the patient and/or family with face to face contact. This time was spent reviewing notes and laboratory data as well as instructing and counseling the patient. Time I spent with the family or surrogate(s) is included only if the patient was incapable of providing the necessary information or participating in medical decisions. I also discussed the differential diagnosis and all of the proposed management plans with the patient and individuals accompanying the patient.     [facility] requires this high level of physician care and nursing on the ICU unit due the complexity of decision management and chance of rapid decline or death. Continued cardiac monitoring and higher level of nursing are required. I am readily available for any further decision-making and intervention.      Jose Howard DO, [email or web address].  3/20/2022  2:32 PM

## 2022-03-20 NOTE — PLAN OF CARE
Problem: Pain:  Goal: Pain level will decrease  Description: Pain level will decrease  3/20/2022 0923 by Michelle Beard RN  Outcome: Met This Shift  3/20/2022 0743 by Gracy Donald RN  Outcome: Met This Shift  Goal: Control of acute pain  Description: Control of acute pain  3/20/2022 0923 by Michelle Beard RN  Outcome: Met This Shift  3/20/2022 0743 by Gracy Donald RN  Outcome: Met This Shift  Goal: Control of chronic pain  Description: Control of chronic pain  3/20/2022 0743 by Gracy Donald RN  Outcome: Met This Shift     Problem: Non-Violent Restraints  Goal: Removal from restraints as soon as assessed to be safe  3/20/2022 0923 by Michelle Beard RN  Outcome: Not Met This Shift  3/20/2022 0743 by Gracy Donald RN  Outcome: Not Met This Shift  Goal: No harm/injury to patient while restraints in use  3/20/2022 0923 by Michelle Beard RN  Outcome: Met This Shift  3/20/2022 0743 by Gracy Donald RN  Outcome: Met This Shift  Goal: Patient's dignity will be maintained  3/20/2022 0923 by Michelle Beard RN  Outcome: Met This Shift  3/20/2022 0743 by Gracy Donald RN  Outcome: Met This Shift

## 2022-03-20 NOTE — CONSULTS
CRITICAL CARE PROGRESS NOTE    The patient's case was discussed in multidisciplinary rounds including critical care specialist, nursing, RT and pharmacy. 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 In ED she was intubated for airway protection, she was found to have macrocytic anemia, thrombocytopenia, elevated INR to 5, lactic acidosis, SEVEN, prolonged QTc, sepsis due to pyelonephritis, pneumonia and possible peritonitis, with ascites. --Hyperglycemic now, decreased D10 W to 10 cc/h  --Renal function continues to worsen with anuria  --Acidosis is better  --Has hypokalemia, hypochloremia, hypomagnesemia. --Lactic acidosis persists  --Received 2 units of PRBC, Hb is 6.7 therefore will receive one unit of PRBC; remains thrombocytopenic and will receive 1 pool of plts transfusion. --Coagulopathy improving some, received cryoprecipitate, continue vitamin K   --Remains in shock and requiring norepinephrine for vasopressor support. BP (!) 100/50   Pulse 78   Temp 98.1 °F (36.7 °C) (Bladder)   Resp 29   Wt 254 lb (115.2 kg)   SpO2 100%   BMI 38.62 kg/m²   General:Comatose, sedated and ventilated, EUGENE, does not respond to pain during my examination, has corneal and gag reflex, jaundiced.   HEENT: No head lesions, PERRL, mouth with ETT, no nasal lesions  Respiratory: Lungs with diminished breath sounds bilaterally, no adventitious sounds auscultated, no accessory muscle use  CV: Regular rate, no murmurs, JVD, no leg edema  Abdomen: Soft, non tender, + bowel sounds, jaundiced  Skin: Hydrated, adequate turgor, no rash, capillary refill <2 seconds  Extremities: Muscular strength: Flaccid limbs, distal pulses present  Neurology: Comatose, flaccid limbs, no response to pain, neck is supple, no meningitic signs present. A/P:  1) Acute blood loss anemia due to gastrointestinal hemorrhage (peptic ulcer disease vs variceal hemorrhage)  in a patient with cirrhosis   --Transfusion to Hb of 7 and platelets to 02V due to acute bleeding. To have 4 units of PRBC on hold at all times. --Pantoprazole 40 mg IV q12h  --Continue octreotide drip  --Piperacillin/tazobactam + linezolid and doxycyclline for management of SBP.   --GI consult for endoscopic evaluation     2) Chronic liver failure due to decompensated cirrhosis and alcoholic hepatitis with hepatic encephalopathy, thrombocytopenia and coagulopathy.  --CT liver with regenerative nodules vs hepatocellular carcinoma  --Lactulose and rifaximin for management of encephalopathy  --Paracentesis for evaluation of SBP and management of ascites     3) Septic shock secondary to pyelonephritis, pneumonia (pneumococcus/legionella/ viral) and possible peritonitis/ enterocolitis. --CT scan with possible cholecystitis  --Crystalloid fluid: 2L NS today  --Antibiotic regimen as per ID  --Vasopressor support with norepinephrine   --Steroids: Hydrocortisone 200 mg/day  --Lactate level 19.9    Repeat level pending  --Cultures obtained  --Surgical consult    4) Acute kidney injury secondary to Sepsis/prerenal vs hepatorenal syndrome  --Avoid nephrotoxins and dose medications according GFR  --Urinary electolytes ordered     5) Prolonged QTc  --Avoid medications that prolong QT    6) Lactic acidosis due to the above  --Monitor q2 hours  --Resuscitation with crystalloids    7) Hypokalemia, hypomagnesemia  --Supplementing electrolytes    DVT prophylaxis with SCDs  Nutrition: NPO  Vascular catheters: TLC in femoral vein  Urinary catheter needed for strict input/output  Goals of care:  Full code  Prognosis is poor, it is expected Ms Haja Maravilla will not survive this hospital admission      Component Latest Ref Rng & Units 3/19/2022 3/19/2022           1:00 PM  1:00 PM   Total CK      20 - 180 U/L 135    AMMONIA, PLASMA, [id number]      11.0 - 51.0 umol/L  325.0 (H)     Component      Latest Ref Rng & Units 3/19/2022           1:00 PM   Lactic Acid, Sepsis      0.5 - 1.9 mmol/L 19.9 (HH)     Recent Labs     03/19/22  1300 03/19/22  1905 03/19/22 2218 03/20/22  0658   FIBRINOGEN  --   --  105*  --    INR 5.0  --   --  3.6   PROTIME 58.9*  --   --  42.8*   * 245*  --  384*   ALT 42* 57*  --  79*     Lab Results   Component Value Date    CHOL 100 09/01/2021    TRIG 164 09/01/2021    HDL 12 09/01/2021    LDLCALC 55 09/01/2021    LABVLDL 33 09/01/2021     No results found for: VITD25  Last 3 CMP:    Recent Labs     03/19/22  1300 03/19/22  1905 03/20/22  0658    132 127*   K 4.0 3.5 2.8*   CL 89* 90* 89*   CO2 12* 12* 19*   BUN 13 14 16   CREATININE 1.8* 1.9* 2.5*   GLUCOSE 104* 225* 328*   CALCIUM 8.2* 7.8* 8.0*   PROT 6.4 6.0* 6.6   LABALBU 1.9* 2.0* 2.8*   BILITOT 13.1* 13.3* 17.2*   ALKPHOS 150* 149* 112*   * 245* 384*   ALT 42* 57* 79*     Recent Labs     03/20/22  0658   WBC 9.2   RBC 2.10*   HGB 6.7*   HCT 19.6*   MCV 93.3   MCH 31.9   MCHC 34.2   RDW 18.8*   PLT 28*   MPV 12.3*     Component      Latest Ref Rng & Units 3/19/2022           3:01 PM   PH 37      7.350 - 7.450 7.287 (L)   PCO2 37      35.0 - 45.0 mmHg 29.0 (L)   PO2 37      80.0 - 100.0 mmHg 145.1 (H)   HCO3      22.0 - 26.0 mmol/L 13.9 (L)   Base Excess      -3.0 - 3.0 mmol/L -11.7 (L)   O2 Sat      92.0 - 98.5 % 99.0 (H)     Component      Latest Ref Rng & Units 3/19/2022           1:12 PM   Color, UA      Straw/Yellow TOMMY (A)   Clarity, UA      Clear CLOUDY (A)   Glucose, UA      Negative mg/dL 100 (A)   Bilirubin, Urine      Negative MODERATE (A)   Ketones, Urine      Negative mg/dL 15 (A)   Specific [place], UA      1.005 - 1.030 1.015   Blood, Urine      Negative SMALL (A)   pH, UA      5.0 - 9.0 8.0   Protein, UA Negative mg/dL >=300 (A)   Urobilinogen, Urine      <2.0 E.U./dL >=8.0 (A)   Nitrite, Urine      Negative POSITIVE (A)   Leukocyte Esterase, Urine      Negative LARGE (A)   WBC, UA      0 - 5 /HPF >20 (A)   RBC, UA      0 - 2 /HPF 1-3   Epithelial Cells, UA      /HPF FEW   Bacteria, UA      None Seen /HPF MANY (A)     No results for input(s): BC in the last 72 hours.     Recent Labs     03/19/22  1300   BLOODCULT2 Gram stain performed from blood culture bottle media  Gram negative rods  *       24 HR INTAKE/OUTPUT:      Intake/Output Summary (Last 24 hours) at 3/20/2022 1113  Last data filed at 3/20/2022 0800  Gross per 24 hour   Intake 3693.41 ml   Output 1160 ml   Net 2533.41 ml     MEDICATIONS:   thiamine (VITAMIN B1) IVPB  500 mg IntraVENous Q8H    Followed by   Mercedes Palumbo ON 3/22/2022] thiamine (VITAMIN B1) IVPB  100 mg IntraVENous Q24H    chlorhexidine  15 mL Mouth/Throat BID    rifaximin  550 mg Oral TID    doxycycline (VIBRAMYCIN) IV  100 mg IntraVENous Q12H    lactulose  30 g Rectal Once    phytonadione (VITAMIN K)  IVPB  10 mg IntraVENous Daily    hydrocortisone sodium succinate PF  50 mg IntraVENous Q6H    pantoprazole (PROTONIX) 40 mg injection  40 mg IntraVENous Q12H    linezolid  600 mg IntraVENous Q12H    piperacillin-tazobactam  3,375 mg IntraVENous Q8H    albumin human  25 g IntraVENous Q6H    lactulose  20 g Oral Q2H      sodium chloride      sodium chloride      sodium chloride      dextrose 10 mL/hr at 03/20/22 0958    sodium chloride      octreotide (SandoSTATIN) infusion 50 mcg/hr (03/20/22 1045)    sodium chloride      sodium chloride      propofol Stopped (03/19/22 1551)    fentaNYL 5 mcg/ml in 0.9%  ml infusion 75 mcg/hr (03/20/22 0314)    norepinephrine-sodium chloride 5 mcg/min (03/20/22 0830)     sodium chloride, sodium chloride, sodium chloride, sodium chloride, sodium chloride    OBJECTIVE:  Vitals:    03/20/22 1030   BP: (!) 100/50   Pulse: 78   Resp: 29 Temp:    SpO2: 100%     FiO2 : 40 %     O2 Device: Ventilator        LABS:  WBC   Date Value Ref Range Status   03/20/2022 9.2 4.5 - 11.5 E9/L Final   03/19/2022 9.9 4.5 - 11.5 E9/L Final   03/19/2022 7.8 4.5 - 11.5 E9/L Final     Hemoglobin   Date Value Ref Range Status   03/20/2022 6.7 (LL) 11.5 - 15.5 g/dL Final   03/19/2022 6.1 (LL) 11.5 - 15.5 g/dL Final   03/19/2022 6.1 (LL) 11.5 - 15.5 g/dL Final     Hematocrit   Date Value Ref Range Status   03/20/2022 19.6 (L) 34.0 - 48.0 % Final   03/19/2022 18.5 (L) 34.0 - 48.0 % Final   03/19/2022 18.7 (L) 34.0 - 48.0 % Final     MCV   Date Value Ref Range Status   03/20/2022 93.3 80.0 - 99.9 fL Final   03/19/2022 101.6 (H) 80.0 - 99.9 fL Final   03/19/2022 104.4 (H) 80.0 - 99.9 fL Final     Platelets   Date Value Ref Range Status   03/20/2022 28 (L) 130 - 450 E9/L Final   03/19/2022 37 (L) 130 - 450 E9/L Final   03/19/2022 36 (L) 130 - 450 E9/L Final     Sodium   Date Value Ref Range Status   03/20/2022 127 (L) 132 - 146 mmol/L Final   03/19/2022 132 132 - 146 mmol/L Final   03/19/2022 133 132 - 146 mmol/L Final     Potassium   Date Value Ref Range Status   03/20/2022 2.8 (L) 3.5 - 5.0 mmol/L Final   03/19/2022 3.5 3.5 - 5.0 mmol/L Final   12/13/2021 4.2 3.5 - 5.0 mmol/L Final     Potassium reflex Magnesium   Date Value Ref Range Status   03/19/2022 4.0 3.5 - 5.0 mmol/L Final   11/26/2021 2.4 (LL) 3.5 - 5.0 mmol/L Final   11/24/2021 2.6 (LL) 3.5 - 5.0 mmol/L Final     Chloride   Date Value Ref Range Status   03/20/2022 89 (L) 98 - 107 mmol/L Final   03/19/2022 90 (L) 98 - 107 mmol/L Final   03/19/2022 89 (L) 98 - 107 mmol/L Final     CO2   Date Value Ref Range Status   03/20/2022 19 (L) 22 - 29 mmol/L Final   03/19/2022 12 (L) 22 - 29 mmol/L Final   03/19/2022 12 (L) 22 - 29 mmol/L Final     BUN   Date Value Ref Range Status   03/20/2022 16 6 - 20 mg/dL Final   03/19/2022 14 6 - 20 mg/dL Final   03/19/2022 13 6 - 20 mg/dL Final     CREATININE   Date Value Ref Range Status   03/20/2022 2.5 (H) 0.5 - 1.0 mg/dL Final   03/19/2022 1.9 (H) 0.5 - 1.0 mg/dL Final   03/19/2022 1.8 (H) 0.5 - 1.0 mg/dL Final     Glucose   Date Value Ref Range Status   03/20/2022 328 (H) 74 - 99 mg/dL Final   03/19/2022 225 (H) 74 - 99 mg/dL Final   03/19/2022 104 (H) 74 - 99 mg/dL Final   08/11/2011 125 (H) 70 - 110 mg/dL Final   07/05/2011 88 70 - 110 mg/dL Final   07/02/2011 85 70 - 110 mg/dL Final     Calcium   Date Value Ref Range Status   03/20/2022 8.0 (L) 8.6 - 10.2 mg/dL Final   03/19/2022 7.8 (L) 8.6 - 10.2 mg/dL Final   03/19/2022 8.2 (L) 8.6 - 10.2 mg/dL Final     Total Protein   Date Value Ref Range Status   03/20/2022 6.6 6.4 - 8.3 g/dL Final   03/19/2022 6.0 (L) 6.4 - 8.3 g/dL Final   03/19/2022 6.4 6.4 - 8.3 g/dL Final     Albumin   Date Value Ref Range Status   03/20/2022 2.8 (L) 3.5 - 5.2 g/dL Final   03/19/2022 2.0 (L) 3.5 - 5.2 g/dL Final   03/19/2022 1.9 (L) 3.5 - 5.2 g/dL Final   08/11/2011 3.6 3.2 - 4.8 g/dL Final   07/05/2011 3.3 3.2 - 4.8 g/dL Final   07/01/2011 3.5 3.2 - 4.8 g/dL Final     Total Bilirubin   Date Value Ref Range Status   03/20/2022 17.2 (H) 0.0 - 1.2 mg/dL Final   03/19/2022 13.3 (H) 0.0 - 1.2 mg/dL Final   03/19/2022 13.1 (H) 0.0 - 1.2 mg/dL Final     Alkaline Phosphatase   Date Value Ref Range Status   03/20/2022 112 (H) 35 - 104 U/L Final   03/19/2022 149 (H) 35 - 104 U/L Final   03/19/2022 150 (H) 35 - 104 U/L Final     AST   Date Value Ref Range Status   03/20/2022 384 (H) 0 - 31 U/L Final   03/19/2022 245 (H) 0 - 31 U/L Final   03/19/2022 139 (H) 0 - 31 U/L Final     ALT   Date Value Ref Range Status   03/20/2022 79 (H) 0 - 32 U/L Final   03/19/2022 57 (H) 0 - 32 U/L Final   03/19/2022 42 (H) 0 - 32 U/L Final     GFR Non-   Date Value Ref Range Status   03/20/2022 21 >=60 mL/min/1.73 Final     Comment:     Chronic Kidney Disease: less than 60 ml/min/1.73 sq.m. Kidney Failure: less than 15 ml/min/1.73 sq.m.   Results valid for patients 18 years and older. 03/19/2022 28 >=60 mL/min/1.73 Final     Comment:     Chronic Kidney Disease: less than 60 ml/min/1.73 sq.m. Kidney Failure: less than 15 ml/min/1.73 sq.m. Results valid for patients 18 years and older. 03/19/2022 30 >=60 mL/min/1.73 Final     Comment:     Chronic Kidney Disease: less than 60 ml/min/1.73 sq.m. Kidney Failure: less than 15 ml/min/1.73 sq.m. Results valid for patients 18 years and older. GFR    Date Value Ref Range Status   03/20/2022 25  Final   03/19/2022 34  Final   03/19/2022 36  Final     Magnesium   Date Value Ref Range Status   03/20/2022 1.6 1.6 - 2.6 mg/dL Final   03/19/2022 1.2 (L) 1.6 - 2.6 mg/dL Final   03/19/2022 1.2 (L) 1.6 - 2.6 mg/dL Final     Phosphorus   Date Value Ref Range Status   03/19/2022 6.4 (H) 2.5 - 4.5 mg/dL Final   10/26/2021 3.3 2.5 - 4.5 mg/dL Final   09/06/2021 3.3 2.5 - 4.5 mg/dL Final     Recent Labs     03/19/22  1933   HCO3 13.2*   BE -10.6*   O2SAT 100.0*       RADIOLOGY:  XR CHEST PORTABLE   Final Result   1. Endotracheal tube terminates in the midthoracic trachea. Enteric tube   extends subdiaphragmatic and off the field of view. 2.  Prominence of the cardiac silhouette. Faint interstitial opacities   bilaterally. US GALLBLADDER RUQ   Final Result   1. Densely echogenic liver with nodular contour consistent with cirrhosis but   also as correlated with CT cannot exclude internal lesions. Recommend MRI   evaluation as malignancy is not excluded   2. Associated ascites and possible medical renal disease   3. Gallbladder wall thickening and sludge also may relate to the   hepatocellular disease, but if concern for cholecystitis HIDA scan could   further evaluate         XR CHEST ABDOMEN NG PLACEMENT   Final Result   1. Satisfactory position of the enteric tube.       2.  Dense left retrocardiac opacity         CT HEAD WO CONTRAST   Final Result   No acute intracranial abnormality. Specifically, there is no acute   intracranial hemorrhage         CT CHEST WO CONTRAST   Final Result   Significantly limited study due to lack of contrast.      Atelectasis/infiltrates in the lung bases concerning for pneumonia. Hepatosplenomegaly with heterogeneous ill-defined hepatic lesions as noted   concerning for cirrhosis with possible regenerating nodules or diffuse   malignancy. Further assessment by CT with contrast or MRI may be considered. There is portal venous hypertension with venous varices as noted and moderate   amount of high density ascites. Diffuse edema in the mesentery with dilated thickened bowel loops concerning   for enterocolitis. Developing peritonitis cannot be excluded. There is   anasarca. Distended and thickened gallbladder. Consider ultrasonography to evaluate   cholecystitis. CT ABDOMEN PELVIS WO CONTRAST Additional Contrast? None   Final Result   Significantly limited study due to lack of contrast.      Atelectasis/infiltrates in the lung bases concerning for pneumonia. Hepatosplenomegaly with heterogeneous ill-defined hepatic lesions as noted   concerning for cirrhosis with possible regenerating nodules or diffuse   malignancy. Further assessment by CT with contrast or MRI may be considered. There is portal venous hypertension with venous varices as noted and moderate   amount of high density ascites. Diffuse edema in the mesentery with dilated thickened bowel loops concerning   for enterocolitis. Developing peritonitis cannot be excluded. There is   anasarca. Distended and thickened gallbladder. Consider ultrasonography to evaluate   cholecystitis. CT CERVICAL SPINE WO CONTRAST   Final Result   1. There is no acute fracture or subluxation of the cervical spine. PROBLEM LIST:  Principal Problem:    Decompensation of cirrhosis of liver (HCC)  Resolved Problems:    * No resolved hospital problems. *      ATTESTATION:  ICU Staff Physician note of personal involvement in Care  As the attending physician, I certify that I personally reviewed the patients history and personnally examined the patient to confirm the physical findings described above,  And that I reviewed the relevant imaging studies and available reports. I also discussed the differential diagnosis and all of the proposed management plans with the patient and individuals accompanying the patient to this visit. They had the opportunity to ask questions about the proposed management plans and to have those questions answered. This patient has a high probability of sudden, clinically significant deterioration, which requires the highest level of physician preparedness to intervene urgently. I managed/supervised life or organ supporting interventions that required frequent physician assessment. I devoted my full attention to the direct care of this patient for the amount of time indicated below. Time I spent with the family or surrogate(s) is included only if the patient was incapable of providing the necessary information or participating in medical decisions  Time devoted to teaching and to any procedures I billed separately is not included.      CRITICAL CARE TIME:  30 minutes    Doreen Mccoy MD  Pulmonary and Critical Care Medicine

## 2022-03-20 NOTE — PROGRESS NOTES
P/c from Lab - critical Hemoglobin 6.3. Dr. Bertha Kerns & pt's RN Fer Thompson updated.     Rosibel Castillo RN  3/20/2022

## 2022-03-20 NOTE — PLAN OF CARE
Problem: Pain:  Goal: Pain level will decrease  Description: Pain level will decrease  Outcome: Met This Shift  Goal: Control of acute pain  Description: Control of acute pain  Outcome: Met This Shift  Goal: Control of chronic pain  Description: Control of chronic pain  Outcome: Met This Shift     Problem: Non-Violent Restraints  Goal: No harm/injury to patient while restraints in use  Outcome: Met This Shift  Goal: Patient's dignity will be maintained  Outcome: Met This Shift     Problem: Non-Violent Restraints  Goal: Removal from restraints as soon as assessed to be safe  Outcome: Not Met This Shift

## 2022-03-20 NOTE — CONSULTS
General Surgery Consult    Patient's Name/Date of Birth: Andressa Almonte / 1975    Date: March 20, 2022     Consulting Surgeon: Geraldine Mccarthy M.D.    PCP: Ema Renae MD     Chief Complaint: abnormal CT    HPI:   Andressa Almonte is a 51 y.o. female who presents for  evaluation of severe liver decompensation with encephalopathy and is intubated in ICU after being found unresponsive at home and CT in ED showed severe ascites and possible peritonitis      Past Medical History:   Diagnosis Date    Acute renal failure (ARF) ([address] 38/67/3090    Alcoholic cirrhosis ([address]     Anxiety     Crohn's colitis ([address]     Depression     History of blood transfusion     Pyoderma gangrenosum     Thyroid disease        Past Surgical History:   Procedure Laterality Date    ABDOMEN SURGERY      pt has a J-pouch     ARM DEBRIDEMENT      R arm-2010-2011??    ARM DEBRIDEMENT      COLONOSCOPY      DILATATION, ESOPHAGUS      ENDOSCOPY, COLON, DIAGNOSTIC      SIGMOIDOSCOPY N/A 12/6/2021    SIGMOIDOSCOPY BIOPSY FLEXIBLE performed by Caryl Redd MD at [address] TRANSESOPHAGEAL ECHOCARDIOGRAM N/A 9/3/2021    TRANSESOPHAGEAL ECHOCARDIOGRAM WITH BUBBLE STUDY performed by Elly Monroe MD at [facility] N/A 9/2/2021    EGD ESOPHAGOGASTRODUODENOSCOPY performed by Shanthi De La Vega MD at [facility] 12/6/2021    EGD CONTROL HEMORRHAGE performed by Caryl Redd MD at [facility] N/A 12/6/2021    EGD BIOPSY performed by Caryl Redd MD at [facility] ENDOSCOPY       Current Facility-Administered Medications   Medication Dose Route Frequency Provider Last Rate Last Admin    0.9 % sodium chloride infusion   IntraVENous PRN Tanja Everardo, APRN - CNP        0.9 % sodium chloride infusion   IntraVENous PRN Tanja Chaffee, APRN - CNP        0.9 % sodium chloride infusion   IntraVENous PRN Tanja Everardo, APRN - CNP  dextrose 10 % infusion   IntraVENous Continuous SRIDHAR Amezquita - CNP 40 mL/hr at 03/20/22 0231 Rate Change at 03/20/22 0231    0.9 % sodium chloride infusion   IntraVENous PRN Karly Maher MD        octreotide (SANDOSTATIN) 500 mcg in sodium chloride 0.9 % 100 mL infusion  50 mcg/hr IntraVENous Continuous Selene Schneider MD 10 mL/hr at 03/20/22 0015 50 mcg/hr at 03/20/22 0015    0.9 % sodium chloride infusion   IntraVENous Once Karly Maher MD        0.9 % sodium chloride infusion   IntraVENous PRN Karly Maher MD        thiamine (B-1) 500 mg in sodium chloride 0.9 % 100 mL IVPB  500 mg IntraVENous Kenna Hansen MD   Stopped at 03/20/22 0740    Followed by   Radha Fowler ON 3/22/2022] thiamine (B-1) 100 mg in sodium chloride 0.9 % 100 mL IVPB  100 mg IntraVENous Q24H Wolfgang Chaves MD        propofol injection  5-50 mcg/kg/min IntraVENous Continuous Wolfgang Chaves MD   Stopped at 03/19/22 1551    chlorhexidine (PERIDEX) 0.12 % solution 15 mL  15 mL Mouth/Throat BID Wolfgang Chaves MD   15 mL at 03/20/22 0817    rifaximin (XIFAXAN) tablet 550 mg  550 mg Oral TID Wolfgang Chaves MD   550 mg at 03/20/22 0818    fentaNYL 5 mcg/ml in 0.9%  ml infusion   mcg/hr IntraVENous Continuous Wolfgang Chaves MD 15 mL/hr at 03/20/22 0314 75 mcg/hr at 03/20/22 0314    doxycycline (VIBRAMYCIN) 100 mg in dextrose 5 % 100 mL IVPB  100 mg IntraVENous Q12H Wolfgang Chaves MD   Stopped at 03/20/22 0545    lactulose (CHRONULAC) 10 GM/15ML solution 30 g  30 g Rectal Once Selene Schneider MD        phytonadione (ADULT) (VITAMIN K) 10 mg in dextrose 5 % 100 mL IVPB  10 mg IntraVENous Daily Wolfgang Chaves MD   Stopped at 03/20/22 0848    hydrocortisone sodium succinate PF (SOLU-CORTEF) injection 50 mg  50 mg IntraVENous Q6H Wolfgang Chaves MD   50 mg at 03/20/22 0452    pantoprazole (PROTONIX) 40 mg in sodium chloride (PF) 10 mL injection  40 mg IntraVENous Q12H Brittanie Ley MD   40 mg at 03/20/22 0452    norepinephrine (LEVOPHED) 16 mg in sodium chloride 0.9 % 250 mL infusion (non-weight-based)  1-100 mcg/min IntraVENous Continuous Brittanie Ley MD   Stopped at 03/20/22 0530    linezolid (ZYVOX) IVPB 600 mg  600 mg IntraVENous Q12H Brittanie Ley MD   Stopped at 03/20/22 0713    piperacillin-tazobactam (ZOSYN) 3,375 mg in dextrose 5 % 50 mL IVPB extended infusion (mini-bag)  3,375 mg IntraVENous Armando MD Dustin   Stopped at 03/20/22 0848    albumin human 25 % IV solution 25 g  25 g IntraVENous Q6H Halima Hale MD   Stopped at 03/20/22 0848    lactulose (CHRONULAC) 10 GM/15ML solution 20 g  20 g Oral Q2H Halima Hale MD   20 g at 03/20/22 0816       No Known Allergies    The patient has a family history that is negative for severe cardiovascular or respiratory issues, negative for reaction to anesthesia.     Social History     Socioeconomic History    Marital status:      Spouse name: Not on file    Number of children: 1    Years of education: Not on file    Highest education level: Not on file   Occupational History    Not on file   Tobacco Use    Smoking status: Never Smoker    Smokeless tobacco: Never Used   Vaping Use    Vaping Use: Never used   Substance and Sexual Activity    Alcohol use: Not Currently     Comment: stopped but drink 3 weeks ago    Drug use: Not Currently    Sexual activity: Not on file   Other Topics Concern    Not on file   Social History Narrative    Not on file     Social Determinants of Health     Financial Resource Strain: Low Risk     Difficulty of Paying Living Expenses: Not hard at all   Food Insecurity: No Food Insecurity    Worried About Running Out of Food in the Last Year: Never true    Yumiko of Food in the Last Year: Never true   Transportation Needs: No Transportation Needs    Lack of Transportation (Medical): No    Lack of Transportation (Non-Medical): No   Physical Activity: Unknown    Days of Exercise per Week: 0 days    Minutes of Exercise per Session: Not on file   Stress: No Stress Concern Present    Feeling of Stress : Only a little   Social Connections: Moderately Integrated    Frequency of Communication with Friends and Family: More than three times a week    Frequency of Social Gatherings with Friends and Family: Three times a week    Attends Voodoo Services: More than 4 times per year    Active Member of Clubs or Organizations: Yes    Attends Club or Organization Meetings: More than 4 times per year    Marital Status:    Intimate Partner Violence: Not At Risk    Fear of Current or Ex-Partner: No    Emotionally Abused: No    Physically Abused: No    Sexually Abused: No   Housing Stability: High Risk    Unable to Pay for Housing in the Last Year: No    Number of Places Lived in the Last Year: 6    Unstable Housing in the Last Year: No           Review of Systems  Not obtained due to intubated and sedated    Physical exam:   BP (!) 98/52   Pulse 94   Temp 98.2 °F (36.8 °C)   Resp 24   Wt 254 lb (115.2 kg)   SpO2 100%   BMI 38.62 kg/m²   General appearance:  Intubated and sedated  Head: NCAT, PERRLA, EOMI, red conjunctiva  Neck: supple, no masses  Lungs: CTAB, equal chest rise bilateral  Heart: Reg rate  Abdomen: soft, nontender, moderately distended with fluid wave  Skin; no lesions  Gu: no cva tenderness  Extremities: extremities normal, atraumatic, no cyanosis or edema      Radiology:  CT abdomen/pelvis:   Significantly limited study due to lack of contrast.       Atelectasis/infiltrates in the lung bases concerning for pneumonia.       Hepatosplenomegaly with heterogeneous ill-defined hepatic lesions as noted   concerning for cirrhosis with possible regenerating nodules or diffuse   malignancy.  Further assessment by CT with contrast or MRI may be considered.    There is portal venous hypertension with venous varices as noted and moderate   amount of high density ascites.       Diffuse edema in the mesentery with dilated thickened bowel loops concerning   for enterocolitis.  Developing peritonitis cannot be excluded.  There is   anasarca.       Distended and thickened gallbladder.  Consider ultrasonography to evaluate   cholecystitis.             Assessment:  51 y.o. female with liver decompensation and abnormal CT    Plan:  Monitor exam and labs, will get hida if no improvement although only drain would be recommended. No signs of peritonitis on exam. Follow daily labs.        Meagan Avila MD  3/20/2022  8:51 AM

## 2022-03-21 NOTE — ACP (ADVANCE CARE PLANNING)
Advance Care Planning   Healthcare Decision Maker:    Primary Decision Maker: Yovana Garayer - Brother/Sister - [phone]        Pt was legally adoped by shanice: Tesfaye Peterson on December 21, 1987. Homar Chela is listed contact - she left for another state when Domenica Vallecillo was younger and Domenica Vallecillo stayed with her dad and step mom    A brother has passed, and Pancho Zuleta is Gali's biological sister. Pt is  from [place], she has a son Shari Arriola who is only 13years old. This was reviewed with both Amara Hagan and Khari Quintana. LEGAL NOK IS SISTER Pancho Zuleta (pronounced Kervin Lambert).      Electronically signed by TOYA Ashley on 3/21/2022 at 11:28 AM

## 2022-03-21 NOTE — PLAN OF CARE
Problem: Pain:  Goal: Pain level will decrease  Description: Pain level will decrease  Outcome: Met This Shift  Goal: Control of acute pain  Description: Control of acute pain  Outcome: Met This Shift  Goal: Control of chronic pain  Description: Control of chronic pain  Outcome: Met This Shift     Problem: Non-Violent Restraints  Goal: Removal from restraints as soon as assessed to be safe  Outcome: Met This Shift  Goal: No harm/injury to patient while restraints in use  Outcome: Met This Shift  Goal: Patient's dignity will be maintained  Outcome: Met This Shift     Problem: Skin Integrity:  Goal: Will show no infection signs and symptoms  Description: Will show no infection signs and symptoms  Outcome: Met This Shift  Goal: Absence of new skin breakdown  Description: Absence of new skin breakdown  Outcome: Met This Shift     Problem: Falls - Risk of:  Goal: Will remain free from falls  Description: Will remain free from falls  Outcome: Met This Shift  Goal: Absence of physical injury  Description: Absence of physical injury  Outcome: Met This Shift

## 2022-03-21 NOTE — PROGRESS NOTES
Internal Medicine Progress Note    [facility]    Dorian Key. Enrike Viera., F.AReinaldoCReinaldoOReinaldoIReinaldo Schulz D.O., MIGUEL ANGEL Harper, MSN, APRN, NP-C  Bridgett Remy. [facility], MSN, APRN-CNP     Primary Care Physician: Alyssa Quiroga MD   Admitting Physician:  Jose G Walter DO  Admission date and time: 3/19/2022 12:40 PM    Room:  [place] [id number]  Admitting diagnosis: Metabolic encephalopathy [N04.86]  Lactic acidosis [E87.2]  Acute blood loss anemia [D62]  Hyperammonemia ([facility] [E72.20]  Hypoglycemia [E16.2]  Elevated INR [R79.1]  Elevated troponin [R77.8]  History of alcohol abuse [F10.11]  Hypothermia, initial encounter [T68. XXXA]  Urinary tract infection with hematuria, site unspecified [N39.0, R31.9]  Gastrointestinal hemorrhage, unspecified gastrointestinal hemorrhage type [K92.2]  Decompensation of cirrhosis of liver ([facility] [K72.90, K74.60]    Patient Name: Santiago Neal  MRN: [id number]    Date of Service: 3/21/2022     Subjective:  Jarrod is a 51 y.o. female who was seen and examined today,3/21/2022, at the bedside. General remains mechanically ventilated without any sedation. She is not responding to painful or verbal stimulus. Multiple subspecialists are following. Review of System:  Unable to be obtained in the patient's current condition    Physical Exam:  I/O this shift: In: 883.3 [I.V.:34.7; IV Piggyback:848.6]  Out: -     Intake/Output Summary (Last 24 hours) at 3/21/2022 0914  Last data filed at 3/21/2022 0826  Gross per 24 hour   Intake 6597.69 ml   Output 1120 ml   Net 5477.69 ml   I/O last 3 completed shifts:   In: 9061.8 [I.V.:5159.6; Blood:1775.3; NG/GT:400; IV Piggyback:1726.9]  Out: 1880 [Urine:780; Emesis/NG output:1100]  Patient Vitals for the past 96 hrs (Last 3 readings):   Weight   03/21/22 0400 244 lb 7.8 oz (110.9 kg)   03/19/22 1245 254 lb (115.2 kg)     Vital Signs:   Blood pressure 115/62, pulse 66, temperature 98.4 °F (36.9 °C), temperature source Bladder, resp. rate 16, height 5' 8\" (1.727 m), weight 244 lb 7.8 oz (110.9 kg), SpO2 97 %. General appearance:  Mechanically ventilated. No current sedation. Entirely obtunded. Head:  Normocephalic. No masses, lesions or tenderness. Eyes:  PERRLA. EOMI. Sclera icteric. .  ENT:  Endotracheal tube is in place. Neck:    Supple. Trachea midline. No thyromegaly. No JVD. No bruits. Heart:    Rhythm regular. Rate controlled. No murmurs. Lungs:    Diminished bilaterally with mechanical breath sounds heard throughout. Abdomen:   Persistent abdominal ascites with soft abdominal examination. Bowel sounds are hypoactive. Extremities:    Peripheral pulses present. No peripheral edema. No ulcers. No cyanosis. No clubbing. Neurologic:    Completely obtunded. Musculoskeletal:   Spine ROM normal. Muscular strength intact. Gait not assessed. Integumentary:  Diffusely jaundiced  Genitalia/Breast:  Cannon voiding with use of a Cannon catheter.     Medication:  Scheduled Meds:   lactulose enema   Rectal Q3H (SCHEDULED)    sodium chloride flush  5-40 mL IntraVENous 2 times per day    albumin human  25 g IntraVENous Q6H    thiamine (VITAMIN B1) IVPB  500 mg IntraVENous Q8H    Followed by   Rayo Roach ON 3/22/2022] thiamine (VITAMIN B1) IVPB  100 mg IntraVENous Q24H    chlorhexidine  15 mL Mouth/Throat BID    rifaximin  550 mg Oral TID    doxycycline (VIBRAMYCIN) IV  100 mg IntraVENous Q12H    phytonadione (VITAMIN K)  IVPB  10 mg IntraVENous Daily    hydrocortisone sodium succinate PF  50 mg IntraVENous Q6H    pantoprazole (PROTONIX) 40 mg injection  40 mg IntraVENous Q12H    linezolid  600 mg IntraVENous Q12H    piperacillin-tazobactam  3,375 mg IntraVENous Q8H     Continuous Infusions:   sodium chloride      dextrose      sodium chloride Stopped (03/21/22 0528)    sodium chloride      sodium chloride      sodium chloride      sodium chloride      sodium chloride      sodium chloride      octreotide (SandoSTATIN) infusion 50 mcg/hr (03/21/22 0826)    sodium chloride      sodium chloride      propofol Stopped (03/19/22 1551)    fentaNYL 5 mcg/ml in 0.9%  ml infusion Stopped (03/21/22 0719)    norepinephrine-sodium chloride Stopped (03/20/22 1900)       Objective Data:  CBC with Differential:    Lab Results   Component Value Date    WBC 5.9 03/21/2022    RBC 2.24 03/21/2022    HGB 7.0 03/21/2022    HCT 20.6 03/21/2022    PLT 25 03/21/2022    MCV 92.0 03/21/2022    MCH 31.3 03/21/2022    MCHC 34.0 03/21/2022    RDW 19.3 03/21/2022    SEGSPCT 59 08/11/2011    METASPCT 3 07/05/2011    LYMPHOPCT 4.0 03/21/2022    MONOPCT 2.0 03/21/2022    MYELOPCT 1.0 03/20/2022    BASOPCT 0.0 03/21/2022    MONOSABS 0.12 03/21/2022    LYMPHSABS 0.24 03/21/2022    EOSABS 0.00 03/21/2022    BASOSABS 0.00 03/21/2022     CMP:    Lab Results   Component Value Date     03/21/2022    K 3.3 03/21/2022    K 4.0 03/19/2022    CL 94 03/21/2022    CO2 22 03/21/2022    BUN 20 03/21/2022    CREATININE 2.7 03/21/2022    GFRAA 23 03/21/2022    LABGLOM 19 03/21/2022    GLUCOSE 169 03/21/2022    GLUCOSE 125 08/11/2011    PROT 6.5 03/21/2022    LABALBU 3.4 03/21/2022    LABALBU 3.6 08/11/2011    CALCIUM 8.3 03/21/2022    BILITOT 17.0 03/21/2022    ALKPHOS 79 03/21/2022     03/21/2022    ALT 54 03/21/2022       Assessment:  1. Septic shock with multiple sources including pyelonephritis, community-acquired pneumonia and potential peritonitis  2. Acute on chronic blood loss anemia in the setting of known varices and peptic ulcer disease  3. Hepatic encephalopathy in the setting of decompensated alcoholic cirrhosis  4. Acute renal failure that is multifactorial nature  5. Hypothyroidism  6. History of Crohn's colitis  7. Depression and anxiety      Plan:   Carolee Francois remains critically ill at this point maintained on mechanical ventilation.   Sedation was entirely stopped this morning and she remains encephalopathic in the setting of hepatic encephalopathy. Broad-spectrum antibiotic therapy is being employed. The pulmonary, nephrology, infectious disease, GI, and surgical teams continue to follow. This remains a very complicated situation and her prognosis remains poor. No family members were present during my examination but were updated yesterday as per Dr. Raza Benites note. Greater than 40 minutes of critical care time was spent with the patient. This includes chart review, , and discussion with those consultants involved in the patient's care. More than 50% of my  time was spent at the bedside counseling/coordinating care with the patient and/or family with face to face contact. This time was spent reviewing notes and laboratory data as well as instructing and counseling the patient. Time I spent with the family or surrogate(s) is included only if the patient was incapable of providing the necessary information or participating in medical decisions. I also discussed the differential diagnosis and all of the proposed management plans with the patient and individuals accompanying the patient. Gabi Bassett requires this high level of physician care and nursing on the ICU unit due the complexity of decision management and chance of rapid decline or death. Continued cardiac monitoring and higher level of nursing are required. I am readily available for any further decision-making and intervention.      Rhett Meadows DO, F.A.C.O.I.  3/21/2022  9:14 AM

## 2022-03-21 NOTE — PROGRESS NOTES
NEPHROLOGY Attending   Progress Note  3/21/2022 12:30 PM  Subjective:   Admit Date: 3/19/2022  PCP: Morris Kent MD    Interval History:     3/21/2022: Patient examined in the ICU setting -all care reviewed with primary RN at bedside - now off sedation but remains lethargic - hypotensive - NG intact as well as fecal management system -palliative care has been consulted for input       Diet: Diet NPO Exceptions are: Sips of Water with Meds    Data:   Scheduled Meds:   sodium chloride flush  5-40 mL IntraVENous 2 times per day    folic acid  1 mg Oral Daily    midodrine  15 mg Oral Q8H    lactulose  20 g Oral Q2H    sodium chloride flush  5-40 mL IntraVENous 2 times per day    albumin human  25 g IntraVENous Q6H    thiamine (VITAMIN B1) IVPB  500 mg IntraVENous Q8H    Followed by   Beryle Oxford ON 3/22/2022] thiamine (VITAMIN B1) IVPB  100 mg IntraVENous Q24H    chlorhexidine  15 mL Mouth/Throat BID    rifaximin  550 mg Oral TID    doxycycline (VIBRAMYCIN) IV  100 mg IntraVENous Q12H    hydrocortisone sodium succinate PF  50 mg IntraVENous Q6H    pantoprazole (PROTONIX) 40 mg injection  40 mg IntraVENous Q12H    linezolid  600 mg IntraVENous Q12H    piperacillin-tazobactam  3,375 mg IntraVENous Q8H     Continuous Infusions:   sodium chloride      lactated ringers 100 mL/hr at 03/21/22 1104    dextrose      sodium chloride Stopped (03/21/22 0528)    octreotide (SandoSTATIN) infusion 50 mcg/hr (03/21/22 0826)    sodium chloride      propofol Stopped (03/19/22 1551)    norepinephrine-sodium chloride Stopped (03/20/22 1900)     PRN Meds:sodium chloride flush, sodium chloride, glucose, glucagon (rDNA), dextrose, sodium chloride flush, sodium chloride, polyethylene glycol, acetaminophen **OR** acetaminophen, dextrose bolus (hypoglycemia) **OR** dextrose bolus (hypoglycemia)    Intake/Output Summary (Last 24 hours) at 3/21/2022 1230  Last data filed at 3/21/2022 1211  Gross per 24 hour   Intake 4141.52 ml Output 1120 ml   Net 3021.52 ml     CBC:   Recent Labs     03/20/22  1822 03/20/22  2240 03/21/22  0508   WBC 6.2 6.2 5.9   HGB 6.3* 7.0* 7.0*   PLT 23* 22* 25*     BMP:    Recent Labs     03/19/22  1905 03/20/22  0658 03/21/22  0508    127* 131*   K 3.5 2.8* 3.3*   CL 90* 89* 94*   CO2 12* 19* 22   BUN 14 16 20   CREATININE 1.9* 2.5* 2.7*   GLUCOSE 225* 328* 169*     Hepatic:   Recent Labs     03/19/22  1905 03/20/22  0658 03/21/22  0508   * 384* 179*   ALT 57* 79* 54*   BILITOT 13.3* 17.2* 17.0*   ALKPHOS 149* 112* 79     Troponin: No results for input(s): TROPONINI in the last 72 hours. BNP: No results for input(s): BNP in the last 72 hours. Lipids: No results for input(s): CHOL, HDL in the last 72 hours. Invalid input(s): LDLCALCU  ABGs: No results found for: PHART, PO2ART, FBO9QQT  INR:   Recent Labs     03/19/22  1300 03/20/22  0658 03/21/22  0508   INR 5.0 3.6 3.9     -----------------------------------------------------------------  RAD: CT ABDOMEN PELVIS WO CONTRAST Additional Contrast? None    Result Date: 3/19/2022  EXAMINATION: CT OF THE CHEST WITHOUT CONTRAST; CT OF THE ABDOMEN AND PELVIS WITHOUT CONTRAST 3/19/2022 1:34 pm TECHNIQUE: CT of the chest was performed without the administration of intravenous contrast. Multiplanar reformatted images are provided for review. Dose modulation, iterative reconstruction, and/or weight based adjustment of the mA/kV was utilized to reduce the radiation dose to as low as reasonably achievable.; CT of the abdomen and pelvis was performed without the administration of intravenous contrast. Multiplanar reformatted images are provided for review. Dose modulation, iterative reconstruction, and/or weight based adjustment of the mA/kV was utilized to reduce the radiation dose to as low as reasonably achievable.  COMPARISON: 11/25/2021, and CT abdomen pelvis 11/26/2021 HISTORY: ORDERING SYSTEM PROVIDED HISTORY: AMS, effusion, pna? TECHNOLOGIST PROVIDED HISTORY: Reason for exam:->AMS, effusion, pna? Decision Support Exception - unselect if not a suspected or confirmed emergency medical condition->Emergency Medical Condition (MA) FINDINGS: The examination is very limited due to lack of contrast. CT chest. Heart size is in the upper limits of normal.  The great vessels are normal. Endotracheal tube and nasogastric tube are noted. There is atelectasis/infiltrates in the lung bases concerning for pneumonia. There is thickening of the GE junction likely hiatal hernia with paraesophageal varices. CT abdomen pelvis. There is hepatosplenomegaly with liver measuring 21 cm with diffuse lesions in the left and right hepatic lobe measuring up to 2.8 x 3.7 cm. These may represent hepatic cirrhosis with regenerating nodules or diffuse multifocal malignancy. There is splenomegaly with spleen measuring 18 cm with suggestion of portal venous hypertension, and venous varices. Gallbladder is distended with Ca cholecystic edema and wall thickening. Consider ultrasonography. Pancreas, the adrenals and the kidneys are grossly normal. There is suggestion of recanalization of umbilical vein. Umbilical hernia is identified with herniation of bowel loops. Pelvis. Postoperative changes are identified in the colon with the poor delineation of the bowel anatomy. There is diffusely thickened bowel loops which may include small bowel loops and colon with edema in the mesentery and between bowel loops. Ascites is present in the pelvis. Bladder is collapsed with indwelling Cannon catheter and wall thickening. Ascites fluid is present in the pelvis. There is anasarca. Significantly limited study due to lack of contrast. Atelectasis/infiltrates in the lung bases concerning for pneumonia. Hepatosplenomegaly with heterogeneous ill-defined hepatic lesions as noted concerning for cirrhosis with possible regenerating nodules or diffuse malignancy.   Further assessment by CT with contrast or MRI may be considered. There is portal venous hypertension with venous varices as noted and moderate amount of high density ascites. Diffuse edema in the mesentery with dilated thickened bowel loops concerning for enterocolitis. Developing peritonitis cannot be excluded. There is anasarca. Distended and thickened gallbladder. Consider ultrasonography to evaluate cholecystitis. CT CHEST WO CONTRAST    Result Date: 3/19/2022  EXAMINATION: CT OF THE CHEST WITHOUT CONTRAST; CT OF THE ABDOMEN AND PELVIS WITHOUT CONTRAST 3/19/2022 1:34 pm TECHNIQUE: CT of the chest was performed without the administration of intravenous contrast. Multiplanar reformatted images are provided for review. Dose modulation, iterative reconstruction, and/or weight based adjustment of the mA/kV was utilized to reduce the radiation dose to as low as reasonably achievable.; CT of the abdomen and pelvis was performed without the administration of intravenous contrast. Multiplanar reformatted images are provided for review. Dose modulation, iterative reconstruction, and/or weight based adjustment of the mA/kV was utilized to reduce the radiation dose to as low as reasonably achievable. COMPARISON: 11/25/2021, and CT abdomen pelvis 11/26/2021 HISTORY: ORDERING SYSTEM PROVIDED HISTORY: AMS, effusion, pna? TECHNOLOGIST PROVIDED HISTORY: Reason for exam:->AMS, effusion, pna? Decision Support Exception - unselect if not a suspected or confirmed emergency medical condition->Emergency Medical Condition (MA) FINDINGS: The examination is very limited due to lack of contrast. CT chest. Heart size is in the upper limits of normal.  The great vessels are normal. Endotracheal tube and nasogastric tube are noted. There is atelectasis/infiltrates in the lung bases concerning for pneumonia. There is thickening of the GE junction likely hiatal hernia with paraesophageal varices. CT abdomen pelvis.  There is hepatosplenomegaly with liver measuring 21 cm with diffuse lesions in the left and right hepatic lobe measuring up to 2.8 x 3.7 cm. These may represent hepatic cirrhosis with regenerating nodules or diffuse multifocal malignancy. There is splenomegaly with spleen measuring 18 cm with suggestion of portal venous hypertension, and venous varices. Gallbladder is distended with Ca cholecystic edema and wall thickening. Consider ultrasonography. Pancreas, the adrenals and the kidneys are grossly normal. There is suggestion of recanalization of umbilical vein. Umbilical hernia is identified with herniation of bowel loops. Pelvis. Postoperative changes are identified in the colon with the poor delineation of the bowel anatomy. There is diffusely thickened bowel loops which may include small bowel loops and colon with edema in the mesentery and between bowel loops. Ascites is present in the pelvis. Bladder is collapsed with indwelling Cannon catheter and wall thickening. Ascites fluid is present in the pelvis. There is anasarca. Significantly limited study due to lack of contrast. Atelectasis/infiltrates in the lung bases concerning for pneumonia. Hepatosplenomegaly with heterogeneous ill-defined hepatic lesions as noted concerning for cirrhosis with possible regenerating nodules or diffuse malignancy. Further assessment by CT with contrast or MRI may be considered. There is portal venous hypertension with venous varices as noted and moderate amount of high density ascites. Diffuse edema in the mesentery with dilated thickened bowel loops concerning for enterocolitis. Developing peritonitis cannot be excluded. There is anasarca. Distended and thickened gallbladder. Consider ultrasonography to evaluate cholecystitis.        XR CHEST PORTABLE    Result Date: 3/19/2022  EXAMINATION: ONE XRAY VIEW OF THE CHEST 3/19/2022 7:07 pm COMPARISON: Chest series from November 24, 2021 HISTORY: ORDERING SYSTEM PROVIDED HISTORY: Evaluate ETT position TECHNOLOGIST PROVIDED HISTORY: Reason for exam:->Evaluate ETT position FINDINGS: Medical support devices: Endotracheal tube terminates in the midthoracic trachea. Enteric tube extends subdiaphragmatic and off the field of view. Lungs: Symmetric lung inflation. Faint interstitial opacities bilaterally. No pleural effusion or pneumothorax. Cardiomediastinal silhouette and pulmonary vascularity: Cardiac silhouette is prominent. Mild central pulmonary vascular congestion. Osseous and soft tissue structures: No acute findings. 1.  Endotracheal tube terminates in the midthoracic trachea. Enteric tube extends subdiaphragmatic and off the field of view. 2.  Prominence of the cardiac silhouette. Faint interstitial opacities bilaterally.              Objective:   Vitals:   Vitals:    03/21/22 1205   BP:    Pulse: 67   Resp: 18   Temp:    SpO2: 94%     Patient Vitals for the past 24 hrs:   BP Temp Temp src Pulse Resp SpO2 Height Weight   03/21/22 1205    67 18 94 %     03/21/22 1158       5' 8\" (1.727 m)    03/21/22 0900 106/61   67 15 96 %     03/21/22 0836    66 16 97 %     03/21/22 0800 115/62 98.4 °F (36.9 °C) Bladder 68 20 98 %     03/21/22 0700 111/64   68 12 96 %     03/21/22 0600 (!) 115/59 97.9 °F (36.6 °C) Bladder 65 22 96 %     03/21/22 0549    66 15 96 %     03/21/22 0530 107/60 97.9 °F (36.6 °C) Bladder 68 15 96 %     03/21/22 0511    68 12 96 %     03/21/22 0500 (!) 110/59 97.8 °F (36.6 °C) Bladder 67 14 96 %     03/21/22 0415 115/61 97.8 °F (36.6 °C) Bladder 67 17 97 %     03/21/22 0400 114/63 97.8 °F (36.6 °C) Bladder 65 14 96 % 5' 8\" (1.727 m) 244 lb 7.8 oz (110.9 kg)   03/21/22 0345 113/61 97.9 °F (36.6 °C) Bladder 69 21 96 %     03/21/22 0331 115/63   71 19 97 %     03/21/22 0300 108/74   70 22 97 %     03/21/22 0200 106/61   71 26 97 %     03/21/22 0150    71 16 96 %     03/21/22 0100 110/62   72 22 96 %     03/21/22 0000 103/61 98.4 °F (36.9 °C) Bladder 73 10 96 %     03/20/22 2300 106/60   76 19 97 %     03/20/22 2200 (!) 106/58   77 22 96 %     03/20/22 2154    77 21 97 %     03/20/22 2100 (!) 109/59   77 20 96 %     03/20/22 2000 (!) 107/59 97.2 °F (36.2 °C) Bladder 73 28 97 %     03/20/22 1930 (!) 107/58   72 22 97 %     03/20/22 1915 (!) 107/57 96.4 °F (35.8 °C)         03/20/22 1900 (!) 107/59 96.1 °F (35.6 °C)  71 16 98 %     03/20/22 1830 109/62   71 16 98 %     03/20/22 1817    71 16 99 %     03/20/22 1730 107/62   71 16 100 %     03/20/22 1700 111/60   72 19 100 %     03/20/22 1630 113/66   72 19 100 %     03/20/22 1600 112/66   74 16 100 %     03/20/22 1530 113/67   72 25 100 %     03/20/22 1500 116/66   76 26 99 %     03/20/22 1430 111/60 97 °F (36.1 °C) Bladder 75 18 100 %     03/20/22 1415 113/62 97 °F (36.1 °C)  78 18 100 %     03/20/22 1400 113/62 97 °F (36.1 °C)  78 17 100 %     03/20/22 1345    76 26 99 %     03/20/22 1330 121/65   75 19 99 %     03/20/22 1300    80 25 100 %         General appearance: Jaundiced, not in pain or distress, in no respiratory distress    HEENT: Intubated  Neck: Supple, no jugular venous distension, lymphadenopathy, thyromegaly or carotid bruits  Chest: Equal normal breath sounds, no wheezing, no crackles and no tenderness over ribs   Cardiovascular: Normal S1 , S2, regular rate and rhythm, no murmur, rub or gallop  Abdomen: Normal sounds present, soft, lax with no tenderness, no hepatosplenomegaly, and no masses  Extremities: No edema. Pulses are equally present. Skin: intact, no rashes   Neurologic: Extremely lethargic, jaundiced, does not follow commands off sedation, opens eyes.              Assessment/Plan:     1) SEVEN with oliguric in the setting of septic shock, hepatic encephalopathy, obtunded state  Baseline serum creatinine 0.9-1.0 as recent as December 2021  Now with serum creatinine of

## 2022-03-21 NOTE — CONSULTS
Palliative Care Department  [phone]  Palliative Care Initial Consult  Provider Ban Zhang, APRN - CNP      PATIENT: Diane May  : 1975  MRN: [id number]  ADMISSION DATE: 3/19/2022 12:40 PM  Referring Provider: Cruz Hamilton MD    Palliative Medicine was consulted on hospital day 2 for assistance with Goals of care    HPI:     Stone Lara is a 51 y.o. y/o female with a history of alcohol abuse, alcoholic cirrhosis, anxiety, chronic colitis, depression, pyoderma gangrenosum, and thyroid disease who presented to [facility]) on 3/19/2022 with altered mental status. Last known well BM was 36 hours prior to arriving to the hospital.  Was found to have positive occult blood with hemoglobin of 5.2 requiring 2 packed red blood cells transfusion. CT of the chest showedcirrhosis with possible regenerating nodules or diffuse malignancy, concern for enterocolitis or peritonitis, distended and thickened gallbladder. ID, general surgery recommending GI following. Patient was intubated and sent to the ICU. Palliative medicine consulted to discuss goal of care. ASSESSMENT/PLAN:     Pertinent Hospital Diagnoses      Acute blood loss anemia due to GI bleed   Chronic liver failure due to decompensated cirrhosis   Encephalopathy   Septic shock   UTI   Pneumonia   Thrombocytopenia    Palliative Care Encounter / Counseling Regarding Goals of Care  Please see detailed goals of care discussion as below   At this time, Diane May, Does Not have capacity for medical decision-making.   Capacity is time limited and situation/question specific   During encounter no one was surrogate medical decision-make     Outcome of goals of care meeting:  o Continue with current medical treatment  o Biological sister does not know patient's wishes or what she would have wanted  o CODE STATUS to remain a full code  o Step/adoptive mother and biological sister are going to have family discussion regarding patient goals of care.  code status Full Code   o I reviewed with the Kusum/Sister that given multiple medical co-morbidities and current severe acute illness, in the event of cardiac arrest, the chances of surviving may likely be low. It was also reviewed that if they survived a cardiac arrest, a return to prior level of function also may be low.  Advanced Directives: no POA or living will in epic   Surrogate/Legal NOK:  reema Gutiérrez (Sister) [phone]  o Chastity Bunn (adoptive parent) [phone]  o Abby Millard (biological parent) [phone]    Spiritual assessment: no spiritual distress identified  Bereavement and grief: to be determined  Referrals to: none today    Thank you for the opportunity to participate in the care of Paulina Walter. [facility]  Palliative Medicine     SUBJECTIVE:     Details of Conversation:      Chart was reviewed. Spoke with patient's sister Fidel Schaeffer over the phone. Introduced palliative medicine. Fidel Schaeffer stated the patient was adopted by her stepmother when she was a young child, while Fidel Schaeffer and her brother, now , stay with their biological mother mother. Patient's biological father is . Over the years the patient and the Step/AdoptiveMother has parted ways from patient's life, due to patient's lifestyle. Noted the patient's step/adoptive mother does not want to have the responsibility to make decisions for the patient, delegating the role to biological Sister Fidel Schaeffer. Fidel Schaeffer stated of not knowing patient's wishes or what she would have wanted in an acute event, or long-term goals. She wishes for her CODE STATUS to remain a full code. She expressed desire to give the patient  a chance as she still is young to overcome this difficulty. I am not sure of the legality to have biological sibling make decisions, on the patient who was legally adopted to a different family.   Going to investigate further, most likely we may need an Ethics committee. Patient was seen at the bedside no family members present, spoke with bedside nurse. Comfort support provided we will continue to follow. 1620:    Spoke with stepmother Katarzyna Guan over the phone. She reported having adopted the patient when she was 5years old, but  Report of not having a good relationship due to patient's way of living. She expressed willingness to participate in family discussions with patient sister regarding goal of care for the patient, but does not want to cause trouble, has they do not have a great relationship. Spoke with patient's sister Pavithra Diaz, updated her on the conversation that I have with patient's stepmother/adoptive mother, Pavithra Diaz agrees and will have stepmother involved in family discussion for patient's goal of care. Prognosis: Guarded    OBJECTIVE:     /63   Pulse 65   Temp 98.4 °F (36.9 °C) (Bladder)   Resp 16   Ht 5' 8\" (1.727 m)   Wt 244 lb 7.8 oz (110.9 kg)   SpO2 93%   BMI 37.17 kg/m²     Physical Examination:  Gen: Ill-appearing, sedated on vent support  HEENT: normocephalic, atraumatic, PERRL, EOMI,   Neck: trachea midline, no JVD  Lungs: On the vent, respirations easy and not labored,   Heart: regular rate and rhythm  Abdomen:  soft, non-tender  Extremities: Edema  Skin: warm, dry jaundice   neuro: Sedated on the vent    Objective data reviewed: labs, images, records, medication use, vitals and chart    Time/Communication  Greater than 50% of time spent, total 50  minutes in counseling and coordination of care at the bedside regarding goals of care. Thank you for allowing Palliative Medicine to participate in the care of Jailyn Hummel. Note: This report was completed using [facility] voiced recognition software. Every effort has been made to ensure accuracy; however, inadvertent computerized transcription errors may be present.

## 2022-03-21 NOTE — CONSULTS
Comprehensive Nutrition Assessment    Type and Reason for Visit:  Initial,Consult (TF order & management)    Nutrition Recommendations/Plan: Continue NPO,Start Tube Feeding     Start Standard with Fiber (Jevity 1.5) @ 45 ml/hr +1 pro mod daily. Will provide: 1080 ml tv, 1620 kcal, 69 g pro (1720 kcal, 95 g pro w/ pro mod), 821 ml free water  Regimen meets 96% estimated calorie & 100% estimated protein needs    Current Na 131, K+ 3.3, Phos 4.7    Patient should be considered at increased risk for metabolic complications R/T refeeding, characterized by drops in serum K, Mg, and Phos levels. These parameters should be closely monitored and be WNL prior to initiation or progression of feeding. Nutrition Assessment:  Pt intubated w/ septic shock 2/2 pyelonephritis/PNA/potential peritonitis. Note ARF, decompensation of liver cirrhosis. Hx Crohns/ETOH abuse. Will provide TF orders per consult & monitor. Malnutrition Assessment:  Malnutrition Status:   At risk for malnutrition (Comment)    Context:  Acute Illness     Findings of the 6 clinical characteristics of malnutrition:  Energy Intake:  Mild decrease in energy intake (Comment)  Weight Loss:  No significant weight loss     Body Fat Loss:  No significant body fat loss     Muscle Mass Loss:  No significant muscle mass loss    Fluid Accumulation:  No significant fluid accumulation     Strength:  Not Performed    Estimated Daily Nutrient Needs:  Energy (kcal):  ; Weight Used for Energy Requirements:  Current     Protein (g):   (1.5-1.8 as tolerated d/t cirrhosis, monitor LFTs); Weight Used for Protein Requirements:  Ideal        Fluid (ml/day):  per critical care; Method Used for Fluid Requirements:         Nutrition Related Findings:  intubated, +I&Os, general +1/BLE +1 edema, abd distended, +BS, ascites, Na 131, K+ 3.3, Phos 4.7, LFTs trending down, bili 17, NH3 143 (lactulose order noted), MAP 75, OGT LIS      Wounds:   (L thigh- blister) Current Nutrition Therapies:    Diet NPO Exceptions are: Sips of Water with Meds    Anthropometric Measures:  · Height: 5' 8\" (172.7 cm)  · Current Body Weight: 244 lb (110.7 kg) (3/21 bed)   · Admission Body Weight: 244 lb (110.7 kg) (3/21 bed)    · Usual Body Weight: 250 lb (113.4 kg) (10/2021 actual per EMR)     · Ideal Body Weight: 140 lbs; % Ideal Body Weight 174.3 %   · BMI: 37.1  · BMI Categories: Obese Class 2 (BMI 35.0 -39.9)       Nutrition Diagnosis:   · Inadequate oral intake related to impaired respiratory function as evidenced by NPO or clear liquid status due to medical condition,intubation    Nutrition Interventions:   Nutrition Education/Counseling:  Education not appropriate   Coordination of Nutrition Care:  Continue to monitor while inpatient    Goals: Tolerate TF at goal rate       Nutrition Monitoring and Evaluation:   Food/Nutrient Intake Outcomes:  Enteral Nutrition Intake/Tolerance  Physical Signs/Symptoms Outcomes:  Biochemical Data,GI Status,Fluid Status or Edema,Hemodynamic Status,Nutrition Focused Physical Findings,Skin,Weight     Discharge Planning:     Too soon to determine     Electronically signed by Jennyfer Castellanos RD, LD on 3/21/22 at 12:26 PM EDT  Contact: [phone]

## 2022-03-21 NOTE — CARE COORDINATION
3/21/2022 Transition of care: ICU, vent, 40% fio2, peep 5, sedation removed  Has been in/out of multiple rehabs in [place]. Pt lives alone in two story condo- poor conditions per family Cherylene Reins). Sister Blanca Arguelles to provide a ride at discharge. See lineage notes under acp- advance care planning entry. CM to follow unclear discharge plan.   Electronically signed by TOYA Munoz on 3/21/2022 at 11:34 AM

## 2022-03-21 NOTE — PLAN OF CARE
Problem: Non-Violent Restraints  Goal: No harm/injury to patient while restraints in use  3/21/2022 1828 by River Devine RN  Outcome: Met This Shift  Goal: Patient's dignity will be maintained  3/21/2022 1828 by River Devine RN  Outcome: Met This Shift      Problem: Non-Violent Restraints  Goal: Removal from restraints as soon as assessed to be safe  3/21/2022 1828 by River Devine RN  Outcome: Not Met This Shift     Problem: Falls - Risk of:  Goal: Will remain free from falls  Description: Will remain free from falls  3/21/2022 1828 by River Devine RN  Outcome: Met This Shift    Goal: Absence of physical injury  Description: Absence of physical injury  3/21/2022 1828 by River Devine RN  Outcome: Met This Shift

## 2022-03-21 NOTE — PROGRESS NOTES
Hepatology Progress Note      SUBJECTIVE:      Patient remains intubated with no meaningful mentation. Vasopressor therapy has been weaned off. She has had no bowel movements since admission and per nursing has been receiving q2hr lactulose via NG. OBJECTIVE    Physical    VITALS:  /64   Pulse 68   Temp 97.9 °F (36.6 °C) (Bladder)   Resp 12   Ht 5' 8\" (1.727 m)   Wt 244 lb 7.8 oz (110.9 kg)   SpO2 96%   BMI 37.17 kg/m²   Physical Exam:  General: Critically ill-appearing, NAD  HEENT: Scleral icterus, ET tube in place, NG tube in place  Cards: RRR, significant LE edema  Resp: Synchronous with vent, adequate air movement, no wheezing  Abdomen: Soft, mild distention  Extremities: Edematous, no bruising. Skin: No rashes. Jaundice  Neuro: Sedated, no clonus. Eyes open to physical touch       ASSESSMENT AND PLAN      46y/F w/ EtOH cirrhosis and IBD s/p TAC w/ IPAA who presents to the ED after being found unconscious at home with EtOH present. She is admitted with urosepsis, bacteremia, and acute alcoholic hepatitis on alcoholic cirrhosis.      MELD on admission: 40  MELD today: 40  DF on admission: 227     DANIEL-C Score:  30 Day Mortality: 45%  90 Day Mortality: 65%     PLAN:  1. Acute on Chronic Liver Failure secondary to likely continued EtOH use and infection  -Please monitor CBC, CMP, and INR daily.  -Albumin 25g q 6hrs  -Not a liver transplant candidate given active infection and continued EtOH use  -On stress dose steroids which can also help to manage AAH.     2. Hepatic Encephalopathy:  -Present on admission and reason for intubation  -Continue Rifaximin 550mg BID  -Not having bowel movements w/ q2hr lactulose via NG tube. -Dilated bowel loops on admission, possible ileus.   -Would transition to q2-3hr lactulose enemas through FCS. Discussed with nursing.   -Would hold all sedation until showing meaningful mental status.      3.  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   -Please transfuse to Hgb >7  -Would favor only checking Hgb daily unless signs of active bleeding. -INR noted to be 5. Will give 10mg Vit K IV x 3 days.  -Keep fibrinogen >220 w/ Cryo. Monitor daily.   -Keep Plts >20. If signs/suggestion of high-volume bleeding, transfuse to Plts>50.   -Will allow for stabilization before pursuing endoscopic evaluation and further treatment of likely nodular GAVE w/ bleeding.  -If not responding to blood transfusions or showing ai blood loss per rectum or mouth, will pursue more urgently.      4. SEVEN:  -Albumin 25g q 6hrs  -Avoid nephrotoxic agents  -Cr is worsening. Renal now following.  -Would begin treatment for HRS. Continue albumin, octreotide. Vasopressors or midodrine as needed to keep MAP>65 for renal perfusion. 5. Possible Ileus:  -Please obtain KUB to assess  -NG to LIS. -Lactulose WI as above.      6. Ascites:  -Please obtain diagnostic paracentesis. Send fluid for Cell Count w/ Diff and Culture  -Hold on performing large volume paracentesis until Cr normalizes. -Hold diuretics until SEVEN improves.     7. GNR bacteremia:  -Antibiotics per ID and CCM  -Blood Cultures w/ GNRs     8. Hepatic Lesions on Imaging:  -Concern for metastatic disease vs HCC  -When Cr improves, will require CTA Triphasic Liver   -AFP sent  -CEA normal.     I will follow.     Thank you for including us in the care of this patient.  Please do not hesitate to contact us with any additional questions or concerns.     Halima Hale MD  Gastroenterology/Hepatology  [facility]

## 2022-03-21 NOTE — DISCHARGE INSTR - COC
Continuity of Care Form    Patient Name: Clara Carranza   :  1975  MRN:  [id number]    Admit date:  3/19/2022  Discharge date:  ***    Code Status Order: Full Code   Advance Directives:      Admitting Physician:  Maude Carlisle DO  PCP: Minda Gallegos MD    Discharging Nurse: [facility] Unit/Room#: IC08/IC08-01  Discharging Unit Phone Number: ***    Emergency Contact:   Extended Emergency Contact Information  Primary Emergency Contact: Alonso Peoples  Mobile Phone: [phone]  Relation: Parent  Preferred language: English   needed? No  Secondary Emergency Contact: Carmen Savage  Mobile Phone: [phone]  Relation: Brother/Sister   needed?  No    Past Surgical History:  Past Surgical History:   Procedure Laterality Date    ABDOMEN SURGERY      pt has a J-pouch     ARM DEBRIDEMENT      [id number]    ARM DEBRIDEMENT      COLONOSCOPY      DILATATION, ESOPHAGUS      ENDOSCOPY, COLON, DIAGNOSTIC      SIGMOIDOSCOPY N/A 2021    SIGMOIDOSCOPY BIOPSY FLEXIBLE performed by Davion Puente MD at [address] [facility] ENDOSCOPY    TRANSESOPHAGEAL ECHOCARDIOGRAM N/A 9/3/2021    TRANSESOPHAGEAL ECHOCARDIOGRAM WITH BUBBLE STUDY performed by Pooja Nuñez MD at [address] [facility] N/A 2021    EGD ESOPHAGOGASTRODUODENOSCOPY performed by Blank You MD at [address] [facility] 2021    EGD CONTROL HEMORRHAGE performed by Davion Puente MD at [address] [facility] N/A 2021    EGD BIOPSY performed by Davion Puente MD at [facility] History:   Immunization History   Administered Date(s) Administered    COVID-19, Pfizer Purple top, DILUTE for use, 12+ yrs, 30mcg/0.3mL dose 2021, 2021    Influenza, Quadv, IM, PF (6 mo and older Fluzone, Flulaval, Fluarix, and 3 yrs and older Afluria) 10/28/2021       Active Problems:  Patient Active Problem List   Diagnosis Code Small bowel obstruction ([facility] K56.609    Hernia of abdominal wall K43.9    Elevated liver enzymes R74.8    Suicidal ideations R45.851    Alcohol abuse F10.10    Anxiety disorder F41.9    Petechial rash R23.3    Acute renal failure (ARF) (HCC) N17.9    Alcohol abuse with withdrawal ([facility] F10.139    Decompensated hepatic cirrhosis ([facility] K72.90, K74.60    Perirectal abscess K61.1    Acute liver failure without hepatic coma S40.34    Alcoholic hepatitis W71.36    Hepatic encephalopathy (HCC) K72.90    Bacteremia due to vancomycin resistant Enterococcus R78.81, B95.2, Z16.21    Decompensation of cirrhosis of liver (HCC) K72.90, K74.60       Isolation/Infection:   Isolation            No Isolation          Patient Infection Status       Infection Onset Added Last Indicated Last Indicated By Review Planned Expiration Resolved Resolved By    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, VRE        Enterococcus faecium stool 12/3/21  Enterococcus  casseliflavus blood 21    Resolved    COVID-19 (Rule Out) 22 Respiratory Panel, Molecular, with COVID-19 (Restricted: peds pts or suitable admitted adults) (Ordered)   22 Rule-Out Test Resulted    COVID-19 (Rule Out) 21 COVID-19, Rapid (Ordered)   21 Rule-Out Test Resulted    COVID-19 (Rule Out) 21 COVID-19 (Ordered)   21 Rule-Out Test Resulted            Nurse Assessment:  Last Vital Signs: /63   Pulse 65   Temp 98.4 °F (36.9 °C) (Bladder)   Resp 16   Ht 5' 8\" (1.727 m)   Wt 244 lb 7.8 oz (110.9 kg)   SpO2 93%   BMI 37.17 kg/m²     Last documented pain score (0-10 scale): Pain Level: 0  Last Weight:   Wt Readings from Last 1 Encounters:   22 244 lb 7.8 oz (110.9 kg)     Mental Status:  {IP PT MENTAL STATUS:}    IV Access:  {[facility] IV ACCESS:061638653}    Nursing Mobility/ADLs:  Walking   {GWENDOLYN KAISERGJ}  Transfer  {GWENDOLYN RUSSO DPPH:942331596}  Bathing  {GWENDOLYN URSSO TQQC:764807971}  Dressing  {CHP DME TEPU:212406485}  Toileting  {CHP DME XNIH:901102605}  Feeding  {CHP DME ZPMX:384623741}  Med Admin  {CHP DME PJDJ:134301198}  Med Delivery   { XIOMARA MED Delivery:162812211}    Wound Care Documentation and Therapy:        Elimination:  Continence: Bowel: {YES / KP:49600}  Bladder: {YES / D}  Urinary Catheter: {Urinary Catheter:579609733}   Colostomy/Ileostomy/Ileal Conduit: {YES / GT:59945}  Fecal Management System-Stool Appearance: Watery  Fecal Management System-Stool Color: Brown  Fecal Management System-Stool Amount: Large    Date of Last BM: ***    Intake/Output Summary (Last 24 hours) at 3/21/2022 1541  Last data filed at 3/21/2022 1458  Gross per 24 hour   Intake 3990.65 ml   Output 2480 ml   Net 1510.65 ml     I/O last 3 completed shifts:   In: 9061.8 [I.V.:5159.6; Blood:1775.3; NG/GT:400; IV Piggyback:1726.9]  Out: 1880 [Urine:780; Emesis/NG output:1100]    Safety Concerns:     [address] [employer] Safety Concerns:659356613}    Impairments/Disabilities:      [address] [employer] Impairments/Disabilities:106997136}    Nutrition Therapy:  Current Nutrition Therapy:   [address] [employer] Diet List:844757032}    Routes of Feeding: {CHP DME Other Feedings:515805045}  Liquids: {Slp liquid thickness:15558}  Daily Fluid Restriction: {CHP DME Yes amt example:849671368}  Last Modified Barium Swallow with Video (Video Swallowing Test): {Done Not Done MMPK:908572486}    Treatments at the Time of Hospital Discharge:   Respiratory Treatments: ***  Oxygen Therapy:  {Therapy; copd oxygen:11097}  Ventilator:    { CC Vent TJGD:024371588}    Rehab Therapies: {THERAPEUTIC INTERVENTION:3374320215}  Weight Bearing Status/Restrictions: [address] [employer]  Weight Bearin}  Other Medical Equipment (for information only, NOT a DME order):  {EQUIPMENT:262625911}  Other Treatments: ***    Patient's personal belongings (please select all that are sent with patient):  {LOVE DME Belongings:160166979}    RN SIGNATURE: {Esignature:720862189}    CASE MANAGEMENT/SOCIAL WORK SECTION    Inpatient Status Date: ***    Readmission Risk Assessment Score:  Readmission Risk              Risk of Unplanned Readmission:  38           Discharging to Facility/ Agency   Name:   Address:  Phone:  Fax:    Dialysis Facility (if applicable)   Name:  Address:  Dialysis Schedule:  Phone:  Fax:    / signature: {Esignature:270878175}    PHYSICIAN SECTION    Prognosis: {Prognosis:7742163163}    Condition at Discharge: [address] [place] [address] Patient Condition:636522775}    Rehab Potential (if transferring to Rehab): {Prognosis:3440180530}    Recommended Labs or Other Treatments After Discharge: ***    Physician Certification: I certify the above information and transfer of Dane Mendes  is necessary for the continuing treatment of the diagnosis listed and that she requires {Admit to Appropriate Level of Care:16215} for {GREATER/LESS:110751059} 30 days.      Update Admission H&P: {CHP DME Changes in IKYQO:032913679}    PHYSICIAN SIGNATURE:  {Esignature:744331512}

## 2022-03-21 NOTE — PROGRESS NOTES
Assessment and Plan  Patient is a 51 y.o. female with the following medical Problems:   1. hepatic encephalopathy  2. Acute hypoxic respiratory failure requiring intubation  3. Rule out spontaneous bacterial peritonitis  4. Acute kidney injury with suspected hepatorenal syndrome  5. Left thigh hematoma  6. Liver cirrhosis with ascites  7. Anemia and thrombocytopenia  8. Suspected upper GI bleed  9. Chronic alcohol abuse  10. Inflammatory bowel disease  11. Crohn's disease  12. Anxiety  13. Depression  14   History of pyoderma gangrenosum of vasculitis  15. 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 Urinary tract infection  17. Gram-positive bacteremia and gram-negative bacteremia  18. Hyperkalemia  19. Lactic acidosis secondary to liver failure    Plan of care:  1. ID team is managing antibiotics  2. Keep patient off sedation and discontinue fentanyl  3. Continue with lactulose and rifaximin  4. Continue to trend lactate  5. We will order a CT scan of the left thigh to evaluate for the subcutaneous hematoma  6. Paracentesis with fluid analysis to rule out SBP  7. Alpha-fetoprotein to rule out hepatocellular carcinoma  8. Continue to monitor stool output exactly 58  9. Overall pulmonary disease extremely poor. Palliative care is consulted  10. CK level lactic acid  11. Monitor urine output  12. Glucose check every 4 hour  13. Continue vitamin K for 3 days  14. Continue with octreotide and add midodrine for hepatorenal syndrome. 15.  Continue with intubation and mechanical ventilation. 16.  Thiamine and folic acid. Daily progress:  March 21, 2022: Patient remains lethargic and encephalopathy off sedation. She has been off Levophed however her blood pressure is borderline low. Her urine output has been on the lower side. Patient was started on IV fluid today. Blood cultures are positive for gram-positive cocci and gram-negative rods.   Paracentesis was ordered and a CT scan of left thigh was ordered to rule out significant subcutaneous hematoma. Palliative care was consulted since patient's overall prognosis is extremely poor. Past Medical History:  Past Medical History:   Diagnosis Date    Acute renal failure (ARF) ([facility] 05/01/8586    Alcoholic cirrhosis (HCC)     Anxiety     Crohn's colitis ([facility]     Depression     History of blood transfusion     Pyoderma gangrenosum     Thyroid disease         Family History:   Family History   Problem Relation Age of Onset    Cancer Mother         breast     Emphysema Mother     Lupus Mother     Heart Disease Father     High Cholesterol Father     High Blood Pressure Sister     No Known Problems Son        Allergies:         Patient has no known allergies. Social history:  Social History     Socioeconomic History    Marital status:      Spouse name: Not on file    Number of children: 1    Years of education: Not on file    Highest education level: Not on file   Occupational History    Not on file   Tobacco Use    Smoking status: Never Smoker    Smokeless tobacco: Never Used   Vaping Use    Vaping Use: Never used   Substance and Sexual Activity    Alcohol use: Not Currently     Comment: stopped but drink 3 weeks ago    Drug use: Not Currently    Sexual activity: Not on file   Other Topics Concern    Not on file   Social History Narrative    Not on file     Social Determinants of Health     Financial Resource Strain: Low Risk     Difficulty of Paying Living Expenses: Not hard at all   Food Insecurity: No Food Insecurity    Worried About Running Out of Food in the Last Year: Never true    Yumiko of Food in the Last Year: Never true   Transportation Needs: No Transportation Needs    Lack of Transportation (Medical): No    Lack of Transportation (Non-Medical):  No   Physical Activity: Unknown    Days of Exercise per Week: 0 days    Minutes of Exercise per Session: Not on file   Stress: No Stress PRN **OR** dextrose bolus (hypoglycemia) 10% 250 mL, 250 mL, IntraVENous, PRN, Nichole Warner DO    octreotide (SANDOSTATIN) 500 mcg in sodium chloride 0.9 % 100 mL infusion, 50 mcg/hr, IntraVENous, Continuous, Nichole Warner DO, Last Rate: 10 mL/hr at 03/21/22 0826, 50 mcg/hr at 03/21/22 0826    0.9 % sodium chloride infusion, , IntraVENous, Once, Nichole Warner DO    thiamine (B-1) 500 mg in sodium chloride 0.9 % 100 mL IVPB, 500 mg, IntraVENous, Q8H, Stopped at 03/21/22 0715 **FOLLOWED BY** [START ON 3/22/2022] thiamine (B-1) 100 mg in sodium chloride 0.9 % 100 mL IVPB, 100 mg, IntraVENous, Q24H, Ismail U DO Ton    propofol injection, 5-50 mcg/kg/min, IntraVENous, Continuous, Ismail U DO Ton, Stopped at 03/19/22 1551    chlorhexidine (PERIDEX) 0.12 % solution 15 mL, 15 mL, Mouth/Throat, BID, Ismail U DO Ton, 15 mL at 03/21/22 0754    rifaximin (XIFAXAN) tablet 550 mg, 550 mg, Oral, TID, Ismail U DO Ton, 550 mg at 03/21/22 0850    fentaNYL 5 mcg/ml in 0.9%  ml infusion,  mcg/hr, IntraVENous, Continuous, Nichole Warner DO, Stopped at 03/21/22 0719    doxycycline (VIBRAMYCIN) 100 mg in dextrose 5 % 100 mL IVPB, 100 mg, IntraVENous, Q12H, Nichole Warner DO, Stopped at 03/21/22 0510    phytonadione (ADULT) (VITAMIN K) 10 mg in dextrose 5 % 100 mL IVPB, 10 mg, IntraVENous, Daily, Nichole Warner DO, Stopped at 03/20/22 0848    hydrocortisone sodium succinate PF (SOLU-CORTEF) injection 50 mg, 50 mg, IntraVENous, Q6H, Ismail U DO Ton, 50 mg at 03/21/22 0408    pantoprazole (PROTONIX) 40 mg in sodium chloride (PF) 10 mL injection, 40 mg, IntraVENous, Q12H, Ismail U DO Ton, 40 mg at 03/21/22 0408    norepinephrine (LEVOPHED) 16 mg in sodium chloride 0.9 % 250 mL infusion (non-weight-based), 1-100 mcg/min, IntraVENous, Continuous, Ismail U DO Ton, Stopped at 03/20/22 1900    linezolid (ZYVOX) IVPB 600 mg, 600 mg, IntraVENous, Q12H, Achilles Skiff, MD, Stopped at 03/21/22 0627    piperacillin-tazobactam (ZOSYN) 3,375 mg in dextrose 5 % 50 mL IVPB extended infusion (mini-bag), 3,375 mg, IntraVENous, Q8H, Wolfgang Caceres MD, Stopped at 03/21/22 0654    Review of Systems:   Unable to obtain due to medical condition    Physical Exam:   Vital Signs:  /61   Pulse 67   Temp 98.4 °F (36.9 °C) (Bladder)   Resp 15   Ht 5' 8\" (1.727 m)   Wt 244 lb 7.8 oz (110.9 kg)   SpO2 96%   BMI 37.17 kg/m²     Input/Output: In: 6607.7 [I.V.:3510.9; Blood:1322; NG/GT:400]  Out: 1150     Oxygen requirements: MV    Ventilator Information:  Vent Information  $Ventilation: $Subsequent Day  Skin Assessment: Clean, dry, & intact  Vent Type: 980  Vent Mode: AC/VC  Vt Ordered: 380 mL  Rate Set: 14 bmp  Peak Flow: 55 L/min  Pressure Support: 0 cmH20  FiO2 : 40 %  SpO2: 96 %  SpO2/FiO2 ratio: 240  Sensitivity: 3  PEEP/CPAP: 5  I Time/ I Time %: 0 s  Humidification Source: Heated wire  Humidification Temp: 37  Humidification Temp Measured: 36.8  Circuit Condensation: Drained    General appearance: Jaundiced, not in pain or distress, in no respiratory distress    HEENT: Intubated  Neck: Supple, no jugular venous distension, lymphadenopathy, thyromegaly or carotid bruits  Chest: Equal normal breath sounds, no wheezing, no crackles and no tenderness over ribs   Cardiovascular: Normal S1 , S2, regular rate and rhythm, no murmur, rub or gallop  Abdomen: Normal sounds present, soft, lax with no tenderness, no hepatosplenomegaly, and no masses  Extremities: No edema. Pulses are equally present. Skin: intact, no rashes   Neurologic: Extremely lethargic, jaundiced, does not follow commands off sedation, opens eyes.      Investigations:  Labs, radiological imaging and cardiac work up were reviewed        ICU STAFF PHYSICIAN NOTE OF PERSONAL INVOLVEMENT IN CARE  As the attending physician, I certify that I personally reviewed the patient's history and personally examined the patient to confirm the physical findings described above, and that I reviewed the relevant imaging studies and available reports. I also discussed the differential diagnosis and all of the proposed management plans with the patient and individuals accompanying the patient to this visit. They had the opportunity to ask questions about the proposed management plans and to have those questions answered. This patient has a high probability of sudden, clinically significant deterioration, which requires the highest level of physician preparedness to intervene urgently. I managed/supervised life or organ supporting interventions that required frequent physician assessment. I devoted my full attention to the direct care of this patient for the amount of time indicated below. Time I spent with family or surrogate(s) is included only if the patient was incapable of providing the necessary information or participating in medical decision-making. Time devoted to teaching and to any procedures I billed separately is not included.   Critical Care Time: 35 minutes      Electronically signed by Nomi Rondon MD on 3/21/2022 at 10:09 AM

## 2022-03-21 NOTE — PROGRESS NOTES
GENERAL SURGERY  DAILY PROGRESS NOTE  3/21/2022    Chief Complaint   Patient presents with    Altered Mental Status     unresponsive, jaundiced, liver disease, lkw 36 hours ago, found on couch in urine and feces       Subjective: Intubated and sedated.  Does not appear to have RUQ tenderness, MELD 40    Objective:  /62   Pulse 66   Temp 98.4 °F (36.9 °C) (Bladder)   Resp 16   Ht 5' 8\" (1.727 m)   Wt 244 lb 7.8 oz (110.9 kg)   SpO2 97%   BMI 37.17 kg/m²     GENERAL:  laying in bed, intubated   LUNGS:  intuabted  CARDIOVASCULAR:  RR  ABDOMEN:  Soft, non-tender, mildlydistended  EXTREMITIES: No significant edema or swelling      Assessment/Plan:  51 y.o. female acute on chronic liver failure, encephalopathy and bacteremia, concern for GB pathology     Pt s/e  MELD 40 which carriers a 60-65% mortality with in the next 90 days   No a surgical candidate and without RUQ does not apprea to be GB in nature  Abx per primary   Liver failure per GI       Electronically signed by Alma Rosa Joe DO on 3/21/2022 at 8:59 AM

## 2022-03-22 NOTE — PLAN OF CARE
Problem: Non-Violent Restraints  Goal: Removal from restraints as soon as assessed to be safe  3/22/2022 1558 by Delon Garcia RN  Outcome: Completed     Problem: Non-Violent Restraints  Goal: No harm/injury to patient while restraints in use  3/22/2022 1558 by Delon Garcia RN  Outcome: Completed     Problem: Non-Violent Restraints  Goal: Patient's dignity will be maintained  3/22/2022 1558 by Delon Garcia RN  Outcome: Completed

## 2022-03-22 NOTE — PROGRESS NOTES
Internal Medicine Progress Note    [facility]    Yajaira Mata. Quan Palomino., SAVAGEOReinaldoI. Scout Rico D.O., SAVAGEOHERIBERTO Barraza D.O. Mike Cooney, MSN, APRN, NP-C  Kandy Boone. Dm Ramirez, MSN, APRN-CNP     Primary Care Physician: Radha Foster MD   Admitting Physician:  Gilles Virk DO  Admission date and time: 3/19/2022 12:40 PM    Room:  [place] [address]  Admitting diagnosis: Metabolic encephalopathy [F61.25]  Lactic acidosis [E87.2]  Acute blood loss anemia [D62]  Hyperammonemia ([facility] [address] [E72.20]  Hypoglycemia [E16.2]  Elevated INR [R79.1]  Elevated troponin [R77.8]  History of alcohol abuse [F10.11]  Hypothermia, initial encounter [T68. XXXA]  Urinary tract infection with hematuria, site unspecified [N39.0, R31.9]  Gastrointestinal hemorrhage, unspecified gastrointestinal hemorrhage type [K92.2]  Decompensation of cirrhosis of liver ([facility] [address] [K72.90, K74.60]    Patient Name: [employer]  MRN: [id number]    Date of Service: 3/22/2022     Subjective:  Linus Wen is a [address] y.o. female who was seen and examined today,3/22/2022, at the bedside. Gali's condition continues to decline. She has not been on sedation for greater than 24 hours and remains markedly obtunded with persistent hyperammonemia. She is having adequate bowel movements in the setting of lactulose administration. She remains mechanically ventilated. Hematologic profile is worsening with persistent bleeding. The social situation is complex making CODE STATUS decisions very difficult. Review of System:  Unable to be obtained in the patient's current condition    Physical Exam:  I/O this shift:  In: 678.8 [IV Piggyback:678.8]  Out: -     Intake/Output Summary (Last 24 hours) at 3/22/2022 1209  Last data filed at 3/22/2022 0800  Gross per 24 hour   Intake 2505.91 ml   Output 4500 ml   Net -1994.09 ml   I/O last 3 completed shifts: In: 4735.8 [I.V.:1457.4;  Blood:643; NG/GT:518; IV Piggyback:2117.4]  Out: 1767 [Urine:1350; Emesis/NG output:700; MEEOT:7430]  Patient Vitals for the past 96 hrs (Last 3 readings):   Weight   03/22/22 0600 230 lb 6.1 oz (104.5 kg)   03/22/22 0419 230 lb 6.1 oz (104.5 kg)   03/21/22 0400 244 lb 7.8 oz (110.9 kg)     Vital Signs:   Blood pressure 135/77, pulse 74, temperature 97.2 °F (36.2 °C), temperature source Bladder, resp. rate 16, height 5' 8\" (1.727 m), weight 230 lb 6.1 oz (104.5 kg), SpO2 93 %. General appearance:  Mechanically ventilated. No current sedation. Entirely obtunded. Head:  Normocephalic. No masses, lesions or tenderness. Eyes:  PERRLA. EOMI. Sclera icteric. .  ENT:  Endotracheal tube is in place. Neck:    Supple. Trachea midline. No thyromegaly. No JVD. No bruits. Heart:    Rhythm regular. Rate controlled. No murmurs. Lungs:    Diminished bilaterally with mechanical breath sounds heard throughout. Abdomen:   Persistent abdominal ascites with soft abdominal examination. Bowel sounds are hypoactive. Extremities:    Peripheral pulses present. No peripheral edema. No ulcers. No cyanosis. No clubbing. Neurologic:    Completely obtunded. Musculoskeletal:   Spine ROM normal. Muscular strength intact. Gait not assessed. Integumentary:  Diffusely jaundiced. Bleeding from the triple-lumen catheter  Genitalia/Breast:  Cannon voiding with use of a Cannon catheter.     Medication:  Scheduled Meds:   potassium bicarb-citric acid  20 mEq Oral Once    daptomycin (CUBICIN) IVPB  6 mg/kg (Adjusted) IntraVENous Q24H    [Held by provider] lactulose  20 g Oral Q8H    sodium chloride flush  5-40 mL IntraVENous 2 times per day    folic acid  1 mg Oral Daily    midodrine  15 mg Oral Q8H    sodium chloride flush  5-40 mL IntraVENous 2 times per day    albumin human  25 g IntraVENous Q6H    thiamine (VITAMIN B1) IVPB  100 mg IntraVENous Q24H    chlorhexidine  15 mL Mouth/Throat BID    rifaximin  550 mg Oral TID    doxycycline (VIBRAMYCIN) IV  100 mg IntraVENous Q12H  hydrocortisone sodium succinate PF  50 mg IntraVENous Q6H    pantoprazole (PROTONIX) 40 mg injection  40 mg IntraVENous Q12H    piperacillin-tazobactam  3,375 mg IntraVENous Q8H     Continuous Infusions:   sodium chloride      sodium chloride 12.5 mL/hr at 03/22/22 0116    lactated ringers 100 mL/hr at 03/22/22 0825    dextrose      sodium chloride Stopped (03/21/22 0528)    octreotide (SandoSTATIN) infusion 50 mcg/hr (03/22/22 0800)    sodium chloride      propofol Stopped (03/19/22 1551)    norepinephrine-sodium chloride Stopped (03/20/22 1900)       Objective Data:  CBC with Differential:    Lab Results   Component Value Date    WBC 6.1 03/22/2022    RBC 2.31 03/22/2022    HGB 7.2 03/22/2022    HCT 21.8 03/22/2022    PLT 18 03/22/2022    MCV 94.4 03/22/2022    MCH 31.2 03/22/2022    MCHC 33.0 03/22/2022    RDW 19.3 03/22/2022    SEGSPCT 59 08/11/2011    METASPCT 3 07/05/2011    LYMPHOPCT 3.5 03/22/2022    MONOPCT 4.3 03/22/2022    MYELOPCT 1.0 03/20/2022    BASOPCT 0.2 03/22/2022    MONOSABS 0.24 03/22/2022    LYMPHSABS 0.24 03/22/2022    EOSABS 0.00 03/22/2022    BASOSABS 0.00 03/22/2022     CMP:    Lab Results   Component Value Date     03/22/2022    K 3.2 03/22/2022    K 4.0 03/19/2022    CL 96 03/22/2022    CO2 21 03/22/2022    BUN 28 03/22/2022    CREATININE 2.7 03/22/2022    GFRAA 23 03/22/2022    LABGLOM 19 03/22/2022    GLUCOSE 198 03/22/2022    GLUCOSE 125 08/11/2011    PROT 6.2 03/22/2022    LABALBU 3.5 03/22/2022    LABALBU 3.6 08/11/2011    CALCIUM 8.6 03/22/2022    BILITOT 17.4 03/22/2022    ALKPHOS 64 03/22/2022    AST 98 03/22/2022    ALT 43 03/22/2022       Assessment:  1. Septic shock with multiple sources including pyelonephritis, community-acquired pneumonia and potential peritonitis  2. Acute on chronic blood loss anemia in the setting of known varices and peptic ulcer disease  3. Hepatic encephalopathy in the setting of decompensated alcoholic cirrhosis  4.  Acute renal failure that is multifactorial nature  5. Hypothyroidism  6. History of Crohn's colitis  7. Depression and anxiety      Plan: This remains a very critical situation. Multiple subspecialists are providing care in this complex situation. The patient remains mechanically ventilated and markedly obtunded despite no sedation. She remains hyperammonemic. Lactulose is being administered and fecal management system is in place. Hematologic profile is worsening and she exhibits extensive bleeding. Packed red blood cell transfusion and platelet transfusion is being administered. Her condition is critical and her prognosis is poor. The patient's social situation complicates CODE STATUS discussion. Greater than 40 minutes of critical care time was spent with the patient. This includes chart review, , and discussion with those consultants involved in the patient's care. More than 50% of my  time was spent at the bedside counseling/coordinating care with the patient and/or family with face to face contact. This time was spent reviewing notes and laboratory data as well as instructing and counseling the patient. Time I spent with the family or surrogate(s) is included only if the patient was incapable of providing the necessary information or participating in medical decisions. I also discussed the differential diagnosis and all of the proposed management plans with the patient and individuals accompanying the patient. Josie Apple requires this high level of physician care and nursing on the ICU unit due the complexity of decision management and chance of rapid decline or death. Continued cardiac monitoring and higher level of nursing are required. I am readily available for any further decision-making and intervention.      Debi Alston DO, SONIDOA.C.O.I.  3/22/2022  12:09 PM

## 2022-03-22 NOTE — PROGRESS NOTES
NEPHROLOGY Attending   Progress Note  3/22/2022 2:20 PM  Subjective:   Admit Date: 3/19/2022  PCP: Segundo Santiago MD    Interval History:     3/22/2022: Patient examined in the ICU setting -patient remains lethargic despite weaning off sedation -blood pressure remains borderline low off pressors -blood cultures now positive -remains ventilator dependent         Diet: Diet NPO Exceptions are: Sips of Water with Meds  ADULT TUBE FEEDING; Orogastric; Standard with Fiber; Continuous; 15; Yes; 15; Q 8 hours; 45; 30; Q 4 hours; Protein; 1 protein modular (Proteinex) daily    Data:   Scheduled Meds:   daptomycin (CUBICIN) IVPB  6 mg/kg (Adjusted) IntraVENous Q24H    [Held by provider] lactulose  20 g Oral Q8H    sodium chloride flush  5-40 mL IntraVENous 2 times per day    folic acid  1 mg Oral Daily    midodrine  15 mg Oral Q8H    sodium chloride flush  5-40 mL IntraVENous 2 times per day    albumin human  25 g IntraVENous Q6H    thiamine (VITAMIN B1) IVPB  100 mg IntraVENous Q24H    chlorhexidine  15 mL Mouth/Throat BID    rifaximin  550 mg Oral TID    doxycycline (VIBRAMYCIN) IV  100 mg IntraVENous Q12H    hydrocortisone sodium succinate PF  50 mg IntraVENous Q6H    pantoprazole (PROTONIX) 40 mg injection  40 mg IntraVENous Q12H    piperacillin-tazobactam  3,375 mg IntraVENous Q8H     Continuous Infusions:   sodium chloride      sodium chloride 12.5 mL/hr at 03/22/22 0116    lactated ringers 100 mL/hr at 03/22/22 0825    dextrose      sodium chloride Stopped (03/21/22 0528)    octreotide (SandoSTATIN) infusion 50 mcg/hr (03/22/22 0800)    sodium chloride      propofol Stopped (03/19/22 1551)    norepinephrine-sodium chloride Stopped (03/20/22 1900)     PRN Meds:sodium chloride, sodium chloride flush, sodium chloride, glucose, glucagon (rDNA), dextrose, sodium chloride flush, sodium chloride, polyethylene glycol, acetaminophen **OR** acetaminophen, dextrose bolus (hypoglycemia) **OR** dextrose bolus (hypoglycemia)    Intake/Output Summary (Last 24 hours) at 3/22/2022 1420  Last data filed at 3/22/2022 1412  Gross per 24 hour   Intake 2613.08 ml   Output 4500 ml   Net -1886.92 ml     CBC:   Recent Labs     03/20/22  2240 03/21/22  0508 03/22/22  0521   WBC 6.2 5.9 6.1   HGB 7.0* 7.0* 7.2*   PLT 22* 25* 18*     BMP:    Recent Labs     03/21/22  0508 03/21/22  1030 03/22/22  0521   * 132 131*   K 3.3* 3.3* 3.2*   CL 94* 96* 96*   CO2 22 20* 21*   BUN 20 22* 28*   CREATININE 2.7* 2.8* 2.7*   GLUCOSE 169* 171* 198*     Hepatic:   Recent Labs     03/20/22  0658 03/21/22  0508 03/22/22  0521   * 179* 98*   ALT 79* 54* 43*   BILITOT 17.2* 17.0* 17.4*   ALKPHOS 112* 79 64     Troponin: No results for input(s): TROPONINI in the last 72 hours. BNP: No results for input(s): BNP in the last 72 hours. Lipids: No results for input(s): CHOL, HDL in the last 72 hours. Invalid input(s): LDLCALCU  ABGs: No results found for: PHART, PO2ART, [id number]  INR:   Recent Labs     03/20/22  0658 03/21/22  0508 03/22/22  0521   INR 3.6 3.9 5.3*     -----------------------------------------------------------------  RAD: CT ABDOMEN PELVIS WO CONTRAST Additional Contrast? None    Result Date: 3/19/2022  EXAMINATION: CT OF THE CHEST WITHOUT CONTRAST; CT OF THE ABDOMEN AND PELVIS WITHOUT CONTRAST 3/19/2022 1:34 pm TECHNIQUE: CT of the chest was performed without the administration of intravenous contrast. Multiplanar reformatted images are provided for review. Dose modulation, iterative reconstruction, and/or weight based adjustment of the mA/kV was utilized to reduce the radiation dose to as low as reasonably achievable.; CT of the abdomen and pelvis was performed without the administration of intravenous contrast. Multiplanar reformatted images are provided for review.  Dose modulation, iterative reconstruction, and/or weight based adjustment of the mA/kV was utilized to reduce the radiation dose to as low as reasonably achievable. COMPARISON: 11/25/2021, and CT abdomen pelvis 11/26/2021 HISTORY: ORDERING SYSTEM PROVIDED HISTORY: AMS, effusion, pna? TECHNOLOGIST PROVIDED HISTORY: Reason for exam:->AMS, effusion, pna? Decision Support Exception - unselect if not a suspected or confirmed emergency medical condition->Emergency Medical Condition (MA) FINDINGS: The examination is very limited due to lack of contrast. CT chest. Heart size is in the upper limits of normal.  The great vessels are normal. Endotracheal tube and nasogastric tube are noted. There is atelectasis/infiltrates in the lung bases concerning for pneumonia. There is thickening of the GE junction likely hiatal hernia with paraesophageal varices. CT abdomen pelvis. There is hepatosplenomegaly with liver measuring 21 cm with diffuse lesions in the left and right hepatic lobe measuring up to 2.8 x 3.7 cm. These may represent hepatic cirrhosis with regenerating nodules or diffuse multifocal malignancy. There is splenomegaly with spleen measuring 18 cm with suggestion of portal venous hypertension, and venous varices. Gallbladder is distended with Ca cholecystic edema and wall thickening. Consider ultrasonography. Pancreas, the adrenals and the kidneys are grossly normal. There is suggestion of recanalization of umbilical vein. Umbilical hernia is identified with herniation of bowel loops. Pelvis. Postoperative changes are identified in the colon with the poor delineation of the bowel anatomy. There is diffusely thickened bowel loops which may include small bowel loops and colon with edema in the mesentery and between bowel loops. Ascites is present in the pelvis. Bladder is collapsed with indwelling Cannon catheter and wall thickening. Ascites fluid is present in the pelvis. There is anasarca. Significantly limited study due to lack of contrast. Atelectasis/infiltrates in the lung bases concerning for pneumonia.  Hepatosplenomegaly with heterogeneous ill-defined hepatic lesions as noted concerning for cirrhosis with possible regenerating nodules or diffuse malignancy. Further assessment by CT with contrast or MRI may be considered. There is portal venous hypertension with venous varices as noted and moderate amount of high density ascites. Diffuse edema in the mesentery with dilated thickened bowel loops concerning for enterocolitis. Developing peritonitis cannot be excluded. There is anasarca. Distended and thickened gallbladder. Consider ultrasonography to evaluate cholecystitis. CT CHEST WO CONTRAST    Result Date: 3/19/2022  EXAMINATION: CT OF THE CHEST WITHOUT CONTRAST; CT OF THE ABDOMEN AND PELVIS WITHOUT CONTRAST 3/19/2022 1:34 pm TECHNIQUE: CT of the chest was performed without the administration of intravenous contrast. Multiplanar reformatted images are provided for review. Dose modulation, iterative reconstruction, and/or weight based adjustment of the mA/kV was utilized to reduce the radiation dose to as low as reasonably achievable.; CT of the abdomen and pelvis was performed without the administration of intravenous contrast. Multiplanar reformatted images are provided for review. Dose modulation, iterative reconstruction, and/or weight based adjustment of the mA/kV was utilized to reduce the radiation dose to as low as reasonably achievable. COMPARISON: 11/25/2021, and CT abdomen pelvis 11/26/2021 HISTORY: ORDERING SYSTEM PROVIDED HISTORY: AMS, effusion, pna? TECHNOLOGIST PROVIDED HISTORY: Reason for exam:->AMS, effusion, pna? Decision Support Exception - unselect if not a suspected or confirmed emergency medical condition->Emergency Medical Condition (MA) FINDINGS: The examination is very limited due to lack of contrast. CT chest. Heart size is in the upper limits of normal.  The great vessels are normal. Endotracheal tube and nasogastric tube are noted. There is atelectasis/infiltrates in the lung bases concerning for pneumonia. There is thickening of the GE junction likely hiatal hernia with paraesophageal varices. CT abdomen pelvis. There is hepatosplenomegaly with liver measuring 21 cm with diffuse lesions in the left and right hepatic lobe measuring up to 2.8 x 3.7 cm. These may represent hepatic cirrhosis with regenerating nodules or diffuse multifocal malignancy. There is splenomegaly with spleen measuring 18 cm with suggestion of portal venous hypertension, and venous varices. Gallbladder is distended with Ca cholecystic edema and wall thickening. Consider ultrasonography. Pancreas, the adrenals and the kidneys are grossly normal. There is suggestion of recanalization of umbilical vein. Umbilical hernia is identified with herniation of bowel loops. Pelvis. Postoperative changes are identified in the colon with the poor delineation of the bowel anatomy. There is diffusely thickened bowel loops which may include small bowel loops and colon with edema in the mesentery and between bowel loops. Ascites is present in the pelvis. Bladder is collapsed with indwelling Cannon catheter and wall thickening. Ascites fluid is present in the pelvis. There is anasarca. Significantly limited study due to lack of contrast. Atelectasis/infiltrates in the lung bases concerning for pneumonia. Hepatosplenomegaly with heterogeneous ill-defined hepatic lesions as noted concerning for cirrhosis with possible regenerating nodules or diffuse malignancy. Further assessment by CT with contrast or MRI may be considered. There is portal venous hypertension with venous varices as noted and moderate amount of high density ascites. Diffuse edema in the mesentery with dilated thickened bowel loops concerning for enterocolitis. Developing peritonitis cannot be excluded. There is anasarca. Distended and thickened gallbladder. Consider ultrasonography to evaluate cholecystitis.        XR CHEST PORTABLE    Result Date: 3/19/2022  EXAMINATION: ONE XRAY VIEW OF THE CHEST 3/19/2022 7:07 pm COMPARISON: Chest series from November 24, 2021 HISTORY: ORDERING SYSTEM PROVIDED HISTORY: Evaluate ETT position TECHNOLOGIST PROVIDED HISTORY: Reason for exam:->Evaluate ETT position FINDINGS: Medical support devices: Endotracheal tube terminates in the midthoracic trachea. Enteric tube extends subdiaphragmatic and off the field of view. Lungs: Symmetric lung inflation. Faint interstitial opacities bilaterally. No pleural effusion or pneumothorax. Cardiomediastinal silhouette and pulmonary vascularity: Cardiac silhouette is prominent. Mild central pulmonary vascular congestion. Osseous and soft tissue structures: No acute findings. 1.  Endotracheal tube terminates in the midthoracic trachea. Enteric tube extends subdiaphragmatic and off the field of view. 2.  Prominence of the cardiac silhouette. Faint interstitial opacities bilaterally.              Objective:   Vitals:   Vitals:    03/22/22 1104   BP:    Pulse: 74   Resp: 16   Temp:    SpO2: 93%     Patient Vitals for the past 24 hrs:   BP Temp Temp src Pulse Resp SpO2 Weight   03/22/22 1104    74 16 93 %    03/22/22 1000 135/77   76 19 93 %    03/22/22 0900 119/60   72 17 91 %    03/22/22 0800 134/74 97.2 °F (36.2 °C) Bladder 68 18 93 %    03/22/22 0700 130/74   66 16 92 %    03/22/22 0621    63 12 92 %    03/22/22 0611    59 15 91 %    03/22/22 0600 120/72   59 19 90 % 230 lb 6.1 oz (104.5 kg)   03/22/22 0500 121/74   58 14 91 %    03/22/22 0419       230 lb 6.1 oz (104.5 kg)   03/22/22 0400 116/62 97.5 °F (36.4 °C) Bladder 64 16 100 %    03/22/22 0300 107/64   61 18 94 %    03/22/22 0200 111/62   60 23 93 %    03/22/22 0100 105/60   62 28 94 %    03/22/22 0053    62 17 93 %    03/22/22 0000 (!) 106/58 98.1 °F (36.7 °C) Bladder 63 18 92 %    03/21/22 2300 (!) 106/57   65 17 94 %    03/21/22 2248    66 16 93 %    03/21/22 2200 (!) 109/58   65 15 93 %    03/21/22 2113    62 13 91 %    03/21/22 2100 110/61   65 13 91 %    03/21/22 2000 111/61 97.9 °F (36.6 °C) Bladder 62 13 91 %    03/21/22 1900 113/62   62 22 92 %    03/21/22 1800 (!) 112/59   62 22 (!) 89 %    03/21/22 1700 (!) 106/52   70 20 92 %    03/21/22 1600 110/61 98.4 °F (36.9 °C) Bladder 66 15 92 %    03/21/22 1500 113/61   66 14 93 %        General appearance: Jaundiced  HEENT: Intubated  Neck: Supple, no jugular venous distension, lymphadenopathy, thyromegaly or carotid bruits  Chest: Equal normal breath sounds, no wheezing, no crackles and no tenderness over ribs   Cardiovascular: Normal S1 , S2, regular rate and rhythm, no murmur, rub or gallop  Abdomen: Normal sounds present, soft, lax with no tenderness, no hepatosplenomegaly, and no masses  Extremities: No edema. Pulses are equally present.    Skin: intact, no rashes   Neurologic: Extremely lethargic, jaundiced, does not follow commands off sedation             Assessment/Plan:     1) SEVEN with oliguric in the setting of septic shock, hepatic encephalopathy, obtunded state  Baseline serum creatinine 0.9-1.0 as recent as December 2021  Now with serum creatinine of 1.8 upon admission  UA 3/19/2022: Cloudy christin, 1.015, >300 protein, large leukocytes  Initial urine electrolytes: Blaze 77 / Chl 47  CT scan showed unremarkable kidneys  Serum creatinine continues to trend higher from 1.8 on admission to current 2.7  Continue current hydration and follow very closely    2) Alcoholic liver cirrhosis, acute hepatitis (most likely alcohol related) , coagulopathy  Also w/ severe hypoalbuminemia, hepatic encephalopathy portal HTN , and ascites   Care per the ICU as well as surgical team     3) Severe anemia   possible GI bleed , thrombocytopenia  Hemoglobin low but stable     4)  Septic shock (Gm neg rods) - UTI related     5) Acute hypoxic resp failure - Nathan basal pneumonia , intubated on vent      6) Hypokalemia , hypomagnesemia  Assure supplementation for goal of 4.0 and magnesium goal of 2.0    7) Metabolic acidosis  metabolic acidosis partly due to use of lactulose and acute kidney injury. Serum bicarbonate levels have improved since admission              SRIDHAR Lebron - CNP      Patient seen and examined. No family members present at the bedside. Patient remains poorly responsive despite being off sedation for the last 24 hours. Remains on mechanical ventilation. Has significant amounts of stools because of lactulose administration. Urine output is fair. Patient now with  Spontaneous bleeding from various sites. Blood pressures remain on the low side. Chart reviewed. I had a face to face encounter with the patient. Agree with exam.    Agree with  formulation, assessment and plan as outlined above and directed by me. Addition and corrections were done as deemed appropriate. My exam and plan include:     Continue current treatment as outlined above. Prognosis poor.            Óscar Calvillo MD  Nephrology        Electronically signed by Óscar Calvillo MD on 3/22/2022 at 2:29 PM

## 2022-03-22 NOTE — PROGRESS NOTES
OPO notifed of planned extubation. Does not wish to follow- requests call back of cardiac time of death.      Electronically signed by Rahel Dillard RN on 3/22/2022 at 5:03 PM

## 2022-03-22 NOTE — PROGRESS NOTES
given active infection and continued EtOH use  -On stress dose steroids which can also help to manage AAH.     2. Hepatic Encephalopathy:  -Present on admission and reason for intubation  -Continue Rifaximin 550mg BID  -Continue q2hr lactulose via NG tube. -Cotninue holding all sedation.     3. 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   -Low-volume, chronic bleed. -Monitor CBC daily.  -No indication for urgent endoscopic therapy. Will proceed in the setting of active bleeding pending overall clinical status. 4. Coagulopathy:  -In the setting of hepatic decompensation and sepsis  -Will give 10mg Vit K IV x 3 days.  -Only treat coagulopathy in setting of active bleeding. No need to transfuse for numbers alone. No current need for transfusion to fix coagulopathy.  -If showing signs of high-volume, active bleeding, transfuse FFP to INR<1.5, transfuse Cryo to Fibrinogen >220, and transfuse Plts to Plt>50. Again, no current need to transfuse. 5. SEVEN:  -Likely mixture of ATN in the setting of sepsis and HRS   -Albumin 25g q 6hrs  -Avoid nephrotoxic agents  -Continue treatment for HRS. Continue albumin, octreotide, and midodrine.      6. Possible Ileus:  -Began having BMs w/ lactulose through the NG.   -Large volume output through NG last night though TFs have also been started. -Monitor     6. Ascites:  -Please obtain diagnostic paracentesis when able. Unlikely to happen in the near future given significant coagulopathy. Antibiotics currently covering for SBP. Send fluid for Cell Count w/ Diff and Culture when able. -Hold on performing large volume paracentesis until Cr normalizes. -Hold diuretics until SEVEN improves.     7. GNR bacteremia:  -Antibiotics per ID and CCM  -Blood Cultures w/ Klebsiella, Staph, and Strep species     8.  Hepatic Lesions on Imaging:  -Concern for metastatic disease vs HCC  -When Cr improves, will require CTA Triphasic Liver   -AFP sent  -CEA normal.     I will follow.     Thank you for including us in the care of this patient.  Please do not hesitate to contact us with any additional questions or concerns.     Pam Chopra MD  Gastroenterology/Hepatology  [facility]

## 2022-03-22 NOTE — PROGRESS NOTES
Patient absent heart sounds and breathe sounds, pupils fixed and dialated. Symptoms witnessed by two RN (harley/ugo). Dr Amy Alston notified of symptoms, time of death pronounced via telephone on 3/22/22 at [phone].

## 2022-03-22 NOTE — PROGRESS NOTES
Family plan to go ahead with terminal extubation- Pavithra Diaz, decision maker in agreement with course of events.      Electronically signed by Abeba Zimmerman RN on 3/22/2022 at 5:14 PM

## 2022-03-22 NOTE — CARE COORDINATION
3/22/2022 [facility] end of life care. ICU doctor spoke to legal nok. DNR-CCA- no escalation of care. CM available.   Electronically signed by Alejandrina Blair RN-BC on 3/22/2022 at 2:34 PM

## 2022-03-22 NOTE — PLAN OF CARE
Problem: Non-Violent Restraints  Goal: No harm/injury to patient while restraints in use  3/22/2022 0624 by Fanny De La Torre RN  Outcome: Met This Shift     Problem: Non-Violent Restraints  Goal: Patient's dignity will be maintained  3/22/2022 0624 by Fanny De La Torre RN  Outcome: Met This Shift     Problem: Non-Violent Restraints  Goal: Removal from restraints as soon as assessed to be safe  3/22/2022 0624 by Fanny De La Torre RN  Outcome: Not Met This Shift

## 2022-03-22 NOTE — PROGRESS NOTES
ceftriaxone/doxy  daptomycin  was ordered  Spoke with Dr Burke Greco  03/22/22   Supine vent  jaundice        afebrile ac50%       Cr2.7  Wbc6.1 plt18                 3/21/2022  Afebrile on vent 45% jaundice   Cr2.7 wbc5.9 plt25     3/20/2022  In ICU  In bed/intubated  Jaundice   afebrile fkm649%  Cr1.8  ->2.5  9.2 plt28   Blood c Klebsiella/CONS   REVIEW OF SYSTEMS     CONSTITUTIONAL:   unablr due to intubated    Medications Prior to Admission: spironolactone (ALDACTONE) 25 MG tablet, Take 1 tablet by mouth daily  cyanocobalamin 2000 MCG tablet, Take 1 tablet by mouth daily  pyridoxine (B-6) 25 MG tablet, Take 1 tablet by mouth daily  vitamin D (ERGOCALCIFEROL) 1.25 MG (16478 UT) CAPS capsule, Take 1 capsule by mouth once a week Monday  potassium chloride (KLOR-CON M) 10 MEQ extended release tablet, Take 20 mEq by mouth daily  lactulose (CHRONULAC) 10 GM/15ML solution, Take 20 g by mouth every 12 hours  propranolol (INDERAL) 10 MG tablet, Take 20 mg by mouth every 12 hours   rifaximin (XIFAXAN) 550 MG tablet, Take 550 mg by mouth 2 times daily  cloNIDine (CATAPRES) 0.1 MG tablet, Take 0.1 mg by mouth daily  magnesium oxide (MAG-OX) 400 MG tablet, Take 400 mg by mouth daily  vitamin B-1 (THIAMINE) 100 MG tablet, Take 100 mg by mouth daily  folic acid (FOLVITE) 1 MG tablet, Take 1 tablet by mouth daily  thiamine mononitrate 100 MG tablet, Take 1 tablet by mouth daily  pantoprazole (PROTONIX) 40 MG tablet, Take 40 mg by mouth daily   CURRENT MEDICATIONS     Current Facility-Administered Medications:     potassium bicarb-citric acid (EFFER-K) effervescent tablet 20 mEq, 20 mEq, Oral, Once, Chela Dangelo MD    0.9 % sodium chloride infusion, , IntraVENous, PRN, Chela Dangelo MD    sodium chloride flush 0.9 % injection 5-40 mL, 5-40 mL, IntraVENous, 2 times per day, Danny Piña MD, 10 mL at 03/22/22 0849    sodium chloride flush 0.9 % injection 5-40 mL, 5-40 mL, IntraVENous, PRN, Deven Klein, MD    0.9 % sodium chloride infusion, 25 mL, IntraVENous, PRN, Deven Gomez MD, Last Rate: 12.5 mL/hr at 03/22/22 0116, Rate Verify at 03/22/22 0116    lactated ringers infusion, , IntraVENous, Continuous, Deven Gomez MD, Last Rate: 100 mL/hr at 03/22/22 0825, New Bag at 27/28/33 2902    folic acid (FOLVITE) tablet 1 mg, 1 mg, Oral, Daily, Deven Gomez MD, 1 mg at 03/22/22 0848    midodrine (PROAMATINE) tablet 15 mg, 15 mg, Oral, Q8H, Deven Gomez MD, 15 mg at 03/22/22 0310    lactulose (CHRONULAC) 10 GM/15ML solution 20 g, 20 g, Oral, Q2H, Gilberto Arellano MD, 20 g at 03/22/22 1014    glucose (GLUTOSE) 40 % oral gel 15 g, 15 g, Oral, PRN, Betot Tashia, DO    glucagon (rDNA) injection 1 mg, 1 mg, IntraMUSCular, PRN, Bimal Lao, DO    dextrose 5 % solution, 100 mL/hr, IntraVENous, PRN, Ismail U Ton, DO    sodium chloride flush 0.9 % injection 5-40 mL, 5-40 mL, IntraVENous, 2 times per day, Bimal Lao DO, 10 mL at 03/22/22 0849    sodium chloride flush 0.9 % injection 5-40 mL, 5-40 mL, IntraVENous, PRN, Bimal Lao DO    0.9 % sodium chloride infusion, 25 mL, IntraVENous, PRN, Bimal Lao DO, Stopped at 03/21/22 2729    polyethylene glycol (GLYCOLAX) packet 17 g, 17 g, Oral, Daily PRN, Betot Bee, DO    acetaminophen (TYLENOL) tablet 650 mg, 650 mg, Oral, Q6H PRN **OR** acetaminophen (TYLENOL) suppository 650 mg, 650 mg, Rectal, Q6H PRN, Betot Bee, DO    albumin human 25 % IV solution 25 g, 25 g, IntraVENous, Q6H, Willi Solis MD, Paused at 03/22/22 0031    dextrose bolus (hypoglycemia) 10% 125 mL, 125 mL, IntraVENous, PRN **OR** dextrose bolus (hypoglycemia) 10% 250 mL, 250 mL, IntraVENous, PRN, Bimal Lao DO    octreotide (SANDOSTATIN) 500 mcg in sodium chloride 0.9 % 100 mL infusion, 50 mcg/hr, IntraVENous, Continuous, Ismail U DO Ton, Last Rate: 10 mL/hr at 03/22/22 0800, 50 mcg/hr at 03/22/22 0800    0.9 % sodium chloride infusion, , IntraVENous, Once, Flor Tirado DO    [COMPLETED] thiamine (B-1) 500 mg in sodium chloride 0.9 % 100 mL IVPB, 500 mg, IntraVENous, Q8H, Last Rate: 200 mL/hr at 03/22/22 0702, 500 mg at 03/22/22 0702 **FOLLOWED BY** thiamine (B-1) 100 mg in sodium chloride 0.9 % 100 mL IVPB, 100 mg, IntraVENous, Q24H, Ismail U DO Ton    propofol injection, 5-50 mcg/kg/min, IntraVENous, Continuous, Ismail U DO Ton, Stopped at 03/19/22 1551    chlorhexidine (PERIDEX) 0.12 % solution 15 mL, 15 mL, Mouth/Throat, BID, Ismail U DO Ton, 15 mL at 03/22/22 0751    rifaximin (XIFAXAN) tablet 550 mg, 550 mg, Oral, TID, Flor Tirado DO, 550 mg at 03/22/22 0848    doxycycline (VIBRAMYCIN) 100 mg in dextrose 5 % 100 mL IVPB, 100 mg, IntraVENous, Q12H, Flor Tirado DO, Stopped at 03/22/22 0515    hydrocortisone sodium succinate PF (SOLU-CORTEF) injection 50 mg, 50 mg, IntraVENous, Q6H, Ismail U DO Ton, 50 mg at 03/22/22 1014    pantoprazole (PROTONIX) 40 mg in sodium chloride (PF) 10 mL injection, 40 mg, IntraVENous, Q12H, Ismail U DO Ton, 40 mg at 03/22/22 0416    norepinephrine (LEVOPHED) 16 mg in sodium chloride 0.9 % 250 mL infusion (non-weight-based), 1-100 mcg/min, IntraVENous, Continuous, Flor Tirado DO, Stopped at 03/20/22 1900    linezolid (ZYVOX) IVPB 600 mg, 600 mg, IntraVENous, Q12H, Azul Almonte MD, Stopped at 03/22/22 0714    piperacillin-tazobactam (ZOSYN) 3,375 mg in dextrose 5 % 50 mL IVPB extended infusion (mini-bag), 3,375 mg, IntraVENous, Q8H, Azul Almonte MD, Stopped at 03/22/22 0831  ALLERGIES     Patient has no known allergies.   Immunization History   Administered Date(s) Administered    COVID-19, Pfizer Purple top, DILUTE for use, 12+ yrs, 30mcg/0.3mL dose 04/14/2021, 06/01/2021    Influenza, Quadv, IM, PF (6 mo and older Fluzone, Flulaval, Fluarix, and 3 yrs and older Afluria) 10/28/2021      Internal Administration   First Dose COVID-19, Pfizer Purple top, DILUTE for use, 12+ yrs, 30mcg/0.3mL dose  04/14/2021   Second Dose COVID-19, Pfizer Purple top, DILUTE for use, 12+ yrs, 30mcg/0.3mL dose   06/01/2021       Last COVID Lab SARS-CoV-2, PCR (no units)   Date Value   03/19/2022 Not Detected     SARS-CoV-2, NAAT (no units)   Date Value   11/24/2021 Not Detected        PAST MEDICAL HISTORY     Past Medical History:   Diagnosis Date    Acute renal failure (ARF) ([facility] [facility] 78/69/6644    Alcoholic cirrhosis ([facility] [address] [facility]     Anxiety     Crohn's colitis ([facility] [address] [facility]     Depression     History of blood transfusion     Pyoderma gangrenosum     Thyroid disease      SURGICAL HISTORY       Past Surgical History:   Procedure Laterality Date    ABDOMEN SURGERY      pt has a J-pouch     ARM DEBRIDEMENT      R arm-2010-2011??    ARM DEBRIDEMENT      COLONOSCOPY      DILATATION, ESOPHAGUS      ENDOSCOPY, COLON, DIAGNOSTIC      SIGMOIDOSCOPY N/A 12/6/2021    SIGMOIDOSCOPY BIOPSY FLEXIBLE performed by Orly Cruz MD at [id number] [place] [address] TRANSESOPHAGEAL ECHOCARDIOGRAM N/A 9/3/2021    TRANSESOPHAGEAL ECHOCARDIOGRAM WITH BUBBLE STUDY performed by Iron Barrera MD at [address] [place] [address] N/A 9/2/2021    EGD ESOPHAGOGASTRODUODENOSCOPY performed by Maicol Esparza MD at [address] [place] [address] 12/6/2021    EGD CONTROL HEMORRHAGE performed by Orly Cruz MD at [address] [place] [address] N/A 12/6/2021    EGD BIOPSY performed by Orly Cruz MD at [address] [place] [address] Road:    03/22/22 0700 03/22/22 0800 03/22/22 0900 03/22/22 1000   BP: 130/74 134/74 119/60 135/77   Pulse: 66 68 72 76   Resp: 16 18 17 19   Temp:  97.2 °F (36.2 °C)     TempSrc:  Bladder     SpO2: 92% 93% 91% 93%   Weight:       Height:         Physical Exam   Constitutional/General: vent sedated    Head: NC/AT  Pulmonary: Lungs dec to auscultation bilaterally equally . Vent   Cardiovascular:  tachy    no murmurs, gallops, or rubs. Abdomen: Soft, dec BS. No distension. Nontender. Extremities:   edema  Skin: Warm and dry  jaundice  Neurologic:    sedated   right fem tlc 3/19  [place]  fms     DIAGNOSTIC RESULTS   RADIOLOGY:         LABS  Recent Labs     03/20/22  2240 03/21/22  0508 03/22/22  0521   WBC 6.2 5.9 6.1   HGB 7.0* 7.0* 7.2*   HCT 20.6* 20.6* 21.8*   MCV 90.4 92.0 94.4   PLT 22* 25* 18*     Recent Labs     03/20/22  0658 03/20/22  0658 03/21/22  0508 03/21/22  0508 03/21/22  1030 03/21/22  1030 03/22/22  0521   *   < > 131*  --  132  --  131*   K 2.8*   < > 3.3*   < > 3.3*   < > 3.2*   CL 89*   < > 94*   < > 96*   < > 96*   CO2 19*   < > 22   < > 20*   < > 21*   BUN 16   < > 20  --  22*  --  28*   CREATININE 2.5*   < > 2.7*  --  2.8*  --  2.7*   GFRAA 25   < > 23  --  22  --  23   LABGLOM 21   < > 19  --  18  --  19   GLUCOSE 328*   < > 169*  --  171*  --  198*   PROT 6.6  --  6.5  --   --   --  6.2*   LABALBU 2.8*  --  3.4*  --   --   --  3.5   CALCIUM 8.0*   < > 8.3*  --  8.5*  --  8.6   BILITOT 17.2*  --  17.0*  --   --   --  17.4*   ALKPHOS 112*  --  79  --   --   --  64   *  --  179*  --   --   --  98*   ALT 79*  --  54*  --   --   --  43*    < > = values in this interval not displayed.      Recent Labs     03/21/22  0508   PROCAL 1.50*     Lab Results   Component Value Date    CRP 0.5 (H) 12/13/2021    CRP 0.4 11/24/2021    CRP 15.7 (H) 05/17/2011     Lab Results   Component Value Date    SEDRATE 38 (H) 12/13/2021    SEDRATE 25 (H) 12/05/2021    SEDRATE 97 (H) 11/24/2021     No results found for: [id number]  Lab Results   Component Value Date    COVID19 Not Detected 03/19/2022     COVID-19/SAR-COV2 LABS  Recent Labs     03/19/22  1300 03/19/22  2218 03/20/22  0658 03/20/22  1237 03/21/22  0508 03/22/22  0521   PROCAL  --   --   --   --  1.50*  --    FIBRINOGEN  --  105*  --  126*  --   --    INR   < >  --  3.6 --  3.9 5.3*   PROTIME   < >  --  42.8*  --  46.8* 62.8*   AST   < >  --  384*  --  179* 98*   ALT   < >  --  79*  --  54* 43*    < > = values in this interval not displayed. Lab Results   Component Value Date    CHOL 100 09/01/2021    TRIG 164 09/01/2021    HDL 12 09/01/2021    LDLCALC 55 09/01/2021    LABVLDL 33 09/01/2021       Lab Results   Component Value Date    HAV Negative 04/01/2011    HEPAIGM Non-Reactive 07/10/2019    HEPBIGM Non-Reactive 07/10/2019    HEPCAB NON REACT 04/01/2011    HCVABI Non-Reactive 07/10/2019     Hep C Ab Interp   Date Value Ref Range Status   07/10/2019 Non-Reactive NON REACT Final        MICROBIOLOGY:     Lab Results   Component Value Date    BC 24 Hours no growth 03/20/2022    BC 5 Days no growth 11/25/2021    BC  11/24/2021     Refer to previous culture CXBL2 11/24/2021 2038 for susceptibility  results      [facility]  11/24/2021     This isolate is presumed to be resistant based on the  detection of inducible Clindamycin resistance. Clindamycin  may still be effective in some patients.       ORG Staphylococcus epidermidis 03/19/2022    ORG Klebsiella pneumoniae ssp pneumoniae 03/19/2022    ORG Staphylococcus species 03/19/2022     Lab Results   Component Value Date    BLOODCULT2 24 Hours no growth 03/20/2022    BLOODCULT2  03/19/2022     Gram stain performed from blood culture bottle media  Gram negative rods  Gram stain performed from blood culture bottle media  Gram positive cocci in clusters      BLOODCULT2 Identification and sensitivity to follow 03/19/2022    BLOODCULT2 Identification and sensitivity to follow 03/19/2022    ORG Staphylococcus epidermidis 03/19/2022    ORG Klebsiella pneumoniae ssp pneumoniae 03/19/2022    ORG Staphylococcus species 03/19/2022        Body Fluid Culture, Sterile   Date Value Ref Range Status   10/26/2021   Final    Growth not present  Neisseria gonorrhoeae not isolated       No results found for: CXSURG  Urine Culture, Routine   Date Value Ref Range Status   03/19/2022   Final    10 to 100,000 CFU/mL  Mixed ayan isolated. Further workup and sensitivity testing  is not routinely indicated and will not be performed. Mixed ayan isolated includes:  Mixed gram negative rods  Proteus species     12/05/2021 >100,000 CFU/ml  Final   11/24/2021 <10,000 CFU/mL  Gram positive organism    Final       FINAL IMPRESSION    Patient is a 51 y.o. female who presented with   Chief Complaint   Patient presents with    Altered Mental Status     unresponsive, jaundiced, liver disease, lkw 36 hours ago, found on couch in urine and feces    and admitted for Metabolic encephalopathy [Y36.01]  Lactic acidosis [E87.2]  Acute blood loss anemia [D62]  Hyperammonemia ([facility] [address] [E72.20]  Hypoglycemia [E16.2]  Elevated INR [R79.1]  Elevated troponin [R77.8]  History of alcohol abuse [F10.11]  Hypothermia, initial encounter [T68. XXXA]  Urinary tract infection with hematuria, site unspecified [N39.0, R31.9]  Gastrointestinal hemorrhage, unspecified gastrointestinal hemorrhage type [K92.2]  Decompensation of cirrhosis of liver ([facility] [address] [K72.90, K74.60]     Sepsis  GN Klebsiella with CONS repeat blood cx  respiratory failure eval for pneumonia  Gallbladder wall thickening and sludge  Surgery following   Diffuse edema in the mesentery with dilated thickened bowel loops concerning   for enterocolitis.  Developing peritonitis cannot be excluded  -check cx  Liver cirrhosis/GI following   MRSA neg    lactulose (CHRONULAC) 10 GM/15ML solution 20 g, Q2H  chlorhexidine (PERIDEX) 0.12 % solution 15 mL, BID  rifaximin (XIFAXAN) tablet 550 mg, TID  doxycycline (VIBRAMYCIN) 100 mg in dextrose 5 % 100 mL IVPB, Q12H  hydrocortisone sodium succinate PF (SOLU-CORTEF) injection 50 mg, Q6H  linezolid (ZYVOX) IVPB 600 mg, Q12H  piperacillin-tazobactam (ZOSYN) 3,375 mg in dextrose 5 % 50 mL IVPB extended infusion (mini-bag), Q8H    Switch to daptomycin due to thrombocytopenia      Imaging and labs were reviewed per medical records      Electronically signed by Le Araya MD on 3/22/2022 at 10:52 AM

## 2022-03-22 NOTE — PLAN OF CARE
Problem: Non-Violent Restraints  Goal: No harm/injury to patient while restraints in use  3/22/2022 0625 by Barry Reynaga RN  Outcome: Met This Shift     Problem: Non-Violent Restraints  Goal: Patient's dignity will be maintained  3/22/2022 0625 by Barry Reynaga RN  Outcome: Met This Shift     Problem: Non-Violent Restraints  Goal: Removal from restraints as soon as assessed to be safe  3/22/2022 0625 by Barry Reynaga RN  Outcome: Not Met This Shift

## 2022-03-22 NOTE — DEATH NOTES
Death Pronouncement Note  Patient's Name: Romelia Garcia   Patient's YOB: 1975  MRN Number: [id number]    Admitting Provider: Gideon Galeas DO  Attending Provider: Gideon Galeas DO    Patient was examined and the following were absent: Pulses, Blood Pressure and Respiratory effort    I declared the patient dead on 3/22/2022 at 5:55 PM    Preliminary Cause of Death: Alcoholic hepatic failure     Electronically signed by Latanya Jeronimo MD on 3/22/22 at 7:30 PM EDT

## 2022-03-22 NOTE — PROGRESS NOTES
PULMONARY & CRITICAL CARE PROGRESS NOTE    Romelia Garcia is a 51 y.o. female with progressive fulminant hepatic failure    Assessment and Plan    1. hepatic encephalopathy  2. Acute hypoxic respiratory failure requiring intubation  3. Rule out spontaneous bacterial peritonitis  4. Acute kidney injury with suspected hepatorenal syndrome  5. Left thigh hematoma  6. Liver cirrhosis with ascites  7. Anemia and thrombocytopenia  8. Suspected upper GI bleed  9. Chronic alcohol abuse  10. Inflammatory bowel disease  11. Crohn's disease  12. Anxiety  13. Depression  14   History of pyoderma gangrenosum of vasculitis  15. 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 Urinary tract infection  17. Gram-positive bacteremia and gram-negative bacteremia  18. Hyperkalemia  19. Lactic acidosis secondary to liver failure      In summary:  Multi-System Organ Failure   Fulminant hepatic failure and now Acute Kidney Injury consistent with hepatorenal syndrome   On vent, comatose / encephalopathic  Coagulopathy / thrombocytopenia progressing and pt is actively bleeding  Has been on antibiotics for any potential infecitons    Reviewed with Dr. Natalia Montemayor (GI) - no clear reversible process - pt is actively dying    Reviewed with sister Yenny Nicolas    [facility] is a DNR [facility] and ext[facility]ate if / when family will arrive    All questions answered         __________________________________________________      Daily progress:  March 21, 2022: Patient remains lethargic and encephalopathy off sedation. She has been off Levophed however her blood pressure is borderline low. Her urine output has been on the lower side. Patient was started on IV fluid today. Blood cultures are positive for gram-positive cocci and gram-negative rods. Paracentesis was ordered and a CT scan of left thigh was ordered to rule out significant subcutaneous hematoma.   Palliative care was consulted since patient's overall prognosis is extremely poor. Past Medical History:  Past Medical History:   Diagnosis Date    Acute renal failure (ARF) ([facility] 70/41/1029    Alcoholic cirrhosis (HCC)     Anxiety     Crohn's colitis ([facility]     Depression     History of blood transfusion     Pyoderma gangrenosum     Thyroid disease         Family History:   Family History   Problem Relation Age of Onset    Cancer Mother         breast     Emphysema Mother     Lupus Mother     Heart Disease Father     High Cholesterol Father     High Blood Pressure Sister     No Known Problems Son        Allergies:         Patient has no known allergies. Social history:  Social History     Socioeconomic History    Marital status:      Spouse name: Not on file    Number of children: 1    Years of education: Not on file    Highest education level: Not on file   Occupational History    Not on file   Tobacco Use    Smoking status: Never Smoker    Smokeless tobacco: Never Used   Vaping Use    Vaping Use: Never used   Substance and Sexual Activity    Alcohol use: Not Currently     Comment: stopped but drink 3 weeks ago    Drug use: Not Currently    Sexual activity: Not on file   Other Topics Concern    Not on file   Social History Narrative    Not on file     Social Determinants of Health     Financial Resource Strain: Low Risk     Difficulty of Paying Living Expenses: Not hard at all   Food Insecurity: No Food Insecurity    Worried About Running Out of Food in the Last Year: Never true    [place]iko of Food in the Last Year: Never true   Transportation Needs: No Transportation Needs    Lack of Transportation (Medical): No    Lack of Transportation (Non-Medical): No   Physical Activity: Unknown    Days of Exercise per Week: 0 days    Minutes of Exercise per Session: Not on file   Stress: No Stress Concern Present    Feeling of Stress : Only a little   Social Connections:  Moderately Integrated    Frequency of Communication with Friends and Family: More than three times a week    Frequency of Social Gatherings with Friends and Family: Three times a week    Attends [employer] Services: More than 4 times per year    Active Member of Clubs or Organizations:  Yes    Attends Club or Organization Meetings: More than 4 times per year    Marital Status:    Intimate Partner Violence: Not At Risk    Fear of Current or Ex-Partner: No    Emotionally Abused: No    Physically Abused: No    Sexually Abused: No   Housing Stability: High Risk    Unable to Pay for Housing in the Last Year: No    Number of Places Lived in the Last Year: 6    Unstable Housing in the Last Year: No       Current Medications:     Current Facility-Administered Medications:     0.9 % sodium chloride infusion, , IntraVENous, PRN, Noemí Flores MD    DAPTOmycin (CUBICIN) 500 mg in sodium chloride 0.9 % 50 mL IVPB, 6 mg/kg (Adjusted), IntraVENous, Q24H, Ivan Catsillo MD, Last Rate: 100 mL/hr at 03/22/22 1249, 500 mg at 03/22/22 1249    [Held by provider] lactulose (CHRONULAC) 10 GM/15ML solution 20 g, 20 g, Oral, Q8H, Noemí Flores MD    sodium chloride flush 0.9 % injection 5-40 mL, 5-40 mL, IntraVENous, 2 times per day, Yvette Carrillo MD, 10 mL at 03/22/22 0849    sodium chloride flush 0.9 % injection 5-40 mL, 5-40 mL, IntraVENous, PRN, Yvette Carrillo MD    0.9 % sodium chloride infusion, 25 mL, IntraVENous, PRN, Yvette Carrillo MD, Last Rate: 12.5 mL/hr at 03/22/22 0116, Rate Verify at 03/22/22 0116    lactated ringers infusion, , IntraVENous, Continuous, Yvette Carrillo MD, Last Rate: 100 mL/hr at 03/22/22 0825, New Bag at 81/57/96 8750    folic acid (FOLVITE) tablet 1 mg, 1 mg, Oral, Daily, Yvette Carrillo MD, 1 mg at 03/22/22 0848    midodrine (PROAMATINE) tablet 15 mg, 15 mg, Oral, Q8H, Yvette Carrillo MD, 15 mg at 03/22/22 1324    glucose (GLUTOSE) 40 % oral gel 15 g, 15 g, Oral, PRN, Mayco Breed, DO    glucagon (rDNA) injection 1 mg, 1 mg, IntraMUSCular, PRN, Ismail U Ton, DO    dextrose 5 % solution, 100 mL/hr, IntraVENous, PRN, Ismail U Ton, DO    sodium chloride flush 0.9 % injection 5-40 mL, 5-40 mL, IntraVENous, 2 times per day, Mayco Mojica, DO, 10 mL at 03/22/22 0849    sodium chloride flush 0.9 % injection 5-40 mL, 5-40 mL, IntraVENous, PRN, Mayco Breed, DO    0.9 % sodium chloride infusion, 25 mL, IntraVENous, PRN, Mayco Breed, DO, Stopped at 03/21/22 5990    polyethylene glycol (GLYCOLAX) packet 17 g, 17 g, Oral, Daily PRN, Mayco Breed, DO    acetaminophen (TYLENOL) tablet 650 mg, 650 mg, Oral, Q6H PRN **OR** acetaminophen (TYLENOL) suppository 650 mg, 650 mg, Rectal, Q6H PRN, Mayco Mojica, DO    albumin human 25 % IV solution 25 g, 25 g, IntraVENous, Q6H, Billy Gutierres MD, Last Rate: 100 mL/hr at 03/22/22 1258, 25 g at 03/22/22 1258    dextrose bolus (hypoglycemia) 10% 125 mL, 125 mL, IntraVENous, PRN **OR** dextrose bolus (hypoglycemia) 10% 250 mL, 250 mL, IntraVENous, PRN, Mayco Breed, DO    octreotide (SANDOSTATIN) 500 mcg in sodium chloride 0.9 % 100 mL infusion, 50 mcg/hr, IntraVENous, Continuous, Ismail U Ton, DO, Last Rate: 10 mL/hr at 03/22/22 0800, 50 mcg/hr at 03/22/22 0800    0.9 % sodium chloride infusion, , IntraVENous, Once, Mayco Mojica, DO    [COMPLETED] thiamine (B-1) 500 mg in sodium chloride 0.9 % 100 mL IVPB, 500 mg, IntraVENous, Q8H, Stopped at 03/22/22 1135 **FOLLOWED BY** thiamine (B-1) 100 mg in sodium chloride 0.9 % 100 mL IVPB, 100 mg, IntraVENous, Q24H, Ismail U Tno, DO    propofol injection, 5-50 mcg/kg/min, IntraVENous, Continuous, Ismail U DO Ton, Stopped at 03/19/22 1551    chlorhexidine (PERIDEX) 0.12 % solution 15 mL, 15 mL, Mouth/Throat, BID, Ismail U DO Ton, 15 mL at 03/22/22 0751    rifaximin (XIFAXAN) tablet 550 mg, 550 mg, Oral, TID, Mayco Mojica, DO, 550 mg at 03/22/22 0848    doxycycline (VIBRAMYCIN) 100 mg in dextrose 5 % 100 mL IVPB, 100 mg, IntraVENous, Q12H, Manoj Brush, DO, Stopped at 03/22/22 0515    hydrocortisone sodium succinate PF (SOLU-CORTEF) injection 50 mg, 50 mg, IntraVENous, Q6H, Ismail U Ton, DO, 50 mg at 03/22/22 1014    pantoprazole (PROTONIX) 40 mg in sodium chloride (PF) 10 mL injection, 40 mg, IntraVENous, Q12H, Ismail U Ton, DO, 40 mg at 03/22/22 0416    norepinephrine (LEVOPHED) 16 mg in sodium chloride 0.9 % 250 mL infusion (non-weight-based), 1-100 mcg/min, IntraVENous, Continuous, Manoj Brush, DO, Stopped at 03/20/22 1900    piperacillin-tazobactam (ZOSYN) 3,375 mg in dextrose 5 % 50 mL IVPB extended infusion (mini-bag), 3,375 mg, IntraVENous, Q8H, Annabelle Prince MD, Last Rate: 12.5 mL/hr at 03/22/22 1249, 3,375 mg at 03/22/22 1249    Review of Systems:   Unable to obtain due to medical condition    Physical Exam:   Vital Signs:  /77   Pulse 74   Temp 97.2 °F (36.2 °C) (Bladder)   Resp 16   Ht 5' 8\" (1.727 m)   Wt 230 lb 6.1 oz (104.5 kg)   SpO2 93%   BMI 35.03 kg/m²     Input/Output:   In: 3489.2 [I.V.:701.2; NG/GT:518]  Out: 4500     Oxygen requirements: MV    Ventilator Information:  Vent Information  $Ventilation: $Subsequent Day  Skin Assessment: Clean, dry, & intact  Equipment Changed: Vent Circuit (insp/exp filter, martines)  Vent Type: 980  Vent Mode: AC/VC  Vt Ordered: 380 mL  Rate Set: 14 bmp  Peak Flow: 55 L/min  Pressure Support: 0 cmH20  FiO2 : 50 %  SpO2: 93 %  SpO2/FiO2 ratio: 186  Sensitivity: 4  PEEP/CPAP: 5  I Time/ I Time %: 0 s  Humidification Source: Heated wire  Humidification Temp: 37  Humidification Temp Measured: 36.9  Circuit Condensation: Drained    General appearance: Jaundiced, not in pain or distress, in no respiratory distress , comatose  HEENT: Intubated  Neck: Supple, no jugular venous distension, lymphadenopathy, thyromegaly or carotid bruits  Chest: Equal normal breath sounds, no wheezing, no crackles and no tenderness over ribs   Cardiovascular: Normal S1 , S2, regular rate and rhythm, no murmur, rub or gallop  Abdomen: Normal sounds present, soft, lax with no tenderness, no hepatosplenomegaly, and no masses  Extremities: bleeding along femoral line site.      Neurologic: comatose     Investigations:  Labs, radiological imaging and cardiac work up were reviewed    CRITICAL CARE TIME:  50 minutes including end of life decision making    Wilner Conrad MD  Pulmonary & Critical Care  3/22/2022 2:04 PM

## 2022-03-23 LAB
AFP-TUMOR MARKER: 4 NG/ML (ref 0–9)
AFP-TUMOR MARKER: 5 NG/ML (ref 0–9)
BLOOD BANK DISPENSE STATUS: NORMAL
BLOOD BANK PRODUCT CODE: NORMAL
BPU ID: NORMAL
DESCRIPTION BLOOD BANK: NORMAL
ORGANISM: ABNORMAL
ORGANISM: ABNORMAL

## 2022-03-23 NOTE — DISCHARGE SUMMARY
Internal Medicine Progress Note     [facility]     Shawn Zurita. Gilda Gamez., F.A.C.O.I. Ofelia Hoang D.O., SAVAGEOHERIBERTO High D.O. Bob Pedersen, MSN, APRN, NP-C  Joel Whiting. Corinne Anon, MSN, APRN-CNP       Internal Medicine  Discharge Summary    NAME: Dae Hedrick  :  1975  MRN:  [id number]  Juan Antonio Hernandez MD  ADMITTED: 3/19/2022      DISCHARGED: 3/23/22    ADMITTING PHYSICIAN: No att. providers found    CONSULTANT(S):   IP CONSULT TO GI  IP CONSULT TO CRITICAL CARE  IP CONSULT TO DIETITIAN  IP CONSULT TO GENERAL SURGERY  IP CONSULT TO INFECTIOUS DISEASES  IP CONSULT TO NEPHROLOGY  IP CONSULT TO PALLIATIVE CARE     ADMITTING DIAGNOSIS:   Metabolic encephalopathy [R67.51]  Lactic acidosis [E87.2]  Acute blood loss anemia [D62]  Hyperammonemia (HCC) [E72.20]  Hypoglycemia [E16.2]  Elevated INR [R79.1]  Elevated troponin [R77.8]  History of alcohol abuse [F10.11]  Hypothermia, initial encounter [T68. XXXA]  Urinary tract infection with hematuria, site unspecified [N39.0, R31.9]  Gastrointestinal hemorrhage, unspecified gastrointestinal hemorrhage type [K92.2]  Decompensation of cirrhosis of liver ([facility] [address] [K72.90, K74.60]     DISCHARGE DIAGNOSES:   1. Patient was pronounced  3/22/2022 at 5:55 PM following terminal extubation and withdrawal of care  2. Septic shock with multiple sources including pyelonephritis, community-acquired pneumonia and potential peritonitis  3. Acute on chronic blood loss anemia in the setting of known varices and peptic ulcer disease  4. Hepatic encephalopathy in the setting of decompensated alcoholic cirrhosis  5. Acute renal failure that is multifactorial nature  6. Hypothyroidism  7. History of Crohn's colitis  8. Depression and anxiety      BRIEF HISTORY OF PRESENT ILLNESS:   Patient is 23-year-old female presented to the ED with altered mental status. Patient unable to provide HPI.   Additionally she has no contact listed that we can get in contact with. However patient was brought in unresponsive. She was found at home unresponsive. Apparently she has been unresponsive for the last 2 days. Patient was hypoglycemic as reported by EMS. LABS[de-identified]  Lab Results   Component Value Date    WBC 6.1 03/22/2022    HGB 7.2 (L) 03/22/2022    HCT 21.8 (L) 03/22/2022    PLT 18 (LL) 03/22/2022     (L) 03/22/2022    K 3.2 (L) 03/22/2022    CL 96 (L) 03/22/2022    CREATININE 2.7 (H) 03/22/2022    BUN 28 (H) 03/22/2022    CO2 21 (L) 03/22/2022    GLUCOSE 198 (H) 03/22/2022    ALT 43 (H) 03/22/2022    AST 98 (H) 03/22/2022    INR 5.3 (HH) 03/22/2022     Lab Results   Component Value Date    INR 5.3 (HH) 03/22/2022    INR 3.9 03/21/2022    INR 3.6 03/20/2022    PROTIME 62.8 (H) 03/22/2022    PROTIME 46.8 (H) 03/21/2022    PROTIME 42.8 (H) 03/20/2022      Lab Results   Component Value Date    TSH 2.070 03/21/2022     Lab Results   Component Value Date    TRIG 164 (H) 09/01/2021    TRIG 71 07/10/2019    TRIG 197 (H) 10/04/2016     Lab Results   Component Value Date    HDL 12 09/01/2021    HDL 46 07/10/2019    HDL 98 10/04/2016     Lab Results   Component Value Date    LDLCALC 55 09/01/2021    LDLCALC 89 07/10/2019    LDLCALC 107 (H) 10/04/2016     Lab Results   Component Value Date    LABA1C 4.1 09/01/2021       IMAGING:  CT ABDOMEN PELVIS WO CONTRAST Additional Contrast? None    Result Date: 3/19/2022  EXAMINATION: CT OF THE CHEST WITHOUT CONTRAST; CT OF THE ABDOMEN AND PELVIS WITHOUT CONTRAST 3/19/2022 1:34 pm TECHNIQUE: CT of the chest was performed without the administration of intravenous contrast. Multiplanar reformatted images are provided for review.  Dose modulation, iterative reconstruction, and/or weight based adjustment of the mA/kV was utilized to reduce the radiation dose to as low as reasonably achievable.; CT of the abdomen and pelvis was performed without the administration of intravenous contrast. Multiplanar reformatted There is anasarca. Significantly limited study due to lack of contrast. Atelectasis/infiltrates in the lung bases concerning for pneumonia. Hepatosplenomegaly with heterogeneous ill-defined hepatic lesions as noted concerning for cirrhosis with possible regenerating nodules or diffuse malignancy. Further assessment by CT with contrast or MRI may be considered. There is portal venous hypertension with venous varices as noted and moderate amount of high density ascites. Diffuse edema in the mesentery with dilated thickened bowel loops concerning for enterocolitis. Developing peritonitis cannot be excluded. There is anasarca. Distended and thickened gallbladder. Consider ultrasonography to evaluate cholecystitis. CT HEAD WO CONTRAST    Result Date: 3/19/2022  EXAMINATION: CT OF THE HEAD WITHOUT CONTRAST  3/19/2022 1:34 pm TECHNIQUE: CT of the head was performed without the administration of intravenous contrast. Dose modulation, iterative reconstruction, and/or weight based adjustment of the mA/kV was utilized to reduce the radiation dose to as low as reasonably achievable. COMPARISON: None. HISTORY: ORDERING SYSTEM PROVIDED HISTORY: AMS TECHNOLOGIST PROVIDED HISTORY: Has a \"code stroke\" or \"stroke alert\" been called? ->No Reason for exam:->AMS Decision Support Exception - unselect if not a suspected or confirmed emergency medical condition->Emergency Medical Condition (MA) FINDINGS: BRAIN/VENTRICLES: There is no acute intracranial hemorrhage, mass effect or midline shift. No abnormal extra-axial fluid collection. The gray-white differentiation is maintained without evidence of an acute infarct. There is no evidence of hydrocephalus. ORBITS: The visualized portion of the orbits demonstrate no acute abnormality. SINUSES: The visualized paranasal sinuses and mastoid air cells demonstrate no acute abnormality. SOFT TISSUES/SKULL:  No acute abnormality of the visualized skull or soft tissues.      No acute intracranial abnormality. Specifically, there is no acute intracranial hemorrhage     CT CHEST WO CONTRAST    Result Date: 3/19/2022  EXAMINATION: CT OF THE CHEST WITHOUT CONTRAST; CT OF THE ABDOMEN AND PELVIS WITHOUT CONTRAST 3/19/2022 1:34 pm TECHNIQUE: CT of the chest was performed without the administration of intravenous contrast. Multiplanar reformatted images are provided for review. Dose modulation, iterative reconstruction, and/or weight based adjustment of the mA/kV was utilized to reduce the radiation dose to as low as reasonably achievable.; CT of the abdomen and pelvis was performed without the administration of intravenous contrast. Multiplanar reformatted images are provided for review. Dose modulation, iterative reconstruction, and/or weight based adjustment of the mA/kV was utilized to reduce the radiation dose to as low as reasonably achievable. COMPARISON: 11/25/2021, and CT abdomen pelvis 11/26/2021 HISTORY: ORDERING SYSTEM PROVIDED HISTORY: AMS, effusion, pna? TECHNOLOGIST PROVIDED HISTORY: Reason for exam:->AMS, effusion, pna? Decision Support Exception - unselect if not a suspected or confirmed emergency medical condition->Emergency Medical Condition (MA) FINDINGS: The examination is very limited due to lack of contrast. CT chest. Heart size is in the upper limits of normal.  The great vessels are normal. Endotracheal tube and nasogastric tube are noted. There is atelectasis/infiltrates in the lung bases concerning for pneumonia. There is thickening of the GE junction likely hiatal hernia with paraesophageal varices. CT abdomen pelvis. There is hepatosplenomegaly with liver measuring 21 cm with diffuse lesions in the left and right hepatic lobe measuring up to 2.8 x 3.7 cm. These may represent hepatic cirrhosis with regenerating nodules or diffuse multifocal malignancy. There is splenomegaly with spleen measuring 18 cm with suggestion of portal venous hypertension, and venous varices. Gallbladder is distended with Ca cholecystic edema and wall thickening. Consider ultrasonography. Pancreas, the adrenals and the kidneys are grossly normal. There is suggestion of recanalization of umbilical vein. Umbilical hernia is identified with herniation of bowel loops. Pelvis. Postoperative changes are identified in the colon with the poor delineation of the bowel anatomy. There is diffusely thickened bowel loops which may include small bowel loops and colon with edema in the mesentery and between bowel loops. Ascites is present in the pelvis. Bladder is collapsed with indwelling Cannon catheter and wall thickening. Ascites fluid is present in the pelvis. There is anasarca. Significantly limited study due to lack of contrast. Atelectasis/infiltrates in the lung bases concerning for pneumonia. Hepatosplenomegaly with heterogeneous ill-defined hepatic lesions as noted concerning for cirrhosis with possible regenerating nodules or diffuse malignancy. Further assessment by CT with contrast or MRI may be considered. There is portal venous hypertension with venous varices as noted and moderate amount of high density ascites. Diffuse edema in the mesentery with dilated thickened bowel loops concerning for enterocolitis. Developing peritonitis cannot be excluded. There is anasarca. Distended and thickened gallbladder. Consider ultrasonography to evaluate cholecystitis. CT CERVICAL SPINE WO CONTRAST    Result Date: 3/19/2022  EXAMINATION: CT OF THE CERVICAL SPINE WITHOUT CONTRAST 3/19/2022 1:34 pm TECHNIQUE: CT of the cervical spine was performed without the administration of intravenous contrast. Multiplanar reformatted images are provided for review. Dose modulation, iterative reconstruction, and/or weight based adjustment of the mA/kV was utilized to reduce the radiation dose to as low as reasonably achievable. COMPARISON: None.  HISTORY: ORDERING SYSTEM PROVIDED HISTORY: AMS possible fall TECHNOLOGIST PROVIDED HISTORY: Reason for exam:->AMS possible fall Decision Support Exception - unselect if not a suspected or confirmed emergency medical condition->Emergency Medical Condition (MA) FINDINGS: BONES/ALIGNMENT: There is no acute fracture or traumatic malalignment. The ring of C1 is intact as is the dense. There is no compression fracture of the cervical spine. No jumped or perched facet is noted. There is a stable 6 mm cyst seen within the posterior aspect of the C5 vertebral body. This is unchanged when compared to patient's prior study of 12/29/2016. DEGENERATIVE CHANGES: No significant degenerative changes. SOFT TISSUES: There is no prevertebral soft tissue swelling. 1. There is no acute fracture or subluxation of the cervical spine. US GALLBLADDER RUQ    Result Date: 3/19/2022  EXAMINATION: RIGHT UPPER QUADRANT ULTRASOUND 3/19/2022 5:05 pm COMPARISON: CT today HISTORY: ORDERING SYSTEM PROVIDED HISTORY: Cholecystitis? TECHNOLOGIST PROVIDED HISTORY: Reason for exam:->Cholecystitis? What reading provider will be dictating this exam?->CRC FINDINGS: LIVER:  There is increased echogenicity BILIARY SYSTEM:  Gallbladder wall thickening present and sludge Common bile duct is within normal limits measuring 5 mm. RIGHT KIDNEY: No right hydronephrosis on survey views. Medical renal disease not excluded PANCREAS:  Is not well seen due to overlying structures OTHER: There is ascites in the right upper quadrant     1. Densely echogenic liver with nodular contour consistent with cirrhosis but also as correlated with CT cannot exclude internal lesions. Recommend MRI evaluation as malignancy is not excluded 2. Associated ascites and possible medical renal disease 3.  Gallbladder wall thickening and sludge also may relate to the hepatocellular disease, but if concern for cholecystitis HIDA scan could further evaluate     XR CHEST PORTABLE    Result Date: 3/19/2022  EXAMINATION: ONE XRAY VIEW OF THE CHEST 3/19/2022 7:07 pm COMPARISON: Chest series from November 24, 2021 HISTORY: ORDERING SYSTEM PROVIDED HISTORY: Evaluate ETT position TECHNOLOGIST PROVIDED HISTORY: Reason for exam:->Evaluate ETT position FINDINGS: Medical support devices: Endotracheal tube terminates in the midthoracic trachea. Enteric tube extends subdiaphragmatic and off the field of view. Lungs: Symmetric lung inflation. Faint interstitial opacities bilaterally. No pleural effusion or pneumothorax. Cardiomediastinal silhouette and pulmonary vascularity: Cardiac silhouette is prominent. Mild central pulmonary vascular congestion. Osseous and soft tissue structures: No acute findings. 1.  Endotracheal tube terminates in the midthoracic trachea. Enteric tube extends subdiaphragmatic and off the field of view. 2.  Prominence of the cardiac silhouette. Faint interstitial opacities bilaterally. CT FEMUR LEFT WO CONTRAST    Result Date: 3/21/2022  EXAMINATION: CT OF THE LEFT FEMUR WITHOUT CONTRAST 3/21/2022 2:09 pm TECHNIQUE: CT of the left femur was performed without the administration of intravenous contrast.  Multiplanar reformatted images are provided for review. Dose modulation, iterative reconstruction, and/or weight based adjustment of the mA/kV was utilized to reduce the radiation dose to as low as reasonably achievable. COMPARISON: None. HISTORY ORDERING SYSTEM PROVIDED HISTORY: hematoma TECHNOLOGIST PROVIDED HISTORY: Reason for exam:->hematoma FINDINGS: Bones: No evidence of fracture or dislocation. No gross osseous erosion. Soft Tissue: Small knee joint effusion of simple fluid, not a hemarthrosis. There is diffuse subcutaneous edema of the left thigh and the visualized left calf. There is subcutaneous edema in the visualized right lower extremity as well. Rectal tube; surgical changes of the rectum noted. Cannon catheter decompresses the bladder. There is free fluid in the pelvis.   Partially visualized right femoral vascular catheter. No soft tissue gas. No abscess. Diffuse subcutaneous edema. No discrete hematoma. No fracture or dislocation. Small knee joint effusion. No hemarthrosis. XR CHEST ABDOMEN NG PLACEMENT    Result Date: 3/19/2022  EXAMINATION: ONE SUPINE XRAY VIEW(S) OF THE ABDOMEN 3/19/2022 2:09 pm COMPARISON: CT chest from the same day. HISTORY: ORDERING SYSTEM PROVIDED HISTORY: AMS, ng placement TECHNOLOGIST PROVIDED HISTORY: Reason for exam:->AMS, ng placement FINDINGS: Partially imaged endotracheal tube in the distal thoracic trachea. Increased pulmonary vascular congestion. Mild prominence of the cardiac silhouette. Confluent opacity in the left retrocardiac region. Paucity of bowel gas in the upper abdomen. Partially imaged enteric tube with distal tip and side port extending subdiaphragmatic. Distal tip projects over the left upper quadrant. 1.  Satisfactory position of the enteric tube. 2.  Dense left retrocardiac opacity         HOSPITAL COURSE:   Unfortunately, Carolee Francois did very poorly throughout the hospitalization. She was found to be suffering from septic shock with multiple sources of infection including pyelonephritis, community-acquired pneumonia, and peritonitis. The patient is a significant abuser of alcohol and was decompensated from a cirrhosis standpoint. She also developed anemia and significant hematologic disorders in the setting of known varices and peptic ulcer disease. She was markedly hyperammonemic with hepatic encephalopathy. She developed renal failure as well. She required intubation and was maintained on ventilatory support with little improvement in her mentation. Family presented to the bedside and in the setting of the patient's critical status, ultimately opted for compassionate extubation. She was pronounced  by the critical care team at 5:55 PM on 3/22/2022.     Preparing for this patient's discharge, including paperwork, orders, instructions, and meeting with patient did require > 40 minutes.     Rhett Meadows DO   3/23/2022  11:12 AM

## 2022-03-25 LAB
BLOOD CULTURE, ROUTINE: NORMAL
CULTURE, BLOOD 2: NORMAL

## 2023-05-12 NOTE — PROGRESS NOTES
----- Message from Del Yu MD sent at 5/11/2023  9:16 PM CDT -----  Let him know recent blood work showed normal kidney function and a glycohemoglobin of 6.4 which reflects reasonable control blood sugars      Del Yu MD    ----- Message -----  From: Maria Elena Trevino MA  Sent: 5/10/2023   9:36 AM CDT  To: Del Yu MD    Saw Diabetic Ed on 4/27 but no provider, no upcoming visits.  Please advise.     PROGRESS NOTE  By Cristina Ren M.D. [facility]  Dr. Zach Pereira M.D.,  Dr. Margaret Dexter M.D.,   Dr. Rosangela Watkins D.O.,  Dr. Jazmin Montoya M.D.,  Dr. Iman Platt D.O.,  Anne Gilliam D.O. Lelo Grew  51 y.o.  female    SUBJECTIVE:  No new complaints. No nausea or vomiting    OBJECTIVE:    /67   Pulse 89   Temp 98.6 °F (37 °C)   Resp 18   Ht 5' 8\" (1.727 m)   Wt 210 lb (95.3 kg)   SpO2 100%   BMI 31.93 kg/m²     General: NAD/ female. AAO x3  HEENT: Persistent scleral icterus/moist oral mucosa  Neck: Supple/trachea is midline  Chest: Symmetrical excursion/nonlabored respirations  Abd.: Soft and obese  Extr.:  Decreased muscle tone and bulk throughout  Skin: Warm and dry. Persistent icterus        DATA:       Lab Results   Component Value Date    WBC 5.2 09/01/2021    RBC 2.26 09/01/2021    HGB 8.8 09/01/2021    HCT 27.4 09/01/2021    .7 09/01/2021    MCH 33.2 09/01/2021    MCHC 32.3 09/01/2021    RDW 18.9 09/01/2021    PLT 70 09/01/2021    MPV 12.8 09/01/2021     Lab Results   Component Value Date     09/01/2021    K 3.0 09/01/2021    K 2.9 01/27/2021     09/01/2021    CO2 17 09/01/2021    BUN 7 09/01/2021    CREATININE 0.9 09/01/2021    CALCIUM 8.2 09/01/2021    PROT 6.5 09/01/2021    LABALBU 2.3 09/01/2021    LABALBU 3.6 08/11/2011    BILITOT 15.5 09/01/2021    ALKPHOS 172 09/01/2021     09/01/2021    ALT 58 09/01/2021     Lab Results   Component Value Date    LIPASE 63 08/31/2021     Lab Results   Component Value Date    AMYLASE 123 08/31/2021         ASSESSMENT/PLAN:    1.   Decompensated alcoholic cirrhosis/ Acute alcoholic hepatitis     -MDF 98, portends poor prognosis; hold off on steroids - positive blood cultures   -MELD-NA: 30 presentation, previously 20-24 and most recent admission in 1/2021  -Recently evaluated for liver transplant and was deemed not a candidate  -Jaundice most likely related to alcoholic hepatitis  - Right upper quad ultrasound and CT scan concerning for thickened GB, but more likely related to systemic effect  - MRCP without signs of focal obstruction, though limited study  -Icteric sclera, no ascites, no asterixis  -Ammonia markedly elevated with sleep disturbance; Hepatic encephalopathy, type C, overt, grade 1, recurrent  - Continue rifaximin 550mg BID and lactulose, titrated to 2-3 BM daily  -Continue supportive care  -Daily CMP and INR     2. Normocytic anemia, unspecified  -Likely secondary hypoproliferative or myelosuppression secondary to acute on chronic alcohol abuse  --No part signs of overt GI bleeding -decreased H&H   -Recent EGD and colonoscopy at [facility] [place] in December 2020 unremarkable for any etiology  -Plan to repeat EGD tomorrow-        3. Ulcerative colitis s/p IPAA  -Suspect diarrhea secondary to #1  --Not on any maintenance medications    4. Bacteremia  -Defer antibiotics to admitting service  -Consider ID evaluation       Monica Lowry MD  9/1/2021  12:23 PM    NOTE:  This report was transcribed using voice recognition software. Every effort was made to ensure accuracy; however, inadvertent computerized transcription errors may be present.

## 2024-10-01 NOTE — PROGRESS NOTES
[facility]     [address] [place] [address] 80  [place], [facility]  Phone [phone]   [phone]      Admit Date: 3/19/2022 12:40 PM  Pt Name: Antonio Brown  MRN: [id number]  : 1975  Reason for Consult:    Chief Complaint   Patient presents with    Altered Mental Status     unresponsive, jaundiced, liver disease, lkw 36 hours ago, found on couch in urine and feces     Requesting Physician:  Marlo Campos DO  PCP: Mayuri Peter MD  History Obtained From:  patient, chart   ID consulted for Metabolic encephalopathy [W50.80]  Lactic acidosis [E87.2]  Acute blood loss anemia [D62]  Hyperammonemia ([address] [E72.20]  Hypoglycemia [E16.2]  Elevated INR [R79.1]  Elevated troponin [R77.8]  History of alcohol abuse [F10.11]  Hypothermia, initial encounter [T68. XXXA]  Urinary tract infection with hematuria, site unspecified [N39.0, R31.9]  Gastrointestinal hemorrhage, unspecified gastrointestinal hemorrhage type [K92.2]  Decompensation of cirrhosis of liver ([address] [K72.90, K74.60]  on hospital day 2  CHIEF COMPLAINT       Chief Complaint   Patient presents with    Altered Mental Status     unresponsive, jaundiced, liver disease, lkw 36 hours ago, found on couch in urine and feces     HISTORYOF PRESENT ILLNESS   Antonio Brown is a 51 y.o. female who presents with   has a past medical history of Acute renal failure (ARF) ([address] Alcoholic cirrhosis ([address] Anxiety, Crohn's colitis ([address] Depression, History of blood transfusion, Pyoderma gangrenosum, and Thyroid disease.       Altered Mental Status      Pt is known to ID  Last seen on 2021 for vre/cons bacteremia  dapto ended 2021  Chart reviewed    Pt came from home  in due to found at home unresponsive  Pt with h/o ETOH/cirrhosis    Pt was seen in ICU unable to obtain hpi/ros  afebrile intubated  Cr1.8  alt42 nh3 325 wvs116 wbc7.8 plt36  UA cloudy LE/protein  ID was asked to see for infection   Currently received ceftriaxone/doxy  daptomycin  was ordered  Spoke with Dr Corbin Lindsay  03/21/22   Afebrile on vent 45% jaundice   Cr2.7 wbc5.9 plt25    3/20/2022  In ICU  In bed/intubated  Jaundice   afebrile yik414%  Cr1.8  ->2.5  9.2 plt28   Blood c Klebsiella/CONS   REVIEW OF SYSTEMS     CONSTITUTIONAL:   unablr due to intubated    Medications Prior to Admission: spironolactone (ALDACTONE) 25 MG tablet, Take 1 tablet by mouth daily  cyanocobalamin 2000 MCG tablet, Take 1 tablet by mouth daily  pyridoxine (B-6) 25 MG tablet, Take 1 tablet by mouth daily  vitamin D (ERGOCALCIFEROL) 1.25 MG (66659 UT) CAPS capsule, Take 1 capsule by mouth once a week Monday  potassium chloride (KLOR-CON M) 10 MEQ extended release tablet, Take 20 mEq by mouth daily  lactulose (CHRONULAC) 10 GM/15ML solution, Take 20 g by mouth every 12 hours  propranolol (INDERAL) 10 MG tablet, Take 20 mg by mouth every 12 hours   rifaximin (XIFAXAN) 550 MG tablet, Take 550 mg by mouth 2 times daily  cloNIDine (CATAPRES) 0.1 MG tablet, Take 0.1 mg by mouth daily  magnesium oxide (MAG-OX) 400 MG tablet, Take 400 mg by mouth daily  vitamin B-1 (THIAMINE) 100 MG tablet, Take 100 mg by mouth daily  folic acid (FOLVITE) 1 MG tablet, Take 1 tablet by mouth daily  thiamine mononitrate 100 MG tablet, Take 1 tablet by mouth daily  pantoprazole (PROTONIX) 40 MG tablet, Take 40 mg by mouth daily   CURRENT MEDICATIONS     Current Facility-Administered Medications:     sodium chloride flush 0.9 % injection 5-40 mL, 5-40 mL, IntraVENous, 2 times per day, Yvette Carrillo MD, 10 mL at 03/21/22 1227    sodium chloride flush 0.9 % injection 5-40 mL, 5-40 mL, IntraVENous, PRN, Yvette Carrillo MD    0.9 % sodium chloride infusion, 25 mL, IntraVENous, PRN, Yvette Carrillo MD    lactated ringers infusion, , IntraVENous, Continuous, Yvette Carrillo MD, Last Rate: 100 mL/hr at 03/21/22 1104, New Bag at 78/02/15 8687    folic acid (FOLVITE) tablet 1 mg, 1 mg, Oral, Daily, Gerald Mcrae MD, 1 mg at 03/21/22 1119    midodrine (PROAMATINE) tablet 15 mg, 15 mg, Oral, Q8H, Gerald Mcrae MD, 15 mg at 03/21/22 1119    lactulose (CHRONULAC) 10 GM/15ML solution 20 g, 20 g, Oral, Q2H, Veronica Montgomery MD    glucose (GLUTOSE) 40 % oral gel 15 g, 15 g, Oral, PRN, Savanna Brisk, DO    glucagon (rDNA) injection 1 mg, 1 mg, IntraMUSCular, PRN, Ismail U Ton, DO    dextrose 5 % solution, 100 mL/hr, IntraVENous, PRN, Ismail U Ton, DO    sodium chloride flush 0.9 % injection 5-40 mL, 5-40 mL, IntraVENous, 2 times per day, Savanna Brisk, DO, 10 mL at 03/21/22 0850    sodium chloride flush 0.9 % injection 5-40 mL, 5-40 mL, IntraVENous, PRN, Savanna Brisk, DO    0.9 % sodium chloride infusion, 25 mL, IntraVENous, PRN, Savanna Brisk, DO, Stopped at 03/21/22 6260    polyethylene glycol (GLYCOLAX) packet 17 g, 17 g, Oral, Daily PRN, Savanna Brisk, DO    acetaminophen (TYLENOL) tablet 650 mg, 650 mg, Oral, Q6H PRN **OR** acetaminophen (TYLENOL) suppository 650 mg, 650 mg, Rectal, Q6H PRN, Savanna Brisk, DO    albumin human 25 % IV solution 25 g, 25 g, IntraVENous, Q6H, Judith Mcgraw MD, Stopped at 03/21/22 1323    dextrose bolus (hypoglycemia) 10% 125 mL, 125 mL, IntraVENous, PRN **OR** dextrose bolus (hypoglycemia) 10% 250 mL, 250 mL, IntraVENous, PRN, Savanna Brisk, DO    octreotide (SANDOSTATIN) 500 mcg in sodium chloride 0.9 % 100 mL infusion, 50 mcg/hr, IntraVENous, Continuous, Ismail U Ton, DO, Last Rate: 10 mL/hr at 03/21/22 0826, 50 mcg/hr at 03/21/22 0826    0.9 % sodium chloride infusion, , IntraVENous, Once, Savanna Brisk, DO    thiamine (B-1) 500 mg in sodium chloride 0.9 % 100 mL IVPB, 500 mg, IntraVENous, Q8H, Stopped at 03/21/22 0715 **FOLLOWED BY** [START ON 3/22/2022] thiamine (B-1) 100 mg in sodium chloride 0.9 % 100 mL IVPB, 100 mg, IntraVENous, Q24H, Savanna Brisk, DO   propofol injection, 5-50 mcg/kg/min, IntraVENous, Continuous, Bobby Chawla DO, Stopped at 03/19/22 1551    chlorhexidine (PERIDEX) 0.12 % solution 15 mL, 15 mL, Mouth/Throat, BID, Ismail U Ton DO, 15 mL at 03/21/22 0754    rifaximin (XIFAXAN) tablet 550 mg, 550 mg, Oral, TID, Bobby Chawla DO, 550 mg at 03/21/22 0850    doxycycline (VIBRAMYCIN) 100 mg in dextrose 5 % 100 mL IVPB, 100 mg, IntraVENous, Q12H, Bobby Chawla DO, Stopped at 03/21/22 0510    hydrocortisone sodium succinate PF (SOLU-CORTEF) injection 50 mg, 50 mg, IntraVENous, Q6H, Ismail U Ton DO, 50 mg at 03/21/22 1100    pantoprazole (PROTONIX) 40 mg in sodium chloride (PF) 10 mL injection, 40 mg, IntraVENous, Q12H, Ismail U Ton DO, 40 mg at 03/21/22 0408    norepinephrine (LEVOPHED) 16 mg in sodium chloride 0.9 % 250 mL infusion (non-weight-based), 1-100 mcg/min, IntraVENous, Continuous, Bobyb Chawla DO, Stopped at 03/20/22 1900    linezolid (ZYVOX) IVPB 600 mg, 600 mg, IntraVENous, Q12H, Werner Peña MD, Stopped at 03/21/22 0627    piperacillin-tazobactam (ZOSYN) 3,375 mg in dextrose 5 % 50 mL IVPB extended infusion (mini-bag), 3,375 mg, IntraVENous, Q8H, Werner Peña MD, Last Rate: 12.5 mL/hr at 03/21/22 1127, 3,375 mg at 03/21/22 1127  ALLERGIES     Patient has no known allergies.   Immunization History   Administered Date(s) Administered    COVID-19, Pfizer Purple top, DILUTE for use, 12+ yrs, 30mcg/0.3mL dose 04/14/2021, 06/01/2021    Influenza, Quadv, IM, PF (6 mo and older Fluzone, Flulaval, Fluarix, and 3 yrs and older Afluria) 10/28/2021      Internal Administration   First Dose COVID-19, Pfizer Purple top, DILUTE for use, 12+ yrs, 30mcg/0.3mL dose  04/14/2021   Second Dose COVID-19, Pfizer Purple top, DILUTE for use, 12+ yrs, 30mcg/0.3mL dose   06/01/2021       Last COVID Lab SARS-CoV-2, PCR (no units)   Date Value   03/19/2022 Not Detected     SARS-CoV-2, NAAT (no units)   Date Value 11/24/2021 Not Detected        PAST MEDICAL HISTORY     Past Medical History:   Diagnosis Date    Acute renal failure (ARF) ([facility] 06/11/0815    Alcoholic cirrhosis ([facility]     Anxiety     Crohn's colitis ([facility]     Depression     History of blood transfusion     Pyoderma gangrenosum     Thyroid disease      SURGICAL HISTORY       Past Surgical History:   Procedure Laterality Date    ABDOMEN SURGERY      pt has a J-pouch     ARM DEBRIDEMENT      R arm-2010-2011??    ARM DEBRIDEMENT      COLONOSCOPY      DILATATION, ESOPHAGUS      ENDOSCOPY, COLON, DIAGNOSTIC      SIGMOIDOSCOPY N/A 12/6/2021    SIGMOIDOSCOPY BIOPSY FLEXIBLE performed by Franki Ureña MD at [address]  TRANSESOPHAGEAL ECHOCARDIOGRAM N/A 9/3/2021    TRANSESOPHAGEAL ECHOCARDIOGRAM WITH BUBBLE STUDY performed by Piero Gamez MD at [facility] N/A 9/2/2021    EGD ESOPHAGOGASTRODUODENOSCOPY performed by Andre Caba MD at [facility] 12/6/2021    EGD CONTROL HEMORRHAGE performed by Franki Ureña MD at [facility] N/A 12/6/2021    EGD BIOPSY performed by Franki Ureña MD at [address] [place] [address] Road:    03/21/22 1158 03/21/22 1200 03/21/22 1205 03/21/22 1300   BP:  114/64  110/60   Pulse:  65 67 66   Resp:  30 18 24   Temp:  98.4 °F (36.9 °C)     TempSrc:  Bladder     SpO2:  93% 94% 90%   Weight:       Height: 5' 8\" (1.727 m)        Physical Exam   Constitutional/General: vent sedated NAD  Head: NC/AT  Pulmonary: Lungs dec  to auscultation bilaterally. Vent   Cardiovascular:  tachy    no murmurs, gallops, or rubs. Abdomen: Soft, dec BS. No distension. Nontender.     Extremities:  Warm and well perfused edema  Skin: Warm and dry without rash  Neurologic:    sedated   right fem tlc 3/19     DIAGNOSTIC RESULTS   RADIOLOGY:   CT ABDOMEN PELVIS WO CONTRAST Additional Contrast? None    Result Date: 3/19/2022  EXAMINATION: CT OF THE CHEST WITHOUT CONTRAST; CT OF THE ABDOMEN AND PELVIS WITHOUT CONTRAST 3/19/2022 1:34 pm TECHNIQUE: CT of the chest was performed without the administration of intravenous contrast. Multiplanar reformatted images are provided for review. Dose modulation, iterative reconstruction, and/or weight based adjustment of the mA/kV was utilized to reduce the radiation dose to as low as reasonably achievable.; CT of the abdomen and pelvis was performed without the administration of intravenous contrast. Multiplanar reformatted images are provided for review. Dose modulation, iterative reconstruction, and/or weight based adjustment of the mA/kV was utilized to reduce the radiation dose to as low as reasonably achievable. COMPARISON: 11/25/2021, and CT abdomen pelvis 11/26/2021 HISTORY: ORDERING SYSTEM PROVIDED HISTORY: AMS, effusion, pna? TECHNOLOGIST PROVIDED HISTORY: Reason for exam:->AMS, effusion, pna? Decision Support Exception - unselect if not a suspected or confirmed emergency medical condition->Emergency Medical Condition (MA) FINDINGS: The examination is very limited due to lack of contrast. CT chest. Heart size is in the upper limits of normal.  The great vessels are normal. Endotracheal tube and nasogastric tube are noted. There is atelectasis/infiltrates in the lung bases concerning for pneumonia. There is thickening of the GE junction likely hiatal hernia with paraesophageal varices. CT abdomen pelvis. There is hepatosplenomegaly with liver measuring 21 cm with diffuse lesions in the left and right hepatic lobe measuring up to 2.8 x 3.7 cm. These may represent hepatic cirrhosis with regenerating nodules or diffuse multifocal malignancy. There is splenomegaly with spleen measuring 18 cm with suggestion of portal venous hypertension, and venous varices. Gallbladder is distended with Ca cholecystic edema and wall thickening. Consider ultrasonography.   Pancreas, the Vaccine status unknown adrenals and the kidneys are grossly normal. There is suggestion of recanalization of umbilical vein. Umbilical hernia is identified with herniation of bowel loops. Pelvis. Postoperative changes are identified in the colon with the poor delineation of the bowel anatomy. There is diffusely thickened bowel loops which may include small bowel loops and colon with edema in the mesentery and between bowel loops. Ascites is present in the pelvis. Bladder is collapsed with indwelling Cannon catheter and wall thickening. Ascites fluid is present in the pelvis. There is anasarca. Significantly limited study due to lack of contrast. Atelectasis/infiltrates in the lung bases concerning for pneumonia. Hepatosplenomegaly with heterogeneous ill-defined hepatic lesions as noted concerning for cirrhosis with possible regenerating nodules or diffuse malignancy. Further assessment by CT with contrast or MRI may be considered. There is portal venous hypertension with venous varices as noted and moderate amount of high density ascites. Diffuse edema in the mesentery with dilated thickened bowel loops concerning for enterocolitis. Developing peritonitis cannot be excluded. There is anasarca. Distended and thickened gallbladder. Consider ultrasonography to evaluate cholecystitis. CT HEAD WO CONTRAST    Result Date: 3/19/2022  EXAMINATION: CT OF THE HEAD WITHOUT CONTRAST  3/19/2022 1:34 pm TECHNIQUE: CT of the head was performed without the administration of intravenous contrast. Dose modulation, iterative reconstruction, and/or weight based adjustment of the mA/kV was utilized to reduce the radiation dose to as low as reasonably achievable. COMPARISON: None. HISTORY: ORDERING SYSTEM PROVIDED HISTORY: AMS TECHNOLOGIST PROVIDED HISTORY: Has a \"code stroke\" or \"stroke alert\" been called? ->No Reason for exam:->AMS Decision Support Exception - unselect if not a suspected or confirmed emergency medical condition->Emergency Medical Condition (MA) FINDINGS: BRAIN/VENTRICLES: There is no acute intracranial hemorrhage, mass effect or midline shift. No abnormal extra-axial fluid collection. The gray-white differentiation is maintained without evidence of an acute infarct. There is no evidence of hydrocephalus. ORBITS: The visualized portion of the orbits demonstrate no acute abnormality. SINUSES: The visualized paranasal sinuses and mastoid air cells demonstrate no acute abnormality. SOFT TISSUES/SKULL:  No acute abnormality of the visualized skull or soft tissues. No acute intracranial abnormality. Specifically, there is no acute intracranial hemorrhage     CT CHEST WO CONTRAST    Result Date: 3/19/2022  EXAMINATION: CT OF THE CHEST WITHOUT CONTRAST; CT OF THE ABDOMEN AND PELVIS WITHOUT CONTRAST 3/19/2022 1:34 pm TECHNIQUE: CT of the chest was performed without the administration of intravenous contrast. Multiplanar reformatted images are provided for review. Dose modulation, iterative reconstruction, and/or weight based adjustment of the mA/kV was utilized to reduce the radiation dose to as low as reasonably achievable.; CT of the abdomen and pelvis was performed without the administration of intravenous contrast. Multiplanar reformatted images are provided for review. Dose modulation, iterative reconstruction, and/or weight based adjustment of the mA/kV was utilized to reduce the radiation dose to as low as reasonably achievable. COMPARISON: 11/25/2021, and CT abdomen pelvis 11/26/2021 HISTORY: ORDERING SYSTEM PROVIDED HISTORY: AMS, effusion, pna? TECHNOLOGIST PROVIDED HISTORY: Reason for exam:->AMS, effusion, pna?  Decision Support Exception - unselect if not a suspected or confirmed emergency medical condition->Emergency Medical Condition (MA) FINDINGS: The examination is very limited due to lack of contrast. CT chest. Heart size is in the upper limits of normal.  The great vessels are normal. Endotracheal tube and nasogastric tube are noted. There is atelectasis/infiltrates in the lung bases concerning for pneumonia. There is thickening of the GE junction likely hiatal hernia with paraesophageal varices. CT abdomen pelvis. There is hepatosplenomegaly with liver measuring 21 cm with diffuse lesions in the left and right hepatic lobe measuring up to 2.8 x 3.7 cm. These may represent hepatic cirrhosis with regenerating nodules or diffuse multifocal malignancy. There is splenomegaly with spleen measuring 18 cm with suggestion of portal venous hypertension, and venous varices. Gallbladder is distended with Ca cholecystic edema and wall thickening. Consider ultrasonography. Pancreas, the adrenals and the kidneys are grossly normal. There is suggestion of recanalization of umbilical vein. Umbilical hernia is identified with herniation of bowel loops. Pelvis. Postoperative changes are identified in the colon with the poor delineation of the bowel anatomy. There is diffusely thickened bowel loops which may include small bowel loops and colon with edema in the mesentery and between bowel loops. Ascites is present in the pelvis. Bladder is collapsed with indwelling Cannon catheter and wall thickening. Ascites fluid is present in the pelvis. There is anasarca. Significantly limited study due to lack of contrast. Atelectasis/infiltrates in the lung bases concerning for pneumonia. Hepatosplenomegaly with heterogeneous ill-defined hepatic lesions as noted concerning for cirrhosis with possible regenerating nodules or diffuse malignancy. Further assessment by CT with contrast or MRI may be considered. There is portal venous hypertension with venous varices as noted and moderate amount of high density ascites. Diffuse edema in the mesentery with dilated thickened bowel loops concerning for enterocolitis. Developing peritonitis cannot be excluded. There is anasarca. Distended and thickened gallbladder.   Consider ultrasonography to evaluate cholecystitis. CT CERVICAL SPINE WO CONTRAST    Result Date: 3/19/2022  EXAMINATION: CT OF THE CERVICAL SPINE WITHOUT CONTRAST 3/19/2022 1:34 pm TECHNIQUE: CT of the cervical spine was performed without the administration of intravenous contrast. Multiplanar reformatted images are provided for review. Dose modulation, iterative reconstruction, and/or weight based adjustment of the mA/kV was utilized to reduce the radiation dose to as low as reasonably achievable. COMPARISON: None. HISTORY: ORDERING SYSTEM PROVIDED HISTORY: AMS possible fall TECHNOLOGIST PROVIDED HISTORY: Reason for exam:->AMS possible fall Decision Support Exception - unselect if not a suspected or confirmed emergency medical condition->Emergency Medical Condition (MA) FINDINGS: BONES/ALIGNMENT: There is no acute fracture or traumatic malalignment. The ring of C1 is intact as is the dense. There is no compression fracture of the cervical spine. No jumped or perched facet is noted. There is a stable 6 mm cyst seen within the posterior aspect of the C5 vertebral body. This is unchanged when compared to patient's prior study of 12/29/2016. DEGENERATIVE CHANGES: No significant degenerative changes. SOFT TISSUES: There is no prevertebral soft tissue swelling. 1. There is no acute fracture or subluxation of the cervical spine. XR CHEST ABDOMEN NG PLACEMENT    Result Date: 3/19/2022  EXAMINATION: ONE SUPINE XRAY VIEW(S) OF THE ABDOMEN 3/19/2022 2:09 pm COMPARISON: CT chest from the same day. HISTORY: ORDERING SYSTEM PROVIDED HISTORY: AMS, ng placement TECHNOLOGIST PROVIDED HISTORY: Reason for exam:->AMS, ng placement FINDINGS: Partially imaged endotracheal tube in the distal thoracic trachea. Increased pulmonary vascular congestion. Mild prominence of the cardiac silhouette. Confluent opacity in the left retrocardiac region. Paucity of bowel gas in the upper abdomen.  Partially imaged enteric tube with distal tip and side port extending subdiaphragmatic. Distal tip projects over the left upper quadrant. 1.  Satisfactory position of the enteric tube. 2.  Dense left retrocardiac opacity     LABS  Recent Labs     03/20/22  1822 03/20/22  2240 03/21/22  0508   WBC 6.2 6.2 5.9   HGB 6.3* 7.0* 7.0*   HCT 18.2* 20.6* 20.6*   MCV 91.9 90.4 92.0   PLT 23* 22* 25*     Recent Labs     03/19/22  1905 03/19/22  1905 03/20/22  0658 03/20/22  0658 03/21/22  0508     --  127*  --  131*   K 3.5   < > 2.8*   < > 3.3*   CL 90*   < > 89*   < > 94*   CO2 12*   < > 19*   < > 22   BUN 14  --  16  --  20   CREATININE 1.9*  --  2.5*  --  2.7*   GFRAA 34  --  25  --  23   LABGLOM 28  --  21  --  19   GLUCOSE 225*  --  328*  --  169*   PROT 6.0*  --  6.6  --  6.5   LABALBU 2.0*  --  2.8*  --  3.4*   CALCIUM 7.8*  --  8.0*  --  8.3*   BILITOT 13.3*  --  17.2*  --  17.0*   ALKPHOS 149*  --  112*  --  79   *  --  384*  --  179*   ALT 57*  --  79*  --  54*    < > = values in this interval not displayed. Recent Labs     03/21/22  0508   PROCAL 1.50*     Lab Results   Component Value Date    CRP 0.5 (H) 12/13/2021    CRP 0.4 11/24/2021    CRP 15.7 (H) 05/17/2011     Lab Results   Component Value Date    SEDRATE 38 (H) 12/13/2021    SEDRATE 25 (H) 12/05/2021    SEDRATE 97 (H) 11/24/2021     No results found for: [id number]  Lab Results   Component Value Date    COVID19 Not Detected 03/19/2022     COVID-19/YUE-COV2 LABS  Recent Labs     03/19/22  1300 03/19/22  1300 03/19/22  1905 03/19/22  2218 03/20/22  0658 03/20/22  1237 03/21/22  0508   PROCAL  --   --   --   --   --   --  1.50*   FIBRINOGEN  --   --   --  105*  --  126*  --    INR 5.0  --   --   --  3.6  --  3.9   PROTIME 58.9*  --   --   --  42.8*  --  46.8*   *   < > 245*  --  384*  --  179*   ALT 42*   < > 57*  --  79*  --  54*    < > = values in this interval not displayed.      Lab Results   Component Value Date    CHOL 100 09/01/2021    TRIG 164 09/01/2021    HDL 12 09/01/2021 LDLCALC 55 09/01/2021    LABVLDL 33 09/01/2021       Lab Results   Component Value Date    HAV Negative 04/01/2011    HEPAIGM Non-Reactive 07/10/2019    HEPBIGM Non-Reactive 07/10/2019    HEPCAB NON REACT 04/01/2011    HCVABI Non-Reactive 07/10/2019     Hep C Ab Interp   Date Value Ref Range Status   07/10/2019 Non-Reactive NON REACT Final        MICROBIOLOGY:     Lab Results   Component Value Date    [facility]  03/19/2022     Gram stain performed from blood culture bottle media  Gram positive cocci in [facility] Identification and sensitivity to follow 03/19/2022    BC 5 Days no growth 11/25/2021    ORG Staphylococcus species 03/19/2022    ORG Gram negative coni 03/19/2022    ORG Citrobacter species 12/05/2021     Lab Results   Component Value Date    BLOODCULT2  03/19/2022     Gram stain performed from blood culture bottle media  Gram negative rods  Gram stain performed from blood culture bottle media  [facility]      [facility] to follow 03/19/2022    Hollycesar Lisa Previously positive blood culture called 11/24/2021    BLOODCULT2  11/24/2021     This isolate is presumed to be resistant based on the  detection of inducible Clindamycin resistance. Clindamycin  may still be effective in some patients. ORG Staphylococcus species 03/19/2022    ORG Gram negative coni 03/19/2022    ORG Citrobacter species 12/05/2021        Body Fluid Culture, Sterile   Date Value Ref Range Status   10/26/2021   Final    Growth not present  Neisseria gonorrhoeae not isolated       No results found for: CXSURG  Urine Culture, Routine   Date Value Ref Range Status   03/19/2022   Final    10 to 100,000 CFU/mL  Mixed ayan isolated. Further workup and sensitivity testing  is not routinely indicated and will not be performed.   Mixed ayan isolated includes:  Mixed gram negative rods  Proteus species     12/05/2021 >100,000 CFU/ml  Final   11/24/2021 <10,000 CFU/mL  Gram positive organism Final       FINAL IMPRESSION    Patient is a 51 y.o. female who presented with   Chief Complaint   Patient presents with    Altered Mental Status     unresponsive, jaundiced, liver disease, lkw 36 hours ago, found on couch in urine and feces    and admitted for Metabolic encephalopathy [X11.39]  Lactic acidosis [E87.2]  Acute blood loss anemia [D62]  Hyperammonemia ([facility] [E72.20]  Hypoglycemia [E16.2]  Elevated INR [R79.1]  Elevated troponin [R77.8]  History of alcohol abuse [F10.11]  Hypothermia, initial encounter [T68. XXXA]  Urinary tract infection with hematuria, site unspecified [N39.0, R31.9]  Gastrointestinal hemorrhage, unspecified gastrointestinal hemorrhage type [K92.2]  Decompensation of cirrhosis of liver ([facility] [address] [K72.90, K74.60]     Sepsis  GN Klebsiella with CONS repeat blood cx  respiratory failure eval for pneumonia  Gallbladder wall thickening and sludge  Surgery following   Diffuse edema in the mesentery with dilated thickened bowel loops concerning   for enterocolitis.  Developing peritonitis cannot be excluded  -check cx  Liver cirrhosis/GI following   MRSA neg    Await final  ID and sensi      chlorhexidine (PERIDEX) 0.12 % solution 15 mL, BID  rifaximin (XIFAXAN) tablet 550 mg, TID  doxycycline (VIBRAMYCIN) 100 mg in dextrose 5 % 100 mL IVPB, Q12H  hydrocortisone sodium succinate PF (SOLU-CORTEF) injection 50 mg, Q6H  norepinephrine (LEVOPHED) 16 mg in sodium chloride 0.9 % 250 mL infusion (non-weight-based), Continuous  linezolid (ZYVOX) IVPB 600 mg, Q12H  piperacillin-tazobactam (ZOSYN) 3,375 mg in dextrose 5 % 50 mL IVPB extended infusion (mini-bag), Q8H          Imaging and labs were reviewed per medical records    Thank you for involving me in the care of Jimi Gould. Please do not hesitate to call for any questions or concerns.          Electronically signed by Loco Wiley MD on 3/21/2022 at 2:15 PM

## 2025-02-24 NOTE — PROGRESS NOTES
OCCUPATIONAL THERAPY BEDSIDE TREATMENT NOTE   [employer]bie Blink (air taxi) [id number] [address]     EQTH:  Patient Name: Cathay Halsted  MRN: [id number]  : 1975  Room: 8537/1888     Evaluating OT: Alicia Short, OTR/L #[id number]     Referring Provider[de-identified] Kandis Aguilar MD                                    Specific Provider Orders/Date: OT evaluation and treatment 21     Diagnosis:  Hepatic encephalopathy ([facility] [address] [K72.90]  Dehydration [E86.0]  Altered mental status, unspecified altered mental status type [R41.82]       Pertinent Medical History:  has a past medical history of Acute renal failure (ARF) ([facility] [address] Alcoholic cirrhosis ([facility] [address] Anxiety, Crohn's colitis ([facility] [address] Depression, History of blood transfusion, Pyoderma gangrenosum, and Thyroid disease.         Precautions:  Fall Risk, contact iso (VRE blood), incontinence of urine, bed alarm, cognition     Assessment of current deficits   [x]? Functional mobility             [x]?ADLs           [x]? Strength                  [x]? Cognition   [x]? Functional transfers           []? IADLs         [x]? Safety Awareness   [x]? Endurance   []? Fine Coordination              [x]? Balance      []? Vision/perception   []? Sensation     []? Gross Motor Coordination  []? ROM           []?  Delirium                   []? Motor Control      OT PLAN OF CARE   OT POC based on physician orders, patient diagnosis and results of clinical assessment     Frequency/Duration  2-5 days/wk for 2 weeks PRN   Specific OT Treatment to include:   * Instruction/training on adapted ADL techniques and AE recommendations to increase functional independence within precautions       * Functional transfer/mobility training/DME recommendations for increased independence, safety, and fall prevention  * Patient/Family education to increase follow through with safety techniques and functional independence  * Recommendation of environmental To ED for hyperglycemia s/p knee injection 2/19, states sugar was 219 this AM. Today feeling like room is spinning, headache, and dizziness since 1100. +nystagmus and feels like he is going to fall over when walking. VAN negative, no unilateral weakness or facial droop.   modifications for increased safety with functional transfers/mobility and ADLs  * Cognitive retraining/development of therapeutic activities to improve problem solving, judgement, memory, and attention for increased safety/participation in ADL/IADL tasks  * Therapeutic exercise to improve motor endurance, ROM, and functional strength for ADLs/functional transfers  * Therapeutic activities to facilitate/challenge dynamic balance, stand tolerance for increased safety and independence with ADLs  * Positioning to improve skin integrity, interaction with environment and functional independence     Recommended Adaptive Equipment:  TBD      Home Living:  Questionable historian d/t impaired cognition. Per chart review Pt lives with her son in a 2 story with 2-3 step(s) to enter with rail and  bed/bath on 2nd floor   Bathroom setup: tub shower   Equipment owned: crutches     Prior Level of Function: per chart review pt was indep  with ADLs , Independent  with IADLs; ambulated with no AD  Driving: ??   Occupation/leisure: not noted     Pain Level: Pt did not complain of pain this session     Cognition: A&O: self, month and year, confused to place, and situation   Follows 1 step directions: fair               Memory:  poor              Sequencing:  fair               Problem solving:  poor              Judgement/safety:  poor     Functional Assessment:   AM-PAC Daily Activity Raw Score: 18/24       Initial Eval Status  Date: 11/29/21 Treatment Status  Date: 12/2/21 STG=LTG  Time frame: 10-14 days   Feeding Minimal Assist /set up SBA  Independent    Grooming Moderate Assist   To groom matted hair,  SBA  Pt washed hands standing at sink, able to jose luis deodorant and comb hair as simulated task seated EOB Modified [place]    UB Dressing Minimal Assist  SBA  Jose Luis/parveen heck gown and robe seated EOB, assistance to manage of IV line Independent    LB Dressing Moderate Assist   To jose luis socks in bed,  SBA  Pt donned/doffed hospital socks using cross over technique seated EOB     DNT pants this date Modified [place]    Bathing Moderate Assist  Bed level  Poor safety and problem solving SBA  Simulated Task due to pt stating of already getting wash up earlier this date    Pt able to wash of UB, using of cross over technique to wash of LB, with SBA to stand and wash of buttocks/lillian area for safety Modified [place]    Toileting Maximal Assist   Incontinent of urine, pt attempting to clean perineal area but not thorough. SBA  Pt completed toileting task on standard commode, with pt able to manage gown, hygiene to lillian area and transfer with use of grab bar Modified [place]    Bed Mobility  Supine to sit: Minimal Assist   Sit to supine: Minimal Assist  SBA  Supine<>EOB  EOB<>Supine  Supine to sit:  Independent   Sit to supine: Independent    Functional Transfers Sit to stand: Minimal Assist   Stand to sit: Minimal Assist   Stand pivot: NT  SBA  Sit to Stand  Stand to Sit    Cueing for hand placement Independent    Functional Mobility Mod A with HHA  1-2 side steps , unsteady  High risk for falls  SBA  Pt ambulated short household distance in room EOB<>Bathroom with no device, slow pace, assistance to manage of IV poll indep   Balance Sitting: SBA     Standing: mod A Sitting EOB:  Supervision    Standing:  SBA Sitting: indep      Standing: indep   Activity Tolerance poor Fair   good   Visual/  Perceptual Glasses: contacts (per chart)             BUE  ROM/Strength/  Fine motor Coordination Hand dominance: R     RUE: ROM WFL     Strength: grossly 3+/5      Strength:  WFL     Coordination:  fair     LUE: ROM  WFL     Strength: grossly 3+/5      Strength:  WFL     Coordination: fair   Increase BUE strength 1/3 muscle grade for improved indep with functional mobility/transfers          Treatment/Education/Comments: Upon arrival, pt supine in bed, agreeable to therapy, speaking with nursing okaying pt to be seen this session. Pt compelted of bed mobility, functional mobility, transfers and light ADL tasks this session. Pt educated on role of OT, goals to be reached, safety with bed mobility, safety and hand placement with transfers, techniques to assist with LB dressing/bathing tasks. Spoke to pt in regards to home setup, with pt denying need for tucker DME at time of discharge. At end of session, pt seated upright in bed, all tubes and lines intact, call light within reach.  Pt has made fair progress towards set goals.    Continue with current plan of care focusing on increasing of independency with transfers and ADL tasks        Time In: 1:30pm  Time Out: 1:45pm       Min Units   OT Eval Low 03623     OT Eval Medium 47595     OT Eval High 31993     OT Re-Eval 60641          ADL/Self Care 78459 15 1   Therapeutic Activities 15262     Therapeutic Ex 14195     Orthotic Management 41525     Neuro Re-Ed 86051     Non-Billable Time     TOTAL TIMED TREATMENT         Jennifer Ley GOODRICH/L [id number]

## (undated) DEVICE — CONNECTOR IRRIGATION AUXILIARY H2O JET W/ PRT MTL THRD HYDR

## (undated) DEVICE — Device: Brand: DEFENDO VALVE AND CONNECTOR KIT

## (undated) DEVICE — YANKAUER,BULB TIP,W/O VENT,RIGID,STERILE: Brand: MEDLINE

## (undated) DEVICE — GAUZE,SPONGE,POST-OP,4X3,STRL,LF: Brand: MEDLINE

## (undated) DEVICE — SPONGE GZ 4IN 4IN 4 PLY N WVN AVANT

## (undated) DEVICE — BLOCK BITE 60FR CAREGUARD

## (undated) DEVICE — STERILE SURGICAL LUBRICANT, METAL TUBE: Brand: SURGILUBE

## (undated) DEVICE — MASK,FACE,MAXFLUIDPROTECT,SHIELD/ERLPS: Brand: MEDLINE

## (undated) DEVICE — DEFENDO AIR WATER SUCTION AND BIOPSY VALVE KIT FOR  OLYMPUS: Brand: DEFENDO AIR/WATER/SUCTION AND BIOPSY VALVE

## (undated) DEVICE — KIT BEDSIDE REVITAL OX 500ML

## (undated) DEVICE — 6 X 9  1.75MIL 4-WALL LABGUARD: Brand: MINIGRIP COMMERCIAL LLC

## (undated) DEVICE — FORCEPS BX L160CM JAW DIA2.4MM YEL L CAP W/ NDL DISP RAD

## (undated) DEVICE — BITEBLOCK 54FR W/ DENT RIM BLOX

## (undated) DEVICE — FIAPC® PROBE W/ FILTER 2200 SC OD 2.3MM/6.9FR; L 2.2M/7.2FT: Brand: ERBE

## (undated) DEVICE — LUBRICANT SURG JELLY ST BACTER TUBE 4.25OZ

## (undated) DEVICE — GOWN ISOLATN REG YEL M WT MULTIPLY SIDETIE LEV 2

## (undated) DEVICE — TUBING, SUCTION, 1/4" X 10', STRAIGHT: Brand: MEDLINE

## (undated) DEVICE — ELECTRODE PT RET AD L9FT HI MOIST COND ADH HYDRGEL CORDED

## (undated) DEVICE — Z DISCONTINUED NO SUB IDED TUBING ETCO2 AD L6.5FT NSL ORAL CVD PRNG NONFLARED TIP OVR

## (undated) DEVICE — MEDI-VAC NON-CONDUCTIVE SUCTION TUBING: Brand: CARDINAL HEALTH

## (undated) DEVICE — CONTAINER SPEC COLL 960ML POLYPR TRIANG GRAD INTAKE/OUTPUT

## (undated) DEVICE — CONTAINER SPEC 60ML PH 7NEUTRAL BUFF FRMLN RDY TO USE

## (undated) DEVICE — KENDALL 450 SERIES MONITORING FOAM ELECTRODE - RECTANGULAR SHAPE ( 3/PK): Brand: KENDALL

## (undated) DEVICE — MEDI-VAC YANKAUER SUCTION HANDLE W/BULBOUS TIP: Brand: CARDINAL HEALTH